# Patient Record
Sex: FEMALE | Race: ASIAN | Employment: FULL TIME | URBAN - METROPOLITAN AREA
[De-identification: names, ages, dates, MRNs, and addresses within clinical notes are randomized per-mention and may not be internally consistent; named-entity substitution may affect disease eponyms.]

---

## 2017-01-05 ENCOUNTER — OFFICE VISIT (OUTPATIENT)
Dept: FAMILY MEDICINE | Facility: CLINIC | Age: 42
End: 2017-01-05
Payer: OTHER MISCELLANEOUS

## 2017-01-05 ENCOUNTER — TELEPHONE (OUTPATIENT)
Dept: FAMILY MEDICINE | Facility: CLINIC | Age: 42
End: 2017-01-05

## 2017-01-05 VITALS
BODY MASS INDEX: 23.99 KG/M2 | HEART RATE: 61 BPM | HEIGHT: 59 IN | SYSTOLIC BLOOD PRESSURE: 92 MMHG | DIASTOLIC BLOOD PRESSURE: 60 MMHG | TEMPERATURE: 96.5 F | RESPIRATION RATE: 14 BRPM | OXYGEN SATURATION: 99 % | WEIGHT: 119 LBS

## 2017-01-05 DIAGNOSIS — E78.00 HYPERCHOLESTEROLEMIA: ICD-10-CM

## 2017-01-05 DIAGNOSIS — R10.31 ABDOMINAL PAIN, RIGHT LOWER QUADRANT: Primary | ICD-10-CM

## 2017-01-05 DIAGNOSIS — E55.9 VITAMIN D DEFICIENCY: ICD-10-CM

## 2017-01-05 DIAGNOSIS — M77.8 DELTOID TENDINITIS OF LEFT SHOULDER: ICD-10-CM

## 2017-01-05 DIAGNOSIS — R20.2 PARESTHESIAS: ICD-10-CM

## 2017-01-05 LAB
ALBUMIN UR-MCNC: NEGATIVE MG/DL
APPEARANCE UR: CLEAR
BASOPHILS # BLD AUTO: 0 10E9/L (ref 0–0.2)
BASOPHILS NFR BLD AUTO: 0.3 %
BILIRUB UR QL STRIP: NEGATIVE
COLOR UR AUTO: YELLOW
DIFFERENTIAL METHOD BLD: NORMAL
EOSINOPHIL # BLD AUTO: 0.2 10E9/L (ref 0–0.7)
EOSINOPHIL NFR BLD AUTO: 3.1 %
ERYTHROCYTE [DISTWIDTH] IN BLOOD BY AUTOMATED COUNT: 12.7 % (ref 10–15)
GLUCOSE UR STRIP-MCNC: NEGATIVE MG/DL
HCT VFR BLD AUTO: 37.4 % (ref 35–47)
HGB BLD-MCNC: 12 G/DL (ref 11.7–15.7)
HGB UR QL STRIP: ABNORMAL
KETONES UR STRIP-MCNC: NEGATIVE MG/DL
LEUKOCYTE ESTERASE UR QL STRIP: NEGATIVE
LYMPHOCYTES # BLD AUTO: 1.6 10E9/L (ref 0.8–5.3)
LYMPHOCYTES NFR BLD AUTO: 23.1 %
MCH RBC QN AUTO: 28.6 PG (ref 26.5–33)
MCHC RBC AUTO-ENTMCNC: 32.1 G/DL (ref 31.5–36.5)
MCV RBC AUTO: 89 FL (ref 78–100)
MONOCYTES # BLD AUTO: 0.6 10E9/L (ref 0–1.3)
MONOCYTES NFR BLD AUTO: 8.8 %
NEUTROPHILS # BLD AUTO: 4.4 10E9/L (ref 1.6–8.3)
NEUTROPHILS NFR BLD AUTO: 64.7 %
NITRATE UR QL: NEGATIVE
NON-SQ EPI CELLS #/AREA URNS LPF: ABNORMAL /LPF
PH UR STRIP: 7 PH (ref 5–7)
PLATELET # BLD AUTO: 269 10E9/L (ref 150–450)
RBC # BLD AUTO: 4.19 10E12/L (ref 3.8–5.2)
RBC #/AREA URNS AUTO: ABNORMAL /HPF (ref 0–2)
SP GR UR STRIP: 1.01 (ref 1–1.03)
URN SPEC COLLECT METH UR: ABNORMAL
UROBILINOGEN UR STRIP-ACNC: 0.2 EU/DL (ref 0.2–1)
WBC # BLD AUTO: 6.8 10E9/L (ref 4–11)
WBC #/AREA URNS AUTO: ABNORMAL /HPF (ref 0–2)

## 2017-01-05 PROCEDURE — 81001 URINALYSIS AUTO W/SCOPE: CPT | Performed by: FAMILY MEDICINE

## 2017-01-05 PROCEDURE — 36415 COLL VENOUS BLD VENIPUNCTURE: CPT | Performed by: FAMILY MEDICINE

## 2017-01-05 PROCEDURE — 99214 OFFICE O/P EST MOD 30 MIN: CPT | Performed by: FAMILY MEDICINE

## 2017-01-05 PROCEDURE — 85025 COMPLETE CBC W/AUTO DIFF WBC: CPT | Performed by: FAMILY MEDICINE

## 2017-01-05 PROCEDURE — 99000 SPECIMEN HANDLING OFFICE-LAB: CPT | Performed by: FAMILY MEDICINE

## 2017-01-05 PROCEDURE — 82306 VITAMIN D 25 HYDROXY: CPT | Mod: 90 | Performed by: FAMILY MEDICINE

## 2017-01-05 NOTE — PATIENT INSTRUCTIONS
1. ICE  decreases inflammation & kills the pain coming from the nerves     WARM SOAKS/PACKS  Relax & loosen up tight muscles to reduce the pain of the        muscle tightness & spasm    2. PLEASE  Return if not better in 1-4 days     3. Consider asking re workman comp

## 2017-01-05 NOTE — MR AVS SNAPSHOT
After Visit Summary   1/5/2017    Zo Qureshi    MRN: [id number]           Patient Information     Date Of Birth          1975        Visit Information        Provider Department      1/5/2017 1:00 PM Fatoumata Herman MD [facility]        Today's Diagnoses     Abdominal pain, right lower quadrant- m strain onset mid to  end Dec 2016     -  1     Deltoid tendinitis of left shoulder onset after increased work mid Dec , 2016          Paresthesias 1st 3 fingers after increased work mid Dec          Hypercholesterolemia         Vitamin D deficiency           Care Instructions    1. ICE  decreases inflammation & kills the pain coming from the nerves     WARM SOAKS/PACKS  Relax & loosen up tight muscles to reduce the pain of the        muscle tightness & spasm    2. PLEASE  Return if not better in 1-4 days     3. Consider asking re workman comp         Follow-ups after your visit        Additional Services     [facility] PT, HAND, AND CHIROPRACTIC REFERRAL       **This order will print in the [facility] Scheduling Office**    Physical Therapy, Hand Therapy and Chiropractic Care are available through:    *[facility]  *[facility]  *[facility]    Call one number to schedule at any of the above locations: [phone].    Your provider has referred you to: As Indicated:     Indication/Reason for Referral:   Onset of Illness:   Therapy Orders: Evaluate and Treat  Special Programs:   Special Request:     Joe Diaz      Additional Comments for the Therapist or Chiropractor:     Please be aware that coverage of these services is subject to the terms and limitations of your health insurance plan.  Call member services at your health plan with any benefit or coverage questions.      Please bring the following to your appointment:    *Your personal calendar for scheduling future appointments  *Comfortable clothing             "      Follow-up notes from your care team     Return in about 2 weeks (around 1/19/2017) for f/u injury .      Your next 10 appointments already scheduled     Jan 09, 2017  5:00 PM   [facility] Baron MD   [facility]    [address] [place] [address] [id number] [phone]              Who to contact     If you have questions or need follow up information about today's clinic visit or your schedule please contact [facility] directly at [phone].  Normal or non-critical lab and imaging results will be communicated to you by MyChart, letter or phone within 4 business days after the clinic has received the results. If you do not hear from us within 7 days, please contact the clinic through MyChart or phone. If you have a critical or abnormal lab result, we will notify you by phone as soon as possible.  Submit refill requests through [facility] or call your pharmacy and they will forward the refill request to us. Please allow 3 business days for your refill to be completed.          Additional Information About Your Visit        Care EveryWhere ID     This is your Care EveryWhere ID. This could be used by other organizations to access your [place] medical records  [id number]        Your Vitals Were     Pulse Temperature Respirations    61 96.5  F (35.8  C) (Tympanic) 14    Height BMI (Body Mass Index) Pulse Oximetry    4' 11\" (1.499 m) 24.02 kg/m2 99%    Last Period Breastfeeding?       01/04/2017 (Exact Date) No        Blood Pressure from Last 3 Encounters:   01/05/17 92/60   09/27/16 106/80   06/20/16 104/58    Weight from Last 3 Encounters:   01/05/17 119 lb (53.978 kg)   09/27/16 121 lb (54.885 kg)   06/20/16 121 lb (54.885 kg)              We Performed the Following     CBC with platelets differential     [facility] PT, HAND, AND CHIROPRACTIC REFERRAL     UA reflex to Microscopic and Culture     Urine Microscopic     " Vitamin D Deficiency        Primary Care Provider Office Phone # Fax #    Terri Baron [phone]       [facility] [address] [facility] [address]        Thank you!     Thank you for choosing [facility]  for your care. Our goal is always to provide you with excellent care. Hearing back from our patients is one way we can continue to improve our services. Please take a few minutes to complete the written survey that you may receive in the mail after your visit with us. Thank you!             Your Updated Medication List - Protect others around you: Learn how to safely use, store and throw away your medicines at www.disposemymeds.org.          This list is accurate as of: 1/5/17  2:10 PM.  Always use your most recent med list.                   Brand Name Dispense Instructions for use    benzonatate 200 MG capsule    TESSALON    21 capsule    Take 1 capsule (200 mg) by mouth 3 times daily as needed for cough       clindamycin-benzoyl peroxide gel    BENZACLIN    50 g    Apply to  Affected area initially once daily for 2 weeks and then increase to 2 times daily if tolerated       loratadine 10 MG tablet    CLARITIN    30 tablet    Take 1 tablet (10 mg) by mouth daily       PATADAY 0.2 % Soln   Generic drug:  olopatadine HCl          phenazopyridine 200 MG tablet    PYRIDIUM    6 tablet    Take 1 tablet (200 mg) by mouth 3 times daily as needed for irritation       traMADol 50 MG tablet    ULTRAM    40 tablet    Take 1-2 tablets ( mg) by mouth every 6 hours as needed for moderate pain       vitamin D 2000 UNITS tablet     100 tablet    Take 2,000 Units by mouth daily

## 2017-01-05 NOTE — PROGRESS NOTES
SUBJECTIVE:                                                    Zo Qureshi is a 41 year old female who presents to clinic today for the following health issues:    Abdominal Pain      Duration: 01/04/17 ONSET awoke her at   12 MN and and better by 3-4am     Ate breakfast at 9am -rice crackers and nuts     Started up again 10;30am     Constant, not crampy     LMP= 1-31-17    No PMH     Supper rice and fish at 7 pm    Description (location/character/radiation): Pain on  right side of abdomen-more in UQ        Associated flank pain: None    Intensity:  moderate    Accompanying signs and symptoms:        Fever/Chills: Yes        Gas/Bloating: YES       Nausea/vomitting: no        Diarrhea: No        Dysuria or Hematuria: no     History (previous similar pain/trauma/previous testing): NA    Precipitating or alleviating factors:    Di start after longer hrs and harder work in mid 12-16     Does stress this RUQ  By pulling a cleaning machine across the table toward her from the Lt side        Pain worse with eating/BM/urination: No       Pain relieved by BM: no     Therapies tried and outcome: Tylenol    LMP:  01/04/17    PMH UTIs with last in 9-16          Medication Followup of Vitamin D     Taking Medication as prescribed: yes 2000IU a d     Side Effects:  None    Medication Helping Symptoms:  Yes    Vit D = 21 in 5-14     Hyperlipidemia: cholesterol Follow-Up      Rate your low fat/cholesterol diet?: not monitoring fat    Taking statin?  No    Other lipid medications/supplements?:  none     Musculoskeletal problem/pain: Lt Nondominant Deltoid      Duration: since heavier work longer hrs in early 12-16    Description  Location: above     Intensity:  moderate, 7/10    Accompanying signs and symptoms: none    History  Previous similar problem: no   Previous evaluation:  none    Precipitating or alleviating factors:  Trauma or overuse: YES  Aggravating factors include: lifting, exercise and overuse    Therapies tried  "and outcome: rest/inactivity        PARAESTHESIA of  RT Dominant Fingers #1-3       Duration: sice mid 12-16     Description  Location: above     Intensity:  moderate    Accompanying signs and symptoms: numbness and tingling    History  Previous similar problem: no   Previous evaluation:  none    Precipitating or alleviating factors:  Trauma or overuse: YES  Aggravating factors include: exercise, overuse and cold or damp weather    Therapies tried and outcome: rest/inactivity      Problem list and histories reviewed & adjusted, as indicated.  Additional history: as documented    Labs reviewed in EPIC  Problem list, Medication list, Allergies, and Medical/Social/Surgical histories reviewed in [facility] and updated as appropriate.    ROS:  C: NEGATIVE for fever, chills, change in weight  I: NEGATIVE for worrisome rashes, moles or lesions  E: NEGATIVE for vision changes or irritation  E/M: NEGATIVE for ear, mouth and throat problems  R: NEGATIVE for significant cough or SOB  B: NEGATIVE for masses, tenderness or discharge  CV: NEGATIVE for chest pain, palpitations or peripheral edema  GI: NEGATIVE for nausea,  heartburn, or change in bowel habits,NEGA TIVE for constipation, diarrhea, dyspepsia, nausea and vomiting and abdominal pain RUQ and a little  RLQ  : NEGATIVE for frequency, dysuria, or hematuria  M: NEGATIVE for significant arthralgias or myalgia  N: NEGATIVE for weakness, dizziness or paresthesias  E: NEGATIVE for temperature intolerance, skin/hair changes  H: NEGATIVE for bleeding problems  P: NEGATIVE for changes in mood or affect    OBJECTIVE:                                                    BP 92/60 mmHg  Pulse 61  Temp(Src) 96.5  F (35.8  C) (Tympanic)  Resp 14  Ht 4' 11\" (1.499 m)  Wt 119 lb (53.978 kg)  BMI 24.02 kg/m2  SpO2 99%  LMP 01/04/2017 (Exact Date)  Breastfeeding? No  Body mass index is 24.02 kg/(m^2).  GENERAL: healthy, alert and no distress; a little uncomfortable with standing up and " walking   EYES: Eyes grossly normal to inspection, PERRL and conjunctivae and sclerae normal  RESP: lungs clear to auscultation - no rales, rhonchi or wheezes  CV: regular rate and rhythm, normal S1 S2, no S3 or S4, no murmur, click or rub, no peripheral edema and peripheral pulses strong  ABDOMEN: tenderness RUQ > sup  LUQ and bowel sounds normal kumar rebound, referral of pain   MS: no gross musculoskeletal defects noted, no edema  SKIN: no suspicious lesions or rashes  NEURO: Normal strength and tone, mentation intact and speech normal  PSYCH: mentation appears normal, affect normal/bright    Diagnostic Test Results:  Results for orders placed or performed in visit on 01/05/17 (from the past 24 hour(s))   UA reflex to Microscopic and Culture   Result Value Ref Range    Color Urine Yellow     Appearance Urine Clear     Glucose Urine Negative NEG mg/dL    Bilirubin Urine Negative NEG    Ketones Urine Negative NEG mg/dL    Specific Gravity Urine 1.015 1.003 - 1.035    Blood Urine Large (A) NEG    pH Urine 7.0 5.0 - 7.0 pH    Protein Albumin Urine Negative NEG mg/dL    Urobilinogen Urine 0.2 0.2 - 1.0 EU/dL    Nitrite Urine Negative NEG    Leukocyte Esterase Urine Negative NEG    Source Midstream Urine    Urine Microscopic   Result Value Ref Range    WBC Urine O - 2 0 - 2 /HPF    RBC Urine 10-25 (A) 0 - 2 /HPF    Squamous Epithelial /LPF Urine Few FEW /LPF   CBC with platelets differential   Result Value Ref Range    WBC 6.8 4.0 - 11.0 10e9/L    RBC Count 4.19 3.8 - 5.2 10e12/L    Hemoglobin 12.0 11.7 - 15.7 g/dL    Hematocrit 37.4 35.0 - 47.0 %    MCV 89 78 - 100 fl    MCH 28.6 26.5 - 33.0 pg    MCHC 32.1 31.5 - 36.5 g/dL    RDW 12.7 10.0 - 15.0 %    Platelet Count 269 150 - 450 10e9/L    Diff Method Automated Method     % Neutrophils 64.7 %    % Lymphocytes 23.1 %    % Monocytes 8.8 %    % Eosinophils 3.1 %    % Basophils 0.3 %    Absolute Neutrophil 4.4 1.6 - 8.3 10e9/L    Absolute Lymphocytes 1.6 0.8 - 5.3 10e9/L     Absolute Monocytes 0.6 0.0 - 1.3 10e9/L    Absolute Eosinophils 0.2 0.0 - 0.7 10e9/L    Absolute Basophils 0.0 0.0 - 0.2 10e9/L        ASSESSMENT/PLAN:                                                              ICD-10-CM    1. Abdominal pain, right lower quadrant- m strain onset mid to  end Dec 2016  R10.31 CBC with platelets differential     UA reflex to Microscopic and Culture     Urine Microscopic     [facility] PT[facility] HAND, AND CHIR[facility]RACTIC REFERRAL   2. Deltoid tendinitis of left shoulder onset after increased work mid Dec , 2016  M75.82 [facility], [facility] REFERRAL   3. Paresthesias 1st 3 fingers after increased work mid Dec  R20.2 [facility] HAND, [facility]CT[facility] REFERRAL   4. Hypercholesterolemia E78.00    5. Vitamin D deficiency E55.9 Vitamin D Deficiency       Patient Instructions   1. ICE  decreases inflammation & kills the pain coming from the nerves     WARM SOAKS/PACKS  Relax & loosen up tight muscles to reduce the pain of the        muscle tightness & spasm    2. PLEASE  Return if not better in 1-4 days     3. Consider asking re workman comp       As this was caused by the work   Pt now admits to working extra hrs and harder and more intense work at work the weeks prior to Christmas, which is when the mm strain above began in arms and abdom     She holds a drill tightly with her Lt arm and the drill with her Rt to the point of numbness.  She must reach across to the far end of her desk to get the cleaning machine and demos this and shows how she hurt her abdom mm on the Rt ant flank      Fatoumata Herman MD  [facility]

## 2017-01-05 NOTE — NURSING NOTE
"Chief Complaint   Patient presents with     Abdominal Pain     BP 92/60 mmHg  Pulse 61  Temp(Src) 96.5  F (35.8  C) (Tympanic)  Resp 14  Ht 4' 11\" (1.499 m)  Wt 119 lb (53.978 kg)  BMI 24.02 kg/m2  SpO2 99%  LMP 01/04/2017 (Exact Date)  Breastfeeding? No Estimated body mass index is 24.02 kg/(m^2) as calculated from the following:    Height as of this encounter: 4' 11\" (1.499 m).    Weight as of this encounter: 119 lb (53.978 kg).  BP completed using cuff size: luz maria Mcclellan CMA    There are no preventive care reminders to display for this patient.  Health Maintenance reviewed at today's visit patient asked to schedule/complete:   None, Health Maintenance up to date.      "

## 2017-01-05 NOTE — Clinical Note
[facility]  [address]  [place] [id number] [phone]                                                                                                           Zo Qureshi  [address] [address] [address]    January 6, 2017      Dear Zo,    The results of your recent tests were reviewed and are enclosed.     They are all normal     THE FOLLOWING ARE EXPLANATIONS OF SOME OF OUR LAB TESTS     YOU DID NOT NECESSARILY HAVE ALL OF THESE DONE   Hgb is the blood iron level  WBC means White Blood Cells  Platelets are small blood cells that help with forming the blood clots along with other blood factors.  Electrolytes are Sodium, Potassium, Calcium, Magnesium, Phosphorus.  Liver tests are: AST, ALT, Bilirubin, Alkaline Phosphatase.  Kidney tests are Creatinine, GFR.  HDL Cholesterol - is the good cholesterol and it is good to have it high.  LDL cholesterol is the bad cholesterol and it is good to have it low.  It is recommended to have LDL less than 130 for people with hypertension and to have it less than 100 for people with heart disease, diabetes and chronic kidney disease.  Triglycerides are another type of lipid that can cause heart disease, like the cholesterol and should be kept low   Thyroid tests are TSH, T4, T3  Glucose is sugar.  A1c is a test that gives us an idea about how well was controlled the diabetes for the last 3 months.   PSA stands for Prostate Specific Antigen and it can be elevated with prostate cancer or prostate inflammation.    Your vitamin D is normal.  Please take 1,000 units in the summer and 2,000 units in the winter to maintain it     Please continue on the same medications  Results for orders placed or performed in visit on 01/05/17   CBC with platelets differential   Result Value Ref Range    WBC 6.8 4.0 - 11.0 10e9/L    RBC Count 4.19 3.8 - 5.2 10e12/L    Hemoglobin 12.0 11.7 - 15.7 g/dL    Hematocrit  37.4 35.0 - 47.0 %    MCV 89 78 - 100 fl    MCH 28.6 26.5 - 33.0 pg    MCHC 32.1 31.5 - 36.5 g/dL    RDW 12.7 10.0 - 15.0 %    Platelet Count 269 150 - 450 10e9/L    Diff Method Automated Method     % Neutrophils 64.7 %    % Lymphocytes 23.1 %    % Monocytes 8.8 %    % Eosinophils 3.1 %    % Basophils 0.3 %    Absolute Neutrophil 4.4 1.6 - 8.3 10e9/L    Absolute Lymphocytes 1.6 0.8 - 5.3 10e9/L    Absolute Monocytes 0.6 0.0 - 1.3 10e9/L    Absolute Eosinophils 0.2 0.0 - 0.7 10e9/L    Absolute Basophils 0.0 0.0 - 0.2 10e9/L   UA reflex to Microscopic and Culture   Result Value Ref Range    Color Urine Yellow     Appearance Urine Clear     Glucose Urine Negative NEG mg/dL    Bilirubin Urine Negative NEG    Ketones Urine Negative NEG mg/dL    Specific Gravity Urine 1.015 1.003 - 1.035    Blood Urine Large (A) NEG    pH Urine 7.0 5.0 - 7.0 pH    Protein Albumin Urine Negative NEG mg/dL    Urobilinogen Urine 0.2 0.2 - 1.0 EU/dL    Nitrite Urine Negative NEG    Leukocyte Esterase Urine Negative NEG    Source Midstream Urine    Vitamin D Deficiency   Result Value Ref Range    Vitamin D Deficiency screening 38 20 - 75 ug/L   Urine Microscopic   Result Value Ref Range    WBC Urine O - 2 0 - 2 /HPF    RBC Urine 10-25 (A) 0 - 2 /HPF    Squamous Epithelial /LPF Urine Few FEW /LPF     Thank you for choosing [facility].  We appreciate the opportunity to serve you and look forward to supporting your healthcare needs in the future.    If you have any questions or concerns, please call me or my staff at [phone].      Sincerely,    Fatoumata Herman MD

## 2017-01-05 NOTE — TELEPHONE ENCOUNTER
Patient calling and states having RLQ since last night.  Rates pain a 6 out of 10.  States pain is constant.  Has period.  Denies chance of pregnancy.  States having chills and sweating.  Took Tylenol but just now so unsure if helped at all yet.  No openings in AV today.  Offered tomorrow with option of UC or ER if worsened before appt.  Declined that.  Declined LV.  Transferred to appointments to check other locations.  Delma Vaz RN

## 2017-01-05 NOTE — Clinical Note
[facility]  [address]  [place] [address]  [phone]                                                                                                           Zo Qureshi  [address] [address] [address]    January 5, 2017          Dear Zo Qureshi,    Seen by me today.  Off work Jan 5-6 with RTW on Monday Jan 9, 2017                   Sincerely,    Fatoumata Herman MD

## 2017-01-06 ENCOUNTER — THERAPY VISIT (OUTPATIENT)
Dept: PHYSICAL THERAPY | Facility: CLINIC | Age: 42
End: 2017-01-06
Payer: OTHER MISCELLANEOUS

## 2017-01-06 DIAGNOSIS — M25.512 BILATERAL SHOULDER PAIN: Primary | ICD-10-CM

## 2017-01-06 DIAGNOSIS — M25.511 BILATERAL SHOULDER PAIN: Primary | ICD-10-CM

## 2017-01-06 DIAGNOSIS — R29.898 WEAKNESS OF SHOULDER: ICD-10-CM

## 2017-01-06 LAB — DEPRECATED CALCIDIOL+CALCIFEROL SERPL-MC: 38 UG/L (ref 20–75)

## 2017-01-06 PROCEDURE — 97162 PT EVAL MOD COMPLEX 30 MIN: CPT | Mod: GP | Performed by: PHYSICAL THERAPIST

## 2017-01-06 NOTE — PROGRESS NOTES
Quick Note:    .  Please see attached lab results  They are all normal     THE FOLLOWING ARE EXPLANATIONS OF SOME OF OUR LAB TESTS   YOU DID NOT NECESSARILY HAVE ALL OF THESE DONE     Hgb is the blood iron level  WBC means White Blood Cells  Platelets are small blood cells that help with forming the blood clots along with other blood factors.  Electrolytes are Sodium, Potassium, Calcium, Magnesium, Phosphorus.  Liver tests are: AST, ALT, Bilirubin, Alkaline Phosphatase.  Kidney tests are Creatinine, GFR.  HDL Cholesterol - is the good cholesterol and it is good to have it high.  LDL cholesterol is the bad cholesterol and it is good to have it low.  It is recommended to have LDL less than 130 for people with hypertension and to have it less than 100 for people with heart disease, diabetes and chronic kidney disease.  Triglycerides are another type of lipid that can cause heart disease, like the cholesterol and should be kept low   Thyroid tests are TSH, T4, T3  Glucose is sugar.  A1c is a test that gives us an idea about how well was controlled the diabetes for the last 3 months.   PSA stands for Prostate Specific Antigen and it can be elevated with prostate cancer or prostate inflammation.    Your vitamin D is normal. Please take 1,000 units in the summer and 2,000 units in the winter to maintain it     Please continue on the same medications    ______

## 2017-01-09 ENCOUNTER — OFFICE VISIT (OUTPATIENT)
Dept: FAMILY MEDICINE | Facility: CLINIC | Age: 42
End: 2017-01-09
Payer: OTHER MISCELLANEOUS

## 2017-01-09 VITALS
HEART RATE: 81 BPM | BODY MASS INDEX: 24.39 KG/M2 | WEIGHT: 121 LBS | HEIGHT: 59 IN | OXYGEN SATURATION: 99 % | SYSTOLIC BLOOD PRESSURE: 96 MMHG | DIASTOLIC BLOOD PRESSURE: 70 MMHG | RESPIRATION RATE: 16 BRPM | TEMPERATURE: 98 F

## 2017-01-09 DIAGNOSIS — M25.512 ACUTE PAIN OF BOTH SHOULDERS: ICD-10-CM

## 2017-01-09 DIAGNOSIS — M25.511 ACUTE PAIN OF BOTH SHOULDERS: ICD-10-CM

## 2017-01-09 DIAGNOSIS — R10.11 ABDOMINAL PAIN, RIGHT UPPER QUADRANT: Primary | ICD-10-CM

## 2017-01-09 PROCEDURE — 99213 OFFICE O/P EST LOW 20 MIN: CPT | Performed by: FAMILY MEDICINE

## 2017-01-09 RX ORDER — TRAMADOL HYDROCHLORIDE 50 MG/1
50-100 TABLET ORAL EVERY 6 HOURS PRN
Qty: 40 TABLET | Refills: 0 | Status: SHIPPED | OUTPATIENT
Start: 2017-01-09 | End: 2017-02-06

## 2017-01-09 NOTE — MR AVS SNAPSHOT
After Visit Summary   1/9/2017    Zo Qureshi    MRN: [id number]           Patient Information     Date Of Birth          1975        Visit Information        Provider Department      1/9/2017 5:00 PM Terri Baron MD [facility]        Today's Diagnoses     Abdominal pain, right upper quadrant    -  1     Acute pain of both shoulders            Follow-ups after your visit        Your next 10 appointments already scheduled     Khoi 10, 2017 11:00 AM   SHORT with Fatoumata Herman MD   [facility] ([facility]    [address]  [facility] [address]   [phone]            Jan 13, 2017  3:30 PM   [facility] with Estefani Finley, PT   [facility] ([facility]  )    [address] [place] [address]   [phone]            Jan 20, 2017  3:30 PM   [facility] with Estefani Finley, PT   [facility] ([facility]  )    [address] [place] [address]   [phone]              Future tests that were ordered for you today     Open Future Orders        Priority Expected Expires Ordered    US Abdomen Limited Routine  1/9/2018 1/9/2017            Who to contact     If you have questions or need follow up information about today's clinic visit or your schedule please contact [facility] directly at [phone].  Normal or non-critical lab and imaging results will be communicated to you by MyChart, letter or phone within 4 business days after the clinic has received the results. If you do not hear from us within 7 days, please contact the clinic through MyChart or phone. If you have a critical or abnormal lab result, we will notify you by phone as soon as possible.  Submit refill requests through [employer] or call your pharmacy and they will forward the refill request to us. Please allow 3 business days for your refill  "to be completed.          Additional Information About Your Visit        Care EveryWhere ID     This is your Care EveryWhere ID. This could be used by other organizations to access your [place] medical records  [id number]        Your Vitals Were     Pulse Temperature Respirations    81 98  F (36.7  C) (Oral) 16    Height BMI (Body Mass Index) Pulse Oximetry    4' 11\" (1.499 m) 24.43 kg/m2 99%    Last Period          01/04/2017 (Exact Date)         Blood Pressure from Last 3 Encounters:   01/09/17 96/70   01/05/17 92/60   09/27/16 106/80    Weight from Last 3 Encounters:   01/09/17 121 lb (54.885 kg)   01/05/17 119 lb (53.978 kg)   09/27/16 121 lb (54.885 kg)                 Where to get your medicines      Some of these will need a paper prescription and others can be bought over the counter.  Ask your nurse if you have questions.     Bring a paper prescription for each of these medications    - traMADol 50 MG tablet       Primary Care Provider Office Phone # Fax #    Terri Baron [phone]       [facility] [address] [facility] [address]        Thank you!     Thank you for choosing [facility]  for your care. Our goal is always to provide you with excellent care. Hearing back from our patients is one way we can continue to improve our services. Please take a few minutes to complete the written survey that you may receive in the mail after your visit with us. Thank you!             Your Updated Medication List - Protect others around you: Learn how to safely use, store and throw away your medicines at www.disposemymeds.org.          This list is accurate as of: 1/9/17  5:49 PM.  Always use your most recent med list.                   Brand Name Dispense Instructions for use    clindamycin-benzoyl peroxide gel    BENZACLIN    50 g    Apply to  Affected area initially once daily for 2 weeks and then increase to 2 times daily if tolerated       loratadine " 10 MG tablet    CLARITIN    30 tablet    Take 1 tablet (10 mg) by mouth daily       PATADAY 0.2 % Soln   Generic drug:  olopatadine HCl          traMADol 50 MG tablet    ULTRAM    40 tablet    Take 1-2 tablets ( mg) by mouth every 6 hours as needed for moderate pain

## 2017-01-09 NOTE — NURSING NOTE
"Chief Complaint   Patient presents with     Neck Pain     both sides of neck     Musculoskeletal Problem     both shoulders and arms- left arm pain is more shoulder/bicep area and right is whole arm and fingers, pt reports right hand is weaker     Initial BP 96/70 mmHg  Pulse 81  Temp(Src) 98  F (36.7  C) (Oral)  Resp 16  Ht 4' 11\" (1.499 m)  Wt 121 lb (54.885 kg)  BMI 24.43 kg/m2  SpO2 99%  LMP 01/04/2017 (Exact Date) Estimated body mass index is 24.43 kg/(m^2) as calculated from the following:    Height as of this encounter: 4' 11\" (1.499 m).    Weight as of this encounter: 121 lb (54.885 kg).  BP completed using cuff size regular Right Arm    Health Maintenance reviewed - Yes: (pt is up to date)  Tobacco Verified: Yes  Family History Updated: Yes  MyChart Offered: Yes  Immunizations Up to Date:  Yes    William Mixon CMA      "

## 2017-01-09 NOTE — Clinical Note
[facility]  [address] [place], [address]  [phone]        January 9, 2017    RE: Zo Qureshi                                                                                                                                                       [address] [facility] [address]            To Whom It May Concern,   Zo Qureshi is a patient of mine.   She is suffering from bilateral shoulder tendonitis.   She will need work restrictions for the next 4-6 weeks.   She will be attending physical therapy during this time.   She will need to refrain from any repetitive work with the upper body/shoulders during this time.             Sincerely,    Terri Robles M.D.

## 2017-01-09 NOTE — PROGRESS NOTES
"  SUBJECTIVE:                                                    Zo Qureshi is a 41 year old female who presents to clinic today for the following health issues:    Her shoulder and arms have been hurting for many months but worse for the last 2-3 weeks.   Now her hands and fingers ar hurting too.   They are stiff.   Last Thursday at work she had right upper quadrant pain.   She laid down for 30 minutes and it still bothered her and she was told to come in.   She was seen and the MD felt it was muscle strain and also felt she had shoulder tendonitis due to repetitive work.   She has worked for over 15 years doing the same work.   It is very repetitive with the hands and arms.         Medication Followup of Tramadol    Taking Medication as prescribed: yes    Side Effects:  None    Medication Helping Symptoms:  yes       Neck Pain/Shoulder Pain     Onset: on and off couple weeks    Description:   Location: both sides of neck, both shoulders and arms  Radiation: into the right shoulder, into the right forearm, into the right hand and nto the left shoulder    Intensity: severe    Progression of Symptoms:  worsening    Accompanying Signs & Symptoms:  Burning, prickly sensation (paresthesias) in arm(s): YES- right arm  Numbness in arm(s): YES- right hand  Weakness in arm(s):  YES- right hand  Fever: no   Headache: YES  Nausea and/or vomiting: no    History:   Trauma: no   Previous neck pain: no   Previous surgery or injections: no   Previous Imaging (MRI,X ray): no     Precipitating factors:   Does movement increase the pain:  YES    Alleviating factors: 3 fingers on right hand are \"tight\" feeling     Therapies Tried and outcome:  Tramadol      Past Medical History   Diagnosis Date     Unspecified closed fracture of pelvis 2000     left fracture of pelvis after delivery     INFLAM SPONDYLOPATHY NOS   1/7/2008     HSIL on Pap smear 09/21/11     MARTA 2 and 3     Leukocytopenia 3/10/2014     Thrombocytopenia (H) 3/10/2014 " "      Past Surgical History   Procedure Laterality Date     C nonspecific procedure  10/00     after delivery 2 units of prbcs secondary to placenta     Tubal ligation  5/2009     laparoscopic tubal ligation     Leep tx, cervical  12/19/11     MARTA II       MEDICATIONS:  Current Outpatient Prescriptions   Medication     loratadine (CLARITIN) 10 MG tablet     traMADol (ULTRAM) 50 MG tablet     PATADAY 0.2 % SOLN     clindamycin-benzoyl peroxide (BENZACLIN) gel     No current facility-administered medications for this visit.       SOCIAL HISTORY:  Social History   Substance Use Topics     Smoking status: Never Smoker      Smokeless tobacco: Never Used     Alcohol Use: 0.0 oz/week     0 Standard drinks or equivalent per week      Comment: occasionally       Family History   Problem Relation Age of Onset     Hypertension Father      Lipids Father      Hypertension Mother      Lipids Mother      DIABETES No family hx of      Coronary Artery Disease No family hx of      Hyperlipidemia No family hx of      CEREBROVASCULAR DISEASE No family hx of      Breast Cancer No family hx of      Colon Cancer No family hx of      Prostate Cancer No family hx of      Other Cancer No family hx of      Depression No family hx of      Anxiety Disorder No family hx of      MENTAL ILLNESS No family hx of      Substance Abuse No family hx of      Anesthesia Reaction No family hx of      Asthma No family hx of      OSTEOPOROSIS No family hx of      Genetic Disorder No family hx of      Thyroid Disease No family hx of      Obesity No family hx of      Unknown/Adopted No family hx of        Objective:  Blood pressure 96/70, pulse 81, temperature 98  F (36.7  C), temperature source Oral, resp. rate 16, height 4' 11\" (1.499 m), weight 121 lb (54.885 kg), last menstrual period 01/04/2017, SpO2 99 %, not currently breastfeeding.  Chest: Clear to auscultation bilaterally.  No wheezes, rales or retractions.  CV: Regular rate and rhythm without " murmurs, rubs or gallops.  ABDOMEN:  Positive bowel sounds, soft, nondistended and mild tender in right upper quadrant with no rebound.  No masses are palpated and there is no organomegaly or inguinal lymphadenopathy.  Shoulder exam with negative impingement on both,  There is no swelling, redness or gross deformity of the shoulder joints.  There is limited range of motion with active range of motion in the mid arch and even some on the left with passive range of motion.  The empty can sign is + on both sides but the pain is more in the bottom of the neck.    The rotater cuff strength is 5/5 with some pain with internal rotation and some with external rotation.    Assessment:  1. Shoulder pain - both sides - feel most likely tendonitis  2. Neck pain - very stiff and tight  3. Right upper quadrant pain - is better now - could have been muscles strain but will rule out gallstones    Plan:  1. Letter for work restrictions for 4-6 weeks  2. Continue PT  3. Refills per epicare  4. RUQ u/s  5. If not better in 4 weeks will consider MRI of left shoulder first

## 2017-01-13 ENCOUNTER — THERAPY VISIT (OUTPATIENT)
Dept: PHYSICAL THERAPY | Facility: CLINIC | Age: 42
End: 2017-01-13
Payer: OTHER MISCELLANEOUS

## 2017-01-13 DIAGNOSIS — M54.12 CERVICAL RADICULOPATHY: ICD-10-CM

## 2017-01-13 DIAGNOSIS — M25.511 BILATERAL SHOULDER PAIN: Primary | ICD-10-CM

## 2017-01-13 DIAGNOSIS — M25.512 BILATERAL SHOULDER PAIN: Primary | ICD-10-CM

## 2017-01-13 PROCEDURE — 97110 THERAPEUTIC EXERCISES: CPT | Mod: GP | Performed by: PHYSICAL THERAPIST

## 2017-01-13 PROCEDURE — 97112 NEUROMUSCULAR REEDUCATION: CPT | Mod: GP | Performed by: PHYSICAL THERAPIST

## 2017-01-13 PROCEDURE — 97014 ELECTRIC STIMULATION THERAPY: CPT | Mod: GP | Performed by: PHYSICAL THERAPIST

## 2017-01-13 NOTE — PROGRESS NOTES
HPI  System  Physical Exam  General   ROS    SUBJECTIVE  Subjective changes as noted by pt:  Feels worse. Just came from 8 hour day at work and having a lot of pain. Increased B Upper trap pain, left shoulder pain, right finger pain. Had HA and dizziness yeaterday. Obtained work note from MD for absence. Patient denies any implanted devices.  Current pain level:Current Pain level: 8/10   Changes in function:  Less functional ability at the current moment. Tolerates 1 hour of sitting at the start of the day before needing to move   Adverse reaction to treatment or activity:  None    OBJECTIVE  Changes in objective findings:   Manual traction did not provide UE sx relief. Hypersensitivity to palpation of midline of thoracic spine, rhomboids, upper trap. Did not tolerate much treatment today due to pain and sensitivity.     Neurological:  Motor Deficit:  Myotomes L R   C4 (shoulder elevation) 5/5 5/5   C5 (shoulder abduction) 5/5 5/5   C6 (elbow flexion) 5/5 5/5   C7 (elbow extension) 5/5 5/5   C8 (thumb extension) 5/5 4/5   T1 (finger add/abd) 5/5 3+/5     Sensory Deficit: Symmetrical and normal  DTR's:  +Loc on R, +AC joint reflex - shoulder abd  C5 (Biceps):  Left:  3+  Right:  3+   C6 (Brachioradialis):  Left:  3+  Right:  3+      C7 (Triceps):  Left:  +  Right:  3+    AROM: (Major, Moderate, Minimal or Nil loss)  Movement Loss Cameron Mod Min Nil Quality & Pain   Protrusion    X    Flexion  X   B neck pain   Retraction  X   B neck pain   Extension  X   B neck pain   Left Rotation    X    Right Rotation    X    Left Side Bending   X     Right Side bending   X       SPECIAL TESTS   R L   Spurlings/Quadrant + +   ULTT 1 (median) + +   Distraction - -       ASSESSMENT  Zo's symptom presentation today gives consideration to mechanical neck pain with radicular symptoms as a contribution to her pain. She presents with decreased cervical motion, positive Spurling's test, decreased sensation of median nerve  distribution on R hand, reflexes are hyper and potential + cord signs with acromioclavicular reflex and Pantoja bilaterally. 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 With bilateral presentation, there are some concerns for myelopathy and will keep vigilant for changing symptoms. Patient was also instructed to seek additional medical care if she experiences worsening bilateral symptoms over the weekend and to follow up with me on Monday.    Diagnosis added to POC: Neck pain with radicular symptoms (Cervical radiculopathy)  Pain: hot/cold therapy, E-stim, manual therapy, mechanical traction, patient education, home program  Decreased ROM: therapeutic exercise, neuromuscular re-education, manual therapy  Decreased function: therapeutic activity  Decreased strength: therapeutic exercise  Decreased motor control: neuromuscular re-education    Patient continues to require intervention to meet STG and LTG's: PT  Patient has experienced an exacerbation of symptoms due to just coming off of 8 hour work day.  Response to therapy has shown lack of progress in  pain level and ROM   Progress made towards STG/LTG?  None    PLAN    Continue current treatment plan until patient demonstrates readiness to progress to higher level exercises.  The following modalities have been added:  electrical stimulation, mechanical traction and hot/cold therapy    Please refer to the daily flowsheet for treatment today, total treatment time and time spent performing 1:1 timed codes.

## 2017-01-16 ENCOUNTER — TELEPHONE (OUTPATIENT)
Dept: FAMILY MEDICINE | Facility: CLINIC | Age: 42
End: 2017-01-16

## 2017-01-16 NOTE — TELEPHONE ENCOUNTER
Patient calling to let you know she went to PT on Friday and she should be off work completely.  States did not say how long she should be off work.  Would need letter to be off work.  I did letter but you may want to edit or add next evaluation date.  Can pick letter up.  Call when ready [phone] .

## 2017-01-16 NOTE — Clinical Note
[facility]  [address] [place], [place], [address]  [phone]        January 16, 2017    Zo Qureshi                                                                                                                                        [address] [facility] [address]            To whom it may concern,      Zo Qureshi is a patient of mine.   She is suffering from bilateral shoulder tendonitis.   She will need to attend physical therapy for 4-6 weeks.   Since being evaluated by physical therapy for her issues they are advises she be completely out of work til next evaluation.          Sincerely,          Terri Robles M.D.

## 2017-01-17 ENCOUNTER — HOSPITAL ENCOUNTER (OUTPATIENT)
Dept: ULTRASOUND IMAGING | Facility: CLINIC | Age: 42
Discharge: HOME | End: 2017-01-17
Attending: FAMILY MEDICINE | Admitting: FAMILY MEDICINE
Payer: OTHER MISCELLANEOUS

## 2017-01-17 DIAGNOSIS — R10.11 ABDOMINAL PAIN, RIGHT UPPER QUADRANT: ICD-10-CM

## 2017-01-17 PROCEDURE — 76705 ECHO EXAM OF ABDOMEN: CPT

## 2017-01-19 ENCOUNTER — THERAPY VISIT (OUTPATIENT)
Dept: PHYSICAL THERAPY | Facility: CLINIC | Age: 42
End: 2017-01-19
Payer: OTHER MISCELLANEOUS

## 2017-01-19 DIAGNOSIS — R29.898 WEAKNESS OF SHOULDER: ICD-10-CM

## 2017-01-19 DIAGNOSIS — M25.511 BILATERAL SHOULDER PAIN: ICD-10-CM

## 2017-01-19 DIAGNOSIS — M25.512 BILATERAL SHOULDER PAIN: ICD-10-CM

## 2017-01-19 DIAGNOSIS — M54.12 CERVICAL RADICULOPATHY: Primary | ICD-10-CM

## 2017-01-19 PROCEDURE — 97140 MANUAL THERAPY 1/> REGIONS: CPT | Mod: GP | Performed by: PHYSICAL THERAPIST

## 2017-01-19 PROCEDURE — 97110 THERAPEUTIC EXERCISES: CPT | Mod: GP | Performed by: PHYSICAL THERAPIST

## 2017-01-23 ENCOUNTER — THERAPY VISIT (OUTPATIENT)
Dept: PHYSICAL THERAPY | Facility: CLINIC | Age: 42
End: 2017-01-23
Payer: OTHER MISCELLANEOUS

## 2017-01-23 DIAGNOSIS — R29.898 WEAKNESS OF SHOULDER: ICD-10-CM

## 2017-01-23 DIAGNOSIS — M25.511 BILATERAL SHOULDER PAIN: ICD-10-CM

## 2017-01-23 DIAGNOSIS — M54.12 CERVICAL RADICULOPATHY: Primary | ICD-10-CM

## 2017-01-23 DIAGNOSIS — M25.512 BILATERAL SHOULDER PAIN: ICD-10-CM

## 2017-01-23 PROCEDURE — 97112 NEUROMUSCULAR REEDUCATION: CPT | Mod: GP | Performed by: PHYSICAL THERAPIST

## 2017-01-23 PROCEDURE — 97140 MANUAL THERAPY 1/> REGIONS: CPT | Mod: GP | Performed by: PHYSICAL THERAPIST

## 2017-01-23 PROCEDURE — 97110 THERAPEUTIC EXERCISES: CPT | Mod: GP | Performed by: PHYSICAL THERAPIST

## 2017-01-26 ENCOUNTER — THERAPY VISIT (OUTPATIENT)
Dept: PHYSICAL THERAPY | Facility: CLINIC | Age: 42
End: 2017-01-26
Payer: OTHER MISCELLANEOUS

## 2017-01-26 DIAGNOSIS — M25.511 BILATERAL SHOULDER PAIN: ICD-10-CM

## 2017-01-26 DIAGNOSIS — M25.512 BILATERAL SHOULDER PAIN: ICD-10-CM

## 2017-01-26 DIAGNOSIS — R29.898 WEAKNESS OF SHOULDER: ICD-10-CM

## 2017-01-26 DIAGNOSIS — M54.12 CERVICAL RADICULOPATHY: Primary | ICD-10-CM

## 2017-01-26 PROCEDURE — 97140 MANUAL THERAPY 1/> REGIONS: CPT | Mod: GP | Performed by: PHYSICAL THERAPIST

## 2017-01-26 NOTE — PROGRESS NOTES
Patient arrived late to therapy today due to cramping in her right leg. She states that at times it is also numb. The symptoms of cramping and numbness have been on the left side as well, though not presently, and started around the same time as the rest of her symptoms (neck/shoulder pain). She denies any bowel and bladder dysfunction.     Objective:    Neurological:    Motor Deficit:  Myotomes L R   L1-2 (hip flexion) 4+ 4-   L3 (knee extension) 5 4-   L4 (ankle DF) 5 4   L5 (g. toe ext) 5 4-   S1 (ankle PF or knee flex) 4+ 3+     Reflexes:    L R   Patellar 2+ 2+   Achilles 2+ 3+   Babinski (-) (-)     Sensation: Decreased on the right compared to the left at the anterolateral thigh, lateral and medial calf, lateral and medial foot, dorsum of the foot, and at the heel.     Dural Signs:  - Slump and SLR testing was positive to neural tension of the lower extremity. On the right, the symptoms were reported in the calf where the patient had reported the cramping.     Spring Testing: Symptoms in the leg were reproduced with unilateral PAs at C6 on the right.     Palpation: tenderness to the midline of the neck, and right worse than left sided pain with palpation of the cervical soft tissue.     Other Tests:   - Upper limb tension tests were positive for left sided symptoms of the UE with the median nerve tension test on the left, and positive for right sided symptoms of the UE with radial nerve tension tests on the right.   - Manual traction of the cervical spine reduced UE pain, but generated a pulling sensation in the right LE.       Ms. Qureshi was instructed to report to the Emergency Department if lower extremity symptoms worsened, including loss of bowel/bladder.     HPI  System  Physical Exam  General   ROS

## 2017-01-31 ENCOUNTER — TELEPHONE (OUTPATIENT)
Dept: PHYSICAL THERAPY | Facility: CLINIC | Age: 42
End: 2017-01-31

## 2017-01-31 DIAGNOSIS — M25.512 BILATERAL SHOULDER PAIN: ICD-10-CM

## 2017-01-31 DIAGNOSIS — M25.511 BILATERAL SHOULDER PAIN: ICD-10-CM

## 2017-01-31 DIAGNOSIS — M54.12 CERVICAL RADICULOPATHY: Primary | ICD-10-CM

## 2017-01-31 DIAGNOSIS — R29.898 WEAKNESS OF SHOULDER: ICD-10-CM

## 2017-01-31 NOTE — TELEPHONE ENCOUNTER
Spoke with patient on the phone about her cancelling today's appointment due to pain. She reported that her neck was very painful and that the pain was going down to her hand, and that it hurt to make a fist. I instructed her to call her doctor and schedule a follow-up as soon as she could get in.

## 2017-02-01 NOTE — TELEPHONE ENCOUNTER
Left message for patient to call clinic back. Dr. Robles highly recommends patient come in to clinic tomorrow and be seen- she recommends patient see Dr. Cunningham and see if cervical spine MRI is needed due to patients current sx's.  William Mixon CMA

## 2017-02-02 ENCOUNTER — THERAPY VISIT (OUTPATIENT)
Dept: PHYSICAL THERAPY | Facility: CLINIC | Age: 42
End: 2017-02-02
Payer: OTHER MISCELLANEOUS

## 2017-02-02 ENCOUNTER — HOSPITAL ENCOUNTER (OUTPATIENT)
Dept: ULTRASOUND IMAGING | Facility: CLINIC | Age: 42
Discharge: HOME | End: 2017-02-02
Attending: FAMILY MEDICINE | Admitting: FAMILY MEDICINE
Payer: OTHER MISCELLANEOUS

## 2017-02-02 ENCOUNTER — TELEPHONE (OUTPATIENT)
Dept: FAMILY MEDICINE | Facility: CLINIC | Age: 42
End: 2017-02-02

## 2017-02-02 ENCOUNTER — OFFICE VISIT (OUTPATIENT)
Dept: FAMILY MEDICINE | Facility: CLINIC | Age: 42
End: 2017-02-02
Payer: OTHER MISCELLANEOUS

## 2017-02-02 VITALS
TEMPERATURE: 98.4 F | BODY MASS INDEX: 24.63 KG/M2 | RESPIRATION RATE: 18 BRPM | HEART RATE: 84 BPM | DIASTOLIC BLOOD PRESSURE: 94 MMHG | WEIGHT: 122 LBS | SYSTOLIC BLOOD PRESSURE: 128 MMHG

## 2017-02-02 DIAGNOSIS — M79.605 BILATERAL LEG PAIN: Primary | ICD-10-CM

## 2017-02-02 DIAGNOSIS — M25.511 BILATERAL SHOULDER PAIN: ICD-10-CM

## 2017-02-02 DIAGNOSIS — M79.604 BILATERAL LEG PAIN: Primary | ICD-10-CM

## 2017-02-02 DIAGNOSIS — M54.12 CERVICAL RADICULOPATHY: Primary | ICD-10-CM

## 2017-02-02 DIAGNOSIS — R29.898 WEAKNESS OF SHOULDER: ICD-10-CM

## 2017-02-02 DIAGNOSIS — M54.12 CERVICAL RADICULOPATHY: ICD-10-CM

## 2017-02-02 DIAGNOSIS — M79.605 BILATERAL LEG PAIN: ICD-10-CM

## 2017-02-02 DIAGNOSIS — M54.50 BILATERAL LOW BACK PAIN WITHOUT SCIATICA, UNSPECIFIED CHRONICITY: ICD-10-CM

## 2017-02-02 DIAGNOSIS — M25.512 BILATERAL SHOULDER PAIN: ICD-10-CM

## 2017-02-02 DIAGNOSIS — M79.604 BILATERAL LEG PAIN: ICD-10-CM

## 2017-02-02 PROCEDURE — 99214 OFFICE O/P EST MOD 30 MIN: CPT | Performed by: FAMILY MEDICINE

## 2017-02-02 PROCEDURE — 97140 MANUAL THERAPY 1/> REGIONS: CPT | Mod: GP | Performed by: PHYSICAL THERAPIST

## 2017-02-02 PROCEDURE — 93970 EXTREMITY STUDY: CPT

## 2017-02-02 PROCEDURE — 97110 THERAPEUTIC EXERCISES: CPT | Mod: GP | Performed by: PHYSICAL THERAPIST

## 2017-02-02 PROCEDURE — 97112 NEUROMUSCULAR REEDUCATION: CPT | Mod: GP | Performed by: PHYSICAL THERAPIST

## 2017-02-02 NOTE — TELEPHONE ENCOUNTER
[facility] radiology calls, read and call to GREGORIO, NA at [facility], informs bilateral leg u/s negative, pt has left, routed FYI to GREGORIO Fowler RN, BSN  Message handled by Nurse Triage.

## 2017-02-02 NOTE — PROGRESS NOTES
HPI  System  Physical Exam  General   ROS    SUBJECTIVE  Patient reports the left leg pain is decreased, just feels pulling in the back of the left leg behind the knee. Reports the pain was greater on Tuesday and worsens when she sits for prolonged periods of time. Also complains of continued right calf cramping when walking. Having more bilateral back pain rated 7/10 currently. Reports she has not been able to have a bowel movement over the past 2 days. Continues to have B upper trapezius pain and right arm pain rated 3/10. C/o right sided headaches and occasional dizziness when she has higher levels of pain.      Objective:    Neurological:    Motor Deficit: (no changes from 1/25/17 visit)  Myotomes                                             R                                        L  C4 (shoulder elevation) 5 5   C5 (shoulder abduction) 5 5   C6 (elbow flexion) 5 5   C7 (elbow extension) 5 5   C8 (thumb extension) 5 5   T1 (finger add/abd) 5 5     Myotomes  R  L   L1-2 (hip flexion)  4+  4-    L3 (knee extension)  5  4-    L4 (ankle DF)  5  4    L5 (g. toe ext)  5  4+   S1 (ankle PF or knee flex)  4+  3+      Reflexes:     L R   Biceps 3+ 3+   Brachioradialis 3+ 3+   Patellar  3+  3+    Achilles  3+  3+    Pantoja (+) (+)   Babinski  (-)  (-)      Sensation: Continued Decreased on the left compared to the right at the anterolateral thigh, lateral and medial calf, lateral and medial foot, dorsum of the foot, and at the heel.     Posture: Poor: posterior pelvic tilt, slouched, forward head and rounded shoulders     Palpation: tenderness in suboccipital region R>L, Tightness and pain in right hand intrinsics    Other Tests:  .    - Manual traction of the cervical spine reduced UE pain, no report of pulling sensation in the LEs today   - Prone lying and prone press ups x 10 reduced R calf pain and L hamstring pain                 ASSESSMENT  Zo continues to require intervention to meet STG and LTG's: PT  No  change of symptoms has been noted. Patient to follow up with MD to discuss new LE symptoms and further evaluation of cervical spine due to recent changing symptoms. Continue to follow up with physical therapy as appropriate.  Response to therapy has shown an improvement in  pain level and radicular symptoms  Progress made towards STG/LTG?  Yes (See Goal flowsheet attached for updates on achievement of STG and LTG)    PLAN  Continue current treatment plan until patient demonstrates readiness to progress to higher level exercises.    Please refer to the daily flowsheet for treatment today, total treatment time and time spent performing 1:1 timed codes.

## 2017-02-02 NOTE — TELEPHONE ENCOUNTER
'm really booked today, can she come tomorrow, or urgent care please?  Kayla Cunningham MD  [facility]  [phone]

## 2017-02-02 NOTE — NURSING NOTE
"Chief Complaint   Patient presents with     Neck Pain     Shoulder Pain     Musculoskeletal Problem     left leg pain     Initial /94 mmHg  Pulse 84  Temp(Src) 98.4  F (36.9  C) (Oral)  Resp 18  Wt 122 lb (55.339 kg)  LMP 01/04/2017 (Exact Date) Estimated body mass index is 24.63 kg/(m^2) as calculated from the following:    Height as of 1/9/17: 4' 11\" (1.499 m).    Weight as of this encounter: 122 lb (55.339 kg)..  BP completed using cuff size regular-RA      "

## 2017-02-02 NOTE — PROGRESS NOTES
"  SUBJECTIVE:                                                    Zo Qureshi is a 41 year old female who presents to clinic today for the following health issues:    Patient called clinic today with bilateral leg pain Left > Right. Was triaged by nurse line and told to come in for evaluation.   Per patient, she was doing fine until she developed left leg pain - she has been unable to walk straight since then.   She also noted a\"charley horse feel in right calf on Monday which has not gone away'.   The pain is mostly in the back of her legs and is debilitating especially when she walks. Denies any swelling.   Pain is worse with prolonged periods of sitting. Of note she sits about 8-10 hrs a day for work.     Has history of chronic back pain denies pain radiating into legs. She requests a referral in order to PT to work on her back as well.   She is scheduled to see PCP for re-evaluation of neck and shoulder problems.  Does not endorse worsening neck pain in visit today.         Problem list and histories reviewed & adjusted, as indicated.  Additional history: as documented    Problem list, Medication list, Allergies, and Medical/Social/Surgical histories reviewed in EPIC and updated as appropriate.    ROS:  Constitutional, MSK, neuro  systems are negative, except as otherwise noted.      OBJECTIVE:                                                    /94 mmHg  Pulse 84  Temp(Src) 98.4  F (36.9  C) (Oral)  Resp 18  Wt 122 lb (55.339 kg)  LMP 01/04/2017 (Exact Date)  Body mass index is 24.63 kg/(m^2).  GENERAL: healthy, alert and no distress  MS: TTP bilateral calf. Antalgic gait. Sensation intact. Positive philly. No swelling/asymmetry noted.     Diagnostic Test Results:  none      ASSESSMENT/PLAN:                                                      1. Bilateral leg pain  - pain is very focal to back of both legs/calves- due to long periods of sitting will order US to rule out DVT. Supportive care also " discussed.   - US Lower Extremity Venous Duplex Bilateral; Future    2. Bilateral low back pain without sciatica, unspecified chronicity  - referral as per patient request.   - [facility]    3. Cervical radiculopathy  - will order MRI for further evaluation. Patient to keep appt on Monday   - MR Cervical Spine w/o & w Contrast; Future    See Patient Instructions    Liz Gaviria MD  [facility]

## 2017-02-02 NOTE — TELEPHONE ENCOUNTER
Called patient to discuss, patient states that her pain is the same, she feels like she has cramps in her right leg. Patient states she is having a hard time walking, she has to hold the rails to walk up the stairs. Will have patient speak with Triage to see if appropriate to wait until tomorrow to see AA or if patient should be seen in ER/UC today.  William Mixon, CMA

## 2017-02-02 NOTE — TELEPHONE ENCOUNTER
Patient informed-AA's schedule full.   are you ok to double book patient? Looks like  recommends patient to f/u with you.  Informed patient we would call patient back after 11 once we talked with AA.

## 2017-02-06 ENCOUNTER — THERAPY VISIT (OUTPATIENT)
Dept: PHYSICAL THERAPY | Facility: CLINIC | Age: 42
End: 2017-02-06
Payer: OTHER MISCELLANEOUS

## 2017-02-06 ENCOUNTER — OFFICE VISIT (OUTPATIENT)
Dept: FAMILY MEDICINE | Facility: CLINIC | Age: 42
End: 2017-02-06
Payer: OTHER MISCELLANEOUS

## 2017-02-06 VITALS
DIASTOLIC BLOOD PRESSURE: 60 MMHG | HEART RATE: 74 BPM | OXYGEN SATURATION: 99 % | TEMPERATURE: 98.4 F | SYSTOLIC BLOOD PRESSURE: 104 MMHG | HEIGHT: 59 IN | BODY MASS INDEX: 24.13 KG/M2 | RESPIRATION RATE: 16 BRPM | WEIGHT: 119.7 LBS

## 2017-02-06 DIAGNOSIS — R29.898 WEAKNESS OF SHOULDER: ICD-10-CM

## 2017-02-06 DIAGNOSIS — M54.12 CERVICAL RADICULOPATHY: Primary | ICD-10-CM

## 2017-02-06 DIAGNOSIS — M54.50 BILATERAL LOW BACK PAIN WITHOUT SCIATICA: Primary | ICD-10-CM

## 2017-02-06 DIAGNOSIS — M46.98 INFLAMMATORY SPONDYLOPATHY OF SACRAL REGION (H): ICD-10-CM

## 2017-02-06 DIAGNOSIS — M25.511 ACUTE PAIN OF BOTH SHOULDERS: ICD-10-CM

## 2017-02-06 DIAGNOSIS — M25.512 ACUTE PAIN OF BOTH SHOULDERS: ICD-10-CM

## 2017-02-06 PROCEDURE — 97161 PT EVAL LOW COMPLEX 20 MIN: CPT | Mod: GP | Performed by: PHYSICAL THERAPIST

## 2017-02-06 PROCEDURE — 97110 THERAPEUTIC EXERCISES: CPT | Mod: GP | Performed by: PHYSICAL THERAPIST

## 2017-02-06 PROCEDURE — 99213 OFFICE O/P EST LOW 20 MIN: CPT | Performed by: FAMILY MEDICINE

## 2017-02-06 RX ORDER — TRAMADOL HYDROCHLORIDE 50 MG/1
50-100 TABLET ORAL EVERY 6 HOURS PRN
Qty: 40 TABLET | Refills: 0 | Status: SHIPPED | OUTPATIENT
Start: 2017-02-06 | End: 2017-05-08

## 2017-02-06 NOTE — PROGRESS NOTES
[facility] Initial Evaluation  2/6/2017     Subjective Exam:  Zo Qureshi is a 41 year old female patient presenting to Physical Therapy with the following Primary Symptoms: Bilateral broad mid lower back.  She reports intermittent bilateral pulling type pain in both calves/back of legs. Her back pain symptoms are described as constant.  Pain is rated 8/10 at rest, and 10/10 at worst.Red Flag Screen:  She denies having painful cough/sneeze, saddle anaesthesia, severe night pain, peripheral motor deficit, recent bowel/bladder change, recent vision change, dizziness, ringing in the ears or pain with swallowing. History of symptoms: Patient has history of chronic low back pain but had recent calf cramping/pain January 1, 2016 as the result of increased work activities.  Previous treatment has included chiropractic.  Since onset, these symptoms are improving.  Current Symptoms are worsened by: sitting >30 minutes increases leg pain, standing >30-40.Current Symptoms are relieved by lying flat       Recent surgical procedure/Imaging: none General health as reported by patient is:  Good.  Pertinent medical history: Currently seeing PT for neck, shoulder and OT for hand.  She denies any significant current illness or recent hospital admissions. She denies any regonal implanted devices.     Current occupational status: Off work. Previous functional status:  fully functional prior to pain onset/injury.       HPI                  LUMBAR Examination:    Standing posture: normal Lateral shift present: none   AROM: (Major, Moderate, Minimal or Nil loss)  Movement Loss Cameron Mod Min Nil Pain   Flexion    X Fingertips to 4 inchs above floor Pain with rising   Extension   X  Mid back   Side Gliding/Bend L    X    Side Gliding/Bend R    X      Repeated movement testing:   No peripheralization with RFIS. ROM slightly improved with COLTON and pain decreased in   REIL caused increased low back soreness.    Directional  preference: Extension    Neurological:  Motor Deficit:  Myotomes L R   L1-2 (hip flexion) 5 5   L3 (knee extension) 5 5   L4 (ankle DF) 5 5   L5 (g. toe ext) 5 5   S1 (ankle PF or knee flex) 5 5     Sensory Exam: Symmetrical and normal light touch sensation (2)  Reflexes: Achilles: R 2+, L 2+        Patellar R 2+, L 2+  Cord Signs: Negative Babinski B  Dural Signs:   L R   Slump Leg pulling Leg pulling   SLR + LBP + LBP     Hip Screen: WNL and Negative    System  Physical Exam  General   ROS    Assessment/Plan:      Patient is a 41 year old female with lumbar complaints.    Patient has the following significant findings with corresponding treatment plan.                Diagnosis 1:  Low back pain with mobility impairments  Pain -  electric stimulation, manual therapy, self management, education and directional preference exercise  Decreased ROM/flexibility - manual therapy and therapeutic exercise  Impaired muscle performance - neuro re-education  Decreased function - therapeutic activities  Impaired posture - neuro re-education    Therapy Evaluation Codes:   1) History comprised of:   Personal factors that impact the plan of care:      Work status.    Comorbidity factors that impact the plan of care are:      Pain at night/rest.     Medications impacting care: Anti-inflammatory.  2) Examination of Body Systems comprised of:   Body structures and functions that impact the plan of care:      Lumbar spine.   Activity limitations that impact the plan of care are:      Sitting, Standing and Working.  3) Clinical presentation characteristics are:   Stable/Uncomplicated.  4) Decision-Making    Low complexity using standardized patient assessment instrument and/or measureable assessment of functional outcome.  Cumulative Therapy Evaluation is: Low complexity.    Previous and current functional limitations:  (See Goal Flow Sheet for this information)    Short term and Long term goals: (See Goal Flow Sheet for this  information)     Communication ability:  Patient appears to be able to clearly communicate and understand verbal and written communication and follow directions correctly.  Treatment Explanation - The following has been discussed with the patient:   RX ordered/plan of care  Anticipated outcomes  Possible risks and side effects  This patient would benefit from PT intervention to resume normal activities.   Rehab potential is good.    Frequency:  1 X week, once daily  Duration:  for 6 weeks  Discharge Plan:  Achieve all LTG.  Independent in home treatment program.  Reach maximal therapeutic benefit.    Please refer to the daily flowsheet for treatment today, total treatment time and time spent performing 1:1 timed codes.

## 2017-02-06 NOTE — PROGRESS NOTES
SUBJECTIVE:                                                    Zo Qureshi is a 41 year old female who presents to clinic today for the following health issues:      Recheck on bilateral sides mid back pain, both sides of neck, with right side of neck worse and right shoulder pain.  The pain radiates down right arm and when patient tries to close hand there is more pain.   She also has ongoing right leg pain and cramps in the right legs and numbness in the right leg after sitting for only short periods of time.   PT has been concerned about cervical radiculopathy.   An MRI of the cervical spine has been ordered for Thursday.       Past Medical History   Diagnosis Date     Unspecified closed fracture of pelvis 2000     left fracture of pelvis after delivery     INFLAM SPONDYLOPATHY NOS   1/7/2008     HSIL on Pap smear 09/21/11     MARTA 2 and 3     Leukocytopenia 3/10/2014     Thrombocytopenia (H) 3/10/2014       Past Surgical History   Procedure Laterality Date     C nonspecific procedure  10/00     after delivery 2 units of prbcs secondary to placenta     Tubal ligation  5/2009     laparoscopic tubal ligation     Leep tx, cervical  12/19/11     MARTA II       MEDICATIONS:  Current Outpatient Prescriptions   Medication     traMADol (ULTRAM) 50 MG tablet     loratadine (CLARITIN) 10 MG tablet     PATADAY 0.2 % SOLN     clindamycin-benzoyl peroxide (BENZACLIN) gel     No current facility-administered medications for this visit.       SOCIAL HISTORY:  Social History   Substance Use Topics     Smoking status: Never Smoker      Smokeless tobacco: Never Used     Alcohol Use: 0.0 oz/week     0 Standard drinks or equivalent per week      Comment: occasionally       Family History   Problem Relation Age of Onset     Hypertension Father      Lipids Father      Hypertension Mother      Lipids Mother      DIABETES No family hx of      Coronary Artery Disease No family hx of      Hyperlipidemia No family hx of       "CEREBROVASCULAR DISEASE No family hx of      Breast Cancer No family hx of      Colon Cancer No family hx of      Prostate Cancer No family hx of      Other Cancer No family hx of      Depression No family hx of      Anxiety Disorder No family hx of      MENTAL ILLNESS No family hx of      Substance Abuse No family hx of      Anesthesia Reaction No family hx of      Asthma No family hx of      OSTEOPOROSIS No family hx of      Genetic Disorder No family hx of      Thyroid Disease No family hx of      Obesity No family hx of      Unknown/Adopted No family hx of        Objective:  Blood pressure 104/60, pulse 74, temperature 98.4  F (36.9  C), temperature source Oral, resp. rate 16, height 4' 11\" (1.499 m), weight 119 lb 11.2 oz (54.296 kg), SpO2 99 %, not currently breastfeeding.  Neck:  There is no lymphadenopathy or thyroid tenderness or enlargement  Chest: Clear to auscultation bilaterally.  No wheezes, rales or retractions.  CV: Regular rate and rhythm without murmurs, rubs or gallops.  Paraspinal muscles are very tight all the way down the spine  Patellar reflexes are equal and 1+  Strength of dorsiflexion 5/5 on left and 4+/5 or right  Quad extension 5/5 both sides    Assessment:  1. Concerns about cervical radiculopathy with PT and patient with hx of spondy and does not want to tx due to side effects - also father has severe cervical and lumbar multilevel disease  2. Shoulder tendonitis - this is improving with PT  3. RUQ pain - seen for last time - this is gone now and u/s was negative    Plan:  1. Will get MRI of c spine  2. Continue PT  3. Refills per epicare  4. Continue work restrictions for at least 2 more weeks and until results of MRI      "

## 2017-02-06 NOTE — NURSING NOTE
"Chief Complaint   Patient presents with     Work Comp     Follow-up on Shoulder     Initial /60 mmHg  Pulse 74  Temp(Src) 98.4  F (36.9  C) (Oral)  Resp 16  Ht 4' 11\" (1.499 m)  Wt 119 lb 11.2 oz (54.296 kg)  BMI 24.16 kg/m2  SpO2 99% Estimated body mass index is 24.16 kg/(m^2) as calculated from the following:    Height as of this encounter: 4' 11\" (1.499 m).    Weight as of this encounter: 119 lb 11.2 oz (54.296 kg).  BP completed using cuff size regular Right Arm    Health Maintenance reviewed - Yes: (pt is up to date)  Tobacco Verified: Yes  Family History Updated: Yes  MyChart Offered: Yes  Immunizations Up to Date:  Yes    William Mixon CMA      "

## 2017-02-06 NOTE — MR AVS SNAPSHOT
After Visit Summary   2/6/2017    Zo Qureshi    MRN: [id number]           Patient Information     Date Of Birth          1975        Visit Information        Provider Department      2/6/2017 10:00 AM Terri Baron MD [facility]        Today's Diagnoses     Cervical radiculopathy    -  1     Weakness of shoulder         Acute pain of both shoulders            Follow-ups after your visit        Your next 10 appointments already scheduled     Feb 06, 2017  1:50 PM   [facility]reyen, PT   [facility]    [address]  [address]  [place] [facility] [address]   [phone]            Feb 07, 2017 12:30 PM   [facility] La [facility]   [facility]    [address]  [place] [address]   [phone]            Feb 09, 2017  9:00 AM   MR CERVICAL SPINE W/O & W CONTRAST with [id number]   [facility] ([facility])    [address]  [place] [address]   [phone]           Take your medicines as usual, unless your doctor tells you not to. Bring a list of your current medicines to your exam (including vitamins, minerals and over-the-counter drugs).  You will be given intravenous contrast for this exam. To prepare:   The day before your exam, drink extra fluids at least six 8-ounce glasses (unless your doctor tells you to restrict your fluids).   Have a blood test (creatinine test) within 30 days of your exam. Go to your clinic or Diagnostic Imaging Department for this test.  The MRI machine uses a strong magnet. Please wear clothes without metal (snaps, zippers). A sweatsuit works well, or we may give you a hospital gown.  Please remove any body piercings and hair extensions before you arrive. You will also remove watches, jewelry, hairpins, wallets, dentures, partial dental plates and hearing aids. You may wear contact lenses, and  you may be able to wear your rings. We have a safe place to keep your personal items, but it is safer to leave them at home.   **IMPORTANT** THE INSTRUCTIONS BELOW ARE ONLY FOR THOSE PATIENTS WHO HAVE BEEN TOLD THEY WILL RECEIVE SEDATION OR GENERAL ANESTHESIA DURING THEIR MRI PROCEDURE:  IF YOU WILL RECEIVE SEDATION (take medicine to help you relax during your exam):   You must get the medicine from your doctor before you arrive. Bring the medicine to the exam. Do not take it at home.   Arrive one hour early. Bring someone who can take you home after the test. Your medicine will make you sleepy. After the exam, you may not drive, take a bus or take a taxi by yourself.   No eating 8 hours before your exam. You may have clear liquids up until 4 hours before your exam. (Clear liquids include water, clear tea, black coffee and fruit juice without pulp.)  IF YOU WILL RECEIVE ANESTHESIA (be asleep for your exam):   Arrive 1 1/2 hours early. Bring someone who can take you home after the test. You may not drive, take a bus or take a taxi by yourself.   No eating 8 hours before your exam. You may have clear liquids up until 4 hours before your exam. (Clear liquids include water, clear tea, black coffee and fruit juice without pulp.)  Please call the Imaging Department at your exam site with any questions.              Who to contact     If you have questions or need follow up information about today's clinic visit or your schedule please contact [facility] directly at [phone].  Normal or non-critical lab and imaging results will be communicated to you by MyChart, letter or phone within 4 business days after the clinic has received the results. If you do not hear from us within 7 days, please contact the clinic through [employer] or phone. If you have a critical or abnormal lab result, we will notify you by phone as soon as possible.  Submit refill requests through [employer] or call your pharmacy and they  "will forward the refill request to us. Please allow 3 business days for your refill to be completed.          Additional Information About Your Visit        Care EveryWhere ID     This is your Care EveryWhere ID. This could be used by other organizations to access your [facility] medical records  [id number]        Your Vitals Were     Pulse Temperature Respirations Height BMI (Body Mass Index) Pulse Oximetry    74 98.4  F (36.9  C) (Oral) 16 4' 11\" (1.499 m) 24.16 kg/m2 99%       Blood Pressure from Last 3 Encounters:   02/06/17 104/60   02/02/17 128/94   01/09/17 96/70    Weight from Last 3 Encounters:   02/06/17 119 lb 11.2 oz (54.296 kg)   02/02/17 122 lb (55.339 kg)   01/09/17 121 lb (54.885 kg)              Today, you had the following     No orders found for display         Where to get your medicines      Some of these will need a paper prescription and others can be bought over the counter.  Ask your nurse if you have questions.     Bring a paper prescription for each of these medications    - traMADol 50 MG tablet       Primary Care Provider Office Phone # Fax #    Terri Baron [phone]       [facility] [id number] [place] [address]        Thank you!     Thank you for choosing [facility]  for your care. Our goal is always to provide you with excellent care. Hearing back from our patients is one way we can continue to improve our services. Please take a few minutes to complete the written survey that you may receive in the mail after your visit with us. Thank you!             Your Updated Medication List - Protect others around you: Learn how to safely use, store and throw away your medicines at www.disposemymeds.org.          This list is accurate as of: 2/6/17 10:25 AM.  Always use your most recent med list.                   Brand Name Dispense Instructions for use    clindamycin-benzoyl peroxide gel    BENZACLIN    50 g    Apply to  Affected " area initially once daily for 2 weeks and then increase to 2 times daily if tolerated       loratadine 10 MG tablet    CLARITIN    30 tablet    Take 1 tablet (10 mg) by mouth daily       PATADAY 0.2 % Soln   Generic drug:  olopatadine HCl          traMADol 50 MG tablet    ULTRAM    40 tablet    Take 1-2 tablets ( mg) by mouth every 6 hours as needed for moderate pain

## 2017-02-07 ENCOUNTER — THERAPY VISIT (OUTPATIENT)
Dept: OCCUPATIONAL THERAPY | Facility: CLINIC | Age: 42
End: 2017-02-07
Payer: OTHER MISCELLANEOUS

## 2017-02-07 DIAGNOSIS — M79.644 FINGER PAIN, RIGHT: Primary | ICD-10-CM

## 2017-02-07 DIAGNOSIS — R20.2 PARESTHESIA OF BOTH HANDS: ICD-10-CM

## 2017-02-07 PROCEDURE — 97165 OT EVAL LOW COMPLEX 30 MIN: CPT | Mod: GO | Performed by: OCCUPATIONAL THERAPIST

## 2017-02-07 PROCEDURE — 29125 APPL SHORT ARM SPLINT STATIC: CPT | Mod: GO | Performed by: OCCUPATIONAL THERAPIST

## 2017-02-07 NOTE — PROGRESS NOTES
Hand Therapy Initial Evaluation  Current Date:  2/7/2017    Subjective:  Zo Qureshi is a 41 year old R hand dominant female.    Diagnosis: Paresthesias of 3 fingers  DOI:  January 2017    Patient reports symptoms of pain, stiffness/loss of motion, weakness/loss of strength, numbness and tingling of B hands, but more on R, which occurred due to Unsure. Since onset symptoms are Gradually getting worse. Special tests:  None.  Previous treatment: None, Tramadol. General health as reported by patient is good.  Pertinent medical history includes: numbness/tingling.  Medical allergies: Advil, Percoset, CT dye.  Surgical history: none.  Medication history: pain, muscle relaxants.   Current occupation is: Does a job that involves repetitive motions and paying attention to tedious detail.   Currently not working due to present treatment problem  Job Tasks: prolonged sitting, repetitive tasks, computer work  Barriers include:none  Prior functional level:  no limitations  Red flags:  none    Additional Occupational Profile Information (patterns of daily living, interests, values and needs): Pt is currently unable to work due to her symptoms. She has a hard time doing tasks around the home like washing laundry, opening jars, and opening juice bottles.     Upper Extremity Functional Index Score:  SCORE:   Column Totals: /80: 46   (A lower score indicates greater disability.)    Objective:  Sensation:  Intermittant numbness and tingling bilaterally. Most prominent in R index, middle, and ring finger, and also along R anterior forearm.    Pain Level Report: On scale 0-10/10  Date 2/7/2017 2/7/2017   side R L   Overall 7 5   At Rest 3 2   With Activity 6-7 5     Primary Report: location and description  Date 2/7/2017 2/7/2017   Side R L   Location Forearm, index, middle, and ring fingers Index, middle, and ring finger   Radiation Shooting up arm Shooting up arm   Pain Quality Pinching Pinching   Frequency Constant Constant    Duration Sleeping is ok, but its worst with activity Sleeping is ok, but its worst with activity   Exacerbated by  Lifting, gripping, twisting, pinching, pulling  Lifting, gripping, twisting, pinching, pulling   Relieved by Rest, heat, ice Rest, heat, ice   Progression since onset Gradually getting worse Gradually worse     Range of Motion Wrist AROM (PROM):  Date 2/7/2017 2/7/2017   Side R L   Ext 65 70   Flex 34 59   UD 25 25   RD 20 18     Special Tests:  Date 2/7/2017 2/7/2017   Side R L   Phalens + at 20 sec -   Tinels at CT - -   Tinels at Pronator - -   Median Nerve ULTT ~15% ~15%   Paresthesias + +     STRENGTH: (Measured in pounds, pain scale 0-10/10)    Date 2/7/2017        Trials Left Right Left Right Left Right Left Right Left Right Left Right   1 25 6             2               3               Avg               Pain 0 7               3 Point Pinch  Date 2/7/2017        Trials Left Right Left Right Left Right Left Right Left Right Left Right   1 9 4             2               3               Avg               Pain 0 6               Lateral Pinch  Date 2/7/2017        Trials Left Right Left Right Left Right Left Right Left Right Left Right   1 10 6             2               3               Avg               Pain 0 6               Assessment:  Patient presents with symptoms consistent with diagnosis bilateral Parasthesias of first three fingers, with conservative intervention.     Patient's limitations or Problem List includes:  Pain, Parathesias of the median nerve distribution, Decreased ROM/motion, Weakness, Decreased  and Tightness in musculature of the forearm, bilateral index finger, long finger and ring finger which interferes with the patient's ability to perform Self Care Tasks (dressing, bathing), Recreational Activities and Household Chores as compared to previous level of function.    Rehab Potential:  Excellent - Return to full activity, no limitations    Patient will benefit  from skilled Occupational Therapy to increase ROM, flexibility,  strength and forearm strength and decrease pain and parathesias to return to previous activity level and resume normal daily tasks and to reach their rehab potential.    Barriers to Learning:  No barrier    Communication Issues:  Patient appears to be able to clearly communicate and understand verbal and written communication and follow directions correctly.    Assessment of Occupational Performance:  1-3 Performance Deficits  Identified Performance Deficits: bathing/showering, home establishment and management, meal preparation and cleanup, work and leisure activities      Clinical Decision Making (Complexity): Low complexity    Treatment Explanation:  The following has been discussed with the patient:    RX ordered/plan of care  Anticipated outcomes  Possible risks and side effects    P: Frequency:  1 X week, once daily  Duration:  for 6 weeks    Treatment Plan:    Modalities:  None   Therapeutic Exercise: ,  Tendon Gliding ex, blocking  Neuromuscular Techniques: Median nerve glides both active and passive (in the clinic)  Manual Techniques:, Myofascial release of the forearm extensors and flexors, wrist mobilization techniques  Orthosis:  Wrist in netural orthosis for night wear.    Home Program:   Orthosis: Static orthosis and Forearm based orthosis Wrist in neutral orthosis at night.    Activity: Avoid activities that exacerbate symptoms in the median nerve.  Avoid prolonged flexion or extension of the wrist.    Discharge Plan:    Achieve all LTG.  Independent in home treatment program.  Reach maximal therapeutic benefit.    Next Visit:  MFR  Ergonomics education  Splint L arm if appropriate  Adjust R arm splint if necessary  Introduce nerve glides and exercises

## 2017-02-09 ENCOUNTER — HOSPITAL ENCOUNTER (OUTPATIENT)
Dept: MRI IMAGING | Facility: CLINIC | Age: 42
Discharge: HOME | End: 2017-02-09
Attending: FAMILY MEDICINE | Admitting: FAMILY MEDICINE
Payer: OTHER MISCELLANEOUS

## 2017-02-09 DIAGNOSIS — M54.12 CERVICAL RADICULOPATHY: ICD-10-CM

## 2017-02-09 PROCEDURE — 72141 MRI NECK SPINE W/O DYE: CPT

## 2017-02-13 ENCOUNTER — OFFICE VISIT (OUTPATIENT)
Dept: FAMILY MEDICINE | Facility: CLINIC | Age: 42
End: 2017-02-13
Payer: OTHER MISCELLANEOUS

## 2017-02-13 ENCOUNTER — THERAPY VISIT (OUTPATIENT)
Dept: PHYSICAL THERAPY | Facility: CLINIC | Age: 42
End: 2017-02-13
Payer: OTHER MISCELLANEOUS

## 2017-02-13 VITALS
SYSTOLIC BLOOD PRESSURE: 104 MMHG | DIASTOLIC BLOOD PRESSURE: 62 MMHG | WEIGHT: 120.8 LBS | RESPIRATION RATE: 12 BRPM | BODY MASS INDEX: 24.4 KG/M2 | HEART RATE: 80 BPM | TEMPERATURE: 97.8 F | OXYGEN SATURATION: 99 %

## 2017-02-13 DIAGNOSIS — M25.511 BILATERAL SHOULDER PAIN: ICD-10-CM

## 2017-02-13 DIAGNOSIS — M79.644 FINGER PAIN, RIGHT: ICD-10-CM

## 2017-02-13 DIAGNOSIS — G44.219 EPISODIC TENSION-TYPE HEADACHE, NOT INTRACTABLE: Primary | ICD-10-CM

## 2017-02-13 DIAGNOSIS — M54.50 BILATERAL LOW BACK PAIN WITHOUT SCIATICA: ICD-10-CM

## 2017-02-13 DIAGNOSIS — M54.12 CERVICAL RADICULOPATHY: ICD-10-CM

## 2017-02-13 DIAGNOSIS — R29.898 WEAKNESS OF SHOULDER: ICD-10-CM

## 2017-02-13 DIAGNOSIS — R20.2 PARESTHESIA OF BOTH HANDS: ICD-10-CM

## 2017-02-13 DIAGNOSIS — M25.512 BILATERAL SHOULDER PAIN: ICD-10-CM

## 2017-02-13 PROCEDURE — 99213 OFFICE O/P EST LOW 20 MIN: CPT | Performed by: FAMILY MEDICINE

## 2017-02-13 PROCEDURE — 97140 MANUAL THERAPY 1/> REGIONS: CPT | Mod: GP | Performed by: PHYSICAL THERAPIST

## 2017-02-13 PROCEDURE — 97110 THERAPEUTIC EXERCISES: CPT | Mod: GP | Performed by: PHYSICAL THERAPIST

## 2017-02-13 RX ORDER — TOPIRAMATE 25 MG/1
TABLET, FILM COATED ORAL
Qty: 60 TABLET | Refills: 1 | Status: SHIPPED | OUTPATIENT
Start: 2017-02-13 | End: 2017-10-18

## 2017-02-13 NOTE — PROGRESS NOTES
SUBJECTIVE:                                                    Zo Qureshi is a 42 year old female who presents to clinic today for the following health issues:      Work Comp follow-up and discuss results of MRI on cervical spine that was completed on 02/09/2017.   Her symptoms continue off and on with shoulder pain and radiating down her right arm with tingling and numbness off and on.   Her symptoms have been since last summer but really got worse in last month with her work load increased.         Patient Active Problem List   Diagnosis     Inflammatory spondylopathy (H)     Disorder of SI (sacroiliac) joint     Hypercholesterolemia     HSIL on Pap smear     S/P LEEP of cervix     Acne     Influenza B     Vitamin D deficiency     Bilateral shoulder pain     Weakness of shoulder     Cervical radiculopathy     Bilateral low back pain without sciatica     Paresthesia of both hands     Finger pain, right         Past Medical History   Diagnosis Date     HSIL on Pap smear 09/21/11     MARTA 2 and 3     INFLAM SPONDYLOPATHY NOS   1/7/2008     Leukocytopenia 3/10/2014     Thrombocytopenia (H) 3/10/2014     Unspecified closed fracture of pelvis 2000     left fracture of pelvis after delivery       Past Surgical History   Procedure Laterality Date     C nonspecific procedure  10/00     after delivery 2 units of prbcs secondary to placenta     Tubal ligation  5/2009     laparoscopic tubal ligation     Leep tx, cervical  12/19/11     MARTA II       MEDICATIONS:  Current Outpatient Prescriptions   Medication     traMADol (ULTRAM) 50 MG tablet     loratadine (CLARITIN) 10 MG tablet     PATADAY 0.2 % SOLN     clindamycin-benzoyl peroxide (BENZACLIN) gel     No current facility-administered medications for this visit.        SOCIAL HISTORY:  Social History   Substance Use Topics     Smoking status: Never Smoker     Smokeless tobacco: Never Used     Alcohol use 0.0 oz/week     0 Standard drinks or equivalent per week       Comment: occasionally       Family History   Problem Relation Age of Onset     Hypertension Father      Lipids Father      Hypertension Mother      Lipids Mother      DIABETES No family hx of      Coronary Artery Disease No family hx of      Hyperlipidemia No family hx of      CEREBROVASCULAR DISEASE No family hx of      Breast Cancer No family hx of      Colon Cancer No family hx of      Prostate Cancer No family hx of      Other Cancer No family hx of      Depression No family hx of      Anxiety Disorder No family hx of      MENTAL ILLNESS No family hx of      Substance Abuse No family hx of      Anesthesia Reaction No family hx of      Asthma No family hx of      OSTEOPOROSIS No family hx of      Genetic Disorder No family hx of      Thyroid Disease No family hx of      Obesity No family hx of      Unknown/Adopted No family hx of        Objective:  Blood pressure 104/62, pulse 80, temperature 97.8  F (36.6  C), temperature source Oral, resp. rate 12, weight 120 lb 12.8 oz (54.8 kg), SpO2 99 %, not currently breastfeeding.   Neck:  There is no lymphadenopathy or thyroid tenderness or enlargement  Chest: Clear to auscultation bilaterally.  No wheezes, rales or retractions.  CV: Regular rate and rhythm without murmurs, rubs or gallops.    Cervical MRI:   C5-C6: There is facet arthropathy bilaterally, uncinate hypertrophy  bilaterally and a posterior broad-based disc-osteophyte complex with a  superimposed small posterior central/right paracentral disc herniation  (protrusion). The herniated disc material mildly indents the right  anterior surface of the cervical spinal cord and results in moderate  spinal canal stenosis. There is no foraminal narrowing on either side.     C6-C7: There is facet arthropathy bilaterally, uncinate hypertrophy  bilaterally and a posterior broad-based disc-osteophyte complex with a  superimposed tiny posterior broad-based disc herniation (contusion).  These findings result in minimal  spinal canal narrowing but no  foraminal stenosis on either side.    Assessment:  1. Tendonitis of wrists - right worse than left  2. Right cervical radiculopathy    Plan:  1. 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 Will send to [facility] - consider medical intervention - wonder about injection  4. Recheck in one month

## 2017-02-13 NOTE — NURSING NOTE
"Chief Complaint   Patient presents with     Work Comp     Follow-up     Results     from MRI that was completed 02/09/2017     Initial /62 (BP Location: Left arm, Patient Position: Chair, Cuff Size: Adult Regular)  Pulse 80  Temp 97.8  F (36.6  C) (Oral)  Resp 12  Wt 120 lb 12.8 oz (54.8 kg)  SpO2 99%  BMI 24.4 kg/m2 Estimated body mass index is 24.4 kg/(m^2) as calculated from the following:    Height as of 2/6/17: 4' 11\" (1.499 m).    Weight as of this encounter: 120 lb 12.8 oz (54.8 kg).  BP completed using cuff size regular Right Arm    Health Maintenance reviewed - Yes: (pt is up todate)  Tobacco Verified: Yes  Family History Updated: Yes  MyChart Offered: Yes  Immunizations Up to Date:  Yes    William Mixon CMA    "

## 2017-02-13 NOTE — MR AVS SNAPSHOT
After Visit Summary   2/13/2017    Zo Qureshi    MRN: [id number]           Patient Information     Date Of Birth          1975        Visit Information        Provider Department      2/13/2017 2:00 PM Terri Baron MD [facility]        Today's Diagnoses     Episodic tension-type headache, not intractable    -  1    Paresthesia of both hands        Finger pain, right        Cervical radiculopathy        Weakness of shoulder           Follow-ups after your visit        Additional Services     ORTHO  REFERRAL       [facility] is referring you to the Orthopedic  Services at [facility].       The  Representative will assist you in the coordination of your Orthopedic and Musculoskeletal Care as prescribed by your physician.    The  Representative will call you within 1 business day to help schedule your appointment, or you may contact the  Representative at:    All areas ~ [phone]     Type of Referral : Spine: Cervical / Thoracic: Medical Spine Specialist  - wondering about pain intervention - patient with moderate disc herniation and nerve impingement      Timeframe requested: Routine    Coverage of these services is subject to the terms and limitations of your health insurance plan.  Please call member services at your health plan with any benefit or coverage questions.      If X-rays, CT or MRI's have been performed, please contact the facility where they were done to arrange for , prior to your scheduled appointment.  Please bring this referral request to your appointment and present it to your specialist.                  Follow-up notes from your care team     Return in about 4 weeks (around 3/13/2017).      Your next 10 appointments already scheduled     Feb 14, 2017 11:30 AM JABIER VILLAVICENCIO ([facility]  )    [address]  [facility]  [address]  [facility] [address]   [phone]            Feb 16, 2017  2:20 PM CST   [facility] with Lang M Ho   [facility] ([facility] [place]  )    [address] [address] [address]   [phone]            Feb 21, 2017 11:00 AM CST   [facility] with Shanonvaughn Jensener   [facility] ([facility] [place]  )    [address] [address] [address]   [phone]            Feb 28, 2017  3:30 PM CST   [facility] with Shanonvaughn Chaudhrynder   [facility] ([facility] [place]  )    [address] [facility] [address]  [facility] [address]   [phone]            Mar 07, 2017  3:30 PM CST   [facility] with Shanon Chaudhrynder   [facility] ([facility] [place]  )    [address] [address] [address]   [phone]              Who to contact     If you have questions or need follow up information about today's clinic visit or your schedule please contact [facility] directly at [phone].  Normal or non-critical lab and imaging results will be communicated to you by MyChart, letter or phone within 4 business days after the clinic has received the results. If you do not hear from us within 7 days, please contact the clinic through MyChart or phone. If you have a critical or abnormal lab result, we will notify you by phone as soon as possible.  Submit refill requests through [email or web address] or call your pharmacy and they will forward the refill request to us. Please allow 3 business days for your refill to be completed.          Additional Information About Your Visit        Care EveryWhere ID     This is your Care EveryWhere ID. This could be used by other organizations to access your [place] medical records  [id number]        Your Vitals Were     Pulse Temperature Respirations Pulse Oximetry BMI (Body Mass Index)       80 97.8  F (36.6  C) (Oral) 12 99% 24.4 kg/m2        Blood Pressure from Last 3 Encounters:   02/13/17 104/62   02/06/17 104/60    02/02/17 (!) 128/94    Weight from Last 3 Encounters:   02/13/17 120 lb 12.8 oz (54.8 kg)   02/06/17 119 lb 11.2 oz (54.3 kg)   02/02/17 122 lb (55.3 kg)              We Performed the Following     ORTHO  REFERRAL          Today's Medication Changes          These changes are accurate as of: 2/13/17  2:44 PM.  If you have any questions, ask your nurse or doctor.               Start taking these medicines.        Dose/Directions    topiramate 25 MG tablet   Commonly known as:  TOPAMAX   Used for:  Episodic tension-type headache, not intractable, Weakness of shoulder, Cervical radiculopathy   Started by:  Terri Baron MD        Take 1 tablet (25 mg) at bedtime for 1 week, then 1 tablet twice daily   Quantity:  60 tablet   Refills:  1            Where to get your medicines      These medications were sent to [address] [address]     Phone:  [phone]     topiramate 25 MG tablet                Primary Care Provider Office Phone # Fax #    Terri Baron -[phone]       [facility] [address] [place] [address]        Thank you!     Thank you for choosing [facility]  for your care. Our goal is always to provide you with excellent care. Hearing back from our patients is one way we can continue to improve our services. Please take a few minutes to complete the written survey that you may receive in the mail after your visit with us. Thank you!             Your Updated Medication List - Protect others around you: Learn how to safely use, store and throw away your medicines at www.disposemymeds.org.          This list is accurate as of: 2/13/17  2:44 PM.  Always use your most recent med list.                   Brand Name Dispense Instructions for use    clindamycin-benzoyl peroxide gel    BENZACLIN    50 g    Apply to  Affected area initially once daily for 2 weeks  and then increase to 2 times daily if tolerated       loratadine 10 MG tablet    CLARITIN    30 tablet    Take 1 tablet (10 mg) by mouth daily       PATADAY 0.2 % Soln   Generic drug:  olopatadine HCl          topiramate 25 MG tablet    TOPAMAX    60 tablet    Take 1 tablet (25 mg) at bedtime for 1 week, then 1 tablet twice daily       traMADol 50 MG tablet    ULTRAM    40 tablet    Take 1-2 tablets ( mg) by mouth every 6 hours as needed for moderate pain

## 2017-02-13 NOTE — LETTER
[facility]  [address] [place] [address]  Phone: [phone]    February 13, 2017        Zo Qureshi  [address] [facility] [address]          To Whom It May Concern,       RE: Zo Qureshi    Patient was seen and treated today at our clinic.  She has been diagnosed with tendonitis and has been attending physical therapy 3 days per week.   She has also been diagnosed with right sided cervical radiculopathy and has had MRI of cervical spine showing disc herniation.   She has been referred to medical spine clinic for evaluation and treatment.   She will need at least another 4 weeks off of work for recovery until her next visit with me.     Please contact me for questions or concerns.      Sincerely,        Terri Robles MD

## 2017-02-14 ENCOUNTER — THERAPY VISIT (OUTPATIENT)
Dept: OCCUPATIONAL THERAPY | Facility: CLINIC | Age: 42
End: 2017-02-14
Payer: OTHER MISCELLANEOUS

## 2017-02-14 DIAGNOSIS — R20.2 PARESTHESIA OF BOTH HANDS: ICD-10-CM

## 2017-02-14 DIAGNOSIS — M79.644 FINGER PAIN, RIGHT: ICD-10-CM

## 2017-02-14 PROCEDURE — 97140 MANUAL THERAPY 1/> REGIONS: CPT | Mod: GO | Performed by: OCCUPATIONAL THERAPIST

## 2017-02-14 PROCEDURE — 97112 NEUROMUSCULAR REEDUCATION: CPT | Mod: GO | Performed by: OCCUPATIONAL THERAPIST

## 2017-02-17 ENCOUNTER — TELEPHONE (OUTPATIENT)
Dept: FAMILY MEDICINE | Facility: CLINIC | Age: 42
End: 2017-02-17

## 2017-02-17 NOTE — TELEPHONE ENCOUNTER
2 page fax received from [employer] Zhaopin for Dr Baron to complete.  Form is in Dr Baron's in-basket for review.  Please fax form back to [phone].    Norma Corrales/

## 2017-02-20 ENCOUNTER — THERAPY VISIT (OUTPATIENT)
Dept: PHYSICAL THERAPY | Facility: CLINIC | Age: 42
End: 2017-02-20
Payer: OTHER MISCELLANEOUS

## 2017-02-20 DIAGNOSIS — R29.898 WEAKNESS OF SHOULDER: ICD-10-CM

## 2017-02-20 DIAGNOSIS — M54.12 CERVICAL RADICULOPATHY: ICD-10-CM

## 2017-02-20 DIAGNOSIS — M25.511 BILATERAL SHOULDER PAIN: ICD-10-CM

## 2017-02-20 DIAGNOSIS — M25.512 BILATERAL SHOULDER PAIN: ICD-10-CM

## 2017-02-20 PROCEDURE — 97530 THERAPEUTIC ACTIVITIES: CPT | Mod: GP | Performed by: PHYSICAL THERAPIST

## 2017-02-20 PROCEDURE — 97110 THERAPEUTIC EXERCISES: CPT | Mod: GP | Performed by: PHYSICAL THERAPIST

## 2017-02-21 ENCOUNTER — THERAPY VISIT (OUTPATIENT)
Dept: OCCUPATIONAL THERAPY | Facility: CLINIC | Age: 42
End: 2017-02-21
Payer: OTHER MISCELLANEOUS

## 2017-02-21 DIAGNOSIS — R20.2 PARESTHESIA OF BOTH HANDS: ICD-10-CM

## 2017-02-21 DIAGNOSIS — M79.644 FINGER PAIN, RIGHT: ICD-10-CM

## 2017-02-21 PROCEDURE — 97140 MANUAL THERAPY 1/> REGIONS: CPT | Mod: GO | Performed by: OCCUPATIONAL THERAPIST

## 2017-02-21 PROCEDURE — 97112 NEUROMUSCULAR REEDUCATION: CPT | Mod: GO | Performed by: OCCUPATIONAL THERAPIST

## 2017-02-21 PROCEDURE — 29125 APPL SHORT ARM SPLINT STATIC: CPT | Mod: GO | Performed by: OCCUPATIONAL THERAPIST

## 2017-02-23 ENCOUNTER — THERAPY VISIT (OUTPATIENT)
Dept: PHYSICAL THERAPY | Facility: CLINIC | Age: 42
End: 2017-02-23
Payer: OTHER MISCELLANEOUS

## 2017-02-23 DIAGNOSIS — M54.50 BILATERAL LOW BACK PAIN WITHOUT SCIATICA: ICD-10-CM

## 2017-02-23 DIAGNOSIS — R29.898 WEAKNESS OF SHOULDER: ICD-10-CM

## 2017-02-23 DIAGNOSIS — M25.511 BILATERAL SHOULDER PAIN: ICD-10-CM

## 2017-02-23 DIAGNOSIS — M54.12 CERVICAL RADICULOPATHY: ICD-10-CM

## 2017-02-23 DIAGNOSIS — M25.512 BILATERAL SHOULDER PAIN: ICD-10-CM

## 2017-02-23 PROCEDURE — 97530 THERAPEUTIC ACTIVITIES: CPT | Mod: GP | Performed by: PHYSICAL THERAPIST

## 2017-02-23 PROCEDURE — 97110 THERAPEUTIC EXERCISES: CPT | Mod: GP | Performed by: PHYSICAL THERAPIST

## 2017-02-28 ENCOUNTER — THERAPY VISIT (OUTPATIENT)
Dept: OCCUPATIONAL THERAPY | Facility: CLINIC | Age: 42
End: 2017-02-28
Payer: OTHER MISCELLANEOUS

## 2017-02-28 DIAGNOSIS — M79.644 FINGER PAIN, RIGHT: ICD-10-CM

## 2017-02-28 DIAGNOSIS — R20.2 PARESTHESIA OF BOTH HANDS: ICD-10-CM

## 2017-02-28 DIAGNOSIS — R29.898 MECHANICAL PROBLEMS WITH LIMBS: Primary | ICD-10-CM

## 2017-02-28 PROCEDURE — 97112 NEUROMUSCULAR REEDUCATION: CPT | Mod: GO | Performed by: OCCUPATIONAL THERAPIST

## 2017-02-28 PROCEDURE — 97760 ORTHOTIC MGMT&TRAING 1ST ENC: CPT | Mod: GO | Performed by: OCCUPATIONAL THERAPIST

## 2017-03-01 ENCOUNTER — THERAPY VISIT (OUTPATIENT)
Dept: PHYSICAL THERAPY | Facility: CLINIC | Age: 42
End: 2017-03-01
Payer: OTHER MISCELLANEOUS

## 2017-03-01 DIAGNOSIS — M25.512 BILATERAL SHOULDER PAIN: ICD-10-CM

## 2017-03-01 DIAGNOSIS — M54.12 CERVICAL RADICULOPATHY: ICD-10-CM

## 2017-03-01 DIAGNOSIS — M25.511 BILATERAL SHOULDER PAIN: ICD-10-CM

## 2017-03-01 DIAGNOSIS — R29.898 WEAKNESS OF SHOULDER: ICD-10-CM

## 2017-03-01 PROCEDURE — 97140 MANUAL THERAPY 1/> REGIONS: CPT | Mod: GP | Performed by: PHYSICAL THERAPIST

## 2017-03-01 PROCEDURE — 97112 NEUROMUSCULAR REEDUCATION: CPT | Mod: GP | Performed by: PHYSICAL THERAPIST

## 2017-03-01 PROCEDURE — 97110 THERAPEUTIC EXERCISES: CPT | Mod: GP | Performed by: PHYSICAL THERAPIST

## 2017-03-07 ENCOUNTER — THERAPY VISIT (OUTPATIENT)
Dept: OCCUPATIONAL THERAPY | Facility: CLINIC | Age: 42
End: 2017-03-07
Payer: OTHER MISCELLANEOUS

## 2017-03-07 DIAGNOSIS — R20.2 PARESTHESIA OF BOTH HANDS: ICD-10-CM

## 2017-03-07 DIAGNOSIS — R29.898 MECHANICAL PROBLEMS WITH LIMBS: ICD-10-CM

## 2017-03-07 DIAGNOSIS — M79.644 FINGER PAIN, RIGHT: ICD-10-CM

## 2017-03-07 PROCEDURE — 97140 MANUAL THERAPY 1/> REGIONS: CPT | Mod: GO | Performed by: OCCUPATIONAL THERAPIST

## 2017-03-07 PROCEDURE — 97112 NEUROMUSCULAR REEDUCATION: CPT | Mod: GO | Performed by: OCCUPATIONAL THERAPIST

## 2017-03-09 ENCOUNTER — THERAPY VISIT (OUTPATIENT)
Dept: PHYSICAL THERAPY | Facility: CLINIC | Age: 42
End: 2017-03-09
Payer: OTHER MISCELLANEOUS

## 2017-03-09 ENCOUNTER — TELEPHONE (OUTPATIENT)
Dept: FAMILY MEDICINE | Facility: CLINIC | Age: 42
End: 2017-03-09

## 2017-03-09 DIAGNOSIS — M25.512 CHRONIC PAIN OF BOTH SHOULDERS: ICD-10-CM

## 2017-03-09 DIAGNOSIS — M46.98 INFLAMMATORY SPONDYLOPATHY OF SACRAL REGION (H): Primary | ICD-10-CM

## 2017-03-09 DIAGNOSIS — M54.12 CERVICAL RADICULOPATHY: ICD-10-CM

## 2017-03-09 DIAGNOSIS — M25.511 BILATERAL SHOULDER PAIN: ICD-10-CM

## 2017-03-09 DIAGNOSIS — R29.898 WEAKNESS OF SHOULDER: ICD-10-CM

## 2017-03-09 DIAGNOSIS — M25.512 BILATERAL SHOULDER PAIN: ICD-10-CM

## 2017-03-09 DIAGNOSIS — M25.511 CHRONIC PAIN OF BOTH SHOULDERS: ICD-10-CM

## 2017-03-09 DIAGNOSIS — G89.29 CHRONIC PAIN OF BOTH SHOULDERS: ICD-10-CM

## 2017-03-09 PROCEDURE — 97140 MANUAL THERAPY 1/> REGIONS: CPT | Mod: GP | Performed by: PHYSICAL THERAPIST

## 2017-03-09 PROCEDURE — 97110 THERAPEUTIC EXERCISES: CPT | Mod: GP | Performed by: PHYSICAL THERAPIST

## 2017-03-09 NOTE — TELEPHONE ENCOUNTER
Yani adams from [facility] CB# [phone] available on Monday,    Working on shoulders, neck, low back for 11 visits  Concern about central sensitivity, very guarded, high level of pain even to light touch  Very sensitive to touch in any of the areas being worked on  Radicular symptoms going down arms, feels like it's coming more from muscles than her neck  Very minimal progress    Would she benefit from physical therapist eval at pain clinic  eval at neuro surgeon office on Wed, but PT wondering if pain mngt might be better    Route to PCP to review and advise  Alejandra Champagne RN, BS  Clinical Nurse Triage.

## 2017-03-13 ENCOUNTER — TELEPHONE (OUTPATIENT)
Dept: FAMILY MEDICINE | Facility: CLINIC | Age: 42
End: 2017-03-13

## 2017-03-13 ENCOUNTER — OFFICE VISIT (OUTPATIENT)
Dept: FAMILY MEDICINE | Facility: CLINIC | Age: 42
End: 2017-03-13
Payer: OTHER MISCELLANEOUS

## 2017-03-13 VITALS
WEIGHT: 120.6 LBS | RESPIRATION RATE: 16 BRPM | OXYGEN SATURATION: 99 % | DIASTOLIC BLOOD PRESSURE: 68 MMHG | BODY MASS INDEX: 24.36 KG/M2 | TEMPERATURE: 97.9 F | HEART RATE: 83 BPM | SYSTOLIC BLOOD PRESSURE: 126 MMHG

## 2017-03-13 DIAGNOSIS — K64.4 EXTERNAL HEMORRHOIDS: ICD-10-CM

## 2017-03-13 DIAGNOSIS — M54.2 NECK PAIN: ICD-10-CM

## 2017-03-13 DIAGNOSIS — R20.0 NUMBNESS OF RIGHT HAND: Primary | ICD-10-CM

## 2017-03-13 PROCEDURE — 99213 OFFICE O/P EST LOW 20 MIN: CPT | Performed by: FAMILY MEDICINE

## 2017-03-13 RX ORDER — ASPIRIN 81 MG
100 TABLET, DELAYED RELEASE (ENTERIC COATED) ORAL DAILY
Qty: 60 TABLET | Refills: 1 | Status: SHIPPED | OUTPATIENT
Start: 2017-03-13 | End: 2019-01-21

## 2017-03-13 NOTE — LETTER
[facility]  [address] [place], [address]  [phone]        March 13, 2017    RE: Zo Qureshi                                                                                                                                                       [address] [facility] [address]            To Whom It May Concern,   Zo Qureshi was seen here today.   She continues to have ongoing problems with her tendonitis and we are wondering now about about right sided carpal tunnel syndrome.   She had neck MRI which did not showed disc herniation.   Since PT did not improve her symptoms adequately she is having further evaluation with a pain clinic and with an EMG to see if this is indeed carpal tunnel syndrome.   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   She could work on light duty but that would require that she not do any repetitive work.             Sincerely,    Terri Robles M.D.

## 2017-03-13 NOTE — MR AVS SNAPSHOT
After Visit Summary   3/13/2017    Zo Qureshi    MRN: [id number]           Patient Information     Date Of Birth          1975        Visit Information        Provider Department      3/13/2017 1:30 PM Terri Baron MD [facility]        Today's Diagnoses     Numbness of right hand    -  1    Neck pain        External hemorrhoids           Follow-ups after your visit        Additional Services     NEUROLOGY ADULT REFERRAL       Your provider has referred you to: N: [facility] [place] of Neurology [facility] [phone]   [email or web address]    Reason for Referral: EMG    Please be aware that coverage of these services is subject to the terms and limitations of your health insurance plan.  Call member services at your health plan with any benefit or coverage questions.      Please bring the following with you to your appointment:    (1) Any X-Rays, CTs or MRIs which have been performed.  Contact the facility where they were done to arrange for  prior to your scheduled appointment.    (2) List of current medications  (3) This referral request   (4) Any documents/labs given to you for this referral                  Your next 10 appointments already scheduled     Mar 15, 2017  2:00 PM CDT   New Visit with MARILEE Hsu CNP   [facility] ([facility])    [address]  [facility] [address] [phone]            Mar 17, 2017 10:00 AM CDT   [facility] with Shanon POLO [facility] ([facility]  )    [address]  [address]  [facility] [address]   [phone]              Who to contact     If you have questions or need follow up information about today's clinic visit or your schedule please contact [facility] directly at [phone].  Normal or non-critical lab and imaging results will be communicated to you by Taj  letter or phone within 4 business days after the clinic has received the results. If you do not hear from us within 7 days, please contact the clinic through Product Huntt or phone. If you have a critical or abnormal lab result, we will notify you by phone as soon as possible.  Submit refill requests through DLS or call your pharmacy and they will forward the refill request to us. Please allow 3 business days for your refill to be completed.          Additional Information About Your Visit        Care EveryWhere ID     This is your Care EveryWhere ID. This could be used by other organizations to access your [place] medical records  [id number]        Your Vitals Were     Pulse Temperature Respirations Pulse Oximetry BMI (Body Mass Index)       83 97.9  F (36.6  C) (Oral) 16 99% 24.36 kg/m2        Blood Pressure from Last 3 Encounters:   03/13/17 126/68   02/13/17 104/62   02/06/17 104/60    Weight from Last 3 Encounters:   03/13/17 120 lb 9.6 oz (54.7 kg)   02/13/17 120 lb 12.8 oz (54.8 kg)   02/06/17 119 lb 11.2 oz (54.3 kg)              We Performed the Following     CRP inflammation     ESR: Erythrocyte sedimentation rate     NEUROLOGY ADULT REFERRAL          Today's Medication Changes          These changes are accurate as of: 3/13/17  2:09 PM.  If you have any questions, ask your nurse or doctor.               Start taking these medicines.        Dose/Directions    docusate sodium 100 MG tablet   Commonly known as:  COLACE   Used for:  External hemorrhoids   Started by:  Terri Baron MD        Dose:  100 mg   Take 100 mg by mouth daily   Quantity:  60 tablet   Refills:  1            Where to get your medicines      [facility] [address] [place] [address] [address] AT [place] [address]  [address] [place] [address]    Hours:  24-hours Phone:  [phone]     docusate sodium 100 MG tablet                Primary Care Provider Office Phone #  Fax #    Terri Baron [phone]       [facility] [address] [facility] [address]        Thank you!     Thank you for choosing [facility]  for your care. Our goal is always to provide you with excellent care. Hearing back from our patients is one way we can continue to improve our services. Please take a few minutes to complete the written survey that you may receive in the mail after your visit with us. Thank you!             Your Updated Medication List - Protect others around you: Learn how to safely use, store and throw away your medicines at www.disposemymeds.org.          This list is accurate as of: 3/13/17  2:09 PM.  Always use your most recent med list.                   Brand Name Dispense Instructions for use    clindamycin-benzoyl peroxide gel    BENZACLIN    50 g    Apply to  Affected area initially once daily for 2 weeks and then increase to 2 times daily if tolerated       docusate sodium 100 MG tablet    COLACE    60 tablet    Take 100 mg by mouth daily       loratadine 10 MG tablet    CLARITIN    30 tablet    Take 1 tablet (10 mg) by mouth daily       PATADAY 0.2 % Soln   Generic drug:  olopatadine HCl          topiramate 25 MG tablet    TOPAMAX    60 tablet    Take 1 tablet (25 mg) at bedtime for 1 week, then 1 tablet twice daily       traMADol 50 MG tablet    ULTRAM    40 tablet    Take 1-2 tablets ( mg) by mouth every 6 hours as needed for moderate pain

## 2017-03-13 NOTE — LETTER
[facility]  [address] [place], [address]  [phone]        March 13, 2017    RE: Zo Qureshi                                                                                                                                                       [address] [facility] [address]            Dear Zo,  To Whom It May Concern,   Zo Qureshi was seen here today.   She continues to have ongoing problems with her tendonitis and we are wondering now about about right sided carpal tunnel syndrome.   She had neck MRI which did not showed disc herniation.   Since PT did not improve her symptoms adequately she is having further evaluation with a pain clinic and with an EMG to see if this is indeed carpal tunnel syndrome.   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   She could work on light duty but that would require that she not do any repetitive work.             Sincerely,    Terri Robles M.D.

## 2017-03-13 NOTE — TELEPHONE ENCOUNTER
Pt here today and there were a few typos on letter we gave her for work.  Corrected them and re did letter, faxed per pts request.

## 2017-03-13 NOTE — NURSING NOTE
"Chief Complaint   Patient presents with     Md Request F/U     from OV on 02/13 for neck pain, headache       Initial /68 (BP Location: Right arm, Patient Position: Chair, Cuff Size: Adult Regular)  Pulse 83  Temp 97.9  F (36.6  C) (Oral)  Resp 16  Wt 120 lb 9.6 oz (54.7 kg)  SpO2 99%  BMI 24.36 kg/m2 Estimated body mass index is 24.36 kg/(m^2) as calculated from the following:    Height as of 2/6/17: 4' 11\" (1.499 m).    Weight as of this encounter: 120 lb 9.6 oz (54.7 kg).  Medication Reconciliation: complete     William Mixon CMA      "

## 2017-03-13 NOTE — PROGRESS NOTES
SUBJECTIVE:                                                    Zo Qureshi is a 42 year old female who presents to clinic today for the following health issues:      MD request follow-up for recheck on neck pain and headaches. Patient states she has been getting headaches more frequently. She has been having episodes of vertigo with the headaches. Patient states that taking medicine- Tylenol does help with the headache.  She has appointment with ortho for her neck pain in a few days and she is working to set up appt with the pain clinic.   Her right middle three fingers continue with pain and tingling.         Past Medical History   Diagnosis Date     HSIL on Pap smear 09/21/11     MARTA 2 and 3     INFLAM SPONDYLOPATHY NOS   1/7/2008     Leukocytopenia 3/10/2014     Thrombocytopenia (H) 3/10/2014     Unspecified closed fracture of pelvis 2000     left fracture of pelvis after delivery       Past Surgical History   Procedure Laterality Date     C nonspecific procedure  10/00     after delivery 2 units of prbcs secondary to placenta     Tubal ligation  5/2009     laparoscopic tubal ligation     Leep tx, cervical  12/19/11     MARTA II       MEDICATIONS:  Current Outpatient Prescriptions   Medication     topiramate (TOPAMAX) 25 MG tablet     traMADol (ULTRAM) 50 MG tablet     loratadine (CLARITIN) 10 MG tablet     PATADAY 0.2 % SOLN     clindamycin-benzoyl peroxide (BENZACLIN) gel     No current facility-administered medications for this visit.        SOCIAL HISTORY:  Social History   Substance Use Topics     Smoking status: Never Smoker     Smokeless tobacco: Never Used     Alcohol use 0.0 oz/week     0 Standard drinks or equivalent per week      Comment: occasionally       Family History   Problem Relation Age of Onset     Hypertension Father      Lipids Father      Hypertension Mother      Lipids Mother      DIABETES No family hx of      Coronary Artery Disease No family hx of      Hyperlipidemia No family hx of       CEREBROVASCULAR DISEASE No family hx of      Breast Cancer No family hx of      Colon Cancer No family hx of      Prostate Cancer No family hx of      Other Cancer No family hx of      Depression No family hx of      Anxiety Disorder No family hx of      MENTAL ILLNESS No family hx of      Substance Abuse No family hx of      Anesthesia Reaction No family hx of      Asthma No family hx of      OSTEOPOROSIS No family hx of      Genetic Disorder No family hx of      Thyroid Disease No family hx of      Obesity No family hx of      Unknown/Adopted No family hx of        Objective:  Blood pressure 126/68, pulse 83, temperature 97.9  F (36.6  C), temperature source Oral, resp. rate 16, weight 120 lb 9.6 oz (54.7 kg), SpO2 99 %, not currently breastfeeding.  Neck:  There is no lymphadenopathy or thyroid tenderness or enlargement  Chest: Clear to auscultation bilaterally.  No wheezes, rales or retractions.  CV: Regular rate and rhythm without murmurs, rubs or gallops.  Reviewed MRI of neck - no major herniation or stenosis  PT is regular and not helping much    Assessment:  1. Wonder about CTS rather than radiculopathy   2. Also wonder about contribution of her spondyloarthropathy  3. Headaches  4. Complains of hemorrhoids and hard BM - using OTC cream     Plan:  1. Continue topamax - may increase after another month  2. Will use stool softener for hemorrhoids - already using OTC cream  3. Work off another 4 weeks  4. Will get EMG  5. eval by pain clinic and ortho  6. See [facility] orders for labs  7. Recheck in 4 weeks

## 2017-03-15 ENCOUNTER — TELEPHONE (OUTPATIENT)
Dept: FAMILY MEDICINE | Facility: CLINIC | Age: 42
End: 2017-03-15

## 2017-03-15 ENCOUNTER — OFFICE VISIT (OUTPATIENT)
Dept: NEUROSURGERY | Facility: CLINIC | Age: 42
End: 2017-03-15
Attending: NURSE PRACTITIONER
Payer: OTHER MISCELLANEOUS

## 2017-03-15 VITALS
SYSTOLIC BLOOD PRESSURE: 126 MMHG | DIASTOLIC BLOOD PRESSURE: 85 MMHG | TEMPERATURE: 98.2 F | OXYGEN SATURATION: 97 % | HEART RATE: 111 BPM | WEIGHT: 120 LBS | HEIGHT: 59 IN | BODY MASS INDEX: 24.19 KG/M2

## 2017-03-15 DIAGNOSIS — M54.12 CERVICAL RADICULAR PAIN: Primary | ICD-10-CM

## 2017-03-15 PROCEDURE — 99211 OFF/OP EST MAY X REQ PHY/QHP: CPT | Performed by: NURSE PRACTITIONER

## 2017-03-15 PROCEDURE — 99203 OFFICE O/P NEW LOW 30 MIN: CPT | Performed by: NURSE PRACTITIONER

## 2017-03-15 ASSESSMENT — PAIN SCALES - GENERAL: PAINLEVEL: SEVERE PAIN (7)

## 2017-03-15 NOTE — PATIENT INSTRUCTIONS
1.  Please schedule your physical therapy.      2.  Please schedule your injection. Someone will contact you from the pain clinic within 24 hours to schedule.        3.  Please contact the clinic if pain persists at [phone].

## 2017-03-15 NOTE — NURSING NOTE
"Zo Qureshi is a 42 year old female who presents for:  Chief Complaint   Patient presents with     Neurologic Problem     Neck pain, patient noticed it around Jan 5th 2017, she has been having headaches, dizziness, numbness and tingling on the right side.         Initial Vitals:  /85 (BP Location: Right arm)  Pulse 111  Temp 98.2  F (36.8  C) (Oral)  Ht 4' 11\" (1.499 m)  Wt 120 lb (54.4 kg)  SpO2 97%  BMI 24.24 kg/m2 Estimated body mass index is 24.24 kg/(m^2) as calculated from the following:    Height as of this encounter: 4' 11\" (1.499 m).    Weight as of this encounter: 120 lb (54.4 kg).. Body surface area is 1.5 meters squared. BP completed using cuff size: regular  Severe Pain (7)    Do you feel safe in your environment?  Yes  Do you need any refills today? No    Nursing Comments: Neck pain, patient noticed it around Jan 5th 2017, she has been having headaches, dizziness, numbness and tingling on the right side.  Patient rates her pain today as 7      5 min. nursing intake time  Maryann Doll MA       Discharge plan: 1.  Please schedule your physical therapy.      2.  Please schedule your injection. Someone will contact you from the pain clinic within 24 hours to schedule.        3.  Please contact the clinic if pain persists at [phone].   2 min. nursing discharge time  Maryann Doll MA        "

## 2017-03-15 NOTE — PROGRESS NOTES
Dr. Darnell Rodriguez  [facility]  Neurosurgery Clinic Visit        CC: neck and arm pain    Primary care Provider: Terri Baron      Reason For Visit:   I was asked by Dr. Baron to consult on the patient for cervical radicular pain.      HPI: Zo Qureshi is a 42 year old female with cervical radicular pain.  The pt reports that she has had pain for years but this was controlled with heat and pain medications. She noted that in January the pain became worse and hard to control. She is right handed and has pain in her neck with radicular pain into both arms right greater than left.  She works at a job and does repetitive movements. She reports that this is a work comp claim. She notes numbness in right arm and right hand with severe tightness and numbness to her three middle fingers.  She denies any falls or coordination changes.  She is in PT but has not had injection therapy.      Pain at its worst 10  Pain right now:  7    Past Medical History   Diagnosis Date     HSIL on Pap smear 09/21/11     MARTA 2 and 3     INFLAM SPONDYLOPATHY NOS   1/7/2008     Leukocytopenia 3/10/2014     Thrombocytopenia (H) 3/10/2014     Unspecified closed fracture of pelvis 2000     left fracture of pelvis after delivery       Past Medical History reviewed with patient during visit.    Past Surgical History   Procedure Laterality Date     C nonspecific procedure  10/00     after delivery 2 units of prbcs secondary to placenta     Tubal ligation  5/2009     laparoscopic tubal ligation     Leep tx, cervical  12/19/11     MARTA II     Past Surgical History reviewed with patient during visit.    Current Outpatient Prescriptions   Medication     docusate sodium (COLACE) 100 MG tablet     topiramate (TOPAMAX) 25 MG tablet     traMADol (ULTRAM) 50 MG tablet     loratadine (CLARITIN) 10 MG tablet     clindamycin-benzoyl peroxide (BENZACLIN) gel     PATADAY 0.2 % SOLN     No current facility-administered medications for this  visit.        Allergies   Allergen Reactions     Advil [Ibuprofen Micronized]      Rash      Contrast Dye      Percocet [Codeine] Nausea and Vomiting and Itching       Social History     Social History     Marital status: Single     Spouse name: N/A     Number of children: 4     Years of education: N/A     Occupational History      [employer]     Social History Main Topics     Smoking status: Never Smoker     Smokeless tobacco: Never Used     Alcohol use 0.0 oz/week     0 Standard drinks or equivalent per week      Comment: occasionally     Drug use: No     Sexual activity: Yes     Partners: Male     Birth control/ protection: Surgical      Comment: tubal     Other Topics Concern     Parent/Sibling W/ Cabg, Mi Or Angioplasty Before 65f 55m? No     Social History Narrative       Family History   Problem Relation Age of Onset     Hypertension Father      Lipids Father      Hypertension Mother      Lipids Mother      DIABETES No family hx of      Coronary Artery Disease No family hx of      Hyperlipidemia No family hx of      CEREBROVASCULAR DISEASE No family hx of      Breast Cancer No family hx of      Colon Cancer No family hx of      Prostate Cancer No family hx of      Other Cancer No family hx of      Depression No family hx of      Anxiety Disorder No family hx of      MENTAL ILLNESS No family hx of      Substance Abuse No family hx of      Anesthesia Reaction No family hx of      Asthma No family hx of      OSTEOPOROSIS No family hx of      Genetic Disorder No family hx of      Thyroid Disease No family hx of      Obesity No family hx of      Unknown/Adopted No family hx of          Review Of Systems  Skin: negative  Eyes: negative  Ears/Nose/Throat: negative  Respiratory: No shortness of breath, dyspnea on exertion, cough, or hemoptysis  Cardiovascular: negative  Gastrointestinal: negative  Genitourinary: negative  Musculoskeletal: neck pain  Neurologic: negative and numbness or  "tingling of hands  Psychiatric: negative  Hematologic/Lymphatic/Immunologic: negative  Endocrine: negative     ROS: 10 point ROS neg other than the symptoms noted above in the HPI.      Vital Signs: /85 (BP Location: Right arm)  Pulse 111  Temp 98.2  F (36.8  C) (Oral)  Ht 4' 11\" (1.499 m)  Wt 120 lb (54.4 kg)  SpO2 97%  BMI 24.24 kg/m2    Examination:  Constitutional:  Alert, well nourished, NAD.  HEENT: Normocephalic, atraumatic.   Pulm:  Without shortness of breath   CV:  No pitting edema of BLE.    Neurological:  Awake  Alert  Oriented x 3  Speech clear  Cranial nerves II - XII intact  PERRL  EOMI  Face symmetric  Tongue midline  Motor exam   Shoulder Abduction:  Right:  5/5   Left:  5/5  Biceps:                      Right:  5/5   Left:  5/5  Triceps:                     Right:  5/5   Left:  5/5  Wrist Extensors:       Right:  5/5   Left:  5/5  Wrist Flexors:           Right:  5/5   Left:  5/5  Intrinsics:                   Right:  5/5   Left:  5/5   Hip Flexor:                Right: 5/5  Left:  5/5  Hip Adductor:             Right:  5/5  Left:  5/5  Hip Abductor:             Right:  5/5  Left:  5/5  Gastroc Soleus:        Right:  5/5  Left:  5/5  Tib/Ant:                      Right:  5/5  Left:  5/5  EHL:                          Right:  5/5  Left:  5/5   Sensation normal to bilateral upper and lower extremities    Gait: Able to stand from a seated position. Normal non-antalgic, non-myelopathic gait.  Able to heel/toe walk without loss of balance  Cervical examination reveals painful and restricted range of motion.  Tenderness to palpation of the cervical spine and paraspinous muscles bilaterally.      Imaging: IMPRESSION: Degenerative changes of the cervical spine as described  above.    Assessment/Plan:   Zo Qureshi is a 42 year old female with cervical radicular pain.  The pt reports that she has had pain for years but this was controlled with heat and pain medications. She noted that in January the " pain became worse and hard to control. She is right handed and has pain in her neck with radicular pain into both arms right greater than left.  She works at a job and does repetitive movements. She reports that this is a work comp claim. She notes numbness in right arm and right hand with severe tightness and numbness to her three middle fingers.  She denies any falls or coordination changes.  She is in PT but has not had injection therapy.      The pts MRI was reviewed in detail. She does have a herniation at C5-6 on the right. At this time it was explained that she may benefit from an injection. She also has significant myofacial pain and tenderness. We will have her continue PT for this.  She agreed with the POC.     Patient Instructions   1.  Please schedule your physical therapy.      2.  Please schedule your injection. Someone will contact you from the pain clinic within 24 hours to schedule.        3.  Please contact the clinic if pain persists at [phone].              Marline Sparks [facility]  [facility]  [address] [address]    Tel [phone]  Pager [phone]

## 2017-03-15 NOTE — MR AVS SNAPSHOT
After Visit Summary   3/15/2017    Zo Qureshi    MRN: [id number]           Patient Information     Date Of Birth          1975        Visit Information        Provider Department      3/15/2017 2:00 PM Marline Sparks APRN CNP [facility]        Today's Diagnoses     Cervical radicular pain    -  1      Care Instructions    1.  Please schedule your physical therapy.      2.  Please schedule your injection. Someone will contact you from the pain clinic within 24 hours to schedule.        3.  Please contact the clinic if pain persists at [phone].         Follow-ups after your visit        Additional Services     [facility], [facility]CT[facility] REFERRAL       **This order will print in the [facility] Scheduling Office**    Physical Therapy, Hand Therapy and Chiropractic Care are available through:    *[facility]  *[facility]  *[facility]    Call one number to schedule at any of the above locations: [phone].    Your provider has referred you to: Physical Therapy at [facility] or [facility]    Indication/Reason for Referral: cervical radicular pain   Onset of Illness: chronic  Therapy Orders: Evaluate and Treat  Special Programs: None  Special Request: None    Joe Diaz      Additional Comments for the Therapist or Chiropractor:         Please be aware that coverage of these services is subject to the terms and limitations of your health insurance plan.  Call member services at your health plan with any benefit or coverage questions.      Please bring the following to your appointment:    *Your personal calendar for scheduling future appointments  *Comfortable clothing            PAIN MANAGEMENT CENTER ([place]) REFERRAL       Your provider has referred you to the [facility]. Dr. Wilson or Dr. Justin    Reason for Referral: Procedure or injection only - patient will be contacted within 24 hrs to schedule:  Epidural Steroid (interlaminar approach): Cervical C5-6     Please complete the following questions:    What is your diagnosis for the patient's pain? chronic    Do you have any specific questions for the pain specialist? No    Are there any red flags that may impact the assessment or management of the patient? None    **ANY DIAGNOSTIC TESTS THAT ARE NOT IN EPIC SHOULD BE SENT TO THE PAIN CENTER**    Please note the Pre-Op Pain Consult must be scheduled 2-3 weeks prior to the patient's surgery.  Patient's trying to schedule within 2 weeks of surgery may not be accommodated.     Pre-Op Pain Consults are only good for 30 days.    REGARDING OPIOID MEDICATIONS:  We will always address appropriateness of opioid pain medications, but we generally will not automatically take on a prescribing role. When we do take on prescribing of opioids for chronic pain, it is in collaboration with the referring physician for an intermediate period of time (months), with an expectation that the primary physician or provider will assume the prescribing role if medications are effective at stable doses with demonstrated compliance.  Therefore, please do not assume that your prescribing responsibilities end on the day of pain clinic consultation.  Is this agreeable to you? YES    For any questions, contact the [facility] at [phone].    Please be aware that coverage of these services is subject to the terms and limitations of your health insurance plan.  Call member services at your health plan with any benefit or coverage questions.      Please bring the following with you to your appointment:    (1) Any X-Rays, CTs or MRIs which have been performed.  Contact the facility where they were done to arrange for  prior to your scheduled appointment.    (2) List of current medications   (3) This referral request   (4) Any documents/labs given to you for this referral                  Your next 10 appointments  "already scheduled     Mar 17, 2017 10:00 AM CDT   [facility] with Shanon De La O   [facility]    [address]  [address]  [facility] [address]   [phone]            Apr 10, 2017 11:30 AM CDT   [facility] with Terri Baron MD   [facility]    [address] [facility] [address] [phone]              Who to contact     If you have questions or need follow up information about today's clinic visit or your schedule please contact [facility] directly at [phone].  Normal or non-critical lab and imaging results will be communicated to you by MyChart, letter or phone within 4 business days after the clinic has received the results. If you do not hear from us within 7 days, please contact the clinic through MyChart or phone. If you have a critical or abnormal lab result, we will notify you by phone as soon as possible.  Submit refill requests through [employer] or call your pharmacy and they will forward the refill request to us. Please allow 3 business days for your refill to be completed.          Additional Information About Your Visit        Care EveryWhere ID     This is your Care EveryWhere ID. This could be used by other organizations to access your [facility] medical records  [id number]        Your Vitals Were     Pulse Temperature Height Pulse Oximetry BMI (Body Mass Index)       111 98.2  F (36.8  C) (Oral) 4' 11\" (1.499 m) 97% 24.24 kg/m2        Blood Pressure from Last 3 Encounters:   03/15/17 126/85   03/13/17 126/68   02/13/17 104/62    Weight from Last 3 Encounters:   03/15/17 120 lb (54.4 kg)   03/13/17 120 lb 9.6 oz (54.7 kg)   02/13/17 120 lb 12.8 oz (54.8 kg)              We Performed the Following     [facility] REFERRAL     PA[facility] MANAGEMENT CENTER ([place]) REFERRAL        Primary Care Provider Office Phone # Fax #    Terri Baron [phone] " [phone]       [facility] [place] [address] [facility] [address]        Thank you!     Thank you for choosing [facility]  for your care. Our goal is always to provide you with excellent care. Hearing back from our patients is one way we can continue to improve our services. Please take a few minutes to complete the written survey that you may receive in the mail after your visit with us. Thank you!             Your Updated Medication List - Protect others around you: Learn how to safely use, store and throw away your medicines at www.disposemymeds.org.          This list is accurate as of: 3/15/17  2:21 PM.  Always use your most recent med list.                   Brand Name Dispense Instructions for use    clindamycin-benzoyl peroxide gel    BENZACLIN    50 g    Apply to  Affected area initially once daily for 2 weeks and then increase to 2 times daily if tolerated       docusate sodium 100 MG tablet    COLACE    60 tablet    Take 100 mg by mouth daily       loratadine 10 MG tablet    CLARITIN    30 tablet    Take 1 tablet (10 mg) by mouth daily       PATADAY 0.2 % Soln   Generic drug:  olopatadine HCl          topiramate 25 MG tablet    TOPAMAX    60 tablet    Take 1 tablet (25 mg) at bedtime for 1 week, then 1 tablet twice daily       traMADol 50 MG tablet    ULTRAM    40 tablet    Take 1-2 tablets ( mg) by mouth every 6 hours as needed for moderate pain

## 2017-03-15 NOTE — TELEPHONE ENCOUNTER
Pt saw ortho surgeon today and they suggested she do PT and have epidural injection.  She would like your opinion on that.    She also has still been going to chiropractor.  Do you think she should continue this or stop?

## 2017-03-17 ENCOUNTER — THERAPY VISIT (OUTPATIENT)
Dept: OCCUPATIONAL THERAPY | Facility: CLINIC | Age: 42
End: 2017-03-17
Payer: OTHER MISCELLANEOUS

## 2017-03-17 DIAGNOSIS — M79.644 FINGER PAIN, RIGHT: ICD-10-CM

## 2017-03-17 DIAGNOSIS — R29.898 MECHANICAL PROBLEMS WITH LIMBS: ICD-10-CM

## 2017-03-17 DIAGNOSIS — R20.2 PARESTHESIA OF BOTH HANDS: ICD-10-CM

## 2017-03-17 PROCEDURE — 97140 MANUAL THERAPY 1/> REGIONS: CPT | Mod: GO | Performed by: OCCUPATIONAL THERAPIST

## 2017-03-17 PROCEDURE — 97112 NEUROMUSCULAR REEDUCATION: CPT | Mod: GO | Performed by: OCCUPATIONAL THERAPIST

## 2017-03-17 PROCEDURE — 97110 THERAPEUTIC EXERCISES: CPT | Mod: GO | Performed by: OCCUPATIONAL THERAPIST

## 2017-03-17 NOTE — PROGRESS NOTES
Hand Therapy Progress Note  Current Date:  3/17/2017  Reporting Period: 2/7/17 to 3/17/2017    S:  Subjective changes as noted by patient: It's been painful this week, I didn't have much physical therapy.      Functional changes noted by patient: Its difficult to do anything I used to do. It's hard to do much standing or anything around the house for long.     Response to previous treatment:  Good  Patient has noted adverse reaction to:   None      Objective:  Sensation:  Intermittant numbness and tingling bilaterally. Most prominent in R index, middle, and ring finger, and also along R anterior forearm. 3/17/17: Intermittant numbness and tingling bilaterally. Most prominent in R index, middle, and ring finger, and also along R anterior forearm.     Pain Level Report: On scale 0-10/10  Date 2/7/2017 2/7/2017 3/17/17 3/17/17   side R L R L   Overall 7 5 7 (no meds) 3-4   At Rest 3 2 4-5 3-4   With Activity 6-7 5 7 6     Primary Report: location and description  Date 2/7/2017 2/7/2017 3/17/17 3/17/17   Side R L R L   Location Forearm, index, middle, and ring fingers Index, middle, and ring finger Dorsal wrist, and Ant. Forearm, and first three fingers Ant. Forearm and first 3 fingers   Radiation Shooting up arm Shooting up arm R shoulder shooting down to hand (more than left) shoulder shooting down to hand   Pain Quality Pinching Pinching Pinching  Pinching    Frequency Constant Constant Constant Constant   Duration Sleeping is ok, but its worst with activity Sleeping is ok, but its worst with activity Worse with activity Worse with activity   Exacerbated by  Lifting, gripping, twisting, pinching, pulling  Lifting, gripping, twisting, pinching, pulling Wrist flexion, finger flexion (more stiff and painful than L) Wrist flexion, finger flexion   Relieved by Rest, heat, ice Rest, heat, ice Rest, heat Rest, heat   Progression since onset Gradually getting worse Gradually worse Gradually getting better Gradually getting  better     Range of Motion Wrist AROM (PROM):  Date 2/7/2017 2/7/2017 3/17/17 3/17/17   Side R L R L   Ext 65 70 65 67   Flex 34 59 36 45   UD 25 25 25 25   RD 20 18 19 15     Special Tests:  Date 2/7/2017 2/7/2017 3/17/17 3/17/17   Side R L R L   Phalens + at 20 sec - + at ~10 sec -   Tinels at CT - -     Tinels at Pronator - -     Median Nerve ULTT ~15% ~15% ~15% ~15%   Paresthesias + + + +     STRENGTH: (Measured in pounds, pain scale 0-10/10)    Date 2/7/2017 3/17/17       Trials Left Right Left Right Left Right Left Right Left Right Left Right   1 25 6 10 8           2               3               Avg               Pain 0 7 6 8             3 Point Pinch  Date 2/7/2017 3/17/17       Trials Left Right Left Right Left Right Left Right Left Right Left Right   1 9 4 4 4           2               3               Avg               Pain 0 6 6 6-7             Lateral Pinch  Date 2/7/2017 3/17/17       Trials Left Right Left Right Left Right Left Right Left Right Left Right   1 10 6 6 6           2               3               Avg               Pain 0 6 0 7             Assessment:  Response to therapy has been lack of progress in:  Pain:  Pt reports minimal improvement in pain overall  Paresthesias:  Median nerve - Minimal change  Radial nerve - Continued paresthesias and ache throughout radial nerve distribution    Overall Assessment:  Patient would benefit from continued therapy to achieve rehab potential  STG/LTG:  STGoals have been reviewed;  see goal sheet for details and updates.  LTGoals have been reviewed;  see goal sheet for details and updates.    I have re-evaluated this patient and find that the nature, scope, duration and intensity of the therapy is appropriate for the medical condition of the patient.    P: Frequency:  1 X week, once daily  Duration:  for 6 weeks    Treatment Plan:    Modalities:  None   Therapeutic Exercise: Tendon Gliding ex, blocking  Neuromuscular Techniques: Median nerve glides  both active and passive (in the clinic)  Manual Techniques:, Myofascial release of the forearm extensors and flexors, wrist mobilization techniques  Orthosis:  Wrist in netural orthosis for night wear.    Home Program:   Orthosis: Static orthosis and Forearm based orthosis Wrist in neutral orthosis at night.    Activity: Avoid activities that exacerbate symptoms in the median nerve.  Avoid prolonged flexion or extension of the wrist.    Discharge Plan:    Achieve all LTG.  Independent in home treatment program.  Reach maximal therapeutic benefit.    Next Visit:  MFR  Ergonomics education  Nerve gliding

## 2017-03-17 NOTE — TELEPHONE ENCOUNTER
Pt. Calls back and informed of note below. She will try the injections.     Kasia Roberts RN, BSN, PHN

## 2017-03-24 ENCOUNTER — THERAPY VISIT (OUTPATIENT)
Dept: PHYSICAL THERAPY | Facility: CLINIC | Age: 42
End: 2017-03-24
Payer: OTHER MISCELLANEOUS

## 2017-03-24 DIAGNOSIS — G89.29 CHRONIC PAIN OF BOTH SHOULDERS: ICD-10-CM

## 2017-03-24 DIAGNOSIS — M25.511 CHRONIC PAIN OF BOTH SHOULDERS: ICD-10-CM

## 2017-03-24 DIAGNOSIS — R29.898 WEAKNESS OF SHOULDER: ICD-10-CM

## 2017-03-24 DIAGNOSIS — M25.512 CHRONIC PAIN OF BOTH SHOULDERS: ICD-10-CM

## 2017-03-24 DIAGNOSIS — M54.12 CERVICAL RADICULOPATHY: ICD-10-CM

## 2017-03-24 PROCEDURE — 97140 MANUAL THERAPY 1/> REGIONS: CPT | Mod: GP | Performed by: PHYSICAL THERAPIST

## 2017-03-24 PROCEDURE — 97110 THERAPEUTIC EXERCISES: CPT | Mod: GP | Performed by: PHYSICAL THERAPIST

## 2017-03-27 ENCOUNTER — THERAPY VISIT (OUTPATIENT)
Dept: PHYSICAL THERAPY | Facility: CLINIC | Age: 42
End: 2017-03-27
Payer: OTHER MISCELLANEOUS

## 2017-03-27 DIAGNOSIS — G89.29 CHRONIC PAIN OF BOTH SHOULDERS: ICD-10-CM

## 2017-03-27 DIAGNOSIS — M25.511 CHRONIC PAIN OF BOTH SHOULDERS: ICD-10-CM

## 2017-03-27 DIAGNOSIS — M54.12 CERVICAL RADICULOPATHY: ICD-10-CM

## 2017-03-27 DIAGNOSIS — M25.512 CHRONIC PAIN OF BOTH SHOULDERS: ICD-10-CM

## 2017-03-27 DIAGNOSIS — R29.898 WEAKNESS OF SHOULDER: ICD-10-CM

## 2017-03-27 PROCEDURE — 97140 MANUAL THERAPY 1/> REGIONS: CPT | Mod: GP | Performed by: PHYSICAL THERAPIST

## 2017-04-03 ENCOUNTER — THERAPY VISIT (OUTPATIENT)
Dept: PHYSICAL THERAPY | Facility: CLINIC | Age: 42
End: 2017-04-03
Payer: OTHER MISCELLANEOUS

## 2017-04-03 DIAGNOSIS — M25.512 CHRONIC PAIN OF BOTH SHOULDERS: ICD-10-CM

## 2017-04-03 DIAGNOSIS — M54.12 CERVICAL RADICULOPATHY: ICD-10-CM

## 2017-04-03 DIAGNOSIS — M25.511 CHRONIC PAIN OF BOTH SHOULDERS: ICD-10-CM

## 2017-04-03 DIAGNOSIS — R29.898 WEAKNESS OF SHOULDER: ICD-10-CM

## 2017-04-03 DIAGNOSIS — G89.29 CHRONIC PAIN OF BOTH SHOULDERS: ICD-10-CM

## 2017-04-03 PROCEDURE — 97110 THERAPEUTIC EXERCISES: CPT | Mod: GP | Performed by: PHYSICAL THERAPIST

## 2017-04-03 PROCEDURE — 97140 MANUAL THERAPY 1/> REGIONS: CPT | Mod: GP | Performed by: PHYSICAL THERAPIST

## 2017-04-03 NOTE — PROGRESS NOTES
Subjective:    HPI  Oswestry Score: 55.56 %                 Objective:    System    Physical Exam    General     ROS    Assessment/Plan:      PROGRESS  REPORT    Progress reporting period is from 3/9/2017 to 4/3/2017.       SUBJECTIVE  Subjective changes noted by patient:  Overall the patient reports some relief with therapy but the relief only lasts for a short while. She is still waiting for approval for the injection. She is not sure if some of the exercises were causing extra pain.    Subjective: Pt woke up fine this morning, but then got stiff after doing some exercise. The stiffness is in the lower neck and the back.     Current Pain level: 5/10.     Previous pain level was  8/10  .   Changes in function:  Yes (See Goal flowsheet attached for changes in current functional level)  Adverse reaction to treatment or activity: None    OBJECTIVE  Changes noted in objective findings:  Yes, Recent exacerbation with decreased ROM.       Objective:    Posture: Moderate sitting posture    Thoracic Spine Screen: Stiff and tender to spring testing    Cervical AROM: (Major, Moderate, Minimal or Nil loss)  Movement Loss Cameron Mod Min Nil Pain   Flexion x       Extension x       Left Rotation x       Right Rotation  x      Retraction  x        Deep Neck Flexor Activation: Able activate deep neck flexors, felt a pulling sensation in the neck.     Other: Tenderness of the thoracic and cervical spines, and upper traps.       ASSESSMENT/PLAN  Updated problem list and treatment plan: Diagnosis 1:  Neck Pain  Pain -  hot/cold therapy, US, manual therapy, splint/taping/bracing/orthotics, self management, education, directional preference exercise and home program  Decreased ROM/flexibility - manual therapy, therapeutic exercise, therapeutic activity and home program  Decreased joint mobility - manual therapy, therapeutic exercise, therapeutic activity and home program  Decreased strength - therapeutic exercise, therapeutic activities  and home program  Decreased function - therapeutic activities and home program  Impaired posture - neuro re-education, therapeutic activities and home program  STG/LTGs have been met or progress has been made towards goals:  Yes (See Goal flow sheet completed today.)  Assessment of Progress: The patient has had set backs in their progress.  Self Management Plans:  Patient has been instructed in a home treatment program.  Patient  has been instructed in self management of symptoms.  I have re-evaluated this patient and find that the nature, scope, duration and intensity of the therapy is appropriate for the medical condition of the patient.  Zo continues to require the following intervention to meet STG and LTG's:  PT    Recommendations:  This patient would benefit from continued therapy.     Frequency:  1 X week, once daily  Duration:  for 4 weeks    Patient would benefit from continued therapy until injection is received, at which point we will re-evaluate status and proceed. Therapy has provided relief, though long-term gains have been less than anticipated.       Please refer to the daily flowsheet for treatment today, total treatment time and time spent performing 1:1 timed codes.

## 2017-04-10 ENCOUNTER — THERAPY VISIT (OUTPATIENT)
Dept: OCCUPATIONAL THERAPY | Facility: CLINIC | Age: 42
End: 2017-04-10
Payer: OTHER MISCELLANEOUS

## 2017-04-10 ENCOUNTER — OFFICE VISIT (OUTPATIENT)
Dept: FAMILY MEDICINE | Facility: CLINIC | Age: 42
End: 2017-04-10
Payer: OTHER MISCELLANEOUS

## 2017-04-10 VITALS
WEIGHT: 118.9 LBS | OXYGEN SATURATION: 99 % | HEIGHT: 59 IN | TEMPERATURE: 97.4 F | BODY MASS INDEX: 23.97 KG/M2 | DIASTOLIC BLOOD PRESSURE: 76 MMHG | RESPIRATION RATE: 16 BRPM | SYSTOLIC BLOOD PRESSURE: 124 MMHG | HEART RATE: 77 BPM

## 2017-04-10 DIAGNOSIS — M79.644 FINGER PAIN, RIGHT: Primary | ICD-10-CM

## 2017-04-10 DIAGNOSIS — R29.898 WEAKNESS OF SHOULDER: ICD-10-CM

## 2017-04-10 DIAGNOSIS — R20.2 PARESTHESIA OF BOTH HANDS: ICD-10-CM

## 2017-04-10 DIAGNOSIS — M79.644 FINGER PAIN, RIGHT: ICD-10-CM

## 2017-04-10 DIAGNOSIS — R29.898 MECHANICAL PROBLEMS WITH LIMBS: ICD-10-CM

## 2017-04-10 DIAGNOSIS — M54.12 CERVICAL RADICULOPATHY: ICD-10-CM

## 2017-04-10 DIAGNOSIS — M25.511 RIGHT SHOULDER PAIN, UNSPECIFIED CHRONICITY: Primary | ICD-10-CM

## 2017-04-10 PROCEDURE — 99213 OFFICE O/P EST LOW 20 MIN: CPT | Performed by: FAMILY MEDICINE

## 2017-04-10 PROCEDURE — 97140 MANUAL THERAPY 1/> REGIONS: CPT | Mod: GO | Performed by: OCCUPATIONAL THERAPIST

## 2017-04-10 PROCEDURE — 97112 NEUROMUSCULAR REEDUCATION: CPT | Mod: GO | Performed by: OCCUPATIONAL THERAPIST

## 2017-04-10 NOTE — NURSING NOTE
"Chief Complaint   Patient presents with     Work Comp     follow-up for back, shoulder, neck and hand pain       Initial /76 (BP Location: Right arm, Patient Position: Chair, Cuff Size: Adult Regular)  Pulse 77  Temp 97.4  F (36.3  C) (Oral)  Resp 16  Ht 4' 11\" (1.499 m)  Wt 118 lb 14.4 oz (53.9 kg)  SpO2 99%  BMI 24.01 kg/m2 Estimated body mass index is 24.01 kg/(m^2) as calculated from the following:    Height as of this encounter: 4' 11\" (1.499 m).    Weight as of this encounter: 118 lb 14.4 oz (53.9 kg).  Medication Reconciliation: complete     William Mixon CMA      "

## 2017-04-10 NOTE — MR AVS SNAPSHOT
After Visit Summary   4/10/2017    Zo Qureshi    MRN: [id number]           Patient Information     Date Of Birth          1975        Visit Information        Provider Department      4/10/2017 11:30 AM Terri Baron MD [facility]        Today's Diagnoses     Right shoulder pain, unspecified chronicity    -  1    Mechanical problems with limbs        Paresthesia of both hands        Finger pain, right        Cervical radiculopathy        Weakness of shoulder           Follow-ups after your visit        Your next 10 appointments already scheduled     Apr 11, 2017  9:00 AM CDT   [facility]   [facility] ([facility]  )    [address]  [address]  [facility] [address]   [phone]            Apr 17, 2017 11:20 AM CDT   [facility]    [address]  [address]  [facility] [address]   [phone]            Apr 18, 2017  9:30 AM CDT   [facility] Shanon De La O   [facility]    [id number] [place] [address]  [address]  [facility] [address]   [phone]              Future tests that were ordered for you today     Open Future Orders        Priority Expected Expires Ordered    MR Shoulder Right w/o Contrast Routine  4/10/2018 4/10/2017            Who to contact     If you have questions or need follow up information about today's clinic visit or your schedule please contact [facility] directly at [phone].  Normal or non-critical lab and imaging results will be communicated to you by MyChart, letter or phone within 4 business days after the clinic has received the results. If you do not hear from us within 7 days, please contact the clinic through [employer] or phone. If you have a critical or abnormal lab result, we will notify you by phone as soon as possible.  Submit refill requests through [employer] or call your pharmacy and they will forward  "the refill request to us. Please allow 3 business days for your refill to be completed.          Additional Information About Your Visit        MyChart Information     [employer] lets you send messages to your doctor, view your test results, renew your prescriptions, schedule appointments and more. To sign up, go to [email or web address] . Click on \"Log in\" on the left side of the screen, which will take you to the Welcome page. Then click on \"Sign up Now\" on the right side of the page.     You will be asked to enter the access code listed below, as well as some personal information. Please follow the directions to create your username and password.     Your access code is: [email or web address]  Expires: 2017 11:52 AM     Your access code will  in 90 days. If you need help or a new code, please call your [facility] clinic or [phone].        Care EveryWhere ID     This is your Care EveryWhere ID. This could be used by other organizations to access your [facility] medical records  [id number]        Your Vitals Were     Pulse Temperature Respirations Height Pulse Oximetry BMI (Body Mass Index)    77 97.4  F (36.3  C) (Oral) 16 4' 11\" (1.499 m) 99% 24.01 kg/m2       Blood Pressure from Last 3 Encounters:   04/10/17 124/76   03/15/17 126/85   17 126/68    Weight from Last 3 Encounters:   04/10/17 118 lb 14.4 oz (53.9 kg)   03/15/17 120 lb (54.4 kg)   17 120 lb 9.6 oz (54.7 kg)               Primary Care Provider Office Phone # Fax #    Terri Baron [phone]       [facility] [address] [facility] [address]        Thank you!     Thank you for choosing [facility]  for your care. Our goal is always to provide you with excellent care. Hearing back from our patients is one way we can continue to improve our services. Please take a few minutes to complete the written survey that you may receive in the mail after your visit with us. Thank you!      "        Your Updated Medication List - Protect others around you: Learn how to safely use, store and throw away your medicines at www.disposemymeds.org.          This list is accurate as of: 4/10/17 11:56 AM.  Always use your most recent med list.                   Brand Name Dispense Instructions for use    clindamycin-benzoyl peroxide gel    BENZACLIN    50 g    Apply to  Affected area initially once daily for 2 weeks and then increase to 2 times daily if tolerated       docusate sodium 100 MG tablet    COLACE    60 tablet    Take 100 mg by mouth daily       loratadine 10 MG tablet    CLARITIN    30 tablet    Take 1 tablet (10 mg) by mouth daily       PATADAY 0.2 % Soln   Generic drug:  olopatadine HCl          topiramate 25 MG tablet    TOPAMAX    60 tablet    Take 1 tablet (25 mg) at bedtime for 1 week, then 1 tablet twice daily       traMADol 50 MG tablet    ULTRAM    40 tablet    Take 1-2 tablets ( mg) by mouth every 6 hours as needed for moderate pain

## 2017-04-10 NOTE — LETTER
[facility]  [address] [place], [address]  [phone]        April 10, 2017    RE: Zo Qureshi                                                                                                                                                       [id number] [facility] [address]            To Whom It May Concern,   Zo Qureshi is a patient of mine.   She continues to suffer with neck and right arm pain.   She is going to PT and having treatment.   She will need to have another 4 weeks off work.           Sincerely,    Terri Robles M.D.

## 2017-04-10 NOTE — PROGRESS NOTES
SUBJECTIVE:                                                    Zo Qureshi is a 42 year old female who presents to clinic today for the following health issues:      Work Comp follow-up for back, neck, shoulder and hand pain. Pt states pain, last week was bead. She thinks she may have over-exerted during exercise. Patient reported that she had a sharp pain in shoulder that ran down her right arm. Patient used a hot pack on her arm, and that helped. She reports that she had a hard time moving with the pain. And had limited ROM.  She saw Marline Gustafson at ortho clinic who ordered EMG for right arm and cervical injections for neck.   Has moderate disc bulge and stenosis and C5-C6.   She continues to have right arm pain and numbness.   She also has shoulder pain too.       Patient also reports that last Tuesday and Wednesday she had some episodes of sharp pain on the left side of her head.     Past Medical History:   Diagnosis Date     HSIL on Pap smear 09/21/11    MARTA 2 and 3     INFLAM SPONDYLOPATHY NOS   1/7/2008     Leukocytopenia 3/10/2014     Thrombocytopenia (H) 3/10/2014     Unspecified closed fracture of pelvis 2000    left fracture of pelvis after delivery       Past Surgical History:   Procedure Laterality Date     C NONSPECIFIC PROCEDURE  10/00    after delivery 2 units of prbcs secondary to placenta     LEEP TX, CERVICAL  12/19/11    MARTA II     TUBAL LIGATION  5/2009    laparoscopic tubal ligation       MEDICATIONS:  Current Outpatient Prescriptions   Medication     docusate sodium (COLACE) 100 MG tablet     topiramate (TOPAMAX) 25 MG tablet     traMADol (ULTRAM) 50 MG tablet     loratadine (CLARITIN) 10 MG tablet     PATADAY 0.2 % SOLN     clindamycin-benzoyl peroxide (BENZACLIN) gel     No current facility-administered medications for this visit.        SOCIAL HISTORY:  Social History   Substance Use Topics     Smoking status: Never Smoker     Smokeless tobacco: Never Used     Alcohol use 0.0 oz/week      "0 Standard drinks or equivalent per week      Comment: occasionally       Family History   Problem Relation Age of Onset     Hypertension Father      Lipids Father      Hypertension Mother      Lipids Mother      DIABETES No family hx of      Coronary Artery Disease No family hx of      Hyperlipidemia No family hx of      CEREBROVASCULAR DISEASE No family hx of      Breast Cancer No family hx of      Colon Cancer No family hx of      Prostate Cancer No family hx of      Other Cancer No family hx of      Depression No family hx of      Anxiety Disorder No family hx of      MENTAL ILLNESS No family hx of      Substance Abuse No family hx of      Anesthesia Reaction No family hx of      Asthma No family hx of      OSTEOPOROSIS No family hx of      Genetic Disorder No family hx of      Thyroid Disease No family hx of      Obesity No family hx of      Unknown/Adopted No family hx of        Objective:  Blood pressure 124/76, pulse 77, temperature 97.4  F (36.3  C), temperature source Oral, resp. rate 16, height 4' 11\" (1.499 m), weight 118 lb 14.4 oz (53.9 kg), SpO2 99 %, not currently breastfeeding.  HEENT:  TM's are clear bilaterally.  Oropharynx is moderately red without tonsillar hypertrophy or exudate.  Conjuctiva are clear.  Neck:  There is no lymphadenopathy or thyroid tenderness or enlargement  Right Shoulder exam:  There is no swelling, redness or gross deformity of the shoulder joints.  There is full range of motion with some pain in mid arch.  The empty can sign is ? POsitive but not too bad.  The impingement sign is ++.  The rotater cuff strength is 5/5 with some pain with internal rotation and some with external rotation but some of the pain is behind the shoulder more at level of neck.    Assessment:  1. Cervical pain and stenosis at C5-C6 - will be getting injection  2. Right hand pain and numbness - will be getting EMG  3. Wonder about shoulder or rotator cuff involvement  4. URI - likely " viral    Plan:  1. Note for work  2. Continue therapy  3. MRI of right shoulder  4. EMG of right arm  5. Cervical injections  6. Recheck in one month

## 2017-04-11 ENCOUNTER — THERAPY VISIT (OUTPATIENT)
Dept: PHYSICAL THERAPY | Facility: CLINIC | Age: 42
End: 2017-04-11
Payer: OTHER MISCELLANEOUS

## 2017-04-11 DIAGNOSIS — M25.512 BILATERAL SHOULDER PAIN: ICD-10-CM

## 2017-04-11 DIAGNOSIS — M54.12 CERVICAL RADICULOPATHY: ICD-10-CM

## 2017-04-11 DIAGNOSIS — R29.898 WEAKNESS OF SHOULDER: ICD-10-CM

## 2017-04-11 DIAGNOSIS — M25.511 BILATERAL SHOULDER PAIN: ICD-10-CM

## 2017-04-11 PROCEDURE — 97140 MANUAL THERAPY 1/> REGIONS: CPT | Mod: GP | Performed by: PHYSICAL THERAPIST

## 2017-04-11 PROCEDURE — 97110 THERAPEUTIC EXERCISES: CPT | Mod: GP | Performed by: PHYSICAL THERAPIST

## 2017-04-12 ENCOUNTER — TRANSFERRED RECORDS (OUTPATIENT)
Dept: HEALTH INFORMATION MANAGEMENT | Facility: CLINIC | Age: 42
End: 2017-04-12

## 2017-04-13 ENCOUNTER — OFFICE VISIT (OUTPATIENT)
Dept: FAMILY MEDICINE | Facility: CLINIC | Age: 42
End: 2017-04-13
Payer: COMMERCIAL

## 2017-04-13 ENCOUNTER — TELEPHONE (OUTPATIENT)
Dept: OCCUPATIONAL THERAPY | Facility: CLINIC | Age: 42
End: 2017-04-13

## 2017-04-13 VITALS
DIASTOLIC BLOOD PRESSURE: 78 MMHG | BODY MASS INDEX: 23.63 KG/M2 | SYSTOLIC BLOOD PRESSURE: 128 MMHG | HEART RATE: 80 BPM | RESPIRATION RATE: 16 BRPM | TEMPERATURE: 98.6 F | WEIGHT: 117 LBS

## 2017-04-13 DIAGNOSIS — J04.0 LARYNGITIS: ICD-10-CM

## 2017-04-13 DIAGNOSIS — M79.644 FINGER PAIN, RIGHT: ICD-10-CM

## 2017-04-13 DIAGNOSIS — R20.2 PARESTHESIA OF BOTH HANDS: ICD-10-CM

## 2017-04-13 DIAGNOSIS — R29.898 MECHANICAL PROBLEMS WITH LIMBS: ICD-10-CM

## 2017-04-13 DIAGNOSIS — R07.0 THROAT PAIN: Primary | ICD-10-CM

## 2017-04-13 LAB
DEPRECATED S PYO AG THROAT QL EIA: NORMAL
MICRO REPORT STATUS: NORMAL
SPECIMEN SOURCE: NORMAL

## 2017-04-13 PROCEDURE — 99213 OFFICE O/P EST LOW 20 MIN: CPT | Performed by: PHYSICIAN ASSISTANT

## 2017-04-13 PROCEDURE — 87880 STREP A ASSAY W/OPTIC: CPT | Performed by: PHYSICIAN ASSISTANT

## 2017-04-13 PROCEDURE — 87081 CULTURE SCREEN ONLY: CPT | Performed by: PHYSICIAN ASSISTANT

## 2017-04-13 RX ORDER — FLUTICASONE PROPIONATE 50 MCG
1-2 SPRAY, SUSPENSION (ML) NASAL DAILY
Qty: 16 G | Refills: 3 | Status: SHIPPED | OUTPATIENT
Start: 2017-04-13 | End: 2019-12-05

## 2017-04-13 RX ORDER — BENZONATATE 100 MG/1
100 CAPSULE ORAL 3 TIMES DAILY PRN
Qty: 42 CAPSULE | Refills: 0 | Status: SHIPPED | OUTPATIENT
Start: 2017-04-13 | End: 2017-05-08

## 2017-04-13 NOTE — PROGRESS NOTES
SUBJECTIVE:                                                    Zo Qureshi is a 42 year old female who presents to clinic today for the following health issues:      Acute Illness   Acute illness concerns: sore throat  Onset: Monday    Fever: no     Chills/Sweats: no     Headache (location?): YES with dizziness    Sinus Pressure:no    Conjunctivitis:  no    Ear Pain: no    Rhinorrhea: YES    Congestion: YES    Sore Throat: YES-worse with lying down.      Cough: YES-productive of clear sputum    Wheeze: no     Decreased Appetite: no     Nausea: no     Vomiting: no     Diarrhea:  no    Dysuria/Freq.: no    Fatigue/Achiness: YES    Sick/Strep Exposure: YES-brother, niece, and father.      Therapies Tried and outcome: tylenol    No known history of seasonal allergies.     Problem list and histories reviewed & adjusted, as indicated.  Additional history: as documented    Patient Active Problem List   Diagnosis     Inflammatory spondylopathy (H)     Disorder of SI (sacroiliac) joint     Hypercholesterolemia     HSIL on Pap smear     S/P LEEP of cervix     Acne     Influenza B     Vitamin D deficiency     Bilateral shoulder pain     Weakness of shoulder     Cervical radiculopathy     Bilateral low back pain without sciatica     Paresthesia of both hands     Finger pain, right     Mechanical problems with limbs     Past Surgical History:   Procedure Laterality Date     C NONSPECIFIC PROCEDURE  10/00    after delivery 2 units of prbcs secondary to placenta     LEEP TX, CERVICAL  12/19/11    MARTA II     TUBAL LIGATION  5/2009    laparoscopic tubal ligation       Social History   Substance Use Topics     Smoking status: Never Smoker     Smokeless tobacco: Never Used     Alcohol use 0.0 oz/week     0 Standard drinks or equivalent per week      Comment: occasionally     Family History   Problem Relation Age of Onset     Hypertension Father      Lipids Father      Hypertension Mother      Lipids Mother      DIABETES No family  hx of      Coronary Artery Disease No family hx of      Hyperlipidemia No family hx of      CEREBROVASCULAR DISEASE No family hx of      Breast Cancer No family hx of      Colon Cancer No family hx of      Prostate Cancer No family hx of      Other Cancer No family hx of      Depression No family hx of      Anxiety Disorder No family hx of      MENTAL ILLNESS No family hx of      Substance Abuse No family hx of      Anesthesia Reaction No family hx of      Asthma No family hx of      OSTEOPOROSIS No family hx of      Genetic Disorder No family hx of      Thyroid Disease No family hx of      Obesity No family hx of      Unknown/Adopted No family hx of          Current Outpatient Prescriptions   Medication Sig Dispense Refill     fluticasone (FLONASE) 50 MCG/ACT spray Spray 1-2 sprays into both nostrils daily 16 g 3     benzonatate (TESSALON) 100 MG capsule Take 1 capsule (100 mg) by mouth 3 times daily as needed 42 capsule 0     docusate sodium (COLACE) 100 MG tablet Take 100 mg by mouth daily 60 tablet 1     topiramate (TOPAMAX) 25 MG tablet Take 1 tablet (25 mg) at bedtime for 1 week, then 1 tablet twice daily 60 tablet 1     traMADol (ULTRAM) 50 MG tablet Take 1-2 tablets ( mg) by mouth every 6 hours as needed for moderate pain 40 tablet 0     loratadine (CLARITIN) 10 MG tablet Take 1 tablet (10 mg) by mouth daily 30 tablet 1     PATADAY 0.2 % SOLN   6     clindamycin-benzoyl peroxide (BENZACLIN) gel Apply to  Affected area initially once daily for 2 weeks and then increase to 2 times daily if tolerated 50 g 11     Allergies   Allergen Reactions     Advil [Ibuprofen Micronized]      Rash      Contrast Dye      Percocet [Codeine] Nausea and Vomiting and Itching     BP Readings from Last 3 Encounters:   04/13/17 128/78   04/10/17 124/76   03/15/17 126/85    Wt Readings from Last 3 Encounters:   04/13/17 117 lb (53.1 kg)   04/10/17 118 lb 14.4 oz (53.9 kg)   03/15/17 120 lb (54.4 kg)                    Reviewed  and updated as needed this visit by clinical staff  Tobacco  Allergies  Meds  Problems  Med Hx  Surg Hx  Fam Hx  Soc Hx        Reviewed and updated as needed this visit by Provider  Allergies  Meds  Problems         ROS:  Constitutional, HEENT, cardiovascular, pulmonary, gi and gu systems are negative, except as otherwise noted.    OBJECTIVE:                                                    /78 (BP Location: Right arm, Patient Position: Chair, Cuff Size: Adult Regular)  Pulse 80  Temp 98.6  F (37  C) (Oral)  Resp 16  Wt 117 lb (53.1 kg)  BMI 23.63 kg/m2  Body mass index is 23.63 kg/(m^2).  GENERAL: alert, ,mild distress and fatigued  EYES: Eyes grossly normal to inspection, PERRL and conjunctivae and sclerae normal  HENT: normal cephalic/atraumatic, both ears: clear effusion, nasal mucosa edematous , oropharynx with marked erythema, oral mucous membranes moist and sinuses: not tender  NECK: no adenopathy, no asymmetry, masses, or scars and thyroid normal to palpation  RESP: hoarse voice. Otherwise, lungs clear to auscultation - no rales, rhonchi or wheezes  CV: regular rates and rhythm, normal S1 S2, no S3 or S4 and no murmur, click or rub  SKIN: no suspicious lesions or rashes  PSYCH: mentation appears normal, affect normal/bright    Diagnostic Test Results:  Results for orders placed or performed in visit on 04/13/17 (from the past 24 hour(s))   Rapid strep screen   Result Value Ref Range    Specimen Description Throat     Rapid Strep A Screen       NEGATIVE: No Group A streptococcal antigen detected by immunoassay, await   culture report.      Micro Report Status FINAL 04/13/2017         ASSESSMENT/PLAN:                                                      (R07.0) Throat pain  (primary encounter diagnosis)  Comment: rapid strep negative. Likely viral and due to post nasal drip. Recommending flonase as well as prn tessalon for cough. Remainder of the exam looks benign. If symptoms fail to  improve in 1 week, patient should RTC. Sooner if worsening.   Plan: Rapid strep screen, Beta strep group A culture,        fluticasone (FLONASE) 50 MCG/ACT spray,         benzonatate (TESSALON) 100 MG capsule        -Medication use and side effects discussed with the patient. Patient is in complete understanding and agreement with plan.       (J04.0) Laryngitis  Comment: evident on exam. Likely viral. Discussed etiology with patient. Recommending fluids, tea with honey, and throat lozenges.   Plan:     Follow up: as above     Marc Grady PA-C  [facility]

## 2017-04-13 NOTE — NURSING NOTE
"Chief Complaint   Patient presents with     Pharyngitis       Initial /78 (BP Location: Right arm, Patient Position: Chair, Cuff Size: Adult Regular)  Pulse 80  Temp 98.6  F (37  C) (Oral)  Resp 16  Wt 117 lb (53.1 kg)  BMI 23.63 kg/m2 Estimated body mass index is 23.63 kg/(m^2) as calculated from the following:    Height as of 4/10/17: 4' 11\" (1.499 m).    Weight as of this encounter: 117 lb (53.1 kg).  Medication Reconciliation: complete    "

## 2017-04-13 NOTE — PATIENT INSTRUCTIONS
Viral Upper Respiratory Illness (Adult)  You have a viral upper respiratory illness (URI), which is another term for the common cold. This illness is contagious during the first few days. It is spread through the air by coughing and sneezing. It may also be spread by direct contact (touching the sick person and then touching your own eyes, nose, or mouth). Frequent handwashing will decrease risk of spread. Most viral illnesses go away within 7 to 10 days with rest and simple home remedies. Sometimes the illness may last for several weeks. Antibiotics will not kill a virus, and they are generally not prescribed for this condition.    Home care    If symptoms are severe, rest at home for the first 2 to 3 days. When you resume activity, don't let yourself get too tired.    Avoid being exposed to cigarette smoke (yours or others ).    You may use acetaminophen or ibuprofen to control pain and fever, unless another medicine was prescribed. (Note: If you have chronic liver or kidney disease, have ever had a stomach ulcer or gastrointestinal bleeding, or are taking blood-thinning medicines, talk with your healthcare provider before using these medicines.) Aspirin should never be given to anyone under 18 years of age who is ill with a viral infection or fever. It may cause severe liver or brain damage.    Your appetite may be poor, so a light diet is fine. Avoid dehydration by drinking 6 to 8 glasses of fluids per day (water, soft drinks, juices, tea, or soup). Extra fluids will help loosen secretions in the nose and lungs.    Over-the-counter cold medicines will not shorten the length of time you re sick, but they may be helpful for the following symptoms: cough, sore throat, and nasal and sinus congestion. (Note: Do not use decongestants if you have high blood pressure.)  Follow-up care  Follow up with your healthcare provider, or as advised.  When to seek medical advice  Call your healthcare provider right away if any  of these occur:    Cough with lots of colored sputum (mucus)    Severe headache; face, neck, or ear pain    Difficulty swallowing due to throat pain    Fever of 100.4 F (38 C)  Call 911, or get immediate medical care  Call emergency services right away if any of these occur:    Chest pain, shortness of breath, wheezing, or difficulty breathing    Coughing up blood    Inability to swallow due to throat pain    2988-7009 [employer]. [address] [place] [address]. All rights reserved. This information is not intended as a substitute for professional medical care. Always follow your healthcare professional's instructions.        Laryngitis    Laryngitis is a swelling of the tissues around the vocal cords. Symptoms include a hoarse (scratchy) voice. The voice may be lost completely. It may be caused by a viral illness, such as a head or chest cold. It may also be due to overuse and strain of the voice. Smoking, drinking alcohol, acid reflux, allergies, or inhaling harsh chemicals may also lead to symptoms. This condition will usually resolve in 1-2 weeks.  Home care    Rest your voice until it recovers. Talk as little as possible. If your symptoms are severe, rest at home for a day or so.    Breathing cool steam from a humidifier/vaporizer or in a steamy shower may be helpful.    Drink plenty of fluids to stay well hydrated.    Do not smoke  Follow-up care  Follow up with your healthcare provider or this facility if you have not improved after one week.  When to seek medical advice  Contact your healthcare provider for any of the following:    Severe pain with swallowing    Trouble opening mouth    Neck swelling, neck pain, or trouble moving neck    Noisy breathing or trouble breathing    Fever of 100.4 F (38. C) or higher, or as directed by your healthcare provider    Drooling    Symptoms do not resolve in 2 weeks    1458-5940 [employer]. [address] [place] [address]  [address]. All rights reserved. This information is not intended as a substitute for professional medical care. Always follow your healthcare professional's instructions.

## 2017-04-13 NOTE — MR AVS SNAPSHOT
After Visit Summary   4/13/2017    Zo Qureshi    MRN: [id number]           Patient Information     Date Of Birth          1975        Visit Information        Provider Department      4/13/2017 11:00 AM Marc Grady PA-C [facility]        Today's Diagnoses     Throat pain    -  1      Care Instructions      Viral Upper Respiratory Illness (Adult)  You have a viral upper respiratory illness (URI), which is another term for the common cold. This illness is contagious during the first few days. It is spread through the air by coughing and sneezing. It may also be spread by direct contact (touching the sick person and then touching your own eyes, nose, or mouth). Frequent handwashing will decrease risk of spread. Most viral illnesses go away within 7 to 10 days with rest and simple home remedies. Sometimes the illness may last for several weeks. Antibiotics will not kill a virus, and they are generally not prescribed for this condition.    Home care    If symptoms are severe, rest at home for the first 2 to 3 days. When you resume activity, don't let yourself get too tired.    Avoid being exposed to cigarette smoke (yours or others ).    You may use acetaminophen or ibuprofen to control pain and fever, unless another medicine was prescribed. (Note: If you have chronic liver or kidney disease, have ever had a stomach ulcer or gastrointestinal bleeding, or are taking blood-thinning medicines, talk with your healthcare provider before using these medicines.) Aspirin should never be given to anyone under 18 years of age who is ill with a viral infection or fever. It may cause severe liver or brain damage.    Your appetite may be poor, so a light diet is fine. Avoid dehydration by drinking 6 to 8 glasses of fluids per day (water, soft drinks, juices, tea, or soup). Extra fluids will help loosen secretions in the nose and lungs.    Over-the-counter cold medicines will not  shorten the length of time you re sick, but they may be helpful for the following symptoms: cough, sore throat, and nasal and sinus congestion. (Note: Do not use decongestants if you have high blood pressure.)  Follow-up care  Follow up with your healthcare provider, or as advised.  When to seek medical advice  Call your healthcare provider right away if any of these occur:    Cough with lots of colored sputum (mucus)    Severe headache; face, neck, or ear pain    Difficulty swallowing due to throat pain    Fever of 100.4 F (38 C)  Call 911, or get immediate medical care  Call emergency services right away if any of these occur:    Chest pain, shortness of breath, wheezing, or difficulty breathing    Coughing up blood    Inability to swallow due to throat pain    7546-3127 [employer]. [address] [place] [address], [place] [id number]. All rights reserved. This information is not intended as a substitute for professional medical care. Always follow your healthcare professional's instructions.        Laryngitis    Laryngitis is a swelling of the tissues around the vocal cords. Symptoms include a hoarse (scratchy) voice. The voice may be lost completely. It may be caused by a viral illness, such as a head or chest cold. It may also be due to overuse and strain of the voice. Smoking, drinking alcohol, acid reflux, allergies, or inhaling harsh chemicals may also lead to symptoms. This condition will usually resolve in 1-2 weeks.  Home care    Rest your voice until it recovers. Talk as little as possible. If your symptoms are severe, rest at home for a day or so.    Breathing cool steam from a humidifier/vaporizer or in a steamy shower may be helpful.    Drink plenty of fluids to stay well hydrated.    Do not smoke  Follow-up care  Follow up with your healthcare provider or this facility if you have not improved after one week.  When to seek medical advice  Contact your healthcare provider for any of the  following:    Severe pain with swallowing    Trouble opening mouth    Neck swelling, neck pain, or trouble moving neck    Noisy breathing or trouble breathing    Fever of 100.4 F (38. C) or higher, or as directed by your healthcare provider    Drooling    Symptoms do not resolve in 2 weeks    7027-9134 [employer]. [address] [place] [address]. All rights reserved. This information is not intended as a substitute for professional medical care. Always follow your healthcare professional's instructions.              Follow-ups after your visit        Your next 10 appointments already scheduled     Apr 18, 2017  9:30 AM CDT   [facility] with Shanon De La O   [facility] ([facility]  )    [address]  [address]  [facility] [address]   [phone]            Apr 24, 2017 10:30 AM CDT   MR SHOULDER RIGHT W/O CONTRAST with [id number]   [facility] ([facility])    [address]  [facility] [id number] [phone]           Take your medicines as usual, unless your doctor tells you not to. Bring a list of your current medicines to your exam (including vitamins, minerals and over-the-counter drugs). Also bring the results of similar scans you may have had.  Please remove any body piercings and hair extensions before you arrive.  Follow your doctor s orders. If you do not, we may have to postpone your exam.  You will not have contrast for this exam. You do not need to do anything special to prepare.  The MRI machine uses a strong magnet. Please wear clothes without metal (snaps, zippers). A sweatsuit works well, or we may give you a hospital gown.   **IMPORTANT** THE INSTRUCTIONS BELOW ARE ONLY FOR THOSE PATIENTS WHO HAVE BEEN TOLD THEY WILL RECEIVE SEDATION OR GENERAL ANESTHESIA DURING THEIR MRI PROCEDURE:  IF YOU WILL RECEIVE SEDATION (take medicine to help you relax during your exam):   You must get the medicine  from your doctor before you arrive. Bring the medicine to the exam. Do not take it at home.   Arrive one hour early. Bring someone who can take you home after the test. Your medicine will make you sleepy. After the exam, you may not drive, take a bus or take a taxi by yourself.   No eating 8 hours before your exam. You may have clear liquids up until 4 hours before your exam. (Clear liquids include water, clear tea, black coffee and fruit juice without pulp.)  IF YOU WILL RECEIVE ANESTHESIA (be asleep for your exam):   Arrive 1 1/2 hours early. Bring someone who can take you home after the test. You may not drive, take a bus or take a taxi by yourself.   No eating 8 hours before your exam. You may have clear liquids up until 4 hours before your exam. (Clear liquids include water, clear tea, black coffee and fruit juice without pulp.)   You will spend four to five hours in the recovery room.  Please call the Imaging Department at your exam site with any questions.            Apr 24, 2017 11:20 AM CDT   [facility] with Caleb POLO [facility] [place] [facility] ([facility]  )    [address]  [address]  [facility] [address]   [phone]            May 08, 2017 11:00 AM CDT   [facility] with Terri Baron MD   [facility]    [address] [facility] [address] [address] [phone]              Who to contact     If you have questions or need follow up information about today's clinic visit or your schedule please contact [facility] directly at [phone].  Normal or non-critical lab and imaging results will be communicated to you by MyChart, letter or phone within 4 business days after the clinic has received the results. If you do not hear from us within 7 days, please contact the clinic through MyChart or phone. If you have a critical or abnormal lab result, we will notify you by phone as soon as possible.  Submit refill requests through  "MyChart or call your pharmacy and they will forward the refill request to us. Please allow 3 business days for your refill to be completed.          Additional Information About Your Visit        MyChart Information     [employer] lets you send messages to your doctor, view your test results, renew your prescriptions, schedule appointments and more. To sign up, go to [email or web address] . Click on \"Log in\" on the left side of the screen, which will take you to the Welcome page. Then click on \"Sign up Now\" on the right side of the page.     You will be asked to enter the access code listed below, as well as some personal information. Please follow the directions to create your username and password.     Your access code is: [email or web address]  Expires: 2017 11:52 AM     Your access code will  in 90 days. If you need help or a new code, please call your [facility] clinic or [phone].        Care EveryWhere ID     This is your Care EveryWhere ID. This could be used by other organizations to access your [facility] medical records  [id number]        Your Vitals Were     Pulse Temperature Respirations BMI (Body Mass Index)          80 98.6  F (37  C) (Oral) 16 23.63 kg/m2         Blood Pressure from Last 3 Encounters:   17 128/78   04/10/17 124/76   03/15/17 126/85    Weight from Last 3 Encounters:   17 117 lb (53.1 kg)   04/10/17 118 lb 14.4 oz (53.9 kg)   03/15/17 120 lb (54.4 kg)              We Performed the Following     Beta strep group A culture     Rapid strep screen          Today's Medication Changes          These changes are accurate as of: 17 11:33 AM.  If you have any questions, ask your nurse or doctor.               Start taking these medicines.        Dose/Directions    benzonatate 100 MG capsule   Commonly known as:  TESSALON   Used for:  Throat pain   Started by:  Marc Grady PA-C        Dose:  100 mg   Take 1 capsule (100 mg) by mouth 3 times daily as needed "   Quantity:  42 capsule   Refills:  0       fluticasone 50 MCG/ACT spray   Commonly known as:  FLONASE   Used for:  Throat pain   Started by:  Marc Grady PA-C        Dose:  1-2 spray   Spray 1-2 sprays into both nostrils daily   Quantity:  16 g   Refills:  3            Where to get your medicines      These medications were sent to [address] [address]     Phone:  [phone]     benzonatate 100 MG capsule    fluticasone 50 MCG/ACT spray                Primary Care Provider Office Phone # Fax #    Terri Baron [phone]       [facility] [id number] [place] [address]        Thank you!     Thank you for choosing [facility]  for your care. Our goal is always to provide you with excellent care. Hearing back from our patients is one way we can continue to improve our services. Please take a few minutes to complete the written survey that you may receive in the mail after your visit with us. Thank you!             Your Updated Medication List - Protect others around you: Learn how to safely use, store and throw away your medicines at www.disposemymeds.org.          This list is accurate as of: 4/13/17 11:33 AM.  Always use your most recent med list.                   Brand Name Dispense Instructions for use    benzonatate 100 MG capsule    TESSALON    42 capsule    Take 1 capsule (100 mg) by mouth 3 times daily as needed       clindamycin-benzoyl peroxide gel    BENZACLIN    50 g    Apply to  Affected area initially once daily for 2 weeks and then increase to 2 times daily if tolerated       docusate sodium 100 MG tablet    COLACE    60 tablet    Take 100 mg by mouth daily       fluticasone 50 MCG/ACT spray    FLONASE    16 g    Spray 1-2 sprays into both nostrils daily       loratadine 10 MG tablet    CLARITIN    30 tablet    Take 1 tablet (10 mg) by mouth daily        PATADAY 0.2 % Soln   Generic drug:  olopatadine HCl          topiramate 25 MG tablet    TOPAMAX    60 tablet    Take 1 tablet (25 mg) at bedtime for 1 week, then 1 tablet twice daily       traMADol 50 MG tablet    ULTRAM    40 tablet    Take 1-2 tablets ( mg) by mouth every 6 hours as needed for moderate pain

## 2017-04-15 LAB
BACTERIA SPEC CULT: NORMAL
MICRO REPORT STATUS: NORMAL
SPECIMEN SOURCE: NORMAL

## 2017-04-17 ENCOUNTER — TELEPHONE (OUTPATIENT)
Dept: FAMILY MEDICINE | Facility: CLINIC | Age: 42
End: 2017-04-17

## 2017-04-17 NOTE — TELEPHONE ENCOUNTER
Zo Qureshi is a 42 year old female who calls with worsening cough since 4/13/17 OV with Garrett . Patient is following plan of care.  Visit note reviewed :(R07.0) Throat pain (primary encounter diagnosis)  Comment: rapid strep negative. Likely viral and due to post nasal drip. Recommending flonase as well as prn tessalon for cough. Remainder of the exam looks benign. If symptoms fail to improve in 1 week, patient should RTC. Sooner if worsening.   Plan: Rapid strep screen, Beta strep group A culture,  fluticasone (FLONASE) 50 MCG/ACT spray,   benzonatate (TESSALON) 100 MG capsule  -Medication use and side effects discussed with the patient. Patient is in complete understanding and agreement with plan.     Call forwarded to scheduling for appointment.   Nura Pink RN

## 2017-04-17 NOTE — TELEPHONE ENCOUNTER
Very full schedule today - maybe could double at 6 pm and if something comes up with cancellation earlier could let her know

## 2017-04-17 NOTE — TELEPHONE ENCOUNTER
Pt calls, wants appointment today with JAIMIE for cough/cold, onset last week, denies SOB or wheezing, does not want to see another provider, routed to JAIMIE/gold staff, please check and inform pt on cell of plan    Telephone Information:   Mobile [phone]     Ellen Fowler RN, BSN  Message handled by Nurse Triage.

## 2017-04-18 ENCOUNTER — THERAPY VISIT (OUTPATIENT)
Dept: OCCUPATIONAL THERAPY | Facility: CLINIC | Age: 42
End: 2017-04-18

## 2017-04-18 DIAGNOSIS — R29.898 MECHANICAL PROBLEMS WITH LIMBS: ICD-10-CM

## 2017-04-18 DIAGNOSIS — R20.2 PARESTHESIA OF BOTH HANDS: ICD-10-CM

## 2017-04-18 DIAGNOSIS — M79.644 FINGER PAIN, RIGHT: ICD-10-CM

## 2017-04-18 PROCEDURE — 97140 MANUAL THERAPY 1/> REGIONS: CPT | Mod: GO | Performed by: OCCUPATIONAL THERAPIST

## 2017-04-18 PROCEDURE — 97112 NEUROMUSCULAR REEDUCATION: CPT | Mod: GO | Performed by: OCCUPATIONAL THERAPIST

## 2017-04-18 PROCEDURE — 97110 THERAPEUTIC EXERCISES: CPT | Mod: GO | Performed by: OCCUPATIONAL THERAPIST

## 2017-04-18 NOTE — MR AVS SNAPSHOT
After Visit Summary   4/18/2017    Zo Qureshi    MRN: [id number]           Patient Information     Date Of Birth          1975        Visit Information        Provider Department      4/18/2017 9:30 AM Shanon De La O [facility] HAND        Today's Diagnoses     Mechanical problems with limbs        Paresthesia of both hands        Finger pain, right           Follow-ups after your visit        Your next 10 appointments already scheduled     Apr 19, 2017 11:30 AM CDT   SHORT with Terri Baron MD   [facility] ([facility])    [address] [facility] [id number] [phone]            Apr 24, 2017 10:30 AM CDT   MR SHOULDER RIGHT W/O CONTRAST with [id number]   [facility] ([facility])    [address] [address]  [facility] [id number] [phone]           Take your medicines as usual, unless your doctor tells you not to. Bring a list of your current medicines to your exam (including vitamins, minerals and over-the-counter drugs). Also bring the results of similar scans you may have had.  Please remove any body piercings and hair extensions before you arrive.  Follow your doctor s orders. If you do not, we may have to postpone your exam.  You will not have contrast for this exam. You do not need to do anything special to prepare.  The MRI machine uses a strong magnet. Please wear clothes without metal (snaps, zippers). A sweatsuit works well, or we may give you a hospital gown.   **IMPORTANT** THE INSTRUCTIONS BELOW ARE ONLY FOR THOSE PATIENTS WHO HAVE BEEN TOLD THEY WILL RECEIVE SEDATION OR GENERAL ANESTHESIA DURING THEIR MRI PROCEDURE:  IF YOU WILL RECEIVE SEDATION (take medicine to help you relax during your exam):   You must get the medicine from your doctor before you arrive. Bring the medicine to the exam. Do not take it at home.   Arrive one hour early. Bring someone who can  take you home after the test. Your medicine will make you sleepy. After the exam, you may not drive, take a bus or take a taxi by yourself.   No eating 8 hours before your exam. You may have clear liquids up until 4 hours before your exam. (Clear liquids include water, clear tea, black coffee and fruit juice without pulp.)  IF YOU WILL RECEIVE ANESTHESIA (be asleep for your exam):   Arrive 1 1/2 hours early. Bring someone who can take you home after the test. You may not drive, take a bus or take a taxi by yourself.   No eating 8 hours before your exam. You may have clear liquids up until 4 hours before your exam. (Clear liquids include water, clear tea, black coffee and fruit juice without pulp.)   You will spend four to five hours in the recovery room.  Please call the Imaging Department at your exam site with any questions.            Apr 24, 2017 11:20 AM CDT   [facility] with Caleb Escalona   [facility] ([facility]  )    [id number] [place] [address]   [phone]            Apr 27, 2017  8:30 AM CDT   [facility] with Shanon De La O   [facility] ([facility]  )    [address] [place] [address]   [phone]            May 04, 2017 11:00 AM CDT   [facility] with Shanon De La O   [facility] ([facility]  )    [id number] [place] [address]   [phone]            May 08, 2017 11:00 AM CDT   SHORT with Terri Baron MD   [facility] ([facility])    [address] [facility] [address] [phone]              Who to contact     If you have questions or need follow up information about today's clinic visit or your schedule please contact [facility] directly at [phone].  Normal or non-critical lab and imaging results will be communicated to you by MyChart, letter or phone within 4 business days after the clinic has received the results. If  "you do not hear from us within 7 days, please contact the clinic through [employer] or phone. If you have a critical or abnormal lab result, we will notify you by phone as soon as possible.  Submit refill requests through [employer] or call your pharmacy and they will forward the refill request to us. Please allow 3 business days for your refill to be completed.          Additional Information About Your Visit        [employer] Information     [employer] lets you send messages to your doctor, view your test results, renew your prescriptions, schedule appointments and more. To sign up, go to [email or web address] . Click on \"Log in\" on the left side of the screen, which will take you to the Welcome page. Then click on \"Sign up Now\" on the right side of the page.     You will be asked to enter the access code listed below, as well as some personal information. Please follow the directions to create your username and password.     Your access code is: [email or web address]  Expires: 2017 11:52 AM     Your access code will  in 90 days. If you need help or a new code, please call your [facility] clinic or [phone].        Care EveryWhere ID     This is your Care EveryWhere ID. This could be used by other organizations to access your [facility] medical records  [id number]         Blood Pressure from Last 3 Encounters:   17 128/78   04/10/17 124/76   03/15/17 126/85    Weight from Last 3 Encounters:   17 53.1 kg (117 lb)   04/10/17 53.9 kg (118 lb 14.4 oz)   03/15/17 54.4 kg (120 lb)              We Performed the Following     [facility] PROGRESS NOTES REPORT     MANUAL THER TECH,1+REGIONS,EA 15 MIN     NEUROMUSCULAR RE-EDUCATION     THERAPEUTIC EXERCISES        Primary Care Provider Office Phone # Fax #    Terri Baron [phone]       [facility] [address] [facility] [address]        Thank you!     Thank you for [facility]  [facility] your [facility]. Our goal is always to " provide you with excellent care. Hearing back from our patients is one way we can continue to improve our services. Please take a few minutes to complete the written survey that you may receive in the mail after your visit with us. Thank you!             Your Updated Medication List - Protect others around you: Learn how to safely use, store and throw away your medicines at www.disposemymeds.org.          This list is accurate as of: 4/18/17  3:17 PM.  Always use your most recent med list.                   Brand Name Dispense Instructions for use    benzonatate 100 MG capsule    TESSALON    42 capsule    Take 1 capsule (100 mg) by mouth 3 times daily as needed       clindamycin-benzoyl peroxide gel    BENZACLIN    50 g    Apply to  Affected area initially once daily for 2 weeks and then increase to 2 times daily if tolerated       docusate sodium 100 MG tablet    COLACE    60 tablet    Take 100 mg by mouth daily       fluticasone 50 MCG/ACT spray    FLONASE    16 g    Spray 1-2 sprays into both nostrils daily       loratadine 10 MG tablet    CLARITIN    30 tablet    Take 1 tablet (10 mg) by mouth daily       PATADAY 0.2 % Soln   Generic drug:  olopatadine HCl          topiramate 25 MG tablet    TOPAMAX    60 tablet    Take 1 tablet (25 mg) at bedtime for 1 week, then 1 tablet twice daily       traMADol 50 MG tablet    ULTRAM    40 tablet    Take 1-2 tablets ( mg) by mouth every 6 hours as needed for moderate pain

## 2017-04-18 NOTE — PROGRESS NOTES
Hand Therapy Progress Note  Current Date:  4/18/17  Reporting Period: 3/17/2017 to 4/18/17    S:  Subjective changes as noted by patient: Today is a good day.  My fingers still hurt when I bend them.  Functional changes noted by patient: I do housework and I stop when it starts to hurt.    Response to previous treatment: It felt better.     Patient has noted adverse reaction to:   None     Upper Extremity Functional Index Score:  SCORE:   Column Totals: /80: 56   (A lower score indicates greater disability.)     Objective:  Sensation:  Intermittant numbness and tingling bilaterally. Most prominent in R index, middle, and ring finger, and also along R anterior forearm. 3/17/17: Intermittant numbness and tingling bilaterally. Most prominent in R index, middle, and ring finger, and also along R anterior forearm.  4/18/17: Numbness and tingling in index, long, and ring fingers bilaterally.    Pain Level Report: On scale 0-10/10  Date 2/7/2017 2/7/2017 3/17/17 3/17/17 4/18/17 4/18/17   side R L R L R L   Overall 7 5 7 (no meds) 3-4 5 4   At Rest 3 2 4-5 3-4 4-5 4-5   With Activity 6-7 5 7 6 7 6     Primary Report: location and description  Date 2/7/2017 2/7/2017 3/17/17 3/17/17 4/18/17 4/18/17   Side R L R L R L   Location Forearm, index, middle, and ring fingers Index, middle, and ring finger Dorsal wrist, and Ant. Forearm, and first three fingers Ant. Forearm and first 3 fingers First three fingers, flexor wad, thumb web space First three fingers, thumb web space   Radiation Shooting up arm Shooting up arm R shoulder shooting down to hand (more than left) shoulder shooting down to hand Shoulder shooting down to hand Shoulder shooting down to hand   Pain Quality Pinching Pinching Pinching  Pinching  Pinching, tightness Pinching, tightness   Frequency Constant Constant Constant Constant Constant Constant   Duration Sleeping is ok, but its worst with activity Sleeping is ok, but its worst with activity Worse with activity  Worse with activity Worse with activity Worse with activity   Exacerbated by  Lifting, gripping, twisting, pinching, pulling  Lifting, gripping, twisting, pinching, pulling Wrist flexion, finger flexion (more stiff and painful than L) Wrist flexion, finger flexion General use  General use   Relieved by Rest, heat, ice Rest, heat, ice Rest, heat Rest, heat Pain meds, rest Pain meds, rest   Progression since onset Gradually getting worse Gradually worse Gradually getting better Gradually getting better Gradually getting better Gradually getting better     Range of Motion Wrist AROM (PROM):  Date 2/7/2017 2/7/2017 3/17/17 3/17/17 4/18/17 4/18/17   Side R L R L R L   Ext 65 70 65 67 65 65   Flex 34 59 36 45 34 31   UD 25 25 25 25 29 19   RD 20 18 19 15 15 13     Special Tests:  Date 2/7/2017 2/7/2017 3/17/17 3/17/17 4/18/17 4/18/17   Side R L R L R L   Phalens + at 20 sec - + at ~10 sec - + at 10 sec - at 30 sec but fatigue present   Tinels at CT - -   + +   Tinels at Pronator - -   NT NT   Median Nerve ULTT ~15% ~15% ~15% ~15% ~20% ~25%   Paresthesias + + + + + +       STRENGTH: (Measured in pounds, pain scale 0-10/10)    Date 2/7/2017 3/17/17 4/18/17      Trials Left Right Left Right Left Right Left Right Left Right Left Right   1 25 6 10 8 20 16         2               3               Avg               Pain 0 7 6 8 6 6           3 Point Pinch  Date 2/7/2017 3/17/17 4/18/17      Trials Left Right Left Right Left Right Left Right Left Right Left Right   1 9 4 4 4 4 3         2               3               Avg               Pain 0 6 6 6-7 2 2           Lateral Pinch  Date 2/7/2017 3/17/17 4/18/17      Trials Left Right Left Right Left Right Left Right Left Right Left Right   1 10 6 6 6 4 3         2               3               Avg               Pain 0 6 0 7 1 2           Assessment:  Response to therapy has been improvement to:  Strength:   and pinch  Pain:  intensity of pain is decreased    Overall  Assessment:  Patient's symptoms are resolving.  Patient is becoming more independent in home exercise program  Patient would benefit from continued therapy to achieve rehab potential  STG/LTG:  STGoals have been reviewed;  see goal sheet for details and updates.  LTGoals have been reviewed;  see goal sheet for details and updates.    I have re-evaluated this patient and find that the nature, scope, duration and intensity of the therapy is appropriate for the medical condition of the patient.    P: Frequency:  1 X week, once daily  Duration:  for 6 weeks    Treatment Plan:    Modalities:  None   Therapeutic Exercise: Tendon Gliding ex, blocking  Neuromuscular Techniques: Median nerve glides both active and passive (in the clinic)  Manual Techniques:, Myofascial release of the forearm extensors and flexors, wrist mobilization techniques  Orthosis:  Wrist in netural orthosis for night wear.    Home Program:   Orthosis: Static orthosis and Forearm based orthosis Wrist in neutral orthosis at night.    Activity: Avoid activities that exacerbate symptoms in the median nerve.  Avoid prolonged flexion or extension of the wrist.    Discharge Plan:    Achieve all LTG.  Independent in home treatment program.  Reach maximal therapeutic benefit.    Next Visit:  MFR  Ergonomics education  Nerve gliding

## 2017-04-19 ENCOUNTER — OFFICE VISIT (OUTPATIENT)
Dept: FAMILY MEDICINE | Facility: CLINIC | Age: 42
End: 2017-04-19
Payer: COMMERCIAL

## 2017-04-19 VITALS
HEART RATE: 80 BPM | OXYGEN SATURATION: 99 % | BODY MASS INDEX: 24.03 KG/M2 | SYSTOLIC BLOOD PRESSURE: 116 MMHG | WEIGHT: 119.2 LBS | RESPIRATION RATE: 16 BRPM | DIASTOLIC BLOOD PRESSURE: 62 MMHG | HEIGHT: 59 IN | TEMPERATURE: 98.3 F

## 2017-04-19 DIAGNOSIS — R05.9 COUGH: Primary | ICD-10-CM

## 2017-04-19 DIAGNOSIS — J40 BRONCHITIS: ICD-10-CM

## 2017-04-19 PROCEDURE — 99213 OFFICE O/P EST LOW 20 MIN: CPT | Performed by: FAMILY MEDICINE

## 2017-04-19 RX ORDER — PREDNISONE 20 MG/1
40 TABLET ORAL DAILY
Qty: 10 TABLET | Refills: 0 | Status: SHIPPED | OUTPATIENT
Start: 2017-04-19 | End: 2017-04-24

## 2017-04-19 NOTE — PROGRESS NOTES
SUBJECTIVE:                                                    Zo Qureshi is a 42 year old female who presents to clinic today for the following health issues:    Sick for about 2 weeks - was seen last week and got some cough medicine.   She also got some nasal spray.   It has not helped.   She feels she now has chest congestion.   She has had fever to 101.   She had lost her voice.  It is coming back now.         Acute Illness   Acute illness concerns: Cough  Onset: 10 days    Fever: YES    Chills/Sweats: no     Headache (location?): YES    Sinus Pressure:no    Conjunctivitis:  YES- itch    Ear Pain: no    Rhinorrhea: YES    Congestion: YES    Sore Throat: YES     Cough: YES    Wheeze: YES    Decreased Appetite: no     Nausea: no     Vomiting: no     Diarrhea:  no     Dysuria/Freq.: no     Fatigue/Achiness: YES    Sick/Strep Exposure: YES     Therapies Tried and outcome: Tessalon, Tylenol      Past Medical History:   Diagnosis Date     HSIL on Pap smear 09/21/11    MARTA 2 and 3     INFLAM SPONDYLOPATHY NOS   1/7/2008     Leukocytopenia 3/10/2014     Thrombocytopenia (H) 3/10/2014     Unspecified closed fracture of pelvis 2000    left fracture of pelvis after delivery       Past Surgical History:   Procedure Laterality Date     C NONSPECIFIC PROCEDURE  10/00    after delivery 2 units of prbcs secondary to placenta     LEEP TX, CERVICAL  12/19/11    MARTA II     TUBAL LIGATION  5/2009    laparoscopic tubal ligation       MEDICATIONS:  Current Outpatient Prescriptions   Medication     fluticasone (FLONASE) 50 MCG/ACT spray     benzonatate (TESSALON) 100 MG capsule     docusate sodium (COLACE) 100 MG tablet     topiramate (TOPAMAX) 25 MG tablet     traMADol (ULTRAM) 50 MG tablet     loratadine (CLARITIN) 10 MG tablet     PATADAY 0.2 % SOLN     clindamycin-benzoyl peroxide (BENZACLIN) gel     No current facility-administered medications for this visit.        SOCIAL HISTORY:  Social History   Substance Use Topics      "Smoking status: Never Smoker     Smokeless tobacco: Never Used     Alcohol use 0.0 oz/week     0 Standard drinks or equivalent per week      Comment: occasionally       Family History   Problem Relation Age of Onset     Hypertension Father      Lipids Father      Hypertension Mother      Lipids Mother      DIABETES No family hx of      Coronary Artery Disease No family hx of      Hyperlipidemia No family hx of      CEREBROVASCULAR DISEASE No family hx of      Breast Cancer No family hx of      Colon Cancer No family hx of      Prostate Cancer No family hx of      Other Cancer No family hx of      Depression No family hx of      Anxiety Disorder No family hx of      MENTAL ILLNESS No family hx of      Substance Abuse No family hx of      Anesthesia Reaction No family hx of      Asthma No family hx of      OSTEOPOROSIS No family hx of      Genetic Disorder No family hx of      Thyroid Disease No family hx of      Obesity No family hx of      Unknown/Adopted No family hx of        Objective:  Blood pressure 116/62, pulse 80, temperature 98.3  F (36.8  C), temperature source Oral, resp. rate 16, height 4' 11\" (1.499 m), weight 119 lb 3.2 oz (54.1 kg), SpO2 99 %, not currently breastfeeding.  HEENT:  TM's are clear bilaterally.  Oropharynx is clear without tonsillar hypertrophy or exudate.  Conjuctiva are clear.  Nose: moderate edema  Neck:  There is no lymphadenopathy or thyroid tenderness or enlargement  Chest: Clear to auscultation bilaterally.  No wheezes, rales or retractions.  CV: Regular rate and rhythm without murmurs, rubs or gallops.  ABDOMEN:  Positive bowel sounds, soft, nondistended and nontender.  No masses are palpated and there is no organomegaly or inguinal lymphadenopathy.  Skin: free of rash or abnormal looking lesion(s)    Assessment:  1. Acute bronchitis - likely viral    Plan:  1. Prednisone  2. May continue tessalon  3. The potential side effects of the prescription medication were discussed with " the patient.  4. The patient is to follow up as needed for nonresolution of symptoms

## 2017-04-19 NOTE — MR AVS SNAPSHOT
After Visit Summary   4/19/2017    Zo Qureshi    MRN: [id number]           Patient Information     Date Of Birth          1975        Visit Information        Provider Department      4/19/2017 9:30 AM Terri Baron MD [facility]        Today's Diagnoses     Cough    -  1    Bronchitis           Follow-ups after your visit        Your next 10 appointments already scheduled     Apr 24, 2017 10:30 AM CDT   MR SHOULDER RIGHT W/O CONTRAST with [id number]   [facility] ([facility])    [address]  [facility] [id number] [phone]           Take your medicines as usual, unless your doctor tells you not to. Bring a list of your current medicines to your exam (including vitamins, minerals and over-the-counter drugs). Also bring the results of similar scans you may have had.  Please remove any body piercings and hair extensions before you arrive.  Follow your doctor s orders. If you do not, we may have to postpone your exam.  You will not have contrast for this exam. You do not need to do anything special to prepare.  The MRI machine uses a strong magnet. Please wear clothes without metal (snaps, zippers). A sweatsuit works well, or we may give you a hospital gown.   **IMPORTANT** THE INSTRUCTIONS BELOW ARE ONLY FOR THOSE PATIENTS WHO HAVE BEEN TOLD THEY WILL RECEIVE SEDATION OR GENERAL ANESTHESIA DURING THEIR MRI PROCEDURE:  IF YOU WILL RECEIVE SEDATION (take medicine to help you relax during your exam):   You must get the medicine from your doctor before you arrive. Bring the medicine to the exam. Do not take it at home.   Arrive one hour early. Bring someone who can take you home after the test. Your medicine will make you sleepy. After the exam, you may not drive, take a bus or take a taxi by yourself.   No eating 8 hours before your exam. You may have clear liquids up until 4 hours before your exam. (Clear  liquids include water, clear tea, black coffee and fruit juice without pulp.)  IF YOU WILL RECEIVE ANESTHESIA (be asleep for your exam):   Arrive 1 1/2 hours early. Bring someone who can take you home after the test. You may not drive, take a bus or take a taxi by yourself.   No eating 8 hours before your exam. You may have clear liquids up until 4 hours before your exam. (Clear liquids include water, clear tea, black coffee and fruit juice without pulp.)   You will spend four to five hours in the recovery room.  Please call the Imaging Department at your exam site with any questions.            Apr 24, 2017 11:20 AM CDT   [facility] with Caleb Escalona   [facility]  )    [address] [place] [address]   [phone]            Apr 27, 2017  8:30 AM CDT   [facility] with Shanon De La O   [facility]  )    [address] [place] [address]   [phone]            May 04, 2017 11:00 AM CDT   [facility] with Shanon De La O   [facility] ([facility]  )    [address] [place], [place] [address]  [address] [place] [address]   [phone]            May 08, 2017 11:00 AM CDT   SHORT with Terri Baron MD   [facility] ([facility]    [address] [place] [id number] [phone]              Who to contact     If you have questions or need follow up information about today's clinic visit or your schedule please contact [facility] directly at [phone].  Normal or non-critical lab and imaging results will be communicated to you by MyChart, letter or phone within 4 business days after the clinic has received the results. If you do not hear from us within 7 days, please contact the clinic through MyChart or phone. If you have a critical or abnormal lab result, we will notify you by phone as soon as possible.  Submit refill requests through [employer] or call your  "pharmacy and they will forward the refill request to us. Please allow 3 business days for your refill to be completed.          Additional Information About Your Visit        MyChart Information     [employer] lets you send messages to your doctor, view your test results, renew your prescriptions, schedule appointments and more. To sign up, go to [email or web address] . Click on \"Log in\" on the left side of the screen, which will take you to the Welcome page. Then click on \"Sign up Now\" on the right side of the page.     You will be asked to enter the access code listed below, as well as some personal information. Please follow the directions to create your username and password.     Your access code is: [email or web address]  Expires: 2017 11:52 AM     Your access code will  in 90 days. If you need help or a new code, please call your [facility] clinic or [phone].        Care EveryWhere ID     This is your Care EveryWhere ID. This could be used by other organizations to access your [facility] medical records  [id number]        Your Vitals Were     Pulse Temperature Respirations Height Pulse Oximetry BMI (Body Mass Index)    80 98.3  F (36.8  C) (Oral) 16 4' 11\" (1.499 m) 99% 24.08 kg/m2       Blood Pressure from Last 3 Encounters:   17 116/62   17 128/78   04/10/17 124/76    Weight from Last 3 Encounters:   17 119 lb 3.2 oz (54.1 kg)   17 117 lb (53.1 kg)   04/10/17 118 lb 14.4 oz (53.9 kg)              Today, you had the following     No orders found for display         Today's Medication Changes          These changes are accurate as of: 17  9:51 AM.  If you have any questions, ask your nurse or doctor.               Start taking these medicines.        Dose/Directions    predniSONE 20 MG tablet   Commonly known as:  DELTASONE   Used for:  Cough, Bronchitis   Started by:  Terri Baron MD        Dose:  40 mg   Take 2 tablets (40 mg) by mouth daily for 5 days   Quantity:  10 tablet "   Refills:  0            Where to get your medicines      These medications were sent to [facility] [address]  [address] [facility] [address]     Phone:  [phone]     predniSONE 20 MG tablet                Primary Care Provider Office Phone # Fax #    Terri Baron [phone]       [facility] [address] [facility] [address]        Thank you!     Thank you for choosing [facility]  for your care. Our goal is always to provide you with excellent care. Hearing back from our patients is one way we can continue to improve our services. Please take a few minutes to complete the written survey that you may receive in the mail after your visit with us. Thank you!             Your Updated Medication List - Protect others around you: Learn how to safely use, store and throw away your medicines at www.disposemymeds.org.          This list is accurate as of: 4/19/17  9:51 AM.  Always use your most recent med list.                   Brand Name Dispense Instructions for use    benzonatate 100 MG capsule    TESSALON    42 capsule    Take 1 capsule (100 mg) by mouth 3 times daily as needed       clindamycin-benzoyl peroxide gel    BENZACLIN    50 g    Apply to  Affected area initially once daily for 2 weeks and then increase to 2 times daily if tolerated       docusate sodium 100 MG tablet    COLACE    60 tablet    Take 100 mg by mouth daily       fluticasone 50 MCG/ACT spray    FLONASE    16 g    Spray 1-2 sprays into both nostrils daily       loratadine 10 MG tablet    CLARITIN    30 tablet    Take 1 tablet (10 mg) by mouth daily       PATADAY 0.2 % Soln   Generic drug:  olopatadine HCl          predniSONE 20 MG tablet    DELTASONE    10 tablet    Take 2 tablets (40 mg) by mouth daily for 5 days       topiramate 25 MG tablet    TOPAMAX    60 tablet    Take 1 tablet (25 mg) at bedtime for 1 week, then 1 tablet twice  daily       traMADol 50 MG tablet    ULTRAM    40 tablet    Take 1-2 tablets ( mg) by mouth every 6 hours as needed for moderate pain

## 2017-04-19 NOTE — TELEPHONE ENCOUNTER
Received fax on 4/18/17 from [facility] stating that they are unable to authorize any further treatment at this time.

## 2017-04-19 NOTE — NURSING NOTE
"Chief Complaint   Patient presents with     RECHECK     Pt was in clinic and saw ZB on 04/13 for cough, was given Tessalon pills. Patient states she is not feeling any better       Initial /62 (BP Location: Right arm, Patient Position: Chair, Cuff Size: Adult Regular)  Pulse 80  Temp 98.3  F (36.8  C) (Oral)  Resp 16  Ht 4' 11\" (1.499 m)  Wt 119 lb 3.2 oz (54.1 kg)  SpO2 99%  BMI 24.08 kg/m2 Estimated body mass index is 24.08 kg/(m^2) as calculated from the following:    Height as of this encounter: 4' 11\" (1.499 m).    Weight as of this encounter: 119 lb 3.2 oz (54.1 kg).  Medication Reconciliation: complete     William Mixon CMA      "

## 2017-04-21 ENCOUNTER — MEDICAL CORRESPONDENCE (OUTPATIENT)
Dept: HEALTH INFORMATION MANAGEMENT | Facility: CLINIC | Age: 42
End: 2017-04-21

## 2017-04-24 ENCOUNTER — TELEPHONE (OUTPATIENT)
Dept: FAMILY MEDICINE | Facility: CLINIC | Age: 42
End: 2017-04-24

## 2017-04-24 ENCOUNTER — HOSPITAL ENCOUNTER (OUTPATIENT)
Dept: MRI IMAGING | Facility: CLINIC | Age: 42
Discharge: HOME | End: 2017-04-24
Attending: FAMILY MEDICINE | Admitting: FAMILY MEDICINE
Payer: OTHER MISCELLANEOUS

## 2017-04-24 ENCOUNTER — THERAPY VISIT (OUTPATIENT)
Dept: PHYSICAL THERAPY | Facility: CLINIC | Age: 42
End: 2017-04-24

## 2017-04-24 DIAGNOSIS — R29.898 WEAKNESS OF SHOULDER: ICD-10-CM

## 2017-04-24 DIAGNOSIS — M54.12 CERVICAL RADICULOPATHY: ICD-10-CM

## 2017-04-24 DIAGNOSIS — M25.512 BILATERAL SHOULDER PAIN: ICD-10-CM

## 2017-04-24 DIAGNOSIS — M25.511 RIGHT SHOULDER PAIN, UNSPECIFIED CHRONICITY: ICD-10-CM

## 2017-04-24 DIAGNOSIS — M25.511 BILATERAL SHOULDER PAIN: ICD-10-CM

## 2017-04-24 PROCEDURE — 97140 MANUAL THERAPY 1/> REGIONS: CPT | Mod: GP | Performed by: PHYSICAL THERAPIST

## 2017-04-24 PROCEDURE — 97110 THERAPEUTIC EXERCISES: CPT | Mod: GP | Performed by: PHYSICAL THERAPIST

## 2017-04-24 PROCEDURE — 73221 MRI JOINT UPR EXTREM W/O DYE: CPT | Mod: RT

## 2017-04-24 NOTE — TELEPHONE ENCOUNTER
Terri Baron MD  P Cr Referral                     Severe tendonitis is seen.   No tear seen.   Would like to have her seen by ortho but does not need to see surgeon              LM for this pt to call Fernanda back.    Dr. Baron can you put the referral into [facility]?    Thank you,  Fernanda-Referrals  [phone]

## 2017-04-24 NOTE — TELEPHONE ENCOUNTER
Informed pt to call [facility] [phone].    Fernanda-Referrals

## 2017-04-25 DIAGNOSIS — R20.0 NUMBNESS OF RIGHT HAND: Primary | ICD-10-CM

## 2017-04-26 ENCOUNTER — TELEPHONE (OUTPATIENT)
Dept: FAMILY MEDICINE | Facility: CLINIC | Age: 42
End: 2017-04-26

## 2017-04-26 DIAGNOSIS — M25.511 SHOULDER PAIN, RIGHT: Primary | ICD-10-CM

## 2017-04-26 PROBLEM — M67.88 RIGHT PERONEAL TENDINOSIS: Status: ACTIVE | Noted: 2017-04-26

## 2017-04-27 ENCOUNTER — THERAPY VISIT (OUTPATIENT)
Dept: OCCUPATIONAL THERAPY | Facility: CLINIC | Age: 42
End: 2017-04-27
Payer: OTHER MISCELLANEOUS

## 2017-04-27 DIAGNOSIS — M79.644 FINGER PAIN, RIGHT: ICD-10-CM

## 2017-04-27 DIAGNOSIS — R20.2 PARESTHESIA OF BOTH HANDS: ICD-10-CM

## 2017-04-27 DIAGNOSIS — R29.898 MECHANICAL PROBLEMS WITH LIMBS: ICD-10-CM

## 2017-04-27 PROCEDURE — 97140 MANUAL THERAPY 1/> REGIONS: CPT | Mod: GO | Performed by: OCCUPATIONAL THERAPIST

## 2017-04-27 PROCEDURE — 97112 NEUROMUSCULAR REEDUCATION: CPT | Mod: GO | Performed by: OCCUPATIONAL THERAPIST

## 2017-05-01 ENCOUNTER — OFFICE VISIT (OUTPATIENT)
Dept: ORTHOPEDICS | Facility: CLINIC | Age: 42
End: 2017-05-01
Payer: OTHER MISCELLANEOUS

## 2017-05-01 VITALS
BODY MASS INDEX: 24.77 KG/M2 | DIASTOLIC BLOOD PRESSURE: 82 MMHG | SYSTOLIC BLOOD PRESSURE: 122 MMHG | HEIGHT: 58 IN | WEIGHT: 118 LBS

## 2017-05-01 DIAGNOSIS — M75.101 ROTATOR CUFF SYNDROME OF RIGHT SHOULDER: ICD-10-CM

## 2017-05-01 DIAGNOSIS — M54.2 CERVICALGIA: Primary | ICD-10-CM

## 2017-05-01 DIAGNOSIS — M50.20 CERVICAL HERNIATED DISC: ICD-10-CM

## 2017-05-01 DIAGNOSIS — M54.12 RIGHT CERVICAL RADICULOPATHY: ICD-10-CM

## 2017-05-01 DIAGNOSIS — M79.18 MYOFASCIAL PAIN SYNDROME, CERVICAL: ICD-10-CM

## 2017-05-01 PROCEDURE — 99243 OFF/OP CNSLTJ NEW/EST LOW 30: CPT | Performed by: FAMILY MEDICINE

## 2017-05-01 NOTE — PROGRESS NOTES
ASSESSMENT & PLAN    ICD-10-CM    1. Cervicalgia M54.2 [facility]   2. Rotator cuff syndrome of right shoulder M75.101    3. Cervical herniated disc M50.20 [facility]   4. Myofascial pain syndrome, cervical M79.1 [facility] REFERR[facility]   5. Right cervical radiculopathy M54.12 [facility] REFERRAL   Discussed exam and in my opinion, injections/interventions should be focused around your neck rather than your shoulder  Reviewed your MRI which shows a chronic tendinosis  Would recommend moving forward with already ordered cervical epidural Steroid injection that was recommend by Neurosurgery  Referral is made to Pain Management for comprehensive evaluation and management of your chronic neck and myofascial pain    Follow up as needed. Call direct clinic number [[phone]] at any time with questions or concerns. Instructed to call the office if the condition evolves or worsens.    -----    SUBJECTIVE  Zo Qureshi is a/an 42 year old right hand dominant female who is seen in consultation at the request of Dr. Baron for evaluation of right posterior and lateral shoulder pain. She will get intermittent radiating pain to her hand to her 3-5th fingers. The patient is seen with a QRC (Qualified Rehabilitation Consultant) from .    Onset: Summer 2016 with increased pain on 1/5/17. Reports insidious onset without acute precipitating event.She reports an increase in her work hours in November. No specific incident reported. Job activities/description has not changed.  Was working 8 hours (M-Sat) and since November increased to 9-10/shift.  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  Worsened by: moving a lot, prolonged use of the right arm  Better with: rest  Quality: sharp when the shoulder  "is acting up, achy while taking pain medication  Pain Scale (maximum/current)/10: 8/10 / 6/10  Treatments tried: heat, Tylenol, other medications: Tramadol (Ultram), home exercises, physical therapy (15 visits) and previous imaging (MRI see below), chiro for neck/back/shoulders  Orthopedic history: NO  Relevant surgical history: NO  Patient Social History: works as assembly line - hearing aids    Patient's past medical, surgical, social, and family histories were reviewed today and no changes are noted.    REVIEW OF SYSTEMS:  10 point ROS is negative other than symptoms noted above in HPI, Past Medical History or as stated below  Constitutional: NEGATIVE for fever, chills, change in weight  Skin: NEGATIVE for worrisome rashes, moles or lesions  GI/: NEGATIVE for bowel or bladder changes  Neuro: NEGATIVE for weakness, dizziness or paresthesias    OBJECTIVE:  Ht 4' 11\" (1.499 m)  Wt 120 lb (54.4 kg)  BMI 24.24 kg/m2   General: healthy, alert and in no distress  HEENT: no scleral icterus or conjunctival erythema  Skin: no suspicious lesions or rash. No jaundice.  CV: regular rhythm by palpation  Resp: normal respiratory effort without conversational dyspnea   Psych: normal mood and affect  Gait: normal steady gait with appropriate coordination and balance  Neuro: feels decreased sensation over C5, 6, 7, 8 dermatomes of right upper extremity.  Motor strength as noted below.  MSK:  RIGHT SHOULDER  Inspection:    no atrophy  Palpation:    Tender about the medial border of scapula, posterior shoulder. Tender along clavicle. Nontender on AC joint, supraspinatus insertion and proximal bicep. Remainder of bony and tendinous landmarks are nontender.  Active Range of Motion:     Abduction 1650, FF 1650,       Scapular dyskinesis present  Strength:    Scapular plane abduction 5-/5,  ER 5-/5, IR 5-/5, biceps 5-/5  Special Tests:    Positive: equivocal supraspinatus (empty can) for mild discomfort, no weakness    " Negative: drop arm/painful arc, castro    CERVICAL SPINE  Inspection:    rounded shoulders, forward head posture  Palpation:    Tender about the cervical spinous processes, paracervical musculature (bilateral) and medial border of scapula. Otherwise remainder of the landmarks and nontender.  Range of Motion:     Flexion full    Extension extremely limited ~ < 10 degrees    Right side bend limited by pain    Right rotation limited by pain ( < 10 degrees)    Left rotation limited by pain (< 10 degrees)  Special Tests:    Positive: Spurling's (right)    Independent visualization of the below image:   MR SHOULDER RIGHT WITHOUT CONTRAST 4/24/2017 10:58 AM     HISTORY: Right shoulder pain for 6 weeks. Muscle weakness  (generalized). Pain in right shoulder.     TECHNIQUE: Coronal oblique T1, T2, and fat suppressed T2, sagittal  oblique T2, and transverse proton density and T2 weighted images.     FINDINGS:   Osseous acromial outlet: There is a mildly type II subacromial  configuration. Lateral downsloping of the acromion is also noted. No  apparent subacromial spur. The acromioclavicular joint appears  essentially within normal limits with no indentation upon the  supraspinatus outlet.     Rotator cuff: There is marked thickening as well as intrasubstance  signal intensity within the posterior aspect of the supraspinatus  tendon/infraspinatus tendon. In addition, there appears to be a small  myotendinous junction cyst or intramuscular ganglion measuring at  least 1.3 cm from medial to lateral and approximately 0.2 cm and  sagittal diameter. No red tendon transverse tear or tendon rupture  is visible. No rotator cuff muscle atrophy.     Labral Structures: The glenoid labrum appears within normal limits. No  apparent SLAP lesion. No paralabral cysts.     Biceps Tendon: No long head biceps tendon tear, subluxation, or  tendinosis.     Osseous structures: Mild resorptive cysts are noted along the  anatomical neck of the  humerus beneath the infraspinatus tendon  attachment. Marrow signal about the shoulder is otherwise  unremarkable. No apparent chondromalacia.     Joint space: No glenohumeral joint effusion.     Additional findings: No abnormal bursal signal. No deltoid muscle  edema.          IMPRESSION: Infraspinatus/posterior supraspinatus severe tendinosis.  There is an infraspinatus myotendinous junction small cyst or  intramuscular ganglion. However, there is no visible transverse tear  or tendon rupture. No rotator cuff muscle atrophy.     PAUL OSCAR MD    MRI OF THE CERVICAL SPINE WITHOUT CONTRAST 2/9/2017 9:36 AM     COMPARISON: None.     HISTORY: Radiculopathy, cervical region.     TECHNIQUE: Multiplanar, multisequence MRI images of the cervical spine  were acquired without intravenous contrast.     FINDINGS: There is normal alignment of the cervical vertebrae;  however, there is reversal of normal cervical lordosis centered at the  C4 level. Vertebral body heights of the cervical spine are normal.  Marrow signal throughout the cervical vertebrae is within normal  limits. There is no evidence for fracture or pathologic bony lesion of  the cervical spine.      There is loss of disc height, disc desiccation and posterior disc  bulging/herniation at the C5-C6 and C6-C7 levels. The cervical  intervertebral discs are otherwise within normal limits in height,  contour and signal intensity.     The cervical spinal cord is mildly deformed by degenerative changes at  the C5-C6 level. The cervical spinal cord is otherwise normal in  contour. There is no abnormal signal in the cervical spinal cord.  There is no evidence for intrathecal abnormality.     Level by level:     C2-C3: There is no spinal canal or neural foraminal stenosis at this  level.     C3-C4: There is no spinal canal or neural foraminal stenosis at this  level.     C4-C5: There is no spinal canal or neural foraminal stenosis at this  level.     C5-C6: There is  facet arthropathy bilaterally, uncinate hypertrophy  bilaterally and a posterior broad-based disc-osteophyte complex with a  superimposed small posterior central/right paracentral disc herniation  (protrusion). The herniated disc material mildly indents the right  anterior surface of the cervical spinal cord and results in moderate  spinal canal stenosis. There is no foraminal narrowing on either side.     C6-C7: There is facet arthropathy bilaterally, uncinate hypertrophy  bilaterally and a posterior broad-based disc-osteophyte complex with a  superimposed tiny posterior broad-based disc herniation (contusion).  These findings result in minimal spinal canal narrowing but no  foraminal stenosis on either side.     C7-T1: There is no spinal canal or neural foraminal stenosis at this  level.         IMPRESSION: Degenerative changes of the cervical spine as described  above.     JACKIE MORIN MD    Patient's conditions were thoroughly discussed during today's visit with greater than 50% of the visit spent counseling the patient with total time spent face-to-face with the patient being 25 minutes.    Rupert Pantoja DO [facility]

## 2017-05-01 NOTE — NURSING NOTE
"Chief Complaint   Patient presents with     Musculoskeletal Problem       Initial /82  Ht 4' 10\" (1.473 m)  Wt 118 lb (53.5 kg)  BMI 24.66 kg/m2 Estimated body mass index is 24.66 kg/(m^2) as calculated from the following:    Height as of this encounter: 4' 10\" (1.473 m).    Weight as of this encounter: 118 lb (53.5 kg).  Medication Reconciliation: complete     Marvin Zepeda ATC    "

## 2017-05-01 NOTE — PATIENT INSTRUCTIONS
Thank you for allowing us to participate in your care today.  Please find below your visit diagnosis and the plan going forward.    1. Cervicalgia    2. Rotator cuff syndrome of right shoulder    3. Cervical herniated disc    4. Myofascial pain syndrome, cervical    5. Right cervical radiculopathy      Discussed your exam and in my opinion, injections/interventions should be focused around your neck rather than your shoulder  Reviewed your MRI which shows a chronic tendinosis  Would recommend moving forward with already ordered cervical epidural Steroid injection that was recommend by Neurosurgery  Referral is made to Pain Management for comprehensive evaluation and management of your chronic neck and myofascial pain    Follow up as needed. Call direct clinic number [[phone]] at any time with questions or concerns.    Rupert Pantoja DO CAQSM  [facility]  Website: [email or web address]  Twitter: [email or web address]

## 2017-05-01 NOTE — MR AVS SNAPSHOT
After Visit Summary   5/1/2017    Zo Qureshi    MRN: [id number]           Patient Information     Date Of Birth          1975        Visit Information        Provider Department      5/1/2017 11:00 AM Rupert Pantoja DO [facility]        Today's Diagnoses     Cervicalgia    -  1    Rotator cuff syndrome of right shoulder        Cervical herniated disc        Myofascial pain syndrome, cervical        Right cervical radiculopathy          Care Instructions    Thank you for allowing us to participate in your care today.  Please find below your visit diagnosis and the plan going forward.    1. Cervicalgia    2. Rotator cuff syndrome of right shoulder    3. Cervical herniated disc    4. Myofascial pain syndrome, cervical    5. Right cervical radiculopathy      Discussed your exam and in my opinion, injections/interventions should be focused around your neck rather than your shoulder  Reviewed your MRI which shows a chronic tendinosis  Would recommend moving forward with already ordered cervical epidural Steroid injection that was recommend by Neurosurgery  Referral is made to Pain Management for comprehensive evaluation and management of your chronic neck and myofascial pain    Follow up as needed. Call direct clinic number [[phone]] at any time with questions or concerns.    Rupert Pantoja DO CAQSM  [facility]  Website: [email or web address]  Twitter: [email or web address]          Follow-ups after your visit        Additional Services     PAIN MANAGEMENT CENTER ([place]) REFERRAL       Your provider has referred you to the [facility].    Reason for Referral: Comprehensive Evaluation and Management    Please complete the following questions:    What is your diagnosis for the patient's pain? Cervical herniation with radic, myofascial pain syndrome    Do you have any specific questions for the pain specialist? No    Are  there any red flags that may impact the assessment or management of the patient? Trying to get this covered by work comp    **ANY DIAGNOSTIC TESTS THAT ARE NOT IN EPIC SHOULD BE SENT TO THE PAIN CENTER**    Please note the Pre-Op Pain Consult must be scheduled 2-3 weeks prior to the patient's surgery.  Patient's trying to schedule within 2 weeks of surgery may not be accommodated.     Pre-Op Pain Consults are only good for 30 days.    REGARDING OPIOID MEDICATIONS:  We will always address appropriateness of opioid pain medications, but we generally will not automatically take on a prescribing role. When we do take on prescribing of opioids for chronic pain, it is in collaboration with the referring physician for an intermediate period of time (months), with an expectation that the primary physician or provider will assume the prescribing role if medications are effective at stable doses with demonstrated compliance.  Therefore, please do not assume that your prescribing responsibilities end on the day of pain clinic consultation.  Is this agreeable to you? YES    For any questions, contact the [facility] at [phone].    Please be aware that coverage of these services is subject to the terms and limitations of your health insurance plan.  Call member services at your health plan with any benefit or coverage questions.      Please bring the following with you to your appointment:    (1) Any X-Rays, CTs or MRIs which have been performed.  Contact the facility where they were done to arrange for  prior to your scheduled appointment.    (2) List of current medications   (3) This referral request   (4) Any documents/labs given to you for this referral                  Your next 10 appointments already scheduled     May 04, 2017 11:00 AM TITI Villavicencio with Shanon VILLAVICENCIO ([facility]  )    [address]  [address] [place] [address]   [phone]         "    May 08, 2017 11:00 AM CDT   [facility] with Terri Baron MD   [facility]    [address] [facility] [address] [phone]            May 10, 2017  9:00 AM CDT   [facility] with Shanon De La O   [facility] ([facility]  )    [address]  [address]  [facility] [address] [phone]            May 15, 2017 11:20 AM CDT   [facility] Spine with Caleb BENEDICT Pola   [facility] ([facility]  )    [address]  [address]  [facility] [address]   [phone]              Who to contact     If you have questions or need follow up information about today's clinic visit or your schedule please contact [facility] directly at [phone].  Normal or non-critical lab and imaging results will be communicated to you by MyChart, letter or phone within 4 business days after the clinic has received the results. If you do not hear from us within 7 days, please contact the clinic through [employer] or phone. If you have a critical or abnormal lab result, we will notify you by phone as soon as possible.  Submit refill requests through [employer] or call your pharmacy and they will forward the refill request to us. Please allow 3 business days for your refill to be completed.          Additional Information About Your Visit        [employer] Information     [employer] lets you send messages to your doctor, view your test results, renew your prescriptions, schedule appointments and more. To sign up, go to [email or web address] . Click on \"Log in\" on the left side of the screen, which will take you to the Welcome page. Then click on \"Sign up Now\" on the right side of the page.     You will be asked to enter the access code listed below, as well as some personal information. Please follow the directions to create your username and password.     Your access code is: [email or web address]  Expires: 7/9/2017 11:52 AM     Your access code will " " in 90 days. If you need help or a new code, please call your [facility] clinic or [phone].        Care EveryWhere ID     This is your Care EveryWhere ID. This could be used by other organizations to access your [place] medical records  [id number]        Your Vitals Were     Height BMI (Body Mass Index)                4' 10\" (1.473 m) 24.66 kg/m2           Blood Pressure from Last 3 Encounters:   17 122/82   17 116/62   17 128/78    Weight from Last 3 Encounters:   17 118 lb (53.5 kg)   17 119 lb 3.2 oz (54.1 kg)   17 117 lb (53.1 kg)              We Performed the Following     PAIN MANAGEMENT CENTER ([place]) REFERRAL        Primary Care Provider Office Phone # Fax #    Terri Baron [phone]       [facility] [id number] [place] [address]        Thank you!     Thank you for choosing [facility]  for your care. Our goal is always to provide you with excellent care. Hearing back from our patients is one way we can continue to improve our services. Please take a few minutes to complete the written survey that you may receive in the mail after your visit with us. Thank you!             Your Updated Medication List - Protect others around you: Learn how to safely use, store and throw away your medicines at www.disposemymeds.org.          This list is accurate as of: 17 11:59 AM.  Always use your most recent med list.                   Brand Name Dispense Instructions for use    benzonatate 100 MG capsule    TESSALON    42 capsule    Take 1 capsule (100 mg) by mouth 3 times daily as needed       clindamycin-benzoyl peroxide gel    BENZACLIN    50 g    Apply to  Affected area initially once daily for 2 weeks and then increase to 2 times daily if tolerated       docusate sodium 100 MG tablet    COLACE    60 tablet    Take 100 mg by mouth daily       fluticasone 50 MCG/ACT spray    FLONASE    16 g    Spray " 1-2 sprays into both nostrils daily       loratadine 10 MG tablet    CLARITIN    30 tablet    Take 1 tablet (10 mg) by mouth daily       PATADAY 0.2 % Soln   Generic drug:  olopatadine HCl      Reported on 5/1/2017       topiramate 25 MG tablet    TOPAMAX    60 tablet    Take 1 tablet (25 mg) at bedtime for 1 week, then 1 tablet twice daily       traMADol 50 MG tablet    ULTRAM    40 tablet    Take 1-2 tablets ( mg) by mouth every 6 hours as needed for moderate pain

## 2017-05-04 ENCOUNTER — THERAPY VISIT (OUTPATIENT)
Dept: OCCUPATIONAL THERAPY | Facility: CLINIC | Age: 42
End: 2017-05-04
Payer: OTHER MISCELLANEOUS

## 2017-05-04 DIAGNOSIS — R29.898 MECHANICAL PROBLEMS WITH LIMBS: ICD-10-CM

## 2017-05-04 DIAGNOSIS — R20.2 PARESTHESIA OF BOTH HANDS: ICD-10-CM

## 2017-05-04 DIAGNOSIS — M79.644 FINGER PAIN, RIGHT: ICD-10-CM

## 2017-05-04 PROCEDURE — 97140 MANUAL THERAPY 1/> REGIONS: CPT | Mod: GO | Performed by: OCCUPATIONAL THERAPIST

## 2017-05-04 PROCEDURE — 97112 NEUROMUSCULAR REEDUCATION: CPT | Mod: GO | Performed by: OCCUPATIONAL THERAPIST

## 2017-05-08 ENCOUNTER — OFFICE VISIT (OUTPATIENT)
Dept: FAMILY MEDICINE | Facility: CLINIC | Age: 42
End: 2017-05-08
Payer: OTHER MISCELLANEOUS

## 2017-05-08 VITALS
SYSTOLIC BLOOD PRESSURE: 112 MMHG | BODY MASS INDEX: 24.35 KG/M2 | HEART RATE: 67 BPM | WEIGHT: 116 LBS | RESPIRATION RATE: 16 BRPM | OXYGEN SATURATION: 99 % | HEIGHT: 58 IN | TEMPERATURE: 97.5 F | DIASTOLIC BLOOD PRESSURE: 64 MMHG

## 2017-05-08 DIAGNOSIS — M25.511 ACUTE PAIN OF BOTH SHOULDERS: ICD-10-CM

## 2017-05-08 DIAGNOSIS — M25.512 ACUTE PAIN OF BOTH SHOULDERS: ICD-10-CM

## 2017-05-08 PROCEDURE — 99213 OFFICE O/P EST LOW 20 MIN: CPT | Performed by: FAMILY MEDICINE

## 2017-05-08 RX ORDER — TRAMADOL HYDROCHLORIDE 50 MG/1
50-100 TABLET ORAL EVERY 6 HOURS PRN
Qty: 40 TABLET | Refills: 0 | Status: SHIPPED | OUTPATIENT
Start: 2017-05-08 | End: 2017-07-05

## 2017-05-08 NOTE — NURSING NOTE
"Chief Complaint   Patient presents with     Work Comp     Follow-up, pt states neck pain is alittle better- she has been resting and doing PT. pt states last weeks she had an episode of sharp pain at the back of head, left side. Patient states right shoulder pain is still bad and left leg and hip is still having episodes of numbness.        Initial /64 (BP Location: Right arm, Patient Position: Chair, Cuff Size: Adult Regular)  Pulse 67  Temp 97.5  F (36.4  C) (Oral)  Resp 16  Ht 4' 10\" (1.473 m)  Wt 116 lb (52.6 kg)  SpO2 99%  BMI 24.24 kg/m2 Estimated body mass index is 24.24 kg/(m^2) as calculated from the following:    Height as of this encounter: 4' 10\" (1.473 m).    Weight as of this encounter: 116 lb (52.6 kg).  Medication Reconciliation: complete     William Mixon CMA      "

## 2017-05-08 NOTE — MR AVS SNAPSHOT
After Visit Summary   5/8/2017    Zo Qureshi    MRN: [id number]           Patient Information     Date Of Birth          1975        Visit Information        Provider Department      5/8/2017 11:00 AM Terri Baron MD [facility]        Today's Diagnoses     Acute pain of both shoulders           Follow-ups after your visit        Your next 10 appointments already scheduled     May 10, 2017  9:00 AM CDT   [facility] Hand with Shanon De La O   [facility] ([facility]  )    [address]  [address]  [facility] [address]   [phone]            May 15, 2017 11:20 AM CDT   [facility] Spine with Caleb BENEDICT Pola   [facility] ([facility]  )    [address]  [address]  [facility] [address]   [phone]              Who to contact     If you have questions or need follow up information about today's clinic visit or your schedule please contact [facility] directly at [phone].  Normal or non-critical lab and imaging results will be communicated to you by [employer], letter or phone within 4 business days after the clinic has received the results. If you do not hear from us within 7 days, please contact the clinic through [employer] or phone. If you have a critical or abnormal lab result, we will notify you by phone as soon as possible.  Submit refill requests through [employer] or call your pharmacy and they will forward the refill request to us. Please allow 3 business days for your refill to be completed.          Additional Information About Your Visit        [employer] Information     [employer] gives you secure access to your electronic health record. If you see a primary care provider, you can also send messages to your care team and make appointments. If you have questions, please call your primary care clinic.  If you do not have a primary care provider, please call [phone] and they will assist you.        Care EveryWhere ID     This is your  "Care EveryWhere ID. This could be used by other organizations to access your [place] medical records  [id number]        Your Vitals Were     Pulse Temperature Respirations Height Pulse Oximetry BMI (Body Mass Index)    67 97.5  F (36.4  C) (Oral) 16 4' 10\" (1.473 m) 99% 24.24 kg/m2       Blood Pressure from Last 3 Encounters:   05/08/17 112/64   05/01/17 122/82   04/19/17 116/62    Weight from Last 3 Encounters:   05/08/17 116 lb (52.6 kg)   05/01/17 118 lb (53.5 kg)   04/19/17 119 lb 3.2 oz (54.1 kg)              Today, you had the following     No orders found for display         Where to get your medicines      Some of these will need a paper prescription and others can be bought over the counter.  Ask your nurse if you have questions.     Bring a paper prescription for each of these medications     traMADol 50 MG tablet          Primary Care Provider Office Phone # Fax #    Terri Baron [phone]       [facility] [address] [facility] [address]        Thank you!     Thank you for choosing [facility]  for your care. Our goal is always to provide you with excellent care. Hearing back from our patients is one way we can continue to improve our services. Please take a few minutes to complete the written survey that you may receive in the mail after your visit with us. Thank you!             Your Updated Medication List - Protect others around you: Learn how to safely use, store and throw away your medicines at www.disposemymeds.org.          This list is accurate as of: 5/8/17 11:39 AM.  Always use your most recent med list.                   Brand Name Dispense Instructions for use    clindamycin-benzoyl peroxide gel    BENZACLIN    50 g    Apply to  Affected area initially once daily for 2 weeks and then increase to 2 times daily if tolerated       docusate sodium 100 MG tablet    COLACE    60 tablet    Take 100 mg by mouth daily       fluticasone 50 " MCG/ACT spray    FLONASE    16 g    Spray 1-2 sprays into both nostrils daily       loratadine 10 MG tablet    CLARITIN    30 tablet    Take 1 tablet (10 mg) by mouth daily       PATADAY 0.2 % Soln   Generic drug:  olopatadine HCl      Reported on 5/1/2017       topiramate 25 MG tablet    TOPAMAX    60 tablet    Take 1 tablet (25 mg) at bedtime for 1 week, then 1 tablet twice daily       traMADol 50 MG tablet    ULTRAM    40 tablet    Take 1-2 tablets ( mg) by mouth every 6 hours as needed for moderate pain

## 2017-05-08 NOTE — LETTER
[facility]  [address] [place], [address]  [phone]        May 8, 2017    RE: Zo Qureshi                                                                                                                                                       [address] [facility] [address]            To Whom It May Concern,   Zo Qureshi is a patient of mine.   She has now seen another specialist who feels her injury is best treated with a course of injections to the neck.   She will receive these over the time of the next 2 months.   She will continue her therapy for her wrist as well.   She will need the next 2 months off in order to see if this will help her function return.   If not, she may move into more long term disability.             Sincerely,    Terri Robles M.D.

## 2017-05-08 NOTE — PROGRESS NOTES
SUBJECTIVE:  Zo Qureshi is a 42 year old female who presents for recheck on her right shoulder and wrist.   She continues PT on her hand and wrist once per week.   PT is helping some and the rest from work is helping.    She continues to have symptoms though is she tried to use her arm for any hard work even at home.   Her shoulder was dx with severe tendonitis by MRI and she saw orthopedics last week.    Dr. Pantoja thought the best route was to start with injections of the neck though due to a disc herniation found on MRI 2/2017.   She felt that the symptoms on the right side may all be worse to to this.   She has set up her first injection for next week.   She continues PT.       Past Medical History:   Diagnosis Date     HSIL on Pap smear 09/21/11    MARTA 2 and 3     INFLAM SPONDYLOPATHY NOS   1/7/2008     Leukocytopenia 3/10/2014     Tendinosis      Thrombocytopenia (H) 3/10/2014     Unspecified closed fracture of pelvis 2000    left fracture of pelvis after delivery       Past Surgical History:   Procedure Laterality Date     C NONSPECIFIC PROCEDURE  10/00    after delivery 2 units of prbcs secondary to placenta     LEEP TX, CERVICAL  12/19/11    MARTA II     TUBAL LIGATION  5/2009    laparoscopic tubal ligation       MEDICATIONS:  Current Outpatient Prescriptions   Medication     fluticasone (FLONASE) 50 MCG/ACT spray     docusate sodium (COLACE) 100 MG tablet     topiramate (TOPAMAX) 25 MG tablet     traMADol (ULTRAM) 50 MG tablet     loratadine (CLARITIN) 10 MG tablet     clindamycin-benzoyl peroxide (BENZACLIN) gel     PATADAY 0.2 % SOLN     No current facility-administered medications for this visit.        SOCIAL HISTORY:  Social History   Substance Use Topics     Smoking status: Never Smoker     Smokeless tobacco: Never Used     Alcohol use 0.0 oz/week     0 Standard drinks or equivalent per week      Comment: occasionally       Family History   Problem Relation Age of Onset     Hypertension Father       "Lipids Father      Hypertension Mother      Lipids Mother      DIABETES No family hx of      Coronary Artery Disease No family hx of      Hyperlipidemia No family hx of      CEREBROVASCULAR DISEASE No family hx of      Breast Cancer No family hx of      Colon Cancer No family hx of      Prostate Cancer No family hx of      Other Cancer No family hx of      Depression No family hx of      Anxiety Disorder No family hx of      MENTAL ILLNESS No family hx of      Substance Abuse No family hx of      Anesthesia Reaction No family hx of      Asthma No family hx of      OSTEOPOROSIS No family hx of      Genetic Disorder No family hx of      Thyroid Disease No family hx of      Obesity No family hx of      Unknown/Adopted No family hx of        Objective:  Blood pressure 112/64, pulse 67, temperature 97.5  F (36.4  C), temperature source Oral, resp. rate 16, height 4' 10\" (1.473 m), weight 116 lb (52.6 kg), SpO2 99 %, not currently breastfeeding.  Neck:  There is no lymphadenopathy or thyroid tenderness or enlargement  Chest: Clear to auscultation bilaterally.  No wheezes, rales or retractions.  CV: Regular rate and rhythm without murmurs, rubs or gallops.      Assessment:  1. Right wrist tendonitis  2. Right shoulder tendonitis  3. Right sided cervical disc herniation at C5-6      Plan:  1. Has neck injections with [facility] starting next week  2. Continue PT for wrist  3. Come back in 2 months  4. She may not be able to return to the kind of work she was doing - the repetitive work - especially if she is not better in 2 months      "

## 2017-05-10 ENCOUNTER — THERAPY VISIT (OUTPATIENT)
Dept: OCCUPATIONAL THERAPY | Facility: CLINIC | Age: 42
End: 2017-05-10
Payer: OTHER MISCELLANEOUS

## 2017-05-10 DIAGNOSIS — R20.2 PARESTHESIA OF BOTH HANDS: ICD-10-CM

## 2017-05-10 DIAGNOSIS — R29.898 MECHANICAL PROBLEMS WITH LIMBS: ICD-10-CM

## 2017-05-10 DIAGNOSIS — M79.644 FINGER PAIN, RIGHT: ICD-10-CM

## 2017-05-10 PROCEDURE — 97112 NEUROMUSCULAR REEDUCATION: CPT | Mod: GO | Performed by: OCCUPATIONAL THERAPIST

## 2017-05-10 PROCEDURE — 97140 MANUAL THERAPY 1/> REGIONS: CPT | Mod: GO | Performed by: OCCUPATIONAL THERAPIST

## 2017-05-15 ENCOUNTER — THERAPY VISIT (OUTPATIENT)
Dept: PHYSICAL THERAPY | Facility: CLINIC | Age: 42
End: 2017-05-15
Payer: OTHER MISCELLANEOUS

## 2017-05-15 DIAGNOSIS — M54.50 BILATERAL LOW BACK PAIN WITHOUT SCIATICA: ICD-10-CM

## 2017-05-15 PROCEDURE — 97110 THERAPEUTIC EXERCISES: CPT | Mod: GP | Performed by: PHYSICAL THERAPIST

## 2017-05-15 PROCEDURE — 97140 MANUAL THERAPY 1/> REGIONS: CPT | Mod: GP | Performed by: PHYSICAL THERAPIST

## 2017-05-15 NOTE — PROGRESS NOTES
Subjective:    HPI                    Objective:    System    Physical Exam    General     ROS    Assessment/Plan:      PROGRESS  REPORT    Progress reporting period is from 2017 to 5/15/2017.     NOTE: Sessions since the initial evaluation have focused on neck and upper extremity symptoms which are the main complaint of the patient. Progress there has slowed and the patient is due for an injection in the neck. This is the first session dedicated to the low back since the evaluation.     SUBJECTIVE  Subjective changes noted by patient:  The back is still bothersome.     Current Pain level: 7/10.     Changes in function:  None  Adverse reaction to treatment or activity: None    OBJECTIVE  Changes noted in objective findings:  Yes, see below    AROM: (Major, Moderate, Minimal or Nil loss)  Movement Loss Cameron Mod Min Nil Pain   Flexion x       Extension x       Side Bend L  x      Side Bend R  x        Dural Signs:   R L   Slump (-) (-)   SLR (-) (+)     Repeated movement testing:   (During: produces, abolishes, increases, decreases, no effect, centralizing, peripheralizing; After: better, worse, no better, no worse, no effect, centralized, peripheralized)    Pre-test Symptoms Lyin/10    Symptoms During Symptoms After ROM increased ROM decreased No Effect   SHAYY        Rep SHAYY        Prone Lying same same      LUPE better Better, but can't stay      EIL better Worse (in shoulders and hands)      Rep EIL          Palpation: tenderness of the anterior thigh bilaterally    Other Tests:   - Martinez test positive bilaterally  - Hip Ext: 3+/5 bilaterally        ASSESSMENT/PLAN  Updated problem list and treatment plan:   Diagnosis 1: Low back pain with mobility impairments Pain - electric stimulation, manual therapy, self management, education and directional preference exercise  Decreased ROM/flexibility - manual therapy and therapeutic exercise  Impaired muscle performance - neuro re-education  Decreased function -  therapeutic activities  Impaired posture - neuro re-education  STG/LTGs have been met or progress has been made towards goals:  None  Assessment of Progress: The patient's condition has potential to improve.  Self Management Plans:  Patient has been instructed in a home treatment program.  I have re-evaluated this patient and find that the nature, scope, duration and intensity of the therapy is appropriate for the medical condition of the patient.  Zo continues to require the following intervention to meet STG and LTG's:  PT    Recommendations:  This patient would benefit from continued therapy.     Frequency:  1 X week, once daily  Duration:  for 3 weeks tapering to 2 X a month over 4 weeks        Please refer to the daily flowsheet for treatment today, total treatment time and time spent performing 1:1 timed codes.

## 2017-05-16 ENCOUNTER — TRANSFERRED RECORDS (OUTPATIENT)
Dept: HEALTH INFORMATION MANAGEMENT | Facility: CLINIC | Age: 42
End: 2017-05-16

## 2017-05-24 ENCOUNTER — THERAPY VISIT (OUTPATIENT)
Dept: OCCUPATIONAL THERAPY | Facility: CLINIC | Age: 42
End: 2017-05-24
Payer: OTHER MISCELLANEOUS

## 2017-05-24 DIAGNOSIS — R29.898 MECHANICAL PROBLEMS WITH LIMBS: ICD-10-CM

## 2017-05-24 DIAGNOSIS — M79.644 FINGER PAIN, RIGHT: ICD-10-CM

## 2017-05-24 DIAGNOSIS — R20.2 PARESTHESIA OF BOTH HANDS: ICD-10-CM

## 2017-05-24 PROCEDURE — 97110 THERAPEUTIC EXERCISES: CPT | Mod: GO | Performed by: OCCUPATIONAL THERAPIST

## 2017-05-24 PROCEDURE — 97112 NEUROMUSCULAR REEDUCATION: CPT | Mod: GO | Performed by: OCCUPATIONAL THERAPIST

## 2017-05-24 PROCEDURE — 97140 MANUAL THERAPY 1/> REGIONS: CPT | Mod: GO | Performed by: OCCUPATIONAL THERAPIST

## 2017-05-24 NOTE — PROGRESS NOTES
Hand Therapy Progress Note  Current Date:  5/24/17  Reporting Period: 4/18/17 to 5/24/17    S:  Subjective changes as noted by patient: My shoulder really hurts and it goes down into my hand.  The numbness and tingling is still there but it's on and off.  Functional changes noted by patient: When I am in pain I don't do my daily tasks.  I can do light chores around the house.    Response to previous treatment: It felt better.     Patient has noted adverse reaction to:   None     Upper Extremity Functional Index Score:  SCORE:   Column Totals: /80: 50   (A lower score indicates greater disability.)       Objective:  Sensation:  Intermittant numbness and tingling bilaterally. Most prominent in R index, middle, and ring finger, and also along R anterior forearm. 3/17/17: Intermittant numbness and tingling bilaterally. Most prominent in R index, middle, and ring finger, and also along R anterior forearm.  4/18/17: Numbness and tingling in index, long, and ring fingers bilaterally. 5/24/17:  N/T in IF, LF, and RF intermittently.     Pain Level Report: On scale 0-10/10  Date 3/17/17 3/17/17 4/18/17 4/18/17 5/24/17 5/24/17   side R L R L R L   Overall 7 (no meds) 3-4 5 4 7 6   At Rest 4-5 3-4 4-5 4-5 6 6   With Activity 7 6 7 6 8 7     Primary Report: location and description  Date 3/17/17 3/17/17 4/18/17 4/18/17 5/24/17   Side R L R L B   Location Dorsal wrist, and Ant. Forearm, and first three fingers Ant. Forearm and first 3 fingers First three fingers, flexor wad, thumb  web space First three fingers, thumb web space MEP, ulnar volar forearm, IF, LF, RF   Radiation R shoulder shooting down to hand (more than left) shoulder shooting down to hand Shoulder shooting down to hand Shoulder shooting down to hand Shoulder shooting down to hand   Pain Quality Pinching  Pinching  Pinching, tightness Pinching, tightness Pinching, twitching   Frequency Constant Constant Constant Constant Constant   Duration Worse with activity  Worse with activity Worse with activity Worse with activity Worse with activity   Exacerbated by Wrist flexion, finger flexion (more stiff and painful than L) Wrist flexion, finger flexion General use  General use General use   Relieved by Rest, heat Rest, heat Pain meds, rest Pain meds, rest Pain meds, rest, heat pad, massage   Progression since onset Gradually getting better Gradually getting better Gradually getting better Gradually getting better Gradually getting better     Range of Motion Wrist AROM (PROM):  Date 3/17/17 3/17/17 4/18/17 4/18/17 5/24/17 5/24/17   Side R L R L R L   Ext 65 67 65 65 55 64   Flex 36 45 34 31 29 29   UD 25 25 29 19 20 25   RD 19 15 15 13 5 7     Special Tests:  Date 3/17/17 3/17/17 4/18/17 4/18/17 5/24/17 5/24/17   Side R L R L R L   Phalens + at ~10 sec - + at 10 sec - at 30 sec but fatigue present +@20 sec -   Tinels at CT   + + + +   Tinels at Pronator   NT NT NT NT   Median Nerve ULTT ~15% ~15% ~20% ~25% ~25% ~25%   Paresthesias + + + + + +       STRENGTH: (Measured in pounds, pain scale 0-10/10)    Date 2/7/2017 3/17/17 4/18/17 5/24/17     Trials Left Right Left Right Left Right Left Right Left Right Left Right   1 25 6 10 8 20 16 15 10       2               3               Avg               Pain 0 7 6 8 6 6 5 6         3 Point Pinch  Date 2/7/2017 3/17/17 4/18/17 5/24/17     Trials Left Right Left Right Left Right Left Right Left Right Left Right   1 9 4 4 4 4 3 2 2       2               3               Avg               Pain 0 6 6 6-7 2 2 3 5         Lateral Pinch  Date 2/7/2017 3/17/17 4/18/17 5/24/17     Trials Left Right Left Right Left Right Left Right Left Right Left Right   1 10 6 6 6 4 3 4 4       2               3               Avg               Pain 0 6 0 7 1 2 0 0         Assessment:  Response to therapy has been improvement to:  Paresthesias:  Median nerve - less intense numbness and tingling  Response to therapy has been lack of progress in:  ROM of Wrist:   All Planes  Strength:   and pinch  Pain:  No change     Overall Assessment:  Patient is not progressing as expected.  Patient would benefit from continued therapy to achieve rehab potential  STG/LTG:  STGoals have been reviewed;  see goal sheet for details and updates.  LTGoals have been reviewed;  see goal sheet for details and updates.    I have re-evaluated this patient and find that the nature, scope, duration and intensity of the therapy is appropriate for the medical condition of the patient.      P: Frequency:  1 X week, once daily  Duration:  for 4 weeks    Treatment Plan:    Modalities:  None   Therapeutic Exercise: Tendon Gliding ex, blocking  Neuromuscular Techniques: Median nerve glides both active and passive (in the clinic)  Manual Techniques:, Myofascial release of the forearm extensors and flexors, wrist mobilization techniques  Orthosis:  Wrist in netural orthosis for night wear.    Home Program:   Orthosis: Static orthosis and Forearm based orthosis Wrist in neutral orthosis at night.    Activity: Avoid activities that exacerbate symptoms in the median nerve.  Avoid prolonged flexion or extension of the wrist.    Discharge Plan:    Achieve all LTG.  Independent in home treatment program.  Reach maximal therapeutic benefit.    Next Visit:  MFR  Ergonomics education  Nerve gliding

## 2017-05-26 ENCOUNTER — THERAPY VISIT (OUTPATIENT)
Dept: PHYSICAL THERAPY | Facility: CLINIC | Age: 42
End: 2017-05-26
Payer: OTHER MISCELLANEOUS

## 2017-05-26 DIAGNOSIS — M54.50 BILATERAL LOW BACK PAIN WITHOUT SCIATICA: ICD-10-CM

## 2017-05-26 DIAGNOSIS — R29.898 WEAKNESS OF SHOULDER: ICD-10-CM

## 2017-05-26 DIAGNOSIS — M54.12 CERVICAL RADICULOPATHY: ICD-10-CM

## 2017-05-26 DIAGNOSIS — M25.511 BILATERAL SHOULDER PAIN: ICD-10-CM

## 2017-05-26 DIAGNOSIS — M25.512 BILATERAL SHOULDER PAIN: ICD-10-CM

## 2017-05-26 PROCEDURE — 97140 MANUAL THERAPY 1/> REGIONS: CPT | Mod: GP | Performed by: PHYSICAL THERAPIST

## 2017-05-26 PROCEDURE — 97110 THERAPEUTIC EXERCISES: CPT | Mod: GP | Performed by: PHYSICAL THERAPIST

## 2017-05-31 ENCOUNTER — THERAPY VISIT (OUTPATIENT)
Dept: OCCUPATIONAL THERAPY | Facility: CLINIC | Age: 42
End: 2017-05-31
Payer: OTHER MISCELLANEOUS

## 2017-05-31 DIAGNOSIS — M79.644 FINGER PAIN, RIGHT: Primary | ICD-10-CM

## 2017-05-31 DIAGNOSIS — R29.898 MECHANICAL PROBLEMS WITH LIMBS: ICD-10-CM

## 2017-05-31 DIAGNOSIS — R20.2 PARESTHESIA OF BOTH HANDS: ICD-10-CM

## 2017-05-31 PROCEDURE — 97140 MANUAL THERAPY 1/> REGIONS: CPT | Mod: GO | Performed by: OCCUPATIONAL THERAPIST

## 2017-05-31 PROCEDURE — 97112 NEUROMUSCULAR REEDUCATION: CPT | Mod: GO | Performed by: OCCUPATIONAL THERAPIST

## 2017-06-06 ENCOUNTER — THERAPY VISIT (OUTPATIENT)
Dept: PHYSICAL THERAPY | Facility: CLINIC | Age: 42
End: 2017-06-06
Payer: OTHER MISCELLANEOUS

## 2017-06-06 DIAGNOSIS — M54.50 BILATERAL LOW BACK PAIN WITHOUT SCIATICA: ICD-10-CM

## 2017-06-06 PROCEDURE — 97140 MANUAL THERAPY 1/> REGIONS: CPT | Mod: GP | Performed by: PHYSICAL THERAPIST

## 2017-06-06 PROCEDURE — 97110 THERAPEUTIC EXERCISES: CPT | Mod: GP | Performed by: PHYSICAL THERAPIST

## 2017-06-09 ENCOUNTER — RADIOLOGY INJECTION OFFICE VISIT (OUTPATIENT)
Dept: PALLIATIVE MEDICINE | Facility: CLINIC | Age: 42
End: 2017-06-09
Payer: OTHER MISCELLANEOUS

## 2017-06-09 ENCOUNTER — RADIANT APPOINTMENT (OUTPATIENT)
Dept: GENERAL RADIOLOGY | Facility: CLINIC | Age: 42
End: 2017-06-09
Attending: PHYSICAL MEDICINE & REHABILITATION

## 2017-06-09 VITALS — SYSTOLIC BLOOD PRESSURE: 128 MMHG | HEART RATE: 88 BPM | DIASTOLIC BLOOD PRESSURE: 79 MMHG | OXYGEN SATURATION: 97 %

## 2017-06-09 DIAGNOSIS — M54.12 CERVICAL RADICULOPATHY: Primary | ICD-10-CM

## 2017-06-09 DIAGNOSIS — M54.2 CERVICALGIA: ICD-10-CM

## 2017-06-09 DIAGNOSIS — M47.812 CERVICAL SPONDYLOSIS WITHOUT MYELOPATHY: ICD-10-CM

## 2017-06-09 PROCEDURE — A9585 GADOBUTROL INJECTION: HCPCS | Mod: JW | Performed by: PHYSICAL MEDICINE & REHABILITATION

## 2017-06-09 PROCEDURE — 62321 NJX INTERLAMINAR CRV/THRC: CPT | Performed by: PHYSICAL MEDICINE & REHABILITATION

## 2017-06-09 ASSESSMENT — PAIN SCALES - GENERAL: PAINLEVEL: SEVERE PAIN (6)

## 2017-06-09 NOTE — PROGRESS NOTES
[facility] - Procedure Note    Date of Visit: 6/9/2017    Procedure performed: C7-T1 interlaminar epidural steroid injection with fluoroscopic guidance  Diagnosis: Cervical spondylosis; Cervical radiculitis/radiculopathy  : Ngozi Wilson DO  Anesthesia: none    Indications: Zo Qureshi is a 42 year old female who is seen at the request of Dr. Pantoja for cervical epidural steroid injection. The patient describes neck pain that radiates down both arms. The patient has been exhibiting symptoms consistent with cervical intraspinal inflammation and radiculopathy. Symptoms have been persistent, disabling, and intermittently severe. The patient reports minimal improvement with conservative treatment, including physical therapy and pain medications.    Cervical MRI was done on 2/9/17 which showed   FINDINGS: There is normal alignment of the cervical vertebrae;  however, there is reversal of normal cervical lordosis centered at the  C4 level. Vertebral body heights of the cervical spine are normal.  Marrow signal throughout the cervical vertebrae is within normal  limits. There is no evidence for fracture or pathologic bony lesion of  the cervical spine.      There is loss of disc height, disc desiccation and posterior disc  bulging/herniation at the C5-C6 and C6-C7 levels. The cervical  intervertebral discs are otherwise within normal limits in height,  contour and signal intensity.     The cervical spinal cord is mildly deformed by degenerative changes at  the C5-C6 level. The cervical spinal cord is otherwise normal in  contour. There is no abnormal signal in the cervical spinal cord.  There is no evidence for intrathecal abnormality.     Level by level:     C2-C3: There is no spinal canal or neural foraminal stenosis at this  level.     C3-C4: There is no spinal canal or neural foraminal stenosis at this  level.     C4-C5: There is no spinal canal or neural foraminal stenosis at  this  level.     C5-C6: There is facet arthropathy bilaterally, uncinate hypertrophy  bilaterally and a posterior broad-based disc-osteophyte complex with a  superimposed small posterior central/right paracentral disc herniation  (protrusion). The herniated disc material mildly indents the right  anterior surface of the cervical spinal cord and results in moderate  spinal canal stenosis. There is no foraminal narrowing on either side.     C6-C7: There is facet arthropathy bilaterally, uncinate hypertrophy  bilaterally and a posterior broad-based disc-osteophyte complex with a  superimposed tiny posterior broad-based disc herniation (contusion).  These findings result in minimal spinal canal narrowing but no  foraminal stenosis on either side.     C7-T1: There is no spinal canal or neural foraminal stenosis at this  level.         IMPRESSION: Degenerative changes of the cervical spine as described  above.    Allergies:      Allergies   Allergen Reactions     Advil [Ibuprofen Micronized]      Rash      Contrast Dye      Percocet [Codeine] Nausea and Vomiting and Itching        Vitals:  /78  Pulse 81  SpO2 97%  Breastfeeding? No    Review of Systems: The patient denies recent fever, chills, illness, use of antibiotics or anticoagulants. All other 10-point review of systems negative.     Procedure: The procedure and risks were explained, and informed written consent was obtained from the patient. Risks include but are not limited to: infection, bleeding, increased pain, and damage to soft tissue, nerve, muscle, and vasculature structures. After getting informed consent, patient was brought into the procedure suite and was placed in a prone position on the procedure table. A Pause for the Cause was performed. Patient was prepped and draped in sterile fashion.     The C7-T1 interspace was identified with use of fluoroscopy in AP view. A 25-gauge, 1.5 inch needle was used to anesthetize the skin and subcutaneous  tissue entry site with a total of 2 ml of 1% lidocaine. Under fluoroscopic visualization, a 22-gauge, 3.5 inch Tuohy epidural needle was slowly advanced towards the epidural space a few millimeters left of midline. The latter part of the needle advancement was guided with fluoroscopy in the lateral view. The epidural space was identified using loss of resistance technique. After negative aspiration for heme and cerebrospinal fluid, a total of 1 mL of gadolinium was injected to confirm needle placement. 9 mL of gadolinium was wasted. Epidurogram confirmed spread within the posterior epidural space. 2 ml of 40mg/ml of triamcinolone and 1 ml of preservative free saline was injected. The needle was removed.  Images were saved to PACS.    The patient tolerated the procedure well, and there was no evidence of procedural complications. No new sensory or motor deficits were noted following the procedure. The patient was stable and able to ambulate on discharge home. Post-procedure instructions were provided.     Pre-procedure pain score: 6/10 in the neck, 5/10 in the arm  Post-procedure pain score: 5/10 in the neck, 4/10 in the arm    Assessment/Plan: Zo Qureshi is a 42 year old female s/p cervical interlaminar epidural steroid injection today for cervical spondylosis and radiculitis/radiculopathy.     1. Following today's procedure, the patient was advised to contact the [facility] for any of the following:   Fever, chills, or night sweats   New onset of pain, numbness, or weakness   Any questions/concerns regarding the procedure  If unable to contact the Pain Center, the patient was instructed to go to a local Emergency Room for any complications.   2. The patient will receive a follow-up call in 1 week.   3. Follow-up with Dr. Pantoja in 2 weeks for post-procedure evaluation.    Ngozi Wilson DO  [facility]  [facility]

## 2017-06-09 NOTE — PATIENT INSTRUCTIONS
[facility] Procedure Discharge Instructions    Today you saw:    Dr. Ngozi Wilson      Your procedure:  Epidural steroid injection     Medications used:  Lidocaine (anesthetic)Kenalog (steroid) Gadolinium (contrast)         Be cautious when walking as numbness and/or weakness in the legs may occur up to 6-8 hours after the procedure due to effect of the local anesthetic    Do not drive for 6 hours. The effect of the local anesthetic could slow your reflexes.     Avoid strenuous activity for the first 24 hours. You may resume your regular activities after that.     You may shower, however avoid swimming, tub baths or hot tubs for 24 hours following your procedure    If you were fasting, you can resume your regular diet and medications    You may have a mild to moderate increase in pain for several days following the injection.      You may use ice packs for 10-15 minutes, 3 to 4 times a day at the injection site for comfort    Do not use heat to painful areas for 6 to 8 hours. This will give the local anesthetic time to wear off and prevent you from accidentally burning your skin.    You may use anti-inflammatory medications (such as Ibuprofen/Advil or Aleve) or Tylenol for pain control if necessary    If you have diabetes, check your blood sugar more frequently than usual as your blood sugar may be higher than normal for 10-14 days following a steroid injection. Contact your doctor who manages your diabetes if your blood sugar is higher than usual    It may take up to 14 days for the steroid medication to start working although you may feel the effect as early as a few days after the procedure.       If you experience any of the following, call the pain center nursing line during work hours at [phone] or on-call physician after hours at [phone]:  -Fever over 100 degree F  -Swelling, bleeding, redness, drainage, warmth at the injection site  -Progressive weakness or numbness in your legs or  arms  -Loss of bowel or bladder function  -Unusual headache that is not relieved by Tylenol  -Unusual new onset of pain that is not improving    Phone #s:  Appointment scheduling line: [phone]

## 2017-06-09 NOTE — NURSING NOTE
Injection intake:    If this procedure is requiring IV sedation has patient been NPO for 6  Hours? n/a    Is patient on coumadin, plavix or other prescribed blood thinner?   No    If patient is on coumadin was it held for 5 days?   NA    If patient is on plavix was it held for 7 days?    NA     Does patient take aspirin?  No    If this is for a cervical procedure and patient is on aspirin has it been held for 6 days?   NA    Any allergies to contrast dye, iodine, steroid and/or numbing medications?  YES: contrast dye    Is patient currently taking antibiotics or have an active infection?  NO    Does patient have a ? Yes       Is patient pregnant or breastfeeding?  NO    Are the vital signs normal?  Yes

## 2017-06-09 NOTE — MR AVS SNAPSHOT
After Visit Summary   6/9/2017    Zo Qureshi    MRN: [id number]           Patient Information     Date Of Birth          1975        Visit Information        Provider Department      6/9/2017 2:15 PM Ngozi Wilson DO [facility]        Today's Diagnoses     Cervical radiculopathy    -  1    Cervical spondylosis without myelopathy          Care Instructions    [facility] Procedure Discharge Instructions    Today you saw:    Dr. Ngozi Wilson      Your procedure:  Epidural steroid injection     Medications used:  Lidocaine (anesthetic)Kenalog (steroid) Gadolinium (contrast)         Be cautious when walking as numbness and/or weakness in the legs may occur up to 6-8 hours after the procedure due to effect of the local anesthetic    Do not drive for 6 hours. The effect of the local anesthetic could slow your reflexes.     Avoid strenuous activity for the first 24 hours. You may resume your regular activities after that.     You may shower, however avoid swimming, tub baths or hot tubs for 24 hours following your procedure    If you were fasting, you can resume your regular diet and medications    You may have a mild to moderate increase in pain for several days following the injection.      You may use ice packs for 10-15 minutes, 3 to 4 times a day at the injection site for comfort    Do not use heat to painful areas for 6 to 8 hours. This will give the local anesthetic time to wear off and prevent you from accidentally burning your skin.    You may use anti-inflammatory medications (such as Ibuprofen/Advil or Aleve) or Tylenol for pain control if necessary    If you have diabetes, check your blood sugar more frequently than usual as your blood sugar may be higher than normal for 10-14 days following a steroid injection. Contact your doctor who manages your diabetes if your blood sugar is higher than usual    It may take up to 14 days for the steroid medication to start  working although you may feel the effect as early as a few days after the procedure.       If you experience any of the following, call the pain center nursing line during work hours at [phone] or on-call physician after hours at [phone]:  -Fever over 100 degree F  -Swelling, bleeding, redness, drainage, warmth at the injection site  -Progressive weakness or numbness in your legs or arms  -Loss of bowel or bladder function  -Unusual headache that is not relieved by Tylenol  -Unusual new onset of pain that is not improving    Phone #s:  Appointment scheduling line: [phone]            Follow-ups after your visit        Your next 10 appointments already scheduled     Jun 16, 2017  9:00 AM CDT   [facility] ([facility]  )    [id number] [place] [address]  [address] [place] [address]   [phone]            Jun 19, 2017 11:20 AM CDT   [facility] with [facility] ([facility]  )    [address] [place] [address]  [address] [place] [address]   [phone]            Jul 10, 2017 11:00 AM CDT   [facility] with Terri Baron MD   [facility] ([facility])    [address] [place] [id number] [phone]              Who to contact     If you have questions or need follow up information about today's clinic visit or your schedule please contact [facility] directly at [phone].  Normal or non-critical lab and imaging results will be communicated to you by MyChart, letter or phone within 4 business days after the clinic has received the results. If you do not hear from us within 7 days, please contact the clinic through MyChart or phone. If you have a critical or abnormal lab result, we will notify you by phone as soon as possible.  Submit refill requests through [facility] or call your pharmacy and they will forward the refill request to us. Please allow 3 business days for your refill  to be completed.          Additional Information About Your Visit        [facility] Information     [facility] gives you secure access to your electronic health record. If you see a primary care provider, you can also send messages to your care team and make appointments. If you have questions, please call your primary care clinic.  If you do not have a primary care provider, please call [phone] and they will assist you.        Care EveryWhere ID     This is your Care EveryWhere ID. This could be used by other organizations to access your [facility] medical records  [id number]        Your Vitals Were     Pulse Pulse Oximetry Breastfeeding?             81 97% No          Blood Pressure from Last 3 Encounters:   06/09/17 125/78   05/08/17 112/64   05/01/17 122/82    Weight from Last 3 Encounters:   05/08/17 52.6 kg (116 lb)   05/01/17 53.5 kg (118 lb)   04/19/17 54.1 kg (119 lb 3.2 oz)              Today, you had the following     No orders found for display       Primary Care Provider Office Phone # Fax #    Terri Baron [phone]       [facility] [address] [place] [address]        Thank you!     Thank you for choosing [facility]  for your care. Our goal is always to provide you with excellent care. Hearing back from our patients is one way we can continue to improve our services. Please take a few minutes to complete the written survey that you may receive in the mail after your visit with us. Thank you!             Your Updated Medication List - Protect others around you: Learn how to safely use, store and throw away your medicines at www.disposemymeds.org.          This list is accurate as of: 6/9/17  2:54 PM.  Always use your most recent med list.                   Brand Name Dispense Instructions for use    clindamycin-benzoyl peroxide gel    BENZACLIN    50 g    Apply to  Affected area initially once daily for 2 weeks and then increase to 2 times  daily if tolerated       docusate sodium 100 MG tablet    COLACE    60 tablet    Take 100 mg by mouth daily       fluticasone 50 MCG/ACT spray    FLONASE    16 g    Spray 1-2 sprays into both nostrils daily       loratadine 10 MG tablet    CLARITIN    30 tablet    Take 1 tablet (10 mg) by mouth daily       PATADAY 0.2 % Soln   Generic drug:  olopatadine HCl      Reported on 5/1/2017       topiramate 25 MG tablet    TOPAMAX    60 tablet    Take 1 tablet (25 mg) at bedtime for 1 week, then 1 tablet twice daily       traMADol 50 MG tablet    ULTRAM    40 tablet    Take 1-2 tablets ( mg) by mouth every 6 hours as needed for moderate pain

## 2017-06-09 NOTE — NURSING NOTE
Discharge Information    IV Discontiued Time:  NA    Amount of Fluid Infused:  NA    Discharge Criteria = When patient returns to baseline or as per MD order    Consciousness:  Pt is fully awake    Circulation:  BP +/- 20% of pre-procedure level    Respiration:  Patient is able to breathe deeply    O2 Sat:  Patient is able to maintain O2 Sat >92% on room air    Activity:  Moves 4 extremities on command    Ambulation:  Patient is able to stand and walk or stand and pivot into wheelchair    Dressing:  Clean/dry or No Dressing    Notes:   Discharge instructions and AVS given to patient    Patient meets criteria for discharge?  YES    Admitted to PCU?  No    Responsible adult present to accompany patient home?  Yes    Signature/Title:    Kristy Kendrick  RN Care Coordinator  [facility] [place]

## 2017-06-16 ENCOUNTER — THERAPY VISIT (OUTPATIENT)
Dept: OCCUPATIONAL THERAPY | Facility: CLINIC | Age: 42
End: 2017-06-16
Payer: OTHER MISCELLANEOUS

## 2017-06-16 DIAGNOSIS — R20.2 PARESTHESIA OF BOTH HANDS: ICD-10-CM

## 2017-06-16 DIAGNOSIS — M79.644 FINGER PAIN, RIGHT: ICD-10-CM

## 2017-06-16 DIAGNOSIS — R29.898 MECHANICAL PROBLEMS WITH LIMBS: ICD-10-CM

## 2017-06-16 PROCEDURE — 97112 NEUROMUSCULAR REEDUCATION: CPT | Mod: GO | Performed by: OCCUPATIONAL THERAPIST

## 2017-06-16 PROCEDURE — 97140 MANUAL THERAPY 1/> REGIONS: CPT | Mod: GO | Performed by: OCCUPATIONAL THERAPIST

## 2017-06-19 ENCOUNTER — THERAPY VISIT (OUTPATIENT)
Dept: PHYSICAL THERAPY | Facility: CLINIC | Age: 42
End: 2017-06-19
Payer: OTHER MISCELLANEOUS

## 2017-06-19 DIAGNOSIS — M54.50 BILATERAL LOW BACK PAIN WITHOUT SCIATICA: ICD-10-CM

## 2017-06-19 PROCEDURE — 97110 THERAPEUTIC EXERCISES: CPT | Mod: GP | Performed by: PHYSICAL THERAPIST

## 2017-06-19 PROCEDURE — 97140 MANUAL THERAPY 1/> REGIONS: CPT | Mod: GP | Performed by: PHYSICAL THERAPIST

## 2017-06-19 PROCEDURE — 97112 NEUROMUSCULAR REEDUCATION: CPT | Mod: GP | Performed by: PHYSICAL THERAPIST

## 2017-06-29 ENCOUNTER — THERAPY VISIT (OUTPATIENT)
Dept: OCCUPATIONAL THERAPY | Facility: CLINIC | Age: 42
End: 2017-06-29
Payer: OTHER MISCELLANEOUS

## 2017-06-29 DIAGNOSIS — R20.2 PARESTHESIA OF BOTH HANDS: ICD-10-CM

## 2017-06-29 DIAGNOSIS — M79.644 FINGER PAIN, RIGHT: ICD-10-CM

## 2017-06-29 PROCEDURE — 97140 MANUAL THERAPY 1/> REGIONS: CPT | Mod: GO | Performed by: OCCUPATIONAL THERAPIST

## 2017-06-29 PROCEDURE — 97112 NEUROMUSCULAR REEDUCATION: CPT | Mod: GO | Performed by: OCCUPATIONAL THERAPIST

## 2017-06-29 PROCEDURE — 97110 THERAPEUTIC EXERCISES: CPT | Mod: GO | Performed by: OCCUPATIONAL THERAPIST

## 2017-06-29 NOTE — MR AVS SNAPSHOT
After Visit Summary   6/29/2017    Zo Qureshi    MRN: [id number]           Patient Information     Date Of Birth          1975        Visit Information        Provider Department      6/29/2017 9:00 AM Dina Gaines OT [facility]        Today's Diagnoses     Mechanical problems with limbs        Paresthesia of both hands        Finger pain, right           Follow-ups after your visit        Your next 10 appointments already scheduled     Jul 07, 2017  8:30 AM CDT   [facility] with Shanon De La O   [facility] ([facility] [place]  )    [address]  [address]  [facility] [address]   [phone]            Jul 07, 2017  9:30 AM CDT   [facility] with Caelb Escalona   [facility] ([facility] [place]  )    [address]  [address] [place] [address]   [phone]            Jul 10, 2017 11:00 AM CDT   [facility]    [address] [place] [address] [phone]              Who to contact     If you have questions or need follow up information about today's clinic visit or your schedule please contact [facility] directly at [phone].  Normal or non-critical lab and imaging results will be communicated to you by MyChart, letter or phone within 4 business days after the clinic has received the results. If you do not hear from us within 7 days, please contact the clinic through MyChart or phone. If you have a critical or abnormal lab result, we will notify you by phone as soon as possible.  Submit refill requests through [employer] or call your pharmacy and they will forward the refill request to us. Please allow 3 business days for your refill to be completed.          Additional Information About Your Visit        [employer] Information     [employer] gives you secure access to your electronic health record. If you see a primary care provider, you can also  send messages to your care team and make appointments. If you have questions, please call your primary care clinic.  If you do not have a primary care provider, please call [phone] and they will assist you.        Care EveryWhere ID     This is your Care EveryWhere ID. This could be used by other organizations to access your [facility] medical records  [id number]         Blood Pressure from Last 3 Encounters:   06/09/17 128/79   05/08/17 112/64   05/01/17 122/82    Weight from Last 3 Encounters:   05/08/17 52.6 kg (116 lb)   05/01/17 53.5 kg (118 lb)   04/19/17 54.1 kg (119 lb 3.2 oz)              We Performed the Following     MANUAL THER TECH,1+REGIONS,EA 15 MIN     NEUROMUSCULAR RE-EDUCATION     THERAPEUTIC EXERCISES        Primary Care Provider Office Phone # Fax #    Terri DENNIS Baron [phone]       [address] [address]        Equal Access to Services     HUMERA HOLCOMB AH: Hadii aad ku hadasho Soomaali, waaxda luqadaha, qaybta kaalmada adeegyada, waxay idiin hayaan kirby kharavivien simpson . So [facility] [phone].    ATENCIÓN: Si habla español, tiene a vogel disposición servicios gratuitos de asistencia lingüística. Llame al [phone].    We comply with applicable federal civil rights laws and [place] laws. We do not discriminate on the basis of race, color, national origin, age, disability sex, sexual orientation or gender identity.            Thank you!     Thank you for choosing [facility]  for your care. Our goal is always to provide you with excellent care. Hearing back from our patients is one way we can continue to improve our services. Please take a few minutes to complete the written survey that you may receive in the mail after your visit with us. Thank you!             Your Updated Medication List - Protect others around you: Learn how to safely use, store and throw away your medicines at www.disposemymeds.org.          This list  is accurate as of: 6/29/17 11:23 AM.  Always use your most recent med list.                   Brand Name Dispense Instructions for use Diagnosis    clindamycin-benzoyl peroxide gel    BENZACLIN    50 g    Apply to  Affected area initially once daily for 2 weeks and then increase to 2 times daily if tolerated    Acne       docusate sodium 100 MG tablet    COLACE    60 tablet    Take 100 mg by mouth daily    External hemorrhoids       fluticasone 50 MCG/ACT spray    FLONASE    16 g    Spray 1-2 sprays into both nostrils daily    Throat pain       loratadine 10 MG tablet    CLARITIN    30 tablet    Take 1 tablet (10 mg) by mouth daily    Hives       PATADAY 0.2 % Soln   Generic drug:  olopatadine HCl      Reported on 5/1/2017        topiramate 25 MG tablet    TOPAMAX    60 tablet    Take 1 tablet (25 mg) at bedtime for 1 week, then 1 tablet twice daily    Episodic tension-type headache, not intractable, Weakness of shoulder, Cervical radiculopathy       traMADol 50 MG tablet    ULTRAM    40 tablet    Take 1-2 tablets ( mg) by mouth every 6 hours as needed for moderate pain    Acute pain of both shoulders

## 2017-06-29 NOTE — PROGRESS NOTES
Hand Therapy Progress Note  Current Date:  6/29/17  Reporting Period: 5/24/17 to 6/29/17    S:  Subjective changes as noted by patient: Yesterday right started to bother me again. I was doing more of my exercises and using it around the house. I may have over used my shoulder as well. Using pain medicine.   Functional changes noted by patient: Light housework. Cleaning, washing dishes.  Response to previous treatment: It felt better.     Patient has noted adverse reaction to:   None     Upper Extremity Functional Index Score:  SCORE:   Column Totals: /80: 50   (A lower score indicates greater disability.)       Objective:  Sensation:  Intermittant numbness and tingling bilaterally. Most prominent in R index, middle, and ring finger, and also along R anterior forearm. 3/17/17: Intermittant numbness and tingling bilaterally. Most prominent in R index, middle, and ring finger, and also along R anterior forearm.  4/18/17: Numbness and tingling in index, long, and ring fingers bilaterally. 5/24/17:  N/T in IF, LF, and RF intermittently.   6/29/17: Predominately in right index, middle, and ring finger.  Reports left is not as bad as right.    Pain Level Report: On scale 0-10/10  Date 3/17/17 3/17/17 4/18/17 4/18/17 5/24/17 5/24/17 6/29/17 6/29/17   side R L R L R L R L   Overall 7 (no meds) 3-4 5 4 7 6 7 5   At Rest 4-5 3-4 4-5 4-5 6 6 5-6 with meds 4   With Activity 7 6 7 6 8 7 8 6     Primary Report: location and description  Date 3/17/17 3/17/17 4/18/17 4/18/17 5/24/17 6/29/17 6/29/17   Side R L R L B R L   Location Dorsal wrist, and Ant. Forearm, and first three fingers Ant. Forearm and first 3 fingers First three fingers, flexor wad, thumb  web space First three fingers, thumb web space MEP, ulnar volar forearm, IF, LF, RF Medial volar FA, dorsal wrist Medial volar FA   Radiation R shoulder shooting down to hand (more than left) shoulder shooting down to hand Shoulder shooting down to hand Shoulder shooting down to  hand Shoulder shooting down to hand Shoulder shooting down to hand Shoulder shooting down to hand   Pain Quality Pinching  Pinching  Pinching, tightness Pinching, tightness Pinching, twitching tightness tightness   Frequency Constant Constant Constant Constant Constant Constant Constant   Duration Worse with activity Worse with activity Worse with activity Worse with activity Worse with activity Worse with activity Worse with activity   Exacerbated by Wrist flexion, finger flexion (more stiff and painful than L) Wrist flexion, finger flexion General use  General use General use General use General use   Relieved by Rest, heat Rest, heat Pain meds, rest Pain meds, rest Pain meds, rest, heat pad, massage Rest, heat pad, pain meds, orthosis Rest, heat pad, pain meds, orthosis   Progression since onset Gradually getting better Gradually getting better Gradually getting better Gradually getting better Gradually getting better Gradually getting better Gradually getting better     Range of Motion Wrist AROM (PROM):  Date 3/17/17 3/17/17 4/18/17 4/18/17 5/24/17 5/24/17 6/29/17 6/29/17   Side R L R L R L R L   Ext 65 67 65 65 55 64 50 60   Flex 36 45 34 31 29 29 45 47   UD 25 25 29 19 20 25 20 16   RD 19 15 15 13 5 7 10 15     Special Tests:  Date 3/17/17 3/17/17 4/18/17 4/18/17 5/24/17 5/24/17 6/29/17 6/29/17   Side R L R L R L R L   Phalens + at ~10 sec - + at 10 sec - at 30 sec but fatigue present +@20 sec - + @ 30 sec -   Tinels at CT   + + + + + -   Tinels at Pronator   NT NT NT NT NT NT   Median Nerve ULTT ~15% ~15% ~20% ~25% ~25% ~25% ~25% ~15%   Paresthesias + + + + + + + +       STRENGTH: (Measured in pounds, pain scale 0-10/10)    Date 2/7/2017 3/17/17 4/18/17 5/24/17 6/29/17    Trials Left Right Left Right Left Right Left Right Left Right Left Right   1 25 6 10 8 20 16 15 10 16 10     2               3               Avg               Pain 0 7 6 8 6 6 5 6 7 5       3 Point Pinch  Date 2/7/2017 3/17/17 4/18/17  5/24/17 6/29/17    Trials Left Right Left Right Left Right Left Right Left Right Left Right   1 9 4 4 4 4 3 2 2 4 2     2               3               Avg               Pain 0 6 6 6-7 2 2 3 5 0 0       Lateral Pinch  Date 2/7/2017 3/17/17 4/18/17 5/24/17 6/29/17    Trials Left Right Left Right Left Right Left Right Left Right Left Right   1 10 6 6 6 4 3 4 4 4 2     2               3               Avg               Pain 0 6 0 7 1 2 0 0 0 0       Assessment:  Response to therapy has been improvement to:  Paresthesias:  Median nerve - less intense numbness and tingling mainly on left  Response to therapy has been lack of progress in:  ROM of Wrist:  All Planes, paresthesias in right median nerve distribution   Strength:   and pinch  Pain:  No change     Overall Assessment:  Patient is not progressing as expected.  Patient would benefit from continued therapy to achieve rehab potential  STG/LTG:  STGoals have been reviewed;  see goal sheet for details and updates.  LTGoals have been reviewed;  see goal sheet for details and updates.    I have re-evaluated this patient and find that the nature, scope, duration and intensity of the therapy is appropriate for the medical condition of the patient.      P: Frequency:  1 X week, once daily  Duration:  for 4 weeks    Treatment Plan:    Modalities:  None   Therapeutic Exercise: Tendon Gliding ex, blocking  Neuromuscular Techniques: Median nerve glides both active and passive (in the clinic)  Manual Techniques:, Myofascial release of the forearm extensors and flexors, wrist mobilization techniques  Orthosis:  Wrist in netural orthosis for night wear.    Home Program:   Orthosis: Static orthosis and Forearm based orthosis Wrist in neutral orthosis at night.    Activity: Avoid activities that exacerbate symptoms in the median nerve.  Avoid prolonged flexion or extension of the wrist.    Discharge Plan:    Achieve all LTG.  Independent in home treatment program.  Reach maximal  therapeutic benefit.    Next Visit:  MFR  Ergonomics education  Nerve gliding

## 2017-07-05 ENCOUNTER — OFFICE VISIT (OUTPATIENT)
Dept: FAMILY MEDICINE | Facility: CLINIC | Age: 42
End: 2017-07-05
Payer: OTHER MISCELLANEOUS

## 2017-07-05 ENCOUNTER — RADIANT APPOINTMENT (OUTPATIENT)
Dept: GENERAL RADIOLOGY | Facility: CLINIC | Age: 42
End: 2017-07-05
Attending: FAMILY MEDICINE
Payer: OTHER MISCELLANEOUS

## 2017-07-05 VITALS
TEMPERATURE: 98.3 F | WEIGHT: 115 LBS | BODY MASS INDEX: 24.14 KG/M2 | HEART RATE: 81 BPM | HEIGHT: 58 IN | DIASTOLIC BLOOD PRESSURE: 76 MMHG | SYSTOLIC BLOOD PRESSURE: 122 MMHG | RESPIRATION RATE: 16 BRPM | OXYGEN SATURATION: 99 %

## 2017-07-05 DIAGNOSIS — M54.2 NECK PAIN: ICD-10-CM

## 2017-07-05 DIAGNOSIS — R21 RASH: ICD-10-CM

## 2017-07-05 DIAGNOSIS — R20.0 NUMBNESS OF RIGHT HAND: ICD-10-CM

## 2017-07-05 DIAGNOSIS — R07.89 ATYPICAL CHEST PAIN: Primary | ICD-10-CM

## 2017-07-05 DIAGNOSIS — M94.0 COSTOCHONDRITIS: ICD-10-CM

## 2017-07-05 DIAGNOSIS — M25.511 ACUTE PAIN OF BOTH SHOULDERS: ICD-10-CM

## 2017-07-05 DIAGNOSIS — R07.89 ATYPICAL CHEST PAIN: ICD-10-CM

## 2017-07-05 DIAGNOSIS — M25.512 ACUTE PAIN OF BOTH SHOULDERS: ICD-10-CM

## 2017-07-05 PROCEDURE — 71020 XR CHEST 2 VW: CPT

## 2017-07-05 PROCEDURE — 93000 ELECTROCARDIOGRAM COMPLETE: CPT | Performed by: FAMILY MEDICINE

## 2017-07-05 PROCEDURE — 99214 OFFICE O/P EST MOD 30 MIN: CPT | Performed by: FAMILY MEDICINE

## 2017-07-05 RX ORDER — HYDROCORTISONE VALERATE CREAM 2 MG/G
CREAM TOPICAL
Qty: 45 G | Refills: 0 | Status: SHIPPED | OUTPATIENT
Start: 2017-07-05 | End: 2019-09-11

## 2017-07-05 RX ORDER — TRAMADOL HYDROCHLORIDE 50 MG/1
50-100 TABLET ORAL EVERY 6 HOURS PRN
Qty: 40 TABLET | Refills: 0 | Status: SHIPPED | OUTPATIENT
Start: 2017-07-05 | End: 2018-03-07

## 2017-07-05 NOTE — MR AVS SNAPSHOT
After Visit Summary   7/5/2017    Zo Qureshi    MRN: [id number]           Patient Information     Date Of Birth          1975        Visit Information        Provider Department      7/5/2017 1:45 PM Terri Baron MD [facility]        Today's Diagnoses     Atypical chest pain    -  1    Neck pain        Rash        Costochondritis        Acute pain of both shoulders        Numbness of right hand           Follow-ups after your visit        Additional Services     MASSAGE THERAPY REFERRAL       Please be aware that coverage of these services is subject to the terms and limitations of your health insurance plan.  Call member services at your health plan with any benefit or coverage questions.      Please bring the following to your appointment:    >>   Any x-rays, CTs or MRIs which have been performed.  Contact the facility where they were done to arrange for  prior to your scheduled appointment.    >>   List of current medications   >>   This referral request   >>   Any documents/labs given to you for this referral            NEUROLOGY ADULT REFERRAL       Your provider has referred you to: [facility]: [facility] [phone]   [email or web address]    Reason for Referral: Consult    Please be aware that coverage of these services is subject to the terms and limitations of your health insurance plan.  Call member services at your health plan with any benefit or coverage questions.      Please bring the following with you to your appointment:    (1) Any X-Rays, CTs or MRIs which have been performed.  Contact the facility where they were done to arrange for  prior to your scheduled appointment.    (2) List of current medications  (3) This referral request   (4) Any documents/labs given to you for this referral                  Your next 10 appointments already scheduled     Jul 07, 2017  8:30 AM CDT   [facility]  with [facility]on [facility] ([facility] [place]  )    [address] [place] [address]  [address]  [facility] [address]   [phone]            Jul 07, 2017  9:30 AM CDT   [facility] with Caleb BENEDICT Pola [facility] ([facility] [place]  )    [address]  [address]  [facility] [address]   [phone]            Jul 10, 2017 11:00 AM CDT   [facility]    [address] [place] [address] [phone]              Who to contact     If you have questions or need follow up information about today's clinic visit or your schedule please contact [facility] directly at [phone].  Normal or non-critical lab and imaging results will be communicated to you by MyChart, letter or phone within 4 business days after the clinic has received the results. If you do not hear from us within 7 days, please contact the clinic through [employer] or phone. If you have a critical or abnormal lab result, we will notify you by phone as soon as possible.  Submit refill requests through [employer] or call your pharmacy and they will forward the refill request to us. Please allow 3 business days for your refill to be completed.          Additional Information About Your Visit        [employer] Information     [employer] gives you secure access to your electronic health record. If you see a primary care provider, you can also send messages to your care team and make appointments. If you have questions, please call your primary care clinic.  If you do not have a primary care provider, please call [phone] and they will assist you.        Care EveryWhere ID     This is your Care EveryWhere ID. This could be used by other organizations to access your [facility] medical records  [id number]        Your Vitals Were     Pulse Temperature Respirations Height Pulse Oximetry BMI (Body Mass Index)    81 98.3  F (36.8  C) (Oral) 16 4'  "10\" (1.473 m) 99% 24.04 kg/m2       Blood Pressure from Last 3 Encounters:   07/05/17 122/76   06/09/17 128/79   05/08/17 112/64    Weight from Last 3 Encounters:   07/05/17 115 lb (52.2 kg)   05/08/17 116 lb (52.6 kg)   05/01/17 118 lb (53.5 kg)              We Performed the Following     CRP inflammation     EKG 12-lead complete w/read - Clinics     ESR: Erythrocyte sedimentation rate     MASSAGE THERAPY REFERRAL     NEUROLOGY ADULT REFERRAL          Today's Medication Changes          These changes are accurate as of: 7/5/17  2:59 PM.  If you have any questions, ask your nurse or doctor.               Start taking these medicines.        Dose/Directions    diclofenac 1 % Gel topical gel   Commonly known as:  VOLTAREN   Used for:  Costochondritis, Atypical chest pain   Started by:  Terri Baron MD        Use small amount and rub into area of chest that is hurting 1-2 times daily as needed   Quantity:  100 g   Refills:  1       hydrocortisone 0.2 % cream   Commonly known as:  WESTCORT   Used for:  Rash   Started by:  Terri Baron MD        Apply sparingly to affected area 2 times daily as needed.   Quantity:  45 g   Refills:  0            Where to get your medicines      These medications were sent to [facility] [place] [id number]     Phone:  [phone]     diclofenac 1 % Gel topical gel    hydrocortisone 0.2 % cream         Some of these will need a paper prescription and others can be bought over the counter.  Ask your nurse if you have questions.     Bring a paper prescription for each of these medications     traMADol 50 MG tablet                Primary Care Provider Office Phone # Fax #    Terri Baron -[phone]       [facility] [address] [place] [address]        Equal Access to Services     HUMERA HOLCOMB AH: Bhanu De La Cruz, jarad mccullough, mert nam, " isabella alanizglen campuzano'aan ah. So [facility] [phone].    ATENCIÓN: Si shannonla modesto, tiene a vogel disposición servicios gratuitos de asistencia lingüística. Issac astorga [phone].    We comply with applicable federal civil rights laws and [place] laws. We do not discriminate on the basis of race, color, national origin, age, disability sex, sexual orientation or gender identity.            Thank you!     Thank you for choosing [facility]  for your care. Our goal is always to provide you with excellent care. Hearing back from our patients is one way we can continue to improve our services. Please take a few minutes to complete the written survey that you may receive in the mail after your visit with us. Thank you!             Your Updated Medication List - Protect others around you: Learn how to safely use, store and throw away your medicines at www.disposemymeds.org.          This list is accurate as of: 7/5/17  2:59 PM.  Always use your most recent med list.                   Brand Name Dispense Instructions for use Diagnosis    clindamycin-benzoyl peroxide gel    BENZACLIN    50 g    Apply to  Affected area initially once daily for 2 weeks and then increase to 2 times daily if tolerated    Acne       diclofenac 1 % Gel topical gel    VOLTAREN    100 g    Use small amount and rub into area of chest that is hurting 1-2 times daily as needed    Costochondritis, Atypical chest pain       docusate sodium 100 MG tablet    COLACE    60 tablet    Take 100 mg by mouth daily    External hemorrhoids       fluticasone 50 MCG/ACT spray    FLONASE    16 g    Spray 1-2 sprays into both nostrils daily    Throat pain       hydrocortisone 0.2 % cream    WESTCORT    45 g    Apply sparingly to affected area 2 times daily as needed.    Rash       loratadine 10 MG tablet    CLARITIN    30 tablet    Take 1 tablet (10 mg) by mouth daily    Hives       PATADAY 0.2 % Soln   Generic drug:  olopatadine HCl       Reported on 5/1/2017        topiramate 25 MG tablet    TOPAMAX    60 tablet    Take 1 tablet (25 mg) at bedtime for 1 week, then 1 tablet twice daily    Episodic tension-type headache, not intractable, Weakness of shoulder, Cervical radiculopathy       traMADol 50 MG tablet    ULTRAM    40 tablet    Take 1-2 tablets ( mg) by mouth every 6 hours as needed for moderate pain    Acute pain of both shoulders

## 2017-07-05 NOTE — NURSING NOTE
"Chief Complaint   Patient presents with     Derm Problem     itchy red rash off and on since having steriod injection at the beginning of June.      Chest Pain     episodes last week. Patient reports she was feeling SOB during the chest pain episodes.        Initial /76 (BP Location: Right arm, Patient Position: Chair, Cuff Size: Adult Regular)  Pulse 81  Temp 98.3  F (36.8  C) (Oral)  Resp 16  Ht 4' 10\" (1.473 m)  Wt 115 lb (52.2 kg)  SpO2 99%  BMI 24.04 kg/m2 Estimated body mass index is 24.04 kg/(m^2) as calculated from the following:    Height as of this encounter: 4' 10\" (1.473 m).    Weight as of this encounter: 115 lb (52.2 kg).  Medication Reconciliation: complete     William Mixon CMA      "

## 2017-07-05 NOTE — PROGRESS NOTES
SUBJECTIVE:                                                    Zo Qureshi is a 42 year old female who presents to clinic today for the following health issues:      Has red spot right where she got her injection earlier last month.   She got chest pain and shortness of breath when she got injection.   This went away shortly after.   She also got itching which lasted 4-5 days.   Then it went away.   Now she has chest pain again for last few days which is similar to back then.   She also has itching again.    Now with rash on upper thighs.   The chest pain she has now is worse in am and worse with twisting and pulling and is worse with pressure.   It is to the left of her sternum.      Rash  Onset: a month    Description:   Location: arms and legs  Character: red spots  Itching (Pruritis): YES    Progression of Symptoms:  waxing and waning    Accompanying Signs & Symptoms:  Fever: no   Body aches or joint pain: no   Sore throat symptoms: no   Recent cold symptoms: no       History:   Previous similar rash: no     Precipitating factors:   Exposure to similar rash: no   New exposures: Patient had steroid injection in her neck   Recent travel: no     Alleviating factors:  none    Therapies Tried and outcome: Warm bath     CHEST PAIN     Onset: last week    Description:   Location:  left side  Character: sharp  Radiation: between shoulder blades  Duration: 1 minutes     Intensity: moderate    Progression of Symptoms:  waxing and waning    Accompanying Signs & Symptoms:  Shortness of breath: YES  Sweating: no   Nausea/vomiting: no   Lightheadedness: YES  Palpitations: no  Fever/Chills: no   Cough: no   Heartburn: no     History:   Family history of heart disease no   Tobacco use: no     Precipitating factors:   Worse with exertion: YES  Worse with deep breaths :  no   Related to food: no     Alleviating factors:  Pt had the same feeling when she was getting the steroid injection in her neck       Therapies Tried and  outcome:       .  Past Medical History:   Diagnosis Date     HSIL on Pap smear 09/21/11    MARTA 2 and 3     INFLAM SPONDYLOPATHY NOS   1/7/2008     Leukocytopenia 3/10/2014     Leukocytopenia      Tendinosis      Thrombocytopenia (H) 3/10/2014     Thrombocytopenia (H)      Unspecified closed fracture of pelvis 2000    left fracture of pelvis after delivery       Past Surgical History:   Procedure Laterality Date     C NONSPECIFIC PROCEDURE  10/00    after delivery 2 units of prbcs secondary to placenta     LEEP TX, CERVICAL  12/19/11    MARTA II     TUBAL LIGATION  5/2009    laparoscopic tubal ligation       MEDICATIONS:  Current Outpatient Prescriptions   Medication     traMADol (ULTRAM) 50 MG tablet     fluticasone (FLONASE) 50 MCG/ACT spray     docusate sodium (COLACE) 100 MG tablet     topiramate (TOPAMAX) 25 MG tablet     loratadine (CLARITIN) 10 MG tablet     PATADAY 0.2 % SOLN     clindamycin-benzoyl peroxide (BENZACLIN) gel     No current facility-administered medications for this visit.        SOCIAL HISTORY:  Social History   Substance Use Topics     Smoking status: Never Smoker     Smokeless tobacco: Never Used     Alcohol use 0.0 oz/week     0 Standard drinks or equivalent per week      Comment: occasionally       Family History   Problem Relation Age of Onset     Hypertension Father      Lipids Father      Hypertension Mother      Lipids Mother      DIABETES No family hx of      Coronary Artery Disease No family hx of      Hyperlipidemia No family hx of      CEREBROVASCULAR DISEASE No family hx of      Breast Cancer No family hx of      Colon Cancer No family hx of      Prostate Cancer No family hx of      Other Cancer No family hx of      Depression No family hx of      Anxiety Disorder No family hx of      MENTAL ILLNESS No family hx of      Substance Abuse No family hx of      Anesthesia Reaction No family hx of      Asthma No family hx of      OSTEOPOROSIS No family hx of      Genetic Disorder No  "family hx of      Thyroid Disease No family hx of      Obesity No family hx of      Unknown/Adopted No family hx of        Objective:  Blood pressure 122/76, pulse 81, temperature 98.3  F (36.8  C), temperature source Oral, resp. rate 16, height 4' 10\" (1.473 m), weight 115 lb (52.2 kg), SpO2 99 %, not currently breastfeeding.  HEENT:  TM's are clear bilaterally.  Oropharynx is clear without tonsillar hypertrophy or exudate.  Conjuctiva are clear.  Neck:  There is no lymphadenopathy or thyroid tenderness or enlargement  Chest: Clear to auscultation bilaterally.  No wheezes, rales or retractions.  Pain to the left of sternum with firm but not really hard palpation  CV: Regular rate and rhythm without murmurs, rubs or gallops.  ABDOMEN:  Positive bowel sounds, soft, nondistended and nontender.  No masses are palpated and there is no organomegaly or inguinal lymphadenopathy.    Cxr: clear  EKG: NSR and no ischemic changes  Right arm EMG: reported as normal with no CTS or radiculopathy    Assessment:  1. Chest pain - likely costochondritis  2. Rash - not sure - seems allergic but not sure to what  3. Numbness in right hand off and on with pain in forearm - EMG was NEG for CTS and radiculopathy or entrapment - will send to neurology  4. Cervicalgia with right sided shoulder pain - better since injection but had side effects while having injection    Plan:  1. Will try topical ketoprofen  2. Warm pack prn  3. Handout on the above diagnosis was given to the patient and discussed  4. westcort cream for rash  5. Patient continued PT and will send to neurology for any further eval for numbness  6. Injection for neck did help with symptoms but caused patient some side effects       "

## 2017-07-05 NOTE — LETTER
[facility]  [address] [place], [address]  [phone]        July 5, 2017    RE: Zo Qureshi                                                                                                                                                       [address] [facility] [address]            To Whom It May Concern,   Zo Qureshi was seen today.   She had an injection of her neck and her right shoulder pain is better.   She is still having trouble with right forearm and hand pain and numbness and not making progress in PT like we had hoped.   She had an EMG which did not show nerve entrapment.   We are sending her to neurology for help on further work up.   She will be trying massage therapy in the meantime.   She will need another 4 weeks off.           Sincerely,    Terri Robles M.D.

## 2017-07-07 ENCOUNTER — THERAPY VISIT (OUTPATIENT)
Dept: OCCUPATIONAL THERAPY | Facility: CLINIC | Age: 42
End: 2017-07-07
Payer: OTHER MISCELLANEOUS

## 2017-07-07 ENCOUNTER — THERAPY VISIT (OUTPATIENT)
Dept: PHYSICAL THERAPY | Facility: CLINIC | Age: 42
End: 2017-07-07
Payer: OTHER MISCELLANEOUS

## 2017-07-07 DIAGNOSIS — M79.644 FINGER PAIN, RIGHT: ICD-10-CM

## 2017-07-07 DIAGNOSIS — R20.2 PARESTHESIA OF BOTH HANDS: ICD-10-CM

## 2017-07-07 DIAGNOSIS — M54.50 BILATERAL LOW BACK PAIN WITHOUT SCIATICA: ICD-10-CM

## 2017-07-07 PROCEDURE — 97140 MANUAL THERAPY 1/> REGIONS: CPT | Mod: GO | Performed by: OCCUPATIONAL THERAPIST

## 2017-07-07 PROCEDURE — 97140 MANUAL THERAPY 1/> REGIONS: CPT | Mod: GP | Performed by: PHYSICAL THERAPIST

## 2017-07-07 PROCEDURE — 97110 THERAPEUTIC EXERCISES: CPT | Mod: GP | Performed by: PHYSICAL THERAPIST

## 2017-07-07 PROCEDURE — 97112 NEUROMUSCULAR REEDUCATION: CPT | Mod: GO | Performed by: OCCUPATIONAL THERAPIST

## 2017-07-07 NOTE — PROGRESS NOTES
Subjective:    HPI  Oswestry Score: 42.5 %                 Objective:    System    Physical Exam    General     ROS    Assessment/Plan:      DISCHARGE  REPORT    Progress reporting period is from 5/15/2017 to 7/7/2017.       SUBJECTIVE  Subjective changes noted by patient: Patient is okay when she does not over-do it. She had the injection and was feeling better, but then overdid it and things got worse. Reports that she still gets tingling in the legs with supine abdominal exercises. Leg gets sleepy when she sits for a long time (more than an hour) and the leg also feels cold. She reports that things are better than when she first started PT.     Changes in function:  Yes (See Goal flowsheet attached for changes in current functional level)  Adverse reaction to treatment or activity: None    OBJECTIVE  Changes noted in objective findings:  Yes, Patient demonstrates decrease symptoms with ROM    AROM: (Major, Moderate, Minimal or Nil loss)  Movement Loss Cameron Mod Min Nil Pain   Flexion   x     Extension    x    Side Gliding L    x Poking pain   Side Gliding R    x Poking pain       Dural Signs:   R L   Slump (-) (-)   SLR (-) (+)     Palpation: Tension with tenderness of the lumbar paraspinals    Other Tests:   - Patient reports symptoms in the legs with supine abdominal exercises at the anterior hip/groin.   - Hip flexion 5/5 bilaterally      ASSESSMENT/PLAN  Updated problem list and treatment plan: Diagnosis 1:  Low back pain with mobility impairments   Pain - self management, education and directional preference exercise  Decreased ROM/flexibility - therapeutic exercise and home program  Impaired muscle performance - neuro re-education and home program  Decreased function - therapeutic activities and home program  Impaired posture - neuro re-education and home program  STG/LTGs have been met or progress has been made towards goals:  Yes (See Goal flow sheet completed today.)  Assessment of Progress: The patient's  progress has plateaued.  Self Management Plans:  Patient has been instructed in a home treatment program.  Patient  has been instructed in self management of symptoms.  Zo continues to require the following intervention to meet STG and LTG's:  PT on hold    Recommendations:  This patient would benefit from further evaluation.    Patient has not reported functional gains for some time, though she does report that symptoms feel less. She is limited in her participation with her home program due to the exacerbation of symptoms. The patient has been referred to a neurologist and to therapeutic massage. Some soft tissue work has been one of the few things helpful to the patient within therapy, we will hold further additional therapy until after her visit with the neurologist regarding ongoing numbness and tingling in the her legs and arms which is the primary limitation with sitting, and does not appear to have been affected with physical therapy thus far when addressing ROM, strength, tissue quality/tension, or in establishing a directional preference.     Please refer to the daily flowsheet for treatment today, total treatment time and time spent performing 1:1 timed codes.

## 2017-07-07 NOTE — Clinical Note
Dr. Baron,  I saw Zo again today. We do not seem to be making functional gains with physical therapy. I saw that she has the referral in to the neurologist and that you submitted an order for therapeutic massage. I think it would be best to put physical therapy on hold at this time until we can establish the various effects of the massage and the injection, and until we get an opinion from the neurologist on the cause of the numbness and tingling in her extremities, especially since the numbness and tingling is what she reports to be the limiting factor in her ability to sit/stand for prolonged periods of time. I hope you are in agreement with this. If you have other thoughts on continuing physical therapy, please contact me so that I am on the same page with regard to the plan of care.   --Caleb

## 2017-07-25 ENCOUNTER — THERAPY VISIT (OUTPATIENT)
Dept: PHYSICAL THERAPY | Facility: CLINIC | Age: 42
End: 2017-07-25
Payer: OTHER MISCELLANEOUS

## 2017-07-25 DIAGNOSIS — R29.898 WEAKNESS OF SHOULDER: ICD-10-CM

## 2017-07-25 DIAGNOSIS — M25.511 BILATERAL SHOULDER PAIN: ICD-10-CM

## 2017-07-25 DIAGNOSIS — M54.12 CERVICAL RADICULOPATHY: ICD-10-CM

## 2017-07-25 DIAGNOSIS — M25.512 BILATERAL SHOULDER PAIN: ICD-10-CM

## 2017-07-25 PROCEDURE — 97140 MANUAL THERAPY 1/> REGIONS: CPT | Mod: GP | Performed by: PHYSICAL THERAPIST

## 2017-07-25 PROCEDURE — 97110 THERAPEUTIC EXERCISES: CPT | Mod: GP | Performed by: PHYSICAL THERAPIST

## 2017-07-25 NOTE — Clinical Note
Dr. Baron,  I am discharging Zo from PT for the time being. If new findings with the neurologist indicatea return to PT, then I would recommend it, but we have not made much progress due to the limiting nature of her symptoms. Her reports each visit do not indicate much progress (she reports the same issues), but when asked directly she states that she feels better. I think we have hit a plateau for the time being. The findings she does have appear to be largely soft tissue in nature and she may benefit from massage therapy on a more regular basis to try and address these issues. She stated that this was something you had discussed previously.  Please let me know if you have any questions.  --Caleb

## 2017-07-25 NOTE — PROGRESS NOTES
Subjective:    HPI                    Objective:    System    Physical Exam    General     ROS    Assessment/Plan:      PROGRESS/DISCHARGE REPORT    Progress reporting period is from 4/3/2017 to 7/25/23017.       SUBJECTIVE  Subjective changes noted by patient: Patient notes that things have trended towards better overall, but that she still gets increases in pain with increased activity. This limits her tolerance for exercise. She is suppose to see a neurologist, but has not yet been cleared to make the appointment.       Current Pain level: 7/10 (for shoulders, back is up to 8 or 9).     Changes in function:  Yes (See Goal flowsheet attached for changes in current functional level)  Adverse reaction to treatment or activity: None    OBJECTIVE  Changes noted in objective findings:  See below    AROM: (Major, Moderate, Minimal or Nil loss)  Movement Loss Cameron Mod Min Nil Pain   Flexion x x   Pinching/pulling in the neck   Extension x x      Left Rotation  x      Right Rotation  x x     Left Side Bending   x     Right Side bending  x   Pinching/pulling in the neck   Retraction   x       Cervical PIVMs: (R- right, L - left; U - upglide, D - downglide)  Level Hypo Pain   C1/2     C2/3     C3/4 L L   C4/5     C5/6     C6/7     C7/T1       Other:   - Tenderness with tension of the upper cervical paraspinals, pectorals bilaterally, and the upper trap on the right.   - STM to the pectorals triggered symptoms down the arm.         ASSESSMENT/PLAN  Updated problem list and treatment plan: Diagnosis 1:  Neck Pain  Pain -  hot/cold therapy, US, manual therapy, splint/taping/bracing/orthotics, self management, education, directional preference exercise and home program  Decreased ROM/flexibility - manual therapy, therapeutic exercise, therapeutic activity and home program  Decreased joint mobility - manual therapy, therapeutic exercise, therapeutic activity and home program  Decreased strength - therapeutic exercise, therapeutic  activities and home program  Decreased function - therapeutic activities and home program  Impaired posture - neuro re-education, therapeutic activities and home program  STG/LTGs have been met or progress has been made towards goals:  Yes (See Goal flow sheet completed today.)  Assessment of Progress: The patient's progress has plateaued.  Self Management Plans:  Patient has been instructed in a home treatment program.  Patient  has been instructed in self management of symptoms.    Recommendations:  This patient would benefit from further evaluation.    Zo has continued to experience function-limiting symptoms of the neck and shoulder. These symptoms have limited her ability to do her exercises, and have therefore limited her progress in PT. She still continues to have pain that wakes her in the night, particularly with laying on the shoulder.     Many of the findings appear related to soft tissue. She has been instructed to consult with her PCP and her upcoming neurologist to discuss findings and POC and return to PT if new findings indicate additional treatment.     For now, Zo has been instructed in a home program to continue making what progress she can within symptom limitations.     Please refer to the daily flowsheet for treatment today, total treatment time and time spent performing 1:1 timed codes.

## 2017-07-26 ENCOUNTER — THERAPY VISIT (OUTPATIENT)
Dept: OCCUPATIONAL THERAPY | Facility: CLINIC | Age: 42
End: 2017-07-26
Payer: OTHER MISCELLANEOUS

## 2017-07-26 DIAGNOSIS — R29.898 MECHANICAL PROBLEMS WITH LIMBS: ICD-10-CM

## 2017-07-26 DIAGNOSIS — R20.2 PARESTHESIA OF BOTH HANDS: ICD-10-CM

## 2017-07-26 DIAGNOSIS — M79.644 FINGER PAIN, RIGHT: ICD-10-CM

## 2017-07-26 PROCEDURE — 97112 NEUROMUSCULAR REEDUCATION: CPT | Mod: GO | Performed by: OCCUPATIONAL THERAPIST

## 2017-07-26 PROCEDURE — 97140 MANUAL THERAPY 1/> REGIONS: CPT | Mod: GO | Performed by: OCCUPATIONAL THERAPIST

## 2017-08-02 ENCOUNTER — OFFICE VISIT (OUTPATIENT)
Dept: FAMILY MEDICINE | Facility: CLINIC | Age: 42
End: 2017-08-02
Payer: OTHER MISCELLANEOUS

## 2017-08-02 VITALS
SYSTOLIC BLOOD PRESSURE: 136 MMHG | BODY MASS INDEX: 24.45 KG/M2 | DIASTOLIC BLOOD PRESSURE: 80 MMHG | HEART RATE: 86 BPM | RESPIRATION RATE: 12 BRPM | TEMPERATURE: 98.3 F | WEIGHT: 117 LBS

## 2017-08-02 DIAGNOSIS — M54.9 BILATERAL BACK PAIN, UNSPECIFIED BACK LOCATION, UNSPECIFIED CHRONICITY: Primary | ICD-10-CM

## 2017-08-02 DIAGNOSIS — M46.98 INFLAMMATORY SPONDYLOPATHY OF SACRAL REGION (H): ICD-10-CM

## 2017-08-02 LAB
CRP SERPL-MCNC: <2.9 MG/L (ref 0–8)
ERYTHROCYTE [SEDIMENTATION RATE] IN BLOOD BY WESTERGREN METHOD: 37 MM/H (ref 0–20)

## 2017-08-02 PROCEDURE — 85652 RBC SED RATE AUTOMATED: CPT | Performed by: FAMILY MEDICINE

## 2017-08-02 PROCEDURE — 99214 OFFICE O/P EST MOD 30 MIN: CPT | Performed by: FAMILY MEDICINE

## 2017-08-02 PROCEDURE — 36415 COLL VENOUS BLD VENIPUNCTURE: CPT | Performed by: FAMILY MEDICINE

## 2017-08-02 PROCEDURE — 86140 C-REACTIVE PROTEIN: CPT | Performed by: FAMILY MEDICINE

## 2017-08-02 NOTE — NURSING NOTE
"Chief Complaint   Patient presents with     RECHECK     rash and chest pain       Initial /80  Pulse 86  Temp 98.3  F (36.8  C) (Oral)  Resp 12  Wt 117 lb (53.1 kg)  BMI 24.45 kg/m2 Estimated body mass index is 24.45 kg/(m^2) as calculated from the following:    Height as of 7/5/17: 4' 10\" (1.473 m).    Weight as of this encounter: 117 lb (53.1 kg).  Medication Reconciliation: complete   Demetrio Sales      "

## 2017-08-02 NOTE — LETTER
[facility]  [address] [place], [address]  [phone]        August 2, 2017    Zo Qureshi                                                                                                                                                       [address] [facility] [address]            To Whom It May Concern,   Zo Qureshi is a patient of mine.   She continues to suffer from joint pain of upper extremities and back.   She continues to undergo treatment for this.   I would like to see if she could return to work part time no more than 4 hours per day.   She can not do any repetitive work with her upper extremities and no lifting more than 10 lbs.             Sincerely,    Terri Robles M.D.

## 2017-08-02 NOTE — PROGRESS NOTES
SUBJECTIVE:                                                    Zo Qureshi is a 42 year old female who presents to clinic today for the following health issues:      Follow up on Rash, Neck and R arm pain and Chest pain.  Rash is improving.  Chest pain continues intermittently, topical cream does help.  She now complains of bilateral pain in lower back.   She is using over the counter menthol patches to help with this pain.   The PT advised stopping for now since insurance would not pay for both PT and massage therapy and PT was not making any gains over the last 2 months.   She has not set up appt yet with massage therapist.   She has appt with neurology.   She has had NEG EMG for her right arm and CTS.   She continues to have right arm and hand and wrist and shoulder pain.        Past Medical History:   Diagnosis Date     HSIL on Pap smear 09/21/11    MARTA 2 and 3     INFLAM SPONDYLOPATHY NOS   1/7/2008     Leukocytopenia 3/10/2014     Leukocytopenia      Tendinosis      Thrombocytopenia (H) 3/10/2014     Thrombocytopenia (H)      Unspecified closed fracture of pelvis 2000    left fracture of pelvis after delivery       Past Surgical History:   Procedure Laterality Date     C NONSPECIFIC PROCEDURE  10/00    after delivery 2 units of prbcs secondary to placenta     LEEP TX, CERVICAL  12/19/11    MARTA II     TUBAL LIGATION  5/2009    laparoscopic tubal ligation       MEDICATIONS:  Current Outpatient Prescriptions   Medication     hydrocortisone (WESTCORT) 0.2 % cream     diclofenac (VOLTAREN) 1 % GEL topical gel     traMADol (ULTRAM) 50 MG tablet     fluticasone (FLONASE) 50 MCG/ACT spray     docusate sodium (COLACE) 100 MG tablet     topiramate (TOPAMAX) 25 MG tablet     loratadine (CLARITIN) 10 MG tablet     PATADAY 0.2 % SOLN     clindamycin-benzoyl peroxide (BENZACLIN) gel     No current facility-administered medications for this visit.        SOCIAL HISTORY:  Social History   Substance Use Topics     Smoking  status: Never Smoker     Smokeless tobacco: Never Used     Alcohol use 0.0 oz/week     0 Standard drinks or equivalent per week      Comment: occasionally       Family History   Problem Relation Age of Onset     Hypertension Father      Lipids Father      Hypertension Mother      Lipids Mother      DIABETES No family hx of      Coronary Artery Disease No family hx of      Hyperlipidemia No family hx of      CEREBROVASCULAR DISEASE No family hx of      Breast Cancer No family hx of      Colon Cancer No family hx of      Prostate Cancer No family hx of      Other Cancer No family hx of      Depression No family hx of      Anxiety Disorder No family hx of      MENTAL ILLNESS No family hx of      Substance Abuse No family hx of      Anesthesia Reaction No family hx of      Asthma No family hx of      OSTEOPOROSIS No family hx of      Genetic Disorder No family hx of      Thyroid Disease No family hx of      Obesity No family hx of      Unknown/Adopted No family hx of        Objective:  Blood pressure 136/80, pulse 86, temperature 98.3  F (36.8  C), temperature source Oral, resp. rate 12, weight 117 lb (53.1 kg), not currently breastfeeding.  HEENT:  TM's are clear bilaterally.  Oropharynx is clear without tonsillar hypertrophy or exudate.  Conjuctiva are clear.  Neck:  There is no lymphadenopathy or thyroid tenderness or enlargement  Chest: Clear to auscultation bilaterally.  No wheezes, rales or retractions.  CV: Regular rate and rhythm without murmurs, rubs or gallops.  Patient with tender spots around shoulder girdle both sides and neck.   She is also tender along her ventral forearm    Assessment:  1. Over the course of the last few months the patient has had rotating areas of pain.  I wonder if her spondyloarthropathy is playing a role in this at all    Plan:  1. See [facility] orders for labs  2. Will be seeing neurology  3. If neurology does not find neurologist issue, will have her see her rheumatologist  4.   Will  have her go back to work part time and restricted work  5. Follow up in 1 month and as needed

## 2017-08-02 NOTE — MR AVS SNAPSHOT
After Visit Summary   8/2/2017    Zo Qureshi    MRN: [id number]           Patient Information     Date Of Birth          1975        Visit Information        Provider Department      8/2/2017 10:30 AM Terri Baron MD [facility]        Today's Diagnoses     Bilateral back pain, unspecified back location, unspecified chronicity    -  1    Inflammatory spondylopathy of sacral region (H)           Follow-ups after your visit        Additional Services     RHEUMATOLOGY REFERRAL       Your provider has referred you to: [facility]:  [facility] [place] Janel Mart[facility]nez   [phone] [email or web address]    Please be aware that coverage of these services is subject to the terms and limitations of your health insurance plan.  Call member services at your health plan with any benefit or coverage questions.      Please bring the following with you to your appointment:    (1) Any X-Rays, CTs or MRIs which have been performed.  Contact the facility where they were done to arrange for  prior to your scheduled appointment.    (2) List of current medications   (3) This referral request   (4) Any documents/labs given to you for this referral                  Your next 10 appointments already scheduled     Aug 03, 2017 11:00 AM CDT   [facility]   [facility] ([facility] [place]  )    [address]  [address]  [place] [address]   [phone]            Aug 10, 2017  9:00 AM CDT   [facility]   [facility] ([facility] [place]  )    [address] [place] [address]   [phone]              Who to contact     If you have questions or need follow up information about today's clinic visit or your schedule please contact [facility] directly at [phone].  Normal or non-critical lab and imaging results will be communicated to you by MyChart, letter or phone within 4 business days after the  clinic has received the results. If you do not hear from us within 7 days, please contact the clinic through [employer] or phone. If you have a critical or abnormal lab result, we will notify you by phone as soon as possible.  Submit refill requests through [employer] or call your pharmacy and they will forward the refill request to us. Please allow 3 business days for your refill to be completed.          Additional Information About Your Visit        [employer] Information     [employer] gives you secure access to your electronic health record. If you see a primary care provider, you can also send messages to your care team and make appointments. If you have questions, please call your primary care clinic.  If you do not have a primary care provider, please call [phone] and they will assist you.        Care EveryWhere ID     This is your Care EveryWhere ID. This could be used by other organizations to access your [place] medical records  [id number]        Your Vitals Were     Pulse Temperature Respirations BMI (Body Mass Index)          86 98.3  F (36.8  C) (Oral) 12 24.45 kg/m2         Blood Pressure from Last 3 Encounters:   08/02/17 136/80   07/05/17 122/76   06/09/17 128/79    Weight from Last 3 Encounters:   08/02/17 117 lb (53.1 kg)   07/05/17 115 lb (52.2 kg)   05/08/17 116 lb (52.6 kg)              We Performed the Following     CRP inflammation     ESR: Erythrocyte sedimentation rate     RHEUMATOLOGY REFERRAL        Primary Care Provider Office Phone # Fax #    Terri COLLINS MD [place] [phone]       [address] [address]        Equal Access to Services     [facility]: Hadii aad ku hadasho Soomaali, waaxda luqadaha, qaybta kaalmada adeegyada, isabella gibson. So [facility] [phone].    ATENCIÓN: Si habla español, tiene a vogel disposición servicios gratuitos de asistencia lingüística. Llame al [phone].    We comply with  applicable federal civil rights laws and [place] laws. We do not discriminate on the basis of race, color, national origin, age, disability sex, sexual orientation or gender identity.            Thank you!     Thank you for choosing [facility]  for your care. Our goal is always to provide you with excellent care. Hearing back from our patients is one way we can continue to improve our services. Please take a few minutes to complete the written survey that you may receive in the mail after your visit with us. Thank you!             Your Updated Medication List - Protect others around you: Learn how to safely use, store and throw away your medicines at www.disposemymeds.org.          This list is accurate as of: 8/2/17 11:08 AM.  Always use your most recent med list.                   Brand Name Dispense Instructions for use Diagnosis    clindamycin-benzoyl peroxide gel    BENZACLIN    50 g    Apply to  Affected area initially once daily for 2 weeks and then increase to 2 times daily if tolerated    Acne       diclofenac 1 % Gel topical gel    VOLTAREN    100 g    Use small amount and rub into area of chest that is hurting 1-2 times daily as needed    Costochondritis, Atypical chest pain       docusate sodium 100 MG tablet    COLACE    60 tablet    Take 100 mg by mouth daily    External hemorrhoids       fluticasone 50 MCG/ACT spray    FLONASE    16 g    Spray 1-2 sprays into both nostrils daily    Throat pain       hydrocortisone 0.2 % cream    WESTCORT    45 g    Apply sparingly to affected area 2 times daily as needed.    Rash       loratadine 10 MG tablet    CLARITIN    30 tablet    Take 1 tablet (10 mg) by mouth daily    Hives       PATADAY 0.2 % Soln   Generic drug:  olopatadine HCl      Reported on 5/1/2017        topiramate 25 MG tablet    TOPAMAX    60 tablet    Take 1 tablet (25 mg) at bedtime for 1 week, then 1 tablet twice daily    Episodic tension-type headache, not intractable,  Weakness of shoulder, Cervical radiculopathy       traMADol 50 MG tablet    ULTRAM    40 tablet    Take 1-2 tablets ( mg) by mouth every 6 hours as needed for moderate pain    Acute pain of both shoulders

## 2017-08-08 NOTE — PROGRESS NOTES
[place] - Rheumatology Clinic Visit     Zo Qureshi MRN# [id number]   YOB: 1975    Primary care provider: Terri Baron  Aug 28, 2017          Assessment and Plan:   # Sacroiliitis (MRI evidence 2010) with negative HLA B27; Polyarthralgia in MCPs and also wrists  # Elevated sed rate  # Pleuritic chest pain    Patient was evaluated by Dr. Cuba at [facility] rheumatology in 2010. Humira and enbrel were tried. But patient did not tolerate. So it was not continued for adequate duration to assess the response.   Since there is MCP involvement, we will check CCP antibody as well.   We will get a repeat MRI of SI joints to follow up on the sacroiliitis.   After the MRI, the patient would start a 2 week course of low dose prednisone 7.5mg PO daily for symptomatic benefit and also to see the response. Higher dose is avoided because of patient's concern for med induced side effects. Side effects discussed. Prednisone is usually not the first choice for spondyloarthropathy however since patient has MCP involvement, I would like to see the response to prednisone first.     The labs, imaging from patient records are reviewed.     I will be back in touch with the patient through mychart/letter when results are available.     Return in about 3 weeks (around 9/18/2017).    Orders Placed This Encounter   Procedures     MRI Pelvis w & w/o contrast     Cyclic Citrullinated Peptide Antibody IgG     AST     ALT       There are no discontinued medications.  Current Outpatient Prescriptions   Medication Sig Dispense Refill     predniSONE (DELTASONE) 5 MG tablet 7.5mg PO daily X 2 weeks and then stop 21 tablet 0     hydrocortisone (WESTCORT) 0.2 % cream Apply sparingly to affected area 2 times daily as needed. 45 g 0     diclofenac (VOLTAREN) 1 % GEL topical gel Use small amount and rub into area of chest that is hurting 1-2 times daily as needed 100 g 1     traMADol (ULTRAM) 50 MG tablet Take 1-2 tablets ( mg) by mouth  every 6 hours as needed for moderate pain 40 tablet 0     fluticasone (FLONASE) 50 MCG/ACT spray Spray 1-2 sprays into both nostrils daily 16 g 3     docusate sodium (COLACE) 100 MG tablet Take 100 mg by mouth daily 60 tablet 1     topiramate (TOPAMAX) 25 MG tablet Take 1 tablet (25 mg) at bedtime for 1 week, then 1 tablet twice daily 60 tablet 1     loratadine (CLARITIN) 10 MG tablet Take 1 tablet (10 mg) by mouth daily 30 tablet 1     PATADAY 0.2 % SOLN Reported on 5/1/2017  6     clindamycin-benzoyl peroxide (BENZACLIN) gel Apply to  Affected area initially once daily for 2 weeks and then increase to 2 times daily if tolerated 50 g 11       Julius Garay MD  [facility]          Active Problem List:     Patient Active Problem List    Diagnosis Date Noted     Right peroneal tendinosis 04/26/2017     Priority: Medium     Mechanical problems with limbs 03/01/2017     Priority: Medium     Paresthesia of both hands 02/07/2017     Priority: Medium     Finger pain, right 02/07/2017     Priority: Medium     Vitamin D deficiency 01/05/2017     Priority: Medium     Influenza B 03/03/2014     Priority: Medium     Acne 09/10/2012     Priority: Medium     S/P LEEP of cervix 12/19/2011     Priority: Medium     HSIL on Pap smear 09/21/2011     Priority: Medium     HGSIL confirmed with biopsy=repeat pap by May 2012  12/19/11 LEEP MARTA II  4/9/12 NIL pap.  Per OV notes, pt to repeat pap in 4 months  8/6/12: NIL pap. Per MD plan pap in 4 months.  12/10/12 NIL pap. Plan per MD, repeat in one year.   02/05/14 Pap reminder letter sent through [employer].  05/21/14 Pap= Normal, Neg HPV. Repeat co-test in 1 yr.  09/21/15 Pap= NIL, Neg HPV. Co-test in 3 yrs per ASCCP guidelines         Hypercholesterolemia 10/31/2010     Priority: Medium     Disorder of SI (sacroiliac) joint 09/15/2008     Priority: Medium     Inflammatory spondylopathy (H) 01/07/2008     Priority: Medium     Problem list name updated by automated  process. Provider to review              History of Present Illness:     Chief Complaint   Patient presents with     Establish Care       August 8, 2017  Have you ever seen a rheumatologist Yes Who Dr. Cuba  When 4/2010  Joint pain history  Onset: has pain that rotates around her body, primary care wanted her to see rheumatology.   Involved joints: back pain arms, shoulders, neck pain.knees Had epidural injection in June 2017  Pain scale:  7.5/10     Wakes the patient from sleep : Yes  Morning stiffness:Yes for 15 minutes  Meds used:tramadol, voltaren gel; tried humira and enbrel around 2010; had flu-like symptoms after trying couple of shots of humira and enbrel.      Interim history  Since last visit:  1. Infections - Yes/ had a cold a couple weeks ago  2. New symptoms/medical problem - No  3. Any side effects from Rheum medications -NA  3. ER visits/Hospitalizations/surgeries - No  4. Last PCP visit: 8/2/17     Fatigue during flare ups of polyarthralgia    Wt Readings from Last 4 Encounters:   08/28/17 53.8 kg (118 lb 9.6 oz)   08/02/17 53.1 kg (117 lb)   07/05/17 52.2 kg (115 lb)   05/08/17 52.6 kg (116 lb)     H/o pleuritic chest pain in middle with taking deep breaths.     No h/o ,unintentional weight loss, loss of appetite, fevers, persistent rash, swollen glands  No family or personal history of psoriasis, ulcerative colitis or chron's disease. No h/o iritis.   Patient denies any raynauds  No h/o arterial/venous thrombosis in the past  No h/o persistent shortness of breath, cough  No h/o persistent nausea, vomiting, constipation, diarrhea, abdominal pain  No h/o hematochezia (except with constipation), hematuria, hemoptysis, hematemesis  No h/o seizures or strokes  No h/o cancer    BP Readings from Last 3 Encounters:   08/28/17 96/62   08/02/17 136/80   07/05/17 122/76              Review of Systems:   Complete ROS negative except for symptoms mentioned in the HPI          Past Medical History:     Past  Medical History:   Diagnosis Date     HSIL on Pap smear 09/21/11    MARTA 2 and 3     INFLAM SPONDYLOPATHY NOS   1/7/2008     Leukocytopenia 3/10/2014     Leukocytopenia      Tendinosis      Thrombocytopenia (H) 3/10/2014     Thrombocytopenia (H)      Unspecified closed fracture of pelvis 2000    left fracture of pelvis after delivery     Past Surgical History:   Procedure Laterality Date     C NONSPECIFIC PROCEDURE  10/00    after delivery 2 units of prbcs secondary to placenta     LEEP TX, CERVICAL  12/19/11    MARTA II     TUBAL LIGATION  5/2009    laparoscopic tubal ligation            Social History:     Social History     Occupational History      [employer]     Social History Main Topics     Smoking status: Never Smoker     Smokeless tobacco: Never Used     Alcohol use 0.0 oz/week     0 Standard drinks or equivalent per week      Comment: rarely     Drug use: No     Sexual activity: Not Currently     Partners: Male     Birth control/ protection: None      Comment: tubal            Family History:     Family History   Problem Relation Age of Onset     Hypertension Father      Lipids Father      Hypertension Mother      Lipids Mother      DIABETES No family hx of      Coronary Artery Disease No family hx of      Hyperlipidemia No family hx of      CEREBROVASCULAR DISEASE No family hx of      Breast Cancer No family hx of      Colon Cancer No family hx of      Prostate Cancer No family hx of      Other Cancer No family hx of      Depression No family hx of      Anxiety Disorder No family hx of      MENTAL ILLNESS No family hx of      Substance Abuse No family hx of      Anesthesia Reaction No family hx of      Asthma No family hx of      OSTEOPOROSIS No family hx of      Genetic Disorder No family hx of      Thyroid Disease No family hx of      Obesity No family hx of      Unknown/Adopted No family hx of             Allergies:     Allergies   Allergen Reactions     Advil [Ibuprofen  "Micronized]      Rash      Contrast Dye      Percocet [Codeine] Nausea and Vomiting and Itching            Medications:     Current Outpatient Prescriptions   Medication Sig Dispense Refill     predniSONE (DELTASONE) 5 MG tablet 7.5mg PO daily X 2 weeks and then stop 21 tablet 0     hydrocortisone (WESTCORT) 0.2 % cream Apply sparingly to affected area 2 times daily as needed. 45 g 0     diclofenac (VOLTAREN) 1 % GEL topical gel Use small amount and rub into area of chest that is hurting 1-2 times daily as needed 100 g 1     traMADol (ULTRAM) 50 MG tablet Take 1-2 tablets ( mg) by mouth every 6 hours as needed for moderate pain 40 tablet 0     fluticasone (FLONASE) 50 MCG/ACT spray Spray 1-2 sprays into both nostrils daily 16 g 3     docusate sodium (COLACE) 100 MG tablet Take 100 mg by mouth daily 60 tablet 1     topiramate (TOPAMAX) 25 MG tablet Take 1 tablet (25 mg) at bedtime for 1 week, then 1 tablet twice daily 60 tablet 1     loratadine (CLARITIN) 10 MG tablet Take 1 tablet (10 mg) by mouth daily 30 tablet 1     PATADAY 0.2 % SOLN Reported on 5/1/2017  6     clindamycin-benzoyl peroxide (BENZACLIN) gel Apply to  Affected area initially once daily for 2 weeks and then increase to 2 times daily if tolerated 50 g 11            Physical Exam:   Blood pressure 96/62, pulse 86, temperature 98.9  F (37.2  C), temperature source Oral, height 1.486 m (4' 10.5\"), weight 53.8 kg (118 lb 9.6 oz), SpO2 99 %, not currently breastfeeding.  Wt Readings from Last 4 Encounters:   08/28/17 53.8 kg (118 lb 9.6 oz)   08/02/17 53.1 kg (117 lb)   07/05/17 52.2 kg (115 lb)   05/08/17 52.6 kg (116 lb)       Constitutional: well-developed, appearing stated age; cooperative  Eyes: PERRLA, normal conjunctiva, sclera  ENT: nl external ears, nose, lips.No mucous membrane lesions, normal saliva pool  Neck: no cervical lymphadenopathy  Resp: lungs clear to auscultation,   CV: RRR, no added sounds, no edema  GI: Abdomen soft and no " tenderness  : not tested  Lymph: no cervical, supraclavicular or epitrochlear nodes  MS: Tenderness in right wrist, MCPs (R> L). ROM in right wrist is significantly restricted passively.   Tenderness in midline lumbar area, paralumbar area and also in SI area.   All shoulder, elbow, wrist, MCP/PIP/DIP, hip, knee, ankle, and foot MTP/IP joints were examined and  found normal. No active synovitis or deformity. Full ROM.  Fist 100%.  No dactylitis,  tenosynovitis, enthesopathy.  Skin: no nail pitting; no rash in exposed areas  Psych: nl judgement, orientation, memory, affect.         Data:   Reviewed following labs:  CBC RESULTS:   Recent Labs   Lab Test  01/05/17   1322   WBC  6.8   RBC  4.19   HGB  12.0   HCT  37.4   MCV  89   MCH  28.6   MCHC  32.1   RDW  12.7   PLT  269       Liver Function Studies -   Recent Labs   Lab Test  09/26/15   1153   PROTTOTAL  8.4   ALBUMIN  4.2   BILITOTAL  0.3   ALKPHOS  60   AST  19   ALT  20       Creatinine   Date Value Ref Range Status   09/26/2015 0.64 0.52 - 1.04 mg/dL Final   ]    No results found for: URIC]    ESR/CRP  Recent Labs   Lab Test  08/02/17   1117  09/26/15   1153  05/21/14   1710   04/16/10   1140   SED  37*  27*  33*   < >  21*   CRP  <2.9   --    --    --   7.1    < > = values in this interval not displayed.       HLA-B27  No results for input(s): C35VMHHTSJ, B1 in the last 71684 hours.    RF/CCP  Recent Labs   Lab Test  05/21/14   1710   RHF  <20       RAMÍREZ/RNP/Sm/SSA/SSB  Recent Labs   Lab Test  05/21/14   1710   BOO  <1.0  Interpretation:  Negative           Julius Garay MD    [facility]

## 2017-08-10 ENCOUNTER — THERAPY VISIT (OUTPATIENT)
Dept: OCCUPATIONAL THERAPY | Facility: CLINIC | Age: 42
End: 2017-08-10
Payer: OTHER MISCELLANEOUS

## 2017-08-10 DIAGNOSIS — R20.2 PARESTHESIA OF BOTH HANDS: ICD-10-CM

## 2017-08-10 DIAGNOSIS — R29.898 MECHANICAL PROBLEMS WITH LIMBS: ICD-10-CM

## 2017-08-10 DIAGNOSIS — M79.644 FINGER PAIN, RIGHT: Primary | ICD-10-CM

## 2017-08-10 PROCEDURE — 97112 NEUROMUSCULAR REEDUCATION: CPT | Mod: GO | Performed by: OCCUPATIONAL THERAPIST

## 2017-08-10 PROCEDURE — 97140 MANUAL THERAPY 1/> REGIONS: CPT | Mod: GO | Performed by: OCCUPATIONAL THERAPIST

## 2017-08-14 ENCOUNTER — THERAPY VISIT (OUTPATIENT)
Dept: OCCUPATIONAL THERAPY | Facility: CLINIC | Age: 42
End: 2017-08-14
Payer: OTHER MISCELLANEOUS

## 2017-08-14 DIAGNOSIS — R20.2 PARESTHESIA OF BOTH HANDS: ICD-10-CM

## 2017-08-14 DIAGNOSIS — R29.898 MECHANICAL PROBLEMS WITH LIMBS: ICD-10-CM

## 2017-08-14 DIAGNOSIS — M79.644 FINGER PAIN, RIGHT: ICD-10-CM

## 2017-08-14 PROCEDURE — 97112 NEUROMUSCULAR REEDUCATION: CPT | Mod: GO | Performed by: OCCUPATIONAL THERAPIST

## 2017-08-14 PROCEDURE — 97140 MANUAL THERAPY 1/> REGIONS: CPT | Mod: GO | Performed by: OCCUPATIONAL THERAPIST

## 2017-08-14 NOTE — PROGRESS NOTES
Hand Therapy Progress Note  Current Date:  8/14/17  Reporting Period: 6/29/17 to 8/14/17    S:  Subjective changes as noted by patient: Every time my shoulder hurts, my hand acts up.  It is more on the R than the left.  Better with rest and with medicine.    Functional changes noted by patient: with increased use, shoulder hurts causing hand to hurt.  Response to previous treatment: It felt better.     Patient has noted adverse reaction to:   None     Objective:  Sensation:  Intermittant numbness and tingling bilaterally. Most prominent in R index, middle, and ring finger, and also along R anterior forearm. 3/17/17: Intermittant numbness and tingling bilaterally. Most prominent in R index, middle, and ring finger, and also along R anterior forearm.  4/18/17: Numbness and tingling in index, long, and ring fingers bilaterally. 5/24/17:  N/T in IF, LF, and RF intermittently.   6/29/17: Predominately in right index, middle, and ring finger.  Reports left is not as bad as right.    8/14/17: Intermittent numbness and tingling present in the index, long, and ring fingers R>L    Pain Level Report: On scale 0-10/10  Date 3/17/17 3/17/17 5/24/17 5/24/17 6/29/17 6/29/17 8/14/17 8/14/17   side R L R L R L R L   Overall 7 (no meds) 3-4 7 6 7 5 8 5   At Rest 4-5 3-4 6 6 5-6 with meds 4 0 0   With Activity 7 6 8 7 8 6 8-9 6     Primary Report: location and description  Date 3/17/17 3/17/17 6/29/17 6/29/17 8/14/17 8/14/17   Side R L R L R L   Location Dorsal wrist, and Ant. Forearm, and first three fingers Ant. Forearm and first 3 fingers Medial volar FA, dorsal wrist Medial volar FA Medial volar FA, dorsal wrist Medial volar FA   Radiation R shoulder shooting down to hand (more than left) shoulder shooting down to hand Shoulder shooting down to hand Shoulder shooting down to hand Shoulder shooting down to hand Shoulder shooting down to hand   Pain Quality Pinching  Pinching  tightness tightness tightness Tightness   Frequency  Constant Constant Constant Constant Intermittent Intermittent   Duration Worse with activity Worse with activity Worse with activity Worse with activity Dependent on activity Dependent on activity   Exacerbated by Wrist flexion, finger flexion (more stiff and painful than L) Wrist flexion, finger flexion General use General use General use General use   Relieved by Rest, heat Rest, heat Rest, heat pad, pain meds, orthosis Rest, heat pad, pain meds, orthosis Rest, pain meds Rest, pain meds   Progression since onset Gradually getting better Gradually getting better Gradually getting better Gradually getting better Improving Improving     Range of Motion Wrist AROM (PROM):  Date 3/17/17 3/17/17 5/24/17 5/24/17 6/29/17 6/29/17 8/14/17 8/14/17   Side R L R L R L R L   Ext 65 67 55 64 50 60 57 65   Flex 36 45 29 29 45 47 28 33   UD 25 25 20 25 20 16 21 21   RD 19 15 5 7 10 15 12 12     Special Tests:  Date 3/17/17 3/17/17 5/24/17 5/24/17 6/29/17 6/29/17 8/14/17 8/14/17   Side R L R L R L R L   Phalens + at ~10 sec - +@20 sec - + @ 30 sec - + at 10 sec NT   Tinels at CT   + + + - + NT   Tinels at Pronator   NT NT NT NT NT NT   Median Nerve ULTT ~15% ~15% ~25% ~25% ~25% ~15% ~40% ~25%   Paresthesias + + + + + + + +     STRENGTH: (Measured in pounds, pain scale 0-10/10)    Date 2/7/2017 3/17/17 4/18/17 5/24/17 6/29/17 8/14/17   Trials Left Right Left Right Left Right Left Right Left Right Left Right   1 25 6 10 8 20 16 15 10 16 10 24 6   2               3               Avg               Pain 0 7 6 8 6 6 5 6 7 5 4 5     3 Point Pinch  Date 2/7/2017 3/17/17 4/18/17 5/24/17 6/29/17 8/14/17   Trials Left Right Left Right Left Right Left Right Left Right Left Right   1 9 4 4 4 4 3 2 2 4 2 5 2   2               3               Avg               Pain 0 6 6 6-7 2 2 3 5 0 0 0 0     Lateral Pinch  Date 2/7/2017 3/17/17 4/18/17 5/24/17 6/29/17 8/14/17   Trials Left Right Left Right Left Right Left Right Left Right Left Right   1 10  6 6 6 4 3 4 4 4 2 4 2   2               3               Avg               Pain 0 6 0 7 1 2 0 0 0 0 0 0     Assessment:  Overall, pain is improved with it now being more intermittent.  Wrist AROM has improved.  Paresthesias persist.  Minimal change in strength overall (slight decrease on the right).  Symptoms in the right hand increase when the patient's shoulder is more painful indicating possible correlation between shoulder and hand pain.    Overall Assessment:  Patient is not progressing as expected.  Patient would benefit from continued therapy to achieve rehab potential  STG/LTG:  STGoals have been reviewed;  see goal sheet for details and updates.  LTGoals have been reviewed;  see goal sheet for details and updates.    I have re-evaluated this patient and find that the nature, scope, duration and intensity of the therapy is appropriate for the medical condition of the patient.    P: Frequency:  Pt to see MD (rheumatologist and neurologist).  Will con't per MD order if pt returns.  If no return by 11/1/17, assume all goals are me to pt satisfaction and D/C [facility].      Treatment Plan:    Modalities:  None   Therapeutic Exercise: Tendon Gliding ex, blocking  Neuromuscular Techniques: Median nerve glides both active and passive (in the clinic)  Manual Techniques:, Myofascial release of the forearm extensors and flexors, wrist mobilization techniques  Orthosis:  Wrist in netural orthosis for night wear.    Home Program:   Orthosis: Static orthosis and Forearm based orthosis Wrist in neutral orthosis at night.    Activity: Avoid activities that exacerbate symptoms in the median nerve.  Avoid prolonged flexion or extension of the wrist.    Discharge Plan:    Achieve all LTG.  Independent in home treatment program.  Reach maximal therapeutic benefit.    Next Visit:  MFR  Ergonomics education  Nerve gliding

## 2017-08-24 PROBLEM — M25.511 BILATERAL SHOULDER PAIN: Status: RESOLVED | Noted: 2017-01-06 | Resolved: 2017-08-24

## 2017-08-24 PROBLEM — R29.898 WEAKNESS OF SHOULDER: Status: RESOLVED | Noted: 2017-01-06 | Resolved: 2017-08-24

## 2017-08-24 PROBLEM — M54.50 BILATERAL LOW BACK PAIN WITHOUT SCIATICA: Status: RESOLVED | Noted: 2017-02-06 | Resolved: 2017-08-24

## 2017-08-24 PROBLEM — M25.512 BILATERAL SHOULDER PAIN: Status: RESOLVED | Noted: 2017-01-06 | Resolved: 2017-08-24

## 2017-08-24 PROBLEM — M54.12 CERVICAL RADICULOPATHY: Status: RESOLVED | Noted: 2017-01-13 | Resolved: 2017-08-24

## 2017-08-28 ENCOUNTER — OFFICE VISIT (OUTPATIENT)
Dept: RHEUMATOLOGY | Facility: CLINIC | Age: 42
End: 2017-08-28
Attending: FAMILY MEDICINE
Payer: COMMERCIAL

## 2017-08-28 VITALS
DIASTOLIC BLOOD PRESSURE: 62 MMHG | HEIGHT: 59 IN | SYSTOLIC BLOOD PRESSURE: 96 MMHG | HEART RATE: 86 BPM | OXYGEN SATURATION: 99 % | TEMPERATURE: 98.9 F | BODY MASS INDEX: 23.91 KG/M2 | WEIGHT: 118.6 LBS

## 2017-08-28 DIAGNOSIS — M25.50 PAIN IN JOINT, MULTIPLE SITES: Primary | ICD-10-CM

## 2017-08-28 LAB
ALT SERPL W P-5'-P-CCNC: 35 U/L (ref 0–50)
AST SERPL W P-5'-P-CCNC: 33 U/L (ref 0–45)

## 2017-08-28 PROCEDURE — 84460 ALANINE AMINO (ALT) (SGPT): CPT | Performed by: INTERNAL MEDICINE

## 2017-08-28 PROCEDURE — 84450 TRANSFERASE (AST) (SGOT): CPT | Performed by: INTERNAL MEDICINE

## 2017-08-28 PROCEDURE — 36415 COLL VENOUS BLD VENIPUNCTURE: CPT | Performed by: INTERNAL MEDICINE

## 2017-08-28 PROCEDURE — 99204 OFFICE O/P NEW MOD 45 MIN: CPT | Performed by: INTERNAL MEDICINE

## 2017-08-28 PROCEDURE — 86200 CCP ANTIBODY: CPT | Performed by: INTERNAL MEDICINE

## 2017-08-28 RX ORDER — PREDNISONE 5 MG/1
TABLET ORAL
Qty: 21 TABLET | Refills: 0 | Status: SHIPPED | OUTPATIENT
Start: 2017-08-28 | End: 2017-11-13

## 2017-08-28 ASSESSMENT — ROUTINE ASSESSMENT OF PATIENT INDEX DATA (RAPID3)
RAPID3 INTERPRETATION: HIGH > 12.0
TOTAL RAPID3 SCORE: 17.8

## 2017-08-28 NOTE — NURSING NOTE
"Chief Complaint   Patient presents with     [facility] Care       Initial BP 96/62 (BP Location: Right arm, Patient Position: Chair, Cuff Size: Adult Regular)  Pulse 86  Temp 98.9  F (37.2  C) (Oral)  Ht 1.486 m (4' 10.5\")  Wt 53.8 kg (118 lb 9.6 oz)  SpO2 99%  BMI 24.37 kg/m2 Estimated body mass index is 24.37 kg/(m^2) as calculated from the following:    Height as of this encounter: 1.486 m (4' 10.5\").    Weight as of this encounter: 53.8 kg (118 lb 9.6 oz).  Medication Reconciliation: complete    Have you ever seen a rheumatologist Yes Who Dr. Cuba  When 4/2010  Joint pain history  Onset: has pain that rotates around her body, primary care wanted her to see rheumatology.   Involved joints: back pain arms, shoulders, neck pain.knees Had epidural injection in June 2017  Pain scale:  7.5/10     Wakes the patient from sleep : Yes  Morning stiffness:Yes for 15 minutes  Meds used:tramadol, voltaren gel    Interim history  Since last visit:  1. Infections - Yes/ had a cold a couple weeks ago  2. New symptoms/medical problem - No  3. Any side effects from Rheum medications -NA  3. ER visits/Hospitalizations/surgeries - No  4. Last PCP visit: 8/2/17  Wt Readings from Last 4 Encounters:   08/28/17 53.8 kg (118 lb 9.6 oz)   08/02/17 53.1 kg (117 lb)   07/05/17 52.2 kg (115 lb)   05/08/17 52.6 kg (116 lb)     BP Readings from Last 3 Encounters:   08/28/17 96/62   08/02/17 136/80   07/05/17 122/76       "

## 2017-08-28 NOTE — MR AVS SNAPSHOT
After Visit Summary   8/28/2017    Zo Qureshi    MRN: [id number]           Patient Information     Date Of Birth          1975        Visit Information        Provider Department      8/28/2017 1:30 PM Julius Garay MD [facility]        Today's Diagnoses     Pain in joint, multiple sites    -  1       Follow-ups after your visit        Follow-up notes from your care team     Return in about 3 weeks (around 9/18/2017).      Your next 10 appointments already scheduled     Sep 20, 2017 10:30 AM CDT   [facility]    [address] [address] [phone]              Future tests that were ordered for you today     Open Future Orders        Priority Expected Expires Ordered    MRI Pelvis w & w/o contrast Routine  8/23/2018 8/28/2017            Who to contact     If you have questions or need follow up information about today's clinic visit or your schedule please contact [facility] directly at [phone].  Normal or non-critical lab and imaging results will be communicated to you by [employer], letter or phone within 4 business days after the clinic has received the results. If you do not hear from us within 7 days, please contact the clinic through [employer] or phone. If you have a critical or abnormal lab result, we will notify you by phone as soon as possible.  Submit refill requests through [employer] or call your pharmacy and they will forward the refill request to us. Please allow 3 business days for your refill to be completed.          Additional Information About Your Visit        [employer] Information     [employer] gives you secure access to your electronic health record. If you see a primary care provider, you can also send messages to your care team and make appointments. If you have questions, please call your primary care clinic.  If you do not have a  "primary care provider, please call [phone] and they will assist you.        Care EveryWhere ID     This is your Care EveryWhere ID. This could be used by other organizations to access your [facility] medical records  [id number]        Your Vitals Were     Pulse Temperature Height Pulse Oximetry BMI (Body Mass Index)       86 98.9  F (37.2  C) (Oral) 1.486 m (4' 10.5\") 99% 24.37 kg/m2        Blood Pressure from Last 3 Encounters:   08/28/17 96/62   08/02/17 136/80   07/05/17 122/76    Weight from Last 3 Encounters:   08/28/17 53.8 kg (118 lb 9.6 oz)   08/02/17 53.1 kg (117 lb)   07/05/17 52.2 kg (115 lb)              We Performed the Following     ALT     AST     Cyclic Citrullinated Peptide Antibody IgG          Today's Medication Changes          These changes are accurate as of: 8/28/17  2:37 PM.  If you have any questions, ask your nurse or doctor.               Start taking these medicines.        Dose/Directions    predniSONE 5 MG tablet   Commonly known as:  DELTASONE   Used for:  Pain in joint, multiple sites   Started by:  Julius Garay MD        7.5mg PO daily X 2 weeks and then stop   Quantity:  21 tablet   Refills:  0            Where to get your medicines      These medications were sent to [facility] - [address]   [address]  [address], [place] [address]     Phone:  [phone]     predniSONE 5 MG tablet                Primary Care Provider Office Phone # Fax #    Terri Baron [phone] [address] [address]        Equal Access to Services     [facility] : Hadii darlyn De La Cruz, jarad mccullough, qaisabella echavarria. So [facility] [phone].    ATENCIÓN: Si habla español, tiene a vogel disposición servicios gratuitos de asistencia lingüística. Llame al [phone].    We comply with applicable federal civil rights laws and [place] " laws. We do not discriminate on the basis of race, color, national origin, age, disability sex, sexual orientation or gender identity.            Thank you!     Thank you for choosing [facility]  for your care. Our goal is always to provide you with excellent care. Hearing back from our patients is one way we can continue to improve our services. Please take a few minutes to complete the written survey that you may receive in the mail after your visit with us. Thank you!             Your Updated Medication List - Protect others around you: Learn how to safely use, store and throw away your medicines at www.disposemymeds.org.          This list is accurate as of: 8/28/17  2:37 PM.  Always use your most recent med list.                   Brand Name Dispense Instructions for use Diagnosis    clindamycin-benzoyl peroxide gel    BENZACLIN    50 g    Apply to  Affected area initially once daily for 2 weeks and then increase to 2 times daily if tolerated    Acne       diclofenac 1 % Gel topical gel    VOLTAREN    100 g    Use small amount and rub into area of chest that is hurting 1-2 times daily as needed    Costochondritis, Atypical chest pain       docusate sodium 100 MG tablet    COLACE    60 tablet    Take 100 mg by mouth daily    External hemorrhoids       fluticasone 50 MCG/ACT spray    FLONASE    16 g    Spray 1-2 sprays into both nostrils daily    Throat pain       hydrocortisone 0.2 % cream    WESTCORT    45 g    Apply sparingly to affected area 2 times daily as needed.    Rash       loratadine 10 MG tablet    CLARITIN    30 tablet    Take 1 tablet (10 mg) by mouth daily    Hives       PATADAY 0.2 % Soln   Generic drug:  olopatadine HCl      Reported on 5/1/2017        predniSONE 5 MG tablet    DELTASONE    21 tablet    7.5mg PO daily X 2 weeks and then stop    Pain in joint, multiple sites       topiramate 25 MG tablet    TOPAMAX    60 tablet    Take 1 tablet (25 mg) at bedtime for 1 week, then 1  tablet twice daily    Episodic tension-type headache, not intractable, Weakness of shoulder, Cervical radiculopathy       traMADol 50 MG tablet    ULTRAM    40 tablet    Take 1-2 tablets ( mg) by mouth every 6 hours as needed for moderate pain    Acute pain of both shoulders

## 2017-08-29 LAB — CCP AB SER IA-ACNC: 1 U/ML

## 2017-08-29 NOTE — PROGRESS NOTES
Results released to [facility]:  Rheumatoid antibody CCP antibody and liver enzymes are normal    Sincerely    Julius Garay MD  [facility]

## 2017-09-07 ENCOUNTER — TRANSFERRED RECORDS (OUTPATIENT)
Dept: HEALTH INFORMATION MANAGEMENT | Facility: CLINIC | Age: 42
End: 2017-09-07

## 2017-09-12 ENCOUNTER — TRANSFERRED RECORDS (OUTPATIENT)
Dept: HEALTH INFORMATION MANAGEMENT | Facility: CLINIC | Age: 42
End: 2017-09-12

## 2017-09-19 ENCOUNTER — TELEPHONE (OUTPATIENT)
Dept: FAMILY MEDICINE | Facility: CLINIC | Age: 42
End: 2017-09-19

## 2017-09-19 NOTE — TELEPHONE ENCOUNTER
Pt calls, has appointment in am with JAIMIE pt c/o tummy pains on and off x 3 days, worse on 9/17, discussed at length, denies fever, n-v-d, does not interfere with adl's today, discussed UC vs ER vs wait for tomorrow, pt agrees  Ellen Fowler RN, BSN  Message handled by Nurse Triage.

## 2017-09-20 ENCOUNTER — OFFICE VISIT (OUTPATIENT)
Dept: FAMILY MEDICINE | Facility: CLINIC | Age: 42
End: 2017-09-20
Payer: OTHER MISCELLANEOUS

## 2017-09-20 VITALS
BODY MASS INDEX: 23.73 KG/M2 | SYSTOLIC BLOOD PRESSURE: 132 MMHG | OXYGEN SATURATION: 98 % | WEIGHT: 117.7 LBS | TEMPERATURE: 98.2 F | HEART RATE: 80 BPM | DIASTOLIC BLOOD PRESSURE: 80 MMHG | RESPIRATION RATE: 14 BRPM | HEIGHT: 59 IN

## 2017-09-20 DIAGNOSIS — M67.88 RIGHT PERONEAL TENDINOSIS: Primary | ICD-10-CM

## 2017-09-20 DIAGNOSIS — G44.219 EPISODIC TENSION-TYPE HEADACHE, NOT INTRACTABLE: ICD-10-CM

## 2017-09-20 DIAGNOSIS — M46.98 INFLAMMATORY SPONDYLOPATHY OF SACRAL REGION (H): ICD-10-CM

## 2017-09-20 PROCEDURE — 99213 OFFICE O/P EST LOW 20 MIN: CPT | Performed by: FAMILY MEDICINE

## 2017-09-20 NOTE — MR AVS SNAPSHOT
After Visit Summary   9/20/2017    Zo Qureshi    MRN: [id number]           Patient Information     Date Of Birth          1975        Visit Information        Provider Department      9/20/2017 10:30 AM Terri Baron MD [facility]        Today's Diagnoses     Right peroneal tendinosis    -  1    Inflammatory spondylopathy of sacral region (H)        Episodic tension-type headache, not intractable          Care Instructions    Would like to again recommend massage therapy for trigger point and muscle pain          Follow-ups after your visit        Additional Services     RHEUMATOLOGY REFERRAL       Your provider has referred you to: [facility]:  [facility] [phone]  [email or web address]    Please be aware that coverage of these services is subject to the terms and limitations of your health insurance plan.  Call member services at your health plan with any benefit or coverage questions.      Please bring the following with you to your appointment:    (1) Any X-Rays, CTs or MRIs which have been performed.  Contact the facility where they were done to arrange for  prior to your scheduled appointment.    (2) List of current medications   (3) This referral request   (4) Any documents/labs given to you for this referral                  Your next 10 appointments already scheduled     Sep 22, 2017  9:45 AM CDT   Return Visit with Julius Garay MD   [facility] ([facility]    [address] [address] [phone]              Who to contact     If you have questions or need follow up information about today's clinic visit or your schedule please contact [facility] directly at [phone].  Normal or non-critical lab and imaging results will be communicated to you by MyChart, letter or phone within 4  "business days after the clinic has received the results. If you do not hear from us within 7 days, please contact the clinic through [employer] or phone. If you have a critical or abnormal lab result, we will notify you by phone as soon as possible.  Submit refill requests through [employer] or call your pharmacy and they will forward the refill request to us. Please allow 3 business days for your refill to be completed.          Additional Information About Your Visit        [employer] Information     [employer] gives you secure access to your electronic health record. If you see a primary care provider, you can also send messages to your care team and make appointments. If you have questions, please call your primary care clinic.  If you do not have a primary care provider, please call [phone] and they will assist you.        Care EveryWhere ID     This is your Care EveryWhere ID. This could be used by other organizations to access your [place] medical records  [id number]        Your Vitals Were     Pulse Temperature Respirations Height Pulse Oximetry BMI (Body Mass Index)    80 98.2  F (36.8  C) (Oral) 14 4' 10.5\" (1.486 m) 98% 24.18 kg/m2       Blood Pressure from Last 3 Encounters:   09/20/17 132/80   08/28/17 96/62   08/02/17 136/80    Weight from Last 3 Encounters:   09/20/17 117 lb 11.2 oz (53.4 kg)   08/28/17 118 lb 9.6 oz (53.8 kg)   08/02/17 117 lb (53.1 kg)              We Performed the Following     RHEUMATOLOGY REFERRAL        Primary Care Provider Office Phone # Fax #    Terrileroy Baron [phone] [address] [address]        Equal Access to Services     : Hadii darlyn De La Cruz, jarad mccullough, qaybta isabella avila . So [facility] [phone].    ATENCIÓN: Si habla español, tiene a vogel disposición servicios gratuitos de asistencia lingüística. Llame al [phone].    We comply with applicable federal " civil rights laws and [place] laws. We do not discriminate on the basis of race, color, national origin, age, disability sex, sexual orientation or gender identity.            Thank you!     Thank you for choosing [facility]  for your care. Our goal is always to provide you with excellent care. Hearing back from our patients is one way we can continue to improve our services. Please take a few minutes to complete the written survey that you may receive in the mail after your visit with us. Thank you!             Your Updated Medication List - Protect others around you: Learn how to safely use, store and throw away your medicines at www.disposemymeds.org.          This list is accurate as of: 9/20/17 11:13 AM.  Always use your most recent med list.                   Brand Name Dispense Instructions for use Diagnosis    clindamycin-benzoyl peroxide gel    BENZACLIN    50 g    Apply to  Affected area initially once daily for 2 weeks and then increase to 2 times daily if tolerated    Acne       diclofenac 1 % Gel topical gel    VOLTAREN    100 g    Use small amount and rub into area of chest that is hurting 1-2 times daily as needed    Costochondritis, Atypical chest pain       docusate sodium 100 MG tablet    COLACE    60 tablet    Take 100 mg by mouth daily    External hemorrhoids       fluticasone 50 MCG/ACT spray    FLONASE    16 g    Spray 1-2 sprays into both nostrils daily    Throat pain       hydrocortisone 0.2 % cream    WESTCORT    45 g    Apply sparingly to affected area 2 times daily as needed.    Rash       loratadine 10 MG tablet    CLARITIN    30 tablet    Take 1 tablet (10 mg) by mouth daily    Hives       PATADAY 0.2 % Soln   Generic drug:  olopatadine HCl      Reported on 5/1/2017        predniSONE 5 MG tablet    DELTASONE    21 tablet    7.5mg PO daily X 2 weeks and then stop    Pain in joint, multiple sites       topiramate 25 MG tablet    TOPAMAX    60 tablet    Take 1 tablet  (25 mg) at bedtime for 1 week, then 1 tablet twice daily    Episodic tension-type headache, not intractable, Weakness of shoulder, Cervical radiculopathy       traMADol 50 MG tablet    ULTRAM    40 tablet    Take 1-2 tablets ( mg) by mouth every 6 hours as needed for moderate pain    Acute pain of both shoulders

## 2017-09-20 NOTE — NURSING NOTE
"Chief Complaint   Patient presents with     RECHECK     from the last visit, stomach pain since sunday        Initial /80 (BP Location: Left arm, Patient Position: Chair, Cuff Size: Adult Regular)  Pulse 80  Temp 98.2  F (36.8  C) (Oral)  Resp 14  Ht 4' 10.5\" (1.486 m)  Wt 117 lb 11.2 oz (53.4 kg)  SpO2 98%  BMI 24.18 kg/m2 Estimated body mass index is 24.18 kg/(m^2) as calculated from the following:    Height as of this encounter: 4' 10.5\" (1.486 m).    Weight as of this encounter: 117 lb 11.2 oz (53.4 kg).  Medication Reconciliation: complete   "

## 2017-09-20 NOTE — LETTER
[facility]  [address] [place], [address]  [phone]        September 20, 2017    Zo Qureshi                                                                                                                                                       [address] [address]                 To Whom It May Concern,   Zo Qureshi is a patient of mine.   She continues to suffer from joint pain of upper extremities and back.   She continues to undergo treatment for this.   I would like to see if she could return to work part time no more than 4 hours per day.   She can not do any repetitive work with her upper extremities and no lifting more than 10 lbs.                 Sincerely,     Terri Robles M.D.

## 2017-09-20 NOTE — PROGRESS NOTES
SUBJECTIVE:   Zo Qureshi is a 42 year old female who presents to clinic today for the following health issues:      The pain is still there but better with the medication.  She saw the neurologist in the last month.   For her headaches she stopped the topamax and was to start gabapentin and work her way up and burst of prednisone.   She is also to use tizanidine and naproxen and imitrex for headaches.   She has not started yet as she wanted to make sure this was ok.   She also did not get massage tx approved.   She had stopped PT because they were not making progress.   Neurology thought she should go due to trigger point issues neck and headaches.      Past Medical History:   Diagnosis Date     HSIL on Pap smear 09/21/11    MARTA 2 and 3     INFLAM SPONDYLOPATHY NOS   1/7/2008     Leukocytopenia 3/10/2014     Leukocytopenia      Tendinosis      Thrombocytopenia (H) 3/10/2014     Thrombocytopenia (H)      Unspecified closed fracture of pelvis 2000    left fracture of pelvis after delivery       Past Surgical History:   Procedure Laterality Date     C NONSPECIFIC PROCEDURE  10/00    after delivery 2 units of prbcs secondary to placenta     LEEP TX, CERVICAL  12/19/11    MARTA II     TUBAL LIGATION  5/2009    laparoscopic tubal ligation       MEDICATIONS:  Current Outpatient Prescriptions   Medication     predniSONE (DELTASONE) 5 MG tablet     hydrocortisone (WESTCORT) 0.2 % cream     diclofenac (VOLTAREN) 1 % GEL topical gel     traMADol (ULTRAM) 50 MG tablet     fluticasone (FLONASE) 50 MCG/ACT spray     docusate sodium (COLACE) 100 MG tablet     topiramate (TOPAMAX) 25 MG tablet     loratadine (CLARITIN) 10 MG tablet     PATADAY 0.2 % SOLN     clindamycin-benzoyl peroxide (BENZACLIN) gel     No current facility-administered medications for this visit.        SOCIAL HISTORY:  Social History   Substance Use Topics     Smoking status: Never Smoker     Smokeless tobacco: Never Used     Alcohol use 0.0 oz/week     0  "Standard drinks or equivalent per week      Comment: rarely       Family History   Problem Relation Age of Onset     Hypertension Father      Lipids Father      Hypertension Mother      Lipids Mother      DIABETES No family hx of      Coronary Artery Disease No family hx of      Hyperlipidemia No family hx of      CEREBROVASCULAR DISEASE No family hx of      Breast Cancer No family hx of      Colon Cancer No family hx of      Prostate Cancer No family hx of      Other Cancer No family hx of      Depression No family hx of      Anxiety Disorder No family hx of      MENTAL ILLNESS No family hx of      Substance Abuse No family hx of      Anesthesia Reaction No family hx of      Asthma No family hx of      OSTEOPOROSIS No family hx of      Genetic Disorder No family hx of      Thyroid Disease No family hx of      Obesity No family hx of      Unknown/Adopted No family hx of        Objective:  Blood pressure 132/80, pulse 80, temperature 98.2  F (36.8  C), temperature source Oral, resp. rate 14, height 4' 10.5\" (1.486 m), weight 117 lb 11.2 oz (53.4 kg), SpO2 98 %, not currently breastfeeding.  Chest: Clear to auscultation bilaterally.  No wheezes, rales or retractions.  CV: Regular rate and rhythm without murmurs, rubs or gallops.  ABDOMEN:  Positive bowel sounds, soft, nondistended and nontender.  No masses are palpated and there is no organomegaly or inguinal lymphadenopathy.  Right arm still with tenderness along extensor tendons of wrist    Assessment:  1. Known spondyloarthropathy  2. Headaches and neck pain  3. Tendonitis - onggoing    Plan:  1. Massage tx for trigger points  2. Proceed with neurology's plan with tizandine at night and gabapentin and naproxen and prednisone burst  3. The potential side effects of the prescription medication were discussed with the patient.  4. Refer to rheum  5. See me in one month  6.  Note for work with restrictions        "

## 2017-09-27 ENCOUNTER — HOSPITAL ENCOUNTER (OUTPATIENT)
Dept: MRI IMAGING | Facility: CLINIC | Age: 42
Discharge: HOME | End: 2017-09-27
Attending: INTERNAL MEDICINE | Admitting: INTERNAL MEDICINE
Payer: OTHER MISCELLANEOUS

## 2017-09-27 DIAGNOSIS — M25.50 PAIN IN JOINT, MULTIPLE SITES: ICD-10-CM

## 2017-09-27 PROCEDURE — A9585 GADOBUTROL INJECTION: HCPCS | Performed by: RADIOLOGY

## 2017-09-27 PROCEDURE — 72197 MRI PELVIS W/O & W/DYE: CPT

## 2017-09-27 PROCEDURE — 25000128 H RX IP 250 OP 636: Performed by: RADIOLOGY

## 2017-09-27 RX ORDER — GADOBUTROL 604.72 MG/ML
7.5 INJECTION INTRAVENOUS ONCE
Status: COMPLETED | OUTPATIENT
Start: 2017-09-27 | End: 2017-09-27

## 2017-09-27 RX ADMIN — GADOBUTROL 5 ML: 604.72 INJECTION INTRAVENOUS at 13:50

## 2017-10-02 NOTE — PROGRESS NOTES
Results released to [facility]:  MRI does NOT show active sacroiliitis at present but shows evidence of past sacroiliitis. We will discuss this when we meet.     Sincerely    Julius Garay MD  [facility]

## 2017-10-12 ENCOUNTER — TRANSFERRED RECORDS (OUTPATIENT)
Dept: HEALTH INFORMATION MANAGEMENT | Facility: CLINIC | Age: 42
End: 2017-10-12

## 2017-10-16 PROBLEM — M79.644 FINGER PAIN, RIGHT: Status: RESOLVED | Noted: 2017-02-07 | Resolved: 2017-10-16

## 2017-10-16 PROBLEM — R20.2 PARESTHESIA OF BOTH HANDS: Status: RESOLVED | Noted: 2017-02-07 | Resolved: 2017-10-16

## 2017-10-16 PROBLEM — R29.898 MECHANICAL PROBLEMS WITH LIMBS: Status: RESOLVED | Noted: 2017-03-01 | Resolved: 2017-10-16

## 2017-10-16 NOTE — PROGRESS NOTES
Pt has not returned for therapy since 8/14/17.  Assume all goals are met to pt satisfaction.  D/C [facility].

## 2017-10-18 ENCOUNTER — OFFICE VISIT (OUTPATIENT)
Dept: FAMILY MEDICINE | Facility: CLINIC | Age: 42
End: 2017-10-18
Payer: OTHER MISCELLANEOUS

## 2017-10-18 VITALS
WEIGHT: 117 LBS | BODY MASS INDEX: 24.04 KG/M2 | RESPIRATION RATE: 14 BRPM | TEMPERATURE: 98.3 F | HEART RATE: 90 BPM | DIASTOLIC BLOOD PRESSURE: 60 MMHG | OXYGEN SATURATION: 98 % | SYSTOLIC BLOOD PRESSURE: 112 MMHG

## 2017-10-18 DIAGNOSIS — M67.88 RIGHT PERONEAL TENDINOSIS: ICD-10-CM

## 2017-10-18 DIAGNOSIS — G47.00 INSOMNIA, UNSPECIFIED TYPE: Primary | ICD-10-CM

## 2017-10-18 DIAGNOSIS — G44.219 EPISODIC TENSION-TYPE HEADACHE, NOT INTRACTABLE: ICD-10-CM

## 2017-10-18 DIAGNOSIS — M54.2 CERVICALGIA: ICD-10-CM

## 2017-10-18 DIAGNOSIS — M46.98 INFLAMMATORY SPONDYLOPATHY OF SACRAL REGION (H): ICD-10-CM

## 2017-10-18 DIAGNOSIS — Z23 NEED FOR PROPHYLACTIC VACCINATION AND INOCULATION AGAINST INFLUENZA: ICD-10-CM

## 2017-10-18 PROCEDURE — 90471 IMMUNIZATION ADMIN: CPT | Performed by: FAMILY MEDICINE

## 2017-10-18 PROCEDURE — 99214 OFFICE O/P EST MOD 30 MIN: CPT | Performed by: FAMILY MEDICINE

## 2017-10-18 PROCEDURE — 90686 IIV4 VACC NO PRSV 0.5 ML IM: CPT | Performed by: FAMILY MEDICINE

## 2017-10-18 RX ORDER — TRAZODONE HYDROCHLORIDE 50 MG/1
50 TABLET, FILM COATED ORAL
Qty: 60 TABLET | Refills: 1 | Status: SHIPPED | OUTPATIENT
Start: 2017-10-18 | End: 2018-02-21

## 2017-10-18 NOTE — PROGRESS NOTES
Injectable Influenza Immunization Documentation    1.  Is the person to be vaccinated sick today?   No    2. Does the person to be vaccinated have an allergy to a component   of the vaccine?   No    3. Has the person to be vaccinated ever had a serious reaction   to influenza vaccine in the past?   No    4. Has the person to be vaccinated ever had Guillain-Barré syndrome?   No    Form completed by Demetrio Sales MA

## 2017-10-18 NOTE — MR AVS SNAPSHOT
After Visit Summary   10/18/2017    Zo Qureshi    MRN: [id number]           Patient Information     Date Of Birth          1975        Visit Information        Provider Department      10/18/2017 11:45 AM Terri Baron MD [facility]        Today's Diagnoses     Insomnia, unspecified type    -  1       Follow-ups after your visit        Who to contact     If you have questions or need follow up information about today's clinic visit or your schedule please contact [facility] directly at [phone].  Normal or non-critical lab and imaging results will be communicated to you by [employer], letter or phone within 4 business days after the clinic has received the results. If you do not hear from us within 7 days, please contact the clinic through [employer] or phone. If you have a critical or abnormal lab result, we will notify you by phone as soon as possible.  Submit refill requests through [employer] or call your pharmacy and they will forward the refill request to us. Please allow 3 business days for your refill to be completed.          Additional Information About Your Visit        MyChart Information     [employer] gives you secure access to your electronic health record. If you see a primary care provider, you can also send messages to your care team and make appointments. If you have questions, please call your primary care clinic.  If you do not have a primary care provider, please call [phone] and they will assist you.        Care EveryWhere ID     This is your Care EveryWhere ID. This could be used by other organizations to access your [facility] medical records  [id number]        Your Vitals Were     Pulse Temperature Respirations Pulse Oximetry BMI (Body Mass Index)       90 98.3  F (36.8  C) (Oral) 14 98% 24.04 kg/m2        Blood Pressure from Last 3 Encounters:   10/18/17 112/60   09/20/17 132/80   08/28/17 96/62    Weight from Last 3 Encounters:    10/18/17 117 lb (53.1 kg)   09/20/17 117 lb 11.2 oz (53.4 kg)   08/28/17 118 lb 9.6 oz (53.8 kg)              Today, you had the following     No orders found for display         Today's Medication Changes          These changes are accurate as of: 10/18/17 12:18 PM.  If you have any questions, ask your nurse or doctor.               Start taking these medicines.        Dose/Directions    traZODone 50 MG tablet   Commonly known as:  DESYREL   Used for:  Insomnia, unspecified type   Started by:  Terri Baron MD        Dose:  50 mg   Take 1 tablet (50 mg) by mouth nightly as needed for sleep   Quantity:  60 tablet   Refills:  1            Where to get your medicines      These medications were sent to [facility] [address]  [id number] [place] [address]     Phone:  [phone]     traZODone 50 MG tablet                Primary Care Provider Office Phone # Fax #    Terri Baron [phone] [address] [facility] [address]        Equal Access to Services     [facility]: Hadii darlyn vicente hadasho Sojignaali, waaxda luqadaha, qaybta kaalmada adevalenciayada, isabella simpson . So [facility] [phone].    ATENCIÓN: Si habla español, tiene a vogel disposición servicios gratuitos de asistencia lingüística. LlVan [facility] [phone].    We comply with applicable federal civil rights laws and [place] laws. We do not discriminate on the basis of race, color, national origin, age, disability, sex, sexual orientation, or gender identity.            Thank you!     Thank you for choosing [facility]  for your care. Our goal is always to provide you with excellent care. Hearing back from our patients is one way we can continue to improve our services. Please take a few minutes to complete the written survey that you may receive in the mail after your visit with us. Thank you!             Your Updated Medication List -  Protect others around you: Learn how to safely use, store and throw away your medicines at www.disposemymeds.org.          This list is accurate as of: 10/18/17 12:18 PM.  Always use your most recent med list.                   Brand Name Dispense Instructions for use Diagnosis    clindamycin-benzoyl peroxide gel    BENZACLIN    50 g    Apply to  Affected area initially once daily for 2 weeks and then increase to 2 times daily if tolerated    Acne       diclofenac 1 % Gel topical gel    VOLTAREN    100 g    Use small amount and rub into area of chest that is hurting 1-2 times daily as needed    Costochondritis, Atypical chest pain       docusate sodium 100 MG tablet    COLACE    60 tablet    Take 100 mg by mouth daily    External hemorrhoids       fluticasone 50 MCG/ACT spray    FLONASE    16 g    Spray 1-2 sprays into both nostrils daily    Throat pain       hydrocortisone 0.2 % cream    WESTCORT    45 g    Apply sparingly to affected area 2 times daily as needed.    Rash       loratadine 10 MG tablet    CLARITIN    30 tablet    Take 1 tablet (10 mg) by mouth daily    Hives       PATADAY 0.2 % Soln   Generic drug:  olopatadine HCl      Reported on 5/1/2017        predniSONE 5 MG tablet    DELTASONE    21 tablet    7.5mg PO daily X 2 weeks and then stop    Pain in joint, multiple sites       traMADol 50 MG tablet    ULTRAM    40 tablet    Take 1-2 tablets ( mg) by mouth every 6 hours as needed for moderate pain    Acute pain of both shoulders       traZODone 50 MG tablet    DESYREL    60 tablet    Take 1 tablet (50 mg) by mouth nightly as needed for sleep    Insomnia, unspecified type

## 2017-10-18 NOTE — PROGRESS NOTES
SUBJECTIVE:   Zo Qureshi is a 42 year old female who presents to clinic today for the following health issues:      Workman's comp follow up visit for Neck, Bilateral Shoulder, and Right hand pain.    She is back and doing better with headaches.   She saw neurology and is taking naproxen and gabapentin.  The prednisone burst is done.   She did not tolerate the tizanidine due to constipation and chest tightness.   She is doing trigger point therapy with PT and it is helping.   She is going 2 times per week for 6 weeks.   She was able to go back to work part time and just not do repetitive work.   Her work could not accommodate that.       Past Medical History:   Diagnosis Date     HSIL on Pap smear 09/21/11    MARTA 2 and 3     INFLAM SPONDYLOPATHY NOS   1/7/2008     Leukocytopenia 3/10/2014     Leukocytopenia      Tendinosis      Thrombocytopenia (H) 3/10/2014     Thrombocytopenia (H)      Unspecified closed fracture of pelvis 2000    left fracture of pelvis after delivery       Past Surgical History:   Procedure Laterality Date     C NONSPECIFIC PROCEDURE  10/00    after delivery 2 units of prbcs secondary to placenta     LEEP TX, CERVICAL  12/19/11    MARTA II     TUBAL LIGATION  5/2009    laparoscopic tubal ligation       MEDICATIONS:  Current Outpatient Prescriptions   Medication     predniSONE (DELTASONE) 5 MG tablet     hydrocortisone (WESTCORT) 0.2 % cream     diclofenac (VOLTAREN) 1 % GEL topical gel     traMADol (ULTRAM) 50 MG tablet     fluticasone (FLONASE) 50 MCG/ACT spray     docusate sodium (COLACE) 100 MG tablet     topiramate (TOPAMAX) 25 MG tablet     loratadine (CLARITIN) 10 MG tablet     PATADAY 0.2 % SOLN     clindamycin-benzoyl peroxide (BENZACLIN) gel     No current facility-administered medications for this visit.        SOCIAL HISTORY:  Social History   Substance Use Topics     Smoking status: Never Smoker     Smokeless tobacco: Never Used     Alcohol use 0.0 oz/week     0 Standard drinks or  equivalent per week      Comment: rarely       Family History   Problem Relation Age of Onset     Hypertension Father      Lipids Father      Hypertension Mother      Lipids Mother      DIABETES No family hx of      Coronary Artery Disease No family hx of      Hyperlipidemia No family hx of      CEREBROVASCULAR DISEASE No family hx of      Breast Cancer No family hx of      Colon Cancer No family hx of      Prostate Cancer No family hx of      Other Cancer No family hx of      Depression No family hx of      Anxiety Disorder No family hx of      MENTAL ILLNESS No family hx of      Substance Abuse No family hx of      Anesthesia Reaction No family hx of      Asthma No family hx of      OSTEOPOROSIS No family hx of      Genetic Disorder No family hx of      Thyroid Disease No family hx of      Obesity No family hx of      Unknown/Adopted No family hx of        Objective:  Blood pressure 112/60, pulse 90, temperature 98.3  F (36.8  C), temperature source Oral, resp. rate 14, weight 117 lb (53.1 kg), SpO2 98 %, not currently breastfeeding.  Neck:  There is no lymphadenopathy or thyroid tenderness or enlargement  Chest: Clear to auscultation bilaterally.  No wheezes, rales or retractions.  CV: Regular rate and rhythm without murmurs, rubs or gallops.  Shoulders are tender anteriorly    Assessment:  1. Rheumatic disease - has not taken prednisone for 2 weeks or rechecked with rheum.   MRI did NOT show active sacroiliitis   2. Neuro - they have her doing PT and trigger point masage 2 times per week for 6 weeks and take gabapentin and naproxen  3. Headaches - some better  4. Tendonitis - could be due to #1  5. insomnia    Plan:  1. Will keep on restrictions for now - too early to say if permanent  2. Follow up with neuro later this week  3. Take prednisone and then recheck with rheum  4. Letter  5. Check back with me in 4-6 weeks  6. Will add trazodone for sleep  7. The potential side effects of the prescription medication  were discussed with the patient.  8. Flu shot

## 2017-10-18 NOTE — LETTER
[facility] [place]  [address] [place], [place], [address]  [phone]        October 18, 2017    Zo Qureshi                                                                                                                                                       [id number] [facility] [address]            To Whom It May Concern,   Zo Qureshi is a patient of mine.   She continues to suffer from joint pain of upper extremities and back.   She continues to undergo treatment for this.   She may continue work part time no more than 4 hours per day.   She can not do any repetitive work with her upper extremities and no lifting more than 10 lbs.                 Sincerely,    Terri Robles M.D.

## 2017-10-18 NOTE — NURSING NOTE
"Chief Complaint   Patient presents with     Work Comp     Neck, shoulder and hand pain       Initial /60 (BP Location: Right arm, Patient Position: Chair, Cuff Size: Adult Regular)  Pulse 90  Temp 98.3  F (36.8  C) (Oral)  Resp 14  Wt 117 lb (53.1 kg)  SpO2 98%  BMI 24.04 kg/m2 Estimated body mass index is 24.04 kg/(m^2) as calculated from the following:    Height as of 9/20/17: 4' 10.5\" (1.486 m).    Weight as of this encounter: 117 lb (53.1 kg).  Medication Reconciliation: complete   Demetrio Sales MA      "

## 2017-11-10 NOTE — PROGRESS NOTES
[facility] - Rheumatology Clinic Visit     Zo Qureshi MRN# [id number]   YOB: 1975    Primary care provider: Terri Baron  Nov 13, 2017          Assessment and Plan:   # Sacroiliitis (MRI evidence 2010) with negative HLA B27; Polyarthralgia in MCPs and also wrists  # Elevated sed rate  # Pleuritic chest pain    Patient was evaluated by Dr. Cuba at [facility] rheumatology in 2010. Humira and enbrel were tried. But patient did not tolerate. So it was not continued for adequate duration to assess the response.   Since there is MCP involvement, we checked RF and CCP antibodies were normal.   We will get a repeat MRI of SI joints to follow up on the sacroiliitis. MRI SI joints 9/2017 did not show active inflammation in SI joints.   Her symptoms are steroid and NSAID responsive. Increase naproxen to 500mg PO BID with food. Follow up in 2 months. Side effects discussed.     The labs, imaging from patient records are reviewed.     I will be back in touch with the patient through mychart/letter when results are available.     Return in about 2 months (around 1/13/2018).    Orders Placed This Encounter   Procedures     ESR     CBC with platelets differential     Creatinine       Medications Discontinued During This Encounter   Medication Reason     predniSONE (DELTASONE) 5 MG tablet      Current Outpatient Prescriptions   Medication Sig Dispense Refill     naproxen (NAPROSYN) 500 MG tablet Take 1 tablet (500 mg) by mouth daily as needed for moderate pain 180 tablet 0     traZODone (DESYREL) 50 MG tablet Take 1 tablet (50 mg) by mouth nightly as needed for sleep 60 tablet 1     hydrocortisone (WESTCORT) 0.2 % cream Apply sparingly to affected area 2 times daily as needed. 45 g 0     diclofenac (VOLTAREN) 1 % GEL topical gel Use small amount and rub into area of chest that is hurting 1-2 times daily as needed 100 g 1     traMADol (ULTRAM) 50 MG tablet Take 1-2 tablets ( mg) by mouth every 6 hours as needed for  moderate pain 40 tablet 0     fluticasone (FLONASE) 50 MCG/ACT spray Spray 1-2 sprays into both nostrils daily 16 g 3     docusate sodium (COLACE) 100 MG tablet Take 100 mg by mouth daily 60 tablet 1     loratadine (CLARITIN) 10 MG tablet Take 1 tablet (10 mg) by mouth daily 30 tablet 1     PATADAY 0.2 % SOLN Reported on 5/1/2017  6     clindamycin-benzoyl peroxide (BENZACLIN) gel Apply to  Affected area initially once daily for 2 weeks and then increase to 2 times daily if tolerated 50 g 11       Julius Garay MD  [facility]          Active Problem List:     Patient Active Problem List    Diagnosis Date Noted     Episodic tension-type headache, not intractable 10/18/2017     Priority: Medium     Cervicalgia 10/18/2017     Priority: Medium     Right peroneal tendinosis 04/26/2017     Priority: Medium     Vitamin D deficiency 01/05/2017     Priority: Medium     Influenza B 03/03/2014     Priority: Medium     Acne 09/10/2012     Priority: Medium     S/P LEEP of cervix 12/19/2011     Priority: Medium     HSIL on Pap smear 09/21/2011     Priority: Medium     HGSIL confirmed with biopsy=repeat pap by May 2012  12/19/11 LEEP MARTA II  4/9/12 NIL pap.  Per OV notes, pt to repeat pap in 4 months  8/6/12: NIL pap. Per MD plan pap in 4 months.  12/10/12 NIL pap. Plan per MD, repeat in one year.   02/05/14 Pap reminder letter sent through [employer].  05/21/14 Pap= Normal, Neg HPV. Repeat co-test in 1 yr.  09/21/15 Pap= NIL, Neg HPV. Co-test in 3 yrs per ASCCP guidelines         Hypercholesterolemia 10/31/2010     Priority: Medium     Disorder of SI (sacroiliac) joint 09/15/2008     Priority: Medium     Inflammatory spondylopathy (H) 01/07/2008     Priority: Medium     Problem list name updated by automated process. Provider to review              History of Present Illness:     Chief Complaint   Patient presents with     RECHECK       August 8, 2017  Have you ever seen a rheumatologist Yes Who Dr. Cuba   When 4/2010  Joint pain history  Onset: has pain that rotates around her body, primary care wanted her to see rheumatology.   Involved joints: back pain arms, shoulders, neck pain.knees Had epidural injection in June 2017  Pain scale:  7.5/10     Wakes the patient from sleep : Yes  Morning stiffness:Yes for 15 minutes  Meds used:tramadol, voltaren gel; tried humira and enbrel around 2010; had flu-like symptoms after trying couple of shots of humira and enbrel.      Interim history  Since last visit:  1. Infections - Yes/ had a cold a couple weeks ago  2. New symptoms/medical problem - No  3. Any side effects from Rheum medications -NA  3. ER visits/Hospitalizations/surgeries - No  4. Last PCP visit: 8/2/17     Fatigue during flare ups of polyarthralgia    Wt Readings from Last 4 Encounters:   11/13/17 54.7 kg (120 lb 9.6 oz)   10/18/17 53.1 kg (117 lb)   09/20/17 53.4 kg (117 lb 11.2 oz)   08/28/17 53.8 kg (118 lb 9.6 oz)     H/o pleuritic chest pain in middle with taking deep breaths.     No h/o ,unintentional weight loss, loss of appetite, fevers, persistent rash, swollen glands  No family or personal history of psoriasis, ulcerative colitis or chron's disease. No h/o iritis.   Patient denies any raynauds  No h/o arterial/venous thrombosis in the past  No h/o persistent shortness of breath, cough  No h/o persistent nausea, vomiting, constipation, diarrhea, abdominal pain  No h/o hematochezia (except with constipation), hematuria, hemoptysis, hematemesis  No h/o seizures or strokes  No h/o cancer    November 13, 2017  Have you ever seen a rheumatologist Yes Who You When 8/28/17  Joint pain history  Onset: pt states that she is having pain in her neck, and shoulders and lower back  Involved joints: see above  Pain scale:  7.5/10     Wakes the patient from sleep : Yes  Morning stiffness:Yes for 5 minutes  Meds used: naproxen which helps     Interim history  Since last visit:  1. Infections - No  2. New  symptoms/medical problem - Yes, has developed bilateral knee pain and going up stairs  3. Any side effects from Rheum medications -none  3. ER visits/Hospitalizations/surgeries - No  4. Last PCP visit: 10/18/17    BP Readings from Last 3 Encounters:   11/13/17 104/66   10/18/17 112/60   09/20/17 132/80              Review of Systems:   Complete ROS negative except for symptoms mentioned in the HPI          Past Medical History:     Past Medical History:   Diagnosis Date     HSIL on Pap smear 09/21/11    MARTA 2 and 3     INFLAM SPONDYLOPATHY NOS   1/7/2008     Leukocytopenia 3/10/2014     Leukocytopenia      Tendinosis      Thrombocytopenia (H) 3/10/2014     Thrombocytopenia (H)      Unspecified closed fracture of pelvis 2000    left fracture of pelvis after delivery     Past Surgical History:   Procedure Laterality Date     C NONSPECIFIC PROCEDURE  10/00    after delivery 2 units of prbcs secondary to placenta     LEEP TX, CERVICAL  12/19/11    MARTA II     TUBAL LIGATION  5/2009    laparoscopic tubal ligation            Social History:     Social History     Occupational History      [employer]     Social History Main Topics     Smoking status: Never Smoker     Smokeless tobacco: Never Used     Alcohol use 0.0 oz/week     0 Standard drinks or equivalent per week      Comment: rarely     Drug use: No     Sexual activity: Not Currently     Partners: Male     Birth control/ protection: None      Comment: tubal            Family History:     Family History   Problem Relation Age of Onset     Hypertension Father      Lipids Father      Hypertension Mother      Lipids Mother      DIABETES No family hx of      Coronary Artery Disease No family hx of      Hyperlipidemia No family hx of      CEREBROVASCULAR DISEASE No family hx of      Breast Cancer No family hx of      Colon Cancer No family hx of      Prostate Cancer No family hx of      Other Cancer No family hx of      Depression No family hx of   "    Anxiety Disorder No family hx of      MENTAL ILLNESS No family hx of      Substance Abuse No family hx of      Anesthesia Reaction No family hx of      Asthma No family hx of      OSTEOPOROSIS No family hx of      Genetic Disorder No family hx of      Thyroid Disease No family hx of      Obesity No family hx of      Unknown/Adopted No family hx of             Allergies:     Allergies   Allergen Reactions     Advil [Ibuprofen Micronized]      Rash      Contrast Dye      Percocet [Codeine] Nausea and Vomiting and Itching            Medications:     Current Outpatient Prescriptions   Medication Sig Dispense Refill     naproxen (NAPROSYN) 500 MG tablet Take 1 tablet (500 mg) by mouth daily as needed for moderate pain 180 tablet 0     traZODone (DESYREL) 50 MG tablet Take 1 tablet (50 mg) by mouth nightly as needed for sleep 60 tablet 1     hydrocortisone (WESTCORT) 0.2 % cream Apply sparingly to affected area 2 times daily as needed. 45 g 0     diclofenac (VOLTAREN) 1 % GEL topical gel Use small amount and rub into area of chest that is hurting 1-2 times daily as needed 100 g 1     traMADol (ULTRAM) 50 MG tablet Take 1-2 tablets ( mg) by mouth every 6 hours as needed for moderate pain 40 tablet 0     fluticasone (FLONASE) 50 MCG/ACT spray Spray 1-2 sprays into both nostrils daily 16 g 3     docusate sodium (COLACE) 100 MG tablet Take 100 mg by mouth daily 60 tablet 1     loratadine (CLARITIN) 10 MG tablet Take 1 tablet (10 mg) by mouth daily 30 tablet 1     PATADAY 0.2 % SOLN Reported on 5/1/2017  6     clindamycin-benzoyl peroxide (BENZACLIN) gel Apply to  Affected area initially once daily for 2 weeks and then increase to 2 times daily if tolerated 50 g 11            Physical Exam:   Blood pressure 104/66, pulse 83, temperature 98.9  F (37.2  C), temperature source Oral, height 1.486 m (4' 10.5\"), weight 54.7 kg (120 lb 9.6 oz), SpO2 100 %, not currently breastfeeding.  Wt Readings from Last 4 Encounters: "   11/13/17 54.7 kg (120 lb 9.6 oz)   10/18/17 53.1 kg (117 lb)   09/20/17 53.4 kg (117 lb 11.2 oz)   08/28/17 53.8 kg (118 lb 9.6 oz)       Constitutional: well-developed, appearing stated age; cooperative  Eyes: PERRLA, normal conjunctiva, sclera  ENT: nl external ears, nose, lips.No mucous membrane lesions, normal saliva pool  Neck: no cervical lymphadenopathy  Resp: lungs clear to auscultation,   CV: RRR, no added sounds, no edema  GI: Abdomen soft and no tenderness  : not tested  Lymph: no cervical, supraclavicular or epitrochlear nodes  MS: Tenderness in right wrist, MCPs (R> L). ROM in right wrist is significantly restricted passively.   Tenderness in midline lumbar area, paralumbar area and also in SI area.   All shoulder, elbow, wrist, MCP/PIP/DIP, hip, knee, ankle, and foot MTP/IP joints were examined and  found normal. No active synovitis or deformity. Full ROM.  Fist 100%.  No dactylitis,  tenosynovitis, enthesopathy.  Skin: no nail pitting; no rash in exposed areas  Psych: nl judgement, orientation, memory, affect.         Data:   Reviewed following labs:  CBC RESULTS:   Recent Labs   Lab Test  01/05/17   1322   WBC  6.8   RBC  4.19   HGB  12.0   HCT  37.4   MCV  89   MCH  28.6   MCHC  32.1   RDW  12.7   PLT  269       Liver Function Studies -   Recent Labs   Lab Test  09/26/15   1153   PROTTOTAL  8.4   ALBUMIN  4.2   BILITOTAL  0.3   ALKPHOS  60   AST  19   ALT  20       Creatinine   Date Value Ref Range Status   09/26/2015 0.64 0.52 - 1.04 mg/dL Final   ]    No results found for: URIC]    ESR/CRP  Recent Labs   Lab Test  08/02/17   1117  09/26/15   1153  05/21/14   1710   04/16/10   1140   SED  37*  27*  33*   < >  21*   CRP  <2.9   --    --    --   7.1    < > = values in this interval not displayed.       HLA-B27  No results for input(s): [id number], B1 in the last 98444 hours.    RF/CCP  Recent Labs   Lab Test  05/21/14   1710   RHF  <20       RAMÍREZ/RNP/Sm/SSA/SSB  Recent Labs   Lab Test  05/21/14    1710   BOO  <1.0  Interpretation:  Negative           Julius Garay MD    [facility]

## 2017-11-13 ENCOUNTER — OFFICE VISIT (OUTPATIENT)
Dept: RHEUMATOLOGY | Facility: CLINIC | Age: 42
End: 2017-11-13
Payer: COMMERCIAL

## 2017-11-13 VITALS
OXYGEN SATURATION: 100 % | DIASTOLIC BLOOD PRESSURE: 66 MMHG | BODY MASS INDEX: 24.31 KG/M2 | WEIGHT: 120.6 LBS | TEMPERATURE: 98.9 F | HEART RATE: 83 BPM | HEIGHT: 59 IN | SYSTOLIC BLOOD PRESSURE: 104 MMHG

## 2017-11-13 DIAGNOSIS — M46.1 SACROILIITIS (H): Primary | ICD-10-CM

## 2017-11-13 LAB
BASOPHILS # BLD AUTO: 0 10E9/L (ref 0–0.2)
BASOPHILS NFR BLD AUTO: 0.5 %
DIFFERENTIAL METHOD BLD: ABNORMAL
EOSINOPHIL # BLD AUTO: 0.1 10E9/L (ref 0–0.7)
EOSINOPHIL NFR BLD AUTO: 2.5 %
ERYTHROCYTE [DISTWIDTH] IN BLOOD BY AUTOMATED COUNT: 12.7 % (ref 10–15)
ERYTHROCYTE [SEDIMENTATION RATE] IN BLOOD BY WESTERGREN METHOD: 41 MM/H (ref 0–20)
HCT VFR BLD AUTO: 33.9 % (ref 35–47)
HGB BLD-MCNC: 10.9 G/DL (ref 11.7–15.7)
LYMPHOCYTES # BLD AUTO: 1.8 10E9/L (ref 0.8–5.3)
LYMPHOCYTES NFR BLD AUTO: 32 %
MCH RBC QN AUTO: 28.8 PG (ref 26.5–33)
MCHC RBC AUTO-ENTMCNC: 32.2 G/DL (ref 31.5–36.5)
MCV RBC AUTO: 90 FL (ref 78–100)
MONOCYTES # BLD AUTO: 0.6 10E9/L (ref 0–1.3)
MONOCYTES NFR BLD AUTO: 11 %
NEUTROPHILS # BLD AUTO: 3 10E9/L (ref 1.6–8.3)
NEUTROPHILS NFR BLD AUTO: 54 %
PLATELET # BLD AUTO: 245 10E9/L (ref 150–450)
RBC # BLD AUTO: 3.78 10E12/L (ref 3.8–5.2)
WBC # BLD AUTO: 5.6 10E9/L (ref 4–11)

## 2017-11-13 PROCEDURE — 99214 OFFICE O/P EST MOD 30 MIN: CPT | Performed by: INTERNAL MEDICINE

## 2017-11-13 PROCEDURE — 85025 COMPLETE CBC W/AUTO DIFF WBC: CPT | Performed by: INTERNAL MEDICINE

## 2017-11-13 PROCEDURE — 36415 COLL VENOUS BLD VENIPUNCTURE: CPT | Performed by: INTERNAL MEDICINE

## 2017-11-13 PROCEDURE — 85652 RBC SED RATE AUTOMATED: CPT | Performed by: INTERNAL MEDICINE

## 2017-11-13 PROCEDURE — 82565 ASSAY OF CREATININE: CPT | Performed by: INTERNAL MEDICINE

## 2017-11-13 RX ORDER — NAPROXEN 500 MG/1
500 TABLET ORAL DAILY PRN
Qty: 180 TABLET | Refills: 0 | Status: SHIPPED | OUTPATIENT
Start: 2017-11-13 | End: 2017-11-13

## 2017-11-13 RX ORDER — NAPROXEN 500 MG/1
500 TABLET ORAL 2 TIMES DAILY WITH MEALS
Qty: 180 TABLET | Refills: 0 | Status: SHIPPED | OUTPATIENT
Start: 2017-11-13 | End: 2018-03-07

## 2017-11-13 ASSESSMENT — ROUTINE ASSESSMENT OF PATIENT INDEX DATA (RAPID3)
TOTAL RAPID3 SCORE: 17.2
RAPID3 INTERPRETATION: HIGH > 12.0

## 2017-11-13 NOTE — MR AVS SNAPSHOT
After Visit Summary   11/13/2017    Zo Qureshi    MRN: [id number]           Patient Information     Date Of Birth          1975        Visit Information        Provider Department      11/13/2017 4:00 PM Julius Garay MD [facility]        Today's Diagnoses     Sacroiliitis (H)    -  1       Follow-ups after your visit        Follow-up notes from your care team     Return in about 2 months (around 1/13/2018).      Your next 10 appointments already scheduled     Nov 29, 2017 10:30 AM CST   [facility]    [address] [facility] [address] [phone]              Who to contact     If you have questions or need follow up information about today's clinic visit or your schedule please contact [facility] ([facility]] directly at [phone].  Normal or non-critical lab and imaging results will be communicated to you by MyChart, letter or phone within 4 business days after the clinic has received the results. If you do not hear from us within 7 days, please contact the clinic through Lvmamahart or phone. If you have a critical or abnormal lab result, we will notify you by phone as soon as possible.  Submit refill requests through Package Concierge or call your pharmacy and they will forward the refill request to us. Please allow 3 business days for your refill to be completed.          Additional Information About Your Visit        MyChart Information     Package Concierge gives you secure access to your electronic health record. If you see a primary care provider, you can also send messages to your care team and make appointments. If you have questions, please call your primary care clinic.  If you do not have a primary care provider, please call [phone] and they will assist you.        Care EveryWhere ID     This is your Care EveryWhere ID. This could be used by other organizations to access  "your [place] medical records  [id number]        Your Vitals Were     Pulse Temperature Height Pulse Oximetry BMI (Body Mass Index)       83 98.9  F (37.2  C) (Oral) 1.486 m (4' 10.5\") 100% 24.78 kg/m2        Blood Pressure from Last 3 Encounters:   11/13/17 104/66   10/18/17 112/60   09/20/17 132/80    Weight from Last 3 Encounters:   11/13/17 54.7 kg (120 lb 9.6 oz)   10/18/17 53.1 kg (117 lb)   09/20/17 53.4 kg (117 lb 11.2 oz)              We Performed the Following     CBC with platelets differential     Creatinine     ESR          Today's Medication Changes          These changes are accurate as of: 11/13/17  4:32 PM.  If you have any questions, ask your nurse or doctor.               Start taking these medicines.        Dose/Directions    naproxen 500 MG tablet   Commonly known as:  NAPROSYN   Used for:  Sacroiliitis (H)   Started by:  Julius Garay MD        Dose:  500 mg   Take 1 tablet (500 mg) by mouth 2 times daily (with meals)   Quantity:  180 tablet   Refills:  0         Stop taking these medicines if you haven't already. Please contact your care team if you have questions.     predniSONE 5 MG tablet   Commonly known as:  DELTASONE   Stopped by:  Julius Garay MD                Where to get your medicines      These medications were sent to [facility] [address] [place] [address] AT [place] [address]  [id number] [place] [address]    Hours:  24-hours Phone:  [phone]     naproxen 500 MG tablet                Primary Care Provider Office Phone # Fax #    Terri Baron [phone] [address] [facility] [address]        Equal Access to Services     HUMERA HOLCOMB AH: Bhanu De La Cruz, waperlita luqguido, qaybta kaalmapetros nam, isabella gibson. [facility] [phone].    ATENCIÓN: Si habla español, tiene a vogel disposición servicios gratuitos de asistencia " lingüísticaSarika Davenport al [phone].    We comply with applicable federal civil rights laws and [place] laws. We do not discriminate on the basis of race, color, national origin, age, disability, sex, sexual orientation, or gender identity.            Thank you!     Thank you for choosing [facility]  for your care. Our goal is always to provide you with excellent care. Hearing back from our patients is one way we can continue to improve our services. Please take a few minutes to complete the written survey that you may receive in the mail after your visit with us. Thank you!             Your Updated Medication List - Protect others around you: Learn how to safely use, store and throw away your medicines at www.disposemymeds.org.          This list is accurate as of: 11/13/17  4:32 PM.  Always use your most recent med list.                   Brand Name Dispense Instructions for use Diagnosis    clindamycin-benzoyl peroxide gel    BENZACLIN    50 g    Apply to  Affected area initially once daily for 2 weeks and then increase to 2 times daily if tolerated    Acne       diclofenac 1 % Gel topical gel    VOLTAREN    100 g    Use small amount and rub into area of chest that is hurting 1-2 times daily as needed    Costochondritis, Atypical chest pain       docusate sodium 100 MG tablet    COLACE    60 tablet    Take 100 mg by mouth daily    External hemorrhoids       fluticasone 50 MCG/ACT spray    FLONASE    16 g    Spray 1-2 sprays into both nostrils daily    Throat pain       hydrocortisone 0.2 % cream    WESTCORT    45 g    Apply sparingly to affected area 2 times daily as needed.    Rash       loratadine 10 MG tablet    CLARITIN    30 tablet    Take 1 tablet (10 mg) by mouth daily    Hives       naproxen 500 MG tablet    NAPROSYN    180 tablet    Take 1 tablet (500 mg) by mouth 2 times daily (with meals)    Sacroiliitis (H)       PATADAY 0.2 % Soln   Generic drug:  olopatadine HCl      Reported on  5/1/2017        traMADol 50 MG tablet    ULTRAM    40 tablet    Take 1-2 tablets ( mg) by mouth every 6 hours as needed for moderate pain    Acute pain of both shoulders       traZODone 50 MG tablet    DESYREL    60 tablet    Take 1 tablet (50 mg) by mouth nightly as needed for sleep    Insomnia, unspecified type

## 2017-11-13 NOTE — NURSING NOTE
"Chief Complaint   Patient presents with     RECHECK       Initial /66 (BP Location: Right arm, Patient Position: Chair, Cuff Size: Adult Regular)  Pulse 83  Temp 98.9  F (37.2  C) (Oral)  Ht 1.486 m (4' 10.5\")  Wt 54.7 kg (120 lb 9.6 oz)  SpO2 100%  BMI 24.78 kg/m2 Estimated body mass index is 24.78 kg/(m^2) as calculated from the following:    Height as of this encounter: 1.486 m (4' 10.5\").    Weight as of this encounter: 54.7 kg (120 lb 9.6 oz).  Medication Reconciliation: complete    Have you ever seen a rheumatologist Yes Who You When 8/28/17  Joint pain history  Onset: pt states that she is having pain in her neck, and shoulders and lower back  Involved joints: see above  Pain scale:  7.5/10     Wakes the patient from sleep : Yes  Morning stiffness:Yes for 5 minutes  Meds used:prednisone    Interim history  Since last visit:  1. Infections - No  2. New symptoms/medical problem - Yes, has developed bilateral knee pain and going up stairs  3. Any side effects from Rheum medications -none  3. ER visits/Hospitalizations/surgeries - No  4. Last PCP visit: 10/18/17  Wt Readings from Last 4 Encounters:   11/13/17 54.7 kg (120 lb 9.6 oz)   10/18/17 53.1 kg (117 lb)   09/20/17 53.4 kg (117 lb 11.2 oz)   08/28/17 53.8 kg (118 lb 9.6 oz)     BP Readings from Last 3 Encounters:   11/13/17 104/66   10/18/17 112/60   09/20/17 132/80     "

## 2017-11-14 ENCOUNTER — TRANSFERRED RECORDS (OUTPATIENT)
Dept: HEALTH INFORMATION MANAGEMENT | Facility: CLINIC | Age: 42
End: 2017-11-14

## 2017-11-14 LAB
CREAT SERPL-MCNC: 0.62 MG/DL (ref 0.52–1.04)
GFR SERPL CREATININE-BSD FRML MDRD: >90 ML/MIN/1.7M2

## 2017-11-15 NOTE — PROGRESS NOTES
Results released to [facility]:    Anemia noted. We will discuss this during next visit. However, please seek medical attention if you have any signs of bleeding in stomach such as : blood in stool or black stool or blood in vomit. Please try over the counter iron supplementation.     Sed rate is still elevated. We will watch this.   Kidney function is normal.     Sincerely    Julius Garay MD  [facility]

## 2017-11-29 ENCOUNTER — OFFICE VISIT (OUTPATIENT)
Dept: FAMILY MEDICINE | Facility: CLINIC | Age: 42
End: 2017-11-29
Payer: OTHER MISCELLANEOUS

## 2017-11-29 VITALS
TEMPERATURE: 98.2 F | RESPIRATION RATE: 12 BRPM | HEART RATE: 80 BPM | BODY MASS INDEX: 24.24 KG/M2 | DIASTOLIC BLOOD PRESSURE: 80 MMHG | WEIGHT: 118 LBS | OXYGEN SATURATION: 99 % | SYSTOLIC BLOOD PRESSURE: 132 MMHG

## 2017-11-29 DIAGNOSIS — M54.2 CERVICALGIA: ICD-10-CM

## 2017-11-29 DIAGNOSIS — M67.88 RIGHT PERONEAL TENDINOSIS: ICD-10-CM

## 2017-11-29 DIAGNOSIS — G44.219 EPISODIC TENSION-TYPE HEADACHE, NOT INTRACTABLE: Primary | ICD-10-CM

## 2017-11-29 PROCEDURE — 99214 OFFICE O/P EST MOD 30 MIN: CPT | Performed by: FAMILY MEDICINE

## 2017-11-29 NOTE — LETTER
[facility]  [address] [place], [address]  [phone]        November 29, 2017    RE: Zo Qureshi                                                                                                                                                       [address] [facility] [address]            To Whom It May Concern,   Zo Qureshi has been seen today and is doing worse due to inability to obtain any of our recommended treatment.   She is seeing neurology and myself but our prescribed medications are not being covered therefore she is getting worse.   She will need to be off work indefinitely until she starts to improve.   She is continuing to go to physical therapy and her appointments.   I will see her again in 2 months and hopefully she will be able to obtain her needed treatment.             Sincerely,    Terri Robles M.D.

## 2017-11-29 NOTE — MR AVS SNAPSHOT
After Visit Summary   11/29/2017    Zo Qureshi    MRN: [id number]           Patient Information     Date Of Birth          1975        Visit Information        Provider Department      11/29/2017 10:30 AM Terri Baron MD [facility]         Follow-ups after your visit        Your next 10 appointments already scheduled     Khoi 15, 2018 11:30 AM CST   Return Visit with Julius Garay MD   [facility]    [address] [place] [address] [phone]              Who to contact     If you have questions or need follow up information about today's clinic visit or your schedule please contact [facility] directly at [phone].  Normal or non-critical lab and imaging results will be communicated to you by MyChart, letter or phone within 4 business days after the clinic has received the results. If you do not hear from us within 7 days, please contact the clinic through MyChart or phone. If you have a critical or abnormal lab result, we will notify you by phone as soon as possible.  Submit refill requests through surespot or call your pharmacy and they will forward the refill request to us. Please allow 3 business days for your refill to be completed.          Additional Information About Your Visit        MyChart Information     surespot gives you secure access to your electronic health record. If you see a primary care provider, you can also send messages to your care team and make appointments. If you have questions, please call your primary care clinic.  If you do not have a primary care provider, please call [phone] and they will assist you.        Care EveryWhere ID     This is your Care EveryWhere ID. This could be used by other organizations to access your [place] medical records  [id number]        Your Vitals Were     Pulse Temperature Respirations Pulse Oximetry BMI (Body Mass  Index)       80 98.2  F (36.8  C) (Oral) 12 99% 24.24 kg/m2        Blood Pressure from Last 3 Encounters:   11/29/17 132/80   11/13/17 104/66   10/18/17 112/60    Weight from Last 3 Encounters:   11/29/17 118 lb (53.5 kg)   11/13/17 120 lb 9.6 oz (54.7 kg)   10/18/17 117 lb (53.1 kg)              Today, you had the following     No orders found for display       Primary Care Provider Office Phone # Fax #    Terrileroy Baron [phone] [address] [facility] [address]        Equal Access to Services     HUMERA HOLCOMB : Bhanu De La Cruz, waperlita mccullough, qaybta kaalmada cyril, isabella gibson. [facility] [phone].    ATENCIÓN: Si habla español, tiene a vogel disposición servicios gratuitos de asistencia lingüística. Llame al [phone].    We comply with applicable federal civil rights laws and [place] laws. We do not discriminate on the basis of race, color, national origin, age, disability, sex, sexual orientation, or gender identity.            Thank you!     Thank you for choosing [facility]  for your care. Our goal is always to provide you with excellent care. Hearing back from our patients is one way we can continue to improve our services. Please take a few minutes to complete the written survey that you may receive in the mail after your visit with us. Thank you!             Your Updated Medication List - Protect others around you: Learn how to safely use, store and throw away your medicines at www.disposemymeds.org.          This list is accurate as of: 11/29/17 11:08 AM.  Always use your most recent med list.                   Brand Name Dispense Instructions for use Diagnosis    clindamycin-benzoyl peroxide gel    BENZACLIN    50 g    Apply to  Affected area initially once daily for 2 weeks and then increase to 2 times daily if tolerated    Acne       diclofenac 1 % Gel topical gel    VOLTAREN    100 g    Use small  amount and rub into area of chest that is hurting 1-2 times daily as needed    Costochondritis, Atypical chest pain       docusate sodium 100 MG tablet    COLACE    60 tablet    Take 100 mg by mouth daily    External hemorrhoids       fluticasone 50 MCG/ACT spray    FLONASE    16 g    Spray 1-2 sprays into both nostrils daily    Throat pain       GABAPENTIN PO      Take 300 mg by mouth        hydrocortisone 0.2 % cream    WESTCORT    45 g    Apply sparingly to affected area 2 times daily as needed.    Rash       loratadine 10 MG tablet    CLARITIN    30 tablet    Take 1 tablet (10 mg) by mouth daily    Hives       naproxen 500 MG tablet    NAPROSYN    180 tablet    Take 1 tablet (500 mg) by mouth 2 times daily (with meals)    Sacroiliitis (H)       PATADAY 0.2 % Soln   Generic drug:  olopatadine HCl      Reported on 5/1/2017        SUMATRIPTAN SUCCINATE PO      Take 100 mg by mouth every 8 hours as needed for migraine        traMADol 50 MG tablet    ULTRAM    40 tablet    Take 1-2 tablets ( mg) by mouth every 6 hours as needed for moderate pain    Acute pain of both shoulders       traZODone 50 MG tablet    DESYREL    60 tablet    Take 1 tablet (50 mg) by mouth nightly as needed for sleep    Insomnia, unspecified type

## 2017-11-29 NOTE — PROGRESS NOTES
SUBJECTIVE:   Zo Qureshi is a 42 year old female who presents to clinic today for the following health issues:      Patient here for routine workman's comp exam.   She is very frustrated as she has been unable to get her medications.   She is supposed to be on trazodone for sleep rx by me and gabapentin, imitrex and naproxen by neurology and she has been unable to get them and take them as her insurance says the case in closed but when the  calls they say it is not so there is a lot of confusion and in the meantime she is not taking these meds.       She continues to experience neck and right shoulder and forearm pain and inflammation.       Past Medical History:   Diagnosis Date     HSIL on Pap smear 09/21/11    MARTA 2 and 3     INFLAM SPONDYLOPATHY NOS   1/7/2008     Leukocytopenia 3/10/2014     Leukocytopenia      Tendinosis      Thrombocytopenia (H) 3/10/2014     Thrombocytopenia (H)      Unspecified closed fracture of pelvis 2000    left fracture of pelvis after delivery       Past Surgical History:   Procedure Laterality Date     C NONSPECIFIC PROCEDURE  10/00    after delivery 2 units of prbcs secondary to placenta     LEEP TX, CERVICAL  12/19/11    MARTA II     TUBAL LIGATION  5/2009    laparoscopic tubal ligation       MEDICATIONS:  Current Outpatient Prescriptions   Medication     SUMATRIPTAN SUCCINATE PO     GABAPENTIN PO     naproxen (NAPROSYN) 500 MG tablet     hydrocortisone (WESTCORT) 0.2 % cream     diclofenac (VOLTAREN) 1 % GEL topical gel     traMADol (ULTRAM) 50 MG tablet     fluticasone (FLONASE) 50 MCG/ACT spray     docusate sodium (COLACE) 100 MG tablet     loratadine (CLARITIN) 10 MG tablet     clindamycin-benzoyl peroxide (BENZACLIN) gel     traZODone (DESYREL) 50 MG tablet     PATADAY 0.2 % SOLN     No current facility-administered medications for this visit.        SOCIAL HISTORY:  Social History   Substance Use Topics     Smoking status: Never Smoker     Smokeless tobacco:  Never Used     Alcohol use 0.0 oz/week     0 Standard drinks or equivalent per week      Comment: rarely       Family History   Problem Relation Age of Onset     Hypertension Father      Lipids Father      Hypertension Mother      Lipids Mother      DIABETES No family hx of      Coronary Artery Disease No family hx of      Hyperlipidemia No family hx of      CEREBROVASCULAR DISEASE No family hx of      Breast Cancer No family hx of      Colon Cancer No family hx of      Prostate Cancer No family hx of      Other Cancer No family hx of      Depression No family hx of      Anxiety Disorder No family hx of      MENTAL ILLNESS No family hx of      Substance Abuse No family hx of      Anesthesia Reaction No family hx of      Asthma No family hx of      OSTEOPOROSIS No family hx of      Genetic Disorder No family hx of      Thyroid Disease No family hx of      Obesity No family hx of      Unknown/Adopted No family hx of        Objective:  Blood pressure 132/80, pulse 80, temperature 98.2  F (36.8  C), temperature source Oral, resp. rate 12, weight 118 lb (53.5 kg), SpO2 99 %, not currently breastfeeding.  Chest: Clear to auscultation bilaterally.  No wheezes, rales or retractions.  CV: Regular rate and rhythm without murmurs, rubs or gallops.  Neck: very tender posteriorly especially on her right  Right shoulder with tender point anteriorly and tender points at right elbow    Assessment:  1. Work comp with tirgger points and tendonitis - worse not getting medication  2. Also rheumatic disease  3. Headaches which were doing better but now worse as medication not being covered  4. Insomnia - untreated    Plan:  1. Will need to be off work now  2. Will try to get her meds covered so we can pursue adequate tx  3. Follow up with neuro as planned  4. Recheck in 2 months

## 2017-11-29 NOTE — NURSING NOTE
"Chief Complaint   Patient presents with     Work Comp       Initial /80 (BP Location: Right arm, Patient Position: Chair, Cuff Size: Adult Regular)  Pulse 80  Temp 98.2  F (36.8  C) (Oral)  Resp 12  Wt 118 lb (53.5 kg)  SpO2 99%  BMI 24.24 kg/m2 Estimated body mass index is 24.24 kg/(m^2) as calculated from the following:    Height as of 11/13/17: 4' 10.5\" (1.486 m).    Weight as of this encounter: 118 lb (53.5 kg).  Medication Reconciliation: complete   Demetrio Sales MA      "

## 2017-12-20 ENCOUNTER — TELEPHONE (OUTPATIENT)
Dept: FAMILY MEDICINE | Facility: CLINIC | Age: 42
End: 2017-12-20

## 2017-12-20 NOTE — TELEPHONE ENCOUNTER
Reason for Call:  Form, our goal is to have forms completed with 72 hours, however, some forms may require a visit or additional information.    Type of letter, form or note:  employer    Who is the form from?: Insurance comp    Where did the form come from: form was faxed in    What clinic location was the form placed at?: [facility]     Where the form was placed: 's Box    What number is listed as a contact on the form?: NA       Additional comments: Fax completed form to [employer]    Call taken on 12/20/2017 at 12:15 PM by Kelly Pelyao

## 2018-01-15 ENCOUNTER — TRANSFERRED RECORDS (OUTPATIENT)
Dept: HEALTH INFORMATION MANAGEMENT | Facility: CLINIC | Age: 43
End: 2018-01-15

## 2018-01-29 ENCOUNTER — OFFICE VISIT (OUTPATIENT)
Dept: FAMILY MEDICINE | Facility: CLINIC | Age: 43
End: 2018-01-29
Payer: OTHER MISCELLANEOUS

## 2018-01-29 VITALS
HEART RATE: 71 BPM | BODY MASS INDEX: 24.19 KG/M2 | DIASTOLIC BLOOD PRESSURE: 77 MMHG | HEIGHT: 59 IN | WEIGHT: 120 LBS | TEMPERATURE: 98.1 F | SYSTOLIC BLOOD PRESSURE: 128 MMHG | RESPIRATION RATE: 16 BRPM

## 2018-01-29 DIAGNOSIS — M46.98 INFLAMMATORY SPONDYLOPATHY OF SACRAL REGION (H): ICD-10-CM

## 2018-01-29 DIAGNOSIS — G44.219 EPISODIC TENSION-TYPE HEADACHE, NOT INTRACTABLE: ICD-10-CM

## 2018-01-29 DIAGNOSIS — G47.01 INSOMNIA DUE TO MEDICAL CONDITION: ICD-10-CM

## 2018-01-29 DIAGNOSIS — J01.01 ACUTE RECURRENT MAXILLARY SINUSITIS: Primary | ICD-10-CM

## 2018-01-29 PROCEDURE — 99214 OFFICE O/P EST MOD 30 MIN: CPT | Performed by: FAMILY MEDICINE

## 2018-01-29 NOTE — LETTER
[facility]  [address] [place], [address]  [phone]        January 29, 2018    Zo Qureshi                                                                                                                                                       [address] [facility] [address]            To Whom It May Concern,   Zo Qureshi is a patient of mine.   She has made some progress with her pain.   She may return to work with restrictions.   She  May not work more than 4 hours per day and no more than 2 shifts in a row with a day off in between.   Also no lifting greater than 15 lbs.   These will be for the next month and then she will be re- evaluated.            Sincerely,    Terri Robles M.D.

## 2018-01-29 NOTE — NURSING NOTE
"Chief Complaint   Patient presents with     Work Comp     8 week FU       Initial /77 (BP Location: Right arm, Patient Position: Chair, Cuff Size: Adult Regular)  Pulse 71  Temp 98.1  F (36.7  C) (Oral)  Resp 16  Ht 4' 10.5\" (1.486 m)  Wt 120 lb (54.4 kg)  Breastfeeding? No  BMI 24.65 kg/m2 Estimated body mass index is 24.65 kg/(m^2) as calculated from the following:    Height as of this encounter: 4' 10.5\" (1.486 m).    Weight as of this encounter: 120 lb (54.4 kg).  Medication Reconciliation: complete rt arm Lacey Quiros MA      "

## 2018-01-29 NOTE — MR AVS SNAPSHOT
"              After Visit Summary   1/29/2018    Zo Qureshi    MRN: [id number]           Patient Information     Date Of Birth          1975        Visit Information        Provider Department      1/29/2018 10:00 AM Terri Baron MD [facility]        Today's Diagnoses     Acute recurrent maxillary sinusitis    -  1       Follow-ups after your visit        Who to contact     If you have questions or need follow up information about today's clinic visit or your schedule please contact [facility] directly at [phone].  Normal or non-critical lab and imaging results will be communicated to you by Giving Assistanthart, letter or phone within 4 business days after the clinic has received the results. If you do not hear from us within 7 days, please contact the clinic through TrackBillt or phone. If you have a critical or abnormal lab result, we will notify you by phone as soon as possible.  Submit refill requests through Q Interactive or call your pharmacy and they will forward the refill request to us. Please allow 3 business days for your refill to be completed.          Additional Information About Your Visit        MyChart Information     [employer] gives you secure access to your electronic health record. If you see a primary care provider, you can also send messages to your care team and make appointments. If you have questions, please call your primary care clinic.  If you do not have a primary care provider, please call [phone] and they will assist you.        Care EveryWhere ID     This is your Care EveryWhere ID. This could be used by other organizations to access your [place] medical records  [id number]        Your Vitals Were     Pulse Temperature Respirations Height Breastfeeding? BMI (Body Mass Index)    71 98.1  F (36.7  C) (Oral) 16 4' 10.5\" (1.486 m) No 24.65 kg/m2       Blood Pressure from Last 3 Encounters:   01/29/18 128/77   11/29/17 132/80   11/13/17 104/66    Weight " from Last 3 Encounters:   01/29/18 120 lb (54.4 kg)   11/29/17 118 lb (53.5 kg)   11/13/17 120 lb 9.6 oz (54.7 kg)              Today, you had the following     No orders found for display         Today's Medication Changes          These changes are accurate as of 1/29/18 10:58 AM.  If you have any questions, ask your nurse or doctor.               Start taking these medicines.        Dose/Directions    loratadine-pseudoePHEDrine  MG per 24 hr tablet   Commonly known as:  CLARITIN-D 24-hour   Used for:  Acute recurrent maxillary sinusitis   Started by:  Terri Baron MD        Dose:  1 tablet   Take 1 tablet by mouth daily   Quantity:  14 tablet   Refills:  0            Where to get your medicines      Some of these will need a paper prescription and others can be bought over the counter.  Ask your nurse if you have questions.     Bring a paper prescription for each of these medications     loratadine-pseudoePHEDrine  MG per 24 hr tablet                Primary Care Provider Office Phone # Fax #    Terri Baron [phone] [address] [facility] [address]        Equal Access to Services     [facility] AH: Hadii darlyn vicente hadasho Somaddi, waaxda luqadaha, qaybta kaalmada adeegyada, isabella simpson . So [facility] [phone].    ATENCIÓN: Si habla español, tiene a vogel disposición servicios gratuitos de asistencia lingüística. Llame al [phone].    We comply with applicable federal civil rights laws and [place] laws. We do not discriminate on the basis of race, color, national origin, age, disability, sex, sexual orientation, or gender identity.            Thank you!     Thank you for choosing [facility]  for your care. Our goal is always to provide you with excellent care. Hearing back from our patients is one way we can continue to improve our services. Please take a few minutes to complete the written survey that you may receive in  the mail after your visit with us. Thank you!             Your Updated Medication List - Protect others around you: Learn how to safely use, store and throw away your medicines at www.disposemymeds.org.          This list is accurate as of 1/29/18 10:58 AM.  Always use your most recent med list.                   Brand Name Dispense Instructions for use Diagnosis    clindamycin-benzoyl peroxide gel    BENZACLIN    50 g    Apply to  Affected area initially once daily for 2 weeks and then increase to 2 times daily if tolerated    Acne       diclofenac 1 % Gel topical gel    VOLTAREN    100 g    Use small amount and rub into area of chest that is hurting 1-2 times daily as needed    Costochondritis, Atypical chest pain       docusate sodium 100 MG tablet    COLACE    60 tablet    Take 100 mg by mouth daily    External hemorrhoids       fluticasone 50 MCG/ACT spray    FLONASE    16 g    Spray 1-2 sprays into both nostrils daily    Throat pain       GABAPENTIN PO      Take 300 mg by mouth        hydrocortisone 0.2 % cream    WESTCORT    45 g    Apply sparingly to affected area 2 times daily as needed.    Rash       loratadine 10 MG tablet    CLARITIN    30 tablet    Take 1 tablet (10 mg) by mouth daily    Hives       loratadine-pseudoePHEDrine  MG per 24 hr tablet    CLARITIN-D 24-hour    14 tablet    Take 1 tablet by mouth daily    Acute recurrent maxillary sinusitis       naproxen 500 MG tablet    NAPROSYN    180 tablet    Take 1 tablet (500 mg) by mouth 2 times daily (with meals)    Sacroiliitis (H)       PATADAY 0.2 % Soln   Generic drug:  olopatadine HCl      Reported on 5/1/2017        SUMATRIPTAN SUCCINATE PO      Take 100 mg by mouth every 8 hours as needed for migraine        traMADol 50 MG tablet    ULTRAM    40 tablet    Take 1-2 tablets ( mg) by mouth every 6 hours as needed for moderate pain    Acute pain of both shoulders       traZODone 50 MG tablet    DESYREL    60 tablet    Take 1 tablet (50  mg) by mouth nightly as needed for sleep    Insomnia, unspecified type

## 2018-01-29 NOTE — LETTER
[facility]  [address] [place], [address]  [phone]        January 29, 2018    Zo Qureshi                                                                                                                                                       [address] [facility] [facility] [address]              To Whom It May Concern,   Zo Qureshi is a patient of mine.   She has made some progress with her pain.   She may return to work with restrictions.   She  May not work more than 4 hours per day and no more than 2 shifts in a row with a day off in between.   I would like her to have a 10 minute break after 2 hours.  Also no lifting greater than 15 lbs.   These will be for the next month and then she will be re- evaluated.            Sincerely,    Terri Robles M.D.

## 2018-01-29 NOTE — PROGRESS NOTES
SUBJECTIVE:  Zo Qureshi is a 42 year old female who presents for recheck on workers comp.   She was able to get her meds covered and start them.   She was doing well.   She saw neurology who felt she has fibromyalgia as well and started her on cymbalta.   She had taken 5 day of it when she began to have diarrhea and eye pressure and she stopped it.   She also began to feel sick that day which was last Friday.   She got sore throat and runny nose and fever and facial pressure.   She then stopped all her meds.   Her facial pain is especially bad on the right.   Her joints start to feel gradually worse on after stopping her meds.   Her headache are better overall and her sleep is better.   She is still not back to work.   She does not have follow up appointment with rheumatology yet.            Past Medical History:   Diagnosis Date     HSIL on Pap smear 09/21/11    MARTA 2 and 3     INFLAM SPONDYLOPATHY NOS   1/7/2008     Leukocytopenia 3/10/2014     Leukocytopenia      Tendinosis      Thrombocytopenia (H) 3/10/2014     Thrombocytopenia (H)      Unspecified closed fracture of pelvis 2000    left fracture of pelvis after delivery       Past Surgical History:   Procedure Laterality Date     C NONSPECIFIC PROCEDURE  10/00    after delivery 2 units of prbcs secondary to placenta     LEEP TX, CERVICAL  12/19/11    MARTA II     TUBAL LIGATION  5/2009    laparoscopic tubal ligation       MEDICATIONS:  Current Outpatient Prescriptions   Medication     SUMATRIPTAN SUCCINATE PO     GABAPENTIN PO     naproxen (NAPROSYN) 500 MG tablet     traZODone (DESYREL) 50 MG tablet     hydrocortisone (WESTCORT) 0.2 % cream     diclofenac (VOLTAREN) 1 % GEL topical gel     traMADol (ULTRAM) 50 MG tablet     fluticasone (FLONASE) 50 MCG/ACT spray     docusate sodium (COLACE) 100 MG tablet     loratadine (CLARITIN) 10 MG tablet     PATADAY 0.2 % SOLN     clindamycin-benzoyl peroxide (BENZACLIN) gel     No current facility-administered  "medications for this visit.        SOCIAL HISTORY:  Social History   Substance Use Topics     Smoking status: Never Smoker     Smokeless tobacco: Never Used     Alcohol use 0.0 oz/week     0 Standard drinks or equivalent per week      Comment: rarely       Family History   Problem Relation Age of Onset     Hypertension Father      Lipids Father      Hypertension Mother      Lipids Mother      DIABETES No family hx of      Coronary Artery Disease No family hx of      Hyperlipidemia No family hx of      CEREBROVASCULAR DISEASE No family hx of      Breast Cancer No family hx of      Colon Cancer No family hx of      Prostate Cancer No family hx of      Other Cancer No family hx of      Depression No family hx of      Anxiety Disorder No family hx of      MENTAL ILLNESS No family hx of      Substance Abuse No family hx of      Anesthesia Reaction No family hx of      Asthma No family hx of      OSTEOPOROSIS No family hx of      Genetic Disorder No family hx of      Thyroid Disease No family hx of      Obesity No family hx of      Unknown/Adopted No family hx of        Objective:  Blood pressure 128/77, pulse 71, temperature 98.1  F (36.7  C), temperature source Oral, resp. rate 16, height 4' 10.5\" (1.486 m), weight 120 lb (54.4 kg), not currently breastfeeding.  HEENT:  TM's are clear bilaterally.  Oropharynx is clear without tonsillar hypertrophy or exudate.  Conjuctiva are clear.  Neck:  There is no lymphadenopathy or thyroid tenderness or enlargement  Chest: Clear to auscultation bilaterally.  No wheezes, rales or retractions.  CV: Regular rate and rhythm without murmurs, rubs or gallops.  Extremities: There is no clubbing, cyanosis or edema.  Peripheral pulses are present bilaterally in the radial and dorsalis pedis arteries.      Assessment:  1. Joint pain - likely fibromyalgia and inflammatory joint disease  2. Right shoulder pain - has been tendonitis from overuse - this is getting better  3. URI and right " sinusitis - likely viral  4. Insomnia - has been better  5. Headaches - have been better    Plan:  1. claritin D  2. The potential side effects of the prescription medication were discussed with the patient.  3. After a week start back on meds  4. Start back on gabapentin now  5. Trazodone prn  6. May return to work very gradually - see letter  7. See rheum again in next few weeks  8. Recheck with me in 1 month

## 2018-02-19 DIAGNOSIS — J01.01 ACUTE RECURRENT MAXILLARY SINUSITIS: ICD-10-CM

## 2018-02-21 ENCOUNTER — OFFICE VISIT (OUTPATIENT)
Dept: FAMILY MEDICINE | Facility: CLINIC | Age: 43
End: 2018-02-21
Payer: COMMERCIAL

## 2018-02-21 VITALS
RESPIRATION RATE: 14 BRPM | WEIGHT: 118.1 LBS | TEMPERATURE: 98.2 F | DIASTOLIC BLOOD PRESSURE: 70 MMHG | HEART RATE: 81 BPM | HEIGHT: 59 IN | OXYGEN SATURATION: 100 % | SYSTOLIC BLOOD PRESSURE: 124 MMHG | BODY MASS INDEX: 23.81 KG/M2

## 2018-02-21 DIAGNOSIS — M46.98 INFLAMMATORY SPONDYLOPATHY OF SACRAL REGION (H): ICD-10-CM

## 2018-02-21 DIAGNOSIS — Z98.890 S/P LEEP OF CERVIX: ICD-10-CM

## 2018-02-21 DIAGNOSIS — G47.00 INSOMNIA, UNSPECIFIED TYPE: ICD-10-CM

## 2018-02-21 DIAGNOSIS — E78.00 HYPERCHOLESTEROLEMIA: ICD-10-CM

## 2018-02-21 DIAGNOSIS — Z00.00 ROUTINE HISTORY AND PHYSICAL EXAMINATION OF ADULT: Primary | ICD-10-CM

## 2018-02-21 DIAGNOSIS — I88.9 LYMPHADENITIS: ICD-10-CM

## 2018-02-21 PROCEDURE — 99396 PREV VISIT EST AGE 40-64: CPT | Performed by: FAMILY MEDICINE

## 2018-02-21 PROCEDURE — G0145 SCR C/V CYTO,THINLAYER,RESCR: HCPCS | Performed by: FAMILY MEDICINE

## 2018-02-21 PROCEDURE — 36415 COLL VENOUS BLD VENIPUNCTURE: CPT | Performed by: FAMILY MEDICINE

## 2018-02-21 PROCEDURE — 87624 HPV HI-RISK TYP POOLED RSLT: CPT | Performed by: FAMILY MEDICINE

## 2018-02-21 PROCEDURE — 80061 LIPID PANEL: CPT | Performed by: FAMILY MEDICINE

## 2018-02-21 RX ORDER — TRAZODONE HYDROCHLORIDE 50 MG/1
50 TABLET, FILM COATED ORAL
Qty: 60 TABLET | Refills: 6 | Status: SHIPPED | OUTPATIENT
Start: 2018-02-21 | End: 2018-04-23

## 2018-02-21 RX ORDER — CEPHALEXIN 500 MG/1
500 CAPSULE ORAL 2 TIMES DAILY
Qty: 20 CAPSULE | Refills: 0 | Status: SHIPPED | OUTPATIENT
Start: 2018-02-21 | End: 2018-03-07

## 2018-02-21 ASSESSMENT — PATIENT HEALTH QUESTIONNAIRE - PHQ9
SUM OF ALL RESPONSES TO PHQ QUESTIONS 1-9: 13
SUM OF ALL RESPONSES TO PHQ QUESTIONS 1-9: 13
10. IF YOU CHECKED OFF ANY PROBLEMS, HOW DIFFICULT HAVE THESE PROBLEMS MADE IT FOR YOU TO DO YOUR WORK, TAKE CARE OF THINGS AT HOME, OR GET ALONG WITH OTHER PEOPLE: VERY DIFFICULT

## 2018-02-21 NOTE — PROGRESS NOTES
SUBJECTIVE:   CC: Zo Qureshi is an 43 year old woman who presents for preventive health visit.     Physical   Annual:     Getting at least 3 servings of Calcium per day::  Yes    Bi-annual eye exam::  Yes    Dental care twice a year::  NO    Sleep apnea or symptoms of sleep apnea::  Daytime drowsiness    Diet::  Regular (no restrictions)    Frequency of exercise::  2-3 days/week    Duration of exercise::  Less than 15 minutes    Taking medications regularly::  Yes    Medication side effects::  Other    Additional concerns today::  No              Today's PHQ-2 Score:   PHQ-2 ( 1999 [employer]) 2/21/2018   Q1: Little interest or pleasure in doing things 3   Q2: Feeling down, depressed or hopeless 1   PHQ-2 Score 4   Q1: Little interest or pleasure in doing things Nearly every day   Q2: Feeling down, depressed or hopeless Several days   PHQ-2 Score 4       Abuse: Current or Past(Physical, Sexual or Emotional)- No  Do you feel safe in your environment - Yes    Social History   Substance Use Topics     Smoking status: Never Smoker     Smokeless tobacco: Never Used     Alcohol use 0.0 oz/week     0 Standard drinks or equivalent per week      Comment: rarely     Alcohol Use 2/21/2018   If you drink alcohol, do you typically have greater than 3 drinks per day OR greater than 7 drinks per week?   Not applicable   No flowsheet data found.    Reviewed orders with patient.  Reviewed health maintenance and updated orders accordingly - Yes      Subjective:  Zo Qureshi is a 43 year old female  who presents today for a Physical Exam.  She has had an abnormal PAP smear.  She has had her cholesterol checked in the past and it was high in 2015.  Her last mammogram was never. She has had sore lumps in right groin for about 2 weeks and a sore on her bottom for about 3-4 weeks.   Would like those checked out.  Also she has not gotten her gabapentin approved.       Current Outpatient Prescriptions   Medication Sig Dispense Refill      loratadine-pseudoePHEDrine (CLARITIN-D 24-HOUR)  MG per 24 hr tablet Take 1 tablet by mouth daily 14 tablet 0     SUMATRIPTAN SUCCINATE PO Take 100 mg by mouth every 8 hours as needed for migraine       GABAPENTIN PO Take 300 mg by mouth       naproxen (NAPROSYN) 500 MG tablet Take 1 tablet (500 mg) by mouth 2 times daily (with meals) 180 tablet 0     traZODone (DESYREL) 50 MG tablet Take 1 tablet (50 mg) by mouth nightly as needed for sleep 60 tablet 1     hydrocortisone (WESTCORT) 0.2 % cream Apply sparingly to affected area 2 times daily as needed. 45 g 0     diclofenac (VOLTAREN) 1 % GEL topical gel Use small amount and rub into area of chest that is hurting 1-2 times daily as needed 100 g 1     traMADol (ULTRAM) 50 MG tablet Take 1-2 tablets ( mg) by mouth every 6 hours as needed for moderate pain 40 tablet 0     fluticasone (FLONASE) 50 MCG/ACT spray Spray 1-2 sprays into both nostrils daily 16 g 3     loratadine (CLARITIN) 10 MG tablet Take 1 tablet (10 mg) by mouth daily 30 tablet 1     PATADAY 0.2 % SOLN Reported on 5/1/2017  6     clindamycin-benzoyl peroxide (BENZACLIN) gel Apply to  Affected area initially once daily for 2 weeks and then increase to 2 times daily if tolerated 50 g 11     docusate sodium (COLACE) 100 MG tablet Take 100 mg by mouth daily (Patient not taking: Reported on 2/21/2018) 60 tablet 1       Past Medical History:   Diagnosis Date     HSIL on Pap smear 09/21/11    MARTA 2 and 3     INFLAM SPONDYLOPATHY NOS   1/7/2008     Leukocytopenia 3/10/2014     Leukocytopenia      Tendinosis      Thrombocytopenia (H) 3/10/2014     Thrombocytopenia (H)      Unspecified closed fracture of pelvis 2000    left fracture of pelvis after delivery       Past Surgical History:   Procedure Laterality Date     C NONSPECIFIC PROCEDURE  10/00    after delivery 2 units of prbcs secondary to placenta     LEEP TX, CERVICAL  12/19/11    MARTA II     TUBAL LIGATION  5/2009    laparoscopic tubal ligation  "      Family History   Problem Relation Age of Onset     Hypertension Father      Lipids Father      Hypertension Mother      Lipids Mother      DIABETES No family hx of      Coronary Artery Disease No family hx of      Hyperlipidemia No family hx of      CEREBROVASCULAR DISEASE No family hx of      Breast Cancer No family hx of      Colon Cancer No family hx of      Prostate Cancer No family hx of      Other Cancer No family hx of      Depression No family hx of      Anxiety Disorder No family hx of      MENTAL ILLNESS No family hx of      Substance Abuse No family hx of      Anesthesia Reaction No family hx of      Asthma No family hx of      OSTEOPOROSIS No family hx of      Genetic Disorder No family hx of      Thyroid Disease No family hx of      Obesity No family hx of      Unknown/Adopted No family hx of        Social History   Substance Use Topics     Smoking status: Never Smoker     Smokeless tobacco: Never Used     Alcohol use 0.0 oz/week     0 Standard drinks or equivalent per week      Comment: rarely       Objective:  Blood pressure 124/70, pulse 81, temperature 98.2  F (36.8  C), temperature source Oral, resp. rate 14, height 4' 10.5\" (1.486 m), weight 118 lb 1.6 oz (53.6 kg), last menstrual period 01/26/2018, SpO2 100 %, not currently breastfeeding.  General appearance: Healthy.  Mental Status: cooperative, normal affect, no gross thought process defects.  HEENT:   Ears- Normal external canals and TMs  Eyes- PERRL, EOM's intact, fundi benign, sharp disc margins.  Throat- Normal   Neck- no carotid bruits noted  Nodes- Negative  Thyroid: Normal to palpation, no enlargement or nodules noted.  Breasts: Symmetric without mass tenderness or discharge.  Axillary nodes negative.  Lungs: Clear to auscultation bilaterally  Heart.: Normal rate and rhythm.  No murmurs, clicks or gallops.  Pulses:    R-4/4, PT-4/4, DP-4/4  Abdomen: BS active. Soft, non-tender, no masses or organomegaly.  Aorta normal. No " "bruits.  Genitalia: Normal female external genitalia without lesions.  Speculum:  Cervix normal,  Pap taken, Bimanual exam - normal size uterus, no adenexal mass or tenderness.  Extremities: Normal without edema  Reflexes:  Symmetric bilaterally at the patella  Skin: right gluteal area with 2-3 mm superficial ulceration with no purulent drainage - slightly tender and red  Right inguinal area with string of lymph nodes which are shoddy and slightly enlarged and tender - none like that on the left    Assessment:  1. Zo Qureshi is a healthy 43 year old female here for health care maintainence issues.  2. Hx of abnormal pap but last pap was ok  3. Never had mammo  4. Insomnia - needs refll of trazodone which works well  5. Folliculitis which she scratched on right buttock and now small ulceration/infection  6. Right inguinal lymphadenitis  7. Hx of elevated lipids  8. Spondyloarthropathy - cannot get gabapentin approved from her insurance    Plan:  1. A Pap smear was obtained  2. Labs ordered per Epicare orders  3. Screening Mammogram was ordered  4. Refills per epicare  5. Keflex for ulceration and lymphadenitis  6. The potential side effects of the prescription medication were discussed with the patient.  7.  Patient is to follow-up in 1 year and as needed.        Review of Systems  Physical Exam       reports that she has never smoked. She has never used smokeless tobacco.    Estimated body mass index is 24.65 kg/(m^2) as calculated from the following:    Height as of 1/29/18: 4' 10.5\" (1.486 m).    Weight as of 1/29/18: 120 lb (54.4 kg).       Counseling Resources:  ATP IV Guidelines  Pooled Cohorts Equation Calculator  Breast Cancer Risk Calculator  FRAX Risk Assessment  ICSI Preventive Guidelines  Dietary Guidelines for Americans, 2010  [facility] EHR's MyPlate  ASA Prophylaxis  Lung CA Screening    Terri Baron MD  [facility]  Answers for HPI/ROS submitted by the patient on 2/21/2018   PHQ-2 Score: " 4  If you checked off any problems, how difficult have these problems made it for you to do your work, take care of things at home, or get along with other people?: Very difficult  PHQ9 TOTAL SCORE: 13

## 2018-02-21 NOTE — PATIENT INSTRUCTIONS
Preventive Health Recommendations  Female Ages 40 to 49    Yearly exam:     See your health care provider every year in order to  1. Review health changes.   2. Discuss preventive care.    3. Review your medicines if your doctor prescribed any.      Get a Pap test every three years (unless you have an abnormal result and your provider advises testing more often).      If you get Pap tests with HPV test, you only need to test every 5 years, unless you have an abnormal result. You do not need a Pap test if your uterus was removed (hysterectomy) and you have not had cancer.      You should be tested each year for STDs (sexually transmitted diseases), if you're at risk.       Ask your doctor if you should have a mammogram.      Have a colonoscopy (test for colon cancer) if someone in your family has had colon cancer or polyps before age 50.       Have a cholesterol test every 5 years.       Have a diabetes test (fasting glucose) after age 45. If you are at risk for diabetes, you should have this test every 3 years.    Shots: Get a flu shot each year. Get a tetanus shot every 10 years.     Nutrition:     Eat at least 5 servings of fruits and vegetables each day.    Eat whole-grain bread, whole-wheat pasta and brown rice instead of white grains and rice.    Talk to your provider about Calcium and Vitamin D.     Lifestyle    Exercise at least 150 minutes a week (an average of 30 minutes a day, 5 days a week). This will help you control your weight and prevent disease.    Limit alcohol to one drink per day.    No smoking.     Wear sunscreen to prevent skin cancer.    See your dentist every six months for an exam and cleaning.      You may schedule your mammogram at [facility] by calling [phone] and asking to schedule your mammogram or you may schedule with [facility] in [place] by calling [phone].

## 2018-02-21 NOTE — MR AVS SNAPSHOT
After Visit Summary   2/21/2018    Zo Qureshi    MRN: [id number]           Patient Information     Date Of Birth          1975        Visit Information        Provider Department      2/21/2018 10:15 AM Terri Baron MD [facility]        Today's Diagnoses     Routine history and physical examination of adult    -  1    S/P LEEP of cervix        Inflammatory spondylopathy of sacral region (H)        Insomnia, unspecified type        Hypercholesterolemia          Care Instructions      Preventive Health Recommendations  Female Ages 40 to 49    Yearly exam:     See your health care provider every year in order to  1. Review health changes.   2. Discuss preventive care.    3. Review your medicines if your doctor prescribed any.      Get a Pap test every three years (unless you have an abnormal result and your provider advises testing more often).      If you get Pap tests with HPV test, you only need to test every 5 years, unless you have an abnormal result. You do not need a Pap test if your uterus was removed (hysterectomy) and you have not had cancer.      You should be tested each year for STDs (sexually transmitted diseases), if you're at risk.       Ask your doctor if you should have a mammogram.      Have a colonoscopy (test for colon cancer) if someone in your family has had colon cancer or polyps before age 50.       Have a cholesterol test every 5 years.       Have a diabetes test (fasting glucose) after age 45. If you are at risk for diabetes, you should have this test every 3 years.    Shots: Get a flu shot each year. Get a tetanus shot every 10 years.     Nutrition:     Eat at least 5 servings of fruits and vegetables each day.    Eat whole-grain bread, whole-wheat pasta and brown rice instead of white grains and rice.    Talk to your provider about Calcium and Vitamin D.     Lifestyle    Exercise at least 150 minutes a week (an average of 30 minutes a day, 5 days a  week). This will help you control your weight and prevent disease.    Limit alcohol to one drink per day.    No smoking.     Wear sunscreen to prevent skin cancer.    See your dentist every six months for an exam and cleaning.      You may schedule your mammogram at [facility] by calling [phone] and asking to schedule your mammogram or you may schedule with [facility] in [place] by calling [phone].            Follow-ups after your visit        Your next 10 appointments already scheduled     Feb 26, 2018 10:30 AM CST   [facility]    [address] [place] [address] [phone]            Mar 07, 2018  1:45 PM CST   New Visit with [facility] MD   [facility]    [address] [address] [phone]              Future tests that were ordered for you today     Open Future Orders        Priority Expected Expires Ordered    *MA Screening Digital Bilateral Routine  2/21/2019 2/21/2018            Who to contact     If you have questions or need follow up information about today's clinic visit or your schedule please contact [facility] directly at [phone].  Normal or non-critical lab and imaging results will be communicated to you by MyChart, letter or phone within 4 business days after the clinic has received the results. If you do not hear from us within 7 days, please contact the clinic through MyChart or phone. If you have a critical or abnormal lab result, we will notify you by phone as soon as possible.  Submit refill requests through Caterna or call your pharmacy and they will forward the refill request to us. Please allow 3 business days for your refill to be completed.          Additional Information About Your Visit        Nutricatehart  "Information     Taj gives you secure access to your electronic health record. If you see a primary care provider, you can also send messages to your care team and make appointments. If you have questions, please call your primary care clinic.  If you do not have a primary care provider, please call [phone] and they will assist you.        Care EveryWhere ID     This is your Care EveryWhere ID. This could be used by other organizations to access your [place] medical records  [id number]        Your Vitals Were     Pulse Temperature Respirations Height Last Period Pulse Oximetry    81 98.2  F (36.8  C) (Oral) 14 4' 10.5\" (1.486 m) 01/26/2018 100%    BMI (Body Mass Index)                   24.26 kg/m2            Blood Pressure from Last 3 Encounters:   02/21/18 124/70   01/29/18 128/77   11/29/17 132/80    Weight from Last 3 Encounters:   02/21/18 118 lb 1.6 oz (53.6 kg)   01/29/18 120 lb (54.4 kg)   11/29/17 118 lb (53.5 kg)              We Performed the Following     HPV High Risk Types DNA Cervical     Lipid panel reflex to direct LDL Fasting     Pap imaged thin layer screen with HPV - recommended age 30 - 65 years (select HPV order below)     TDAP VACCINE (ADACEL)          Where to get your medicines      These medications were sent to [facility] [address]  [address] [facility] [address]     Phone:  [phone]     traZODone 50 MG tablet          Primary Care Provider Office Phone # Fax #    Terri Baron [phone] [address] [facility] [address]        Equal Access to Services     [facility] : Hadii darlyn De La Cruz, wabonda luanastasiaadaha, qaybta kaalisabella burks . So [facility] [phone].    ATENCIÓN: Si habla español, tiene a vogel disposición servicios gratuitos de asistencia lingüística. Llame al [phone].    We comply with applicable federal civil rights laws and " [place] laws. We do not discriminate on the basis of race, color, national origin, age, disability, sex, sexual orientation, or gender identity.            Thank you!     Thank you for choosing [facility]  for your care. Our goal is always to provide you with excellent care. Hearing back from our patients is one way we can continue to improve our services. Please take a few minutes to complete the written survey that you may receive in the mail after your visit with us. Thank you!             Your Updated Medication List - Protect others around you: Learn how to safely use, store and throw away your medicines at www.disposemymeds.org.          This list is accurate as of 2/21/18 10:57 AM.  Always use your most recent med list.                   Brand Name Dispense Instructions for use Diagnosis    clindamycin-benzoyl peroxide gel    BENZACLIN    50 g    Apply to  Affected area initially once daily for 2 weeks and then increase to 2 times daily if tolerated    Acne       diclofenac 1 % Gel topical gel    VOLTAREN    100 g    Use small amount and rub into area of chest that is hurting 1-2 times daily as needed    Costochondritis, Atypical chest pain       docusate sodium 100 MG tablet    COLACE    60 tablet    Take 100 mg by mouth daily    External hemorrhoids       fluticasone 50 MCG/ACT spray    FLONASE    16 g    Spray 1-2 sprays into both nostrils daily    Throat pain       GABAPENTIN PO      Take 300 mg by mouth        hydrocortisone 0.2 % cream    WESTCORT    45 g    Apply sparingly to affected area 2 times daily as needed.    Rash       loratadine 10 MG tablet    CLARITIN    30 tablet    Take 1 tablet (10 mg) by mouth daily    Hives       loratadine-pseudoePHEDrine  MG per 24 hr tablet    CLARITIN-D 24-hour    14 tablet    Take 1 tablet by mouth daily    Acute recurrent maxillary sinusitis       naproxen 500 MG tablet    NAPROSYN    180 tablet    Take 1 tablet (500 mg) by mouth 2  times daily (with meals)    Sacroiliitis (H)       PATADAY 0.2 % Soln   Generic drug:  olopatadine HCl      Reported on 5/1/2017        SUMATRIPTAN SUCCINATE PO      Take 100 mg by mouth every 8 hours as needed for migraine        traMADol 50 MG tablet    ULTRAM    40 tablet    Take 1-2 tablets ( mg) by mouth every 6 hours as needed for moderate pain    Acute pain of both shoulders       traZODone 50 MG tablet    DESYREL    60 tablet    Take 1 tablet (50 mg) by mouth nightly as needed for sleep    Insomnia, unspecified type

## 2018-02-21 NOTE — NURSING NOTE
"Chief Complaint   Patient presents with     Physical     itchy bump on the back      Dizziness       Initial /70 (BP Location: Right arm, Patient Position: Chair, Cuff Size: Adult Regular)  Pulse 81  Temp 98.2  F (36.8  C) (Oral)  Resp 14  Ht 4' 10.5\" (1.486 m)  Wt 118 lb 1.6 oz (53.6 kg)  LMP 01/26/2018  SpO2 100%  BMI 24.26 kg/m2 Estimated body mass index is 24.26 kg/(m^2) as calculated from the following:    Height as of this encounter: 4' 10.5\" (1.486 m).    Weight as of this encounter: 118 lb 1.6 oz (53.6 kg).  Medication Reconciliation: complete   "

## 2018-02-22 LAB
CHOLEST SERPL-MCNC: 217 MG/DL
HDLC SERPL-MCNC: 56 MG/DL
LDLC SERPL CALC-MCNC: 133 MG/DL
NONHDLC SERPL-MCNC: 161 MG/DL
TRIGL SERPL-MCNC: 140 MG/DL

## 2018-02-22 ASSESSMENT — PATIENT HEALTH QUESTIONNAIRE - PHQ9: SUM OF ALL RESPONSES TO PHQ QUESTIONS 1-9: 13

## 2018-02-26 ENCOUNTER — TRANSFERRED RECORDS (OUTPATIENT)
Dept: HEALTH INFORMATION MANAGEMENT | Facility: CLINIC | Age: 43
End: 2018-02-26

## 2018-02-26 ENCOUNTER — OFFICE VISIT (OUTPATIENT)
Dept: FAMILY MEDICINE | Facility: CLINIC | Age: 43
End: 2018-02-26
Payer: OTHER MISCELLANEOUS

## 2018-02-26 VITALS
HEART RATE: 79 BPM | DIASTOLIC BLOOD PRESSURE: 79 MMHG | TEMPERATURE: 98.1 F | WEIGHT: 118 LBS | RESPIRATION RATE: 16 BRPM | HEIGHT: 59 IN | BODY MASS INDEX: 23.79 KG/M2 | OXYGEN SATURATION: 99 % | SYSTOLIC BLOOD PRESSURE: 124 MMHG

## 2018-02-26 DIAGNOSIS — M77.8 RIGHT SHOULDER TENDONITIS: Primary | ICD-10-CM

## 2018-02-26 DIAGNOSIS — G44.219 EPISODIC TENSION-TYPE HEADACHE, NOT INTRACTABLE: ICD-10-CM

## 2018-02-26 LAB
COPATH REPORT: NORMAL
PAP: NORMAL

## 2018-02-26 PROCEDURE — 99213 OFFICE O/P EST LOW 20 MIN: CPT | Performed by: FAMILY MEDICINE

## 2018-02-26 NOTE — LETTER
[facility]  [address] [place], [address]  [phone]        February 26, 2018    RE: Zo Qureshi                                                                                                                                                       [address] [facility] [address]            To Whom It May Concern,   Zo Qureshi will need to have off work again for the next 2 months due to flaring of her previous injury.   She will continue to have treatment and evaluation.   She will be seen again in 2 months here.            Sincerely,    Terri Robles M.D.

## 2018-02-26 NOTE — NURSING NOTE
"Chief Complaint   Patient presents with     RECHECK       Initial /79 (BP Location: Right arm, Patient Position: Chair, Cuff Size: Adult Regular)  Pulse 79  Temp 98.1  F (36.7  C) (Oral)  Resp 16  Ht 4' 10.5\" (1.486 m)  Wt 118 lb (53.5 kg)  SpO2 99%  BMI 24.24 kg/m2 Estimated body mass index is 24.24 kg/(m^2) as calculated from the following:    Height as of this encounter: 4' 10.5\" (1.486 m).    Weight as of this encounter: 118 lb (53.5 kg).  Medication Reconciliation: complete   "

## 2018-02-26 NOTE — PROGRESS NOTES
SUBJECTIVE:   Zo Qureshi is a 43 year old female who presents to clinic today for the following health issues:      Follow up on work comp.   She went back to work 4 weeks ago.   She went back 4 hours per day and Monday Wednesday and Friday.   The first week she was back the ache started back up but not nearly as bad as it was.   The second and 3rd week were a little worse.   Then the 4th week, her meds were not covered and she had to work and not have any of her med and it got really bad.   The pain was back along with the stiffness and limited range of motion on the right shoulder and arm.   Her left hurts a little too.   She has a hard time sleeping the last week due to the pain.   She is tearful.   She saw neurology this am and they decreased her cymbalta to 30 mg per day and added flexeril at night.   She will see rheum next week.         Past Medical History:   Diagnosis Date     HSIL on Pap smear 09/21/11    MARTA 2 and 3     INFLAM SPONDYLOPATHY NOS   1/7/2008     Leukocytopenia 3/10/2014     Leukocytopenia      Tendinosis      Thrombocytopenia (H) 3/10/2014     Thrombocytopenia (H)      Unspecified closed fracture of pelvis 2000    left fracture of pelvis after delivery       Past Surgical History:   Procedure Laterality Date     C NONSPECIFIC PROCEDURE  10/00    after delivery 2 units of prbcs secondary to placenta     LEEP TX, CERVICAL  12/19/11    MARTA II     TUBAL LIGATION  5/2009    laparoscopic tubal ligation       MEDICATIONS:  Current Outpatient Prescriptions   Medication     loratadine-pseudoePHEDrine (CLARITIN-D 24-HOUR)  MG per 24 hr tablet     GABAPENTIN PO     naproxen (NAPROSYN) 500 MG tablet     hydrocortisone (WESTCORT) 0.2 % cream     diclofenac (VOLTAREN) 1 % GEL topical gel     fluticasone (FLONASE) 50 MCG/ACT spray     docusate sodium (COLACE) 100 MG tablet     loratadine (CLARITIN) 10 MG tablet     clindamycin-benzoyl peroxide (BENZACLIN) gel     traZODone (DESYREL) 50 MG tablet  "    cephALEXin (KEFLEX) 500 MG capsule     SUMATRIPTAN SUCCINATE PO     traMADol (ULTRAM) 50 MG tablet     PATADAY 0.2 % SOLN     No current facility-administered medications for this visit.        SOCIAL HISTORY:  Social History   Substance Use Topics     Smoking status: Never Smoker     Smokeless tobacco: Never Used     Alcohol use 0.0 oz/week     0 Standard drinks or equivalent per week      Comment: rarely       Family History   Problem Relation Age of Onset     Hypertension Father      Lipids Father      Hypertension Mother      Lipids Mother      DIABETES No family hx of      Coronary Artery Disease No family hx of      Hyperlipidemia No family hx of      CEREBROVASCULAR DISEASE No family hx of      Breast Cancer No family hx of      Colon Cancer No family hx of      Prostate Cancer No family hx of      Other Cancer No family hx of      Depression No family hx of      Anxiety Disorder No family hx of      MENTAL ILLNESS No family hx of      Substance Abuse No family hx of      Anesthesia Reaction No family hx of      Asthma No family hx of      OSTEOPOROSIS No family hx of      Genetic Disorder No family hx of      Thyroid Disease No family hx of      Obesity No family hx of      Unknown/Adopted No family hx of        Objective:  Blood pressure 124/79, pulse 79, temperature 98.1  F (36.7  C), temperature source Oral, resp. rate 16, height 4' 10.5\" (1.486 m), weight 118 lb (53.5 kg), SpO2 99 %, not currently breastfeeding.  Neck:  There is no lymphadenopathy or thyroid tenderness or enlargement  Chest: Clear to auscultation bilaterally.  No wheezes, rales or retractions.  CV: Regular rate and rhythm without murmurs, rubs or gallops.  Right Shoulder exam:  There is no swelling, redness or gross deformity of the shoulder joints.  There is limited range of motion with pain in mid and upper arch.  The empty can sign is POS.  The impingement sign is POS.  The rotater cuff strength is 5/5 with  pain with internal " rotation and not as much with external rotation.  She has tenderness of the anterior shoulder    Assessment:  1. Right shoulder tendonitis - has returned  2. Headaches and neck pain      Plan:  1. Patient will need to be off work  2. Will need to get eval for functional capacity - may not be able to go back to this job  3. Continue meds - hope to get these covered - needs to get back on naproxen and gabapentin   4. Ice qid  5. Letter for work  6. Recheck in 2 months

## 2018-02-26 NOTE — MR AVS SNAPSHOT
"              After Visit Summary   2/26/2018    Zo Qureshi    MRN: [id number]           Patient Information     Date Of Birth          1975        Visit Information        Provider Department      2/26/2018 10:30 AM Terri Baron MD [facility]         Follow-ups after your visit        Your next 10 appointments already scheduled     Mar 05, 2018  3:30 PM CST   (Arrive by 3:15 PM)   MA SCREENING DIGITAL BILATERAL with CRMA1   [facility] ([facility]    [address] [place] [address] [phone]           Do not use any powder, lotion or deodorant under your arms or on your breast. If you do, we will ask you to remove it before your exam.  Wear comfortable, two-piece clothing.  If you have any allergies, tell your care team.  Bring any previous mammograms from other facilities or have them mailed to the breast center. Three-dimensional (3D) mammograms are available at [place] locations in [facility], [facility], [facility], and [place]. Health locations include [place] and [facility] in [place]. Benefits of 3D mammograms include: - Improved rate of cancer detection - Decreases your chance of having to go back for more tests, which means fewer: - \"False-positive\" results (This means that there is an abnormal area but it isn't cancer.) - Invasive testing procedures, such as a biopsy or surgery - Can provide clearer images of the breast if you have dense breast tissue. 3D mammography is an optional exam that anyone can have with a 2D mammogram. It doesn't replace or take the place of a 2D mammogram. 2D mammograms remain an effective screening test for all women.  Not all insurance companies cover the cost of a 3D mammogram. Check with your insurance.            Mar 07, 2018  1:45 PM CST   New Visit with Julius Garay MD   [facility] " "([facility])    [address] [id number] [phone]              Who to contact     If you have questions or need follow up information about today's clinic visit or your schedule please contact [facility] directly at [phone].  Normal or non-critical lab and imaging results will be communicated to you by MyChart, letter or phone within 4 business days after the clinic has received the results. If you do not hear from us within 7 days, please contact the clinic through MyChart or phone. If you have a critical or abnormal lab result, we will notify you by phone as soon as possible.  Submit refill requests through Spotzer or call your pharmacy and they will forward the refill request to us. Please allow 3 business days for your refill to be completed.          Additional Information About Your Visit        MyChart Information     Spotzer gives you secure access to your electronic health record. If you see a primary care provider, you can also send messages to your care team and make appointments. If you have questions, please call your primary care clinic.  If you do not have a primary care provider, please call [phone] and they will assist you.        Care EveryWhere ID     This is your Care EveryWhere ID. This could be used by other organizations to access your [place] medical records  [id number]        Your Vitals Were     Pulse Temperature Respirations Height Pulse Oximetry BMI (Body Mass Index)    79 98.1  F (36.7  C) (Oral) 16 4' 10.5\" (1.486 m) 99% 24.24 kg/m2       Blood Pressure from Last 3 Encounters:   02/26/18 124/79   02/21/18 124/70   01/29/18 128/77    Weight from Last 3 Encounters:   02/26/18 118 lb (53.5 kg)   02/21/18 118 lb 1.6 oz (53.6 kg)   01/29/18 120 lb (54.4 kg)              Today, you had the following     No orders found for display       Primary Care Provider Office Phone # Fax #    Terri Baron MD " [phone]       [address]  [facility] [address]        Equal Access to Services     HUMERA HOLCOMB : Hadii aad ku hadsavannah De La Cruz, wabonda luerwin, qaottota kaalmada cyril, isabella rolon lazeusglen arthur. So [facility] [phone].    ATENCIÓN: Si habla español, tiene a vogel disposición servicios gratuitos de asistencia lingüística. Llame al [phone].    We comply with applicable federal civil rights laws and [place] laws. We do not discriminate on the basis of race, color, national origin, age, disability, sex, sexual orientation, or gender identity.            Thank you!     Thank you for choosing [facility]  for your care. Our goal is always to provide you with excellent care. Hearing back from our patients is one way we can continue to improve our services. Please take a few minutes to complete the written survey that you may receive in the mail after your visit with us. Thank you!             Your Updated Medication List - Protect others around you: Learn how to safely use, store and throw away your medicines at www.disposemymeds.org.          This list is accurate as of 2/26/18 11:01 AM.  Always use your most recent med list.                   Brand Name Dispense Instructions for use Diagnosis    cephALEXin 500 MG capsule    KEFLEX    20 capsule    Take 1 capsule (500 mg) by mouth 2 times daily    Lymphadenitis       clindamycin-benzoyl peroxide gel    BENZACLIN    50 g    Apply to  Affected area initially once daily for 2 weeks and then increase to 2 times daily if tolerated    Acne       diclofenac 1 % Gel topical gel    VOLTAREN    100 g    Use small amount and rub into area of chest that is hurting 1-2 times daily as needed    Costochondritis, Atypical chest pain       docusate sodium 100 MG tablet    COLACE    60 tablet    Take 100 mg by mouth daily    External hemorrhoids       fluticasone 50 MCG/ACT spray    FLONASE    16 g    Spray 1-2 sprays into  both nostrils daily    Throat pain       GABAPENTIN PO      Take 300 mg by mouth        hydrocortisone 0.2 % cream    WESTCORT    45 g    Apply sparingly to affected area 2 times daily as needed.    Rash       loratadine 10 MG tablet    CLARITIN    30 tablet    Take 1 tablet (10 mg) by mouth daily    Hives       loratadine-pseudoePHEDrine  MG per 24 hr tablet    CLARITIN-D 24-hour    14 tablet    Take 1 tablet by mouth daily    Acute recurrent maxillary sinusitis       naproxen 500 MG tablet    NAPROSYN    180 tablet    Take 1 tablet (500 mg) by mouth 2 times daily (with meals)    Sacroiliitis (H)       PATADAY 0.2 % Soln   Generic drug:  olopatadine HCl      Reported on 5/1/2017        SUMATRIPTAN SUCCINATE PO      Take 100 mg by mouth every 8 hours as needed for migraine        traMADol 50 MG tablet    ULTRAM    40 tablet    Take 1-2 tablets ( mg) by mouth every 6 hours as needed for moderate pain    Acute pain of both shoulders       traZODone 50 MG tablet    DESYREL    60 tablet    Take 1 tablet (50 mg) by mouth nightly as needed for sleep    Insomnia, unspecified type

## 2018-02-28 LAB
FINAL DIAGNOSIS: NORMAL
HPV HR 12 DNA CVX QL NAA+PROBE: NEGATIVE
HPV16 DNA SPEC QL NAA+PROBE: NEGATIVE
HPV18 DNA SPEC QL NAA+PROBE: NEGATIVE
SPECIMEN DESCRIPTION: NORMAL
SPECIMEN SOURCE CVX/VAG CYTO: NORMAL

## 2018-03-02 ENCOUNTER — TELEPHONE (OUTPATIENT)
Dept: FAMILY MEDICINE | Facility: CLINIC | Age: 43
End: 2018-03-02

## 2018-03-02 NOTE — TELEPHONE ENCOUNTER
Received 5 page fax for Short Term Disability Claim Form for Dr Baron to complete. Form in the in-basket at ROBBIE's desk.

## 2018-03-05 ENCOUNTER — RADIANT APPOINTMENT (OUTPATIENT)
Dept: MAMMOGRAPHY | Facility: CLINIC | Age: 43
End: 2018-03-05
Payer: COMMERCIAL

## 2018-03-05 ENCOUNTER — TELEPHONE (OUTPATIENT)
Dept: FAMILY MEDICINE | Facility: CLINIC | Age: 43
End: 2018-03-05

## 2018-03-05 DIAGNOSIS — G44.219 EPISODIC TENSION-TYPE HEADACHE, NOT INTRACTABLE: Primary | ICD-10-CM

## 2018-03-05 DIAGNOSIS — Z00.00 ROUTINE HISTORY AND PHYSICAL EXAMINATION OF ADULT: ICD-10-CM

## 2018-03-05 DIAGNOSIS — M54.2 CERVICALGIA: ICD-10-CM

## 2018-03-05 PROCEDURE — 77067 SCR MAMMO BI INCL CAD: CPT | Mod: TC

## 2018-03-05 NOTE — TELEPHONE ENCOUNTER
Spoke with patient.  Patient states that there must be some miscommunication.  States she has already been to the [facility] and would now like a referral for a second opinion to [facility].  Please enter the correct referral and then notify patient of the number.  Brittani Tavera M.A.

## 2018-03-05 NOTE — TELEPHONE ENCOUNTER
Patient came into clinic today and wanted to speak with her PCP's nurse. She let me know that she wants to talk to her about getting a new referral for a different provider for a nuerologist, other than seeing someone at [facility]. She wants to get a second opinion about her neck. If you could please call the patient back to talk about this.

## 2018-03-06 NOTE — TELEPHONE ENCOUNTER
Faxed referral to [facility] at fax# [phone].  (ph# [phone])  Brittani Tavera M.A.

## 2018-03-06 NOTE — PROGRESS NOTES
[place] - Rheumatology Clinic Visit     Zo Qureshi MRN# [id number]   YOB: 1975    Primary care provider: Terri Baron  Mar 7, 2018          Assessment and Plan:   # Sacroiliitis (MRI evidence 2010) with negative HLA B27; Polyarthralgia in MCPs and also wrists  # Elevated sed rate  # Pleuritic chest pain    Patient was evaluated by Dr. Cuba at [facility] rheumatology in 2010. Humira and enbrel were tried. But patient did not tolerate. So it was not continued for adequate duration to assess the response.   Since there is MCP involvement, we checked RF and CCP antibodies were normal.   We will get a repeat MRI of SI joints to follow up on the sacroiliitis. MRI SI joints 9/2017 did not show active inflammation in SI joints.   Her symptoms are steroid and NSAID responsive. Patient stopped naproxen on her own in 2/18. Pain increased after stopping naproxen. We will try prednisone course and meet again to discuss. She likely has seronegative rheumatoid arthritis vs spondyloarthropathy.  Resume naproxen next visit if appropriate.     Side effects of prednisone discussed.     The labs, imaging from patient records are reviewed.     I will be back in touch with the patient through mychart/letter when results are available.     Return in about 2 weeks (around 3/21/2018).    Orders Placed This Encounter   Procedures     CBC with platelets differential     AST     ALT     Creatinine     ESR     CRP inflammation       Medications Discontinued During This Encounter   Medication Reason     cephALEXin (KEFLEX) 500 MG capsule      SUMATRIPTAN SUCCINATE PO      GABAPENTIN PO      naproxen (NAPROSYN) 500 MG tablet      traMADol (ULTRAM) 50 MG tablet      Current Outpatient Prescriptions   Medication Sig Dispense Refill     predniSONE (DELTASONE) 5 MG tablet Take 15 mg PO daily X 2 weeks 45 tablet 0     traZODone (DESYREL) 50 MG tablet Take 1 tablet (50 mg) by mouth nightly as needed for sleep 60 tablet 6      loratadine-pseudoePHEDrine (CLARITIN-D 24-HOUR)  MG per 24 hr tablet Take 1 tablet by mouth daily 14 tablet 0     hydrocortisone (WESTCORT) 0.2 % cream Apply sparingly to affected area 2 times daily as needed. 45 g 0     diclofenac (VOLTAREN) 1 % GEL topical gel Use small amount and rub into area of chest that is hurting 1-2 times daily as needed 100 g 1     fluticasone (FLONASE) 50 MCG/ACT spray Spray 1-2 sprays into both nostrils daily 16 g 3     docusate sodium (COLACE) 100 MG tablet Take 100 mg by mouth daily 60 tablet 1     loratadine (CLARITIN) 10 MG tablet Take 1 tablet (10 mg) by mouth daily 30 tablet 1     PATADAY 0.2 % SOLN Reported on 5/1/2017  6     clindamycin-benzoyl peroxide (BENZACLIN) gel Apply to  Affected area initially once daily for 2 weeks and then increase to 2 times daily if tolerated 50 g 11       Julius Garay MD  [facility]          Active Problem List:     Patient Active Problem List    Diagnosis Date Noted     Insomnia due to medical condition 01/29/2018     Priority: Medium     Episodic tension-type headache, not intractable 10/18/2017     Priority: Medium     Cervicalgia 10/18/2017     Priority: Medium     Right peroneal tendinosis 04/26/2017     Priority: Medium     Vitamin D deficiency 01/05/2017     Priority: Medium     Influenza B 03/03/2014     Priority: Medium     Acne 09/10/2012     Priority: Medium     S/P LEEP of cervix 12/19/2011     Priority: Medium     HSIL on Pap smear 09/21/2011     Priority: Medium     HGSIL confirmed with biopsy=repeat pap by May 2012  12/19/11 LEEP MARTA II  4/9/12 NIL pap.  Per [facility] notes, pt to repeat pap in 4 months  8/6/12: NIL pap. Per MD plan pap in 4 months.  12/10/12 NIL pap. Plan per MD, repeat in one year.   02/05/14 Pap reminder letter sent through [facility].  05/21/14 Pap= Normal, Neg HPV. Repeat co-test in 1 yr.  09/21/15 Pap= NIL, Neg HPV. Co-test in 3 yrs per ASCCP guidelines  2/21/18 NIL, Neg HPV. Plan 3 yr pap  for 20 yrs post LEEP, 2011         Hypercholesterolemia 10/31/2010     Priority: Medium     Disorder of SI (sacroiliac) joint 09/15/2008     Priority: Medium     Inflammatory spondylopathy (H) 01/07/2008     Priority: Medium     Problem list name updated by automated process. Provider to review              History of Present Illness:     Chief Complaint   Patient presents with     RECHECK       August 8, 2017  Have you ever seen a rheumatologist Yes Who Dr. Cuba  When 4/2010  Joint pain history  Onset: has pain that rotates around her body, primary care wanted her to see rheumatology.   Involved joints: back pain arms, shoulders, neck pain.knees Had epidural injection in June 2017  Pain scale:  7.5/10     Wakes the patient from sleep : Yes  Morning stiffness:Yes for 15 minutes  Meds used:tramadol, voltaren gel; tried humira and enbrel around 2010; had flu-like symptoms after trying couple of shots of humira and enbrel.      Interim history  Since last visit:  1. Infections - Yes/ had a cold a couple weeks ago  2. New symptoms/medical problem - No  3. Any side effects from Rheum medications -NA  3. ER visits/Hospitalizations/surgeries - No  4. Last PCP visit: 8/2/17     Fatigue during flare ups of polyarthralgia    Wt Readings from Last 4 Encounters:   03/07/18 52.8 kg (116 lb 4.8 oz)   02/26/18 53.5 kg (118 lb)   02/21/18 53.6 kg (118 lb 1.6 oz)   01/29/18 54.4 kg (120 lb)     H/o pleuritic chest pain in middle with taking deep breaths.     No h/o ,unintentional weight loss, loss of appetite, fevers, persistent rash, swollen glands  No family or personal history of psoriasis, ulcerative colitis or chron's disease. No h/o iritis.   Patient denies any raynauds  No h/o arterial/venous thrombosis in the past  No h/o persistent shortness of breath, cough  No h/o persistent nausea, vomiting, constipation, diarrhea, abdominal pain  No h/o hematochezia (except with constipation), hematuria, hemoptysis, hematemesis  No  h/o seizures or strokes  No h/o cancer    November 13, 2017  Have you ever seen a rheumatologist Yes Who You When 8/28/17  Joint pain history  Onset: pt states that she is having pain in her neck, and shoulders and lower back  Involved joints: see above  Pain scale:  7.5/10     Wakes the patient from sleep : Yes  Morning stiffness:Yes for 5 minutes  Meds used: naproxen which helps     Interim history  Since last visit:  1. Infections - No  2. New symptoms/medical problem - Yes, has developed bilateral knee pain and going up stairs  3. Any side effects from Rheum medications -none  3. ER visits/Hospitalizations/surgeries - No  4. Last PCP visit: 10/18/17    March 7, 2018  Have you ever seen a rheumatologist Yes Who You When 11/13/17  Joint pain history  Onset: pt states that she continues with pain in her neck and shoulder and hands, worse on the right side. Pt was put on prednisone back when she saw you, but is not sure if it helped due to she was on other medications at the time  Involved joints: see above  Pain scale:  7/10     Wakes the patient from sleep : Yes  Morning stiffness:Yes for 15 minutes  Meds used:nothing     Interim history  Since last visit:  1. Infections - No  2. New symptoms/medical problem - No  3. Any side effects from Rheum medications -NA  3. ER visits/Hospitalizations/surgeries - No  4. Last PCP visit: 2/26/18    BP Readings from Last 3 Encounters:   03/07/18 118/72   02/26/18 124/79   02/21/18 124/70              Review of Systems:   Complete ROS negative except for symptoms mentioned in the HPI          Past Medical History:     Past Medical History:   Diagnosis Date     HSIL on Pap smear 09/21/11    MARTA 2 and 3     INFLAM SPONDYLOPATHY NOS   1/7/2008     Leukocytopenia 3/10/2014     Leukocytopenia      Tendinosis      Thrombocytopenia (H) 3/10/2014     Thrombocytopenia (H)      Unspecified closed fracture of pelvis 2000    left fracture of pelvis after delivery     Past Surgical History:    Procedure Laterality Date     C NONSPECIFIC PROCEDURE  10/00    after delivery 2 units of prbcs secondary to placenta     LEEP TX, CERVICAL  12/19/11    MARTA II     TUBAL LIGATION  5/2009    laparoscopic tubal ligation            Social History:     Social History     Occupational History      [employer]     Social History Main Topics     Smoking status: Never Smoker     Smokeless tobacco: Never Used     Alcohol use 0.0 oz/week     0 Standard drinks or equivalent per week      Comment: rarely     Drug use: No     Sexual activity: Not Currently     Partners: Male     Birth control/ protection: None      Comment: tubal            Family History:     Family History   Problem Relation Age of Onset     Hypertension Father      Lipids Father      Hypertension Mother      Lipids Mother      DIABETES No family hx of      Coronary Artery Disease No family hx of      Hyperlipidemia No family hx of      CEREBROVASCULAR DISEASE No family hx of      Breast Cancer No family hx of      Colon Cancer No family hx of      Prostate Cancer No family hx of      Other Cancer No family hx of      Depression No family hx of      Anxiety Disorder No family hx of      MENTAL ILLNESS No family hx of      Substance Abuse No family hx of      Anesthesia Reaction No family hx of      Asthma No family hx of      OSTEOPOROSIS No family hx of      Genetic Disorder No family hx of      Thyroid Disease No family hx of      Obesity No family hx of      Unknown/Adopted No family hx of             Allergies:     Allergies   Allergen Reactions     Advil [Ibuprofen Micronized]      Rash      Contrast Dye      Percocet [Codeine] Nausea and Vomiting and Itching            Medications:     Current Outpatient Prescriptions   Medication Sig Dispense Refill     predniSONE (DELTASONE) 5 MG tablet Take 15 mg PO daily X 2 weeks 45 tablet 0     traZODone (DESYREL) 50 MG tablet Take 1 tablet (50 mg) by mouth nightly as needed for sleep 60  "tablet 6     loratadine-pseudoePHEDrine (CLARITIN-D 24-HOUR)  MG per 24 hr tablet Take 1 tablet by mouth daily 14 tablet 0     hydrocortisone (WESTCORT) 0.2 % cream Apply sparingly to affected area 2 times daily as needed. 45 g 0     diclofenac (VOLTAREN) 1 % GEL topical gel Use small amount and rub into area of chest that is hurting 1-2 times daily as needed 100 g 1     fluticasone (FLONASE) 50 MCG/ACT spray Spray 1-2 sprays into both nostrils daily 16 g 3     docusate sodium (COLACE) 100 MG tablet Take 100 mg by mouth daily 60 tablet 1     loratadine (CLARITIN) 10 MG tablet Take 1 tablet (10 mg) by mouth daily 30 tablet 1     PATADAY 0.2 % SOLN Reported on 5/1/2017  6     clindamycin-benzoyl peroxide (BENZACLIN) gel Apply to  Affected area initially once daily for 2 weeks and then increase to 2 times daily if tolerated 50 g 11            Physical Exam:   Blood pressure 118/72, pulse 92, temperature 98.2  F (36.8  C), temperature source Oral, height 1.48 m (4' 10.25\"), weight 52.8 kg (116 lb 4.8 oz), SpO2 98 %, not currently breastfeeding.  Wt Readings from Last 4 Encounters:   03/07/18 52.8 kg (116 lb 4.8 oz)   02/26/18 53.5 kg (118 lb)   02/21/18 53.6 kg (118 lb 1.6 oz)   01/29/18 54.4 kg (120 lb)       Constitutional: well-developed, appearing stated age; cooperative  Eyes: PERRLA, normal conjunctiva, sclera  ENT: nl external ears, nose, lips.No mucous membrane lesions, normal saliva pool  Neck: no cervical lymphadenopathy  Resp: lungs clear to auscultation,   CV: RRR, no added sounds, no edema  GI: Abdomen soft and no tenderness  : not tested  Lymph: no cervical, supraclavicular or epitrochlear nodes  MS: Tenderness in right wrist - chronic, MCPs (R> L) pain resolved. ROM in right wrist is significantly restricted passively.   Tenderness in midline cervical and upper back paraspinal tenderness;   midline lumbar area, paralumbar area and also in SI area tenderness - resolved.   All shoulder, elbow, " wrist, MCP/PIP/DIP, hip, knee, ankle, and foot MTP/IP joints were examined and  found normal. No active synovitis or deformity. Full ROM.  Fist 100%.  No dactylitis,  tenosynovitis, enthesopathy.  Skin: no nail pitting; no rash in exposed areas  Psych: nl judgement, orientation, memory, affect.         Data:   Reviewed following labs:  CBC RESULTS:   Recent Labs   Lab Test  01/05/17   1322   WBC  6.8   RBC  4.19   HGB  12.0   HCT  37.4   MCV  89   MCH  28.6   MCHC  32.1   RDW  12.7   PLT  269       Liver Function Studies -   Recent Labs   Lab Test  09/26/15   1153   PROTTOTAL  8.4   ALBUMIN  4.2   BILITOTAL  0.3   ALKPHOS  60   AST  19   ALT  20       Creatinine   Date Value Ref Range Status   11/13/2017 0.62 0.52 - 1.04 mg/dL Final   ]    No results found for: URIC]    ESR/CRP  Recent Labs   Lab Test  08/02/17   1117  09/26/15   1153  05/21/14   1710   04/16/10   1140   SED  37*  27*  33*   < >  21*   CRP  <2.9   --    --    --   7.1    < > = values in this interval not displayed.       HLA-B27  No results for input(s): [id number], B1 in the last 29667 hours.    RF/CCP  Recent Labs   Lab Test  05/21/14   1710   RHF  <20       RAMÍREZ/RNP/Sm/SSA/SSB  Recent Labs   Lab Test  05/21/14   1710   BOO  <1.0  Interpretation:  Negative           Julius Garay MD    [facility]

## 2018-03-06 NOTE — TELEPHONE ENCOUNTER
Patient given [facility] referral information.   Will fax referral over in the morning as they are currently closed.  Brittani Tavera M.A.

## 2018-03-07 ENCOUNTER — OFFICE VISIT (OUTPATIENT)
Dept: RHEUMATOLOGY | Facility: CLINIC | Age: 43
End: 2018-03-07
Attending: FAMILY MEDICINE
Payer: COMMERCIAL

## 2018-03-07 VITALS
HEIGHT: 58 IN | WEIGHT: 116.3 LBS | BODY MASS INDEX: 24.41 KG/M2 | HEART RATE: 92 BPM | SYSTOLIC BLOOD PRESSURE: 118 MMHG | TEMPERATURE: 98.2 F | OXYGEN SATURATION: 98 % | DIASTOLIC BLOOD PRESSURE: 72 MMHG

## 2018-03-07 DIAGNOSIS — M46.1 SACROILIITIS (H): Primary | ICD-10-CM

## 2018-03-07 LAB
ALT SERPL W P-5'-P-CCNC: 14 U/L (ref 0–50)
AST SERPL W P-5'-P-CCNC: 21 U/L (ref 0–45)
BASOPHILS # BLD AUTO: 0 10E9/L (ref 0–0.2)
BASOPHILS NFR BLD AUTO: 0.4 %
CREAT SERPL-MCNC: 0.73 MG/DL (ref 0.52–1.04)
CRP SERPL-MCNC: <2.9 MG/L (ref 0–8)
DIFFERENTIAL METHOD BLD: ABNORMAL
EOSINOPHIL # BLD AUTO: 0.2 10E9/L (ref 0–0.7)
EOSINOPHIL NFR BLD AUTO: 2.6 %
ERYTHROCYTE [DISTWIDTH] IN BLOOD BY AUTOMATED COUNT: 14 % (ref 10–15)
ERYTHROCYTE [SEDIMENTATION RATE] IN BLOOD BY WESTERGREN METHOD: 36 MM/H (ref 0–20)
GFR SERPL CREATININE-BSD FRML MDRD: 88 ML/MIN/1.7M2
HCT VFR BLD AUTO: 33.9 % (ref 35–47)
HGB BLD-MCNC: 10.2 G/DL (ref 11.7–15.7)
LYMPHOCYTES # BLD AUTO: 2 10E9/L (ref 0.8–5.3)
LYMPHOCYTES NFR BLD AUTO: 28.4 %
MCH RBC QN AUTO: 27.2 PG (ref 26.5–33)
MCHC RBC AUTO-ENTMCNC: 30.1 G/DL (ref 31.5–36.5)
MCV RBC AUTO: 90 FL (ref 78–100)
MONOCYTES # BLD AUTO: 0.8 10E9/L (ref 0–1.3)
MONOCYTES NFR BLD AUTO: 11.8 %
NEUTROPHILS # BLD AUTO: 3.9 10E9/L (ref 1.6–8.3)
NEUTROPHILS NFR BLD AUTO: 56.8 %
PLATELET # BLD AUTO: 265 10E9/L (ref 150–450)
RBC # BLD AUTO: 3.75 10E12/L (ref 3.8–5.2)
WBC # BLD AUTO: 6.9 10E9/L (ref 4–11)

## 2018-03-07 PROCEDURE — 84460 ALANINE AMINO (ALT) (SGPT): CPT | Performed by: INTERNAL MEDICINE

## 2018-03-07 PROCEDURE — 99214 OFFICE O/P EST MOD 30 MIN: CPT | Performed by: INTERNAL MEDICINE

## 2018-03-07 PROCEDURE — 36415 COLL VENOUS BLD VENIPUNCTURE: CPT | Performed by: INTERNAL MEDICINE

## 2018-03-07 PROCEDURE — 82565 ASSAY OF CREATININE: CPT | Performed by: INTERNAL MEDICINE

## 2018-03-07 PROCEDURE — 85025 COMPLETE CBC W/AUTO DIFF WBC: CPT | Performed by: INTERNAL MEDICINE

## 2018-03-07 PROCEDURE — 84450 TRANSFERASE (AST) (SGOT): CPT | Performed by: INTERNAL MEDICINE

## 2018-03-07 PROCEDURE — 86140 C-REACTIVE PROTEIN: CPT | Performed by: INTERNAL MEDICINE

## 2018-03-07 PROCEDURE — 85652 RBC SED RATE AUTOMATED: CPT | Performed by: INTERNAL MEDICINE

## 2018-03-07 RX ORDER — PREDNISONE 5 MG/1
TABLET ORAL
Qty: 45 TABLET | Refills: 0 | Status: SHIPPED | OUTPATIENT
Start: 2018-03-07 | End: 2018-03-28

## 2018-03-07 ASSESSMENT — ROUTINE ASSESSMENT OF PATIENT INDEX DATA (RAPID3)
RAPID3 INTERPRETATION: HIGH > 12.0
TOTAL RAPID3 SCORE: 16

## 2018-03-07 NOTE — TELEPHONE ENCOUNTER
Received request from [facility] to fax over the demographic page on this patient.  Demographic page faxed to [facility] at [phone].  Brittani Tavera M.A.

## 2018-03-07 NOTE — NURSING NOTE
"Chief Complaint   Patient presents with     RECHECK       Initial /72 (BP Location: Left arm, Patient Position: Chair, Cuff Size: Adult Regular)  Pulse 92  Temp 98.2  F (36.8  C) (Oral)  Ht 1.48 m (4' 10.25\")  Wt 52.8 kg (116 lb 4.8 oz)  SpO2 98%  BMI 24.1 kg/m2 Estimated body mass index is 24.1 kg/(m^2) as calculated from the following:    Height as of this encounter: 1.48 m (4' 10.25\").    Weight as of this encounter: 52.8 kg (116 lb 4.8 oz).  Medication Reconciliation: complete    Have you ever seen a rheumatologist Yes Who You When 11/13/17  Joint pain history  Onset: pt states that she continues with pain in her neck and shoulder and hands, worse on the right side. Pt was put on prednisone back when she saw you, but is not sure if it helped due to she was on other medications at the time  Involved joints: see above  Pain scale:  7/10     Wakes the patient from sleep : Yes  Morning stiffness:Yes for 15 minutes  Meds used:nothing    Interim history  Since last visit:  1. Infections - No  2. New symptoms/medical problem - No  3. Any side effects from Rheum medications -NA  3. ER visits/Hospitalizations/surgeries - No  4. Last PCP visit: 2/26/18  Wt Readings from Last 4 Encounters:   03/07/18 52.8 kg (116 lb 4.8 oz)   02/26/18 53.5 kg (118 lb)   02/21/18 53.6 kg (118 lb 1.6 oz)   01/29/18 54.4 kg (120 lb)     BP Readings from Last 3 Encounters:   03/07/18 118/72   02/26/18 124/79   02/21/18 124/70       "

## 2018-03-07 NOTE — MR AVS SNAPSHOT
After Visit Summary   3/7/2018    Zo Qureshi    MRN: [id number]           Patient Information     Date Of Birth          1975        Visit Information        Provider Department      3/7/2018 1:45 PM Julius Garay MD [facility]        Today's Diagnoses     Sacroiliitis (H)    -  1       Follow-ups after your visit        Follow-up notes from your care team     Return in about 2 weeks (around 3/21/2018).      Your next 10 appointments already scheduled     Apr 25, 2018  9:30 AM CDT   SHORT with Terri Baron MD   [facility]    [address] [address] [phone]              Who to contact     If you have questions or need follow up information about today's clinic visit or your schedule please contact [facility] directly at [phone].  Normal or non-critical lab and imaging results will be communicated to you by MyChart, letter or phone within 4 business days after the clinic has received the results. If you do not hear from us within 7 days, please contact the clinic through [facility] or phone. If you have a critical or abnormal lab result, we will notify you by phone as soon as possible.  Submit refill requests through [employer] or call your pharmacy and they will forward the refill request to us. Please allow 3 business days for your refill to be completed.          Additional Information About Your Visit        MyChart Information     [employer] gives you secure access to your electronic health record. If you see a primary care provider, you can also send messages to your care team and make appointments. If you have questions, please call your primary care clinic.  If you do not have a primary care provider, please call [phone] and they will assist you.        Care EveryWhere ID     This is your Care EveryWhere ID. This could be used by other  "organizations to access your [place] medical records  [id number]        Your Vitals Were     Pulse Temperature Height Pulse Oximetry BMI (Body Mass Index)       92 98.2  F (36.8  C) (Oral) 1.48 m (4' 10.25\") 98% 24.1 kg/m2        Blood Pressure from Last 3 Encounters:   03/07/18 118/72   02/26/18 124/79   02/21/18 124/70    Weight from Last 3 Encounters:   03/07/18 52.8 kg (116 lb 4.8 oz)   02/26/18 53.5 kg (118 lb)   02/21/18 53.6 kg (118 lb 1.6 oz)              We Performed the Following     ALT     AST     CBC with platelets differential     Creatinine     CRP inflammation     ESR          Today's Medication Changes          These changes are accurate as of 3/7/18  2:22 PM.  If you have any questions, ask your nurse or doctor.               Start taking these medicines.        Dose/Directions    predniSONE 5 MG tablet   Commonly known as:  DELTASONE   Used for:  Sacroiliitis (H)   Started by:  Julius Garay MD        Take 15 mg PO daily X 2 weeks   Quantity:  45 tablet   Refills:  0         Stop taking these medicines if you haven't already. Please contact your care team if you have questions.     cephALEXin 500 MG capsule   Commonly known as:  KEFLEX   Stopped by:  Julius Garay MD           GABAPENTIN PO   Stopped by:  Julius Garay MD           naproxen 500 MG tablet   Commonly known as:  NAPROSYN   Stopped by:  Julius Garay MD           SUMATRIPTAN SUCCINATE PO   Stopped by:  Julius Garay MD           traMADol 50 MG tablet   Commonly known as:  ULTRAM   Stopped by:  Julius Garay MD                Where to get your medicines      These medications were sent to [facility] [address]  [address] [facility] [address]     Phone:  [phone]     predniSONE 5 MG tablet                Primary Care Provider Office Phone # Fax #    Terri Baron [phone]       " [address] [facility] [address]        Equal Access to Services     MARY CARMENBRYNN ZHANG : Hadii aad ku hadpageanila Shayneali, wabonda kayjarochoha, qanoel marilyallysonpetros orradrianapetros, waxclaudine newton kattyglen ramostaneshavivien gibson. So [facility] [phone].    ATENCIÓN: Si habla español, tiene a vogel disposición servicios gratuitos de asistencia lingüística. [facility] [phone].    We comply with applicable federal civil rights laws and [place] laws. We do not discriminate on the basis of race, color, national origin, age, disability, sex, sexual orientation, or gender identity.            Thank you!     Thank you for choosing [facility]  for your care. Our goal is always to provide you with excellent care. Hearing back from our patients is one way we can continue to improve our services. Please take a few minutes to complete the written survey that you may receive in the mail after your visit with us. Thank you!             Your Updated Medication List - Protect others around you: Learn how to safely use, store and throw away your medicines at www.disposemymeds.org.          This list is accurate as of 3/7/18  2:22 PM.  Always use your most recent med list.                   Brand Name Dispense Instructions for use Diagnosis    clindamycin-benzoyl peroxide gel    BENZACLIN    50 g    Apply to  Affected area initially once daily for 2 weeks and then increase to 2 times daily if tolerated    Acne       diclofenac 1 % Gel topical gel    VOLTAREN    100 g    Use small amount and rub into area of chest that is hurting 1-2 times daily as needed    Costochondritis, Atypical chest pain       docusate sodium 100 MG tablet    COLACE    60 tablet    Take 100 mg by mouth daily    External hemorrhoids       fluticasone 50 MCG/ACT spray    FLONASE    16 g    Spray 1-2 sprays into both nostrils daily    Throat pain       hydrocortisone 0.2 % cream    WESTCORT    45 g    Apply sparingly to affected area 2 times daily as needed.     Rash       loratadine 10 MG tablet    CLARITIN    30 tablet    Take 1 tablet (10 mg) by mouth daily    Hives       loratadine-pseudoePHEDrine  MG per 24 hr tablet    CLARITIN-D 24-hour    14 tablet    Take 1 tablet by mouth daily    Acute recurrent maxillary sinusitis       PATADAY 0.2 % Soln   Generic drug:  olopatadine HCl      Reported on 5/1/2017        predniSONE 5 MG tablet    DELTASONE    45 tablet    Take 15 mg PO daily X 2 weeks    Sacroiliitis (H)       traZODone 50 MG tablet    DESYREL    60 tablet    Take 1 tablet (50 mg) by mouth nightly as needed for sleep    Insomnia, unspecified type

## 2018-03-19 NOTE — PROGRESS NOTES
Results released to [facility]:  Dear Ms. Qureshi,  Basic blood cell counts, liver and kidney function labs within normal limits except anemia noted. We will watch this.   Inflammatory marker CRP is normal but sed rate is improving when compared to 4 months ago.     Sincerely    Julius Garay MD  [facility]

## 2018-03-23 ENCOUNTER — TELEPHONE (OUTPATIENT)
Dept: FAMILY MEDICINE | Facility: CLINIC | Age: 43
End: 2018-03-23

## 2018-03-23 DIAGNOSIS — G44.219 EPISODIC TENSION-TYPE HEADACHE, NOT INTRACTABLE: ICD-10-CM

## 2018-03-23 DIAGNOSIS — M53.3 DISORDER OF SI (SACROILIAC) JOINT: Primary | ICD-10-CM

## 2018-03-23 NOTE — TELEPHONE ENCOUNTER
Fax from [facility] in [place], [place] that patient contacted them requesting Tramadol RX.  Med is not on current med list.  You last gave Tramadol RX 7/5/17 for acute pain in both shoulders.  Form put in Dr. Dennis's in basket.  Delma Vaz, RN      2/26/18  Assessment:  1. Right shoulder tendonitis - has returned  2. Headaches and neck pain        Plan:  1. Patient will need to be off work  2. Will need to get eval for functional capacity - may not be able to go back to this job  3. Continue meds - hope to get these covered - needs to get back on naproxen and gabapentin   4. Ice qid  5. Letter for work  6. Recheck in 2 months

## 2018-03-26 RX ORDER — TRAMADOL HYDROCHLORIDE 50 MG/1
50 TABLET ORAL EVERY 6 HOURS PRN
Qty: 40 TABLET | Refills: 0 | Status: SHIPPED | OUTPATIENT
Start: 2018-03-26 | End: 2018-04-23

## 2018-03-28 ENCOUNTER — OFFICE VISIT (OUTPATIENT)
Dept: RHEUMATOLOGY | Facility: CLINIC | Age: 43
End: 2018-03-28
Payer: COMMERCIAL

## 2018-03-28 VITALS
RESPIRATION RATE: 12 BRPM | WEIGHT: 117.5 LBS | HEIGHT: 58 IN | BODY MASS INDEX: 24.67 KG/M2 | DIASTOLIC BLOOD PRESSURE: 76 MMHG | HEART RATE: 88 BPM | TEMPERATURE: 98.3 F | OXYGEN SATURATION: 100 % | SYSTOLIC BLOOD PRESSURE: 112 MMHG

## 2018-03-28 DIAGNOSIS — M46.1 SACROILIITIS (H): Primary | ICD-10-CM

## 2018-03-28 LAB
FERRITIN SERPL-MCNC: 4 NG/ML (ref 12–150)
IRON SATN MFR SERPL: 6 % (ref 15–46)
IRON SERPL-MCNC: 25 UG/DL (ref 35–180)
TIBC SERPL-MCNC: 389 UG/DL (ref 240–430)

## 2018-03-28 PROCEDURE — 83550 IRON BINDING TEST: CPT | Performed by: INTERNAL MEDICINE

## 2018-03-28 PROCEDURE — 82728 ASSAY OF FERRITIN: CPT | Performed by: INTERNAL MEDICINE

## 2018-03-28 PROCEDURE — 36415 COLL VENOUS BLD VENIPUNCTURE: CPT | Performed by: INTERNAL MEDICINE

## 2018-03-28 PROCEDURE — 83540 ASSAY OF IRON: CPT | Performed by: INTERNAL MEDICINE

## 2018-03-28 PROCEDURE — 99213 OFFICE O/P EST LOW 20 MIN: CPT | Performed by: INTERNAL MEDICINE

## 2018-03-28 RX ORDER — NAPROXEN 500 MG/1
500 TABLET ORAL 2 TIMES DAILY WITH MEALS
Qty: 60 TABLET | COMMUNITY
Start: 2018-03-28 | End: 2018-04-23

## 2018-03-28 ASSESSMENT — ROUTINE ASSESSMENT OF PATIENT INDEX DATA (RAPID3)
RAPID3 INTERPRETATION: HIGH > 12.0
TOTAL RAPID3 SCORE: 19.7

## 2018-03-28 NOTE — PROGRESS NOTES
[facility] - Rheumatology Clinic Visit     Zo Qureshi MRN# [id number]   YOB: 1975    Primary care provider: Terri Baron  Mar 28, 2018          Assessment and Plan:   # Sacroiliitis (MRI evidence 2010) with negative HLA B27; Polyarthralgia in MCPs and also wrists  # Elevated sed rate; anemia  # Pleuritic chest pain  # Chronic NSAID use    Basic blood cell counts, liver and kidney function labs within normal limits except anemia noted. Patient to discuss with PCP regarding anemia. Patient declined doing anemia work up today. Later agreed for work up today.     Inflammatory marker CRP is normal but sed rate is improving when compared to 4 months ago.     Patient was evaluated by Dr. Cuba at [facility] in 2010. Humira and enbrel were tried. But patient did not tolerate. So it was not continued for adequate duration to assess the response.   Since there is MCP involvement, we checked RF and CCP antibodies were normal.   MRI SI joints 9/2017 did not show active inflammation in SI joints.   Her symptoms are steroid and NSAID responsive. Patient stopped naproxen on her own in 2/18. Pain increased after stopping naproxen. She is back on naproxen 500mg twice daily    She likely has seronegative rheumatoid arthritis vs spondyloarthropathy. Uncontrolled polyarthralgia. I gave the option of starting sulfasalazine. Side effects discussed. Patient wants to wait and watch and discuss with PCP next month and then get back to me. Patient to discuss with PCP regarding anemia also. Patient declined doing anemia work up today. Later agreed for work up today.     The labs, imaging from patient records are reviewed.     I will be back in touch with the patient through mychart/letter when results are available.     Follow up after discussing with PCP.     Data Unavailable    Orders Placed This Encounter   Procedures     IRON     IRON AND IRON BINDING CAPACITY     FERRITIN       Medications Discontinued During  This Encounter   Medication Reason     predniSONE (DELTASONE) 5 MG tablet      Current Outpatient Prescriptions   Medication Sig Dispense Refill     naproxen (NAPROSYN) 500 MG tablet Take 1 tablet (500 mg) by mouth 2 times daily (with meals) 60 tablet      traMADol (ULTRAM) 50 MG tablet Take 1 tablet (50 mg) by mouth every 6 hours as needed for severe pain 40 tablet 0     traZODone (DESYREL) 50 MG tablet Take 1 tablet (50 mg) by mouth nightly as needed for sleep 60 tablet 6     loratadine-pseudoePHEDrine (CLARITIN-D 24-HOUR)  MG per 24 hr tablet Take 1 tablet by mouth daily 14 tablet 0     hydrocortisone (WESTCORT) 0.2 % cream Apply sparingly to affected area 2 times daily as needed. 45 g 0     diclofenac (VOLTAREN) 1 % GEL topical gel Use small amount and rub into area of chest that is hurting 1-2 times daily as needed 100 g 1     fluticasone (FLONASE) 50 MCG/ACT spray Spray 1-2 sprays into both nostrils daily 16 g 3     docusate sodium (COLACE) 100 MG tablet Take 100 mg by mouth daily 60 tablet 1     PATADAY 0.2 % SOLN Reported on 5/1/2017  6     clindamycin-benzoyl peroxide (BENZACLIN) gel Apply to  Affected area initially once daily for 2 weeks and then increase to 2 times daily if tolerated 50 g 11     loratadine (CLARITIN) 10 MG tablet Take 1 tablet (10 mg) by mouth daily (Patient not taking: Reported on 3/28/2018) 30 tablet 1       Julius Garay MD  [facility]          Active Problem List:     Patient Active Problem List    Diagnosis Date Noted     Insomnia due to medical condition 01/29/2018     Priority: Medium     Episodic tension-type headache, not intractable 10/18/2017     Priority: Medium     Cervicalgia 10/18/2017     Priority: Medium     Right peroneal tendinosis 04/26/2017     Priority: Medium     Vitamin D deficiency 01/05/2017     Priority: Medium     Influenza B 03/03/2014     Priority: Medium     Acne 09/10/2012     Priority: Medium     S/P LEEP of cervix 12/19/2011      Priority: Medium     HSIL on Pap smear 09/21/2011     Priority: Medium     HGSIL confirmed with biopsy=repeat pap by May 2012  12/19/11 LEEP MARTA II  4/9/12 NIL pap.  Per OV notes, pt to repeat pap in 4 months  8/6/12: NIL pap. Per MD plan pap in 4 months.  12/10/12 NIL pap. Plan per MD, repeat in one year.   02/05/14 Pap reminder letter sent through [employer].  05/21/14 Pap= Normal, Neg HPV. Repeat co-test in 1 yr.  09/21/15 Pap= NIL, Neg HPV. Co-test in 3 yrs per ASCCP guidelines  2/21/18 NIL, Neg HPV. Plan 3 yr pap for 20 yrs post LEEP, 2011         Hypercholesterolemia 10/31/2010     Priority: Medium     Disorder of SI (sacroiliac) joint 09/15/2008     Priority: Medium     Inflammatory spondylopathy (H) 01/07/2008     Priority: Medium     Problem list name updated by automated process. Provider to review              History of Present Illness:     Chief Complaint   Patient presents with     RECHECK     f/u       August 8, 2017  Have you ever seen a rheumatologist Yes Who Dr. Cuba  When 4/2010  Joint pain history  Onset: has pain that rotates around her body, primary care wanted her to see rheumatology.   Involved joints: back pain arms, shoulders, neck pain.knees Had epidural injection in June 2017  Pain scale:  7.5/10     Wakes the patient from sleep : Yes  Morning stiffness:Yes for 15 minutes  Meds used:tramadol, voltaren gel; tried humira and enbrel around 2010; had flu-like symptoms after trying couple of shots of humira and enbrel.      Interim history  Since last visit:  1. Infections - Yes/ had a cold a couple weeks ago  2. New symptoms/medical problem - No  3. Any side effects from Rheum medications -NA  3. ER visits/Hospitalizations/surgeries - No  4. Last PCP visit: 8/2/17     Fatigue during flare ups of polyarthralgia    Wt Readings from Last 4 Encounters:   03/28/18 53.3 kg (117 lb 8 oz)   03/07/18 52.8 kg (116 lb 4.8 oz)   02/26/18 53.5 kg (118 lb)   02/21/18 53.6 kg (118 lb 1.6 oz)     H/o  pleuritic chest pain in middle with taking deep breaths.     No h/o ,unintentional weight loss, loss of appetite, fevers, persistent rash, swollen glands  No family or personal history of psoriasis, ulcerative colitis or chron's disease. No h/o iritis.   Patient denies any raynauds  No h/o arterial/venous thrombosis in the past  No h/o persistent shortness of breath, cough  No h/o persistent nausea, vomiting, constipation, diarrhea, abdominal pain  No h/o hematochezia (except with constipation), hematuria, hemoptysis, hematemesis  No h/o seizures or strokes  No h/o cancer    November 13, 2017  Have you ever seen a rheumatologist Yes Who You When 8/28/17  Joint pain history  Onset: pt states that she is having pain in her neck, and shoulders and lower back  Involved joints: see above  Pain scale:  7.5/10     Wakes the patient from sleep : Yes  Morning stiffness:Yes for 5 minutes  Meds used: naproxen which helps     Interim history  Since last visit:  1. Infections - No  2. New symptoms/medical problem - Yes, has developed bilateral knee pain and going up stairs  3. Any side effects from Rheum medications -none  3. ER visits/Hospitalizations/surgeries - No  4. Last PCP visit: 10/18/17    March 7, 2018  Have you ever seen a rheumatologist Yes Who You When 11/13/17  Joint pain history  Onset: pt states that she continues with pain in her neck and shoulder and hands, worse on the right side. Pt was put on prednisone back when she saw you, but is not sure if it helped due to she was on other medications at the time  Involved joints: see above  Pain scale:  7/10     Wakes the patient from sleep : Yes  Morning stiffness:Yes for 15 minutes  Meds used:nothing     Interim history  Since last visit:  1. Infections - No  2. New symptoms/medical problem - No  3. Any side effects from Rheum medications -NA  3. ER visits/Hospitalizations/surgeries - No  4. Last PCP visit: 2/26/18 March 28, 2018  Have you ever seen a  rheumatologist yes Who  When 3/7/18  Joint pain history  Onset: 10-15 yrs ago  Involved joints: neck, shoulder, R wrist and arms  Pain scale:  8/10     Wakes the patient from sleep : Yes  Morning stiffness:15  Meds used: finished prednisone. Prednisone helped about 25-30% of her pain. After stopping prednisone, her pain is worse. Started taking gabapentin 300mg, QID, started 1 week ago  Interim history  Since last visit:  1. Infections - No  2. New symptoms/medical problem - Yes- L shoulder and neck are so stiff that she cannot turn her neck. Has to turn whole body to look  3. Any side effects from Rheum medications - some fatigue and dizziness from prednisone  3. ER visits/Hospitalizations/surgeries - No  4. Last PCP visit: 02/2018    BP Readings from Last 3 Encounters:   03/28/18 112/76   03/07/18 118/72   02/26/18 124/79              Review of Systems:   Complete ROS negative except for symptoms mentioned in the HPI          Past Medical History:     Past Medical History:   Diagnosis Date     HSIL on Pap smear 09/21/11    MARTA 2 and 3     INFLAM SPONDYLOPATHY NOS   1/7/2008     Leukocytopenia 3/10/2014     Leukocytopenia      Tendinosis      Thrombocytopenia (H) 3/10/2014     Thrombocytopenia (H)      Unspecified closed fracture of pelvis 2000    left fracture of pelvis after delivery     Past Surgical History:   Procedure Laterality Date     C NONSPECIFIC PROCEDURE  10/00    after delivery 2 units of prbcs secondary to placenta     LEEP TX, CERVICAL  12/19/11    MARTA II     TUBAL LIGATION  5/2009    laparoscopic tubal ligation            Social History:     Social History     Occupational History      [employer]     Social History Main Topics     Smoking status: Never Smoker     Smokeless tobacco: Never Used     Alcohol use 0.0 oz/week     0 Standard drinks or equivalent per week      Comment: rarely     Drug use: No     Sexual activity: Not Currently     Partners: Male     Birth  control/ protection: None      Comment: tubal            Family History:     Family History   Problem Relation Age of Onset     Hypertension Father      Lipids Father      Hypertension Mother      Lipids Mother      DIABETES No family hx of      Coronary Artery Disease No family hx of      Hyperlipidemia No family hx of      CEREBROVASCULAR DISEASE No family hx of      Breast Cancer No family hx of      Colon Cancer No family hx of      Prostate Cancer No family hx of      Other Cancer No family hx of      Depression No family hx of      Anxiety Disorder No family hx of      MENTAL ILLNESS No family hx of      Substance Abuse No family hx of      Anesthesia Reaction No family hx of      Asthma No family hx of      OSTEOPOROSIS No family hx of      Genetic Disorder No family hx of      Thyroid Disease No family hx of      Obesity No family hx of      Unknown/Adopted No family hx of             Allergies:     Allergies   Allergen Reactions     Advil [Ibuprofen Micronized]      Rash      Contrast Dye      Percocet [Codeine] Nausea and Vomiting and Itching            Medications:     Current Outpatient Prescriptions   Medication Sig Dispense Refill     naproxen (NAPROSYN) 500 MG tablet Take 1 tablet (500 mg) by mouth 2 times daily (with meals) 60 tablet      traMADol (ULTRAM) 50 MG tablet Take 1 tablet (50 mg) by mouth every 6 hours as needed for severe pain 40 tablet 0     traZODone (DESYREL) 50 MG tablet Take 1 tablet (50 mg) by mouth nightly as needed for sleep 60 tablet 6     loratadine-pseudoePHEDrine (CLARITIN-D 24-HOUR)  MG per 24 hr tablet Take 1 tablet by mouth daily 14 tablet 0     hydrocortisone (WESTCORT) 0.2 % cream Apply sparingly to affected area 2 times daily as needed. 45 g 0     diclofenac (VOLTAREN) 1 % GEL topical gel Use small amount and rub into area of chest that is hurting 1-2 times daily as needed 100 g 1     fluticasone (FLONASE) 50 MCG/ACT spray Spray 1-2 sprays into both nostrils daily  "16 g 3     docusate sodium (COLACE) 100 MG tablet Take 100 mg by mouth daily 60 tablet 1     PATADAY 0.2 % SOLN Reported on 5/1/2017  6     clindamycin-benzoyl peroxide (BENZACLIN) gel Apply to  Affected area initially once daily for 2 weeks and then increase to 2 times daily if tolerated 50 g 11     loratadine (CLARITIN) 10 MG tablet Take 1 tablet (10 mg) by mouth daily (Patient not taking: Reported on 3/28/2018) 30 tablet 1            Physical Exam:   Blood pressure 112/76, pulse 88, temperature 98.3  F (36.8  C), temperature source Oral, resp. rate 12, height 1.48 m (4' 10.25\"), weight 53.3 kg (117 lb 8 oz), SpO2 100 %, not currently breastfeeding.  Wt Readings from Last 4 Encounters:   03/28/18 53.3 kg (117 lb 8 oz)   03/07/18 52.8 kg (116 lb 4.8 oz)   02/26/18 53.5 kg (118 lb)   02/21/18 53.6 kg (118 lb 1.6 oz)       Constitutional: well-developed, appearing stated age; cooperative  Psych: nl judgement, orientation, memory, affect.         Data:   Reviewed following labs:  CBC RESULTS:   Recent Labs   Lab Test  01/05/17   1322   WBC  6.8   RBC  4.19   HGB  12.0   HCT  37.4   MCV  89   MCH  28.6   MCHC  32.1   RDW  12.7   PLT  269       Liver Function Studies -   Recent Labs   Lab Test  09/26/15   1153   PROTTOTAL  8.4   ALBUMIN  4.2   BILITOTAL  0.3   ALKPHOS  60   AST  19   ALT  20       Creatinine   Date Value Ref Range Status   03/07/2018 0.73 0.52 - 1.04 mg/dL Final   ]    No results found for: URIC]    ESR/CRP  Recent Labs   Lab Test  08/02/17   1117  09/26/15   1153  05/21/14   1710   04/16/10   1140   SED  37*  27*  33*   < >  21*   CRP  <2.9   --    --    --   7.1    < > = values in this interval not displayed.       HLA-B27  No results for input(s): [id number], B1 in the last 71172 hours.    RF/CCP  Recent Labs   Lab Test  05/21/14   1710   RHF  <20       RAMÍREZ/RNP/Sm/SSA/SSB  Recent Labs   Lab Test  05/21/14 1710   BOO  <1.0  Interpretation:  Negative           Julius Garay, " MD [facility]

## 2018-03-28 NOTE — MR AVS SNAPSHOT
After Visit Summary   3/28/2018    Zo Qureshi    MRN: [id number]           Patient Information     Date Of Birth          1975        Visit Information        Provider Department      3/28/2018 10:30 AM Julius Garay MD [facility]        Care Instructions      Good sources of iron include beef, liver, spinach and other dark green leafy vegetables, whole grains, beans, and nuts.   Discuss with PCP regarding anemia and also starting sulfasalazine for your joint pains.           Follow-ups after your visit        Your next 10 appointments already scheduled     Apr 25, 2018  9:30 AM CDT   SHORT with Terri Baron MD   [facility]    [address] [place] [id number] [phone]              Who to contact     If you have questions or need follow up information about today's clinic visit or your schedule please contact [facility] directly at [phone].  Normal or non-critical lab and imaging results will be communicated to you by [employer], letter or phone within 4 business days after the clinic has received the results. If you do not hear from us within 7 days, please contact the clinic through [employer] or phone. If you have a critical or abnormal lab result, we will notify you by phone as soon as possible.  Submit refill requests through [employer] or call your pharmacy and they will forward the refill request to us. Please allow 3 business days for your refill to be completed.          Additional Information About Your Visit        [employer] Information     [employer] gives you secure access to your electronic health record. If you see a primary care provider, you can also send messages to your care team and make appointments. If you have questions, please call your primary care clinic.  If you do not have a primary care provider, please call [phone] and they will  "assist you.        Care EveryWhere ID     This is your Care EveryWhere ID. This could be used by other organizations to access your [facility] medical records  [id number]        Your Vitals Were     Pulse Temperature Respirations Height Pulse Oximetry BMI (Body Mass Index)    88 98.3  F (36.8  C) (Oral) 12 1.48 m (4' 10.25\") 100% 24.35 kg/m2       Blood Pressure from Last 3 Encounters:   03/28/18 112/76   03/07/18 118/72   02/26/18 124/79    Weight from Last 3 Encounters:   03/28/18 53.3 kg (117 lb 8 oz)   03/07/18 52.8 kg (116 lb 4.8 oz)   02/26/18 53.5 kg (118 lb)              Today, you had the following     No orders found for display         Today's Medication Changes          These changes are accurate as of 3/28/18 11:34 AM.  If you have any questions, ask your nurse or doctor.               Stop taking these medicines if you haven't already. Please contact your care team if you have questions.     predniSONE 5 MG tablet   Commonly known as:  DELTASONE   Stopped by:  Juilus Garay MD                    Primary Care Provider Office Phone # Fax #    Terri Baron [phone] [address] [facility] [address]        Equal Access to Services     [facility] AH: Hadcyrus finno Somaddi, waaxda luqadaha, qaybta kaalmapetros nam, isabella gibson. So [facility] [phone].    ATENCIÓN: Si habla español, tiene a vogel disposición servicios gratuitos de asistencia lingüística. Llame al [phone].    We comply with applicable federal civil rights laws and [place] laws. We do not discriminate on the basis of race, color, national origin, age, disability, sex, sexual orientation, or gender identity.            Thank you!     Thank you for choosing [facility]  for your care. Our goal is always to provide you with excellent care. Hearing back from our patients is one way we can continue to improve our services. Please take a few " minutes to complete the written survey that you may receive in the mail after your visit with us. Thank you!             Your Updated Medication List - Protect others around you: Learn how to safely use, store and throw away your medicines at www.disposemymeds.org.          This list is accurate as of 3/28/18 11:34 AM.  Always use your most recent med list.                   Brand Name Dispense Instructions for use Diagnosis    clindamycin-benzoyl peroxide gel    BENZACLIN    50 g    Apply to  Affected area initially once daily for 2 weeks and then increase to 2 times daily if tolerated    Acne       diclofenac 1 % Gel topical gel    VOLTAREN    100 g    Use small amount and rub into area of chest that is hurting 1-2 times daily as needed    Costochondritis, Atypical chest pain       docusate sodium 100 MG tablet    COLACE    60 tablet    Take 100 mg by mouth daily    External hemorrhoids       fluticasone 50 MCG/ACT spray    FLONASE    16 g    Spray 1-2 sprays into both nostrils daily    Throat pain       hydrocortisone 0.2 % cream    WESTCORT    45 g    Apply sparingly to affected area 2 times daily as needed.    Rash       loratadine 10 MG tablet    CLARITIN    30 tablet    Take 1 tablet (10 mg) by mouth daily    Hives       loratadine-pseudoePHEDrine  MG per 24 hr tablet    CLARITIN-D 24-hour    14 tablet    Take 1 tablet by mouth daily    Acute recurrent maxillary sinusitis       NAPROSYN 500 MG tablet   Generic drug:  naproxen     60 tablet    Take 1 tablet (500 mg) by mouth 2 times daily (with meals)        PATADAY 0.2 % Soln   Generic drug:  olopatadine HCl      Reported on 5/1/2017        traMADol 50 MG tablet    ULTRAM    40 tablet    Take 1 tablet (50 mg) by mouth every 6 hours as needed for severe pain    Disorder of SI (sacroiliac) joint, Episodic tension-type headache, not intractable       traZODone 50 MG tablet    DESYREL    60 tablet    Take 1 tablet (50 mg) by mouth nightly as needed for  sleep    Insomnia, unspecified type

## 2018-03-28 NOTE — PATIENT INSTRUCTIONS
Good sources of iron include beef, liver, spinach and other dark green leafy vegetables, whole grains, beans, and nuts.   Discuss with PCP regarding anemia and also starting sulfasalazine for your joint pains.

## 2018-03-28 NOTE — NURSING NOTE
"Chief Complaint   Patient presents with     RECHECK     f/u       Initial /76  Pulse 88  Temp 98.3  F (36.8  C) (Oral)  Resp 12  Ht 1.48 m (4' 10.25\")  Wt 53.3 kg (117 lb 8 oz)  SpO2 100%  BMI 24.35 kg/m2 Estimated body mass index is 24.35 kg/(m^2) as calculated from the following:    Height as of this encounter: 1.48 m (4' 10.25\").    Weight as of this encounter: 53.3 kg (117 lb 8 oz).  Medication Reconciliation: complete    Have you ever seen a rheumatologist yes Who  When 3/7/18  Joint pain history  Onset: 10-15 yrs ago  Involved joints: neck, shoulder, R wrist and arms  Pain scale:  8/10     Wakes the patient from sleep : Yes  Morning stiffness:15  Meds used: finished prednisone. Started taking gabapentin 300mg, QID, started 1 week ago  Interim history  Since last visit:  1. Infections - No  2. New symptoms/medical problem - Yes- L shoulder and neck are so stiff that she cannot turn her neck. Has to turn whole body to look  3. Any side effects from Rheum medications - some fatigue and dizziness from prednisone  3. ER visits/Hospitalizations/surgeries - No  4. Last PCP visit: 02/2018  Wt Readings from Last 4 Encounters:   03/28/18 53.3 kg (117 lb 8 oz)   03/07/18 52.8 kg (116 lb 4.8 oz)   02/26/18 53.5 kg (118 lb)   02/21/18 53.6 kg (118 lb 1.6 oz)     BP Readings from Last 3 Encounters:   03/28/18 112/76   03/07/18 118/72   02/26/18 124/79     Lacey Ramirez CMA    "

## 2018-03-30 NOTE — PROGRESS NOTES
Results released to [facility]:  Ms. Qureshi,  Your labs are suggestive of iron deficiency anemia. Please take 325mg ferrous sulphate twice daily. This is available over the counter.   Please follow up with your primary care doctor in 4 weeks regarding this anemia.     Sincerely    Julius Garay MD  [facility]

## 2018-04-17 ENCOUNTER — ALLIED HEALTH/NURSE VISIT (OUTPATIENT)
Dept: NURSING | Facility: CLINIC | Age: 43
End: 2018-04-17

## 2018-04-17 DIAGNOSIS — Z11.1 SCREENING EXAMINATION FOR PULMONARY TUBERCULOSIS: Primary | ICD-10-CM

## 2018-04-17 PROCEDURE — 99207 ZZC NO CHARGE NURSE ONLY: CPT

## 2018-04-17 PROCEDURE — 86580 TB INTRADERMAL TEST: CPT

## 2018-04-17 NOTE — NURSING NOTE
The patient is asked the following questions today and these are her answers:    -Have you had a mantoux administered in the past 30 days?    No  -Have you had a previous positive Mantoux.  No  -Have you received BCG in the past.  No  -Have you had a live vaccine  (MMR, Varicella, OPV, Yellow Fever) in the last 6 weeks.  No  -Have you had and active  viral or bacterial infection in the past 6 weeks.  No  -Have you received corticosteroids or immunosuppressive agents in the past 6 weeks.  No  -Have you been diagnosed with HIV?  No  -Do you have a maglinancy?  No     Brittani Tavera M.A.

## 2018-04-17 NOTE — NURSING NOTE
Patient advised to return in 48-72 hours to have Mantoux read.  Patient already has a future appointment scheduled.  Brittani Tavera M.A.

## 2018-04-17 NOTE — MR AVS SNAPSHOT
After Visit Summary   4/17/2018    Zo Qureshi    MRN: [id number]           Patient Information     Date Of Birth          1975        Visit Information        Provider Department      4/17/2018 10:00 AM EVELYNE ABDUL/LPN [facility]        Today's Diagnoses     Screening examination for pulmonary tuberculosis    -  1       Follow-ups after your visit        Your next 10 appointments already scheduled     Apr 19, 2018 11:00 AM CDT   Nurse Only with CR MA/LPN   [facility] ([facility])    [address] [facility] [address] [phone]            Apr 23, 2018  1:00 PM CDT   SHORT with Terri Baron MD   [facility][note 3])    [address] [facility] [address] [phone]              Who to contact     If you have questions or need follow up information about today's clinic visit or your schedule please contact [facility] directly at [phone].  Normal or non-critical lab and imaging results will be communicated to you by [employer], letter or phone within 4 business days after the clinic has received the results. If you do not hear from us within 7 days, please contact the clinic through [employer] or phone. If you have a critical or abnormal lab result, we will notify you by phone as soon as possible.  Submit refill requests through [employer] or call your pharmacy and they will forward the refill request to us. Please allow 3 business days for your refill to be completed.          Additional Information About Your Visit        ATG Accesshart Information     [employer] gives you secure access to your electronic health record. If you see a primary care provider, you can also send messages to your care team and make appointments. If you have questions, please call your primary care clinic.  If you do not have a primary care provider, please call [phone] and  they will assist you.        Care EveryWhere ID     This is your Care EveryWhere ID. This could be used by other organizations to access your [facility] medical records  [id number]         Blood Pressure from Last 3 Encounters:   03/28/18 112/76   03/07/18 118/72   02/26/18 124/79    Weight from Last 3 Encounters:   03/28/18 53.3 kg (117 lb 8 oz)   03/07/18 52.8 kg (116 lb 4.8 oz)   02/26/18 53.5 kg (118 lb)              We Performed the Following     TB INTRADERMAL TEST        Primary Care Provider Office Phone # Fax #    Terri Baron [phone] [address] [facility] [address]        Equal Access to Services     HUMERA HOLCOMB : Hadii aad ku hadasho Soomaali, waaxda luqadaha, qaybta kaalmada adeegyada, isabella simpson . So [facility] [phone].    ATENCIÓN: Si habla español, tiene a vogel disposición servicios gratuitos de asistencia lingüística. Ll[facility] [phone].    We comply with applicable federal civil rights laws and [place] laws. We do not discriminate on the basis of race, color, national origin, age, disability, sex, sexual orientation, or gender identity.            Thank you!     Thank you for choosing [facility]  for your care. Our goal is always to provide you with excellent care. Hearing back from our patients is one way we can continue to improve our services. Please take a few minutes to complete the written survey that you may receive in the mail after your visit with us. Thank you!             Your Updated Medication List - Protect others around you: Learn how to safely use, store and throw away your medicines at www.disposemymeds.org.          This list is accurate as of 4/17/18 10:17 AM.  Always use your most recent med list.                   Brand Name Dispense Instructions for use Diagnosis    clindamycin-benzoyl peroxide gel    BENZACLIN    50 g    Apply to  Affected area initially once daily for 2 weeks and then  increase to 2 times daily if tolerated    Acne       diclofenac 1 % Gel topical gel    VOLTAREN    100 g    Use small amount and rub into area of chest that is hurting 1-2 times daily as needed    Costochondritis, Atypical chest pain       docusate sodium 100 MG tablet    COLACE    60 tablet    Take 100 mg by mouth daily    External hemorrhoids       fluticasone 50 MCG/ACT spray    FLONASE    16 g    Spray 1-2 sprays into both nostrils daily    Throat pain       hydrocortisone 0.2 % cream    WESTCORT    45 g    Apply sparingly to affected area 2 times daily as needed.    Rash       loratadine 10 MG tablet    CLARITIN    30 tablet    Take 1 tablet (10 mg) by mouth daily    Hives       loratadine-pseudoePHEDrine  MG per 24 hr tablet    CLARITIN-D 24-hour    14 tablet    Take 1 tablet by mouth daily    Acute recurrent maxillary sinusitis       NAPROSYN 500 MG tablet   Generic drug:  naproxen     60 tablet    Take 1 tablet (500 mg) by mouth 2 times daily (with meals)        PATADAY 0.2 % Soln   Generic drug:  olopatadine HCl      Reported on 5/1/2017        traMADol 50 MG tablet    ULTRAM    40 tablet    Take 1 tablet (50 mg) by mouth every 6 hours as needed for severe pain    Disorder of SI (sacroiliac) joint, Episodic tension-type headache, not intractable       traZODone 50 MG tablet    DESYREL    60 tablet    Take 1 tablet (50 mg) by mouth nightly as needed for sleep    Insomnia, unspecified type

## 2018-04-19 ENCOUNTER — RADIANT APPOINTMENT (OUTPATIENT)
Dept: GENERAL RADIOLOGY | Facility: CLINIC | Age: 43
End: 2018-04-19
Attending: FAMILY MEDICINE
Payer: COMMERCIAL

## 2018-04-19 ENCOUNTER — OFFICE VISIT (OUTPATIENT)
Dept: FAMILY MEDICINE | Facility: CLINIC | Age: 43
End: 2018-04-19
Payer: COMMERCIAL

## 2018-04-19 ENCOUNTER — ALLIED HEALTH/NURSE VISIT (OUTPATIENT)
Dept: NURSING | Facility: CLINIC | Age: 43
End: 2018-04-19
Payer: COMMERCIAL

## 2018-04-19 VITALS
HEART RATE: 80 BPM | RESPIRATION RATE: 16 BRPM | HEIGHT: 59 IN | DIASTOLIC BLOOD PRESSURE: 60 MMHG | TEMPERATURE: 98.5 F | BODY MASS INDEX: 23.79 KG/M2 | SYSTOLIC BLOOD PRESSURE: 110 MMHG | WEIGHT: 118 LBS

## 2018-04-19 DIAGNOSIS — R76.11 MANTOUX: POSITIVE: Primary | ICD-10-CM

## 2018-04-19 DIAGNOSIS — R76.11 MANTOUX: POSITIVE: ICD-10-CM

## 2018-04-19 LAB
PPDINDURATION: NORMAL MM (ref 0–5)
PPDREDNESS: NORMAL MM

## 2018-04-19 PROCEDURE — 99213 OFFICE O/P EST LOW 20 MIN: CPT | Performed by: FAMILY MEDICINE

## 2018-04-19 PROCEDURE — 86480 TB TEST CELL IMMUN MEASURE: CPT | Performed by: FAMILY MEDICINE

## 2018-04-19 PROCEDURE — 71046 X-RAY EXAM CHEST 2 VIEWS: CPT

## 2018-04-19 PROCEDURE — 36415 COLL VENOUS BLD VENIPUNCTURE: CPT | Performed by: FAMILY MEDICINE

## 2018-04-19 PROCEDURE — 99207 ZZC NO CHARGE NURSE ONLY: CPT

## 2018-04-19 NOTE — NURSING NOTE
Mantoux results: 15 mm.  Dr. PERKINS will see her.  2 view CXR ordered and brought to x-ray.  Aware to go to room 28 after x-ray.  Delma Vaz RN

## 2018-04-19 NOTE — PROGRESS NOTES
SUBJECTIVE:   Zo Qureshi is a 43 year old female who presents to clinic today for the following health issues:    Patient here today to see RN for PPD read.   Per RN, reading was positive---15mm x 20mm   Patient is of [place] origin and came to the [place] at age 13.   No recent travel. No weight loss, chills, cough, night sweats        Problem list and histories reviewed & adjusted, as indicated.  Additional history: as documented    Patient Active Problem List   Diagnosis     Inflammatory spondylopathy (H)     Disorder of SI (sacroiliac) joint     Hypercholesterolemia     HSIL on Pap smear     S/P LEEP of cervix     Acne     Influenza B     Vitamin D deficiency     Right peroneal tendinosis     Episodic tension-type headache, not intractable     Cervicalgia     Insomnia due to medical condition     Past Surgical History:   Procedure Laterality Date     C NONSPECIFIC PROCEDURE  10/00    after delivery 2 units of prbcs secondary to placenta     LEEP TX, CERVICAL  12/19/11    MARTA II     TUBAL LIGATION  5/2009    laparoscopic tubal ligation       Social History   Substance Use Topics     Smoking status: Never Smoker     Smokeless tobacco: Never Used     Alcohol use 0.0 oz/week     0 Standard drinks or equivalent per week      Comment: rarely     Family History   Problem Relation Age of Onset     Hypertension Father      Lipids Father      Hypertension Mother      Lipids Mother      DIABETES No family hx of      Coronary Artery Disease No family hx of      Hyperlipidemia No family hx of      CEREBROVASCULAR DISEASE No family hx of      Breast Cancer No family hx of      Colon Cancer No family hx of      Prostate Cancer No family hx of      Other Cancer No family hx of      Depression No family hx of      Anxiety Disorder No family hx of      MENTAL ILLNESS No family hx of      Substance Abuse No family hx of      Anesthesia Reaction No family hx of      Asthma No family hx of      OSTEOPOROSIS No family hx of       "Genetic Disorder No family hx of      Thyroid Disease No family hx of      Obesity No family hx of      Unknown/Adopted No family hx of            Reviewed and updated as needed this visit by clinical staff  Tobacco       Reviewed and updated as needed this visit by Provider         ROS:  Constitutional, HEENT, cardiovascular, pulmonary, GI, , musculoskeletal, neuro, skin, endocrine and psych systems are negative, except as otherwise noted.    OBJECTIVE:     /60 (BP Location: Left arm, Patient Position: Chair, Cuff Size: Adult Regular)  Pulse 80  Temp 98.5  F (36.9  C) (Oral)  Resp 16  Ht 4' 11\" (1.499 m)  Wt 118 lb (53.5 kg)  Breastfeeding? No  BMI 23.83 kg/m2  Body mass index is 23.83 kg/(m^2).  GENERAL: healthy, alert and no distress  NECK: no adenopathy  RESP: lungs clear to auscultation - no rales, rhonchi or wheezes  CV: regular rate and rhythm, normal S1 S2  MS: no gross musculoskeletal defects noted, no edema  SKIN: no suspicious lesions or rashes    Diagnostic Test Results:  CXR: I independently visualized the xray: no signs of TB or infiltrates       ASSESSMENT/PLAN:         1. Mantoux: positive  - CXR clear.   - XR Chest 2 Views; Future  - M Tuberculosis by Quantiferon    See Patient Instructions    Liz Gaviria MD  [facility]    "

## 2018-04-19 NOTE — MR AVS SNAPSHOT
After Visit Summary   4/19/2018    Zo Qureshi    MRN: [id number]           Patient Information     Date Of Birth          1975        Visit Information        Provider Department      4/19/2018 11:30 AM Liz Gaviria MD [facility]        Today's Diagnoses     Mantoux: positive    -  1      Care Instructions    Follow up as needed          Follow-ups after your visit        Your next 10 appointments already scheduled     Apr 23, 2018  1:00 PM CDT   SHORT with Terri Baron MD   [facility] ([facility])    [address] [place] [id number] [phone]              Who to contact     If you have questions or need follow up information about today's clinic visit or your schedule please contact [facility] directly at [phone].  Normal or non-critical lab and imaging results will be communicated to you by MyChart, letter or phone within 4 business days after the clinic has received the results. If you do not hear from us within 7 days, please contact the clinic through MyChart or phone. If you have a critical or abnormal lab result, we will notify you by phone as soon as possible.  Submit refill requests through [employer] or call your pharmacy and they will forward the refill request to us. Please allow 3 business days for your refill to be completed.          Additional Information About Your Visit        MyChart Information     [employer] gives you secure access to your electronic health record. If you see a primary care provider, you can also send messages to your care team and make appointments. If you have questions, please call your primary care clinic.  If you do not have a primary care provider, please call [phone] and they will assist you.        Care EveryWhere ID     This is your Care EveryWhere ID. This could be used by other organizations to access your [facility]  "records  [id number]        Your Vitals Were     Pulse Temperature Respirations Height Breastfeeding? BMI (Body Mass Index)    80 98.5  F (36.9  C) (Oral) 16 4' 11\" (1.499 m) No 23.83 kg/m2       Blood Pressure from Last 3 Encounters:   04/19/18 110/60   03/28/18 112/76   03/07/18 118/72    Weight from Last 3 Encounters:   04/19/18 118 lb (53.5 kg)   03/28/18 117 lb 8 oz (53.3 kg)   03/07/18 116 lb 4.8 oz (52.8 kg)              We Performed the Following     M Tuberculosis by Quantiferon        Primary Care Provider Office Phone # Fax #    Terri Baron [phone] [address] [facility] [address]        Equal Access to Services     [facility]: Hadii aad ku hadasho Soomaali, waaxda luqadaha, qaybta kaalmada adeegyada, isabella simpson . So [facility] [phone].    ATENCIÓN: Si habla español, tiene a vogel disposición servicios gratuitos de asistencia lingüística. Llame al [phone].    We comply with applicable federal civil rights laws and [place] laws. We do not discriminate on the basis of race, color, national origin, age, disability, sex, sexual orientation, or gender identity.            Thank you!     Thank you for choosing [facility]  for your care. Our goal is always to provide you with excellent care. Hearing back from our patients is one way we can continue to improve our services. Please take a few minutes to complete the written survey that you may receive in the mail after your visit with us. Thank you!             Your Updated Medication List - Protect others around you: Learn how to safely use, store and throw away your medicines at www.disposemymeds.org.          This list is accurate as of 4/19/18 11:54 AM.  Always use your most recent med list.                   Brand Name Dispense Instructions for use Diagnosis    clindamycin-benzoyl peroxide gel    BENZACLIN    50 g    Apply to  Affected area initially once daily for 2 " weeks and then increase to 2 times daily if tolerated    Acne       diclofenac 1 % Gel topical gel    VOLTAREN    100 g    Use small amount and rub into area of chest that is hurting 1-2 times daily as needed    Costochondritis, Atypical chest pain       docusate sodium 100 MG tablet    COLACE    60 tablet    Take 100 mg by mouth daily    External hemorrhoids       fluticasone 50 MCG/ACT spray    FLONASE    16 g    Spray 1-2 sprays into both nostrils daily    Throat pain       hydrocortisone 0.2 % cream    WESTCORT    45 g    Apply sparingly to affected area 2 times daily as needed.    Rash       loratadine 10 MG tablet    CLARITIN    30 tablet    Take 1 tablet (10 mg) by mouth daily    Hives       loratadine-pseudoePHEDrine  MG per 24 hr tablet    CLARITIN-D 24-hour    14 tablet    Take 1 tablet by mouth daily    Acute recurrent maxillary sinusitis       NAPROSYN 500 MG tablet   Generic drug:  naproxen     60 tablet    Take 1 tablet (500 mg) by mouth 2 times daily (with meals)        PATADAY 0.2 % Soln   Generic drug:  olopatadine HCl      Reported on 5/1/2017        traMADol 50 MG tablet    ULTRAM    40 tablet    Take 1 tablet (50 mg) by mouth every 6 hours as needed for severe pain    Disorder of SI (sacroiliac) joint, Episodic tension-type headache, not intractable       traZODone 50 MG tablet    DESYREL    60 tablet    Take 1 tablet (50 mg) by mouth nightly as needed for sleep    Insomnia, unspecified type

## 2018-04-19 NOTE — MR AVS SNAPSHOT
After Visit Summary   4/19/2018    Zo Qureshi    MRN: [id number]           Patient Information     Date Of Birth          1975        Visit Information        Provider Department      4/19/2018 11:00 AM CR RN [facility]        Today's Diagnoses     Mantoux: positive    -  1       Follow-ups after your visit        Your next 10 appointments already scheduled     Apr 19, 2018 11:30 AM CDT   SHORT with Liz Gaviria MD   [facility])    [address] [facility] [address] [phone]            Apr 23, 2018  1:00 PM CDT   SHORT with Terri Baron MD   [facility] ([facility]    [address] [facility] [address] [phone]              Who to contact     If you have questions or need follow up information about today's clinic visit or your schedule please contact [facility] directly at [phone].  Normal or non-critical lab and imaging results will be communicated to you by [employer], letter or phone within 4 business days after the clinic has received the results. If you do not hear from us within 7 days, please contact the clinic through [employer] or phone. If you have a critical or abnormal lab result, we will notify you by phone as soon as possible.  Submit refill requests through [employer] or call your pharmacy and they will forward the refill request to us. Please allow 3 business days for your refill to be completed.          Additional Information About Your Visit        [employer] Information     [employer] gives you secure access to your electronic health record. If you see a primary care provider, you can also send messages to your care team and make appointments. If you have questions, please call your primary care clinic.  If you do not have a primary care provider, please call [phone] and they will assist  you.        Care EveryWhere ID     This is your Care EveryWhere ID. This could be used by other organizations to access your [facility] medical records  [id number]         Blood Pressure from Last 3 Encounters:   03/28/18 112/76   03/07/18 118/72   02/26/18 124/79    Weight from Last 3 Encounters:   03/28/18 117 lb 8 oz (53.3 kg)   03/07/18 116 lb 4.8 oz (52.8 kg)   02/26/18 118 lb (53.5 kg)              Today, you had the following     No orders found for display       Primary Care Provider Office Phone # Fax #    Terri Baron [phone] [address] [facility] [address]        Equal Access to Services     HUMERA HOLCOMB : Hadii darlyn finno Sojignaali, waaxda luqadaha, qaybta kaalmada adeegyada, isabella simpson . So [facility] [phone].    ATENCIÓN: Si habla español, tiene a vogel disposición servicios gratuitos de asistencia lingüística. Ll[facility]ica [facility] [phone].    We comply with applicable federal civil rights laws and [place] laws. We do not discriminate on the basis of race, color, national origin, age, disability, sex, sexual orientation, or gender identity.            Thank you!     Thank you for choosing [facility]  for your care. Our goal is always to provide you with excellent care. Hearing back from our patients is one way we can continue to improve our services. Please take a few minutes to complete the written survey that you may receive in the mail after your visit with us. Thank you!             Your Updated Medication List - Protect others around you: Learn how to safely use, store and throw away your medicines at www.disposemymeds.org.          This list is accurate as of 4/19/18 11:21 AM.  Always use your most recent med list.                   Brand Name Dispense Instructions for use Diagnosis    clindamycin-benzoyl peroxide gel    BENZACLIN    50 g    Apply to  Affected area initially once daily for 2 weeks and then increase to 2  times daily if tolerated    Acne       diclofenac 1 % Gel topical gel    VOLTAREN    100 g    Use small amount and rub into area of chest that is hurting 1-2 times daily as needed    Costochondritis, Atypical chest pain       docusate sodium 100 MG tablet    COLACE    60 tablet    Take 100 mg by mouth daily    External hemorrhoids       fluticasone 50 MCG/ACT spray    FLONASE    16 g    Spray 1-2 sprays into both nostrils daily    Throat pain       hydrocortisone 0.2 % cream    WESTCORT    45 g    Apply sparingly to affected area 2 times daily as needed.    Rash       loratadine 10 MG tablet    CLARITIN    30 tablet    Take 1 tablet (10 mg) by mouth daily    Hives       loratadine-pseudoePHEDrine  MG per 24 hr tablet    CLARITIN-D 24-hour    14 tablet    Take 1 tablet by mouth daily    Acute recurrent maxillary sinusitis       NAPROSYN 500 MG tablet   Generic drug:  naproxen     60 tablet    Take 1 tablet (500 mg) by mouth 2 times daily (with meals)        PATADAY 0.2 % Soln   Generic drug:  olopatadine HCl      Reported on 5/1/2017        traMADol 50 MG tablet    ULTRAM    40 tablet    Take 1 tablet (50 mg) by mouth every 6 hours as needed for severe pain    Disorder of SI (sacroiliac) joint, Episodic tension-type headache, not intractable       traZODone 50 MG tablet    DESYREL    60 tablet    Take 1 tablet (50 mg) by mouth nightly as needed for sleep    Insomnia, unspecified type

## 2018-04-23 ENCOUNTER — OFFICE VISIT (OUTPATIENT)
Dept: FAMILY MEDICINE | Facility: CLINIC | Age: 43
End: 2018-04-23
Payer: OTHER MISCELLANEOUS

## 2018-04-23 ENCOUNTER — TRANSFERRED RECORDS (OUTPATIENT)
Dept: HEALTH INFORMATION MANAGEMENT | Facility: CLINIC | Age: 43
End: 2018-04-23

## 2018-04-23 VITALS
HEART RATE: 80 BPM | BODY MASS INDEX: 24.05 KG/M2 | RESPIRATION RATE: 14 BRPM | SYSTOLIC BLOOD PRESSURE: 108 MMHG | WEIGHT: 119.3 LBS | DIASTOLIC BLOOD PRESSURE: 74 MMHG | HEIGHT: 59 IN | TEMPERATURE: 98.3 F

## 2018-04-23 DIAGNOSIS — G47.00 INSOMNIA, UNSPECIFIED TYPE: ICD-10-CM

## 2018-04-23 DIAGNOSIS — M77.8 RIGHT SHOULDER TENDONITIS: ICD-10-CM

## 2018-04-23 DIAGNOSIS — G89.4 CHRONIC PAIN SYNDROME: ICD-10-CM

## 2018-04-23 DIAGNOSIS — G44.219 EPISODIC TENSION-TYPE HEADACHE, NOT INTRACTABLE: ICD-10-CM

## 2018-04-23 DIAGNOSIS — D50.9 IRON DEFICIENCY ANEMIA, UNSPECIFIED IRON DEFICIENCY ANEMIA TYPE: Primary | ICD-10-CM

## 2018-04-23 DIAGNOSIS — M54.2 CERVICALGIA: ICD-10-CM

## 2018-04-23 DIAGNOSIS — M67.88 RIGHT PERONEAL TENDINOSIS: ICD-10-CM

## 2018-04-23 DIAGNOSIS — M53.3 DISORDER OF SI (SACROILIAC) JOINT: ICD-10-CM

## 2018-04-23 LAB
M TB TUBERC IFN-G BLD QL: NEGATIVE
M TB TUBERC IFN-G/MITOGEN IGNF BLD: 0.02 IU/ML

## 2018-04-23 PROCEDURE — 99000 SPECIMEN HANDLING OFFICE-LAB: CPT | Performed by: FAMILY MEDICINE

## 2018-04-23 PROCEDURE — 80307 DRUG TEST PRSMV CHEM ANLYZR: CPT | Mod: 90 | Performed by: FAMILY MEDICINE

## 2018-04-23 PROCEDURE — 99213 OFFICE O/P EST LOW 20 MIN: CPT | Performed by: FAMILY MEDICINE

## 2018-04-23 RX ORDER — TRAMADOL HYDROCHLORIDE 50 MG/1
50 TABLET ORAL EVERY 6 HOURS PRN
Qty: 60 TABLET | Refills: 0 | Status: SHIPPED | OUTPATIENT
Start: 2018-04-23 | End: 2019-01-21

## 2018-04-23 RX ORDER — TRAZODONE HYDROCHLORIDE 50 MG/1
50 TABLET, FILM COATED ORAL
Qty: 60 TABLET | Refills: 6 | Status: SHIPPED | OUTPATIENT
Start: 2018-04-23 | End: 2019-01-21

## 2018-04-23 RX ORDER — GABAPENTIN 300 MG/1
CAPSULE ORAL
Qty: 120 CAPSULE | Refills: 1 | COMMUNITY
Start: 2018-04-23 | End: 2018-08-29

## 2018-04-23 RX ORDER — NAPROXEN 500 MG/1
500 TABLET ORAL 2 TIMES DAILY WITH MEALS
Qty: 60 TABLET | Refills: 3 | Status: SHIPPED | OUTPATIENT
Start: 2018-04-23 | End: 2019-01-21

## 2018-04-23 RX ORDER — DULOXETIN HYDROCHLORIDE 30 MG/1
60 CAPSULE, DELAYED RELEASE ORAL DAILY
Qty: 180 CAPSULE | Refills: 3 | COMMUNITY
Start: 2018-04-23 | End: 2019-01-21

## 2018-04-23 RX ORDER — TRAZODONE HYDROCHLORIDE 50 MG/1
50 TABLET, FILM COATED ORAL
Qty: 60 TABLET | Refills: 6 | Status: SHIPPED | OUTPATIENT
Start: 2018-04-23 | End: 2018-04-23

## 2018-04-23 RX ORDER — NAPROXEN 500 MG/1
500 TABLET ORAL 2 TIMES DAILY WITH MEALS
Qty: 60 TABLET | Refills: 3 | Status: SHIPPED | OUTPATIENT
Start: 2018-04-23 | End: 2018-04-23

## 2018-04-23 RX ORDER — FERROUS SULFATE 325(65) MG
325 TABLET ORAL 2 TIMES DAILY
Qty: 60 TABLET | Refills: 2 | Status: SHIPPED | OUTPATIENT
Start: 2018-04-23 | End: 2019-07-08

## 2018-04-23 NOTE — PROGRESS NOTES
SUBJECTIVE:   Zo Qureshi is a 43 year old female who presents to clinic today for the following health issues:      Pt is here for a work comp injury follow up to the neck shoulder and hand on mostly on the right side.   When she was last seen she had gotten worse due to going back to work part time and not having her meds covered.   She is not back on her meds and has been off work for the last 2 months.   Her right shoulder is a lot better and the wrist is much better.   Her headaches are improved.   She has been seeing rheumatology and saw neurology as well.  She was found to be iron deficient which is unrelated to her WC injury.       Past Medical History:   Diagnosis Date     HSIL on Pap smear 09/21/11    MARTA 2 and 3     INFLAM SPONDYLOPATHY NOS   1/7/2008     Leukocytopenia 3/10/2014     Leukocytopenia      Tendinosis      Thrombocytopenia (H) 3/10/2014     Thrombocytopenia (H)      Unspecified closed fracture of pelvis 2000    left fracture of pelvis after delivery       Past Surgical History:   Procedure Laterality Date     C NONSPECIFIC PROCEDURE  10/00    after delivery 2 units of prbcs secondary to placenta     LEEP TX, CERVICAL  12/19/11    MARTA II     TUBAL LIGATION  5/2009    laparoscopic tubal ligation       MEDICATIONS:  Current Outpatient Prescriptions   Medication     clindamycin-benzoyl peroxide (BENZACLIN) gel     diclofenac (VOLTAREN) 1 % GEL topical gel     docusate sodium (COLACE) 100 MG tablet     fluticasone (FLONASE) 50 MCG/ACT spray     hydrocortisone (WESTCORT) 0.2 % cream     loratadine (CLARITIN) 10 MG tablet     loratadine-pseudoePHEDrine (CLARITIN-D 24-HOUR)  MG per 24 hr tablet     naproxen (NAPROSYN) 500 MG tablet     PATADAY 0.2 % SOLN     traMADol (ULTRAM) 50 MG tablet     traZODone (DESYREL) 50 MG tablet     No current facility-administered medications for this visit.        SOCIAL HISTORY:  Social History   Substance Use Topics     Smoking status: Never Smoker      "Smokeless tobacco: Never Used     Alcohol use 0.0 oz/week     0 Standard drinks or equivalent per week      Comment: rarely       Family History   Problem Relation Age of Onset     Hypertension Father      Lipids Father      Hypertension Mother      Lipids Mother      DIABETES No family hx of      Coronary Artery Disease No family hx of      Hyperlipidemia No family hx of      CEREBROVASCULAR DISEASE No family hx of      Breast Cancer No family hx of      Colon Cancer No family hx of      Prostate Cancer No family hx of      Other Cancer No family hx of      Depression No family hx of      Anxiety Disorder No family hx of      MENTAL ILLNESS No family hx of      Substance Abuse No family hx of      Anesthesia Reaction No family hx of      Asthma No family hx of      OSTEOPOROSIS No family hx of      Genetic Disorder No family hx of      Thyroid Disease No family hx of      Obesity No family hx of      Unknown/Adopted No family hx of        Objective:  Blood pressure 108/74, pulse 80, temperature 98.3  F (36.8  C), temperature source Oral, resp. rate 14, height 4' 11\" (1.499 m), weight 119 lb 4.8 oz (54.1 kg), last menstrual period 04/22/2018, not currently breastfeeding.  Neck:  There is no lymphadenopathy or thyroid tenderness or enlargement  Chest: Clear to auscultation bilaterally.  No wheezes, rales or retractions.  CV: Regular rate and rhythm without murmurs, rubs or gallops.  Right shoulder - slightly tender to touch and palpation anterior and the right wrist has good strength of     Assessment:  1. Right shoulder tendonitis  2. Right wrist tendonitis  3. Iron deficiency - not WC related    Plan:  1. Continue current meds  2. Refills per [facility]  3. Will refer for functional capacity analysis  4. Recheck in 1-2 months  5. Continue off work  6. Continue on meds at current doses        "

## 2018-04-23 NOTE — MR AVS SNAPSHOT
After Visit Summary   4/23/2018    Zo Qureshi    MRN: [id number]           Patient Information     Date Of Birth          1975        Visit Information        Provider Department      4/23/2018 1:00 PM Terri Baron MD [facility]        Today's Diagnoses     Iron deficiency anemia, unspecified iron deficiency anemia type    -  1    Right peroneal tendinosis        Right shoulder tendonitis        Cervicalgia        Disorder of SI (sacroiliac) joint        Episodic tension-type headache, not intractable        Insomnia, unspecified type        Chronic pain syndrome           Follow-ups after your visit        Additional Services     OCCUPATIONAL MEDICINE REFERRAL       Your provider has referred you to: [facility] [phone]   [email or web address]    Patient needs functional capacity evaluation - has had workers comp injury and have tried to get her back to work but the injury has come back - I think she may not be able to continue with her current line of work    Please be aware that coverage of these services is subject to the terms and limitations of your health insurance plan.  Call member services at your health plan with any benefit or coverage questions.      Please bring the following to your appointment:  >>   Any x-rays, CTs or MRIs which have been performed.  Contact the facility where they were done to arrange for  prior to your scheduled appointment.    >>   List of current medications   >>   This referral request   >>   Any documents/labs given to you for this referral                  Who to contact     If you have questions or need follow up information about today's clinic visit or your schedule please contact [facility] directly at [phone].  Normal or non-critical lab and imaging results will be communicated to you by MyChart, letter or phone within 4 business days after the clinic has  "received the results. If you do not hear from us within 7 days, please contact the clinic through [employer] or phone. If you have a critical or abnormal lab result, we will notify you by phone as soon as possible.  Submit refill requests through [employer] or call your pharmacy and they will forward the refill request to us. Please allow 3 business days for your refill to be completed.          Additional Information About Your Visit        [employer] Information     [employer] gives you secure access to your electronic health record. If you see a primary care provider, you can also send messages to your care team and make appointments. If you have questions, please call your primary care clinic.  If you do not have a primary care provider, please call [phone] and they will assist you.        Care EveryWhere ID     This is your Care EveryWhere ID. This could be used by other organizations to access your [place] medical records  [id number]        Your Vitals Were     Pulse Temperature Respirations Height Last Period Breastfeeding?    80 98.3  F (36.8  C) (Oral) 14 4' 11\" (1.499 m) 04/22/2018 No    BMI (Body Mass Index)                   24.1 kg/m2            Blood Pressure from Last 3 Encounters:   04/23/18 108/74   04/19/18 110/60   03/28/18 112/76    Weight from Last 3 Encounters:   04/23/18 119 lb 4.8 oz (54.1 kg)   04/19/18 118 lb (53.5 kg)   03/28/18 117 lb 8 oz (53.3 kg)              We Performed the Following     Drug  Screen Comprehensive, Urine w/o Reported Meds (Pain Care Package)     OCCUPATIONAL MEDICINE REFERRAL          Today's Medication Changes          These changes are accurate as of 4/23/18  1:42 PM.  If you have any questions, ask your nurse or doctor.               Start taking these medicines.        Dose/Directions    ferrous sulfate 325 (65 Fe) MG tablet   Commonly known as:  IRON   Used for:  Iron deficiency anemia, unspecified iron deficiency anemia type   Started by:  Terri Baron MD        " Dose:  325 mg   Take 1 tablet (325 mg) by mouth 2 times daily   Quantity:  60 tablet   Refills:  2            Where to get your medicines      These medications were sent to [facility] [place] [address]  [address] [facility] [address]     Phone:  [phone]     naproxen 500 MG tablet    traZODone 50 MG tablet         Some of these will need a paper prescription and others can be bought over the counter.  Ask your nurse if you have questions.     Bring a paper prescription for each of these medications     ferrous sulfate 325 (65 Fe) MG tablet    traMADol 50 MG tablet               Information about OPIOIDS     PRESCRIPTION OPIOIDS: WHAT YOU NEED TO KNOW   You have a prescription for an opioid (narcotic) pain medicine. Opioids can cause addiction. If you have a history of chemical dependency of any type, you are at a higher risk of becoming addicted to opioids. Only take this medicine after all other options have been tried. Take it for as short a time and as few doses as possible.     Do not:    Drive. If you drive while taking these medicines, you could be arrested for driving under the influence (DUI).    Operate heavy machinery    Do any other dangerous activities while taking these medicines.     Drink any alcohol while taking these medicines.      Take with any other medicines that contain acetaminophen. Read all labels carefully. Look for the word  acetaminophen  or  Tylenol.  Ask your pharmacist if you have questions or are unsure.    Store your pills in a secure place, locked if possible. We will not replace any lost or stolen medicine. If you don t finish your medicine, please throw away (dispose) as directed by your pharmacist. The [place] Pollution Control Agency has more information about safe disposal: https://www.pca.[facility].[place].us/living-green/managing-unwanted-medications    All opioids tend to cause constipation. Drink plenty of water and eat foods that  have a lot of fiber, such as fruits, vegetables, prune juice, apple juice and high-fiber cereal. Take a laxative (Miralax, milk of magnesia, Colace, Senna) if you don t move your bowels at least every other day.          Primary Care Provider Office Phone # Fax #    Terri Baron [phone] [address] [facility] [address]        Equal Access to Services     HUMERA HOLCOMB : Hadii aad ku hadasho Soomaali, waaxda luqadaha, qaybta kaalmada adeegyada, waxay idiin hayaan adevalencia ramostaneshavivien simpson . So [facility] [phone].    ATENCIÓN: Si dasia salvador, tiene a vogel disposición servicios gratuitos de asistencia lingüística. [facility] [phone].    We comply with applicable federal civil rights laws and [place] laws. We do not discriminate on the basis of race, color, national origin, age, disability, sex, sexual orientation, or gender identity.            Thank you!     Thank you for choosing [facility]  for your care. Our goal is always to provide you with excellent care. Hearing back from our patients is one way we can continue to improve our services. Please take a few minutes to complete the written survey that you may receive in the mail after your visit with us. Thank you!             Your Updated Medication List - Protect others around you: Learn how to safely use, store and throw away your medicines at www.disposemymeds.org.          This list is accurate as of 4/23/18  1:42 PM.  Always use your most recent med list.                   Brand Name Dispense Instructions for use Diagnosis    clindamycin-benzoyl peroxide gel    BENZACLIN    50 g    Apply to  Affected area initially once daily for 2 weeks and then increase to 2 times daily if tolerated    Acne       diclofenac 1 % Gel topical gel    VOLTAREN    100 g    Use small amount and rub into area of chest that is hurting 1-2 times daily as needed    Costochondritis, Atypical chest pain       docusate sodium 100 MG  tablet    COLACE    60 tablet    Take 100 mg by mouth daily    External hemorrhoids       DULoxetine 30 MG EC capsule    CYMBALTA    180 capsule    Take 2 capsules (60 mg) by mouth daily    Cervicalgia, Right shoulder tendonitis, Right peroneal tendinosis       ferrous sulfate 325 (65 Fe) MG tablet    IRON    60 tablet    Take 1 tablet (325 mg) by mouth 2 times daily    Iron deficiency anemia, unspecified iron deficiency anemia type       fluticasone 50 MCG/ACT spray    FLONASE    16 g    Spray 1-2 sprays into both nostrils daily    Throat pain       gabapentin 300 MG capsule    NEURONTIN    120 capsule    1 tab in am and 1 in evening and 2 at bedtime for (4 per day)    Right peroneal tendinosis, Right shoulder tendonitis, Cervicalgia       hydrocortisone 0.2 % cream    WESTCORT    45 g    Apply sparingly to affected area 2 times daily as needed.    Rash       loratadine 10 MG tablet    CLARITIN    30 tablet    Take 1 tablet (10 mg) by mouth daily    Hives       loratadine-pseudoePHEDrine  MG per 24 hr tablet    CLARITIN-D 24-hour    14 tablet    Take 1 tablet by mouth daily    Acute recurrent maxillary sinusitis       naproxen 500 MG tablet    NAPROSYN    60 tablet    Take 1 tablet (500 mg) by mouth 2 times daily (with meals)    Right peroneal tendinosis, Right shoulder tendonitis, Cervicalgia       PATADAY 0.2 % Soln   Generic drug:  olopatadine HCl      Reported on 5/1/2017        traMADol 50 MG tablet    ULTRAM    60 tablet    Take 1 tablet (50 mg) by mouth every 6 hours as needed for severe pain    Disorder of SI (sacroiliac) joint, Episodic tension-type headache, not intractable       traZODone 50 MG tablet    DESYREL    60 tablet    Take 1 tablet (50 mg) by mouth nightly as needed for sleep    Insomnia, unspecified type

## 2018-04-23 NOTE — LETTER
[facility]  [address] [place], [address]  [phone]        April 23, 2018    RE: Zo Qureshi                                                                                                                                                       [address] [address] [facility] [address]          To Whom It May Concern,   Zo Qureshi is a patient of mine.   She was seen today.   She is doing much better now with treatment and being off work.   She will need to continue off work and will have a functional capacity evaluation done.   I will see her back in 1-2 months after the evaluation is complete.             Sincerely,    Terri Robles M.D.

## 2018-04-26 LAB — COMPREHEN DRUG ANALYSIS UR: NORMAL

## 2018-05-08 ENCOUNTER — TELEPHONE (OUTPATIENT)
Dept: FAMILY MEDICINE | Facility: CLINIC | Age: 43
End: 2018-05-08

## 2018-05-08 NOTE — TELEPHONE ENCOUNTER
Pt calls, has tramadol rx from 4/23/18, clinic needs to fax to work comp pharmacy, she has hard copy, will bring signed rx to clinic for faxing and will bring fax number, FYI to     Ellen Fowler RN, BSN  Message handled by Nurse Triage.

## 2018-05-29 ENCOUNTER — TELEPHONE (OUTPATIENT)
Dept: FAMILY MEDICINE | Facility: CLINIC | Age: 43
End: 2018-05-29

## 2018-05-29 DIAGNOSIS — M25.512 BILATERAL SHOULDER PAIN, UNSPECIFIED CHRONICITY: Primary | ICD-10-CM

## 2018-05-29 DIAGNOSIS — M25.511 BILATERAL SHOULDER PAIN, UNSPECIFIED CHRONICITY: Primary | ICD-10-CM

## 2018-05-29 NOTE — TELEPHONE ENCOUNTER
Patient calling and states was referred to [facility] Mattson.  [facility] saw them and they advised she see orthopedic surgeon.  Wanting you to put referral in for bilateral shoulders.  See 5/24/18 scanned note from Juan.  Can not copy and paste since scanned record.  Please advise.  Delma Vaz RN

## 2018-06-08 ENCOUNTER — OFFICE VISIT (OUTPATIENT)
Dept: ORTHOPEDICS | Facility: CLINIC | Age: 43
End: 2018-06-08
Payer: OTHER MISCELLANEOUS

## 2018-06-08 VITALS — WEIGHT: 119 LBS | SYSTOLIC BLOOD PRESSURE: 120 MMHG | DIASTOLIC BLOOD PRESSURE: 74 MMHG | BODY MASS INDEX: 24.04 KG/M2

## 2018-06-08 DIAGNOSIS — M75.40 SUBACROMIAL IMPINGEMENT, UNSPECIFIED LATERALITY: Primary | ICD-10-CM

## 2018-06-08 PROCEDURE — 99243 OFF/OP CNSLTJ NEW/EST LOW 30: CPT | Mod: 25 | Performed by: ORTHOPAEDIC SURGERY

## 2018-06-08 PROCEDURE — 20610 DRAIN/INJ JOINT/BURSA W/O US: CPT | Mod: 50 | Performed by: ORTHOPAEDIC SURGERY

## 2018-06-08 NOTE — PROGRESS NOTES
HISTORY OF PRESENT ILLNESS:    Zo Qureshi is a 43 year old female who is seen in consultation at the request of Dr. Baron for bilateral chronic shoulder pain. 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 Due to chronic neck and shoulder pain she is unable to perform her work tasks.  Patient's last day of work 2/26/18.  Pain was off and on over 8 years ago, and significantly worsening over the last 2-3 years.   Present symptoms: posterior shoulder pain that radiates across the superior aspect of the shoulder, down the lateral upper arm and down the anterior forearm with pain and numbness into 2-4th fingers. Weakness noted in both arms, and increased pain in all motions with movements above chest height.   Treatments tried to this point:  Rest, pain medication, physical therapy, Ten's unit, cervical epidural  Orthopedic PMH: cervicalgia     Past Medical History:   Diagnosis Date     HSIL on Pap smear 09/21/11    MARTA 2 and 3     INFLAM SPONDYLOPATHY NOS   1/7/2008     Leukocytopenia 3/10/2014     Leukocytopenia      Tendinosis      Thrombocytopenia (H) 3/10/2014     Thrombocytopenia (H)      Unspecified closed fracture of pelvis 2000    left fracture of pelvis after delivery       Past Surgical History:   Procedure Laterality Date     C NONSPECIFIC PROCEDURE  10/00    after delivery 2 units of prbcs secondary to placenta     LEEP TX, CERVICAL  12/19/11    MARTA II     TUBAL LIGATION  5/2009    laparoscopic tubal ligation       Family History   Problem Relation Age of Onset     Hypertension Father      Lipids Father      Hypertension Mother      Lipids Mother      DIABETES No family hx of      Coronary Artery Disease No family hx of      Hyperlipidemia No family hx of      CEREBROVASCULAR DISEASE No family hx of      Breast Cancer No family hx of      Colon Cancer No family hx of      Prostate Cancer No family hx of      Other Cancer No family hx of       Depression No family hx of      Anxiety Disorder No family hx of      MENTAL ILLNESS No family hx of      Substance Abuse No family hx of      Anesthesia Reaction No family hx of      Asthma No family hx of      OSTEOPOROSIS No family hx of      Genetic Disorder No family hx of      Thyroid Disease No family hx of      Obesity No family hx of      Unknown/Adopted No family hx of        Social History     Social History     Marital status: Single     Spouse name: N/A     Number of children: 4     Years of education: N/A     Occupational History      [employer], [employer]     Social History Main Topics     Smoking status: Never Smoker     Smokeless tobacco: Never Used     Alcohol use 0.0 oz/week     0 Standard drinks or equivalent per week      Comment: rarely     Drug use: No     Sexual activity: Not Currently     Partners: Male     Birth control/ protection: None      Comment: tubal     Other Topics Concern     Parent/Sibling W/ Cabg, Mi Or Angioplasty Before 65f 55m? No     Social History Narrative       Current Outpatient Prescriptions   Medication Sig Dispense Refill     clindamycin-benzoyl peroxide (BENZACLIN) gel Apply to  Affected area initially once daily for 2 weeks and then increase to 2 times daily if tolerated 50 g 11     diclofenac (VOLTAREN) 1 % GEL topical gel Use small amount and rub into area of chest that is hurting 1-2 times daily as needed 100 g 1     docusate sodium (COLACE) 100 MG tablet Take 100 mg by mouth daily 60 tablet 1     DULoxetine (CYMBALTA) 30 MG EC capsule Take 2 capsules (60 mg) by mouth daily 180 capsule 3     ferrous sulfate (IRON) 325 (65 Fe) MG tablet Take 1 tablet (325 mg) by mouth 2 times daily 60 tablet 2     fluticasone (FLONASE) 50 MCG/ACT spray Spray 1-2 sprays into both nostrils daily 16 g 3     gabapentin (NEURONTIN) 300 MG capsule 1 tab in am and 1 in evening and 2 at bedtime for (4 per day) 120 capsule 1     hydrocortisone (WESTCORT) 0.2 % cream Apply  sparingly to affected area 2 times daily as needed. 45 g 0     loratadine (CLARITIN) 10 MG tablet Take 1 tablet (10 mg) by mouth daily 30 tablet 1     loratadine-pseudoePHEDrine (CLARITIN-D 24-HOUR)  MG per 24 hr tablet Take 1 tablet by mouth daily 14 tablet 0     naproxen (NAPROSYN) 500 MG tablet Take 1 tablet (500 mg) by mouth 2 times daily (with meals) 60 tablet 3     PATADAY 0.2 % SOLN Reported on 5/1/2017  6     traMADol (ULTRAM) 50 MG tablet Take 1 tablet (50 mg) by mouth every 6 hours as needed for severe pain 60 tablet 0     traZODone (DESYREL) 50 MG tablet Take 1 tablet (50 mg) by mouth nightly as needed for sleep 60 tablet 6       Allergies   Allergen Reactions     Advil [Ibuprofen Micronized]      Rash      Contrast Dye      Percocet [Oxycodone-Acetaminophen] Nausea and Vomiting and Itching       REVIEW OF SYSTEMS:  CONSTITUTIONAL:  NEGATIVE for fever, chills, change in weight  INTEGUMENTARY/SKIN:  NEGATIVE for worrisome rashes, moles or lesions  EYES:  NEGATIVE for vision changes or irritation  ENT/MOUTH:  NEGATIVE for ear, mouth and throat problems  RESP:  NEGATIVE for significant cough or SOB  BREAST:  NEGATIVE for masses, tenderness or discharge  CV:  NEGATIVE for chest pain, palpitations or peripheral edema  GI:  NEGATIVE for nausea, abdominal pain, heartburn, or change in bowel habits  :  Negative   MUSCULOSKELETAL:  See HPI above  NEURO:  NEGATIVE for weakness, dizziness or paresthesias  ENDOCRINE:  NEGATIVE for temperature intolerance, skin/hair changes  HEME/ALLERGY/IMMUNE:  NEGATIVE for bleeding problems  PSYCHIATRIC:  NEGATIVE for changes in mood or affect      PHYSICAL EXAM:  /74 (BP Location: Right arm, Patient Position: Chair, Cuff Size: Adult Regular)  Wt 119 lb (54 kg)  BMI 24.04 kg/m2  Body mass index is 24.04 kg/(m^2).   GENERAL APPEARANCE: healthy, alert and no distress   SKIN: no suspicious lesions or rashes  NEURO: Normal strength and tone, mentation intact and speech  normal  VASCULAR: Good pulses, and capillary refill   LYMPH: no lymphadenopathy   PSYCH:  mentation appears normal and affect normal/bright    MSK:  Examination of the neck and shoulder girdle musculature reveals no asymmetry, or atrophy to the muscle masses.  There is no erythema, ecchymosis or edema.  There is a normal lordosis to the cervical and lumbar spine regions.  There is a normal kyphosis to the thoracic spine.  There is no clinical evidence of scoliosis. There is no tenderness to palpation or percussion.  There is no paraspinal muscle spasm present.  There is a Normal range of motion to the cervical spine. Forward flexion to 45, extension to 55, R and L lateral bending to 45, and rotation to 70, both R and L.    Strength : Bicep 5/5   C5-6       Sensation :     Deltoid 5/5   C5    Lateral Arm    Wrist extensors 5/5  C6    Thumb    Tricep  5/5   C7    Middle Finger    Finger flexors  5/5  C8    Little Finger    Interossei  5/5   T1    Medial Arm    Reflexes :  Bicep   Symmetric  C5-6    BR Symmetric  C6    Tricep Symmetric  C7    Shoulder:  Examination of the bilateral shoulder reveals a partial active ROM and full passive ROM.    Forward Flexion : 180 degrees Pain  YES --   Abduction  :  180 degrees  YES --   Internal Rotation : thoracic 10   YES --  Subscap Lift-off test negative  External Rotation :    0 Deg-90  90 Deg- 90  Contralateral side symmetric    There is no tenderness to palpation about the AC joint, anterior capsule or Bicep tendon.  There is moderate tenderness to palpation in the subacromial space.  There is give-way rotator cuff weakness.  Speed's an Yergason's Tests for Bicep pathology are negative. Arm adduction does not cause pain in the AC joint.  Apprehension sign is negative. Scapular winging is not present. ROM of the elbow and wrist is normal and CMS is intact to fingertips.                 ASSESSMENT / PLAN: Probable subacromial impingement of bilateral shoulders.  For both its  diagnostic as well as therapeutic value, I offered her some corticosteroid injections.  She accepted.      Procedure Note:  After risks and benefits were explained and questions answered, pt agreed to undergo a bilateral subacromial shoulder injection. Using aseptic technique and a posterior approach, the subacromial space was infiltrated with 4 mg of Dexamethasone, 40 mg of Depo Medrol, and 3 cc of 1.0% Lidocaine.  There were no complications and the patient tolerated the procedure well.    Imaging Interpretation:         Dmitry Dacosta MD  Department of Orthopedic Surgery

## 2018-06-08 NOTE — MR AVS SNAPSHOT
After Visit Summary   6/8/2018    Zo Qureshi    MRN: [id number]           Patient Information     Date Of Birth          1975        Visit Information        Provider Department      6/8/2018 1:00 PM Leonard Dacosta MD [facility] ORTHOPEDIC SURGERY        Today's Diagnoses     Subacromial impingement, unspecified laterality    -  1       Follow-ups after your visit        Who to contact     If you have questions or need follow up information about today's clinic visit or your schedule please contact [facility] ORTHOPEDIC SURGERY directly at [phone].  Normal or non-critical lab and imaging results will be communicated to you by FabAlleyhart, letter or phone within 4 business days after the clinic has received the results. If you do not hear from us within 7 days, please contact the clinic through Picateerst or phone. If you have a critical or abnormal lab result, we will notify you by phone as soon as possible.  Submit refill requests through [employer] or call your pharmacy and they will forward the refill request to us. Please allow 3 business days for your refill to be completed.          Additional Information About Your Visit        MyChart Information     [employer] gives you secure access to your electronic health record. If you see a primary care provider, you can also send messages to your care team and make appointments. If you have questions, please call your primary care clinic.  If you do not have a primary care provider, please call [phone] and they will assist you.        Care EveryWhere ID     This is your Care EveryWhere ID. This could be used by other organizations to access your [facility] medical records  [id number]        Your Vitals Were     BMI (Body Mass Index)                   24.04 kg/m2            Blood Pressure from Last 3 Encounters:   06/08/18 120/74   04/23/18 108/74   04/19/18 110/60    Weight from Last 3 Encounters:   06/08/18 119 lb (54 kg)    04/23/18 119 lb 4.8 oz (54.1 kg)   04/19/18 118 lb (53.5 kg)              We Performed the Following     DEXAMETHASONE SODIUM PHOS PER 1 MG     DRAIN/INJECT LARGE JOINT/BURSA     METHYLPREDNISOLONE 40 MG INJ          Today's Medication Changes          These changes are accurate as of 6/8/18 11:59 PM.  If you have any questions, ask your nurse or doctor.               Start taking these medicines.        Dose/Directions    dexamethasone 4 MG/ML injection   Commonly known as:  DECADRON   Used for:  Subacromial impingement, unspecified laterality   Started by:  Leonard Dacosta MD        Dose:  4 mg   Inject 1 mL (4 mg) as directed once for 1 dose   Quantity:  1 mL   Refills:  0       lidocaine 1 % injection   Used for:  Subacromial impingement, unspecified laterality   Started by:  Leonard Dacosta MD        Dose:  6 mL   6 mLs by INTRA-ARTICULAR route once for 1 dose   Quantity:  6 mL   Refills:  0       methylPREDNISolone acetate 40 MG/ML injection   Commonly known as:  DEPO-MEDROL   Used for:  Subacromial impingement, unspecified laterality   Started by:  Leonard Dacosta MD        Dose:  40 mg   1 mL (40 mg) by INTRA-ARTICULAR route once for 1 dose   Quantity:  1 mL   Refills:  0            Where to get your medicines      Some of these will need a paper prescription and others can be bought over the counter.  Ask your nurse if you have questions.     You don't need a prescription for these medications     dexamethasone 4 MG/ML injection    lidocaine 1 % injection    methylPREDNISolone acetate 40 MG/ML injection                Primary Care Provider Office Phone # Fax #    Terri Baron [phone] [address] [facility] [address]        Equal Access to Services     [facility]: Hadcyrus De La Cruz, waaxda luqguido, qaybta isabella avila. So [facility] [phone].    ATENCIÓN: zac Cortes  disposición servicios gratuitos de asistencia lingüística. Issac astorga [phone].    We comply with applicable federal civil rights laws and [place] laws. We do not discriminate on the basis of race, color, national origin, age, disability, sex, sexual orientation, or gender identity.            Thank you!     Thank you for choosing [facility] ORTHOPEDIC SURGERY  for your care. Our goal is always to provide you with excellent care. Hearing back from our patients is one way we can continue to improve our services. Please take a few minutes to complete the written survey that you may receive in the mail after your visit with us. Thank you!             Your Updated Medication List - Protect others around you: Learn how to safely use, store and throw away your medicines at www.disposemymeds.org.          This list is accurate as of 6/8/18 11:59 PM.  Always use your most recent med list.                   Brand Name Dispense Instructions for use Diagnosis    clindamycin-benzoyl peroxide gel    BENZACLIN    50 g    Apply to  Affected area initially once daily for 2 weeks and then increase to 2 times daily if tolerated    Acne       dexamethasone 4 MG/ML injection    DECADRON    1 mL    Inject 1 mL (4 mg) as directed once for 1 dose    Subacromial impingement, unspecified laterality       diclofenac 1 % Gel topical gel    VOLTAREN    100 g    Use small amount and rub into area of chest that is hurting 1-2 times daily as needed    Costochondritis, Atypical chest pain       docusate sodium 100 MG tablet    COLACE    60 tablet    Take 100 mg by mouth daily    External hemorrhoids       DULoxetine 30 MG EC capsule    CYMBALTA    180 capsule    Take 2 capsules (60 mg) by mouth daily    Cervicalgia, Right shoulder tendonitis, Right peroneal tendinosis       ferrous sulfate 325 (65 Fe) MG tablet    IRON    60 tablet    Take 1 tablet (325 mg) by mouth 2 times daily    Iron deficiency anemia, unspecified iron deficiency  anemia type       fluticasone 50 MCG/ACT spray    FLONASE    16 g    Spray 1-2 sprays into both nostrils daily    Throat pain       gabapentin 300 MG capsule    NEURONTIN    120 capsule    1 tab in am and 1 in evening and 2 at bedtime for (4 per day)    Right peroneal tendinosis, Right shoulder tendonitis, Cervicalgia       hydrocortisone 0.2 % cream    WESTCORT    45 g    Apply sparingly to affected area 2 times daily as needed.    Rash       lidocaine 1 % injection     6 mL    6 mLs by INTRA-ARTICULAR route once for 1 dose    Subacromial impingement, unspecified laterality       loratadine 10 MG tablet    CLARITIN    30 tablet    Take 1 tablet (10 mg) by mouth daily    Hives       loratadine-pseudoePHEDrine  MG per 24 hr tablet    CLARITIN-D 24-hour    14 tablet    Take 1 tablet by mouth daily    Acute recurrent maxillary sinusitis       methylPREDNISolone acetate 40 MG/ML injection    DEPO-MEDROL    1 mL    1 mL (40 mg) by INTRA-ARTICULAR route once for 1 dose    Subacromial impingement, unspecified laterality       naproxen 500 MG tablet    NAPROSYN    60 tablet    Take 1 tablet (500 mg) by mouth 2 times daily (with meals)    Right peroneal tendinosis, Right shoulder tendonitis, Cervicalgia       PATADAY 0.2 % Soln   Generic drug:  olopatadine HCl      Reported on 5/1/2017        traMADol 50 MG tablet    ULTRAM    60 tablet    Take 1 tablet (50 mg) by mouth every 6 hours as needed for severe pain    Disorder of SI (sacroiliac) joint, Episodic tension-type headache, not intractable       traZODone 50 MG tablet    DESYREL    60 tablet    Take 1 tablet (50 mg) by mouth nightly as needed for sleep    Insomnia, unspecified type

## 2018-06-29 RX ORDER — METHYLPREDNISOLONE ACETATE 40 MG/ML
40 INJECTION, SUSPENSION INTRA-ARTICULAR; INTRALESIONAL; INTRAMUSCULAR; SOFT TISSUE ONCE
Qty: 1 ML | Refills: 0 | OUTPATIENT
Start: 2018-06-29 | End: 2018-06-29

## 2018-06-29 RX ORDER — LIDOCAINE HYDROCHLORIDE 10 MG/ML
6 INJECTION, SOLUTION INFILTRATION; PERINEURAL ONCE
Qty: 6 ML | Refills: 0 | OUTPATIENT
Start: 2018-06-29 | End: 2018-06-29

## 2018-06-29 RX ORDER — DEXAMETHASONE SODIUM PHOSPHATE 4 MG/ML
4 INJECTION, SOLUTION INTRA-ARTICULAR; INTRALESIONAL; INTRAMUSCULAR; INTRAVENOUS; SOFT TISSUE ONCE
Qty: 1 ML | Refills: 0 | OUTPATIENT
Start: 2018-06-29 | End: 2019-02-25

## 2018-07-31 ENCOUNTER — TRANSFERRED RECORDS (OUTPATIENT)
Dept: HEALTH INFORMATION MANAGEMENT | Facility: CLINIC | Age: 43
End: 2018-07-31

## 2018-08-29 ENCOUNTER — OFFICE VISIT (OUTPATIENT)
Dept: FAMILY MEDICINE | Facility: CLINIC | Age: 43
End: 2018-08-29
Payer: OTHER MISCELLANEOUS

## 2018-08-29 VITALS
SYSTOLIC BLOOD PRESSURE: 120 MMHG | HEART RATE: 79 BPM | TEMPERATURE: 98.3 F | DIASTOLIC BLOOD PRESSURE: 72 MMHG | RESPIRATION RATE: 14 BRPM | BODY MASS INDEX: 23.83 KG/M2 | WEIGHT: 118.2 LBS | HEIGHT: 59 IN | OXYGEN SATURATION: 99 %

## 2018-08-29 DIAGNOSIS — M54.2 CERVICALGIA: ICD-10-CM

## 2018-08-29 DIAGNOSIS — M53.3 DISORDER OF SI (SACROILIAC) JOINT: ICD-10-CM

## 2018-08-29 DIAGNOSIS — G44.219 EPISODIC TENSION-TYPE HEADACHE, NOT INTRACTABLE: ICD-10-CM

## 2018-08-29 DIAGNOSIS — M77.8 RIGHT SHOULDER TENDONITIS: ICD-10-CM

## 2018-08-29 DIAGNOSIS — M67.88 RIGHT PERONEAL TENDINOSIS: ICD-10-CM

## 2018-08-29 PROCEDURE — 99214 OFFICE O/P EST MOD 30 MIN: CPT | Performed by: FAMILY MEDICINE

## 2018-08-29 RX ORDER — GABAPENTIN 300 MG/1
CAPSULE ORAL
Qty: 120 CAPSULE | Refills: 1 | COMMUNITY
Start: 2018-08-29 | End: 2018-10-29

## 2018-08-29 NOTE — MR AVS SNAPSHOT
"              After Visit Summary   8/29/2018    Zo Qureshi    MRN: [id number]           Patient Information     Date Of Birth          1975        Visit Information        Provider Department      8/29/2018 10:00 AM Terri Baron MD [facility]        Today's Diagnoses     Right peroneal tendinosis        Right shoulder tendonitis        Cervicalgia           Follow-ups after your visit        Who to contact     If you have questions or need follow up information about today's clinic visit or your schedule please contact [facility] directly at [phone].  Normal or non-critical lab and imaging results will be communicated to you by RegisterPatienthart, letter or phone within 4 business days after the clinic has received the results. If you do not hear from us within 7 days, please contact the clinic through RegisterPatienthart or phone. If you have a critical or abnormal lab result, we will notify you by phone as soon as possible.  Submit refill requests through [employer] or call your pharmacy and they will forward the refill request to us. Please allow 3 business days for your refill to be completed.          Additional Information About Your Visit        MyChart Information     [employer] gives you secure access to your electronic health record. If you see a primary care provider, you can also send messages to your care team and make appointments. If you have questions, please call your primary care clinic.  If you do not have a primary care provider, please call [phone] and they will assist you.        Care EveryWhere ID     This is your Care EveryWhere ID. This could be used by other organizations to access your [facility] medical records  [id number]        Your Vitals Were     Pulse Temperature Respirations Height Last Period Pulse Oximetry    79 98.3  F (36.8  C) (Oral) 14 4' 11\" (1.499 m) 08/10/2018 (Approximate) 99%    Breastfeeding? BMI (Body Mass Index)                No 23.87 " kg/m2           Blood Pressure from Last 3 Encounters:   08/29/18 120/72   06/08/18 120/74   04/23/18 108/74    Weight from Last 3 Encounters:   08/29/18 118 lb 3.2 oz (53.6 kg)   06/08/18 119 lb (54 kg)   04/23/18 119 lb 4.8 oz (54.1 kg)              Today, you had the following     No orders found for display         Today's Medication Changes          These changes are accurate as of 8/29/18 10:47 AM.  If you have any questions, ask your nurse or doctor.               These medicines have changed or have updated prescriptions.        Dose/Directions    gabapentin 300 MG capsule   Commonly known as:  NEURONTIN   This may have changed:  additional instructions   Used for:  Right peroneal tendinosis, Right shoulder tendonitis, Cervicalgia   Changed by:  Terri Baron MD        1 tab in am  and 2 at bedtime for (3 per day)   Quantity:  120 capsule   Refills:  1                Primary Care Provider Office Phone # Fax #    Terri Baron [phone] [address] [facility] [address]        Equal Access to Services     [facility]: Hadii darlyn vicente hadasho Somaddi, waaxda luqadaha, qaybta kaalmapetros nam, isabella simpson . So [facility] [phone].    ATENCIÓN: Si habla español, tiene a vogel disposición servicios gratuitos de asistencia lingüística. [facility] [phone].    We comply with applicable federal civil rights laws and [place] laws. We do not discriminate on the basis of race, color, national origin, age, disability, sex, sexual orientation, or gender identity.            Thank you!     Thank you for choosing [facility]  for your care. Our goal is always to provide you with excellent care. Hearing back from our patients is one way we can continue to improve our services. Please take a few minutes to complete the written survey that you may receive in the mail after your visit with us. Thank you!             Your Updated Medication List -  Protect others around you: Learn how to safely use, store and throw away your medicines at www.disposemymeds.org.          This list is accurate as of 8/29/18 10:47 AM.  Always use your most recent med list.                   Brand Name Dispense Instructions for use Diagnosis    clindamycin-benzoyl peroxide gel    BENZACLIN    50 g    Apply to  Affected area initially once daily for 2 weeks and then increase to 2 times daily if tolerated    Acne       dexamethasone 4 MG/ML injection    DECADRON    1 mL    Inject 1 mL (4 mg) as directed once for 1 dose    Subacromial impingement, unspecified laterality       diclofenac 1 % Gel topical gel    VOLTAREN    100 g    Use small amount and rub into area of chest that is hurting 1-2 times daily as needed    Costochondritis, Atypical chest pain       docusate sodium 100 MG tablet    COLACE    60 tablet    Take 100 mg by mouth daily    External hemorrhoids       DULoxetine 30 MG EC capsule    CYMBALTA    180 capsule    Take 2 capsules (60 mg) by mouth daily    Cervicalgia, Right shoulder tendonitis, Right peroneal tendinosis       ferrous sulfate 325 (65 Fe) MG tablet    IRON    60 tablet    Take 1 tablet (325 mg) by mouth 2 times daily    Iron deficiency anemia, unspecified iron deficiency anemia type       fluticasone 50 MCG/ACT spray    FLONASE    16 g    Spray 1-2 sprays into both nostrils daily    Throat pain       gabapentin 300 MG capsule    NEURONTIN    120 capsule    1 tab in am  and 2 at bedtime for (3 per day)    Right peroneal tendinosis, Right shoulder tendonitis, Cervicalgia       hydrocortisone 0.2 % cream    WESTCORT    45 g    Apply sparingly to affected area 2 times daily as needed.    Rash       loratadine 10 MG tablet    CLARITIN    30 tablet    Take 1 tablet (10 mg) by mouth daily    Hives       loratadine-pseudoePHEDrine  MG per 24 hr tablet    CLARITIN-D 24-hour    14 tablet    Take 1 tablet by mouth daily    Acute recurrent maxillary sinusitis        naproxen 500 MG tablet    NAPROSYN    60 tablet    Take 1 tablet (500 mg) by mouth 2 times daily (with meals)    Right peroneal tendinosis, Right shoulder tendonitis, Cervicalgia       PATADAY 0.2 % Soln   Generic drug:  olopatadine HCl      Reported on 5/1/2017        traMADol 50 MG tablet    ULTRAM    60 tablet    Take 1 tablet (50 mg) by mouth every 6 hours as needed for severe pain    Disorder of SI (sacroiliac) joint, Episodic tension-type headache, not intractable       traZODone 50 MG tablet    DESYREL    60 tablet    Take 1 tablet (50 mg) by mouth nightly as needed for sleep    Insomnia, unspecified type

## 2018-08-29 NOTE — PROGRESS NOTES
SUBJECTIVE:   Zo Qureshi is a 43 year old female who presents to clinic today for the following health issues:      Pt [facility]   She has had functional capacity eval and it shows that she cannot do her things her job requires.       Past Medical History:   Diagnosis Date     HSIL on Pap smear 09/21/11    MARTA 2 and 3     INFLAM SPONDYLOPATHY NOS   1/7/2008     Leukocytopenia 3/10/2014     Leukocytopenia      Tendinosis      Thrombocytopenia (H) 3/10/2014     Thrombocytopenia (H)      Unspecified closed fracture of pelvis 2000    left fracture of pelvis after delivery       Past Surgical History:   Procedure Laterality Date     C NONSPECIFIC PROCEDURE  10/00    after delivery 2 units of prbcs secondary to placenta     LEEP TX, CERVICAL  12/19/11    MARTA II     TUBAL LIGATION  5/2009    laparoscopic tubal ligation       MEDICATIONS:  Current Outpatient Prescriptions   Medication     clindamycin-benzoyl peroxide (BENZACLIN) gel     diclofenac (VOLTAREN) 1 % GEL topical gel     docusate sodium (COLACE) 100 MG tablet     DULoxetine (CYMBALTA) 30 MG EC capsule     ferrous sulfate (IRON) 325 (65 Fe) MG tablet     fluticasone (FLONASE) 50 MCG/ACT spray     gabapentin (NEURONTIN) 300 MG capsule     hydrocortisone (WESTCORT) 0.2 % cream     loratadine (CLARITIN) 10 MG tablet     loratadine-pseudoePHEDrine (CLARITIN-D 24-HOUR)  MG per 24 hr tablet     naproxen (NAPROSYN) 500 MG tablet     PATADAY 0.2 % SOLN     traMADol (ULTRAM) 50 MG tablet     traZODone (DESYREL) 50 MG tablet     dexamethasone (DECADRON) 4 MG/ML injection     No current facility-administered medications for this visit.        SOCIAL HISTORY:  Social History   Substance Use Topics     Smoking status: Never Smoker     Smokeless tobacco: Never Used     Alcohol use 0.0 oz/week     0 Standard drinks or equivalent per week      Comment: rarely       Family History   Problem Relation Age of Onset  "    Hypertension Father      Lipids Father      Hypertension Mother      Lipids Mother      Diabetes No family hx of      Coronary Artery Disease No family hx of      Hyperlipidemia No family hx of      Cerebrovascular Disease No family hx of      Breast Cancer No family hx of      Colon Cancer No family hx of      Prostate Cancer No family hx of      Other Cancer No family hx of      Depression No family hx of      Anxiety Disorder No family hx of      Mental Illness No family hx of      Substance Abuse No family hx of      Anesthesia Reaction No family hx of      Asthma No family hx of      Osteoperosis No family hx of      Genetic Disorder No family hx of      Thyroid Disease No family hx of      Obesity No family hx of      Unknown/Adopted No family hx of        Objective:  Blood pressure 120/72, pulse 79, temperature 98.3  F (36.8  C), temperature source Oral, resp. rate 14, height 4' 11\" (1.499 m), weight 118 lb 3.2 oz (53.6 kg), last menstrual period 08/10/2018, SpO2 99 %, not currently breastfeeding.  Neck:  There is no lymphadenopathy or thyroid tenderness or enlargement  Chest: Clear to auscultation bilaterally.  No wheezes, rales or retractions.  CV: Regular rate and rhythm without murmurs, rubs or gallops.  Both shoulder with tenderness anteriorly and decreased strength  Functional eval reviewed  - see abstract    Assessment:  1. Shoulder tendonitis - went of functional eval  2. Neck pain using tramadol  3. Headaches - using gabapentin    Plan:  1. Signed letter for permanent disability  2. Went over functional capacity eval  3. Will change gabapentin dosing as she is having side effects of light headedness and tiredness  4. Recheck in 2 months  5. Consider changing tramadol too      "

## 2018-08-30 ENCOUNTER — TELEPHONE (OUTPATIENT)
Dept: RHEUMATOLOGY | Facility: CLINIC | Age: 43
End: 2018-08-30

## 2018-08-30 NOTE — TELEPHONE ENCOUNTER
Received form that needs to be filled out. Last visit 3/2018.  Form on Dr. Tanisha ibrahim. Ashley Oates LPN

## 2018-08-30 NOTE — TELEPHONE ENCOUNTER
Per Dr. Garay, pt has not done her follow-up with us, has not done short term disability.  Our goal is to treat and get pt back to work.  My-chart message sent to pt. Ashley Oates LPN

## 2018-08-30 NOTE — TELEPHONE ENCOUNTER
# I strongly discourage long term disability  # The goal is to treat the disease and help her continue with her work.  # Patient had lost follow up after 3/18

## 2018-09-06 ENCOUNTER — TELEPHONE (OUTPATIENT)
Dept: FAMILY MEDICINE | Facility: CLINIC | Age: 43
End: 2018-09-06

## 2018-09-25 ENCOUNTER — TELEPHONE (OUTPATIENT)
Dept: FAMILY MEDICINE | Facility: CLINIC | Age: 43
End: 2018-09-25

## 2018-09-25 ENCOUNTER — NURSE TRIAGE (OUTPATIENT)
Dept: NURSING | Facility: CLINIC | Age: 43
End: 2018-09-25

## 2018-09-25 ENCOUNTER — RADIANT APPOINTMENT (OUTPATIENT)
Dept: GENERAL RADIOLOGY | Facility: CLINIC | Age: 43
End: 2018-09-25
Attending: FAMILY MEDICINE
Payer: COMMERCIAL

## 2018-09-25 ENCOUNTER — OFFICE VISIT (OUTPATIENT)
Dept: URGENT CARE | Facility: URGENT CARE | Age: 43
End: 2018-09-25
Payer: COMMERCIAL

## 2018-09-25 VITALS
HEART RATE: 78 BPM | WEIGHT: 118 LBS | OXYGEN SATURATION: 99 % | BODY MASS INDEX: 23.83 KG/M2 | DIASTOLIC BLOOD PRESSURE: 70 MMHG | SYSTOLIC BLOOD PRESSURE: 128 MMHG | TEMPERATURE: 98.4 F

## 2018-09-25 DIAGNOSIS — R30.0 DYSURIA: ICD-10-CM

## 2018-09-25 DIAGNOSIS — R10.9 LEFT FLANK PAIN: ICD-10-CM

## 2018-09-25 DIAGNOSIS — R10.9 LEFT FLANK PAIN: Primary | ICD-10-CM

## 2018-09-25 LAB
ALBUMIN UR-MCNC: NEGATIVE MG/DL
ANION GAP SERPL CALCULATED.3IONS-SCNC: 5 MMOL/L (ref 3–14)
APPEARANCE UR: CLEAR
BASOPHILS # BLD AUTO: 0 10E9/L (ref 0–0.2)
BASOPHILS NFR BLD AUTO: 0.2 %
BILIRUB UR QL STRIP: NEGATIVE
BUN SERPL-MCNC: 15 MG/DL (ref 7–30)
CALCIUM SERPL-MCNC: 9.5 MG/DL (ref 8.5–10.1)
CHLORIDE SERPL-SCNC: 106 MMOL/L (ref 94–109)
CO2 SERPL-SCNC: 31 MMOL/L (ref 20–32)
COLOR UR AUTO: YELLOW
CREAT SERPL-MCNC: 0.5 MG/DL (ref 0.52–1.04)
DIFFERENTIAL METHOD BLD: NORMAL
EOSINOPHIL # BLD AUTO: 0.1 10E9/L (ref 0–0.7)
EOSINOPHIL NFR BLD AUTO: 0.8 %
GFR SERPL CREATININE-BSD FRML MDRD: >90 ML/MIN/1.7M2
GLUCOSE SERPL-MCNC: 98 MG/DL (ref 70–99)
GLUCOSE UR STRIP-MCNC: NEGATIVE MG/DL
HGB UR QL STRIP: ABNORMAL
KETONES UR STRIP-MCNC: NEGATIVE MG/DL
LEUKOCYTE ESTERASE UR QL STRIP: NEGATIVE
LYMPHOCYTES # BLD AUTO: 1.2 10E9/L (ref 0.8–5.3)
LYMPHOCYTES NFR BLD AUTO: 13.4 %
MONOCYTES # BLD AUTO: 1.1 10E9/L (ref 0–1.3)
MONOCYTES NFR BLD AUTO: 11.5 %
NEUTROPHILS # BLD AUTO: 6.8 10E9/L (ref 1.6–8.3)
NEUTROPHILS NFR BLD AUTO: 74.1 %
NITRATE UR QL: NEGATIVE
PH UR STRIP: 6 PH (ref 5–7)
POTASSIUM SERPL-SCNC: 4.2 MMOL/L (ref 3.4–5.3)
RBC #/AREA URNS AUTO: NORMAL /HPF
SODIUM SERPL-SCNC: 142 MMOL/L (ref 133–144)
SOURCE: ABNORMAL
SP GR UR STRIP: 1.01 (ref 1–1.03)
UROBILINOGEN UR STRIP-ACNC: 0.2 EU/DL (ref 0.2–1)
WBC # BLD AUTO: 9.2 10E9/L (ref 4–11)
WBC #/AREA URNS AUTO: NORMAL /HPF

## 2018-09-25 PROCEDURE — 81001 URINALYSIS AUTO W/SCOPE: CPT | Performed by: FAMILY MEDICINE

## 2018-09-25 PROCEDURE — 85048 AUTOMATED LEUKOCYTE COUNT: CPT | Performed by: FAMILY MEDICINE

## 2018-09-25 PROCEDURE — 99214 OFFICE O/P EST MOD 30 MIN: CPT | Performed by: FAMILY MEDICINE

## 2018-09-25 PROCEDURE — 80048 BASIC METABOLIC PNL TOTAL CA: CPT | Performed by: FAMILY MEDICINE

## 2018-09-25 PROCEDURE — 36415 COLL VENOUS BLD VENIPUNCTURE: CPT | Performed by: FAMILY MEDICINE

## 2018-09-25 PROCEDURE — 85004 AUTOMATED DIFF WBC COUNT: CPT | Performed by: FAMILY MEDICINE

## 2018-09-25 PROCEDURE — 74019 RADEX ABDOMEN 2 VIEWS: CPT

## 2018-09-25 RX ORDER — PREDNISONE 20 MG/1
40 TABLET ORAL DAILY
Qty: 6 TABLET | Refills: 0 | Status: SHIPPED | OUTPATIENT
Start: 2018-09-25 | End: 2018-09-28

## 2018-09-25 ASSESSMENT — ENCOUNTER SYMPTOMS
HEMATURIA: 0
DYSURIA: 0
FEVER: 0
VOMITING: 0
CHILLS: 1
RECTAL PAIN: 0
ABDOMINAL PAIN: 0
FREQUENCY: 1
BLOOD IN STOOL: 0
APPETITE CHANGE: 1
CONSTIPATION: 0
DIARRHEA: 0
MYALGIAS: 1
NAUSEA: 0
ANAL BLEEDING: 0

## 2018-09-25 NOTE — PATIENT INSTRUCTIONS
Take prednisone 40 mg daily for 3 days.  This medication reduces inflammation and will hopefully help relieve the irritation around the nerve that is causing your pain.    If you are not feeling better by Friday, follow up with your primary care provider or one of their partners.    If you are having any significantly worsening symptoms, follow up sooner.  Go to the ER as soon as possible if you have any leakage of urine or stool or if you have any sudden onset weakness or numbness in your leg.

## 2018-09-25 NOTE — TELEPHONE ENCOUNTER
Pt calls for earlier appointment,has 1:20 today, discussed at length, ER if severe, [facility] open now, may proceed to either, pt will consider, recommend call to cancel if does go elsewhere, pt able to verbalize without difficulty, cannot ascertain if severe but informed it pt feels severe recommend ER vs UC    Next 5 appointments (look out 90 days)     Sep 25, 2018  1:20 PM CDT   SHORT with Yusuf Vargas PA-C   [facility]    [address] [place] [id number]   [phone]            Oct 08, 2018 11:15 AM CDT   SHORT with Terri Baron MD   [facility]    [id number] [place] [id number]   [phone]            Oct 29, 2018 10:15 AM CDT   SHORT with Terri Baron MD   [facility]    [address] [place] [id number]   [phone]                Ellen Fowler, RN, BSN  Message handled by Nurse Triage.

## 2018-09-25 NOTE — PROGRESS NOTES
SUBJECTIVE:   Zo Qureshi is a 43 year old female presenting with a chief complaint of   Chief Complaint   Patient presents with     Urgent Care     Urinary Problem     Pt states having lower left pain in abdomen on and off x 2 weeks.        She is an established patient of [place].    The patient reports onset of intermittent left-sided pain beginning about three weeks ago. She notes that this pain has occurred on three occasions since onset, with each episode lasting about one hour. She states that each time she has taken Tylenol, laid down, and applied heat to the area with good relief of her symptoms. However, she states that today she woke up with left-sided pain that has not responded to her normal treatment. She reports associated frequency, chills, and decreased appetite. She also notes dark green stool recently. She denies any fevers, nausea, vomiting, diarrhea, constipation, black or bloody stools, rectal pain, dysuria, hematuria, urgency, or vaginal symptoms.     Review of Systems   Constitutional: Positive for appetite change and chills. Negative for fever.   Gastrointestinal: Negative for abdominal pain, anal bleeding, blood in stool, constipation, diarrhea, nausea, rectal pain and vomiting.   Genitourinary: Positive for frequency. Negative for dysuria, hematuria, pelvic pain, urgency, vaginal bleeding, vaginal discharge and vaginal pain.   Musculoskeletal: Positive for myalgias.       Past Medical History:   Diagnosis Date     HSIL on Pap smear 09/21/11    MARTA 2 and 3     INFLAM SPONDYLOPATHY NOS   1/7/2008     Leukocytopenia 3/10/2014     Leukocytopenia      Tendinosis      Thrombocytopenia (H) 3/10/2014     Thrombocytopenia (H)      Unspecified closed fracture of pelvis 2000    left fracture of pelvis after delivery     Family History   Problem Relation Age of Onset     Hypertension Father      Lipids Father      Hypertension Mother      Lipids Mother      Diabetes No family hx of      Coronary  Artery Disease No family hx of      Hyperlipidemia No family hx of      Cerebrovascular Disease No family hx of      Breast Cancer No family hx of      Colon Cancer No family hx of      Prostate Cancer No family hx of      Other Cancer No family hx of      Depression No family hx of      Anxiety Disorder No family hx of      Mental Illness No family hx of      Substance Abuse No family hx of      Anesthesia Reaction No family hx of      Asthma No family hx of      Osteoporosis No family hx of      Genetic Disorder No family hx of      Thyroid Disease No family hx of      Obesity No family hx of      Unknown/Adopted No family hx of      Current Outpatient Prescriptions   Medication Sig Dispense Refill     docusate sodium (COLACE) 100 MG tablet Take 100 mg by mouth daily 60 tablet 1     DULoxetine (CYMBALTA) 30 MG EC capsule Take 2 capsules (60 mg) by mouth daily 180 capsule 3     ferrous sulfate (IRON) 325 (65 Fe) MG tablet Take 1 tablet (325 mg) by mouth 2 times daily 60 tablet 2     fluticasone (FLONASE) 50 MCG/ACT spray Spray 1-2 sprays into both nostrils daily 16 g 3     gabapentin (NEURONTIN) 300 MG capsule 1 tab in am  and 2 at bedtime for (3 per day) 120 capsule 1     clindamycin-benzoyl peroxide (BENZACLIN) gel Apply to  Affected area initially once daily for 2 weeks and then increase to 2 times daily if tolerated (Patient not taking: Reported on 9/25/2018) 50 g 11     dexamethasone (DECADRON) 4 MG/ML injection Inject 1 mL (4 mg) as directed once for 1 dose 1 mL 0     diclofenac (VOLTAREN) 1 % GEL topical gel Use small amount and rub into area of chest that is hurting 1-2 times daily as needed 100 g 1     hydrocortisone (WESTCORT) 0.2 % cream Apply sparingly to affected area 2 times daily as needed. 45 g 0     loratadine (CLARITIN) 10 MG tablet Take 1 tablet (10 mg) by mouth daily 30 tablet 1     loratadine-pseudoePHEDrine (CLARITIN-D 24-HOUR)  MG per 24 hr tablet Take 1 tablet by mouth daily 14 tablet  0     naproxen (NAPROSYN) 500 MG tablet Take 1 tablet (500 mg) by mouth 2 times daily (with meals) 60 tablet 3     PATADAY 0.2 % SOLN Reported on 5/1/2017  6     traMADol (ULTRAM) 50 MG tablet Take 1 tablet (50 mg) by mouth every 6 hours as needed for severe pain (Patient not taking: Reported on 9/25/2018) 60 tablet 0     traZODone (DESYREL) 50 MG tablet Take 1 tablet (50 mg) by mouth nightly as needed for sleep 60 tablet 6     Social History   Substance Use Topics     Smoking status: Never Smoker     Smokeless tobacco: Never Used     Alcohol use 0.0 oz/week     0 Standard drinks or equivalent per week      Comment: rarely       OBJECTIVE  /70 (BP Location: Right arm, Patient Position: Chair, Cuff Size: Adult Regular)  Pulse 78  Temp 98.4  F (36.9  C) (Tympanic)  Wt 118 lb (53.5 kg)  LMP 09/04/2018  SpO2 99%  BMI 23.83 kg/m2    Physical Exam   Constitutional: She appears well-developed and well-nourished. She is cooperative. She appears distressed.   HENT:   Head: Normocephalic.   Right Ear: Tympanic membrane and external ear normal.   Left Ear: Tympanic membrane and external ear normal.   Nose: Nose normal.   Mouth/Throat: Oropharynx is clear and moist and mucous membranes are normal.   Eyes: Conjunctivae and EOM are normal. Pupils are equal, round, and reactive to light.   Neck: Normal range of motion.   Cardiovascular: Normal rate, regular rhythm, S1 normal and S2 normal.    Pulmonary/Chest: Effort normal and breath sounds normal. She has no wheezes. She has no rhonchi. She has no rales.   Abdominal: Soft. Bowel sounds are normal. She exhibits no mass. There is no hepatosplenomegaly. There is tenderness (Left side/flank). There is no rigidity, no rebound, no guarding, no CVA tenderness and no tenderness at McBurney's point.   Musculoskeletal: She exhibits tenderness.   Tenderness along posterior and lateral iliac crest in the muscles   Neurological: She is alert. She has normal strength. No sensory  deficit.   Reflex Scores:       Patellar reflexes are 2+ on the right side and 2+ on the left side.  Skin: No rash noted.       Labs:  Results for orders placed or performed in visit on 09/25/18 (from the past 24 hour(s))   UA reflex to Microscopic and Culture   Result Value Ref Range    Color Urine Yellow     Appearance Urine Clear     Glucose Urine Negative NEG^Negative mg/dL    Bilirubin Urine Negative NEG^Negative    Ketones Urine Negative NEG^Negative mg/dL    Specific Gravity Urine 1.010 1.003 - 1.035    Blood Urine Small (A) NEG^Negative    pH Urine 6.0 5.0 - 7.0 pH    Protein Albumin Urine Negative NEG^Negative mg/dL    Urobilinogen Urine 0.2 0.2 - 1.0 EU/dL    Nitrite Urine Negative NEG^Negative    Leukocyte Esterase Urine Negative NEG^Negative    Source Midstream Urine    Urine Microscopic   Result Value Ref Range    WBC Urine 0 - 5 OTO5^0 - 5 /HPF    RBC Urine O - 2 OTO2^O - 2 /HPF   WBC and differential   Result Value Ref Range    WBC 9.2 4.0 - 11.0 10e9/L    % Neutrophils 74.1 %    % Lymphocytes 13.4 %    % Monocytes 11.5 %    % Eosinophils 0.8 %    % Basophils 0.2 %    Absolute Neutrophil 6.8 1.6 - 8.3 10e9/L    Absolute Lymphocytes 1.2 0.8 - 5.3 10e9/L    Absolute Monocytes 1.1 0.0 - 1.3 10e9/L    Absolute Eosinophils 0.1 0.0 - 0.7 10e9/L    Absolute Basophils 0.0 0.0 - 0.2 10e9/L    Diff Method Automated Method    Basic metabolic panel  (Ca, Cl, CO2, Creat, Gluc, K, Na, BUN)   Result Value Ref Range    Sodium 142 133 - 144 mmol/L    Potassium 4.2 3.4 - 5.3 mmol/L    Chloride 106 94 - 109 mmol/L    Carbon Dioxide 31 20 - 32 mmol/L    Anion Gap 5 3 - 14 mmol/L    Glucose 98 70 - 99 mg/dL    Urea Nitrogen 15 7 - 30 mg/dL    Creatinine 0.50 (L) 0.52 - 1.04 mg/dL    GFR Estimate >90 >60 mL/min/1.7m2    GFR Estimate If Black >90 >60 mL/min/1.7m2    Calcium 9.5 8.5 - 10.1 mg/dL       X-Ray was done.  KUB shows a small amount of spurring on the superior edge of the L ilium.  There is some stool in the right  colon.    ASSESSMENT:      ICD-10-CM    1. Left flank pain R10.9 WBC and differential     Basic metabolic panel  (Ca, Cl, CO2, Creat, Gluc, K, Na, BUN)     XR Abdomen 2 Views     predniSONE (DELTASONE) 20 MG tablet   2. Dysuria R30.0 UA reflex to Microscopic and Culture     Urine Microscopic      Left flank pain:  Tenderness within the soft tissues and along the upper iliac crest suggests a MSK etiology rather than an intra-abdominal process.  Pain does not seem consistent with that of passing kidney stones, especially again given tenderness in the soft tissues.    Lab work all WNL.  No evidence of significant bacterial etiology.    Given distribution of pain, I suspect there is some irritation of the left side L1 nerve root.    PLAN:  Pt allergic to ibuprofen (rash), so will use prednisone instead to reduce inflammation around this nerve root.    Patient Instructions   Take prednisone 40 mg daily for 3 days.  This medication reduces inflammation and will hopefully help relieve the irritation around the nerve that is causing your pain.    If you are not feeling better by Friday, follow up with your primary care provider or one of their partners.    If you are having any significantly worsening symptoms, follow up sooner.  Go to the ER as soon as possible if you have any leakage of urine or stool or if you have any sudden onset weakness or numbness in your leg.

## 2018-09-25 NOTE — MR AVS SNAPSHOT
After Visit Summary   9/25/2018    oZ Qureshi    MRN: [id number]           Patient Information     Date Of Birth          1975        Visit Information        Provider Department      9/25/2018 11:10 AM Alesha Wiggins MD [facility]        Today's Diagnoses     Left flank pain    -  1    Dysuria          Care Instructions    Take prednisone 40 mg daily for 3 days.  This medication reduces inflammation and will hopefully help relieve the irritation around the nerve that is causing your pain.    If you are not feeling better by Friday, follow up with your primary care provider or one of their partners.    If you are having any significantly worsening symptoms, follow up sooner.  Go to the ER as soon as possible if you have any leakage of urine or stool or if you have any sudden onset weakness or numbness in your leg.          Follow-ups after your visit        Your next 10 appointments already scheduled     Oct 08, 2018 11:15 AM CDT   [facility] with Terri Baron MD   [facility]    [address] [place] [id number] [phone]            Oct 29, 2018 10:15 AM CDT   SHORT with Terri Baron MD   [facility]    [address] [facility] [address] [phone]              Future tests that were ordered for you today     Open Future Orders        Priority Expected Expires Ordered    UA reflex to Microscopic and Culture Routine  9/25/2019 9/25/2018            Who to contact     If you have questions or need follow up information about today's clinic visit or your schedule please contact [facility] directly at [phone].  Normal or non-critical lab and imaging results will be communicated to you by MyChart, letter or phone within 4 business days after the clinic has received the results. If you do not hear from us within 7 days,  please contact the clinic through [employer] or phone. If you have a critical or abnormal lab result, we will notify you by phone as soon as possible.  Submit refill requests through [employer] or call your pharmacy and they will forward the refill request to us. Please allow 3 business days for your refill to be completed.          Additional Information About Your Visit        Careg[employer] Information     [employer] gives you secure access to your electronic health record. If you see a primary care provider, you can also send messages to your care team and make appointments. If you have questions, please call your primary care clinic.  If you do not have a primary care provider, please call [phone] and they will assist you.        Care EveryWhere ID     This is your Care EveryWhere ID. This could be used by other organizations to access your [facility] medical records  [id number]        Your Vitals Were     Pulse Temperature Last Period Pulse Oximetry BMI (Body Mass Index)       78 98.4  F (36.9  C) (Tympanic) 09/04/2018 99% 23.83 kg/m2        Blood Pressure from Last 3 Encounters:   09/25/18 128/70   08/29/18 120/72   06/08/18 120/74    Weight from Last 3 Encounters:   09/25/18 118 lb (53.5 kg)   08/29/18 118 lb 3.2 oz (53.6 kg)   06/08/18 119 lb (54 kg)              We Performed the Following     Basic metabolic panel  (Ca, Cl, CO2, Creat, Gluc, K, Na, BUN)     UA reflex to Microscopic and Culture     Urine Microscopic     WBC and differential          Today's Medication Changes          These changes are accurate as of 9/25/18  1:32 PM.  If you have any questions, ask your nurse or doctor.               Start taking these medicines.        Dose/Directions    predniSONE 20 MG tablet   Commonly known as:  DELTASONE   Used for:  Left flank pain   Started by:  Alesha Wiggins MD        Dose:  40 mg   Take 2 tablets (40 mg) by mouth daily for 3 days   Quantity:  6 tablet   Refills:  0            Where to get your medicines       These medications were sent to [facility] [place] - [place] - [address]   [address]  [address], [place] [address]     Phone:  [phone]     predniSONE 20 MG tablet                Primary Care Provider Office Phone # Fax #    Terri Baron [phone] [address]  [facility] [address]        Equal Access to Services     [facility]: Hadii aad ku hadasho Soomaali, waaxda luqadaha, qaybta kaalmada adeegyada, waxay idiin hayaan adeeg kharash la'aan ah. So [facility] [phone].    ATENCIÓN: Si dasia salvador, tiene a vogel disposición servicios gratuitos de asistencia lingüística. Llame al [phone].    We comply with applicable federal civil rights laws and [place] laws. We do not discriminate on the basis of race, color, national origin, age, disability, sex, sexual orientation, or gender identity.            Thank you!     Thank you for choosing [facility]  for your care. Our goal is always to provide you with excellent care. Hearing back from our patients is one way we can continue to improve our services. Please take a few minutes to complete the written survey that you may receive in the mail after your visit with us. Thank you!             Your Updated Medication List - Protect others around you: Learn how to safely use, store and throw away your medicines at www.disposemymeds.org.          This list is accurate as of 9/25/18  1:32 PM.  Always use your most recent med list.                   Brand Name Dispense Instructions for use Diagnosis    clindamycin-benzoyl peroxide gel    BENZACLIN    50 g    Apply to  Affected area initially once daily for 2 weeks and then increase to 2 times daily if tolerated    Acne       dexamethasone 4 MG/ML injection    DECADRON    1 mL    Inject 1 mL (4 mg) as directed once for 1 dose    Subacromial impingement, unspecified laterality       diclofenac 1 % Gel topical gel    VOLTAREN    100 g     Use small amount and rub into area of chest that is hurting 1-2 times daily as needed    Costochondritis, Atypical chest pain       docusate sodium 100 MG tablet    COLACE    60 tablet    Take 100 mg by mouth daily    External hemorrhoids       DULoxetine 30 MG EC capsule    CYMBALTA    180 capsule    Take 2 capsules (60 mg) by mouth daily    Cervicalgia, Right shoulder tendonitis, Right peroneal tendinosis       ferrous sulfate 325 (65 Fe) MG tablet    IRON    60 tablet    Take 1 tablet (325 mg) by mouth 2 times daily    Iron deficiency anemia, unspecified iron deficiency anemia type       fluticasone 50 MCG/ACT spray    FLONASE    16 g    Spray 1-2 sprays into both nostrils daily    Throat pain       gabapentin 300 MG capsule    NEURONTIN    120 capsule    1 tab in am  and 2 at bedtime for (3 per day)    Right peroneal tendinosis, Right shoulder tendonitis, Cervicalgia       hydrocortisone 0.2 % cream    WESTCORT    45 g    Apply sparingly to affected area 2 times daily as needed.    Rash       loratadine 10 MG tablet    CLARITIN    30 tablet    Take 1 tablet (10 mg) by mouth daily    Hives       loratadine-pseudoePHEDrine  MG per 24 hr tablet    CLARITIN-D 24-hour    14 tablet    Take 1 tablet by mouth daily    Acute recurrent maxillary sinusitis       naproxen 500 MG tablet    NAPROSYN    60 tablet    Take 1 tablet (500 mg) by mouth 2 times daily (with meals)    Right peroneal tendinosis, Right shoulder tendonitis, Cervicalgia       PATADAY 0.2 % Soln   Generic drug:  olopatadine HCl      Reported on 5/1/2017        predniSONE 20 MG tablet    DELTASONE    6 tablet    Take 2 tablets (40 mg) by mouth daily for 3 days    Left flank pain       traMADol 50 MG tablet    ULTRAM    60 tablet    Take 1 tablet (50 mg) by mouth every 6 hours as needed for severe pain    Disorder of SI (sacroiliac) joint, Episodic tension-type headache, not intractable       traZODone 50 MG tablet    DESYREL    60 tablet    Take 1  tablet (50 mg) by mouth nightly as needed for sleep    Insomnia, unspecified type

## 2018-09-25 NOTE — TELEPHONE ENCOUNTER
"  Reason for Disposition    Pain or burning with urination     \"I was seen today in UC(see epic) I thought I had a UTI. The tests were negative for an infection. Since I have been home, I am having more frequency, burning when I urinate. When I wipe it's a pinkish color. I am still having left flank pain.\" denies fever or other sx. Triaged and advised ER. Patient prefers to make an appt with PCP. Transferred to scheduling. Call back if needed.    Additional Information    Negative: Passed out (i.e., lost consciousness, collapsed and was not responding)    Negative: Shock suspected (e.g., cold/pale/clammy skin, too weak to stand, low BP, rapid pulse)    Negative: Difficult to awaken or acting confused  (e.g., disoriented, slurred speech)    Negative: Sounds like a life-threatening emergency to the triager    Negative: Followed a major injury to the back (e.g., MVA, fall > 10 feet or 3 meters, penetrating injury, etc.)    Negative: Back pain or flank pain during pregnancy    Negative: Upper, mid or lower back pain that occurs mainly in the midline    Negative: [1] SEVERE pain (e.g., excruciating, scale 8-10) AND [2] present > 1 hour    Negative: [1] SEVERE pain (e.g., excruciating, scale 8-10) AND [2] not improved after pain medicine    Negative: [1] Sudden onset of severe flank pain AND [2] age > 60    Negative: [1] Abdominal pain AND [2] age > 60    Negative: [1] Unable to urinate (or only a few drops) > 4 hours AND     [2] bladder feels very full (e.g., palpable bladder or strong urge to urinate)    Negative: Vomiting    Negative: Weakness of a leg or foot (e.g., unable to bear weight, dragging foot)    Negative: Patient sounds very sick or weak to the triager    Negative: Fever > 100.5 F (38.1 C)    Protocols used: FLANK PAIN-ADULT-    "

## 2018-09-26 ENCOUNTER — TELEPHONE (OUTPATIENT)
Dept: FAMILY MEDICINE | Facility: CLINIC | Age: 43
End: 2018-09-26

## 2018-09-26 NOTE — TELEPHONE ENCOUNTER
Patient calling and states she is wondering if Dr. Robles can work her her today.   has been having flank pain for a month and was to urgent care yesterday.  Reviewed urgent care visit.  Was given Prednisone and advised to follow-up Friday if not better and sooner if worse.  Discussed patient giving Prednisone time to work.  Offered appointment for Friday in case she is not better.  Declined and states is scheduled with Dr. Robles on 10/8/18.  Advised to give Prednisone time to work and follow up as advised.  Delma Vaz RN    9/25/18  ASSESSMENT:         ICD-10-CM     1. Left flank pain R10.9 WBC and differential       Basic metabolic panel  (Ca, Cl, CO2, Creat, Gluc, K, Na, BUN)       XR Abdomen 2 Views       predniSONE (DELTASONE) 20 MG tablet   2. Dysuria R30.0 UA reflex to Microscopic and Culture       Urine Microscopic      Left flank pain:  Tenderness within the soft tissues and along the upper iliac crest suggests a MSK etiology rather than an intra-abdominal process.  Pain does not seem consistent with that of passing kidney stones, especially again given tenderness in the soft tissues.     Lab work all WNL.  No evidence of significant bacterial etiology.     Given distribution of pain, I suspect there is some irritation of the left side L1 nerve root.     PLAN:  Pt allergic to ibuprofen (rash), so will use prednisone instead to reduce inflammation around this nerve root.     Patient Instructions   Take prednisone 40 mg daily for 3 days.  This medication reduces inflammation and will hopefully help relieve the irritation around the nerve that is causing your pain.     If you are not feeling better by Friday, follow up with your primary care provider or one of their partners.     If you are having any significantly worsening symptoms, follow up sooner.  Go to the ER as soon as possible if you have any leakage of urine or stool or if you have any sudden onset weakness or numbness in your leg.

## 2018-10-08 ENCOUNTER — OFFICE VISIT (OUTPATIENT)
Dept: FAMILY MEDICINE | Facility: CLINIC | Age: 43
End: 2018-10-08
Payer: COMMERCIAL

## 2018-10-08 VITALS
SYSTOLIC BLOOD PRESSURE: 120 MMHG | WEIGHT: 115.4 LBS | DIASTOLIC BLOOD PRESSURE: 76 MMHG | TEMPERATURE: 98.2 F | HEART RATE: 79 BPM | BODY MASS INDEX: 23.26 KG/M2 | HEIGHT: 59 IN | RESPIRATION RATE: 14 BRPM | OXYGEN SATURATION: 98 %

## 2018-10-08 DIAGNOSIS — R19.7 INTERMITTENT DIARRHEA: ICD-10-CM

## 2018-10-08 DIAGNOSIS — M46.98 INFLAMMATORY SPONDYLOPATHY OF SACRAL REGION (H): ICD-10-CM

## 2018-10-08 DIAGNOSIS — R10.32 LLQ ABDOMINAL PAIN: Primary | ICD-10-CM

## 2018-10-08 DIAGNOSIS — Z23 NEED FOR PROPHYLACTIC VACCINATION AND INOCULATION AGAINST INFLUENZA: ICD-10-CM

## 2018-10-08 LAB
CANCER AG125 SERPL-ACNC: 21 U/ML (ref 0–30)
CRP SERPL-MCNC: <2.9 MG/L (ref 0–8)
ERYTHROCYTE [DISTWIDTH] IN BLOOD BY AUTOMATED COUNT: 12.2 % (ref 10–15)
ERYTHROCYTE [SEDIMENTATION RATE] IN BLOOD BY WESTERGREN METHOD: 30 MM/H (ref 0–20)
HCT VFR BLD AUTO: 36.6 % (ref 35–47)
HGB BLD-MCNC: 11.7 G/DL (ref 11.7–15.7)
MCH RBC QN AUTO: 30.2 PG (ref 26.5–33)
MCHC RBC AUTO-ENTMCNC: 32 G/DL (ref 31.5–36.5)
MCV RBC AUTO: 94 FL (ref 78–100)
PLATELET # BLD AUTO: 251 10E9/L (ref 150–450)
RBC # BLD AUTO: 3.88 10E12/L (ref 3.8–5.2)
WBC # BLD AUTO: 7.2 10E9/L (ref 4–11)

## 2018-10-08 PROCEDURE — 90471 IMMUNIZATION ADMIN: CPT | Performed by: FAMILY MEDICINE

## 2018-10-08 PROCEDURE — 36415 COLL VENOUS BLD VENIPUNCTURE: CPT | Performed by: FAMILY MEDICINE

## 2018-10-08 PROCEDURE — 90686 IIV4 VACC NO PRSV 0.5 ML IM: CPT | Performed by: FAMILY MEDICINE

## 2018-10-08 PROCEDURE — 99214 OFFICE O/P EST MOD 30 MIN: CPT | Performed by: FAMILY MEDICINE

## 2018-10-08 PROCEDURE — 85652 RBC SED RATE AUTOMATED: CPT | Performed by: FAMILY MEDICINE

## 2018-10-08 PROCEDURE — 86304 IMMUNOASSAY TUMOR CA 125: CPT | Performed by: FAMILY MEDICINE

## 2018-10-08 PROCEDURE — 86140 C-REACTIVE PROTEIN: CPT | Performed by: FAMILY MEDICINE

## 2018-10-08 PROCEDURE — 80053 COMPREHEN METABOLIC PANEL: CPT | Performed by: FAMILY MEDICINE

## 2018-10-08 PROCEDURE — 85027 COMPLETE CBC AUTOMATED: CPT | Performed by: FAMILY MEDICINE

## 2018-10-08 ASSESSMENT — PATIENT HEALTH QUESTIONNAIRE - PHQ9: 5. POOR APPETITE OR OVEREATING: NOT AT ALL

## 2018-10-08 ASSESSMENT — ANXIETY QUESTIONNAIRES
GAD7 TOTAL SCORE: 1
3. WORRYING TOO MUCH ABOUT DIFFERENT THINGS: SEVERAL DAYS
5. BEING SO RESTLESS THAT IT IS HARD TO SIT STILL: NOT AT ALL
2. NOT BEING ABLE TO STOP OR CONTROL WORRYING: NOT AT ALL
7. FEELING AFRAID AS IF SOMETHING AWFUL MIGHT HAPPEN: NOT AT ALL
6. BECOMING EASILY ANNOYED OR IRRITABLE: NOT AT ALL
1. FEELING NERVOUS, ANXIOUS, OR ON EDGE: NOT AT ALL
IF YOU CHECKED OFF ANY PROBLEMS ON THIS QUESTIONNAIRE, HOW DIFFICULT HAVE THESE PROBLEMS MADE IT FOR YOU TO DO YOUR WORK, TAKE CARE OF THINGS AT HOME, OR GET ALONG WITH OTHER PEOPLE: NOT DIFFICULT AT ALL

## 2018-10-08 NOTE — PROGRESS NOTES
Injectable Influenza Immunization Documentation    1.  Is the person to be vaccinated sick today?   No    2. Does the person to be vaccinated have an allergy to a component   of the vaccine?   No  Egg Allergy Algorithm Link    3. Has the person to be vaccinated ever had a serious reaction   to influenza vaccine in the past?   No    4. Has the person to be vaccinated ever had Guillain-Barré syndrome?   No    Form completed by Unique Simeon CMA on 10/8/2018 at 12:17 PM

## 2018-10-08 NOTE — PROGRESS NOTES
SUBJECTIVE:   Zo Qureshi is a 43 year old female who presents to clinic today for the following health issues:    Zo Qureshi presents with one month of left lower quadrant pain off and on.   She has had this 4 times.   She had it 3 times where it would last about 2 hours.   Heat and water and massage and drinking water would help and then it would go away.   2 weeks ago it came at 8 am and by 11 am still was not better.   She went to  and they did UA and blood work and felt it was nerve or muscle and gave her prednisone but she had barely taken it and the pain was gone at about 2 pm so she is not sure the med is what did it.   It has not been back like that but something does not feel right in the left lower quadrant stil.   Each time this happened it was proceeded by diarrhea which got to the point of being watery.   Now things are normal.        FLANK PAIN     Onset: Month    Description:   Character: Sharp, Stabbing and Gnawing  Location: left lower quadrant pelvic region around to back  Radiation: Back and Pelvic region    Intensity: moderate, severe    Progression of Symptoms:  intermittent    Accompanying Signs & Symptoms:  Fever/Chills?: YES- chills  Gas/Bloating: YES- hard stomach  Nausea: no   Vomitting: no   Diarrhea?: YES- before it happens  Constipation:no   Dysuria or Hematuria: YES- some pink on toilet paper   History:   Trauma: no   Previous similar pain: no    Previous tests done: none    Precipitating factors:   Does the pain change with:     Food: no      BM: no     Urination: no     Alleviating factors:  ibuprofen    Therapies Tried and outcome: heat    LMP:  Patient's last menstrual period was 10/01/2018 (exact date).      Past Medical History:   Diagnosis Date     HSIL on Pap smear 09/21/11    MARTA 2 and 3     INFLAM SPONDYLOPATHY NOS   1/7/2008     Leukocytopenia 3/10/2014     Leukocytopenia      Tendinosis      Thrombocytopenia (H) 3/10/2014     Thrombocytopenia (H)      Unspecified  closed fracture of pelvis 2000    left fracture of pelvis after delivery       Past Surgical History:   Procedure Laterality Date     C NONSPECIFIC PROCEDURE  10/00    after delivery 2 units of prbcs secondary to placenta     LEEP TX, CERVICAL  12/19/11    MARTA II     TUBAL LIGATION  5/2009    laparoscopic tubal ligation       MEDICATIONS:  Current Outpatient Prescriptions   Medication     clindamycin-benzoyl peroxide (BENZACLIN) gel     dexamethasone (DECADRON) 4 MG/ML injection     diclofenac (VOLTAREN) 1 % GEL topical gel     docusate sodium (COLACE) 100 MG tablet     DULoxetine (CYMBALTA) 30 MG EC capsule     ferrous sulfate (IRON) 325 (65 Fe) MG tablet     fluticasone (FLONASE) 50 MCG/ACT spray     gabapentin (NEURONTIN) 300 MG capsule     hydrocortisone (WESTCORT) 0.2 % cream     loratadine (CLARITIN) 10 MG tablet     loratadine-pseudoePHEDrine (CLARITIN-D 24-HOUR)  MG per 24 hr tablet     naproxen (NAPROSYN) 500 MG tablet     PATADAY 0.2 % SOLN     traMADol (ULTRAM) 50 MG tablet     traZODone (DESYREL) 50 MG tablet     No current facility-administered medications for this visit.        SOCIAL HISTORY:  Social History   Substance Use Topics     Smoking status: Never Smoker     Smokeless tobacco: Never Used     Alcohol use 0.0 oz/week     0 Standard drinks or equivalent per week      Comment: rarely       Family History   Problem Relation Age of Onset     Hypertension Father      Lipids Father      Hypertension Mother      Lipids Mother      Diabetes No family hx of      Coronary Artery Disease No family hx of      Hyperlipidemia No family hx of      Cerebrovascular Disease No family hx of      Breast Cancer No family hx of      Colon Cancer No family hx of      Prostate Cancer No family hx of      Other Cancer No family hx of      Depression No family hx of      Anxiety Disorder No family hx of      Mental Illness No family hx of      Substance Abuse No family hx of      Anesthesia Reaction No family hx  "of      Asthma No family hx of      Osteoporosis No family hx of      Genetic Disorder No family hx of      Thyroid Disease No family hx of      Obesity No family hx of      Unknown/Adopted No family hx of        Objective:  Blood pressure 120/76, pulse 79, temperature 98.2  F (36.8  C), temperature source Oral, resp. rate 14, height 4' 11\" (1.499 m), weight 115 lb 6.4 oz (52.3 kg), last menstrual period 10/01/2018, SpO2 98 %, not currently breastfeeding.  HEENT:  TM's are clear bilaterally.  Oropharynx is clear without tonsillar hypertrophy or exudate.  Conjuctiva are clear.  Neck:  There is no lymphadenopathy or thyroid tenderness or enlargement  Chest: Clear to auscultation bilaterally.  No wheezes, rales or retractions.  CV: Regular rate and rhythm without murmurs, rubs or gallops.  Abd: mild tenderness in left lower quadrant - no guarding and no masses  Extremities: There is no clubbing, cyanosis or edema.  Peripheral pulses are present bilaterally in the radial and dorsalis pedis arteries.    Assessment:  1. Left lower quadrant pain - wonder about divertic or IBD or ovarian origin  2. Patient with inflammatory disease already     Plan:  1. Will get labs  2. Will get pelvic ultrasound  3. Flu shot  4. If ultrasound is fine, will get colonoscopy  5. Will see back in one month      "

## 2018-10-09 ENCOUNTER — HOSPITAL ENCOUNTER (OUTPATIENT)
Dept: ULTRASOUND IMAGING | Facility: CLINIC | Age: 43
Discharge: HOME | End: 2018-10-09
Attending: FAMILY MEDICINE | Admitting: FAMILY MEDICINE
Payer: COMMERCIAL

## 2018-10-09 DIAGNOSIS — R10.32 LLQ ABDOMINAL PAIN: ICD-10-CM

## 2018-10-09 DIAGNOSIS — R19.7 INTERMITTENT DIARRHEA: ICD-10-CM

## 2018-10-09 DIAGNOSIS — D25.1 INTRAMURAL AND SUBMUCOUS LEIOMYOMA OF UTERUS: Primary | ICD-10-CM

## 2018-10-09 DIAGNOSIS — D25.0 INTRAMURAL AND SUBMUCOUS LEIOMYOMA OF UTERUS: Primary | ICD-10-CM

## 2018-10-09 LAB
ALBUMIN SERPL-MCNC: 4 G/DL (ref 3.4–5)
ALP SERPL-CCNC: 57 U/L (ref 40–150)
ALT SERPL W P-5'-P-CCNC: 18 U/L (ref 0–50)
ANION GAP SERPL CALCULATED.3IONS-SCNC: 11 MMOL/L (ref 3–14)
AST SERPL W P-5'-P-CCNC: 17 U/L (ref 0–45)
BILIRUB SERPL-MCNC: 0.2 MG/DL (ref 0.2–1.3)
BUN SERPL-MCNC: 10 MG/DL (ref 7–30)
CALCIUM SERPL-MCNC: 9.3 MG/DL (ref 8.5–10.1)
CHLORIDE SERPL-SCNC: 102 MMOL/L (ref 94–109)
CO2 SERPL-SCNC: 25 MMOL/L (ref 20–32)
CREAT SERPL-MCNC: 0.55 MG/DL (ref 0.52–1.04)
GFR SERPL CREATININE-BSD FRML MDRD: >90 ML/MIN/1.7M2
GLUCOSE SERPL-MCNC: 95 MG/DL (ref 70–99)
POTASSIUM SERPL-SCNC: 3.8 MMOL/L (ref 3.4–5.3)
PROT SERPL-MCNC: 7.8 G/DL (ref 6.8–8.8)
SODIUM SERPL-SCNC: 138 MMOL/L (ref 133–144)

## 2018-10-09 PROCEDURE — 76830 TRANSVAGINAL US NON-OB: CPT

## 2018-10-09 ASSESSMENT — ANXIETY QUESTIONNAIRES: GAD7 TOTAL SCORE: 1

## 2018-10-23 ENCOUNTER — TELEPHONE (OUTPATIENT)
Dept: FAMILY MEDICINE | Facility: CLINIC | Age: 43
End: 2018-10-23

## 2018-10-23 NOTE — TELEPHONE ENCOUNTER
Received 4 page fax for Sedentary Level Work for Dr Baron to complete. Form in the in-basket at JAIMIE's desk.

## 2018-10-24 NOTE — TELEPHONE ENCOUNTER
2050 PT IS AAO X4 PT HAS NO SIGNS OF DISTRESS BREATHING IS EQUAL AND UNLABORED SKIN PWD PT IS SIGNING AMA FORM AT THIS TIME PT AND WIFE STATES UNDERSTANDING OF AMA EXPLAINED TO PT THAT WE FAXED A COPY OF STRESS TEST SCHEDULING FORM TO SCHEDULING DEPARTMENT AND THAT HE SHOULD HERE FROM THEM BY THE END OF DAY Monday AND IF HE DOES NOT FOR HIM TO CALL Tuesday PT AND WIFE STATES UNDERSTANDING AND IS LEAVING THIS ER     Silvina Vanegas RN  06/08/18 2057     Done and in my outbasket

## 2018-10-29 ENCOUNTER — OFFICE VISIT (OUTPATIENT)
Dept: FAMILY MEDICINE | Facility: CLINIC | Age: 43
End: 2018-10-29
Payer: COMMERCIAL

## 2018-10-29 VITALS
HEART RATE: 83 BPM | TEMPERATURE: 98.4 F | DIASTOLIC BLOOD PRESSURE: 78 MMHG | OXYGEN SATURATION: 99 % | BODY MASS INDEX: 23.71 KG/M2 | HEIGHT: 59 IN | RESPIRATION RATE: 14 BRPM | SYSTOLIC BLOOD PRESSURE: 114 MMHG | WEIGHT: 117.6 LBS

## 2018-10-29 DIAGNOSIS — M77.8 RIGHT SHOULDER TENDONITIS: ICD-10-CM

## 2018-10-29 DIAGNOSIS — M54.2 CERVICALGIA: ICD-10-CM

## 2018-10-29 DIAGNOSIS — M67.88 RIGHT PERONEAL TENDINOSIS: ICD-10-CM

## 2018-10-29 PROCEDURE — 99213 OFFICE O/P EST LOW 20 MIN: CPT | Performed by: FAMILY MEDICINE

## 2018-10-29 RX ORDER — GABAPENTIN 300 MG/1
CAPSULE ORAL
Qty: 120 CAPSULE | Refills: 1
Start: 2018-10-29 | End: 2019-01-21

## 2018-10-29 NOTE — LETTER
[facility]  [address] [place], [place], [address]  [phone]        October 29, 2018    RE: Zo Qureshi                                                                                                                                                       [address] [facility] [address]            To Whom It May Concern,   Zo Qureshi is a patient of mine.   If she is able to find a job within her work restrictions, I would like to see her back for evaluation about 1 month into the job to see how she is doing with her job and her symptoms.           Sincerely,    Terri Robles M.D.

## 2018-10-29 NOTE — PROGRESS NOTES
SUBJECTIVE:   Zo Qureshi is a 43 year old female who presents to clinic today for the following health issues:      Pt is here for a work comp follow up visit.   She is not taking gabapentin in am or afternoon now.   She has one at 5 pm and 2 at bedtime.   The dizziness and fatigue are better but pain is a little worse.   She is dealing with it and feels this is better overall.         Past Medical History:   Diagnosis Date     HSIL on Pap smear 09/21/11    MARTA 2 and 3     INFLAM SPONDYLOPATHY NOS   1/7/2008     Leukocytopenia 3/10/2014     Leukocytopenia      Tendinosis      Thrombocytopenia (H) 3/10/2014     Thrombocytopenia (H)      Unspecified closed fracture of pelvis 2000    left fracture of pelvis after delivery       Past Surgical History:   Procedure Laterality Date     C NONSPECIFIC PROCEDURE  10/00    after delivery 2 units of prbcs secondary to placenta     LEEP TX, CERVICAL  12/19/11    MARTA II     TUBAL LIGATION  5/2009    laparoscopic tubal ligation       MEDICATIONS:  Current Outpatient Prescriptions   Medication     clindamycin-benzoyl peroxide (BENZACLIN) gel     diclofenac (VOLTAREN) 1 % GEL topical gel     docusate sodium (COLACE) 100 MG tablet     DULoxetine (CYMBALTA) 30 MG EC capsule     ferrous sulfate (IRON) 325 (65 Fe) MG tablet     fluticasone (FLONASE) 50 MCG/ACT spray     gabapentin (NEURONTIN) 300 MG capsule     hydrocortisone (WESTCORT) 0.2 % cream     loratadine (CLARITIN) 10 MG tablet     loratadine-pseudoePHEDrine (CLARITIN-D 24-HOUR)  MG per 24 hr tablet     naproxen (NAPROSYN) 500 MG tablet     PATADAY 0.2 % SOLN     traMADol (ULTRAM) 50 MG tablet     traZODone (DESYREL) 50 MG tablet     dexamethasone (DECADRON) 4 MG/ML injection     [DISCONTINUED] gabapentin (NEURONTIN) 300 MG capsule     No current facility-administered medications for this visit.        SOCIAL HISTORY:  Social History   Substance Use Topics     Smoking status: Never Smoker     Smokeless tobacco: Never  "Used     Alcohol use 0.0 oz/week     0 Standard drinks or equivalent per week      Comment: rarely       Family History   Problem Relation Age of Onset     Hypertension Father      Lipids Father      Hypertension Mother      Lipids Mother      Diabetes No family hx of      Coronary Artery Disease No family hx of      Hyperlipidemia No family hx of      Cerebrovascular Disease No family hx of      Breast Cancer No family hx of      Colon Cancer No family hx of      Prostate Cancer No family hx of      Other Cancer No family hx of      Depression No family hx of      Anxiety Disorder No family hx of      Mental Illness No family hx of      Substance Abuse No family hx of      Anesthesia Reaction No family hx of      Asthma No family hx of      Osteoporosis No family hx of      Genetic Disorder No family hx of      Thyroid Disease No family hx of      Obesity No family hx of      Unknown/Adopted No family hx of        Objective:  Blood pressure 114/78, pulse 83, temperature 98.4  F (36.9  C), temperature source Oral, resp. rate 14, height 4' 11\" (1.499 m), weight 117 lb 9.6 oz (53.3 kg), last menstrual period 10/28/2018, SpO2 99 %, not currently breastfeeding.  Chest: Clear to auscultation bilaterally.  No wheezes, rales or retractions.  CV: Regular rate and rhythm without murmurs, rubs or gallops.  Right shoulder with tenderness anteriorly and her right dorsal wrist as well    Assessment:  1. Cervicalgia   2. Right shoulder and peroneal tendonitis - pain a little worse with less gabapentin but side effects better      Plan:  1. Will continue with this regimen   2. Recheck with start of new job on current restrictions      "

## 2018-10-29 NOTE — MR AVS SNAPSHOT
After Visit Summary   10/29/2018    Zo Qureshi    MRN: [id number]           Patient Information     Date Of Birth          1975        Visit Information        Provider Department      10/29/2018 10:15 AM Terri Baron MD [facility]        Today's Diagnoses     Right peroneal tendinosis        Right shoulder tendonitis        Cervicalgia           Follow-ups after your visit        Follow-up notes from your care team     Return in about 1 month (around 11/29/2018) for Medication recheck after starting work.      Your next 10 appointments already scheduled     Nov 16, 2018 10:00 AM CST   [facility]    [address]  [address]  [facility] [address] [phone]           Bring a current list of meds and any records pertaining to this visit. For Physicals, please bring immunization records and any forms needing to be filled out. Please arrive 10 minutes early to complete paperwork.              Who to contact     If you have questions or need follow up information about today's clinic visit or your schedule please contact [facility] directly at [phone].  Normal or non-critical lab and imaging results will be communicated to you by MyChart, letter or phone within 4 business days after the clinic has received the results. If you do not hear from us within 7 days, please contact the clinic through MyChart or phone. If you have a critical or abnormal lab result, we will notify you by phone as soon as possible.  Submit refill requests through [employer] or call your pharmacy and they will forward the refill request to us. Please allow 3 business days for your refill to be completed.          Additional Information About Your Visit        MyChart Information     [employer] gives you secure access to your electronic health record. If you see a primary care provider,  "you can also send messages to your care team and make appointments. If you have questions, please call your primary care clinic.  If you do not have a primary care provider, please call [phone] and they will assist you.        Care EveryWhere ID     This is your Care EveryWhere ID. This could be used by other organizations to access your [place] medical records  [id number]        Your Vitals Were     Pulse Temperature Respirations Height Last Period Pulse Oximetry    83 98.4  F (36.9  C) (Oral) 14 4' 11\" (1.499 m) 10/28/2018 (Exact Date) 99%    Breastfeeding? BMI (Body Mass Index)                No 23.75 kg/m2           Blood Pressure from Last 3 Encounters:   10/29/18 114/78   10/08/18 120/76   09/25/18 128/70    Weight from Last 3 Encounters:   10/29/18 117 lb 9.6 oz (53.3 kg)   10/08/18 115 lb 6.4 oz (52.3 kg)   09/25/18 118 lb (53.5 kg)              Today, you had the following     No orders found for display         Today's Medication Changes          These changes are accurate as of 10/29/18 10:55 AM.  If you have any questions, ask your nurse or doctor.               These medicines have changed or have updated prescriptions.        Dose/Directions    gabapentin 300 MG capsule   Commonly known as:  NEURONTIN   This may have changed:  additional instructions   Used for:  Right peroneal tendinosis, Right shoulder tendonitis, Cervicalgia   Changed by:  Terri Baron MD        1 tab at 5 pm  and 2 at bedtime for (3 per day)   Quantity:  120 capsule   Refills:  1            Where to get your medicines      Some of these will need a paper prescription and others can be bought over the counter.  Ask your nurse if you have questions.     You don't need a prescription for these medications     gabapentin 300 MG capsule                Primary Care Provider Office Phone # Fax #    Terri Baron [phone] [address] [facility] [address]        Equal Access to Services     [facility] " GAAR : Hadii aad ku hadsavannah De La Cruz, waaxda luqadaha, qaybta kaalmada adejoseph, isabella newton [place] orr karthikeyanvivien simpson . So [facility] [phone].    ATENCIÓN: Si habla modesto, tiene a vogel disposición servicios gratuitos de asistencia lingüística. Llame al [phone].    We comply with applicable federal civil rights laws and [place] laws. We do not discriminate on the basis of race, color, national origin, age, disability, sex, sexual orientation, or gender identity.            Thank you!     Thank you for choosing [facility]  for your care. Our goal is always to provide you with excellent care. Hearing back from our patients is one way we can continue to improve our services. Please take a few minutes to complete the written survey that you may receive in the mail after your visit with us. Thank you!             Your Updated Medication List - Protect others around you: Learn how to safely use, store and throw away your medicines at www.disposemymeds.org.          This list is accurate as of 10/29/18 10:55 AM.  Always use your most recent med list.                   Brand Name Dispense Instructions for use Diagnosis    clindamycin-benzoyl peroxide gel    BENZACLIN    50 g    Apply to  Affected area initially once daily for 2 weeks and then increase to 2 times daily if tolerated    Acne       dexamethasone 4 MG/ML injection    DECADRON    1 mL    Inject 1 mL (4 mg) as directed once for 1 dose    Subacromial impingement, unspecified laterality       diclofenac 1 % Gel topical gel    VOLTAREN    100 g    Use small amount and rub into area of chest that is hurting 1-2 times daily as needed    Costochondritis, Atypical chest pain       docusate sodium 100 MG tablet    COLACE    60 tablet    Take 100 mg by mouth daily    External hemorrhoids       DULoxetine 30 MG EC capsule    CYMBALTA    180 capsule    Take 2 capsules (60 mg) by mouth daily    Cervicalgia, Right shoulder tendonitis, Right  peroneal tendinosis       ferrous sulfate 325 (65 Fe) MG tablet    IRON    60 tablet    Take 1 tablet (325 mg) by mouth 2 times daily    Iron deficiency anemia, unspecified iron deficiency anemia type       fluticasone 50 MCG/ACT spray    FLONASE    16 g    Spray 1-2 sprays into both nostrils daily    Throat pain       gabapentin 300 MG capsule    NEURONTIN    120 capsule    1 tab at 5 pm  and 2 at bedtime for (3 per day)    Right peroneal tendinosis, Right shoulder tendonitis, Cervicalgia       hydrocortisone 0.2 % cream    WESTCORT    45 g    Apply sparingly to affected area 2 times daily as needed.    Rash       loratadine 10 MG tablet    CLARITIN    30 tablet    Take 1 tablet (10 mg) by mouth daily    Hives       loratadine-pseudoePHEDrine  MG per 24 hr tablet    CLARITIN-D 24-hour    14 tablet    Take 1 tablet by mouth daily    Acute recurrent maxillary sinusitis       naproxen 500 MG tablet    NAPROSYN    60 tablet    Take 1 tablet (500 mg) by mouth 2 times daily (with meals)    Right peroneal tendinosis, Right shoulder tendonitis, Cervicalgia       PATADAY 0.2 % Soln   Generic drug:  olopatadine HCl      Reported on 5/1/2017        traMADol 50 MG tablet    ULTRAM    60 tablet    Take 1 tablet (50 mg) by mouth every 6 hours as needed for severe pain    Disorder of SI (sacroiliac) joint, Episodic tension-type headache, not intractable       traZODone 50 MG tablet    DESYREL    60 tablet    Take 1 tablet (50 mg) by mouth nightly as needed for sleep    Insomnia, unspecified type

## 2018-11-19 ENCOUNTER — OFFICE VISIT (OUTPATIENT)
Dept: FAMILY MEDICINE | Facility: CLINIC | Age: 43
End: 2018-11-19
Payer: COMMERCIAL

## 2018-11-19 VITALS
BODY MASS INDEX: 23.71 KG/M2 | HEART RATE: 76 BPM | HEIGHT: 59 IN | WEIGHT: 117.6 LBS | OXYGEN SATURATION: 96 % | RESPIRATION RATE: 16 BRPM | TEMPERATURE: 98.1 F | SYSTOLIC BLOOD PRESSURE: 100 MMHG | DIASTOLIC BLOOD PRESSURE: 64 MMHG

## 2018-11-19 DIAGNOSIS — N92.1 MENORRHAGIA WITH IRREGULAR CYCLE: Primary | ICD-10-CM

## 2018-11-19 PROCEDURE — 99214 OFFICE O/P EST MOD 30 MIN: CPT | Performed by: FAMILY MEDICINE

## 2018-11-19 NOTE — MR AVS SNAPSHOT
After Visit Summary   11/19/2018    Zo Qureshi    MRN: [id number]           Patient Information     Date Of Birth          1975        Visit Information        Provider Department      11/19/2018 10:00 AM Terri Baron MD [facility]        Today's Diagnoses     Menorrhagia with irregular cycle    -  1       Follow-ups after your visit        Follow-up notes from your care team     Return in about 6 months (around 5/19/2019) for Medication recheck.      Your next 10 appointments already scheduled     Nov 30, 2018  2:30 PM CST   Office Visit with Rolando Covarrubias MD   [facility] ([facility]    [address] [place]  [address]  [facility] [address] [phone]           Bring a current list of meds and any records pertaining to this visit. For Physicals, please bring immunization records and any forms needing to be filled out. Please arrive 10 minutes early to complete paperwork.              Future tests that were ordered for you today     Open Future Orders        Priority Expected Expires Ordered    **CBC with platelets FUTURE 14d Routine 11/26/2018 12/3/2018 11/19/2018    **Basic metabolic panel FUTURE 14d Routine 11/26/2018 12/3/2018 11/19/2018            Who to contact     If you have questions or need follow up information about today's clinic visit or your schedule please contact [facility] directly at [phone].  Normal or non-critical lab and imaging results will be communicated to you by MyChart, letter or phone within 4 business days after the clinic has received the results. If you do not hear from us within 7 days, please contact the clinic through MyChart or phone. If you have a critical or abnormal lab result, we will notify you by phone as soon as possible.  Submit refill requests through GlassUp or call your pharmacy and they will forward the refill request to us. Please allow 3 business days  "for your refill to be completed.          Additional Information About Your Visit        MyChart Information     UserTestinghart gives you secure access to your electronic health record. If you see a primary care provider, you can also send messages to your care team and make appointments. If you have questions, please call your primary care clinic.  If you do not have a primary care provider, please call [phone] and they will assist you.        Care EveryWhere ID     This is your Care EveryWhere ID. This could be used by other organizations to access your [place] medical records  [id number]        Your Vitals Were     Pulse Temperature Respirations Height Last Period Pulse Oximetry    76 98.1  F (36.7  C) (Oral) 16 4' 11\" (1.499 m) 10/28/2018 (Exact Date) 96%    BMI (Body Mass Index)                   23.75 kg/m2            Blood Pressure from Last 3 Encounters:   11/19/18 100/64   10/29/18 114/78   10/08/18 120/76    Weight from Last 3 Encounters:   11/19/18 117 lb 9.6 oz (53.3 kg)   10/29/18 117 lb 9.6 oz (53.3 kg)   10/08/18 115 lb 6.4 oz (52.3 kg)               Primary Care Provider Office Phone # Fax #    Terri Baron [phone] [address] [facility] [address]        Equal Access to Services     HUMERA HOLCOMB AH: Hadii darlyn ku hadasho Soomaali, waaxda luqadaha, qaybta kaalmada isabella nam. So [facility] [phone].    ATENCIÓN: Si habla español, tiene a vogel disposición servicios gratuitos de asistencia lingüística. Llhuan al [phone].    We comply with applicable federal civil rights laws and [place] laws. We do not discriminate on the basis of race, color, national origin, age, disability, sex, sexual orientation, or gender identity.            Thank you!     Thank you for choosing [facility]  for your care. Our goal is always to provide you with excellent care. Hearing back from our patients is one way we can continue to " improve our services. Please take a few minutes to complete the written survey that you may receive in the mail after your visit with us. Thank you!             Your Updated Medication List - Protect others around you: Learn how to safely use, store and throw away your medicines at www.disposemymeds.org.          This list is accurate as of 11/19/18 10:45 AM.  Always use your most recent med list.                   Brand Name Dispense Instructions for use Diagnosis    clindamycin-benzoyl peroxide gel    BENZACLIN    50 g    Apply to  Affected area initially once daily for 2 weeks and then increase to 2 times daily if tolerated    Acne       dexamethasone 4 MG/ML injection    DECADRON    1 mL    Inject 1 mL (4 mg) as directed once for 1 dose    Subacromial impingement, unspecified laterality       diclofenac 1 % Gel topical gel    VOLTAREN    100 g    Use small amount and rub into area of chest that is hurting 1-2 times daily as needed    Costochondritis, Atypical chest pain       docusate sodium 100 MG tablet    COLACE    60 tablet    Take 100 mg by mouth daily    External hemorrhoids       DULoxetine 30 MG EC capsule    CYMBALTA    180 capsule    Take 2 capsules (60 mg) by mouth daily    Cervicalgia, Right shoulder tendonitis, Right peroneal tendinosis       ferrous sulfate 325 (65 Fe) MG tablet    IRON    60 tablet    Take 1 tablet (325 mg) by mouth 2 times daily    Iron deficiency anemia, unspecified iron deficiency anemia type       fluticasone 50 MCG/ACT spray    FLONASE    16 g    Spray 1-2 sprays into both nostrils daily    Throat pain       gabapentin 300 MG capsule    NEURONTIN    120 capsule    1 tab at 5 pm  and 2 at bedtime for (3 per day)    Right peroneal tendinosis, Right shoulder tendonitis, Cervicalgia       hydrocortisone 0.2 % cream    WESTCORT    45 g    Apply sparingly to affected area 2 times daily as needed.    Rash       loratadine 10 MG tablet    CLARITIN    30 tablet    Take 1 tablet (10  mg) by mouth daily    Hives       loratadine-pseudoePHEDrine  MG per 24 hr tablet    CLARITIN-D 24-hour    14 tablet    Take 1 tablet by mouth daily    Acute recurrent maxillary sinusitis       naproxen 500 MG tablet    NAPROSYN    60 tablet    Take 1 tablet (500 mg) by mouth 2 times daily (with meals)    Right peroneal tendinosis, Right shoulder tendonitis, Cervicalgia       PATADAY 0.2 % Soln   Generic drug:  olopatadine HCl      Reported on 5/1/2017        traMADol 50 MG tablet    ULTRAM    60 tablet    Take 1 tablet (50 mg) by mouth every 6 hours as needed for severe pain    Disorder of SI (sacroiliac) joint, Episodic tension-type headache, not intractable       traZODone 50 MG tablet    DESYREL    60 tablet    Take 1 tablet (50 mg) by mouth nightly as needed for sleep    Insomnia, unspecified type

## 2018-11-19 NOTE — PROGRESS NOTES
SUBJECTIVE:   Zo Qureshi is a 43 year old female who presents to clinic today for the following health issues:    Pt presents to the clinic for Menstrual Issues. States she has intermittent cramping pain in lower abdomen and occasional back pain. Also states she has irregular periods, such as more frequent and never on time. This has been going on for years states patient.   It is worse over the last year where her periods are heavier and closer together.   She gets clots on the first couple days.    She had ultrasound which showed 2 fibroids with the biggest being 3.3 cm/.  She has an appointment next week with GYN.    She has never had a reaction that she can remember to anesthesia in the past.   Her mother had a seizure reaction to anesthesia recently with shoulder surgery.   she  Has not had blood clots or a family history of them.       Review Of Systems  Skin: negative  Eyes: negative  Ears/Nose/Throat: negative  Respiratory: No shortness of breath, dyspnea on exertion, cough, or hemoptysis  Cardiovascular: negative  Gastrointestinal: negative  Genitourinary: negative  Musculoskeletal: positive for right shoulder and wrist pain and neck pain - ongoing workers comp issues  Neurologic: positive for headaches which is ongoing and getting better with current tx regimen  Psychiatric: negative  Hematologic/Lymphatic/Immunologic: negative  Endocrine: negative      Past Medical History:   Diagnosis Date     HSIL on Pap smear 09/21/11    MARTA 2 and 3     INFLAM SPONDYLOPATHY NOS   1/7/2008     Leukocytopenia 3/10/2014     Leukocytopenia      Tendinosis      Thrombocytopenia (H) 3/10/2014     Thrombocytopenia (H)      Unspecified closed fracture of pelvis 2000    left fracture of pelvis after delivery       Past Surgical History:   Procedure Laterality Date     C NONSPECIFIC PROCEDURE  10/00    after delivery 2 units of prbcs secondary to placenta     LEEP TX, CERVICAL  12/19/11    MARTA II     TUBAL LIGATION  5/2009     laparoscopic tubal ligation       MEDICATIONS:  Current Outpatient Prescriptions   Medication     clindamycin-benzoyl peroxide (BENZACLIN) gel     diclofenac (VOLTAREN) 1 % GEL topical gel     docusate sodium (COLACE) 100 MG tablet     DULoxetine (CYMBALTA) 30 MG EC capsule     ferrous sulfate (IRON) 325 (65 Fe) MG tablet     fluticasone (FLONASE) 50 MCG/ACT spray     gabapentin (NEURONTIN) 300 MG capsule     hydrocortisone (WESTCORT) 0.2 % cream     naproxen (NAPROSYN) 500 MG tablet     PATADAY 0.2 % SOLN     traMADol (ULTRAM) 50 MG tablet     traZODone (DESYREL) 50 MG tablet     dexamethasone (DECADRON) 4 MG/ML injection     loratadine (CLARITIN) 10 MG tablet     loratadine-pseudoePHEDrine (CLARITIN-D 24-HOUR)  MG per 24 hr tablet     No current facility-administered medications for this visit.        SOCIAL HISTORY:  Social History   Substance Use Topics     Smoking status: Never Smoker     Smokeless tobacco: Never Used     Alcohol use 0.0 oz/week     0 Standard drinks or equivalent per week      Comment: rarely       Family History   Problem Relation Age of Onset     Hypertension Father      Lipids Father      Hypertension Mother      Lipids Mother      Diabetes No family hx of      Coronary Artery Disease No family hx of      Hyperlipidemia No family hx of      Cerebrovascular Disease No family hx of      Breast Cancer No family hx of      Colon Cancer No family hx of      Prostate Cancer No family hx of      Other Cancer No family hx of      Depression No family hx of      Anxiety Disorder No family hx of      Mental Illness No family hx of      Substance Abuse No family hx of      Anesthesia Reaction No family hx of      Asthma No family hx of      Osteoporosis No family hx of      Genetic Disorder No family hx of      Thyroid Disease No family hx of      Obesity No family hx of      Unknown/Adopted No family hx of        Objective:  Blood pressure 100/64, pulse 76, temperature 98.1  F (36.7  C),  "temperature source Oral, resp. rate 16, height 4' 11\" (1.499 m), weight 117 lb 9.6 oz (53.3 kg), last menstrual period 10/28/2018, SpO2 96 %, not currently breastfeeding.  HEENT:  TM's are clear bilaterally.  Oropharynx is clear without tonsillar hypertrophy or exudate.  Conjuctiva are clear.  Neck:  There is no lymphadenopathy or thyroid tenderness or enlargement  Chest: Clear to auscultation bilaterally.  No wheezes, rales or retractions.  CV: Regular rate and rhythm without murmurs, rubs or gallops.  ABDOMEN:  Positive bowel sounds, soft, nondistended and nontender.  No masses are palpated and there is no organomegaly or inguinal lymphadenopathy.  Extremities: There is no clubbing, cyanosis or edema.  Peripheral pulses are present bilaterally in the radial and dorsalis pedis arteries.  Skin: free of rash or abnormal looking lesion(s)    Component      Latest Ref Rng & Units 10/8/2018   WBC      4.0 - 11.0 10e9/L 7.2   RBC Count      3.8 - 5.2 10e12/L 3.88   Hemoglobin      11.7 - 15.7 g/dL 11.7   Hematocrit      35.0 - 47.0 % 36.6   MCV      78 - 100 fl 94   MCH      26.5 - 33.0 pg 30.2   MCHC      31.5 - 36.5 g/dL 32.0   RDW      10.0 - 15.0 % 12.2   Platelet Count      150 - 450 10e9/L 251       Pelvic ultrasound results:  1. Two probable uterine fibroids, a 1.8 cm intracavitary submucosal  fibroid and a 3.3 cm subserosal fibroid.  2. Unremarkable appearance of the ovaries.  3. No free fluid in the pelvis.    Assessment:  1. Uterine fibroids  2. Heavy and frequent periods  3. Fit for surgery with no further work up other than future labs ordered - if she has surgery stop the naproxen 7 days prior to surgery      Plan:  1. This may count as her preop if surgery in next 30 days  2. Fit for surgery  3. Discussed fibroids and gave handout  4. Discussed hysterectomy - vaginal vs lap hyst vs fibroidectomy - patient has had tubal ligation and does not desire any more children  5. Ordered future labs for her in case " surgery is scheduled in next 30 days  6. Has appt next week with GYN

## 2018-11-30 ENCOUNTER — TELEPHONE (OUTPATIENT)
Dept: OBGYN | Facility: CLINIC | Age: 43
End: 2018-11-30

## 2018-11-30 ENCOUNTER — OFFICE VISIT (OUTPATIENT)
Dept: OBGYN | Facility: CLINIC | Age: 43
End: 2018-11-30
Payer: COMMERCIAL

## 2018-11-30 VITALS
DIASTOLIC BLOOD PRESSURE: 76 MMHG | HEART RATE: 72 BPM | BODY MASS INDEX: 23.63 KG/M2 | WEIGHT: 117 LBS | SYSTOLIC BLOOD PRESSURE: 120 MMHG

## 2018-11-30 DIAGNOSIS — D25.0 SUBMUCOUS LEIOMYOMA OF UTERUS: ICD-10-CM

## 2018-11-30 DIAGNOSIS — N92.0 MENORRHAGIA WITH REGULAR CYCLE: Primary | ICD-10-CM

## 2018-11-30 PROCEDURE — 99213 OFFICE O/P EST LOW 20 MIN: CPT | Performed by: OBSTETRICS & GYNECOLOGY

## 2018-11-30 NOTE — PROGRESS NOTES
SUBJECTIVE:  Zo Qureshi is an 43 year old  P4 woman who presents for discussion of abnormal bleeding and ultrasound      -findings        Menses every 26-28 days; 5-7 days of flow, very heavy first 2-3 days        -compromised ability to perform work activities      Menses associated with pain            Ultrasound; submucous leiomyoma/prominent endometrium/normal ovaries       Does not desire any further children; has tubal ligation    Past Medical History:   Diagnosis Date     HSIL on Pap smear 11    MARTA 2 and 3     INFLAM SPONDYLOPATHY NOS   2008     Leukocytopenia 3/10/2014     Leukocytopenia      Tendinosis      Thrombocytopenia (H) 3/10/2014     Thrombocytopenia (H)      Unspecified closed fracture of pelvis     left fracture of pelvis after delivery     Past Surgical History:   Procedure Laterality Date     C NONSPECIFIC PROCEDURE  10/00    after delivery 2 units of prbcs secondary to placenta     LEEP TX, CERVICAL  11    MARTA II     TUBAL LIGATION  2009    laparoscopic tubal ligation     Current Outpatient Prescriptions   Medication     ferrous sulfate (IRON) 325 (65 Fe) MG tablet     clindamycin-benzoyl peroxide (BENZACLIN) gel     dexamethasone (DECADRON) 4 MG/ML injection     diclofenac (VOLTAREN) 1 % GEL topical gel     docusate sodium (COLACE) 100 MG tablet     DULoxetine (CYMBALTA) 30 MG EC capsule     fluticasone (FLONASE) 50 MCG/ACT spray     gabapentin (NEURONTIN) 300 MG capsule     hydrocortisone (WESTCORT) 0.2 % cream     loratadine (CLARITIN) 10 MG tablet     loratadine-pseudoePHEDrine (CLARITIN-D 24-HOUR)  MG per 24 hr tablet     naproxen (NAPROSYN) 500 MG tablet     PATADAY 0.2 % SOLN     traMADol (ULTRAM) 50 MG tablet     traZODone (DESYREL) 50 MG tablet     No current facility-administered medications for this visit.      Allergies   Allergen Reactions     Advil [Ibuprofen Micronized]      Rash      Contrast Dye      Percocet [Oxycodone-Acetaminophen] Nausea  and Vomiting and Itching     Social History   Substance Use Topics     Smoking status: Never Smoker     Smokeless tobacco: Never Used     Alcohol use 0.0 oz/week     0 Standard drinks or equivalent per week      Comment: rarely       Review of Systems  CONSTITUTIONAL:NEGATIVE  GI: NEGATIVE  : see above  PSYCHIATRIC: NEGATIVE    OBJECTIVE:  /76 (BP Location: Right arm, Patient Position: Chair, Cuff Size: Adult Regular)  Pulse 72  Wt 117 lb (53.1 kg)  LMP 11/25/2018 (Exact Date)  BMI 23.63 kg/m2   EXAM:  GENERAL APPEARANCE: healthy, alert and no distress      ASSESSMENT:  Abnormal uterine bleeding/submucous leiomyoma  Differential diagnosis (PALM-COEIN) reviewed; therapies discussed, OCP, Mirena, endometrial ablation with Myosure, hysterectomy      -she wishes to proceed with option 3    PLAN:  D&C/hysteroscopy/Novasure endometrial ablation/Mysoure resection of submucous leiomyoma       -Risks, benefits, alternatives discussed; including but not limited to risk of infection, bleeding, damage to bowel/bladder and any other intra-abdominal viscera.  Risks also include risks of anesthesia and blood transfusion. Patient understands and agrees to planned procedure.          Health Maintenance   Topic Date Due     PHQ-9 Q1YR  02/21/2019     URINE DRUG SCREEN Q1 YR  04/23/2019     TETANUS IMMUNIZATION (SYSTEM ASSIGNED)  05/13/2019     JOSE DAVID QUESTIONNAIRE 1 YEAR  10/08/2019     PAP Q3 YR  02/21/2021     HIV SCREEN (SYSTEM ASSIGNED)  Completed     INFLUENZA VACCINE  Completed

## 2018-11-30 NOTE — TELEPHONE ENCOUNTER
Type of surgery: novasure endometrial ablation, myosure resection of leiomyoma, d&c  Location of surgery: [facility]  Date and time of surgery: 12/4/18 @ 11:20 am  Surgeon: Dr. Covarrubias  Pre-Op Appt Date: 11/19/18  Post-Op Appt Date:    Packet sent out: Yes  Pre-cert/Authorization completed:  No  Date: 11/30/18

## 2018-11-30 NOTE — MR AVS SNAPSHOT
After Visit Summary   11/30/2018    Zo Qureshi    MRN: [id number]           Patient Information     Date Of Birth          1975        Visit Information        Provider Department      11/30/2018 2:30 PM Rolando Covarrubias MD [facility]        Today's Diagnoses     Menorrhagia with regular cycle    -  1    Submucous leiomyoma of uterus           Follow-ups after your visit        Your next 10 appointments already scheduled     Dec 04, 2018   Procedure with Rolando Covarrubias MD   [facility] (--)    [address] [facility] [id number] [phone]              Who to contact     If you have questions or need follow up information about today's clinic visit or your schedule please contact [facility] directly at [phone].  Normal or non-critical lab and imaging results will be communicated to you by MyChart, letter or phone within 4 business days after the clinic has received the results. If you do not hear from us within 7 days, please contact the clinic through MyChart or phone. If you have a critical or abnormal lab result, we will notify you by phone as soon as possible.  Submit refill requests through SpineForm or call your pharmacy and they will forward the refill request to us. Please allow 3 business days for your refill to be completed.          Additional Information About Your Visit        MyChart Information     SpineForm gives you secure access to your electronic health record. If you see a primary care provider, you can also send messages to your care team and make appointments. If you have questions, please call your primary care clinic.  If you do not have a primary care provider, please call [phone] and they will assist you.        Care EveryWhere ID     This is your Care EveryWhere ID. This could be used by other organizations to access your [place] medical records  [id number]        Your Vitals Were      Pulse Last Period BMI (Body Mass Index)             72 11/25/2018 (Exact Date) 23.63 kg/m2          Blood Pressure from Last 3 Encounters:   11/30/18 120/76   11/19/18 100/64   10/29/18 114/78    Weight from Last 3 Encounters:   11/30/18 117 lb (53.1 kg)   11/19/18 117 lb 9.6 oz (53.3 kg)   10/29/18 117 lb 9.6 oz (53.3 kg)              Today, you had the following     No orders found for display       Primary Care Provider Office Phone # Fax #    Terri DENNIS Baron [phone] [address] [facility] [address]        Equal Access to Services     HUMERA HOLCOMB : Bhanu De La Cruz, waperlita mccullough, qaybta kaalmada cyril, isabella simpson . So [facility] [phone].    ATENCIÓN: Si habla español, tiene a vogel disposición servicios gratuitos de asistencia lingüística. [facility] [phone].    We comply with applicable federal civil rights laws and [place] laws. We do not discriminate on the basis of race, color, national origin, age, disability, sex, sexual orientation, or gender identity.            Thank you!     Thank you for choosing [facility]  for your care. Our goal is always to provide you with excellent care. Hearing back from our patients is one way we can continue to improve our services. Please take a few minutes to complete the written survey that you may receive in the mail after your visit with us. Thank you!             Your Updated Medication List - Protect others around you: Learn how to safely use, store and throw away your medicines at www.disposemymeds.org.          This list is accurate as of 11/30/18  3:20 PM.  Always use your most recent med list.                   Brand Name Dispense Instructions for use Diagnosis    clindamycin-benzoyl peroxide 1-5 % external gel    BENZACLIN    50 g    Apply to  Affected area initially once daily for 2 weeks and then increase to 2 times daily if tolerated    Acne       dexamethasone 4  MG/ML injection    DECADRON    1 mL    Inject 1 mL (4 mg) as directed once for 1 dose    Subacromial impingement, unspecified laterality       diclofenac 1 % topical gel    VOLTAREN    100 g    Use small amount and rub into area of chest that is hurting 1-2 times daily as needed    Costochondritis, Atypical chest pain       docusate sodium 100 MG tablet    COLACE    60 tablet    Take 100 mg by mouth daily    External hemorrhoids       DULoxetine 30 MG capsule    CYMBALTA    180 capsule    Take 2 capsules (60 mg) by mouth daily    Cervicalgia, Right shoulder tendonitis, Right peroneal tendinosis       ferrous sulfate 325 (65 Fe) MG tablet    FEROSUL    60 tablet    Take 1 tablet (325 mg) by mouth 2 times daily    Iron deficiency anemia, unspecified iron deficiency anemia type       fluticasone 50 MCG/ACT nasal spray    FLONASE    16 g    Spray 1-2 sprays into both nostrils daily    Throat pain       gabapentin 300 MG capsule    NEURONTIN    120 capsule    1 tab at 5 pm  and 2 at bedtime for (3 per day)    Right peroneal tendinosis, Right shoulder tendonitis, Cervicalgia       hydrocortisone 0.2 % external cream    WESTCORT    45 g    Apply sparingly to affected area 2 times daily as needed.    Rash       loratadine 10 MG tablet    CLARITIN    30 tablet    Take 1 tablet (10 mg) by mouth daily    Hives       loratadine-pseudoePHEDrine  MG 24 hr tablet    CLARITIN-D 24-HOUR    14 tablet    Take 1 tablet by mouth daily    Acute recurrent maxillary sinusitis       naproxen 500 MG tablet    NAPROSYN    60 tablet    Take 1 tablet (500 mg) by mouth 2 times daily (with meals)    Right peroneal tendinosis, Right shoulder tendonitis, Cervicalgia       PATADAY 0.2 % ophthalmic solution   Generic drug:  olopatadine      Reported on 5/1/2017        traMADol 50 MG tablet    ULTRAM    60 tablet    Take 1 tablet (50 mg) by mouth every 6 hours as needed for severe pain    Disorder of SI (sacroiliac) joint, Episodic tension-type  headache, not intractable       traZODone 50 MG tablet    DESYREL    60 tablet    Take 1 tablet (50 mg) by mouth nightly as needed for sleep    Insomnia, unspecified type

## 2018-11-30 NOTE — NURSING NOTE
"Chief Complaint   Patient presents with     RECHECK     discuss US done on 10/09/18 - last pap 18       Initial /76 (BP Location: Right arm, Patient Position: Chair, Cuff Size: Adult Regular)  Pulse 72  Wt 117 lb (53.1 kg)  LMP 2018 (Exact Date)  BMI 23.63 kg/m2 Estimated body mass index is 23.63 kg/(m^2) as calculated from the following:    Height as of 18: 4' 11\" (1.499 m).    Weight as of this encounter: 117 lb (53.1 kg).  BP completed using cuff size: regular    Questioned patient about current smoking habits.  Pt. has never smoked.          The following HM Due: NONE    Nurse assisted visit.  Unique Mcneil MA.               "

## 2018-12-04 ENCOUNTER — HOSPITAL ENCOUNTER (OUTPATIENT)
Facility: CLINIC | Age: 43
Discharge: HOME | End: 2018-12-04
Attending: OBSTETRICS & GYNECOLOGY | Admitting: OBSTETRICS & GYNECOLOGY
Payer: COMMERCIAL

## 2018-12-04 ENCOUNTER — ANESTHESIA EVENT (OUTPATIENT)
Dept: SURGERY | Facility: CLINIC | Age: 43
End: 2018-12-04
Payer: COMMERCIAL

## 2018-12-04 ENCOUNTER — ANESTHESIA (OUTPATIENT)
Dept: SURGERY | Facility: CLINIC | Age: 43
End: 2018-12-04
Payer: COMMERCIAL

## 2018-12-04 VITALS
HEIGHT: 59 IN | TEMPERATURE: 98.3 F | DIASTOLIC BLOOD PRESSURE: 75 MMHG | WEIGHT: 115 LBS | BODY MASS INDEX: 23.18 KG/M2 | RESPIRATION RATE: 15 BRPM | SYSTOLIC BLOOD PRESSURE: 119 MMHG | OXYGEN SATURATION: 99 %

## 2018-12-04 DIAGNOSIS — R10.9 POSTOPERATIVE ABDOMINAL PAIN: Primary | ICD-10-CM

## 2018-12-04 DIAGNOSIS — G89.18 POSTOPERATIVE ABDOMINAL PAIN: Primary | ICD-10-CM

## 2018-12-04 LAB — HCG UR QL: NEGATIVE

## 2018-12-04 PROCEDURE — 88305 TISSUE EXAM BY PATHOLOGIST: CPT | Mod: 26 | Performed by: OBSTETRICS & GYNECOLOGY

## 2018-12-04 PROCEDURE — 25000132 ZZH RX MED GY IP 250 OP 250 PS 637: Performed by: OBSTETRICS & GYNECOLOGY

## 2018-12-04 PROCEDURE — 58563 HYSTEROSCOPY ABLATION: CPT | Mod: 51 | Performed by: OBSTETRICS & GYNECOLOGY

## 2018-12-04 PROCEDURE — 25000566 ZZH SEVOFLURANE, EA 15 MIN: Performed by: OBSTETRICS & GYNECOLOGY

## 2018-12-04 PROCEDURE — 71000013 ZZH RECOVERY PHASE 1 LEVEL 1 EA ADDTL HR: Performed by: OBSTETRICS & GYNECOLOGY

## 2018-12-04 PROCEDURE — 37000008 ZZH ANESTHESIA TECHNICAL FEE, 1ST 30 MIN: Performed by: OBSTETRICS & GYNECOLOGY

## 2018-12-04 PROCEDURE — 58561 HYSTEROSCOPY REMOVE MYOMA: CPT | Performed by: OBSTETRICS & GYNECOLOGY

## 2018-12-04 PROCEDURE — 27210794 ZZH OR GENERAL SUPPLY STERILE: Performed by: OBSTETRICS & GYNECOLOGY

## 2018-12-04 PROCEDURE — 25000128 H RX IP 250 OP 636: Performed by: ANESTHESIOLOGY

## 2018-12-04 PROCEDURE — 88305 TISSUE EXAM BY PATHOLOGIST: CPT | Performed by: OBSTETRICS & GYNECOLOGY

## 2018-12-04 PROCEDURE — 36000058 ZZH SURGERY LEVEL 3 EA 15 ADDTL MIN: Performed by: OBSTETRICS & GYNECOLOGY

## 2018-12-04 PROCEDURE — 71000012 ZZH RECOVERY PHASE 1 LEVEL 1 FIRST HR: Performed by: OBSTETRICS & GYNECOLOGY

## 2018-12-04 PROCEDURE — 71000027 ZZH RECOVERY PHASE 2 EACH 15 MINS: Performed by: OBSTETRICS & GYNECOLOGY

## 2018-12-04 PROCEDURE — 40000306 ZZH STATISTIC PRE PROC ASSESS II: Performed by: OBSTETRICS & GYNECOLOGY

## 2018-12-04 PROCEDURE — 81025 URINE PREGNANCY TEST: CPT | Performed by: ANESTHESIOLOGY

## 2018-12-04 PROCEDURE — 25000125 ZZHC RX 250: Performed by: NURSE ANESTHETIST, CERTIFIED REGISTERED

## 2018-12-04 PROCEDURE — 93010 ELECTROCARDIOGRAM REPORT: CPT | Performed by: INTERNAL MEDICINE

## 2018-12-04 PROCEDURE — 36000056 ZZH SURGERY LEVEL 3 1ST 30 MIN: Performed by: OBSTETRICS & GYNECOLOGY

## 2018-12-04 PROCEDURE — 25800025 ZZH RX 258: Performed by: OBSTETRICS & GYNECOLOGY

## 2018-12-04 PROCEDURE — C1888 ENDOVAS NON-CARDIAC ABL CATH: HCPCS | Performed by: OBSTETRICS & GYNECOLOGY

## 2018-12-04 PROCEDURE — 25000128 H RX IP 250 OP 636: Performed by: NURSE ANESTHETIST, CERTIFIED REGISTERED

## 2018-12-04 PROCEDURE — 37000009 ZZH ANESTHESIA TECHNICAL FEE, EACH ADDTL 15 MIN: Performed by: OBSTETRICS & GYNECOLOGY

## 2018-12-04 RX ORDER — ONDANSETRON 4 MG/1
4 TABLET, ORALLY DISINTEGRATING ORAL
Status: DISCONTINUED | OUTPATIENT
Start: 2018-12-04 | End: 2018-12-04 | Stop reason: HOSPADM

## 2018-12-04 RX ORDER — FENTANYL CITRATE 50 UG/ML
INJECTION, SOLUTION INTRAMUSCULAR; INTRAVENOUS PRN
Status: DISCONTINUED | OUTPATIENT
Start: 2018-12-04 | End: 2018-12-04

## 2018-12-04 RX ORDER — ONDANSETRON 4 MG/1
4-8 TABLET, ORALLY DISINTEGRATING ORAL EVERY 8 HOURS PRN
Qty: 4 TABLET | Refills: 0 | Status: SHIPPED | OUTPATIENT
Start: 2018-12-04 | End: 2019-07-08

## 2018-12-04 RX ORDER — LIDOCAINE 40 MG/G
CREAM TOPICAL
Status: DISCONTINUED | OUTPATIENT
Start: 2018-12-04 | End: 2018-12-04 | Stop reason: HOSPADM

## 2018-12-04 RX ORDER — GLYCOPYRROLATE 0.2 MG/ML
INJECTION, SOLUTION INTRAMUSCULAR; INTRAVENOUS PRN
Status: DISCONTINUED | OUTPATIENT
Start: 2018-12-04 | End: 2018-12-04

## 2018-12-04 RX ORDER — FENTANYL CITRATE 50 UG/ML
25-50 INJECTION, SOLUTION INTRAMUSCULAR; INTRAVENOUS
Status: DISCONTINUED | OUTPATIENT
Start: 2018-12-04 | End: 2018-12-04 | Stop reason: HOSPADM

## 2018-12-04 RX ORDER — DIPHENHYDRAMINE HYDROCHLORIDE 50 MG/ML
25 INJECTION INTRAMUSCULAR; INTRAVENOUS EVERY 6 HOURS PRN
Status: DISCONTINUED | OUTPATIENT
Start: 2018-12-04 | End: 2018-12-04 | Stop reason: HOSPADM

## 2018-12-04 RX ORDER — PROPOFOL 10 MG/ML
INJECTION, EMULSION INTRAVENOUS CONTINUOUS PRN
Status: DISCONTINUED | OUTPATIENT
Start: 2018-12-04 | End: 2018-12-04

## 2018-12-04 RX ORDER — LABETALOL HYDROCHLORIDE 5 MG/ML
10 INJECTION, SOLUTION INTRAVENOUS
Status: DISCONTINUED | OUTPATIENT
Start: 2018-12-04 | End: 2018-12-04 | Stop reason: HOSPADM

## 2018-12-04 RX ORDER — NALOXONE HYDROCHLORIDE 0.4 MG/ML
.1-.4 INJECTION, SOLUTION INTRAMUSCULAR; INTRAVENOUS; SUBCUTANEOUS
Status: DISCONTINUED | OUTPATIENT
Start: 2018-12-04 | End: 2018-12-04 | Stop reason: HOSPADM

## 2018-12-04 RX ORDER — ONDANSETRON 2 MG/ML
4 INJECTION INTRAMUSCULAR; INTRAVENOUS EVERY 30 MIN PRN
Status: DISCONTINUED | OUTPATIENT
Start: 2018-12-04 | End: 2018-12-04 | Stop reason: HOSPADM

## 2018-12-04 RX ORDER — DEXAMETHASONE SODIUM PHOSPHATE 4 MG/ML
INJECTION, SOLUTION INTRA-ARTICULAR; INTRALESIONAL; INTRAMUSCULAR; INTRAVENOUS; SOFT TISSUE PRN
Status: DISCONTINUED | OUTPATIENT
Start: 2018-12-04 | End: 2018-12-04

## 2018-12-04 RX ORDER — LIDOCAINE HYDROCHLORIDE 10 MG/ML
INJECTION, SOLUTION INFILTRATION; PERINEURAL PRN
Status: DISCONTINUED | OUTPATIENT
Start: 2018-12-04 | End: 2018-12-04

## 2018-12-04 RX ORDER — PROPOFOL 10 MG/ML
INJECTION, EMULSION INTRAVENOUS PRN
Status: DISCONTINUED | OUTPATIENT
Start: 2018-12-04 | End: 2018-12-04

## 2018-12-04 RX ORDER — ONDANSETRON 4 MG/1
4 TABLET, ORALLY DISINTEGRATING ORAL EVERY 30 MIN PRN
Status: DISCONTINUED | OUTPATIENT
Start: 2018-12-04 | End: 2018-12-04 | Stop reason: HOSPADM

## 2018-12-04 RX ORDER — HYDROCODONE BITARTRATE AND ACETAMINOPHEN 5; 325 MG/1; MG/1
1 TABLET ORAL
Status: COMPLETED | OUTPATIENT
Start: 2018-12-04 | End: 2018-12-04

## 2018-12-04 RX ORDER — SODIUM CHLORIDE, SODIUM LACTATE, POTASSIUM CHLORIDE, CALCIUM CHLORIDE 600; 310; 30; 20 MG/100ML; MG/100ML; MG/100ML; MG/100ML
INJECTION, SOLUTION INTRAVENOUS CONTINUOUS
Status: DISCONTINUED | OUTPATIENT
Start: 2018-12-04 | End: 2018-12-04 | Stop reason: HOSPADM

## 2018-12-04 RX ORDER — MEPERIDINE HYDROCHLORIDE 50 MG/ML
12.5 INJECTION INTRAMUSCULAR; INTRAVENOUS; SUBCUTANEOUS
Status: DISCONTINUED | OUTPATIENT
Start: 2018-12-04 | End: 2018-12-04 | Stop reason: HOSPADM

## 2018-12-04 RX ORDER — HYDROCODONE BITARTRATE AND ACETAMINOPHEN 5; 325 MG/1; MG/1
1-2 TABLET ORAL EVERY 4 HOURS PRN
Qty: 10 TABLET | Refills: 0 | Status: SHIPPED | OUTPATIENT
Start: 2018-12-04 | End: 2019-07-08

## 2018-12-04 RX ORDER — KETOROLAC TROMETHAMINE 30 MG/ML
INJECTION, SOLUTION INTRAMUSCULAR; INTRAVENOUS PRN
Status: DISCONTINUED | OUTPATIENT
Start: 2018-12-04 | End: 2018-12-04

## 2018-12-04 RX ORDER — ONDANSETRON 2 MG/ML
INJECTION INTRAMUSCULAR; INTRAVENOUS PRN
Status: DISCONTINUED | OUTPATIENT
Start: 2018-12-04 | End: 2018-12-04

## 2018-12-04 RX ORDER — HYDROCODONE BITARTRATE AND ACETAMINOPHEN 5; 325 MG/1; MG/1
1 TABLET ORAL ONCE
Status: COMPLETED | OUTPATIENT
Start: 2018-12-04 | End: 2018-12-04

## 2018-12-04 RX ADMIN — DEXAMETHASONE SODIUM PHOSPHATE 8 MG: 4 INJECTION, SOLUTION INTRA-ARTICULAR; INTRALESIONAL; INTRAMUSCULAR; INTRAVENOUS; SOFT TISSUE at 11:37

## 2018-12-04 RX ADMIN — GLYCOPYRROLATE 0.2 MG: 0.2 INJECTION, SOLUTION INTRAMUSCULAR; INTRAVENOUS at 11:37

## 2018-12-04 RX ADMIN — PROPOFOL 50 MCG/KG/MIN: 10 INJECTION, EMULSION INTRAVENOUS at 11:40

## 2018-12-04 RX ADMIN — FENTANYL CITRATE 50 MCG: 50 INJECTION, SOLUTION INTRAMUSCULAR; INTRAVENOUS at 13:23

## 2018-12-04 RX ADMIN — SODIUM CHLORIDE, POTASSIUM CHLORIDE, SODIUM LACTATE AND CALCIUM CHLORIDE: 600; 310; 30; 20 INJECTION, SOLUTION INTRAVENOUS at 11:30

## 2018-12-04 RX ADMIN — KETOROLAC TROMETHAMINE 30 MG: 30 INJECTION, SOLUTION INTRAMUSCULAR at 12:12

## 2018-12-04 RX ADMIN — HYDROCODONE BITARTRATE AND ACETAMINOPHEN 1 TABLET: 5; 325 TABLET ORAL at 15:57

## 2018-12-04 RX ADMIN — PROPOFOL 200 MG: 10 INJECTION, EMULSION INTRAVENOUS at 11:37

## 2018-12-04 RX ADMIN — DIPHENHYDRAMINE HYDROCHLORIDE 25 MG: 50 INJECTION, SOLUTION INTRAMUSCULAR; INTRAVENOUS at 14:37

## 2018-12-04 RX ADMIN — FENTANYL CITRATE 50 MCG: 50 INJECTION, SOLUTION INTRAMUSCULAR; INTRAVENOUS at 13:35

## 2018-12-04 RX ADMIN — HYDROCODONE BITARTRATE AND ACETAMINOPHEN 1 TABLET: 5; 325 TABLET ORAL at 14:03

## 2018-12-04 RX ADMIN — SODIUM CHLORIDE, POTASSIUM CHLORIDE, SODIUM LACTATE AND CALCIUM CHLORIDE: 600; 310; 30; 20 INJECTION, SOLUTION INTRAVENOUS at 13:37

## 2018-12-04 RX ADMIN — ONDANSETRON 4 MG: 2 INJECTION INTRAMUSCULAR; INTRAVENOUS at 11:53

## 2018-12-04 RX ADMIN — PHENYLEPHRINE HYDROCHLORIDE 100 MCG: 10 INJECTION, SOLUTION INTRAMUSCULAR; INTRAVENOUS; SUBCUTANEOUS at 11:47

## 2018-12-04 RX ADMIN — FENTANYL CITRATE 100 MCG: 50 INJECTION, SOLUTION INTRAMUSCULAR; INTRAVENOUS at 11:37

## 2018-12-04 RX ADMIN — MIDAZOLAM 2 MG: 1 INJECTION INTRAMUSCULAR; INTRAVENOUS at 11:30

## 2018-12-04 RX ADMIN — LIDOCAINE HYDROCHLORIDE 30 MG: 10 INJECTION, SOLUTION INFILTRATION; PERINEURAL at 11:37

## 2018-12-04 NOTE — IP AVS SNAPSHOT
MRN:[id number]                      After Visit Summary   12/4/2018    Zo Qureshi    MRN: [id number]           Thank you!     Thank you for choosing [facility] for your care. Our goal is always to provide you with excellent care. Hearing back from our patients is one way we can continue to improve our services. Please take a few minutes to complete the written survey that you may receive in the mail after you visit. If you would like to speak to someone directly about your visit please contact Patient Relations at [phone]. Thank you!          Patient Information     Date Of Birth          1975        About your hospital stay     You were admitted on:  December 4, 2018 You last received care in the:  [facility] PreOP/PostOP    You were discharged on:  December 4, 2018       Who to Call     For medical emergencies, please call 911.  For non-urgent questions about your medical care, please call your primary care provider or clinic, [phone]  For questions related to your surgery, please call your surgery clinic        Attending Provider     Provider Rolando Steele MD OB/Gyn       Primary Care Provider Office Phone # Fax #    Terri Baron [phone]      After Care Instructions     Discharge Instructions       Resume pre procedure diet            Discharge Instructions       Pelvic Rest. No tampons, douching or intercourse for  2  weeks.            Discharge Instructions       Patient to arrange follow up appointment in 1-2  weeks            No driving or operating machinery       No driving or operating machinery until day after procedure            Shower        Shower on Post-op day  1                  Further instructions from your care team       DR. ROLANDO ALVARADO M.D.   CLINIC PHONE NUMBER:  [phone].    GENERAL ANESTHESIA OR SEDATION ADULT DISCHARGE INSTRUCTIONS   SPECIAL PRECAUTIONS FOR 24 HOURS AFTER SURGERY    IT  IS NOT UNUSUAL TO FEEL LIGHT-HEADED OR FAINT, UP TO 24 HOURS AFTER SURGERY OR WHILE TAKING PAIN MEDICATION.  IF YOU HAVE THESE SYMPTOMS; SIT FOR A FEW MINUTES BEFORE STANDING AND HAVE SOMEONE ASSIST YOU WHEN YOU GET UP TO WALK OR USE THE BATHROOM.    YOU SHOULD REST AND RELAX FOR THE NEXT 24 HOURS AND YOU MUST MAKE ARRANGEMENTS TO HAVE SOMEONE STAY WITH YOU FOR AT LEAST 24 HOURS AFTER YOUR DISCHARGE.  AVOID HAZARDOUS AND STRENUOUS ACTIVITIES.  DO NOT MAKE IMPORTANT DECISIONS FOR 24 HOURS.    DO NOT DRIVE ANY VEHICLE OR OPERATE MECHANICAL EQUIPMENT FOR 24 HOURS FOLLOWING THE END OF YOUR SURGERY.  EVEN THOUGH YOU MAY FEEL NORMAL, YOUR REACTIONS MAY BE AFFECTED BY THE MEDICATION YOU HAVE RECEIVED.    DO NOT DRINK ALCOHOLIC BEVERAGES FOR 24 HOURS FOLLOWING YOUR SURGERY.    DRINK CLEAR LIQUIDS (APPLE JUICE, GINGER ALE, 7-UP, BROTH, ETC.).  PROGRESS TO YOUR REGULAR DIET AS YOU FEEL ABLE.    YOU MAY HAVE A DRY MOUTH, A SORE THROAT, MUSCLES ACHES OR TROUBLE SLEEPING.  THESE SHOULD GO AWAY AFTER 24 HOURS.    CALL YOUR DOCTOR FOR ANY OF THE FOLLOWING:  SIGNS OF INFECTION (FEVER, GROWING TENDERNESS AT THE SURGERY SITE, A LARGE AMOUNT OF DRAINAGE OR BLEEDING, SEVERE PAIN, FOUL-SMELLING DRAINAGE, REDNESS OR SWELLING.    IT HAS BEEN OVER 8 TO 10 HOURS SINCE SURGERY AND YOU ARE STILL NOT ABLE TO URINATE (PASS WATER).      HYSTEROSCOPY  DISCHARGE INSTRUCTIONS      DO NOT DRIVE A CAR, DRINK ALCOHOL OR USE MACHINERY FOR THE NEXT 24 HOURS.  YOU SHOULD WAIT UNTIL YOU HAVE RECOVERED BEFORE MAKING ANY IMPORTANT DECISIONS.    PAIN  YOU MAY HAVE CRAMPS, SHOULDER PAIN OR A LOW BACKACHE FOR 24 TO 48 HOURS.  TYLENOL (ACETAMINOPHEN) OR MOTRIN (IBUPROFEN) MAY HELP, OR YOUR DOCTOR MAY GIVE YOU PAIN MEDICINE.  CALL YOUR DOCTOR IF PAIN CANNOT BE CONTROLLED.    BLEEDING OR VAGINAL DISCHARGE  YOU MAY HAVE SOME BLEEDING OR DISCHARGE FOR UP TO A WEEK OR LONGER.  DO NOT DOUCHE, USE TAMPONS OR HAVE SEX (INTERCOURSE) FOR 14 DAYS.  CALL YOUR DOCTOR IF YOU  "SOAK MORE THAN ONE MAXI PAD (SANITARY NAPKIN) PER HOUR, OR IF YOU PASS LARGE BLOOD CLOTS.    FEVER  YOU MAY HAVE A LOW FEVER FOR THE FIRST TWO DAYS.  CALL YOUR DOCTOR IF IT GOES OVER 101 DEGREES.    NAUSEA  IF YOU HAVE NAUSEA (FEEL SICK TO YOUR STOMACH), STAY IN BED.  TRY DRINKING A SMALL AMOUNT OF 7-UP, TEA OR SOUP.    SWOLLEN BELLY  IF YOUR ABDOMEN (BELLY AREA) FEELS FIRM OR SWOLLEN, CALL YOUR DOCTOR.    DIZZINESS AND WEAKNESS  YOU MAY FEEL DIZZY OR WEAK FOR A FEW DAYS.  IF SO, YOU SHOULD REST OFTEN, STAND UP SLOWLY AND USE CARE WHEN CLIMBING STAIRS.    DIET AND ACTIVITY  EAT LIGHT MEALS AND DRINK PLENTY OF FLUIDS FOR THE FIRST 24 HOURS (OR LONGER, IF YOU HAVE NAUSEA).  WAIT 5 DAYS BEFORE BATHING.  SHOWERS ARE OKAY.  MOST WOMEN CAN RETURN TO WORK AFTER 24 HOURS.  YOU MAY GO BACK TO YOUR OTHER ACTIVITIES AFTER YOUR PAIN GOES AWAY.        IF YOU HAVE STITCHES  YOU MAY REMOVE YOUR BANDAGE THE DAY AFTER TREATMENT.  YOUR DOCTOR WILL TELL YOU IF YOUR STITCHES NEED TO BE REMOVED.  SOME STITCHES DISSOLVE OVER TIME.         You received Toradol, an IV form of ibuprofen (Motrin) at 12:15 PM.  Do not take any ibuprofen products until 6:15 PM    You received a pain pill (Norco) at 2:00 PM          Pending Results     Date and Time Order Name Status Description    12/4/2018 1217 Surgical pathology exam In process             Admission Information     Date & Time Provider Department Dept. Phone    12/4/2018 Rolando Covarrubias MD [facility] PreOP/PostOP [phone]      Your Vitals Were     Blood Pressure Temperature Respirations Height Weight Last Period    126/82 98.3  F (36.8  C) (Temporal) 15 1.499 m (4' 11\") 52.2 kg (115 lb) 11/25/2018 (Exact Date)    Pulse Oximetry BMI (Body Mass Index)                99% 23.23 kg/m2          [employer] Information     [employer] gives you secure access to your electronic health record. If you see a primary care provider, you can also send messages to your care team and make appointments. " If you have questions, please call your primary care clinic.  If you do not have a primary care provider, please call [phone] and they will assist you.        Care EveryWhere ID     This is your Care EveryWhere ID. This could be used by other organizations to access your [facility] medical records  [id number]        Equal Access to Services     HUMERA HOLCOMB : Hadcyrus vicente hadpageanila Sojignaali, waaxda luqadaha, qaybta kaalmada kirbyadrianapetros, isabella ramostaneshavivien simpson . So [facility] [phone].    ATENCIÓN: Si habla español, tiene a vogel disposición servicios gratuitos de asistencia lingüística. Issac al [phone].    We comply with applicable federal civil rights laws and [place] laws. We do not discriminate on the basis of race, color, national origin, age, disability, sex, sexual orientation, or gender identity.               Review of your medicines      UNREVIEWED medicines. Ask your doctor about these medicines        Dose / Directions    dexamethasone 4 MG/ML injection   Commonly known as:  DECADRON   Used for:  Subacromial impingement, unspecified laterality        Dose:  4 mg   Inject 1 mL (4 mg) as directed once for 1 dose   Quantity:  1 mL   Refills:  0         START taking        Dose / Directions    HYDROcodone-acetaminophen 5-325 MG tablet   Commonly known as:  NORCO   Used for:  Postoperative abdominal pain        Dose:  1-2 tablet   Take 1-2 tablets by mouth every 4 hours as needed for moderate to severe pain   Quantity:  10 tablet   Refills:  0       ondansetron 4 MG ODT tab   Commonly known as:  ZOFRAN-ODT   Used for:  Postoperative abdominal pain        Dose:  4-8 mg   Take 1-2 tablets (4-8 mg) by mouth every 8 hours as needed for nausea   Quantity:  4 tablet   Refills:  0         CONTINUE these medicines which may have CHANGED, or have new prescriptions. If we are uncertain of the size of tablets/capsules you have at home, strength may be listed as something that might have changed.         Dose / Directions    docusate sodium 100 MG tablet   Commonly known as:  COLACE   This may have changed:    - when to take this  - reasons to take this   Used for:  External hemorrhoids        Dose:  100 mg   Take 100 mg by mouth daily   Quantity:  60 tablet   Refills:  1         CONTINUE these medicines which have NOT CHANGED        Dose / Directions    clindamycin-benzoyl peroxide 1-5 % external gel   Commonly known as:  BENZACLIN   Used for:  Acne        Apply to  Affected area initially once daily for 2 weeks and then increase to 2 times daily if tolerated   Quantity:  50 g   Refills:  11       diclofenac 1 % topical gel   Commonly known as:  VOLTAREN   Used for:  Costochondritis, Atypical chest pain        Use small amount and rub into area of chest that is hurting 1-2 times daily as needed   Quantity:  100 g   Refills:  1       DULoxetine 30 MG capsule   Commonly known as:  CYMBALTA   Used for:  Cervicalgia, Right shoulder tendonitis, Right peroneal tendinosis        Dose:  60 mg   Take 2 capsules (60 mg) by mouth daily   Quantity:  180 capsule   Refills:  3       ferrous sulfate 325 (65 Fe) MG tablet   Commonly known as:  FEROSUL   Used for:  Iron deficiency anemia, unspecified iron deficiency anemia type        Dose:  325 mg   Take 1 tablet (325 mg) by mouth 2 times daily   Quantity:  60 tablet   Refills:  2       fluticasone 50 MCG/ACT nasal spray   Commonly known as:  FLONASE   Used for:  Throat pain        Dose:  1-2 spray   Spray 1-2 sprays into both nostrils daily   Quantity:  16 g   Refills:  3       gabapentin 300 MG capsule   Commonly known as:  NEURONTIN   Used for:  Right peroneal tendinosis, Right shoulder tendonitis, Cervicalgia        1 tab at 5 pm  and 2 at bedtime for (3 per day)   Quantity:  120 capsule   Refills:  1       hydrocortisone 0.2 % external cream   Commonly known as:  WESTCORT   Used for:  Rash        Apply sparingly to affected area 2 times daily as needed.   Quantity:  45 g    Refills:  0       loratadine 10 MG tablet   Commonly known as:  CLARITIN   Used for:  Hives        Dose:  10 mg   Take 1 tablet (10 mg) by mouth daily   Quantity:  30 tablet   Refills:  1       loratadine-pseudoePHEDrine  MG 24 hr tablet   Commonly known as:  CLARITIN-D 24-HOUR   Used for:  Acute recurrent maxillary sinusitis        Dose:  1 tablet   Take 1 tablet by mouth daily   Quantity:  14 tablet   Refills:  0       naproxen 500 MG tablet   Commonly known as:  NAPROSYN   Used for:  Right peroneal tendinosis, Right shoulder tendonitis, Cervicalgia        Dose:  500 mg   Take 1 tablet (500 mg) by mouth 2 times daily (with meals)   Quantity:  60 tablet   Refills:  3       PATADAY 0.2 % ophthalmic solution   Generic drug:  olopatadine        Reported on 5/1/2017   Refills:  6       traMADol 50 MG tablet   Commonly known as:  ULTRAM   Used for:  Disorder of SI (sacroiliac) joint, Episodic tension-type headache, not intractable        Dose:  50 mg   Take 1 tablet (50 mg) by mouth every 6 hours as needed for severe pain   Quantity:  60 tablet   Refills:  0       traZODone 50 MG tablet   Commonly known as:  DESYREL   Used for:  Insomnia, unspecified type        Dose:  50 mg   Take 1 tablet (50 mg) by mouth nightly as needed for sleep   Quantity:  60 tablet   Refills:  6            Where to get your medicines      Some of these will need a paper prescription and others can be bought over the counter. Ask your nurse if you have questions.     Bring a paper prescription for each of these medications     HYDROcodone-acetaminophen 5-325 MG tablet    ondansetron 4 MG ODT tab                Protect others around you: Learn how to safely use, store and throw away your medicines at www.disposemymeds.org.        Information about OPIOIDS     PRESCRIPTION OPIOIDS: WHAT YOU NEED TO KNOW   We gave you an opioid (narcotic) pain medicine. It is important to manage your pain, but opioids are not always the best choice. You  should first try all the other options your care team gave you. Take this medicine for as short a time (and as few doses) as possible.    Some activities can increase your pain, such as bandage changes or therapy sessions. It may help to take your pain medicine 30 to 60 minutes before these activities. Reduce your stress by getting enough sleep, working on hobbies you enjoy and practicing relaxation or meditation. Talk to your care team about ways to manage your pain beyond prescription opioids.    These medicines have risks:    DO NOT drive when on new or higher doses of pain medicine. These medicines can affect your alertness and reaction times, and you could be arrested for driving under the influence (DUI). If you need to use opioids long-term, talk to your care team about driving.    DO NOT operate heavy machinery    DO NOT do any other dangerous activities while taking these medicines.    DO NOT drink any alcohol while taking these medicines.     If the opioid prescribed includes acetaminophen, DO NOT take with any other medicines that contain acetaminophen. Read all labels carefully. Look for the word  acetaminophen  or  Tylenol.  Ask your pharmacist if you have questions or are unsure.    You can get addicted to pain medicines, especially if you have a history of addiction (chemical, alcohol or substance dependence). Talk to your care team about ways to reduce this risk.    All opioids tend to cause constipation. Drink plenty of water and eat foods that have a lot of fiber, such as fruits, vegetables, prune juice, apple juice and high-fiber cereal. Take a laxative (Miralax, milk of magnesia, Colace, Senna) if you don t move your bowels at least every other day. Other side effects include upset stomach, sleepiness, dizziness, throwing up, tolerance (needing more of the medicine to have the same effect), physical dependence and slowed breathing.    Store your pills in a secure place, locked if possible. We  will not replace any lost or stolen medicine. If you don t finish your medicine, please throw away (dispose) as directed by your pharmacist. The [place] Pollution Control Agency has more information about safe disposal: https://www.pca.[facility].[place].us/living-green/managing-unwanted-medications             Medication List: This is a list of all your medications and when to take them. Check marks below indicate your daily home schedule. Keep this list as a reference.      Medications           Morning Afternoon Evening Bedtime As Needed    clindamycin-benzoyl peroxide 1-5 % external gel   Commonly known as:  BENZACLIN   Apply to  Affected area initially once daily for 2 weeks and then increase to 2 times daily if tolerated                                dexamethasone 4 MG/ML injection   Commonly known as:  DECADRON   Inject 1 mL (4 mg) as directed once for 1 dose   Last time this was given:  8 mg on 12/4/2018 11:37 AM                                diclofenac 1 % topical gel   Commonly known as:  VOLTAREN   Use small amount and rub into area of chest that is hurting 1-2 times daily as needed                                docusate sodium 100 MG tablet   Commonly known as:  COLACE   Take 100 mg by mouth daily                                DULoxetine 30 MG capsule   Commonly known as:  CYMBALTA   Take 2 capsules (60 mg) by mouth daily                                ferrous sulfate 325 (65 Fe) MG tablet   Commonly known as:  FEROSUL   Take 1 tablet (325 mg) by mouth 2 times daily                                fluticasone 50 MCG/ACT nasal spray   Commonly known as:  FLONASE   Spray 1-2 sprays into both nostrils daily                                gabapentin 300 MG capsule   Commonly known as:  NEURONTIN   1 tab at 5 pm  and 2 at bedtime for (3 per day)                                HYDROcodone-acetaminophen 5-325 MG tablet   Commonly known as:  NORCO   Take 1-2 tablets by mouth every 4 hours as needed for moderate to  severe pain   Last time this was given:  1 tablet on 12/4/2018  2:03 PM                                hydrocortisone 0.2 % external cream   Commonly known as:  WESTCORT   Apply sparingly to affected area 2 times daily as needed.                                loratadine 10 MG tablet   Commonly known as:  CLARITIN   Take 1 tablet (10 mg) by mouth daily                                loratadine-pseudoePHEDrine  MG 24 hr tablet   Commonly known as:  CLARITIN-D 24-HOUR   Take 1 tablet by mouth daily                                naproxen 500 MG tablet   Commonly known as:  NAPROSYN   Take 1 tablet (500 mg) by mouth 2 times daily (with meals)                                ondansetron 4 MG ODT tab   Commonly known as:  ZOFRAN-ODT   Take 1-2 tablets (4-8 mg) by mouth every 8 hours as needed for nausea                                PATADAY 0.2 % ophthalmic solution   Reported on 5/1/2017   Generic drug:  olopatadine                                traMADol 50 MG tablet   Commonly known as:  ULTRAM   Take 1 tablet (50 mg) by mouth every 6 hours as needed for severe pain                                traZODone 50 MG tablet   Commonly known as:  DESYREL   Take 1 tablet (50 mg) by mouth nightly as needed for sleep

## 2018-12-04 NOTE — ANESTHESIA POSTPROCEDURE EVALUATION
Patient: Zo Qureshi    Procedure(s):  novasure endometrial ablation, mysure resection of leiomyoma, dilation and curettage  OPERATIVE HYSTEROSCOPY WITH MORCELLATOR    Diagnosis:submucous leiomyoma and abnormal bleeding  Diagnosis Additional Information: PREOPERATIVE DIAGNOSES:  Abnormal uterine bleeding, submucous uterine leiomyoma.       POSTOPERATIVE DIAGNOSES:  Abnormal uterine bleeding, submucous uterine leiomyoma.       PROCEDURE:  D and C hysteroscopy with MyoSure resection of submucous uterin, e leiomyoma and NovaSure endometrial ablation.     Anesthesia Type:  General, LMA    Note:  Anesthesia Post Evaluation    Patient location during evaluation: PACU  Patient participation: Able to fully participate in evaluation  Level of consciousness: awake and alert  Pain management: adequate  Airway patency: patent  Cardiovascular status: acceptable  Respiratory status: acceptable  Hydration status: acceptable  PONV: none     Anesthetic complications: None          Last vitals:  Vitals:    12/04/18 1345 12/04/18 1400 12/04/18 1419   BP: 126/82 122/74 126/82   Resp: 13 15    Temp:  97.6  F (36.4  C) 98.3  F (36.8  C)   SpO2: 98% 98% 99%         Electronically Signed By: Axel Olvera MD  December 4, 2018  2:28 PM

## 2018-12-04 NOTE — DISCHARGE INSTRUCTIONS
DR. ANÍBAL ALVARADO M.D.   CLINIC PHONE NUMBER:  [phone].    GENERAL ANESTHESIA OR SEDATION ADULT DISCHARGE INSTRUCTIONS   SPECIAL PRECAUTIONS FOR 24 HOURS AFTER SURGERY    IT IS NOT UNUSUAL TO FEEL LIGHT-HEADED OR FAINT, UP TO 24 HOURS AFTER SURGERY OR WHILE TAKING PAIN MEDICATION.  IF YOU HAVE THESE SYMPTOMS; SIT FOR A FEW MINUTES BEFORE STANDING AND HAVE SOMEONE ASSIST YOU WHEN YOU GET UP TO WALK OR USE THE BATHROOM.    YOU SHOULD REST AND RELAX FOR THE NEXT 24 HOURS AND YOU MUST MAKE ARRANGEMENTS TO HAVE SOMEONE STAY WITH YOU FOR AT LEAST 24 HOURS AFTER YOUR DISCHARGE.  AVOID HAZARDOUS AND STRENUOUS ACTIVITIES.  DO NOT MAKE IMPORTANT DECISIONS FOR 24 HOURS.    DO NOT DRIVE ANY VEHICLE OR OPERATE MECHANICAL EQUIPMENT FOR 24 HOURS FOLLOWING THE END OF YOUR SURGERY.  EVEN THOUGH YOU MAY FEEL NORMAL, YOUR REACTIONS MAY BE AFFECTED BY THE MEDICATION YOU HAVE RECEIVED.    DO NOT DRINK ALCOHOLIC BEVERAGES FOR 24 HOURS FOLLOWING YOUR SURGERY.    DRINK CLEAR LIQUIDS (APPLE JUICE, GINGER ALE, 7-UP, BROTH, ETC.).  PROGRESS TO YOUR REGULAR DIET AS YOU FEEL ABLE.    YOU MAY HAVE A DRY MOUTH, A SORE THROAT, MUSCLES ACHES OR TROUBLE SLEEPING.  THESE SHOULD GO AWAY AFTER 24 HOURS.    CALL YOUR DOCTOR FOR ANY OF THE FOLLOWING:  SIGNS OF INFECTION (FEVER, GROWING TENDERNESS AT THE SURGERY SITE, A LARGE AMOUNT OF DRAINAGE OR BLEEDING, SEVERE PAIN, FOUL-SMELLING DRAINAGE, REDNESS OR SWELLING.    IT HAS BEEN OVER 8 TO 10 HOURS SINCE SURGERY AND YOU ARE STILL NOT ABLE TO URINATE (PASS WATER).      HYSTEROSCOPY  DISCHARGE INSTRUCTIONS      DO NOT DRIVE A CAR, DRINK ALCOHOL OR USE MACHINERY FOR THE NEXT 24 HOURS.  YOU SHOULD WAIT UNTIL YOU HAVE RECOVERED BEFORE MAKING ANY IMPORTANT DECISIONS.    PAIN  YOU MAY HAVE CRAMPS, SHOULDER PAIN OR A LOW BACKACHE FOR 24 TO 48 HOURS.  TYLENOL (ACETAMINOPHEN) OR MOTRIN (IBUPROFEN) MAY HELP, OR YOUR DOCTOR MAY GIVE YOU PAIN MEDICINE.  CALL YOUR DOCTOR IF PAIN CANNOT BE CONTROLLED.    BLEEDING  OR VAGINAL DISCHARGE  YOU MAY HAVE SOME BLEEDING OR DISCHARGE FOR UP TO A WEEK OR LONGER.  DO NOT DOUCHE, USE TAMPONS OR HAVE SEX (INTERCOURSE) FOR 14 DAYS.  CALL YOUR DOCTOR IF YOU SOAK MORE THAN ONE MAXI PAD (SANITARY NAPKIN) PER HOUR, OR IF YOU PASS LARGE BLOOD CLOTS.    FEVER  YOU MAY HAVE A LOW FEVER FOR THE FIRST TWO DAYS.  CALL YOUR DOCTOR IF IT GOES OVER 101 DEGREES.    NAUSEA  IF YOU HAVE NAUSEA (FEEL SICK TO YOUR STOMACH), STAY IN BED.  TRY DRINKING A SMALL AMOUNT OF 7-UP, TEA OR SOUP.    SWOLLEN BELLY  IF YOUR ABDOMEN (BELLY AREA) FEELS FIRM OR SWOLLEN, CALL YOUR DOCTOR.    DIZZINESS AND WEAKNESS  YOU MAY FEEL DIZZY OR WEAK FOR A FEW DAYS.  IF SO, YOU SHOULD REST OFTEN, STAND UP SLOWLY AND USE CARE WHEN CLIMBING STAIRS.    DIET AND ACTIVITY  EAT LIGHT MEALS AND DRINK PLENTY OF FLUIDS FOR THE FIRST 24 HOURS (OR LONGER, IF YOU HAVE NAUSEA).  WAIT 5 DAYS BEFORE BATHING.  SHOWERS ARE OKAY.  MOST WOMEN CAN RETURN TO WORK AFTER 24 HOURS.  YOU MAY GO BACK TO YOUR OTHER ACTIVITIES AFTER YOUR PAIN GOES AWAY.        IF YOU HAVE STITCHES  YOU MAY REMOVE YOUR BANDAGE THE DAY AFTER TREATMENT.  YOUR DOCTOR WILL TELL YOU IF YOUR STITCHES NEED TO BE REMOVED.  SOME STITCHES DISSOLVE OVER TIME.         You received Toradol, an IV form of ibuprofen (Motrin) at 12:15 PM.  Do not take any ibuprofen products until 6:15 PM    You received a pain pill (Norco) at 2:00 PM

## 2018-12-04 NOTE — ANESTHESIA PREPROCEDURE EVALUATION
PAC NOTE:       ANESTHESIA PRE EVALUATION:  Anesthesia Evaluation     . Pt has had prior anesthetic. Type: General    No history of anesthetic complications          ROS/MED HX    ENT/Pulmonary:  - neg pulmonary ROS     Neurologic:  - neg neurologic ROS     Cardiovascular:  - neg cardiovascular ROS       METS/Exercise Tolerance:     Hematologic: Comments: Lab Test        10/08/18     09/25/18     03/07/18     11/13/17                       1209          1231          1429          1639          WBC          7.2          9.2          6.9          5.6           HGB          11.7          --          10.2*        10.9*         MCV          94            --          90           90            PLT          251           --          265          245            Lab Test        10/08/18     09/25/18     03/07/18      --          09/26/15                       1209          1231          1429           --           1153          NA           138          142           --           --          137           POTASSIUM    3.8          4.2           --           --          4.0           CHLORIDE     102          106           --           --          103           CO2          25           31            --           --          24            BUN          10           15            --           --          11            CR           0.55         0.50*        0.73           < >        0.64          ANIONGAP     11           5             --           --          10            ALLAN          9.3          9.5           --           --          8.9           GLC          95           98            --           --          87             < > = values in this interval not displayed.                     (+) Anemia, -      Musculoskeletal:  - neg musculoskeletal ROS       GI/Hepatic:  - neg GI/hepatic ROS       Renal/Genitourinary:  - ROS Renal section negative       Endo:  - neg endo ROS       Psychiatric:  - neg psychiatric ROS        Infectious Disease:  - neg infectious disease ROS       Malignancy:         Other:    - neg other ROS                 Physical Exam  Normal systems: cardiovascular, pulmonary and dental    Airway   Mallampati: II  TM distance: >3 FB  Neck ROM: full    Dental     Cardiovascular       Pulmonary              Anesthesia Plan      History & Physical Review  History and physical reviewed and following examination; no interval change.    ASA Status:  2 .    NPO Status:  > 8 hours    Plan for General and LMA with Intravenous induction. Maintenance will be Balanced.    PONV prophylaxis:  Ondansetron (or other 5HT-3) and Dexamethasone or Solumedrol       Postoperative Care  Postoperative pain management:  IV analgesics and Oral pain medications.      Consents  Anesthetic plan, risks, benefits and alternatives discussed with:  Patient..                            .

## 2018-12-04 NOTE — PROGRESS NOTES
Pt arrived in phase 2 with hives on chest and face. MDA was notified and IV benadryl was given. Pt states she was feeling itchy prior to taking Norco, allergy to ibuprofen. Continue to monitor , oncoming RN notified.

## 2018-12-04 NOTE — OP NOTE
Procedure Date: 2018      PREOPERATIVE DIAGNOSES:  Abnormal uterine bleeding, submucous uterine leiomyoma.      POSTOPERATIVE DIAGNOSES:  Abnormal uterine bleeding, submucous uterine leiomyoma.      PROCEDURE:  D and C hysteroscopy with MyoSure resection of submucous uterine leiomyoma and NovaSure endometrial ablation.      SURGEON:  Rolando Covarrubias MD      ANESTHESIA:  General.      COMPLICATIONS:  None apparent.      ESTIMATED BLOOD LOSS:  50 mL.      SPECIMENS:  Uterine leiomyoma and endometrium.      NARRATIVE SUMMARY:  The patient is a 43-year-old  4, para 4 patient who presents to the operating suite for surgical remedy of abnormal uterine bleeding.  She has a suspected submucous leiomyoma.  After explanation of the risks, benefits and alternatives, she gives written and verbal consent.  These risks include but are not limited to infection, bleeding, damage to bowel, bladder or any other intraabdominal viscera, as well as all risks attended to anesthesia and blood transfusion with consent.      DESCRIPTION OF PROCEDURE:  The patient was taken to the operating suite, placed in low lithotomy position, the perineum were prepped and draped in sterile fashion.  Cervix was then visualized, grasped with a single-tooth tenaculum and sounds to 9 cm with the uterine cavity width of 4.5 cm, cervical length of 4 cm and the cavity length of 5 cm.  A hysteroscope is introduced with an approximate 1-1.5 cm polyp in the posterior fundal area of the uterus.  This is resected with MyoSure device with a fluid deficit of 460 mL.  Tubal ostia are seen.  There were no other uterine abnormalities appreciated.  The hysteroscope was then removed and the endometrium is curetted with curettings submitted for histopathologic evaluation along with the resected leiomyoma.  The cervix had been previously dilated to a #7 Hegar dilator and the NovaSure device was then introduced with measurements previously given.  The power was  124 leach and the time of ablation was 59 seconds.  When this had been accomplished, all instruments were removed from the uterus, cervix and vagina.  The patient was awakened from anesthesia and taken to the recovery room in satisfactory condition.      She will be released to home when she is able to ambulate, tolerate p.o. fluids and void and otherwise return in 1-2 weeks for a follow-up visit or sooner for temperature greater than 100.5, increasing abdominal or perineal pain or heavy vaginal bleeding.  Prescriptions on release include Norco if needed for analgesia and Zofran if needed for nausea.         ANÍBAL ALVARADO MD             D: 2018   T: 2018   MT: MELECIO      Name:     JANKI AQUINO   MRN:      [id number]        Account:        [id number]   :      1975           Procedure Date: 2018      Document: [id number]

## 2018-12-04 NOTE — OP NOTE
Gynecology Brief Operative Note    Pre-operative diagnosis: submucous leiomyoma and abnormal bleeding   Post-operative diagnosis: Same   Procedure: D&C/hysteroscopy/Myosure resection of leiomyoma/Novasure endometrial ablation   Surgeon: Rolando Covarrubias MD   Assistant(s): None   Anesthesia: General Laryngeal Mask Airway Anesthesia   Estimated blood loss: 50ml   Total IV fluids: (See anesthesia record)   Blood transfusion: No transfusion was given during surgery   Total urine output: (See anesthesia record)   Drains: None   Specimens: Leiomyoma/endometrium   Findings: See dictated note   Complications: None   Condition: Stable   Comments: See dictated operative report for full details

## 2018-12-04 NOTE — ANESTHESIA CARE TRANSFER NOTE
Patient: Zo Qureshi    Procedure(s):  novasure endometrial ablation, mysure resection of leiomyoma, dilation and curettage  OPERATIVE HYSTEROSCOPY WITH MORCELLATOR    Diagnosis: submucous leiomyoma and abnormal bleeding  Diagnosis Additional Information: No value filed.    Anesthesia Type:   General, LMA     Note:  Airway :Face Mask  Patient transferred to:PACU  Handoff Report: Identifed the Patient, Identified the Reponsible Provider, Reviewed the pertinent medical history, Discussed the surgical course, Reviewed Intra-OP anesthesia mangement and issues during anesthesia, Set expectations for post-procedure period and Allowed opportunity for questions and acknowledgement of understanding      Vitals: (Last set prior to Anesthesia Care Transfer)    CRNA VITALS  12/4/2018 1152 - 12/4/2018 1231      12/4/2018             Pulse: 87    SpO2: 100 %    Resp Rate (observed): 15                Electronically Signed By: MARILEE Zarco CRNA  December 4, 2018  12:31 PM

## 2018-12-04 NOTE — IP AVS SNAPSHOT
[facility] PreOP/PostOP    [address]    [facility] [address]    Phone:  [phone]    Fax:  [phone]                                       After Visit Summary   12/4/2018    Zo Qureshi    MRN: [id number]           After Visit Summary Signature Page     I have received my discharge instructions, and my questions have been answered. I have discussed any challenges I see with this plan with the nurse or doctor.    ..........................................................................................................................................  Patient/Patient Representative Signature      ..........................................................................................................................................  Patient Representative Print Name and Relationship to Patient    ..................................................               ................................................  Date                                   Time    ..........................................................................................................................................  Reviewed by Signature/Title    ...................................................              ..............................................  Date                                               Time          22EPIC Rev 08/18

## 2018-12-05 ENCOUNTER — TELEPHONE (OUTPATIENT)
Dept: FAMILY MEDICINE | Facility: CLINIC | Age: 43
End: 2018-12-05

## 2018-12-05 LAB — COPATH REPORT: NORMAL

## 2018-12-05 NOTE — TELEPHONE ENCOUNTER
Cheo is calling from [facility] and need to know if patient's work condition started on 10/24/18 or a earlier date? On 10/24/18 Dr. Baron signed forms stating patient can't perform the above occupational demand on full-time basis. These forms are scanned into patient's chart and [facility] needs to know the start date on this? Please call Cheo at [phone] to inform him the start date(this is his direct line).    Fely Strong

## 2018-12-06 NOTE — TELEPHONE ENCOUNTER
It started at an earlier date.   On 8/29/18 I wrote letter agreeing with her functional capacity eval so I would say it started on that date  Terri Robles MD

## 2018-12-07 LAB — INTERPRETATION ECG - MUSE: NORMAL

## 2018-12-10 ENCOUNTER — OFFICE VISIT (OUTPATIENT)
Dept: OBGYN | Facility: CLINIC | Age: 43
End: 2018-12-10
Payer: COMMERCIAL

## 2018-12-10 VITALS
BODY MASS INDEX: 23.23 KG/M2 | SYSTOLIC BLOOD PRESSURE: 104 MMHG | WEIGHT: 115 LBS | DIASTOLIC BLOOD PRESSURE: 64 MMHG | HEART RATE: 68 BPM

## 2018-12-10 DIAGNOSIS — Z98.890 POSTOPERATIVE STATE: Primary | ICD-10-CM

## 2018-12-10 PROCEDURE — 99212 OFFICE O/P EST SF 10 MIN: CPT | Performed by: OBSTETRICS & GYNECOLOGY

## 2018-12-10 NOTE — PROGRESS NOTES
Here for postop check      -no gi or gu complaints      -pathology benign    O: no exam    A: satisfactory postop check  P; return as needed

## 2018-12-10 NOTE — NURSING NOTE
"Chief Complaint   Patient presents with     Surgical Followup     Ablation 18 - still having some pain and spotting       Initial /64 (BP Location: Right arm, Patient Position: Chair, Cuff Size: Adult Regular)   Pulse 68   Wt 52.2 kg (115 lb)   LMP 2018 (Exact Date)   BMI 23.23 kg/m   Estimated body mass index is 23.23 kg/m  as calculated from the following:    Height as of 18: 1.499 m (4' 11\").    Weight as of this encounter: 52.2 kg (115 lb).  BP completed using cuff size: regular    Questioned patient about current smoking habits.  Pt. has never smoked.          The following HM Due: NONE      Nurse assisted visit.  Unique Mcneil MA.             "

## 2018-12-28 ENCOUNTER — TELEPHONE (OUTPATIENT)
Dept: RHEUMATOLOGY | Facility: CLINIC | Age: 43
End: 2018-12-28

## 2018-12-28 NOTE — TELEPHONE ENCOUNTER
Attempted to call to clarify information needed. No answer. Triage to try again later.     Kasia PARR RN, BSN, PHN

## 2019-01-10 NOTE — TELEPHONE ENCOUNTER
Multiple attempts made to patient's insurance, with no answer and no call back. Closing encounter.    Yola Camilo, CMA

## 2019-01-11 ENCOUNTER — TELEPHONE (OUTPATIENT)
Dept: FAMILY MEDICINE | Facility: CLINIC | Age: 44
End: 2019-01-11

## 2019-01-11 NOTE — TELEPHONE ENCOUNTER
Received 4 page fax for Disability Work Capacity for Dr Baron to complete. Form in the in-basket at JAIMIE's desk.

## 2019-01-21 ENCOUNTER — OFFICE VISIT (OUTPATIENT)
Dept: FAMILY MEDICINE | Facility: CLINIC | Age: 44
End: 2019-01-21
Payer: OTHER MISCELLANEOUS

## 2019-01-21 VITALS
HEART RATE: 89 BPM | WEIGHT: 114 LBS | RESPIRATION RATE: 12 BRPM | HEIGHT: 58 IN | SYSTOLIC BLOOD PRESSURE: 147 MMHG | DIASTOLIC BLOOD PRESSURE: 87 MMHG | BODY MASS INDEX: 23.93 KG/M2 | TEMPERATURE: 98.1 F

## 2019-01-21 DIAGNOSIS — M53.3 DISORDER OF SI (SACROILIAC) JOINT: ICD-10-CM

## 2019-01-21 DIAGNOSIS — K64.4 EXTERNAL HEMORRHOIDS: ICD-10-CM

## 2019-01-21 DIAGNOSIS — M67.88 RIGHT PERONEAL TENDINOSIS: ICD-10-CM

## 2019-01-21 DIAGNOSIS — G44.219 EPISODIC TENSION-TYPE HEADACHE, NOT INTRACTABLE: ICD-10-CM

## 2019-01-21 DIAGNOSIS — M54.2 CERVICALGIA: ICD-10-CM

## 2019-01-21 DIAGNOSIS — G89.4 CHRONIC PAIN SYNDROME: ICD-10-CM

## 2019-01-21 DIAGNOSIS — G47.00 INSOMNIA, UNSPECIFIED TYPE: ICD-10-CM

## 2019-01-21 DIAGNOSIS — M77.8 RIGHT SHOULDER TENDONITIS: ICD-10-CM

## 2019-01-21 PROCEDURE — 99214 OFFICE O/P EST MOD 30 MIN: CPT | Performed by: FAMILY MEDICINE

## 2019-01-21 RX ORDER — ASPIRIN 81 MG
100 TABLET, DELAYED RELEASE (ENTERIC COATED) ORAL DAILY PRN
Qty: 30 TABLET | Refills: 11 | Status: SHIPPED | OUTPATIENT
Start: 2019-01-21 | End: 2020-11-18

## 2019-01-21 RX ORDER — TRAZODONE HYDROCHLORIDE 50 MG/1
50 TABLET, FILM COATED ORAL
Qty: 60 TABLET | Refills: 6 | Status: SHIPPED | OUTPATIENT
Start: 2019-01-21 | End: 2019-07-08

## 2019-01-21 RX ORDER — DULOXETIN HYDROCHLORIDE 30 MG/1
60 CAPSULE, DELAYED RELEASE ORAL DAILY
Qty: 180 CAPSULE | Refills: 3 | Status: SHIPPED | OUTPATIENT
Start: 2019-01-21 | End: 2019-07-15

## 2019-01-21 RX ORDER — TRAMADOL HYDROCHLORIDE 50 MG/1
50 TABLET ORAL EVERY 6 HOURS PRN
Qty: 60 TABLET | Refills: 0 | Status: SHIPPED | OUTPATIENT
Start: 2019-01-21 | End: 2019-07-08

## 2019-01-21 RX ORDER — GABAPENTIN 300 MG/1
CAPSULE ORAL
Qty: 120 CAPSULE | Refills: 1 | Status: SHIPPED | OUTPATIENT
Start: 2019-01-21 | End: 2019-02-25

## 2019-01-21 RX ORDER — NAPROXEN 500 MG/1
500 TABLET ORAL 2 TIMES DAILY WITH MEALS
Qty: 60 TABLET | Refills: 3 | Status: SHIPPED | OUTPATIENT
Start: 2019-01-21 | End: 2019-07-08

## 2019-01-21 ASSESSMENT — MIFFLIN-ST. JEOR: SCORE: 1070.82

## 2019-01-21 ASSESSMENT — PATIENT HEALTH QUESTIONNAIRE - PHQ9
10. IF YOU CHECKED OFF ANY PROBLEMS, HOW DIFFICULT HAVE THESE PROBLEMS MADE IT FOR YOU TO DO YOUR WORK, TAKE CARE OF THINGS AT HOME, OR GET ALONG WITH OTHER PEOPLE: VERY DIFFICULT
SUM OF ALL RESPONSES TO PHQ QUESTIONS 1-9: 19
SUM OF ALL RESPONSES TO PHQ QUESTIONS 1-9: 19

## 2019-01-21 NOTE — LETTER
[facility]  [address] [place], [address]  [phone]        January 21, 2019    RE: Zo Qureshi                                                                                                                                                       [address] [facility] [address]            To Whom It May Concern,   Zo Qureshi is a patient of mine.  She has been thoroughly evaluated and she has been back at work for a week.   Her original injuries/disabilities are flaring up.   In order to serve her best in her ability to return to work and heal she will need to reduce her hours at work to 2 hours per day with her current restrictions for the next month.   She will be re-evaluated and then it will be decided how much she can increase her work load if any.           Sincerely,    Terri Robles M.D.

## 2019-01-21 NOTE — PROGRESS NOTES
SUBJECTIVE:   Zo Qureshi is a 43 year old female who presents to clinic today for the following health issues:    She is here for recheck.   After 2 years of not working and CFE and ergonomics eval it was determined she could go back to work.  It was recommended she ease back into work 2 hours per day and gradually work up.   The job that hired her put her at 8-9 hours per day despite that.   She has been back for one week and her tendons and shoulders are bothering her. It starts to really bother her after about 1.5 hours.   She has asked if she can get up and rest for a bit from her work station and they do not allow it.       Joint Pain    Onset: Monday but has gotten worse    Description:   Location: left shoulder, right shoulder and left leg and neck  Character: Sharp    Intensity: 9/10    Progression of Symptoms: worse    Accompanying Signs & Symptoms:  Other symptoms: numbness of left leg, weakness of right arm    History:   Previous similar pain: no       Precipitating factors:   Trauma or overuse: YES- repetitive work    Alleviating factors:  Improved by: rest/inactivity, NSAID - tramadol, gabepentin and trazadone, duloxetine     Therapies Tried and outcome: see above    Answers for HPI/ROS submitted by the patient on 1/21/2019   If you checked off any problems, how difficult have these problems made it for you to do your work, take care of things at home, or get along with other people?: Very difficult  PHQ9 TOTAL SCORE: 19    Past Medical History:   Diagnosis Date     Anemia      History of blood transfusion 2000    after vaginal delivery, 2 units PRBCs given     HSIL on Pap smear 09/21/11    MARTA 2 and 3     INFLAM SPONDYLOPATHY NOS   1/7/2008     Leukocytopenia 3/10/2014     Leukocytopenia      Tendinosis      Thrombocytopenia (H) 3/10/2014     Thrombocytopenia (H)      Unspecified closed fracture of pelvis 2000    left fracture of pelvis after delivery       Past Surgical History:   Procedure  Laterality Date     C NONSPECIFIC PROCEDURE  10/00    after delivery 2 units of prbcs secondary to placenta     DILATION AND CURETTAGE, ABLATE ENDOMETRIUM NOVASURE, COMBINED N/A 12/4/2018    Procedure: novasure endometrial ablation, myosure resection of leiomyoma, dilation and curettage;  Surgeon: Rolando Covarrubias MD;  Location: [facility] OR     GYN SURGERY  12/04/2018    fibroid removal     LEEP TX, CERVICAL  12/19/11    MARTA II     OPERATIVE HYSTEROSCOPY WITH MORCELLATOR N/A 12/4/2018    Procedure: OPERATIVE HYSTEROSCOPY WITH MORCELLATOR;  Surgeon: Rolando Covarrubias MD;  Location: [facility] OR     TUBAL LIGATION  5/2009    laparoscopic tubal ligation       MEDICATIONS:  Current Outpatient Medications   Medication     clindamycin-benzoyl peroxide (BENZACLIN) gel     diclofenac (VOLTAREN) 1 % GEL topical gel     docusate sodium (COLACE) 100 MG tablet     DULoxetine (CYMBALTA) 30 MG capsule     ferrous sulfate (IRON) 325 (65 Fe) MG tablet     fluticasone (FLONASE) 50 MCG/ACT spray     gabapentin (NEURONTIN) 300 MG capsule     HYDROcodone-acetaminophen (NORCO) 5-325 MG tablet     hydrocortisone (WESTCORT) 0.2 % cream     loratadine (CLARITIN) 10 MG tablet     naproxen (NAPROSYN) 500 MG tablet     ondansetron (ZOFRAN-ODT) 4 MG ODT tab     PATADAY 0.2 % SOLN     traMADol (ULTRAM) 50 MG tablet     traZODone (DESYREL) 50 MG tablet     dexamethasone (DECADRON) 4 MG/ML injection     No current facility-administered medications for this visit.        SOCIAL HISTORY:  Social History     Tobacco Use     Smoking status: Never Smoker     Smokeless tobacco: Never Used   Substance Use Topics     Alcohol use: Yes     Alcohol/week: 0.0 oz     Comment: rarely       Family History   Problem Relation Age of Onset     Hypertension Father      Lipids Father      Hypertension Mother      Lipids Mother      Diabetes No family hx of      Coronary Artery Disease No family hx of      Hyperlipidemia No family hx of      Cerebrovascular Disease No family  "hx of      Breast Cancer No family hx of      Colon Cancer No family hx of      Prostate Cancer No family hx of      Other Cancer No family hx of      Depression No family hx of      Anxiety Disorder No family hx of      Mental Illness No family hx of      Substance Abuse No family hx of      Anesthesia Reaction No family hx of      Asthma No family hx of      Osteoporosis No family hx of      Genetic Disorder No family hx of      Thyroid Disease No family hx of      Obesity No family hx of      Unknown/Adopted No family hx of        Objective:  Blood pressure 147/87, pulse 89, temperature 98.1  F (36.7  C), temperature source Oral, resp. rate 12, height 1.48 m (4' 10.25\"), weight 51.7 kg (114 lb), not currently breastfeeding.  Recheck 142/88  Gen: tearful when discussing her disability and desire to be useful  Neck:  There is no lymphadenopathy or thyroid tenderness or enlargement  Chest: Clear to auscultation bilaterally.  No wheezes, rales or retractions.  CV: Regular rate and rhythm without murmurs, rubs or gallops.  Shoulders tender anteriorly more on the right and wrists with tenderness along extensor tendons    Assessment:  1. Neck and shoulder pain  2. Peroneal tendinosis  3. Right shoulder tendonitis  4. Elevated blood pressure    Plan:  1. Scale back to 2 hour work days for the next month and recheck in one month  2. Continue current pain meds  3. Refills per epicare  4. Recheck blood pressure in one month as well  5. Letter for work          Answers for HPI/ROS submitted by the patient on 1/21/2019   If you checked off any problems, how difficult have these problems made it for you to do your work, take care of things at home, or get along with other people?: Very difficult  PHQ9 TOTAL SCORE: 19    "

## 2019-01-22 ASSESSMENT — PATIENT HEALTH QUESTIONNAIRE - PHQ9: SUM OF ALL RESPONSES TO PHQ QUESTIONS 1-9: 19

## 2019-02-13 ENCOUNTER — TELEPHONE (OUTPATIENT)
Dept: FAMILY MEDICINE | Facility: CLINIC | Age: 44
End: 2019-02-13

## 2019-02-13 NOTE — TELEPHONE ENCOUNTER
Reason for call:  Other     Dr. Nam Recio with [employer] called on behalf of the patient looking for clarification regarding her long term disability.     Dr. Recio states that he is able to receive phone calls anytime during the day between 8:00-5:00 Eastern Standard Time. He states that it is ok to leave a voicemail if needed.    Phone number: [phone]    Mirta Bansal  FWF - TC/FD  2/13/2019 10:37 AM

## 2019-02-20 ENCOUNTER — TELEPHONE (OUTPATIENT)
Dept: FAMILY MEDICINE | Facility: CLINIC | Age: 44
End: 2019-02-20

## 2019-02-20 NOTE — TELEPHONE ENCOUNTER
Pt calls, wants apt today with [facility] for f/u headache, pt vague, discussed at length, pt only wants to reschedule apt that was made at 6:00, now cancellation showed up, pt aware only short appointment     Next 5 appointments (look out 90 days)    Feb 25, 2019 10:15 AM CST  [facility] [address] [place] [id number]  [phone]   Feb 27, 2019 11:15 AM CST  [facility] [address] [place] [id number]  [phone]            Ellen Fowler RN, BSN  Message handled by Nurse Triage.

## 2019-02-25 ENCOUNTER — OFFICE VISIT (OUTPATIENT)
Dept: FAMILY MEDICINE | Facility: CLINIC | Age: 44
End: 2019-02-25
Payer: OTHER MISCELLANEOUS

## 2019-02-25 VITALS
WEIGHT: 115 LBS | BODY MASS INDEX: 23.18 KG/M2 | HEART RATE: 92 BPM | TEMPERATURE: 98.4 F | RESPIRATION RATE: 18 BRPM | DIASTOLIC BLOOD PRESSURE: 82 MMHG | OXYGEN SATURATION: 98 % | HEIGHT: 59 IN | SYSTOLIC BLOOD PRESSURE: 124 MMHG

## 2019-02-25 DIAGNOSIS — G44.52 NEW DAILY PERSISTENT HEADACHE: Primary | ICD-10-CM

## 2019-02-25 DIAGNOSIS — M77.8 RIGHT SHOULDER TENDONITIS: ICD-10-CM

## 2019-02-25 DIAGNOSIS — M54.2 CERVICALGIA: ICD-10-CM

## 2019-02-25 DIAGNOSIS — G89.4 CHRONIC PAIN SYNDROME: ICD-10-CM

## 2019-02-25 DIAGNOSIS — M46.98 INFLAMMATORY SPONDYLOPATHY OF SACRAL REGION (H): ICD-10-CM

## 2019-02-25 DIAGNOSIS — M67.88 RIGHT PERONEAL TENDINOSIS: ICD-10-CM

## 2019-02-25 PROCEDURE — 99214 OFFICE O/P EST MOD 30 MIN: CPT | Performed by: FAMILY MEDICINE

## 2019-02-25 RX ORDER — PREDNISONE 10 MG/1
TABLET ORAL
Qty: 12 TABLET | Refills: 0 | Status: SHIPPED | OUTPATIENT
Start: 2019-02-25 | End: 2019-04-08

## 2019-02-25 RX ORDER — GABAPENTIN 300 MG/1
CAPSULE ORAL
Qty: 120 CAPSULE | Refills: 1 | COMMUNITY
Start: 2019-02-25 | End: 2019-07-08

## 2019-02-25 ASSESSMENT — ANXIETY QUESTIONNAIRES
GAD7 TOTAL SCORE: 1
GAD7 TOTAL SCORE: 1
6. BECOMING EASILY ANNOYED OR IRRITABLE: SEVERAL DAYS
1. FEELING NERVOUS, ANXIOUS, OR ON EDGE: NOT AT ALL
3. WORRYING TOO MUCH ABOUT DIFFERENT THINGS: NOT AT ALL
2. NOT BEING ABLE TO STOP OR CONTROL WORRYING: NOT AT ALL
5. BEING SO RESTLESS THAT IT IS HARD TO SIT STILL: NOT AT ALL
GAD7 TOTAL SCORE: 1
7. FEELING AFRAID AS IF SOMETHING AWFUL MIGHT HAPPEN: NOT AT ALL
7. FEELING AFRAID AS IF SOMETHING AWFUL MIGHT HAPPEN: NOT AT ALL
4. TROUBLE RELAXING: NOT AT ALL

## 2019-02-25 ASSESSMENT — PATIENT HEALTH QUESTIONNAIRE - PHQ9
SUM OF ALL RESPONSES TO PHQ QUESTIONS 1-9: 19
SUM OF ALL RESPONSES TO PHQ QUESTIONS 1-9: 19
10. IF YOU CHECKED OFF ANY PROBLEMS, HOW DIFFICULT HAVE THESE PROBLEMS MADE IT FOR YOU TO DO YOUR WORK, TAKE CARE OF THINGS AT HOME, OR GET ALONG WITH OTHER PEOPLE: VERY DIFFICULT

## 2019-02-25 ASSESSMENT — MIFFLIN-ST. JEOR: SCORE: 1074.33

## 2019-02-25 NOTE — PROGRESS NOTES
Answers for HPI/ROS submitted by the patient on 2/25/2019   If you checked off any problems, how difficult have these problems made it for you to do your work, take care of things at home, or get along with other people?: Very difficult  PHQ9 TOTAL SCORE: 19  JOSE DAVID 7 TOTAL SCORE: 1    SUBJECTIVE:   Zo Qureshi is a 44 year old female who presents to clinic today for the following health issues:    She is now down to 2 hour work days.   It is not going well.   Her joints are flared very badly now.   Her shoulders and neck and now she has had 2 weeks headache.   She is crying and miserable.   She continue to go to work but is not fairing well.   She has returned to 4 gabapentin per day.       Joint Pain -WORK COMP  (Neck and Shoulder)  With headache started 2 weeks ago.   Neck and shoulder in constant pain, no change. Medication helps.        Past Medical History:   Diagnosis Date     Anemia      History of blood transfusion 2000    after vaginal delivery, 2 units PRBCs given     HSIL on Pap smear 09/21/11    MARTA 2 and 3     INFLAM SPONDYLOPATHY NOS   1/7/2008     Leukocytopenia 3/10/2014     Leukocytopenia      Tendinosis      Thrombocytopenia (H) 3/10/2014     Thrombocytopenia (H)      Unspecified closed fracture of pelvis 2000    left fracture of pelvis after delivery       Past Surgical History:   Procedure Laterality Date     C NONSPECIFIC PROCEDURE  10/00    after delivery 2 units of prbcs secondary to placenta     DILATION AND CURETTAGE, ABLATE ENDOMETRIUM NOVASURE, COMBINED N/A 12/4/2018    Procedure: novasure endometrial ablation, myosure resection of leiomyoma, dilation and curettage;  Surgeon: Rolando Covarrubias MD;  Location: [facility] OR     GYN SURGERY  12/04/2018    fibroid removal     LEEP TX, CERVICAL  12/19/11    MARTA II     OPERATIVE HYSTEROSCOPY WITH MORCELLATOR N/A 12/4/2018    Procedure: OPERATIVE HYSTEROSCOPY WITH MORCELLATOR;  Surgeon: Rolando Covarrubias MD;  Location:  OR     TUBAL LIGATION  5/2009     laparoscopic tubal ligation       MEDICATIONS:  Current Outpatient Medications   Medication     clindamycin-benzoyl peroxide (BENZACLIN) gel     diclofenac (VOLTAREN) 1 % GEL topical gel     docusate sodium (COLACE) 100 MG tablet     DULoxetine (CYMBALTA) 30 MG capsule     ferrous sulfate (IRON) 325 (65 Fe) MG tablet     fluticasone (FLONASE) 50 MCG/ACT spray     gabapentin (NEURONTIN) 300 MG capsule     HYDROcodone-acetaminophen (NORCO) 5-325 MG tablet     hydrocortisone (WESTCORT) 0.2 % cream     loratadine (CLARITIN) 10 MG tablet     naproxen (NAPROSYN) 500 MG tablet     ondansetron (ZOFRAN-ODT) 4 MG ODT tab     PATADAY 0.2 % SOLN     traMADol (ULTRAM) 50 MG tablet     traZODone (DESYREL) 50 MG tablet     No current facility-administered medications for this visit.        SOCIAL HISTORY:  Social History     Tobacco Use     Smoking status: Never Smoker     Smokeless tobacco: Never Used   Substance Use Topics     Alcohol use: Yes     Alcohol/week: 0.0 oz     Comment: rarely       Family History   Problem Relation Age of Onset     Hypertension Father      Lipids Father      Hypertension Mother      Lipids Mother      Diabetes No family hx of      Coronary Artery Disease No family hx of      Hyperlipidemia No family hx of      Cerebrovascular Disease No family hx of      Breast Cancer No family hx of      Colon Cancer No family hx of      Prostate Cancer No family hx of      Other Cancer No family hx of      Depression No family hx of      Anxiety Disorder No family hx of      Mental Illness No family hx of      Substance Abuse No family hx of      Anesthesia Reaction No family hx of      Asthma No family hx of      Osteoporosis No family hx of      Genetic Disorder No family hx of      Thyroid Disease No family hx of      Obesity No family hx of      Unknown/Adopted No family hx of        Objective:  Blood pressure 124/82, pulse 92, temperature 98.4  F (36.9  C), temperature source Oral, resp. rate 18, height  "1.486 m (4' 10.5\"), weight 52.2 kg (115 lb), SpO2 98 %, not currently breastfeeding.  HEENT:  TM's are clear bilaterally.  Oropharynx is clear without tonsillar hypertrophy or exudate.  Conjuctiva are clear.  Chest: Clear to auscultation bilaterally.  No wheezes, rales or retractions.  CV: Regular rate and rhythm without murmurs, rubs or gallops.  Tenderness to touch of base of neck and shoulder   Gen: tearful    Assessment:  1. Tendonitis and cervical pain originally brought on by repetitive work but now suspect inflammation keeping it going  2. Chronic headache - could be due to inflammation or meds or tight cervical muscles    Plan:  1. Get her back to rheum  2. Discussed sulfasalazine but feel going back to Enbrel may be better choice for her as it worked really well in the past ( her side effects were viral infections more often which would be worth it considering what she is going through right now - risk vs benefit)  3. Prednisone burst and taper for headache and pain  4. The potential side effects of the prescription medication were discussed with the patient.  5. Note for work  6. Return to see me after she has begun DMARD tx        "

## 2019-02-25 NOTE — LETTER
[facility]  [address] [place], [address]  [phone]        February 25, 2019    RE: Zo Qureshi                                                                                                                                                       [id number] [facility] [address]          To Whom It May Concern,   Zo Qureshi is a patient of mine.   She will need to be off work until further evaluation by rheumatology.             Sincerely,    Terri Robles M.D.

## 2019-02-26 ASSESSMENT — ANXIETY QUESTIONNAIRES: GAD7 TOTAL SCORE: 1

## 2019-03-04 ENCOUNTER — TELEPHONE (OUTPATIENT)
Dept: FAMILY MEDICINE | Facility: CLINIC | Age: 44
End: 2019-03-04

## 2019-03-04 NOTE — TELEPHONE ENCOUNTER
Unique Simeon, ISMAEL          11:57 AM   Note      Faxed on 9/10/2018      Unique Simeon CMA on 9/10/2018 at 11:58 AM

## 2019-03-04 NOTE — TELEPHONE ENCOUNTER
We received this form back 3/1/19 asking for more information on Question 10.  ROBBIE filled this out and I have faxed it back. Shari Schoenberger, CMA

## 2019-03-04 NOTE — TELEPHONE ENCOUNTER
Jennifer Hensley          1:53 PM   Note      Recd 3 page fax from [employer].  Please complete Health Care Provider Report and fax to [phone].  Form in JAIMIE inbox.     Jennifer Hensley

## 2019-04-01 ENCOUNTER — ANCILLARY PROCEDURE (OUTPATIENT)
Dept: MAMMOGRAPHY | Facility: CLINIC | Age: 44
End: 2019-04-01
Payer: COMMERCIAL

## 2019-04-01 DIAGNOSIS — Z12.31 VISIT FOR SCREENING MAMMOGRAM: ICD-10-CM

## 2019-04-01 PROCEDURE — 77067 SCR MAMMO BI INCL CAD: CPT | Mod: TC

## 2019-04-02 NOTE — PROGRESS NOTES
[facility]     Zo Quresih MRN# [id number]   YOB: 1975    Primary care provider: Terri Baron  Apr 8, 2019          Assessment and Plan:   # Sacroiliitis (MRI evidence 2010) with negative HLA B27; Polyarthralgia in MCPs and also wrists  # Elevated sed rate; anemia  # Pleuritic chest pain  # Chronic NSAID use    Basic blood cell counts, liver and kidney function labs within normal limits except anemia noted.      Inflammatory marker CRP is normal but sed rate is improving when compared to 4 months ago.     Patient was evaluated by Dr. Cuba at [facility] rheumatology in 2010. Humira and enbrel were tried. But patient did not tolerate. So it was not continued for adequate duration to assess the response.   Since there is MCP involvement, we checked RF and CCP antibodies were normal.   MRI SI joints 9/2017 did not show active inflammation in SI joints.   Her symptoms are steroid and NSAID responsive.     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 I gave the option of starting sulfasalazine. Side effects discussed. Lost follow up after 2018 until 4/19. Patient now is ready to start sulfasalazine. If this does not help in 6 weeks, then proceed to anti-TNF.  Check HBV, HCV.   Sulfasalazine likely would take few weeks to start working. Patient interested in doing prednisone taper temporarily in the interim. Patient is also on naproxen.   Labs monthly X 3.     The labs, imaging from patient records are reviewed.     I will be back in touch with the patient through mychart/letter when results are available.     Follow up after discussing with PCP.     F/u in 6 weeks    Data Unavailable    Orders Placed This Encounter   Procedures     Hepatitis B surface antigen     Hepatitis C antibody     CBC with platelets     AST     ALT     Creatinine       Medications Discontinued During This Encounter   Medication Reason     predniSONE (DELTASONE) 10 MG tablet  Therapy completed     Current Outpatient Medications   Medication Sig Dispense Refill     clindamycin-benzoyl peroxide (BENZACLIN) gel Apply to  Affected area initially once daily for 2 weeks and then increase to 2 times daily if tolerated 50 g 11     diclofenac (VOLTAREN) 1 % GEL topical gel Use small amount and rub into area of chest that is hurting 1-2 times daily as needed 100 g 1     docusate sodium (COLACE) 100 MG tablet Take 1 tablet (100 mg) by mouth daily as needed for constipation 30 tablet 11     DULoxetine (CYMBALTA) 30 MG capsule Take 2 capsules (60 mg) by mouth daily 180 capsule 3     ferrous sulfate (IRON) 325 (65 Fe) MG tablet Take 1 tablet (325 mg) by mouth 2 times daily 60 tablet 2     fluticasone (FLONASE) 50 MCG/ACT spray Spray 1-2 sprays into both nostrils daily 16 g 3     gabapentin (NEURONTIN) 300 MG capsule 1 tab in am and 1 at 5 pm  and 2 at bedtime for (4 per day) 120 capsule 1     HYDROcodone-acetaminophen (NORCO) 5-325 MG tablet Take 1-2 tablets by mouth every 4 hours as needed for moderate to severe pain 10 tablet 0     hydrocortisone (WESTCORT) 0.2 % cream Apply sparingly to affected area 2 times daily as needed. 45 g 0     loratadine (CLARITIN) 10 MG tablet Take 1 tablet (10 mg) by mouth daily 30 tablet 1     naproxen (NAPROSYN) 500 MG tablet Take 1 tablet (500 mg) by mouth 2 times daily (with meals) 60 tablet 3     ondansetron (ZOFRAN-ODT) 4 MG ODT tab Take 1-2 tablets (4-8 mg) by mouth every 8 hours as needed for nausea 4 tablet 0     PATADAY 0.2 % SOLN Reported on 5/1/2017  6     predniSONE (DELTASONE) 5 MG tablet April 8, 2019: Take 10 mg PO daily X 2 weeks and then 7.5mg PO daily X 2 weeks and then 5mg Po daily X 2 weeks and then stop. 75 tablet 0     sulfaSALAzine ER (AZULFIDINE EN) 500 MG EC tablet 500mg twice daily for 2 weeks, then 1000mg in the AM and 500mg in the PM for two weeks, then 1000mg in the AM and 1000mg in the PM.  Labs in 1 month 120 tablet 0     traMADol (ULTRAM)  50 MG tablet Take 1 tablet (50 mg) by mouth every 6 hours as needed for severe pain 60 tablet 0     traZODone (DESYREL) 50 MG tablet Take 1 tablet (50 mg) by mouth nightly as needed for sleep 60 tablet 6       Julius Garay MD  [facility]          Active Problem List:     Patient Active Problem List    Diagnosis Date Noted     Right shoulder tendonitis 01/21/2019     Priority: Medium     Submucous leiomyoma of uterus 11/30/2018     Priority: Medium     Menorrhagia with regular cycle 11/30/2018     Priority: Medium     Chronic pain syndrome 04/23/2018     Priority: Medium     Patient is followed by Terri Baron MD for ongoing prescription of pain medication.  All refills should only be approved by this provider, or covering partner.    Medication(s): tramadol.   Maximum quantity per month: 60  Clinic visit frequency required: Q 3 months     Controlled substance agreement:  Encounter-Level CSA - 05/08/2017:          Controlled Substance Agreement - Scan on 5/18/2017 12:15 PM : CONTROLLED SUBSTANCE AGREEMENT (below)              Pain Clinic evaluation in the past: Yes       Date/Location:      DIRE Total Score(s):  No flowsheet data found.    Last [facility] website verification:  none   [email or web address]       Insomnia due to medical condition 01/29/2018     Priority: Medium     Episodic tension-type headache, not intractable 10/18/2017     Priority: Medium     Cervicalgia 10/18/2017     Priority: Medium     Right peroneal tendinosis 04/26/2017     Priority: Medium     Vitamin D deficiency 01/05/2017     Priority: Medium     Influenza B 03/03/2014     Priority: Medium     Acne 09/10/2012     Priority: Medium     S/P LEEP of cervix 12/19/2011     Priority: Medium     HSIL on Pap smear 09/21/2011     Priority: Medium     HGSIL confirmed with biopsy=repeat pap by May 2012  12/19/11 LEEP MARTA II  4/9/12 NIL pap.  Per OV notes, pt to repeat pap in 4 months  8/6/12: NIL pap. Per MD plan pap in  4 months.  12/10/12 NIL pap. Plan per MD, repeat in one year.   02/05/14 Pap reminder letter sent through [employer].  05/21/14 Pap= Normal, Neg HPV. Repeat co-test in 1 yr.  09/21/15 Pap= NIL, Neg HPV. Co-test in 3 yrs per ASCCP guidelines  2/21/18 NIL, Neg HPV. Plan 3 yr pap for 20 yrs post LEEP, 2011         Hypercholesterolemia 10/31/2010     Priority: Medium     Disorder of SI (sacroiliac) joint 09/15/2008     Priority: Medium     Inflammatory spondylopathy (H) 01/07/2008     Priority: Medium     Problem list name updated by automated process. Provider to review              History of Present Illness:     Chief Complaint   Patient presents with     RECHECK       August 8, 2017  Have you ever seen a rheumatologist Yes Who Dr. Cuba  When 4/2010  Joint pain history  Onset: has pain that rotates around her body, primary care wanted her to see rheumatology.   Involved joints: back pain arms, shoulders, neck pain.knees Had epidural injection in June 2017  Pain scale:  7.5/10     Wakes the patient from sleep : Yes  Morning stiffness:Yes for 15 minutes  Meds used:tramadol, voltaren gel; tried humira and enbrel around 2010; had flu-like symptoms after trying couple of shots of humira and enbrel.      Interim history  Since last visit:  1. Infections - Yes/ had a cold a couple weeks ago  2. New symptoms/medical problem - No  3. Any side effects from Rheum medications -NA  3. ER visits/Hospitalizations/surgeries - No  4. Last PCP visit: 8/2/17     Fatigue during flare ups of polyarthralgia    Wt Readings from Last 4 Encounters:   04/08/19 51.7 kg (114 lb)   04/08/19 51.7 kg (114 lb)   02/25/19 52.2 kg (115 lb)   01/21/19 51.7 kg (114 lb)     H/o pleuritic chest pain in middle with taking deep breaths.     No h/o ,unintentional weight loss, loss of appetite, fevers, persistent rash, swollen glands  No family or personal history of psoriasis, ulcerative colitis or chron's disease. No h/o iritis.   Patient denies any  raynauds  No h/o arterial/venous thrombosis in the past  No h/o persistent shortness of breath, cough  No h/o persistent nausea, vomiting, constipation, diarrhea, abdominal pain  No h/o hematochezia (except with constipation), hematuria, hemoptysis, hematemesis  No h/o seizures or strokes  No h/o cancer    November 13, 2017  Have you ever seen a rheumatologist Yes Who You When 8/28/17  Joint pain history  Onset: pt states that she is having pain in her neck, and shoulders and lower back  Involved joints: see above  Pain scale:  7.5/10     Wakes the patient from sleep : Yes  Morning stiffness:Yes for 5 minutes  Meds used: naproxen which helps     Interim history  Since last visit:  1. Infections - No  2. New symptoms/medical problem - Yes, has developed bilateral knee pain and going up stairs  3. Any side effects from Rheum medications -none  3. ER visits/Hospitalizations/surgeries - No  4. Last PCP visit: 10/18/17    March 7, 2018  Have you ever seen a rheumatologist Yes Who You When 11/13/17  Joint pain history  Onset: pt states that she continues with pain in her neck and shoulder and hands, worse on the right side. Pt was put on prednisone back when she saw you, but is not sure if it helped due to she was on other medications at the time  Involved joints: see above  Pain scale:  7/10     Wakes the patient from sleep : Yes  Morning stiffness:Yes for 15 minutes  Meds used:nothing     Interim history  Since last visit:  1. Infections - No  2. New symptoms/medical problem - No  3. Any side effects from Rheum medications -NA  3. ER visits/Hospitalizations/surgeries - No  4. Last PCP visit: 2/26/18 March 28, 2018  Have you ever seen a rheumatologist yes Who  When 3/7/18  Joint pain history  Onset: 10-15 yrs ago  Involved joints: neck, shoulder, R wrist and arms  Pain scale:  8/10     Wakes the patient from sleep : Yes  Morning stiffness:15  Meds used: finished prednisone. Prednisone helped about 25-30% of her  pain. After stopping prednisone, her pain is worse. Started taking gabapentin 300mg, QID, started 1 week ago  Interim history  Since last visit:  1. Infections - No  2. New symptoms/medical problem - Yes- L shoulder and neck are so stiff that she cannot turn her neck. Has to turn whole body to look  3. Any side effects from Rheum medications - some fatigue and dizziness from prednisone  3. ER visits/Hospitalizations/surgeries - No  4. Last PCP visit: 02/2018 April 8, 2019  Have you ever seen a rheumatologist Yes Babak Garay When 3-28-18  Joint pain history  Onset: Ongoing dizziness from medicine, referred back by primary due to symptoms.  Involved joints: Bilateral shoulders, neck/back of head  Pain scale:  8-8.5/10     Wakes the patient from sleep : Yes  Morning stiffness:Yes for 10-15 minutes  Meds used: Gabapentin, Naproxen, Tramadol, Norco, Cymbalta     Interim history  Since last visit:  1. Infections - No  2. New symptoms/medical problem - No  3. Any side effects from Rheum medications -See above  3. ER visits/Hospitalizations/surgeries - Yes-Dec 2018 fibroid removal  4. Last PCP visit: 4-8-19    BP Readings from Last 3 Encounters:   04/08/19 116/76   04/08/19 104/76   02/25/19 124/82              Review of Systems:   Complete ROS negative except for symptoms mentioned in the HPI          Past Medical History:     Past Medical History:   Diagnosis Date     Anemia      History of blood transfusion 2000    after vaginal delivery, 2 units PRBCs given     HSIL on Pap smear 09/21/11    MARTA 2 and 3     INFLAM SPONDYLOPATHY NOS   1/7/2008     Leukocytopenia 3/10/2014     Leukocytopenia      Tendinosis      Thrombocytopenia (H) 3/10/2014     Thrombocytopenia (H)      Unspecified closed fracture of pelvis 2000    left fracture of pelvis after delivery     Past Surgical History:   Procedure Laterality Date     C NONSPECIFIC PROCEDURE  10/00    after delivery 2 units of prbcs secondary to placenta     DILATION AND  CURETTAGE, ABLATE ENDOMETRIUM NOVASURE, COMBINED N/A 12/4/2018    Procedure: novasure endometrial ablation, myosure resection of leiomyoma, dilation and curettage;  Surgeon: Rolando Covarrubias MD;  Location: [facility] OR     GYN SURGERY  12/04/2018    fibroid removal     LEEP TX, CERVICAL  12/19/11    MARTA II     OPERATIVE HYSTEROSCOPY WITH MORCELLATOR N/A 12/4/2018    Procedure: OPERATIVE HYSTEROSCOPY WITH MORCELLATOR;  Surgeon: Rolando Covarrubias MD;  Location: [facility] OR     TUBAL LIGATION  5/2009    laparoscopic tubal ligation            Social History:     Social History     Occupational History     Occupation:      Employer: [employer]   Tobacco Use     Smoking status: Never Smoker     Smokeless tobacco: Never Used   Substance and Sexual Activity     Alcohol use: Yes     Alcohol/week: 0.0 oz     Comment: rarely     Drug use: No     Sexual activity: Not Currently     Partners: Male     Birth control/protection: None     Comment: tubal            Family History:     Family History   Problem Relation Age of Onset     Hypertension Father      Lipids Father      Hypertension Mother      Lipids Mother      Diabetes No family hx of      Coronary Artery Disease No family hx of      Hyperlipidemia No family hx of      Cerebrovascular Disease No family hx of      Breast Cancer No family hx of      Colon Cancer No family hx of      Prostate Cancer No family hx of      Other Cancer No family hx of      Depression No family hx of      Anxiety Disorder No family hx of      Mental Illness No family hx of      Substance Abuse No family hx of      Anesthesia Reaction No family hx of      Asthma No family hx of      Osteoporosis No family hx of      Genetic Disorder No family hx of      Thyroid Disease No family hx of      Obesity No family hx of      Unknown/Adopted No family hx of             Allergies:     Allergies   Allergen Reactions     Advil [Ibuprofen Micronized]      Rash      Contrast Dye Nausea and  Vomiting     Percocet [Oxycodone-Acetaminophen] Nausea and Vomiting and Itching            Medications:     Current Outpatient Medications   Medication Sig Dispense Refill     clindamycin-benzoyl peroxide (BENZACLIN) gel Apply to  Affected area initially once daily for 2 weeks and then increase to 2 times daily if tolerated 50 g 11     diclofenac (VOLTAREN) 1 % GEL topical gel Use small amount and rub into area of chest that is hurting 1-2 times daily as needed 100 g 1     docusate sodium (COLACE) 100 MG tablet Take 1 tablet (100 mg) by mouth daily as needed for constipation 30 tablet 11     DULoxetine (CYMBALTA) 30 MG capsule Take 2 capsules (60 mg) by mouth daily 180 capsule 3     ferrous sulfate (IRON) 325 (65 Fe) MG tablet Take 1 tablet (325 mg) by mouth 2 times daily 60 tablet 2     fluticasone (FLONASE) 50 MCG/ACT spray Spray 1-2 sprays into both nostrils daily 16 g 3     gabapentin (NEURONTIN) 300 MG capsule 1 tab in am and 1 at 5 pm  and 2 at bedtime for (4 per day) 120 capsule 1     HYDROcodone-acetaminophen (NORCO) 5-325 MG tablet Take 1-2 tablets by mouth every 4 hours as needed for moderate to severe pain 10 tablet 0     hydrocortisone (WESTCORT) 0.2 % cream Apply sparingly to affected area 2 times daily as needed. 45 g 0     loratadine (CLARITIN) 10 MG tablet Take 1 tablet (10 mg) by mouth daily 30 tablet 1     naproxen (NAPROSYN) 500 MG tablet Take 1 tablet (500 mg) by mouth 2 times daily (with meals) 60 tablet 3     ondansetron (ZOFRAN-ODT) 4 MG ODT tab Take 1-2 tablets (4-8 mg) by mouth every 8 hours as needed for nausea 4 tablet 0     PATADAY 0.2 % SOLN Reported on 5/1/2017  6     predniSONE (DELTASONE) 5 MG tablet April 8, 2019: Take 10 mg PO daily X 2 weeks and then 7.5mg PO daily X 2 weeks and then 5mg Po daily X 2 weeks and then stop. 75 tablet 0     sulfaSALAzine ER (AZULFIDINE EN) 500 MG EC tablet 500mg twice daily for 2 weeks, then 1000mg in the AM and 500mg in the PM for two weeks, then  1000mg in the AM and 1000mg in the PM.  Labs in 1 month 120 tablet 0     traMADol (ULTRAM) 50 MG tablet Take 1 tablet (50 mg) by mouth every 6 hours as needed for severe pain 60 tablet 0     traZODone (DESYREL) 50 MG tablet Take 1 tablet (50 mg) by mouth nightly as needed for sleep 60 tablet 6            Physical Exam:   Blood pressure 116/76, pulse 74, temperature 97.5  F (36.4  C), temperature source Tympanic, weight 51.7 kg (114 lb), last menstrual period 03/26/2019, SpO2 97 %, not currently breastfeeding.  Wt Readings from Last 4 Encounters:   04/08/19 51.7 kg (114 lb)   04/08/19 51.7 kg (114 lb)   02/25/19 52.2 kg (115 lb)   01/21/19 51.7 kg (114 lb)     Constitutional: well-developed, appearing stated age; cooperative  Eyes: normal conjunctiva, sclera  ENT: nl external ears, nose, lips.No mucous membrane lesions, normal saliva pool  Neck: no cervical lymphadenopathy  Resp: lungs clear to auscultation,   CV: RRR, no added sounds, no edema  GI: Abdomen soft and no tenderness  : not tested  Lymph: no cervical, supraclavicular or epitrochlear nodes  MS: Tenderness in right wrist, MCPs (R> L)- resolved. Tenderness in midline lumbar area, paralumbar area and also in SI area. Cervical and lumbar ROM is reduced.   All shoulder, elbow, wrist, MCP/PIP/DIP, hip, knee, ankle, and foot MTP/IP joints were examined and  found normal. No active synovitis or deformity. Full ROM.  No dactylitis,  tenosynovitis, enthesopathy.  Skin: no rash in exposed areas  Psych: nl judgement, orientation, memory, affect.       Data:   Reviewed following labs:  CBC RESULTS:   Recent Labs   Lab Test  01/05/17   1322   WBC  6.8   RBC  4.19   HGB  12.0   HCT  37.4   MCV  89   MCH  28.6   MCHC  32.1   RDW  12.7   PLT  269       Liver Function Studies -   Recent Labs   Lab Test  09/26/15   1153   PROTTOTAL  8.4   ALBUMIN  4.2   BILITOTAL  0.3   ALKPHOS  60   AST  19   ALT  20       Creatinine   Date Value Ref Range Status   10/08/2018 0.55 0.52 -  1.04 mg/dL Final   ]    No results found for: URIC]    ESR/CRP  Recent Labs   Lab Test  08/02/17   1117  09/26/15   1153  05/21/14   1710   04/16/10   1140   SED  37*  27*  33*   < >  21*   CRP  <2.9   --    --    --   7.1    < > = values in this interval not displayed.       HLA-B27  No results for input(s): [id number], B1 in the last 56487 hours.    RF/CCP  Recent Labs   Lab Test  05/21/14   1710   RHF  <20       RAMÍREZ/RNP/Sm/SSA/SSB  Recent Labs   Lab Test  05/21/14   1710   BOO  <1.0  Interpretation:  Negative           Julius Garay MD    [facility]

## 2019-04-08 ENCOUNTER — OFFICE VISIT (OUTPATIENT)
Dept: FAMILY MEDICINE | Facility: CLINIC | Age: 44
End: 2019-04-08
Payer: COMMERCIAL

## 2019-04-08 ENCOUNTER — OFFICE VISIT (OUTPATIENT)
Dept: RHEUMATOLOGY | Facility: CLINIC | Age: 44
End: 2019-04-08
Payer: COMMERCIAL

## 2019-04-08 VITALS
HEART RATE: 82 BPM | BODY MASS INDEX: 23.42 KG/M2 | DIASTOLIC BLOOD PRESSURE: 76 MMHG | OXYGEN SATURATION: 100 % | WEIGHT: 114 LBS | SYSTOLIC BLOOD PRESSURE: 104 MMHG | TEMPERATURE: 97.9 F

## 2019-04-08 VITALS
TEMPERATURE: 97.5 F | DIASTOLIC BLOOD PRESSURE: 76 MMHG | OXYGEN SATURATION: 97 % | SYSTOLIC BLOOD PRESSURE: 116 MMHG | BODY MASS INDEX: 23.42 KG/M2 | HEART RATE: 74 BPM | WEIGHT: 114 LBS

## 2019-04-08 DIAGNOSIS — Z51.81 ENCOUNTER FOR MONITORING SULFASALAZINE THERAPY: Primary | ICD-10-CM

## 2019-04-08 DIAGNOSIS — R10.31 RLQ ABDOMINAL PAIN: Primary | ICD-10-CM

## 2019-04-08 DIAGNOSIS — M47.819 SPONDYLOARTHROPATHY: ICD-10-CM

## 2019-04-08 DIAGNOSIS — L65.9 HAIR LOSS: ICD-10-CM

## 2019-04-08 DIAGNOSIS — R14.0 BLOATING: ICD-10-CM

## 2019-04-08 DIAGNOSIS — Z79.899 ENCOUNTER FOR MONITORING SULFASALAZINE THERAPY: Primary | ICD-10-CM

## 2019-04-08 LAB
CANCER AG125 SERPL-ACNC: 36 U/ML (ref 0–30)
ERYTHROCYTE [DISTWIDTH] IN BLOOD BY AUTOMATED COUNT: 12.3 % (ref 10–15)
ERYTHROCYTE [SEDIMENTATION RATE] IN BLOOD BY WESTERGREN METHOD: 22 MM/H (ref 0–20)
HCT VFR BLD AUTO: 39.4 % (ref 35–47)
HGB BLD-MCNC: 12.9 G/DL (ref 11.7–15.7)
MCH RBC QN AUTO: 29.9 PG (ref 26.5–33)
MCHC RBC AUTO-ENTMCNC: 32.7 G/DL (ref 31.5–36.5)
MCV RBC AUTO: 91 FL (ref 78–100)
PLATELET # BLD AUTO: 203 10E9/L (ref 150–450)
RBC # BLD AUTO: 4.31 10E12/L (ref 3.8–5.2)
WBC # BLD AUTO: 7 10E9/L (ref 4–11)

## 2019-04-08 PROCEDURE — 99214 OFFICE O/P EST MOD 30 MIN: CPT | Performed by: FAMILY MEDICINE

## 2019-04-08 PROCEDURE — 99214 OFFICE O/P EST MOD 30 MIN: CPT | Performed by: INTERNAL MEDICINE

## 2019-04-08 PROCEDURE — 85027 COMPLETE CBC AUTOMATED: CPT | Performed by: FAMILY MEDICINE

## 2019-04-08 PROCEDURE — 86803 HEPATITIS C AB TEST: CPT | Performed by: INTERNAL MEDICINE

## 2019-04-08 PROCEDURE — 85652 RBC SED RATE AUTOMATED: CPT | Performed by: FAMILY MEDICINE

## 2019-04-08 PROCEDURE — 87340 HEPATITIS B SURFACE AG IA: CPT | Performed by: INTERNAL MEDICINE

## 2019-04-08 PROCEDURE — 80053 COMPREHEN METABOLIC PANEL: CPT | Performed by: FAMILY MEDICINE

## 2019-04-08 PROCEDURE — 86304 IMMUNOASSAY TUMOR CA 125: CPT | Performed by: FAMILY MEDICINE

## 2019-04-08 PROCEDURE — 84443 ASSAY THYROID STIM HORMONE: CPT | Performed by: FAMILY MEDICINE

## 2019-04-08 PROCEDURE — 36415 COLL VENOUS BLD VENIPUNCTURE: CPT | Performed by: FAMILY MEDICINE

## 2019-04-08 RX ORDER — SULFASALAZINE 500 MG/1
TABLET, DELAYED RELEASE ORAL
Qty: 120 TABLET | Refills: 0 | Status: SHIPPED | OUTPATIENT
Start: 2019-04-08 | End: 2019-05-24

## 2019-04-08 RX ORDER — PREDNISONE 5 MG/1
TABLET ORAL
Qty: 75 TABLET | Refills: 0 | Status: SHIPPED | OUTPATIENT
Start: 2019-04-08 | End: 2019-05-24

## 2019-04-08 NOTE — PROGRESS NOTES
SUBJECTIVE:   Zo Qureshi is a 44 year old female who presents to clinic today for the following health issues:    She has been having some lower abdominal  Cramping for 2 weeks.   She had fibroid removed last 12/2018.   She has her last period end of March.   Her upper left thigh is hurting too.   It gets numb too if she sits over an hour.   Sometimes she feels bloated.   The pain is off and on.   At its worst it is hard to handle and then it comes down slowly and sometimes gone.  It will last 1-2 hours at a time.   She has lost about 5 lbs in last few months.    Her last BM was this am.   It was normal.   Eating does not make it worse.   Movement makes it worse.         HPI  ABDOMINAL   PAIN     Onset: 2 weeks    Description:   Character: Sharp and Dull ache - intermitten  Location: right lower quadrant left lower quadrant  Radiation: left leg    Intensity: moderate    Progression of Symptoms:  same    Accompanying Signs & Symptoms:  Fever/Chills?: no   Gas/Bloating: YES  Nausea: no   Vomitting: no   Diarrhea?: no   Constipation:no   Dysuria or Hematuria: no    History:   Trauma: no   Previous similar pain: no    Previous tests done: none    Precipitating factors:   Does the pain change with:     Food: no      BM: no     Urination: no     Alleviating factors:      Therapies Tried and outcome:  Heat and massage helps    LMP:  Spotting - can't tell if it is a period - last time was around March 26     Past Medical History:   Diagnosis Date     Anemia      History of blood transfusion 2000    after vaginal delivery, 2 units PRBCs given     HSIL on Pap smear 09/21/11    MARTA 2 and 3     INFLAM SPONDYLOPATHY NOS   1/7/2008     Leukocytopenia 3/10/2014     Leukocytopenia      Tendinosis      Thrombocytopenia (H) 3/10/2014     Thrombocytopenia (H)      Unspecified closed fracture of pelvis 2000    left fracture of pelvis after delivery       Past Surgical History:   Procedure Laterality Date     C NONSPECIFIC PROCEDURE   10/00    after delivery 2 units of prbcs secondary to placenta     DILATION AND CURETTAGE, ABLATE ENDOMETRIUM NOVASURE, COMBINED N/A 12/4/2018    Procedure: novasure endometrial ablation, myosure resection of leiomyoma, dilation and curettage;  Surgeon: Rolando Covarrubias MD;  Location: [facility] OR     GYN SURGERY  12/04/2018    fibroid removal     LEEP TX, CERVICAL  12/19/11    MARTA II     OPERATIVE HYSTEROSCOPY WITH MORCELLATOR N/A 12/4/2018    Procedure: OPERATIVE HYSTEROSCOPY WITH MORCELLATOR;  Surgeon: Rolando Covarrubias MD;  Location: [facility] OR     TUBAL LIGATION  5/2009    laparoscopic tubal ligation       MEDICATIONS:  Current Outpatient Medications   Medication     clindamycin-benzoyl peroxide (BENZACLIN) gel     diclofenac (VOLTAREN) 1 % GEL topical gel     docusate sodium (COLACE) 100 MG tablet     DULoxetine (CYMBALTA) 30 MG capsule     ferrous sulfate (IRON) 325 (65 Fe) MG tablet     fluticasone (FLONASE) 50 MCG/ACT spray     gabapentin (NEURONTIN) 300 MG capsule     HYDROcodone-acetaminophen (NORCO) 5-325 MG tablet     hydrocortisone (WESTCORT) 0.2 % cream     loratadine (CLARITIN) 10 MG tablet     naproxen (NAPROSYN) 500 MG tablet     ondansetron (ZOFRAN-ODT) 4 MG ODT tab     PATADAY 0.2 % SOLN     predniSONE (DELTASONE) 10 MG tablet     traMADol (ULTRAM) 50 MG tablet     traZODone (DESYREL) 50 MG tablet     No current facility-administered medications for this visit.        SOCIAL HISTORY:  Social History     Tobacco Use     Smoking status: Never Smoker     Smokeless tobacco: Never Used   Substance Use Topics     Alcohol use: Yes     Alcohol/week: 0.0 oz     Comment: rarely       Family History   Problem Relation Age of Onset     Hypertension Father      Lipids Father      Hypertension Mother      Lipids Mother      Diabetes No family hx of      Coronary Artery Disease No family hx of      Hyperlipidemia No family hx of      Cerebrovascular Disease No family hx of      Breast Cancer No family hx of       Colon Cancer No family hx of      Prostate Cancer No family hx of      Other Cancer No family hx of      Depression No family hx of      Anxiety Disorder No family hx of      Mental Illness No family hx of      Substance Abuse No family hx of      Anesthesia Reaction No family hx of      Asthma No family hx of      Osteoporosis No family hx of      Genetic Disorder No family hx of      Thyroid Disease No family hx of      Obesity No family hx of      Unknown/Adopted No family hx of        Objective:  Blood pressure 104/76, pulse 82, temperature 97.9  F (36.6  C), temperature source Oral, weight 51.7 kg (114 lb), last menstrual period 03/26/2019, SpO2 100 %, not currently breastfeeding.  Chest: Clear to auscultation bilaterally.  No wheezes, rales or retractions.  CV: Regular rate and rhythm without murmurs, rubs or gallops.  Abd: tender in lower abdomin - right is more than left with some guarding.   No masses.   + bowel sounds  Extremities: There is no clubbing, cyanosis or edema.  Peripheral pulses are present bilaterally in the radial and dorsalis pedis arteries.    Assessment:  1. Abdominal pain - ? GYN vs GI      Plan:  1. Ultrasound   2. See [facility] orders for labs  3. If ultrasound if fine will get CT of abdomin and pelvis  4. Will be coming back in 1=2 months  5. Seeing rheum today

## 2019-04-08 NOTE — NURSING NOTE
"Chief Complaint   Patient presents with     RECHECK       Initial LMP 03/26/2019 (Within Days)  Estimated body mass index is 23.42 kg/m  as calculated from the following:    Height as of 2/25/19: 1.486 m (4' 10.5\").    Weight as of an earlier encounter on 4/8/19: 51.7 kg (114 lb).  Medication Reconciliation: complete    Have you ever seen a rheumatologist Yes Babak Garay When 3-28-18  Joint pain history  Onset: Ongoing dizziness from medicine, referred back by primary due to symptoms.  Involved joints: Bilateral shoulders, neck/back of head  Pain scale:  8-8.5/10     Wakes the patient from sleep : Yes  Morning stiffness:Yes for 10-15 minutes  Meds used: Gabapentin, Naproxen, Tramadol, Norco, Cymbalta    Interim history  Since last visit:  1. Infections - No  2. New symptoms/medical problem - No  3. Any side effects from Rheum medications -See above  3. ER visits/Hospitalizations/surgeries - Yes-Dec 2018 fibroid removal  4. Last PCP visit: 4-8-19  Wt Readings from Last 4 Encounters:   04/08/19 51.7 kg (114 lb)   02/25/19 52.2 kg (115 lb)   01/21/19 51.7 kg (114 lb)   12/10/18 52.2 kg (115 lb)     BP Readings from Last 3 Encounters:   04/08/19 104/76   02/25/19 124/82   01/21/19 147/87       "

## 2019-04-09 LAB
ALBUMIN SERPL-MCNC: 4.1 G/DL (ref 3.4–5)
ALP SERPL-CCNC: 67 U/L (ref 40–150)
ALT SERPL W P-5'-P-CCNC: 19 U/L (ref 0–50)
ANION GAP SERPL CALCULATED.3IONS-SCNC: 7 MMOL/L (ref 3–14)
AST SERPL W P-5'-P-CCNC: 22 U/L (ref 0–45)
BILIRUB SERPL-MCNC: 0.3 MG/DL (ref 0.2–1.3)
BUN SERPL-MCNC: 8 MG/DL (ref 7–30)
CALCIUM SERPL-MCNC: 9.1 MG/DL (ref 8.5–10.1)
CHLORIDE SERPL-SCNC: 104 MMOL/L (ref 94–109)
CO2 SERPL-SCNC: 28 MMOL/L (ref 20–32)
CREAT SERPL-MCNC: 0.51 MG/DL (ref 0.52–1.04)
GFR SERPL CREATININE-BSD FRML MDRD: >90 ML/MIN/{1.73_M2}
GLUCOSE SERPL-MCNC: 88 MG/DL (ref 70–99)
HBV SURFACE AG SERPL QL IA: NONREACTIVE
HCV AB SERPL QL IA: NONREACTIVE
POTASSIUM SERPL-SCNC: 4.2 MMOL/L (ref 3.4–5.3)
PROT SERPL-MCNC: 8 G/DL (ref 6.8–8.8)
SODIUM SERPL-SCNC: 139 MMOL/L (ref 133–144)
TSH SERPL DL<=0.005 MIU/L-ACNC: 3.07 MU/L (ref 0.4–4)

## 2019-04-10 ENCOUNTER — ANCILLARY PROCEDURE (OUTPATIENT)
Dept: ULTRASOUND IMAGING | Facility: CLINIC | Age: 44
End: 2019-04-10
Attending: FAMILY MEDICINE
Payer: COMMERCIAL

## 2019-04-10 DIAGNOSIS — R10.31 RLQ ABDOMINAL PAIN: ICD-10-CM

## 2019-04-10 PROCEDURE — 76856 US EXAM PELVIC COMPLETE: CPT | Performed by: FAMILY MEDICINE

## 2019-04-10 PROCEDURE — 76830 TRANSVAGINAL US NON-OB: CPT | Performed by: FAMILY MEDICINE

## 2019-04-10 NOTE — RESULT ENCOUNTER NOTE
Results released to [facility]:  Hepatitis B and C screening labs are normal. Good.     Sincerely    Julius Garay MD  [facility]

## 2019-04-15 ENCOUNTER — OFFICE VISIT (OUTPATIENT)
Dept: OBGYN | Facility: CLINIC | Age: 44
End: 2019-04-15
Payer: COMMERCIAL

## 2019-04-15 ENCOUNTER — TELEPHONE (OUTPATIENT)
Dept: FAMILY MEDICINE | Facility: CLINIC | Age: 44
End: 2019-04-15

## 2019-04-15 VITALS
HEIGHT: 59 IN | WEIGHT: 114 LBS | DIASTOLIC BLOOD PRESSURE: 70 MMHG | BODY MASS INDEX: 22.98 KG/M2 | SYSTOLIC BLOOD PRESSURE: 108 MMHG

## 2019-04-15 DIAGNOSIS — R10.2 PELVIC PAIN IN FEMALE: Primary | ICD-10-CM

## 2019-04-15 DIAGNOSIS — N83.202 LEFT OVARIAN CYST: ICD-10-CM

## 2019-04-15 DIAGNOSIS — D25.1 INTRAMURAL LEIOMYOMA OF UTERUS: ICD-10-CM

## 2019-04-15 PROCEDURE — 87591 N.GONORRHOEAE DNA AMP PROB: CPT | Performed by: OBSTETRICS & GYNECOLOGY

## 2019-04-15 PROCEDURE — 87491 CHLMYD TRACH DNA AMP PROBE: CPT | Performed by: OBSTETRICS & GYNECOLOGY

## 2019-04-15 PROCEDURE — 99214 OFFICE O/P EST MOD 30 MIN: CPT | Performed by: OBSTETRICS & GYNECOLOGY

## 2019-04-15 ASSESSMENT — ENCOUNTER SYMPTOMS
VOMITING: 0
FREQUENCY: 0
CHILLS: 0
FEVER: 0
DYSURIA: 0
DIARRHEA: 0
NAUSEA: 0
CONSTIPATION: 0

## 2019-04-15 ASSESSMENT — MIFFLIN-ST. JEOR: SCORE: 1069.79

## 2019-04-15 NOTE — NURSING NOTE
"Chief Complaint   Patient presents with     Follow Up     Ultrasound- cramping, spotting, thinkened uterine lining        Initial /70 (BP Location: Right arm, Patient Position: Sitting, Cuff Size: Adult Regular)   Ht 1.486 m (4' 10.5\")   Wt 51.7 kg (114 lb)   LMP 2019 (Within Days)   BMI 23.42 kg/m   Estimated body mass index is 23.42 kg/m  as calculated from the following:    Height as of this encounter: 1.486 m (4' 10.5\").    Weight as of this encounter: 51.7 kg (114 lb).  BP completed using cuff size: regular    Questioned patient about current smoking habits.  Pt. has never smoked.          The following HM Due: NONE    Yusuf Navarro CMA                "

## 2019-04-15 NOTE — PROGRESS NOTES
SUBJECTIVE:                                                   Zo Qureshi is a 44 year old female who presents to clinic today with the Chief Complaint of:  Patient presents with:  Follow Up: Ultrasound- cramping, spotting, thinkened uterine lining     Pt referred for pelvic pain and abnormal pelvic U/S.  Pt underwent operative hysteroscopy w/ Myosure for submucous myoma, D&C, and Novasure endometrial ablation for menorrhagia 18 by Dr. Covarrubias.  Pt is also s/p LBTL in the past.  She did well initially with minimal to light VB each month until about 1 month ago when she started having worsening mid-pelvic and BLQ pelvic pain, crampy, like menstrual cramps, intermittent and not always associated with the minimal VB she has, she does not Rx it w/ an OTC meds, no abnl vag discharge.  Her PCP got her a pelvic U/S 4/10/19 showing uterus 7.4 x 6.5 x 4.7 cm w/ stripe 7.7 mm, but complex fluid collections and irregular appearance of endometrium, right ovary WNL, left ovary had 2.5 cm simple cyst, no CDS fluid.  Hematometra was in DDx.  Pt had benign myoma and proliferative endometrium at time of D&C.    Last Pap-WNL, HPV Neg 18    Patient's last menstrual period was 2019 (within days)..   Patient is sexually active, .  Using tubal ligation for contraception.    reports that she has never smoked. She has never used smokeless tobacco.    Problem list and histories reviewed & adjusted, as indicated.  Additional history: as documented.    Patient Active Problem List   Diagnosis     Inflammatory spondylopathy (H)     Disorder of SI (sacroiliac) joint     Hypercholesterolemia     HSIL on Pap smear     S/P LEEP of cervix     Acne     Influenza B     Vitamin D deficiency     Right peroneal tendinosis     Episodic tension-type headache, not intractable     Cervicalgia     Insomnia due to medical condition     Chronic pain syndrome     Submucous leiomyoma of uterus     Menorrhagia with regular cycle      Right shoulder tendonitis     Past Surgical History:   Procedure Laterality Date     C NONSPECIFIC PROCEDURE  10/00    after delivery 2 units of prbcs secondary to placenta     DILATION AND CURETTAGE, ABLATE ENDOMETRIUM NOVASURE, COMBINED N/A 12/4/2018    Procedure: novasure endometrial ablation, myosure resection of leiomyoma, dilation and curettage;  Surgeon: Rolando Covarrubias MD;  Location: [facility] OR     GYN SURGERY  12/04/2018    fibroid removal     LEEP TX, CERVICAL  12/19/11    MARTA II     OPERATIVE HYSTEROSCOPY WITH MORCELLATOR N/A 12/4/2018    Procedure: OPERATIVE HYSTEROSCOPY WITH MORCELLATOR;  Surgeon: Rolando Covarrubias MD;  Location: [facility] OR     TUBAL LIGATION  5/2009    laparoscopic tubal ligation      Social History     Tobacco Use     Smoking status: Never Smoker     Smokeless tobacco: Never Used   Substance Use Topics     Alcohol use: Yes     Alcohol/week: 0.0 oz     Comment: rarely      Problem (# of Occurrences) Relation (Name,Age of Onset)    Hypertension (2) Father, Mother    Lipids (2) Father, Mother       Negative family history of: Diabetes, Coronary Artery Disease, Hyperlipidemia, Cerebrovascular Disease, Breast Cancer, Colon Cancer, Prostate Cancer, Other Cancer, Depression, Anxiety Disorder, Mental Illness, Substance Abuse, Anesthesia Reaction, Asthma, Osteoporosis, Genetic Disorder, Thyroid Disease, Obesity, Unknown/Adopted            Current Outpatient Medications   Medication Sig     clindamycin-benzoyl peroxide (BENZACLIN) gel Apply to  Affected area initially once daily for 2 weeks and then increase to 2 times daily if tolerated     diclofenac (VOLTAREN) 1 % GEL topical gel Use small amount and rub into area of chest that is hurting 1-2 times daily as needed     docusate sodium (COLACE) 100 MG tablet Take 1 tablet (100 mg) by mouth daily as needed for constipation     DULoxetine (CYMBALTA) 30 MG capsule Take 2 capsules (60 mg) by mouth daily     ferrous sulfate (IRON) 325 (65 Fe) MG tablet  Take 1 tablet (325 mg) by mouth 2 times daily     fluticasone (FLONASE) 50 MCG/ACT spray Spray 1-2 sprays into both nostrils daily     gabapentin (NEURONTIN) 300 MG capsule 1 tab in am and 1 at 5 pm  and 2 at bedtime for (4 per day)     HYDROcodone-acetaminophen (NORCO) 5-325 MG tablet Take 1-2 tablets by mouth every 4 hours as needed for moderate to severe pain     hydrocortisone (WESTCORT) 0.2 % cream Apply sparingly to affected area 2 times daily as needed.     loratadine (CLARITIN) 10 MG tablet Take 1 tablet (10 mg) by mouth daily     naproxen (NAPROSYN) 500 MG tablet Take 1 tablet (500 mg) by mouth 2 times daily (with meals)     ondansetron (ZOFRAN-ODT) 4 MG ODT tab Take 1-2 tablets (4-8 mg) by mouth every 8 hours as needed for nausea     PATADAY 0.2 % SOLN Reported on 5/1/2017     predniSONE (DELTASONE) 5 MG tablet April 8, 2019: Take 10 mg PO daily X 2 weeks and then 7.5mg PO daily X 2 weeks and then 5mg Po daily X 2 weeks and then stop.     sulfaSALAzine ER (AZULFIDINE EN) 500 MG EC tablet 500mg twice daily for 2 weeks, then 1000mg in the AM and 500mg in the PM for two weeks, then 1000mg in the AM and 1000mg in the PM.  Labs in 1 month     traMADol (ULTRAM) 50 MG tablet Take 1 tablet (50 mg) by mouth every 6 hours as needed for severe pain     traZODone (DESYREL) 50 MG tablet Take 1 tablet (50 mg) by mouth nightly as needed for sleep     No current facility-administered medications for this visit.      Allergies   Allergen Reactions     Contrast Dye Nausea and Vomiting     Percocet [Oxycodone-Acetaminophen] Nausea and Vomiting and Itching     Advil [Ibuprofen Micronized] Rash     Rash        ROS:  Review of Systems   Constitutional: Negative for chills and fever.   Gastrointestinal: Negative for constipation, diarrhea, nausea and vomiting.   Genitourinary: Positive for menstrual problem, pelvic pain and vaginal bleeding. Negative for dysuria, frequency and vaginal discharge.         OBJECTIVE:     /70  "(BP Location: Right arm, Patient Position: Sitting, Cuff Size: Adult Regular)   Ht 1.486 m (4' 10.5\")   Wt 51.7 kg (114 lb)   LMP 03/26/2019 (Within Days)   BMI 23.42 kg/m    Body mass index is 23.42 kg/m .    Exam:  Physical Exam  Abdomen- Abdomen soft, non-tender. BS normal. No masses, organomegaly  Pelvic- Exam chaperoned by nurse, External genitalia normal, Bartholin's glands normal, [place]'s glands normal, Urethral meatus normal, Urethra normal, Bladder normal, Vagina with normal rugae, no abnormal lesions, no abnormal discharge, Normal cervix without lesions or mucopus, no cervical motion tenderness, Uterus normal size, shape, and contour, no masses, non-tender, Adnexa normal size without masses or tenderness bilaterally, Anus normal, GC/Chlam probe was Done      In-Clinic Test Results:  No results found for this or any previous visit (from the past 24 hour(s)).    Prior Pertinent Lab Results Reviewed:  Pap-WNL, HPV Neg 2/21/18, Path from Op Hyst w/ IUM & D&C showed proliferative endometrium and myoma December / 04 / 2018    Prior Pertinent Radiology Images Visualized by Me Today:  Pelvic U/S - Uterus 7 x 7 x 5 cm w/ 7.7 mm stripe, irregular endometrium with complex fluid, right ovary WNL, left ovary with 2.4 cm simple cyst, no CDS fluid.  Not mentioned in reading but apparent in images is a posterior MALVIN myoma that corresponds to a 3.3 cm posterior myoma noted on U/S from 10/9/2018 ( April / 10 / 2019)      ASSESSMENT:                                                      Encounter Diagnoses   Name Primary?     Pelvic pain in female Yes     Left ovarian cyst      Intramural leiomyoma of uterus          PLAN:                                                      1)  GC/Chlam sent    2)  I spent most of today's visit counseling Pt re: the low likelihood if any hyperplasia or endometrial CA in her uterus given she had a neg D&C in 12/2018 (4 months ago).  However, given she had an ablation and a prior LBTL, she " likely has a hematometra as the U/S suggests which can cause the type of pain she is having. The ovarian cyst is c/w a follicle.  I reviewed possible options which at this time are limited to conservative management and f/u U/S to see if the endometrial cavity is enlarging vs D&C to try to evacuate uterus which is unlikely to be totally successful due to scarring of the endometrium and possible recurrence of her current problem, and robotic-TLH, Bilateral salpingectomy, possible left ovarian cystectomy as definitive Rx.  The latter option is most likely to resolve this, but since she is on steroids and other Rx's that my impede wound healing, she would need to be off some of those at least a few weeks prior to hysterectomy to allow the best chance her vaginal cuff and abd incisions heal properly.    3)  After reviewing all these factors and options, she is going to take some time to think about it.  If she decides to manage conservatively, would repeat U/S in 6-8 weeks.  If she decides on surgery, she will need to see her her rheum MD can get her off Rx's a few weeks beforehand that impede wound healing.    4)  I spent 15 minutes counseling time out of a total visit time of 25 minutes in direct face-to-face counseling and discussion of the above issues with the patient.        Tom Mills MD  [facility]

## 2019-04-15 NOTE — TELEPHONE ENCOUNTER
Patient updated with message:     The lining of the uterus appears irregular.   I would like to get her set up with GYN soon.  Please help her arrange this.    Patient stated she has OB GYN appointment today with Dr. Denis in [place].     Nataliia Castillo RN Flex

## 2019-04-16 LAB
C TRACH DNA SPEC QL NAA+PROBE: NEGATIVE
N GONORRHOEA DNA SPEC QL NAA+PROBE: NEGATIVE
SPECIMEN SOURCE: NORMAL
SPECIMEN SOURCE: NORMAL

## 2019-05-08 DIAGNOSIS — Z79.899 ENCOUNTER FOR MONITORING SULFASALAZINE THERAPY: ICD-10-CM

## 2019-05-08 DIAGNOSIS — Z51.81 ENCOUNTER FOR MONITORING SULFASALAZINE THERAPY: ICD-10-CM

## 2019-05-08 LAB
ALT SERPL W P-5'-P-CCNC: 15 U/L (ref 0–50)
AST SERPL W P-5'-P-CCNC: 13 U/L (ref 0–45)
CREAT SERPL-MCNC: 0.57 MG/DL (ref 0.52–1.04)
ERYTHROCYTE [DISTWIDTH] IN BLOOD BY AUTOMATED COUNT: 12.5 % (ref 10–15)
GFR SERPL CREATININE-BSD FRML MDRD: >90 ML/MIN/{1.73_M2}
HCT VFR BLD AUTO: 37.2 % (ref 35–47)
HGB BLD-MCNC: 12.3 G/DL (ref 11.7–15.7)
MCH RBC QN AUTO: 30.4 PG (ref 26.5–33)
MCHC RBC AUTO-ENTMCNC: 33.1 G/DL (ref 31.5–36.5)
MCV RBC AUTO: 92 FL (ref 78–100)
PLATELET # BLD AUTO: 229 10E9/L (ref 150–450)
RBC # BLD AUTO: 4.05 10E12/L (ref 3.8–5.2)
WBC # BLD AUTO: 6.2 10E9/L (ref 4–11)

## 2019-05-08 PROCEDURE — 84450 TRANSFERASE (AST) (SGOT): CPT | Performed by: FAMILY MEDICINE

## 2019-05-08 PROCEDURE — 84460 ALANINE AMINO (ALT) (SGPT): CPT | Performed by: FAMILY MEDICINE

## 2019-05-08 PROCEDURE — 36415 COLL VENOUS BLD VENIPUNCTURE: CPT | Performed by: FAMILY MEDICINE

## 2019-05-08 PROCEDURE — 82565 ASSAY OF CREATININE: CPT | Performed by: FAMILY MEDICINE

## 2019-05-08 PROCEDURE — 85027 COMPLETE CBC AUTOMATED: CPT | Performed by: FAMILY MEDICINE

## 2019-05-09 ENCOUNTER — TELEPHONE (OUTPATIENT)
Dept: FAMILY MEDICINE | Facility: CLINIC | Age: 44
End: 2019-05-09

## 2019-05-09 DIAGNOSIS — H10.13 ALLERGIC CONJUNCTIVITIS, BILATERAL: Primary | ICD-10-CM

## 2019-05-09 NOTE — TELEPHONE ENCOUNTER
Pt calls, wants OTC allergy eye gtt sent, has uses pataday, aware can purchase OTC, ok for JAIMIE, routed, added zaditor rx, may change, [place] and [place] OTC meds, inform pt when final, no associated diagnosis on problem list, need to confirm    Last office visit: 4/8/2019 with prescribing provider:  Hair loss, bloating   Future Office Visit:   Next 5 appointments (look out 90 days)    May 24, 2019  8:00 AM CDT  Return Visit with Julius Garay MD  [facility] ([facility] [address] [place] [id number] [phone]         Ellen Fowler RN, BSN  Message handled by Nurse Triage.

## 2019-05-14 NOTE — PROGRESS NOTES
[facility]     Zo Qureshi MRN# [id number]   YOB: 1975    Primary care provider: Terri Baron  May 24, 2019          Assessment and Plan:   # Sacroiliitis (MRI evidence 2010) with negative HLA B27; Polyarthralgia in MCPs and also wrists  # Elevated sed rate; anemia  # Pleuritic chest pain  # Chronic NSAID use    Basic blood cell counts, liver and kidney function labs within normal limits except anemia noted.      Inflammatory marker CRP is normal but sed rate is improving when compared to 4 months ago.     Patient was evaluated by Dr. Cuba at [facility] rheumatology in 2010. Humira and enbrel were tried. But patient did not tolerate. So it was not continued for adequate duration to assess the response.   Since there is MCP involvement, we checked RF and CCP antibodies were normal.   MRI SI joints 9/2017 did not show active inflammation in SI joints.   Her symptoms are steroid and NSAID responsive.   Lost follow up after 2018 until 4/19.   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  Started sulfasalazine last month. But still on 500mg BID and is improving. Increase to 1000mg BID. Some of the symptoms that patient reports are less likely to be related to sulfasalazine. We will watch.   Screening for Chronic Hepatitis B, Chronic Hepatitis C and TB are negative. These tests are done every few years if you need to be on any immunomodulatory agents.     Sulfasalazine likely would take few weeks to start working  Patient is also on naproxen.   Labs monthly X 3.     The labs, imaging from patient records are reviewed.     I will be back in touch with the patient through mychart/letter when results are available.     Follow up after discussing with PCP.     F/u in 6 weeks    No follow-ups on file.    No orders of the defined types were placed in this encounter.      Medications Discontinued During This Encounter   Medication Reason     predniSONE  (DELTASONE) 5 MG tablet      sulfaSALAzine ER (AZULFIDINE EN) 500 MG EC tablet Reorder     Current Outpatient Medications   Medication Sig Dispense Refill     clindamycin-benzoyl peroxide (BENZACLIN) gel Apply to  Affected area initially once daily for 2 weeks and then increase to 2 times daily if tolerated 50 g 11     diclofenac (VOLTAREN) 1 % GEL topical gel Use small amount and rub into area of chest that is hurting 1-2 times daily as needed 100 g 1     docusate sodium (COLACE) 100 MG tablet Take 1 tablet (100 mg) by mouth daily as needed for constipation 30 tablet 11     DULoxetine (CYMBALTA) 30 MG capsule Take 2 capsules (60 mg) by mouth daily 180 capsule 3     ferrous sulfate (IRON) 325 (65 Fe) MG tablet Take 1 tablet (325 mg) by mouth 2 times daily 60 tablet 2     fluticasone (FLONASE) 50 MCG/ACT spray Spray 1-2 sprays into both nostrils daily (Patient taking differently: Spray 1-2 sprays into both nostrils daily as needed ) 16 g 3     gabapentin (NEURONTIN) 300 MG capsule 1 tab in am and 1 at 5 pm  and 2 at bedtime for (4 per day) 120 capsule 1     HYDROcodone-acetaminophen (NORCO) 5-325 MG tablet Take 1-2 tablets by mouth every 4 hours as needed for moderate to severe pain 10 tablet 0     hydrocortisone (WESTCORT) 0.2 % cream Apply sparingly to affected area 2 times daily as needed. 45 g 0     ketotifen (ZADITOR/REFRESH ANTI-ITCH) 0.025 % ophthalmic solution Place 1 drop into both eyes 2 times daily 1 Bottle 11     loratadine (CLARITIN) 10 MG tablet Take 1 tablet (10 mg) by mouth daily 30 tablet 1     naproxen (NAPROSYN) 500 MG tablet Take 1 tablet (500 mg) by mouth 2 times daily (with meals) 60 tablet 3     ondansetron (ZOFRAN-ODT) 4 MG ODT tab Take 1-2 tablets (4-8 mg) by mouth every 8 hours as needed for nausea 4 tablet 0     PATADAY 0.2 % SOLN as needed Reported on 5/1/2017  6     sulfaSALAzine ER (AZULFIDINE EN) 500 MG EC tablet 1000mg in the AM and 1000mg in the PM daily 120 tablet 0     traMADol  (ULTRAM) 50 MG tablet Take 1 tablet (50 mg) by mouth every 6 hours as needed for severe pain 60 tablet 0     traZODone (DESYREL) 50 MG tablet Take 1 tablet (50 mg) by mouth nightly as needed for sleep 60 tablet 6       Julius Garay MD  [facility]          Active Problem List:     Patient Active Problem List    Diagnosis Date Noted     Intramural leiomyoma of uterus 04/15/2019     Priority: Medium     Pelvic pain in female 04/15/2019     Priority: Medium     Left ovarian cyst 04/15/2019     Priority: Medium     Right shoulder tendonitis 01/21/2019     Priority: Medium     Submucous leiomyoma of uterus 11/30/2018     Priority: Medium     Menorrhagia with regular cycle 11/30/2018     Priority: Medium     Chronic pain syndrome 04/23/2018     Priority: Medium     Patient is followed by Terri Baron MD for ongoing prescription of pain medication.  All refills should only be approved by this provider, or covering partner.    Medication(s): tramadol.   Maximum quantity per month: 60  Clinic visit frequency required: Q 3 months     Controlled substance agreement:  Encounter-Level CSA - 05/08/2017:          Controlled Substance Agreement - Scan on 5/18/2017 12:15 PM : CONTROLLED SUBSTANCE AGREEMENT (below)              Pain Clinic evaluation in the past: Yes       Date/Location:      DIRE Total Score(s):  No flowsheet data found.    Last [facility] website verification:  none   [email or web address]       Insomnia due to medical condition 01/29/2018     Priority: Medium     Episodic tension-type headache, not intractable 10/18/2017     Priority: Medium     Cervicalgia 10/18/2017     Priority: Medium     Right peroneal tendinosis 04/26/2017     Priority: Medium     Vitamin D deficiency 01/05/2017     Priority: Medium     Influenza B 03/03/2014     Priority: Medium     Acne 09/10/2012     Priority: Medium     S/P LEEP of cervix 12/19/2011     Priority: Medium     HSIL on Pap smear 09/21/2011      Priority: Medium     HGSIL confirmed with biopsy=repeat pap by May 2012  12/19/11 LEEP MARTA II  4/9/12 NIL pap.  Per OV notes, pt to repeat pap in 4 months  8/6/12: NIL pap. Per MD plan pap in 4 months.  12/10/12 NIL pap. Plan per MD, repeat in one year.   02/05/14 Pap reminder letter sent through [employer].  05/21/14 Pap= Normal, Neg HPV. Repeat co-test in 1 yr.  09/21/15 Pap= NIL, Neg HPV. Co-test in 3 yrs per ASCCP guidelines  2/21/18 NIL, Neg HPV. Plan 3 yr pap for 20 yrs post LEEP, 2011         Hypercholesterolemia 10/31/2010     Priority: Medium     Disorder of SI (sacroiliac) joint 09/15/2008     Priority: Medium     Inflammatory spondylopathy (H) 01/07/2008     Priority: Medium     Problem list name updated by automated process. Provider to review              History of Present Illness:     Chief Complaint   Patient presents with     RECHECK     6 week follow up        August 8, 2017  Have you ever seen a rheumatologist Yes Who Dr. Cuba  When 4/2010  Joint pain history  Onset: has pain that rotates around her body, primary care wanted her to see rheumatology.   Involved joints: back pain arms, shoulders, neck pain.knees Had epidural injection in June 2017  Pain scale:  7.5/10     Wakes the patient from sleep : Yes  Morning stiffness:Yes for 15 minutes  Meds used:tramadol, voltaren gel; tried humira and enbrel around 2010; had flu-like symptoms after trying couple of shots of humira and enbrel.      Interim history  Since last visit:  1. Infections - Yes/ had a cold a couple weeks ago  2. New symptoms/medical problem - No  3. Any side effects from Rheum medications -NA  3. ER visits/Hospitalizations/surgeries - No  4. Last PCP visit: 8/2/17     Fatigue during flare ups of polyarthralgia    Wt Readings from Last 4 Encounters:   05/24/19 51.2 kg (112 lb 14.4 oz)   04/15/19 51.7 kg (114 lb)   04/08/19 51.7 kg (114 lb)   04/08/19 51.7 kg (114 lb)     H/o pleuritic chest pain in middle with taking deep breaths.      No h/o ,unintentional weight loss, loss of appetite, fevers, persistent rash, swollen glands  No family or personal history of psoriasis, ulcerative colitis or chron's disease. No h/o iritis.   Patient denies any raynauds  No h/o arterial/venous thrombosis in the past  No h/o persistent shortness of breath, cough  No h/o persistent nausea, vomiting, constipation, diarrhea, abdominal pain  No h/o hematochezia (except with constipation), hematuria, hemoptysis, hematemesis  No h/o seizures or strokes  No h/o cancer    November 13, 2017  Have you ever seen a rheumatologist Yes Who You When 8/28/17  Joint pain history  Onset: pt states that she is having pain in her neck, and shoulders and lower back  Involved joints: see above  Pain scale:  7.5/10     Wakes the patient from sleep : Yes  Morning stiffness:Yes for 5 minutes  Meds used: naproxen which helps     Interim history  Since last visit:  1. Infections - No  2. New symptoms/medical problem - Yes, has developed bilateral knee pain and going up stairs  3. Any side effects from Rheum medications -none  3. ER visits/Hospitalizations/surgeries - No  4. Last PCP visit: 10/18/17    March 7, 2018  Have you ever seen a rheumatologist Yes Who You When 11/13/17  Joint pain history  Onset: pt states that she continues with pain in her neck and shoulder and hands, worse on the right side. Pt was put on prednisone back when she saw you, but is not sure if it helped due to she was on other medications at the time  Involved joints: see above  Pain scale:  7/10     Wakes the patient from sleep : Yes  Morning stiffness:Yes for 15 minutes  Meds used:nothing     Interim history  Since last visit:  1. Infections - No  2. New symptoms/medical problem - No  3. Any side effects from Rheum medications -NA  3. ER visits/Hospitalizations/surgeries - No  4. Last PCP visit: 2/26/18 March 28, 2018  Have you ever seen a rheumatologist yes Who  When 3/7/18  Joint pain history  Onset:  10-15 yrs ago  Involved joints: neck, shoulder, R wrist and arms  Pain scale:  8/10     Wakes the patient from sleep : Yes  Morning stiffness:15  Meds used: finished prednisone. Prednisone helped about 25-30% of her pain. After stopping prednisone, her pain is worse. Started taking gabapentin 300mg, QID, started 1 week ago  Interim history  Since last visit:  1. Infections - No  2. New symptoms/medical problem - Yes- L shoulder and neck are so stiff that she cannot turn her neck. Has to turn whole body to look  3. Any side effects from Rheum medications - some fatigue and dizziness from prednisone  3. ER visits/Hospitalizations/surgeries - No  4. Last PCP visit: 02/2018 April 8, 2019  Have you ever seen a rheumatologist Yes Babak Garay When 3-28-18  Joint pain history  Onset: Ongoing dizziness from medicine, referred back by primary due to symptoms.  Involved joints: Bilateral shoulders, neck/back of head  Pain scale:  8-8.5/10     Wakes the patient from sleep : Yes  Morning stiffness:Yes for 10-15 minutes  Meds used: Gabapentin, Naproxen, Tramadol, Norco, Cymbalta     Interim history  Since last visit:  1. Infections - No  2. New symptoms/medical problem - No  3. Any side effects from Rheum medications -See above  3. ER visits/Hospitalizations/surgeries - Yes-Dec 2018 fibroid removal  4. Last PCP visit: 4-8-19    May 24, 2019  Have you ever seen a rheumatologist Yes Who You When 4/8/19   Joint pain history  Onset: Since last visit patient is experiencing more headaches, drowsiness and dizzy spells and weakness she has noticed an increase in illness   Involved joints: bilateral shoulders, neck/back of head, and leg numbness from prolonged sitting or standing,   Pain scale:  7/10     Wakes the patient from sleep : Yes  Morning stiffness:Yes for 10-15 minutes  Meds used:Treated with prednisone and sulfasalazine      Interim history  Since last visit:  1. Infections - No  2. New symptoms/medical problem - No  3.  Any side effects from Rheum medications -drowsiness   3. ER visits/Hospitalizations/surgeries - No  4. Last PCP visit: 4/8/19     BP Readings from Last 3 Encounters:   05/24/19 110/70   04/15/19 108/70   04/08/19 116/76              Review of Systems:   Complete ROS negative except for symptoms mentioned in the HPI          Past Medical History:     Past Medical History:   Diagnosis Date     Anemia      History of blood transfusion 2000    after vaginal delivery, 2 units PRBCs given     HSIL on Pap smear 09/21/11    MARTA 2 and 3     INFLAM SPONDYLOPATHY NOS   1/7/2008     Leukocytopenia 3/10/2014     Leukocytopenia      Tendinosis      Thrombocytopenia (H) 3/10/2014     Thrombocytopenia (H)      Unspecified closed fracture of pelvis 2000    left fracture of pelvis after delivery     Past Surgical History:   Procedure Laterality Date     C NONSPECIFIC PROCEDURE  10/00    after delivery 2 units of prbcs secondary to placenta     DILATION AND CURETTAGE, ABLATE ENDOMETRIUM NOVASURE, COMBINED N/A 12/4/2018    Procedure: novasure endometrial ablation, myosure resection of leiomyoma, dilation and curettage;  Surgeon: Rolando Covarrubias MD;  Location: [facility] OR     GYN SURGERY  12/04/2018    fibroid removal     LEEP TX, CERVICAL  12/19/11    MARTA II     OPERATIVE HYSTEROSCOPY WITH MORCELLATOR N/A 12/4/2018    Procedure: OPERATIVE HYSTEROSCOPY WITH MORCELLATOR;  Surgeon: Rolando Covarrubias MD;  Location: [facility] OR     TUBAL LIGATION  5/2009    laparoscopic tubal ligation            Social History:     Social History     Occupational History     Occupation:      Employer: [employer]   Tobacco Use     Smoking status: Never Smoker     Smokeless tobacco: Never Used   Substance and Sexual Activity     Alcohol use: Yes     Alcohol/week: 0.0 oz     Comment: rarely     Drug use: No     Sexual activity: Not Currently     Partners: Male     Birth control/protection: Female Surgical     Comment: LBTL 2009             Family History:     Family History   Problem Relation Age of Onset     Hypertension Father      Lipids Father      Hypertension Mother      Lipids Mother      Diabetes No family hx of      Coronary Artery Disease No family hx of      Hyperlipidemia No family hx of      Cerebrovascular Disease No family hx of      Breast Cancer No family hx of      Colon Cancer No family hx of      Prostate Cancer No family hx of      Other Cancer No family hx of      Depression No family hx of      Anxiety Disorder No family hx of      Mental Illness No family hx of      Substance Abuse No family hx of      Anesthesia Reaction No family hx of      Asthma No family hx of      Osteoporosis No family hx of      Genetic Disorder No family hx of      Thyroid Disease No family hx of      Obesity No family hx of      Unknown/Adopted No family hx of             Allergies:     Allergies   Allergen Reactions     Contrast Dye Nausea and Vomiting     Percocet [Oxycodone-Acetaminophen] Nausea and Vomiting and Itching     Advil [Ibuprofen Micronized] Rash     Rash             Medications:     Current Outpatient Medications   Medication Sig Dispense Refill     clindamycin-benzoyl peroxide (BENZACLIN) gel Apply to  Affected area initially once daily for 2 weeks and then increase to 2 times daily if tolerated 50 g 11     diclofenac (VOLTAREN) 1 % GEL topical gel Use small amount and rub into area of chest that is hurting 1-2 times daily as needed 100 g 1     docusate sodium (COLACE) 100 MG tablet Take 1 tablet (100 mg) by mouth daily as needed for constipation 30 tablet 11     DULoxetine (CYMBALTA) 30 MG capsule Take 2 capsules (60 mg) by mouth daily 180 capsule 3     ferrous sulfate (IRON) 325 (65 Fe) MG tablet Take 1 tablet (325 mg) by mouth 2 times daily 60 tablet 2     fluticasone (FLONASE) 50 MCG/ACT spray Spray 1-2 sprays into both nostrils daily (Patient taking differently: Spray 1-2 sprays into both nostrils daily as needed ) 16 g 3      "gabapentin (NEURONTIN) 300 MG capsule 1 tab in am and 1 at 5 pm  and 2 at bedtime for (4 per day) 120 capsule 1     HYDROcodone-acetaminophen (NORCO) 5-325 MG tablet Take 1-2 tablets by mouth every 4 hours as needed for moderate to severe pain 10 tablet 0     hydrocortisone (WESTCORT) 0.2 % cream Apply sparingly to affected area 2 times daily as needed. 45 g 0     ketotifen (ZADITOR/REFRESH ANTI-ITCH) 0.025 % ophthalmic solution Place 1 drop into both eyes 2 times daily 1 Bottle 11     loratadine (CLARITIN) 10 MG tablet Take 1 tablet (10 mg) by mouth daily 30 tablet 1     naproxen (NAPROSYN) 500 MG tablet Take 1 tablet (500 mg) by mouth 2 times daily (with meals) 60 tablet 3     ondansetron (ZOFRAN-ODT) 4 MG ODT tab Take 1-2 tablets (4-8 mg) by mouth every 8 hours as needed for nausea 4 tablet 0     PATADAY 0.2 % SOLN as needed Reported on 5/1/2017  6     sulfaSALAzine ER (AZULFIDINE EN) 500 MG EC tablet 1000mg in the AM and 1000mg in the PM daily 120 tablet 0     traMADol (ULTRAM) 50 MG tablet Take 1 tablet (50 mg) by mouth every 6 hours as needed for severe pain 60 tablet 0     traZODone (DESYREL) 50 MG tablet Take 1 tablet (50 mg) by mouth nightly as needed for sleep 60 tablet 6            Physical Exam:   Blood pressure 110/70, pulse 77, temperature 98.2  F (36.8  C), temperature source Oral, height 1.492 m (4' 10.74\"), weight 51.2 kg (112 lb 14.4 oz), SpO2 98 %, not currently breastfeeding.  Wt Readings from Last 4 Encounters:   05/24/19 51.2 kg (112 lb 14.4 oz)   04/15/19 51.7 kg (114 lb)   04/08/19 51.7 kg (114 lb)   04/08/19 51.7 kg (114 lb)     Constitutional: well-developed, appearing stated age; cooperative  MS: Tenderness in right wrist, MCPs (R> L)- resolved. Tenderness in midline lumbar area, paralumbar area and also in SI area. Cervical and lumbar ROM is reduced. No synovotis in peripheral joints.   All shoulder, elbow, wrist, MCP/PIP/DIP, hip, knee, ankle were examined and  found normal. No active " synovitis or deformity. Full ROM.  No dactylitis,  tenosynovitis, enthesopathy.  Skin: no rash in exposed areas  Psych: nl judgement, orientation, memory, affect.       Data:   Reviewed following labs:  CBC RESULTS:   Recent Labs   Lab Test  01/05/17   1322   WBC  6.8   RBC  4.19   HGB  12.0   HCT  37.4   MCV  89   MCH  28.6   MCHC  32.1   RDW  12.7   PLT  269       Liver Function Studies -   Recent Labs   Lab Test  09/26/15   1153   PROTTOTAL  8.4   ALBUMIN  4.2   BILITOTAL  0.3   ALKPHOS  60   AST  19   ALT  20       Creatinine   Date Value Ref Range Status   05/08/2019 0.57 0.52 - 1.04 mg/dL Final   ]    No results found for: URIC]    ESR/CRP  Recent Labs   Lab Test  08/02/17   1117  09/26/15   1153  05/21/14   1710   04/16/10   1140   SED  37*  27*  33*   < >  21*   CRP  <2.9   --    --    --   7.1    < > = values in this interval not displayed.       HLA-B27  No results for input(s): [id number], B1 in the last 84325 hours.    RF/CCP  Recent Labs   Lab Test  05/21/14   1710   RHF  <20       RAMÍREZ/RNP/Sm/SSA/SSB  Recent Labs   Lab Test  05/21/14   1710   BOO  <1.0  Interpretation:  Negative           Julius Garay MD    [facility]

## 2019-05-24 ENCOUNTER — OFFICE VISIT (OUTPATIENT)
Dept: RHEUMATOLOGY | Facility: CLINIC | Age: 44
End: 2019-05-24
Payer: COMMERCIAL

## 2019-05-24 VITALS
HEIGHT: 59 IN | HEART RATE: 77 BPM | OXYGEN SATURATION: 98 % | BODY MASS INDEX: 22.76 KG/M2 | SYSTOLIC BLOOD PRESSURE: 110 MMHG | TEMPERATURE: 98.2 F | DIASTOLIC BLOOD PRESSURE: 70 MMHG | WEIGHT: 112.9 LBS

## 2019-05-24 DIAGNOSIS — M47.819 SPONDYLOARTHROPATHY: ICD-10-CM

## 2019-05-24 PROCEDURE — 99213 OFFICE O/P EST LOW 20 MIN: CPT | Performed by: INTERNAL MEDICINE

## 2019-05-24 RX ORDER — SULFASALAZINE 500 MG/1
TABLET, DELAYED RELEASE ORAL
Qty: 120 TABLET | Refills: 0 | Status: SHIPPED | OUTPATIENT
Start: 2019-05-24 | End: 2019-10-07

## 2019-05-24 RX ORDER — PREDNISONE 5 MG/1
TABLET ORAL
Qty: 75 TABLET | Refills: 0 | Status: CANCELLED | OUTPATIENT
Start: 2019-05-24

## 2019-05-24 ASSESSMENT — MIFFLIN-ST. JEOR: SCORE: 1068.61

## 2019-05-24 NOTE — NURSING NOTE
"Chief Complaint   Patient presents with     RECHECK     6 week follow up        Initial /70 (BP Location: Right arm, Patient Position: Chair, Cuff Size: Adult Regular)   Pulse 77   Temp 98.2  F (36.8  C) (Oral)   Ht 1.492 m (4' 10.74\")   Wt 51.2 kg (112 lb 14.4 oz)   SpO2 98%   BMI 23.01 kg/m   Estimated body mass index is 23.01 kg/m  as calculated from the following:    Height as of this encounter: 1.492 m (4' 10.74\").    Weight as of this encounter: 51.2 kg (112 lb 14.4 oz).  Medication Reconciliation: complete    Have you ever seen a rheumatologist Yes Who You When 4/8/19   Joint pain history  Onset: Since last visit patient is experiencing more headaches, drowsiness and dizzy spells and weakness she has noticed an increase in illness   Involved joints: bilateral shoulders, neck/back of head, and leg numbness from prolonged sitting or standing,   Pain scale:  7/10     Wakes the patient from sleep : Yes  Morning stiffness:Yes for 10-15 minutes  Meds used:Treated with prednisone and sulfasalazine     Interim history  Since last visit:  1. Infections - No  2. New symptoms/medical problem - No  3. Any side effects from Rheum medications -drowsiness   3. ER visits/Hospitalizations/surgeries - No  4. Last PCP visit: 4/8/19   Wt Readings from Last 4 Encounters:   05/24/19 51.2 kg (112 lb 14.4 oz)   04/15/19 51.7 kg (114 lb)   04/08/19 51.7 kg (114 lb)   04/08/19 51.7 kg (114 lb)     BP Readings from Last 3 Encounters:   05/24/19 110/70   04/15/19 108/70   04/08/19 116/76       "

## 2019-06-05 ENCOUNTER — OFFICE VISIT (OUTPATIENT)
Dept: FAMILY MEDICINE | Facility: CLINIC | Age: 44
End: 2019-06-05
Payer: COMMERCIAL

## 2019-06-05 ENCOUNTER — ANCILLARY PROCEDURE (OUTPATIENT)
Dept: GENERAL RADIOLOGY | Facility: CLINIC | Age: 44
End: 2019-06-05
Attending: FAMILY MEDICINE
Payer: COMMERCIAL

## 2019-06-05 VITALS
TEMPERATURE: 98.5 F | BODY MASS INDEX: 23.23 KG/M2 | SYSTOLIC BLOOD PRESSURE: 110 MMHG | DIASTOLIC BLOOD PRESSURE: 70 MMHG | OXYGEN SATURATION: 98 % | WEIGHT: 114 LBS | RESPIRATION RATE: 14 BRPM | HEART RATE: 74 BPM

## 2019-06-05 DIAGNOSIS — R10.32 GROIN PAIN, CHRONIC, LEFT: Primary | ICD-10-CM

## 2019-06-05 DIAGNOSIS — G89.29 GROIN PAIN, CHRONIC, LEFT: ICD-10-CM

## 2019-06-05 DIAGNOSIS — R10.32 GROIN PAIN, CHRONIC, LEFT: ICD-10-CM

## 2019-06-05 DIAGNOSIS — G89.29 GROIN PAIN, CHRONIC, LEFT: Primary | ICD-10-CM

## 2019-06-05 PROCEDURE — 73502 X-RAY EXAM HIP UNI 2-3 VIEWS: CPT

## 2019-06-05 PROCEDURE — 99214 OFFICE O/P EST MOD 30 MIN: CPT | Performed by: FAMILY MEDICINE

## 2019-06-05 ASSESSMENT — PATIENT HEALTH QUESTIONNAIRE - PHQ9
SUM OF ALL RESPONSES TO PHQ QUESTIONS 1-9: 18
10. IF YOU CHECKED OFF ANY PROBLEMS, HOW DIFFICULT HAVE THESE PROBLEMS MADE IT FOR YOU TO DO YOUR WORK, TAKE CARE OF THINGS AT HOME, OR GET ALONG WITH OTHER PEOPLE: EXTREMELY DIFFICULT
SUM OF ALL RESPONSES TO PHQ QUESTIONS 1-9: 18

## 2019-06-05 NOTE — PROGRESS NOTES
Subjective  Answers for HPI/ROS submitted by the patient on 6/5/2019   Chronic problems general questions HPI Form  If you checked off any problems, how difficult have these problems made it for you to do your work, take care of things at home, or get along with other people?: Extremely difficult  PHQ9 TOTAL SCORE: 18      Zo Qureshi is a 44 year old female who presents to clinic today for the following health issues:    HPI   Musculoskeletal problem/pain      Duration: 6 months but worse for past 2 weeks    Description  Location: L groin area    Intensity:  severe    Accompanying signs and symptoms: numbness in L leg, on and off, only when the groin pain comes, he whole leg hurts or feels numb    History  Previous similar problem: no   Previous evaluation:  none    Precipitating or alleviating factors:  Trauma or overuse: no   Aggravating factors include: none    Therapies tried and outcome: heat and Ibuprofen-helped a little    Sitting and standing and walking all make it worse, laying down helps.    PROBLEMS TO ADD ON...    BP Readings from Last 3 Encounters:   06/05/19 110/70   05/24/19 110/70   04/15/19 108/70    Wt Readings from Last 3 Encounters:   06/05/19 51.7 kg (114 lb)   05/24/19 51.2 kg (112 lb 14.4 oz)   04/15/19 51.7 kg (114 lb)                    PROBLEMS TO ADD ON...  Reviewed and updated as needed this visit by Provider         Review of Systems   ROS COMP: Constitutional, HEENT, cardiovascular, pulmonary, gi and gu systems are negative, except as otherwise noted.      Objective    Wt 51.7 kg (114 lb)   BMI 23.23 kg/m    Body mass index is 23.23 kg/m .  Physical Exam   GENERAL: healthy, alert and no distress  RESP: lungs clear to auscultation - no rales, rhonchi or wheezes  CV: regular rate and rhythm, normal S1 S2, no S3 or S4, no murmur, click or rub, no peripheral edema and peripheral pulses strong  ABDOMEN: soft, nontender, no hepatosplenomegaly, no masses and bowel sounds normal  MS: no  gross musculoskeletal defects noted, no edema  MS: extremities normal- no gross deformities noted, right and left hip with F ROM, left groin slightly tender, possible small LN noted in the groin  SKIN: no suspicious lesions or rashes  NEURO: Normal strength and tone, mentation intact and speech normal  PSYCH: mentation appears normal, affect normal/bright    Diagnostic Test Results:  Labs reviewed in Epic        Assessment & Plan     1. Groin pain, chronic, left  Reviewed recent GYN notes and US, this may all be due to GYN issues, will look at x-ray of the hip, due to hx of inflam arthritis, and also check CT scan, as on going pain and US only showing simple cyst  - X-RAY PELVIS 1-2 VIEWS  - CT Pelvis Soft Tissue wo Contrast; Future       Appt with pcp in July already madeAnswers for HPI/ROS submitted by the patient on 6/5/2019   Chronic problems general questions HPI Form  If you checked off any problems, how difficult have these problems made it for you to do your work, take care of things at home, or get along with other people?: Extremely difficult  PHQ9 TOTAL SCORE: 18      No follow-ups on file.    Kay Carrillo MD  [facility]

## 2019-06-06 ASSESSMENT — PATIENT HEALTH QUESTIONNAIRE - PHQ9: SUM OF ALL RESPONSES TO PHQ QUESTIONS 1-9: 18

## 2019-06-10 ENCOUNTER — TELEPHONE (OUTPATIENT)
Dept: FAMILY MEDICINE | Facility: CLINIC | Age: 44
End: 2019-06-10

## 2019-06-10 ENCOUNTER — HOSPITAL ENCOUNTER (OUTPATIENT)
Dept: CT IMAGING | Facility: CLINIC | Age: 44
Discharge: HOME | End: 2019-06-10
Attending: FAMILY MEDICINE | Admitting: FAMILY MEDICINE
Payer: COMMERCIAL

## 2019-06-10 DIAGNOSIS — R10.32 GROIN PAIN, CHRONIC, LEFT: ICD-10-CM

## 2019-06-10 DIAGNOSIS — G89.29 GROIN PAIN, CHRONIC, LEFT: ICD-10-CM

## 2019-06-10 PROCEDURE — 72192 CT PELVIS W/O DYE: CPT

## 2019-06-10 NOTE — TELEPHONE ENCOUNTER
6/10/2019- Patient request Pelvis/Hip images on CD and report printed out.    6/10/2019- CD of Pelvis and Hip complete.    6/10/2019-CD and report at reception area for pick-up.    6/10/2019-ELSA signed.

## 2019-06-24 NOTE — TELEPHONE ENCOUNTER
Reason for Call:  Other call back    Detailed comments: A representative from patient's insurance called needing to discuss restrictions for the patient with a nurse. Would like a call back. [phone]    Best Time: n/a    Can we leave a detailed message on this number? Not Applicable    Call taken on 12/28/2018 at 2:55 PM by Jessica Hernandes     (3) adequate

## 2019-07-08 ENCOUNTER — OFFICE VISIT (OUTPATIENT)
Dept: FAMILY MEDICINE | Facility: CLINIC | Age: 44
End: 2019-07-08
Payer: COMMERCIAL

## 2019-07-08 VITALS
WEIGHT: 112 LBS | BODY MASS INDEX: 22.58 KG/M2 | DIASTOLIC BLOOD PRESSURE: 83 MMHG | HEART RATE: 88 BPM | TEMPERATURE: 98.3 F | SYSTOLIC BLOOD PRESSURE: 134 MMHG | HEIGHT: 59 IN | RESPIRATION RATE: 12 BRPM

## 2019-07-08 DIAGNOSIS — R07.89 ATYPICAL CHEST PAIN: ICD-10-CM

## 2019-07-08 DIAGNOSIS — R10.9 POSTOPERATIVE ABDOMINAL PAIN: ICD-10-CM

## 2019-07-08 DIAGNOSIS — M53.3 DISORDER OF SI (SACROILIAC) JOINT: ICD-10-CM

## 2019-07-08 DIAGNOSIS — G47.00 INSOMNIA, UNSPECIFIED TYPE: ICD-10-CM

## 2019-07-08 DIAGNOSIS — G89.18 POSTOPERATIVE ABDOMINAL PAIN: ICD-10-CM

## 2019-07-08 DIAGNOSIS — M54.2 CERVICALGIA: ICD-10-CM

## 2019-07-08 DIAGNOSIS — M67.88 RIGHT PERONEAL TENDINOSIS: ICD-10-CM

## 2019-07-08 DIAGNOSIS — M77.8 RIGHT SHOULDER TENDONITIS: ICD-10-CM

## 2019-07-08 DIAGNOSIS — M94.0 COSTOCHONDRITIS: ICD-10-CM

## 2019-07-08 DIAGNOSIS — D50.9 IRON DEFICIENCY ANEMIA, UNSPECIFIED IRON DEFICIENCY ANEMIA TYPE: ICD-10-CM

## 2019-07-08 DIAGNOSIS — G44.219 EPISODIC TENSION-TYPE HEADACHE, NOT INTRACTABLE: ICD-10-CM

## 2019-07-08 PROCEDURE — 99214 OFFICE O/P EST MOD 30 MIN: CPT | Performed by: FAMILY MEDICINE

## 2019-07-08 PROCEDURE — 99000 SPECIMEN HANDLING OFFICE-LAB: CPT | Performed by: FAMILY MEDICINE

## 2019-07-08 PROCEDURE — 80307 DRUG TEST PRSMV CHEM ANLYZR: CPT | Mod: 90 | Performed by: FAMILY MEDICINE

## 2019-07-08 RX ORDER — FERROUS SULFATE 325(65) MG
325 TABLET ORAL 2 TIMES DAILY
Qty: 60 TABLET | Refills: 11 | Status: SHIPPED | OUTPATIENT
Start: 2019-07-08 | End: 2020-10-26

## 2019-07-08 RX ORDER — TRAMADOL HYDROCHLORIDE 50 MG/1
50 TABLET ORAL EVERY 6 HOURS PRN
Qty: 60 TABLET | Refills: 0 | Status: SHIPPED | OUTPATIENT
Start: 2019-07-08 | End: 2019-07-15

## 2019-07-08 RX ORDER — TRAZODONE HYDROCHLORIDE 50 MG/1
50 TABLET, FILM COATED ORAL
Qty: 60 TABLET | Refills: 6 | Status: SHIPPED | OUTPATIENT
Start: 2019-07-08 | End: 2020-06-01

## 2019-07-08 RX ORDER — NAPROXEN 500 MG/1
500 TABLET ORAL 2 TIMES DAILY WITH MEALS
Qty: 60 TABLET | Refills: 6 | Status: SHIPPED | OUTPATIENT
Start: 2019-07-08 | End: 2019-07-15

## 2019-07-08 RX ORDER — ONDANSETRON 4 MG/1
4-8 TABLET, ORALLY DISINTEGRATING ORAL EVERY 8 HOURS PRN
Qty: 12 TABLET | Refills: 1 | Status: SHIPPED | OUTPATIENT
Start: 2019-07-08 | End: 2019-10-07

## 2019-07-08 RX ORDER — GABAPENTIN 300 MG/1
CAPSULE ORAL
Qty: 120 CAPSULE | Refills: 6 | Status: SHIPPED | OUTPATIENT
Start: 2019-07-08 | End: 2019-07-15

## 2019-07-08 ASSESSMENT — MIFFLIN-ST. JEOR: SCORE: 1060.72

## 2019-07-08 NOTE — PROGRESS NOTES
Subjective     Zo Qureshi is a 44 year old female who presents to clinic today for the following health issues:    She started new medication from rheum for her pain and it did help but she is having some side effects.   It does wear off.   She takes it twice per day.   It is giving her side effects of headaches.       HPI     Work comp left leg?  Was seen earlier this year for this and had CT.   It was NEG.       Past Medical History:   Diagnosis Date     Anemia      History of blood transfusion 2000    after vaginal delivery, 2 units PRBCs given     HSIL on Pap smear 09/21/11    MARTA 2 and 3     INFLAM SPONDYLOPATHY NOS   1/7/2008     Leukocytopenia 3/10/2014     Leukocytopenia      Tendinosis      Thrombocytopenia (H) 3/10/2014     Thrombocytopenia (H)      Unspecified closed fracture of pelvis 2000    left fracture of pelvis after delivery       Past Surgical History:   Procedure Laterality Date     C NONSPECIFIC PROCEDURE  10/00    after delivery 2 units of prbcs secondary to placenta     DILATION AND CURETTAGE, ABLATE ENDOMETRIUM NOVASURE, COMBINED N/A 12/4/2018    Procedure: novasure endometrial ablation, myosure resection of leiomyoma, dilation and curettage;  Surgeon: Rolando Covarrubias MD;  Location: [facility] OR     GYN SURGERY  12/04/2018    fibroid removal     LEEP TX, CERVICAL  12/19/11    MARTA II     OPERATIVE HYSTEROSCOPY WITH MORCELLATOR N/A 12/4/2018    Procedure: OPERATIVE HYSTEROSCOPY WITH MORCELLATOR;  Surgeon: Rolando Covarrubias MD;  Location: [facility] OR     TUBAL LIGATION  5/2009    laparoscopic tubal ligation       MEDICATIONS:  Current Outpatient Medications   Medication     diclofenac (VOLTAREN) 1 % topical gel     ferrous sulfate (FEROSUL) 325 (65 Fe) MG tablet     gabapentin (NEURONTIN) 300 MG capsule     naproxen (NAPROSYN) 500 MG tablet     ondansetron (ZOFRAN-ODT) 4 MG ODT tab     traMADol (ULTRAM) 50 MG tablet     traZODone (DESYREL) 50 MG tablet     clindamycin-benzoyl peroxide (BENZACLIN) gel  "    docusate sodium (COLACE) 100 MG tablet     DULoxetine (CYMBALTA) 30 MG capsule     fluticasone (FLONASE) 50 MCG/ACT spray     hydrocortisone (WESTCORT) 0.2 % cream     ketotifen (ZADITOR/REFRESH ANTI-ITCH) 0.025 % ophthalmic solution     loratadine (CLARITIN) 10 MG tablet     sulfaSALAzine ER (AZULFIDINE EN) 500 MG EC tablet     No current facility-administered medications for this visit.        SOCIAL HISTORY:  Social History     Tobacco Use     Smoking status: Never Smoker     Smokeless tobacco: Never Used   Substance Use Topics     Alcohol use: Yes     Alcohol/week: 0.0 oz     Comment: rarely       Family History   Problem Relation Age of Onset     Hypertension Father      Lipids Father      Hypertension Mother      Lipids Mother      Diabetes No family hx of      Coronary Artery Disease No family hx of      Hyperlipidemia No family hx of      Cerebrovascular Disease No family hx of      Breast Cancer No family hx of      Colon Cancer No family hx of      Prostate Cancer No family hx of      Other Cancer No family hx of      Depression No family hx of      Anxiety Disorder No family hx of      Mental Illness No family hx of      Substance Abuse No family hx of      Anesthesia Reaction No family hx of      Asthma No family hx of      Osteoporosis No family hx of      Genetic Disorder No family hx of      Thyroid Disease No family hx of      Obesity No family hx of      Unknown/Adopted No family hx of               Review of Systems   ROS COMP: Constitutional, HEENT, cardiovascular, pulmonary, gi and gu systems are negative, except as otherwise noted.      Objective    Ht 1.486 m (4' 10.5\")   Breastfeeding? No   BMI 23.42 kg/m    Body mass index is 23.42 kg/m .  Physical Exam   GENERAL: healthy, alert and no distress  EYES: Eyes grossly normal to inspection, PERRL and conjunctivae and sclerae normal  HENT: ear canals and TM's normal, nose and mouth without ulcers or lesions  NECK: no adenopathy, no asymmetry, " masses, or scars and thyroid normal to palpation  RESP: lungs clear to auscultation - no rales, rhonchi or wheezes  CV: regular rate and rhythm, normal S1 S2, no S3 or S4, no murmur, click or rub, no peripheral edema and peripheral pulses strong  ABDOMEN: soft, nontender, no hepatosplenomegaly, no masses and bowel sounds normal  MS: she continues to have tenderness and pain along the right shoulder and wrist tendons  SKIN: no suspicious lesions or rashes  NEURO: Normal strength and tone, mentation intact and speech normal  PSYCH: mentation appears normal, affect normal/bright  LYMPH: no cervical, supraclavicular, axillary, or inguinal adenopathy    Diagnostic Test Results:  Labs reviewed in [facility]        Assessment:  1. Tendonitis   2. Spondyloarthropathy  3. Neck pain  4. Insomnia due to both  5. Chronic pain      Plan:  1. Refills per [facility]are  2. Follow up with rheum in 1 months  3. Pain referral  4. Note for work  5. Recheck in 3 months

## 2019-07-08 NOTE — LETTER
[facility]  [address] [place], [address]  [phone]        July 8, 2019    RE: Zo Qureshi                                                                                                                                                       [id number] [facility] [id number]            To Whom It May Concern,   Zo Qureshi is a patient of mine.   She is currently seeing rheumatology and under my care and theirs.   She is having some improvement of her symptoms but is still unable to return to work.  I will be seeing her again in 3 months.   She will be evaluated now but a pain clinic and continues ongoing care by rheumatology.             Sincerely,    Terri Robles M.D.

## 2019-07-09 ENCOUNTER — TELEPHONE (OUTPATIENT)
Dept: PALLIATIVE MEDICINE | Facility: CLINIC | Age: 44
End: 2019-07-09

## 2019-07-09 NOTE — TELEPHONE ENCOUNTER
[facility] Referral      1. Confirmed address with patient? Yes  2. Confirmed phone number with patient? Yes  3. Confirmed referring provider? Yes  4. Is the PCP the same as the referring provider? Yes  5. Has the patient been to any previous pain clinics? No  (If yes, send ELSA with welcome letter)  6. Which insurance are we to bill for this appointment?  [employer]    7. Informed pt of cancellation (48 hour) policy? Yes    REGARDING OPIOID MEDICATIONS: We will always address appropriateness of opioid pain medications, but we generally will not automatically take on a prescribing role. When we do take on prescribing of opioids for chronic pain, it is in collaboration with the referring physician for an intermediate period of time (months), with an expectation that the primary physician or provider will assume the prescribing role if medications are effective at stable doses with demonstrated compliance. Therefore, please do not assume that your prescribing responsibilities end on the day of pain clinic consultation.  8. Informed pt of prescribing policy? Yes    9.Please be aware that once you are established with a pain provider and location, you will need to continue have all future visits with that provider and location. It is best to determine what location is the most convenient for you and schedule with that one.    ** PATIENT INFORMED OF THIS POLICY Yes      9. Referring Provider: Terri Robles    [facility]

## 2019-07-12 LAB — COMPREHEN DRUG ANALYSIS UR: NORMAL

## 2019-07-15 DIAGNOSIS — M77.8 RIGHT SHOULDER TENDONITIS: ICD-10-CM

## 2019-07-15 DIAGNOSIS — M54.2 CERVICALGIA: ICD-10-CM

## 2019-07-15 DIAGNOSIS — M67.88 RIGHT PERONEAL TENDINOSIS: ICD-10-CM

## 2019-07-15 DIAGNOSIS — M53.3 DISORDER OF SI (SACROILIAC) JOINT: ICD-10-CM

## 2019-07-15 DIAGNOSIS — G44.219 EPISODIC TENSION-TYPE HEADACHE, NOT INTRACTABLE: ICD-10-CM

## 2019-07-15 RX ORDER — NAPROXEN 500 MG/1
500 TABLET ORAL 2 TIMES DAILY WITH MEALS
Qty: 60 TABLET | Refills: 6 | Status: SHIPPED | OUTPATIENT
Start: 2019-07-15 | End: 2020-02-19

## 2019-07-15 RX ORDER — TRAMADOL HYDROCHLORIDE 50 MG/1
50 TABLET ORAL EVERY 6 HOURS PRN
Qty: 60 TABLET | Refills: 0 | Status: SHIPPED | OUTPATIENT
Start: 2019-07-15 | End: 2019-10-07

## 2019-07-15 RX ORDER — GABAPENTIN 300 MG/1
CAPSULE ORAL
Qty: 120 CAPSULE | Refills: 6 | Status: SHIPPED | OUTPATIENT
Start: 2019-07-15 | End: 2019-10-07

## 2019-07-15 RX ORDER — DULOXETIN HYDROCHLORIDE 30 MG/1
60 CAPSULE, DELAYED RELEASE ORAL DAILY
Qty: 180 CAPSULE | Refills: 3 | Status: SHIPPED | OUTPATIENT
Start: 2019-07-15 | End: 2020-06-01

## 2019-07-15 RX ORDER — TRAMADOL HYDROCHLORIDE 50 MG/1
50 TABLET ORAL EVERY 6 HOURS PRN
Qty: 60 TABLET | Refills: 0 | Status: SHIPPED | OUTPATIENT
Start: 2019-07-15 | End: 2019-07-15

## 2019-07-15 NOTE — TELEPHONE ENCOUNTER
Sent to her workers comp pharmacy  The gabapentin will need to be one tab daily for one week.  Then 1 tab twice daily for one week    Then one tab 3 times daily for one week and then the way it is rx

## 2019-07-15 NOTE — TELEPHONE ENCOUNTER
Called patient.  States was out of medications for over 2 weeks.  Has refills at our pharmacy.  Asked why did not get refills.  States if they are from our pharmacy she has to pay but if sent to work comp pharmacy they are free.  Discussed not running out of medications and medications not going to help her pain if she is starting and stopping medications.  She is wondering if refills can be sent to her work comp pharmacy.  Medications are T'd up.  Checked  and Tramadol and Gabapentin not filled since 1/21/19.  Please advise.  Delma Vaz RN          Notes recorded by Terri Robles MD on 7/15/2019 at 9:40 AM CDT  Please inform her urine screen showed she was not taking cymbalta or gabapentin.   Her pain will not be better if she is not taking the meds prescribed  ------    Notes recorded by Blanche Carranza on 7/12/2019 at 1:33 PM CDT  I contacted [facility] today at 1325 and was told that the urine specimen submitted and tested would have been screened for both medications in question (cymbalta and gabapentin).  Neither was detected.    ------    Notes recorded by Terri Robles MD on 7/12/2019 at 8:23 AM CDT  Should cymbalta and gabapentin show up on this - she is supposed to be taking?

## 2019-08-05 ENCOUNTER — OFFICE VISIT (OUTPATIENT)
Dept: FAMILY MEDICINE | Facility: CLINIC | Age: 44
End: 2019-08-05
Payer: COMMERCIAL

## 2019-08-05 VITALS
RESPIRATION RATE: 12 BRPM | OXYGEN SATURATION: 97 % | WEIGHT: 112 LBS | SYSTOLIC BLOOD PRESSURE: 119 MMHG | BODY MASS INDEX: 23.01 KG/M2 | TEMPERATURE: 98.1 F | DIASTOLIC BLOOD PRESSURE: 76 MMHG | HEART RATE: 71 BPM

## 2019-08-05 DIAGNOSIS — G89.29 CHRONIC GROIN PAIN, LEFT: Primary | ICD-10-CM

## 2019-08-05 DIAGNOSIS — R10.32 CHRONIC GROIN PAIN, LEFT: Primary | ICD-10-CM

## 2019-08-05 PROCEDURE — 99213 OFFICE O/P EST LOW 20 MIN: CPT | Performed by: PHYSICIAN ASSISTANT

## 2019-08-05 NOTE — PROGRESS NOTES
Subjective     Zo Qureshi is a 44 year old female who presents to clinic today for the following health issues:    HPI   Follow up results-CT and Xray, treatment plan. Patient has been seen for this a few times and had a CT and x-ray done which were both negative. Is hoping for an answer to this chronic pain today and how to treat it. She is already taking several pain medications for this and various other pains. States that she did not see Dr. Robles for this although it looks like they did discuss it briefly about 3 weeks ago per Dr. Robles's note. Patient states pain for 3 days and that it is off and on.      Patient Active Problem List   Diagnosis     Inflammatory spondylopathy (H)     Disorder of SI (sacroiliac) joint     Hypercholesterolemia     HSIL on Pap smear     S/P LEEP of cervix     Acne     Influenza B     Vitamin D deficiency     Right peroneal tendinosis     Episodic tension-type headache, not intractable     Cervicalgia     Insomnia due to medical condition     Chronic pain syndrome     Submucous leiomyoma of uterus     Menorrhagia with regular cycle     Right shoulder tendonitis     Intramural leiomyoma of uterus     Pelvic pain in female     Left ovarian cyst     Past Surgical History:   Procedure Laterality Date     C NONSPECIFIC PROCEDURE  10/00    after delivery 2 units of prbcs secondary to placenta     DILATION AND CURETTAGE, ABLATE ENDOMETRIUM NOVASURE, COMBINED N/A 12/4/2018    Procedure: novasure endometrial ablation, myosure resection of leiomyoma, dilation and curettage;  Surgeon: Rolando Covarrubias MD;  Location: [facility] OR     GYN SURGERY  12/04/2018    fibroid removal     LEEP TX, CERVICAL  12/19/11    MARTA II     OPERATIVE HYSTEROSCOPY WITH MORCELLATOR N/A 12/4/2018    Procedure: OPERATIVE HYSTEROSCOPY WITH MORCELLATOR;  Surgeon: Rolando Covarrubias MD;  Location: [facility] OR     TUBAL LIGATION  5/2009    laparoscopic tubal ligation       Social History     Tobacco Use     Smoking status:  Never Smoker     Smokeless tobacco: Never Used   Substance Use Topics     Alcohol use: Yes     Alcohol/week: 0.0 oz     Comment: rarely     Family History   Problem Relation Age of Onset     Hypertension Father      Lipids Father      Hypertension Mother      Lipids Mother      Diabetes No family hx of      Coronary Artery Disease No family hx of      Hyperlipidemia No family hx of      Cerebrovascular Disease No family hx of      Breast Cancer No family hx of      Colon Cancer No family hx of      Prostate Cancer No family hx of      Other Cancer No family hx of      Depression No family hx of      Anxiety Disorder No family hx of      Mental Illness No family hx of      Substance Abuse No family hx of      Anesthesia Reaction No family hx of      Asthma No family hx of      Osteoporosis No family hx of      Genetic Disorder No family hx of      Thyroid Disease No family hx of      Obesity No family hx of      Unknown/Adopted No family hx of          Current Outpatient Medications   Medication Sig Dispense Refill     clindamycin-benzoyl peroxide (BENZACLIN) gel Apply to  Affected area initially once daily for 2 weeks and then increase to 2 times daily if tolerated 50 g 11     diclofenac (VOLTAREN) 1 % topical gel Use small amount and rub into area of chest that is hurting 1-2 times daily as needed 100 g 1     docusate sodium (COLACE) 100 MG tablet Take 1 tablet (100 mg) by mouth daily as needed for constipation 30 tablet 11     DULoxetine (CYMBALTA) 30 MG capsule Take 2 capsules (60 mg) by mouth daily 180 capsule 3     ferrous sulfate (FEROSUL) 325 (65 Fe) MG tablet Take 1 tablet (325 mg) by mouth 2 times daily 60 tablet 11     fluticasone (FLONASE) 50 MCG/ACT spray Spray 1-2 sprays into both nostrils daily (Patient taking differently: Spray 1-2 sprays into both nostrils daily as needed ) 16 g 3     gabapentin (NEURONTIN) 300 MG capsule 1 tab in am and 1 at 5 pm  and 2 at bedtime for (4 per day) 120 capsule 6      hydrocortisone (WESTCORT) 0.2 % cream Apply sparingly to affected area 2 times daily as needed. 45 g 0     ketotifen (ZADITOR/REFRESH ANTI-ITCH) 0.025 % ophthalmic solution Place 1 drop into both eyes 2 times daily 1 Bottle 11     loratadine (CLARITIN) 10 MG tablet Take 1 tablet (10 mg) by mouth daily 30 tablet 1     naproxen (NAPROSYN) 500 MG tablet Take 1 tablet (500 mg) by mouth 2 times daily (with meals) 60 tablet 6     ondansetron (ZOFRAN-ODT) 4 MG ODT tab Take 1-2 tablets (4-8 mg) by mouth every 8 hours as needed for nausea 12 tablet 1     sulfaSALAzine ER (AZULFIDINE EN) 500 MG EC tablet 1000mg in the AM and 1000mg in the PM daily 120 tablet 0     traMADol (ULTRAM) 50 MG tablet Take 1 tablet (50 mg) by mouth every 6 hours as needed for severe pain 60 tablet 0     traZODone (DESYREL) 50 MG tablet Take 1 tablet (50 mg) by mouth nightly as needed for sleep 60 tablet 6     Allergies   Allergen Reactions     Contrast Dye Nausea and Vomiting     Percocet [Oxycodone-Acetaminophen] Nausea and Vomiting and Itching     Advil [Ibuprofen Micronized] Rash     Rash          Reviewed and updated as needed this visit by Provider         Review of Systems   ROS COMP: Constitutional, HEENT, cardiovascular, pulmonary, gi and gu systems are negative, except as otherwise noted.      Objective    /76 (BP Location: Right arm, Patient Position: Chair, Cuff Size: Adult Regular)   Pulse 71   Temp 98.1  F (36.7  C) (Oral)   Resp 12   Wt 50.8 kg (112 lb)   SpO2 97%   BMI 23.01 kg/m    Body mass index is 23.01 kg/m .       Physical Exam   GENERAL: healthy, alert and no distress  EYES: Eyes grossly normal to inspection, PERRL and conjunctivae and sclerae normal  ABDOMEN: soft, nontender, no hepatosplenomegaly, no masses and bowel sounds normal  MS: no gross musculoskeletal defects noted, no edema  SKIN: no suspicious lesions or rashes  NEURO: Normal strength and tone, mentation intact and speech normal  PSYCH: mentation  appears normal, affect normal/bright  Left hip/groin: There are a few small salonpas patches on anterior left hip. Tender to light palpation but no lymphadenopathy or bumps noted. No erythema or ecchymosis. No swelling. Walking normally.    Diagnostic Test Results:  none         Assessment & Plan     (R10.32,  G89.29) Chronic groin pain, left  (primary encounter diagnosis)    Comment: Discussed that I do not want to change her pain regimen at this time especially since this is chronic pain. CT scan and x-ray were negative. Discussed that it is likely muscular which is why it improves and worsens. Conservative treatment recommended along with lidocaine ointment.    Plan: Follow-up with primary care provider for re-check.       Patient Instructions   You can try over the counter lidocaine 4% as needed for pain.    Continue to rest and apply ice or heat as needed.    Follow-up with primary care provider regarding chronic pain.      No follow-ups on file.    Yusuf Vargas PA-C  [facility]

## 2019-08-05 NOTE — PATIENT INSTRUCTIONS
You can try over the counter lidocaine 4% as needed for pain.    Continue to rest and apply ice or heat as needed.    Follow-up with primary care provider regarding chronic pain.

## 2019-08-15 ENCOUNTER — TELEPHONE (OUTPATIENT)
Dept: FAMILY MEDICINE | Facility: CLINIC | Age: 44
End: 2019-08-15

## 2019-08-15 NOTE — TELEPHONE ENCOUNTER
Reason for Call:  Form, our goal is to have forms completed with 72 hours, however, some forms may require a visit or additional information.    Type of letter, form or note:  Disability form    Who is the form from?: Patient    Where did the form come from: Patient or family brought in       What clinic location was the form placed at?: [facility]     Where the form was placed: Dr. Dennis form folder at [employer]    What number is listed as a contact on the form?: [phone]       Additional comments: Please fax to [phone] when completed    Call taken on 8/15/2019 at 11:32 AM by Rachid Silva

## 2019-08-21 ENCOUNTER — ANCILLARY PROCEDURE (OUTPATIENT)
Dept: ULTRASOUND IMAGING | Facility: CLINIC | Age: 44
End: 2019-08-21
Attending: OBSTETRICS & GYNECOLOGY
Payer: COMMERCIAL

## 2019-08-21 DIAGNOSIS — N83.202 LEFT OVARIAN CYST: ICD-10-CM

## 2019-08-21 DIAGNOSIS — R10.2 PELVIC PAIN IN FEMALE: ICD-10-CM

## 2019-08-21 PROCEDURE — 76830 TRANSVAGINAL US NON-OB: CPT | Performed by: OBSTETRICS & GYNECOLOGY

## 2019-08-21 PROCEDURE — 76856 US EXAM PELVIC COMPLETE: CPT | Performed by: OBSTETRICS & GYNECOLOGY

## 2019-09-07 ENCOUNTER — APPOINTMENT (OUTPATIENT)
Dept: CT IMAGING | Facility: CLINIC | Age: 44
End: 2019-09-07
Attending: EMERGENCY MEDICINE
Payer: COMMERCIAL

## 2019-09-07 ENCOUNTER — APPOINTMENT (OUTPATIENT)
Dept: ULTRASOUND IMAGING | Facility: CLINIC | Age: 44
End: 2019-09-07
Attending: EMERGENCY MEDICINE
Payer: COMMERCIAL

## 2019-09-07 ENCOUNTER — HOSPITAL ENCOUNTER (EMERGENCY)
Facility: CLINIC | Age: 44
Discharge: HOME | End: 2019-09-07
Attending: EMERGENCY MEDICINE | Admitting: EMERGENCY MEDICINE
Payer: COMMERCIAL

## 2019-09-07 VITALS
RESPIRATION RATE: 16 BRPM | SYSTOLIC BLOOD PRESSURE: 115 MMHG | HEART RATE: 76 BPM | TEMPERATURE: 98 F | OXYGEN SATURATION: 98 % | DIASTOLIC BLOOD PRESSURE: 82 MMHG

## 2019-09-07 DIAGNOSIS — R10.2 PELVIC PAIN IN FEMALE: ICD-10-CM

## 2019-09-07 DIAGNOSIS — D25.9 UTERINE LEIOMYOMA, UNSPECIFIED LOCATION: ICD-10-CM

## 2019-09-07 DIAGNOSIS — K59.00 CONSTIPATION, UNSPECIFIED CONSTIPATION TYPE: ICD-10-CM

## 2019-09-07 LAB
ALBUMIN SERPL-MCNC: 4.1 G/DL (ref 3.4–5)
ALBUMIN UR-MCNC: NEGATIVE MG/DL
ALP SERPL-CCNC: 63 U/L (ref 40–150)
ALT SERPL W P-5'-P-CCNC: 21 U/L (ref 0–50)
ANION GAP SERPL CALCULATED.3IONS-SCNC: 3 MMOL/L (ref 3–14)
APPEARANCE UR: CLEAR
AST SERPL W P-5'-P-CCNC: 19 U/L (ref 0–45)
BASOPHILS # BLD AUTO: 0.1 10E9/L (ref 0–0.2)
BASOPHILS NFR BLD AUTO: 0.7 %
BILIRUB SERPL-MCNC: 0.3 MG/DL (ref 0.2–1.3)
BILIRUB UR QL STRIP: NEGATIVE
BUN SERPL-MCNC: 15 MG/DL (ref 7–30)
CALCIUM SERPL-MCNC: 9.1 MG/DL (ref 8.5–10.1)
CHLORIDE SERPL-SCNC: 104 MMOL/L (ref 94–109)
CO2 SERPL-SCNC: 30 MMOL/L (ref 20–32)
COLOR UR AUTO: ABNORMAL
CREAT SERPL-MCNC: 0.54 MG/DL (ref 0.52–1.04)
DIFFERENTIAL METHOD BLD: NORMAL
EOSINOPHIL # BLD AUTO: 0.3 10E9/L (ref 0–0.7)
EOSINOPHIL NFR BLD AUTO: 3.5 %
ERYTHROCYTE [DISTWIDTH] IN BLOOD BY AUTOMATED COUNT: 12.3 % (ref 10–15)
GFR SERPL CREATININE-BSD FRML MDRD: >90 ML/MIN/{1.73_M2}
GLUCOSE SERPL-MCNC: 90 MG/DL (ref 70–99)
GLUCOSE UR STRIP-MCNC: NEGATIVE MG/DL
HCT VFR BLD AUTO: 40.2 % (ref 35–47)
HGB BLD-MCNC: 13.2 G/DL (ref 11.7–15.7)
HGB UR QL STRIP: NEGATIVE
IMM GRANULOCYTES # BLD: 0 10E9/L (ref 0–0.4)
IMM GRANULOCYTES NFR BLD: 0.3 %
KETONES UR STRIP-MCNC: NEGATIVE MG/DL
LEUKOCYTE ESTERASE UR QL STRIP: NEGATIVE
LYMPHOCYTES # BLD AUTO: 2.4 10E9/L (ref 0.8–5.3)
LYMPHOCYTES NFR BLD AUTO: 32.7 %
MCH RBC QN AUTO: 30.7 PG (ref 26.5–33)
MCHC RBC AUTO-ENTMCNC: 32.8 G/DL (ref 31.5–36.5)
MCV RBC AUTO: 94 FL (ref 78–100)
MONOCYTES # BLD AUTO: 0.7 10E9/L (ref 0–1.3)
MONOCYTES NFR BLD AUTO: 9.8 %
MUCOUS THREADS #/AREA URNS LPF: PRESENT /LPF
NEUTROPHILS # BLD AUTO: 3.9 10E9/L (ref 1.6–8.3)
NEUTROPHILS NFR BLD AUTO: 53 %
NITRATE UR QL: NEGATIVE
NRBC # BLD AUTO: 0 10*3/UL
NRBC BLD AUTO-RTO: 0 /100
PH UR STRIP: 6 PH (ref 5–7)
PLATELET # BLD AUTO: 256 10E9/L (ref 150–450)
POTASSIUM SERPL-SCNC: 3.8 MMOL/L (ref 3.4–5.3)
PROT SERPL-MCNC: 8.2 G/DL (ref 6.8–8.8)
RBC # BLD AUTO: 4.3 10E12/L (ref 3.8–5.2)
RBC #/AREA URNS AUTO: 1 /HPF (ref 0–2)
SODIUM SERPL-SCNC: 137 MMOL/L (ref 133–144)
SOURCE: ABNORMAL
SP GR UR STRIP: 1.01 (ref 1–1.03)
SQUAMOUS #/AREA URNS AUTO: 2 /HPF (ref 0–1)
UROBILINOGEN UR STRIP-MCNC: NORMAL MG/DL (ref 0–2)
WBC # BLD AUTO: 7.4 10E9/L (ref 4–11)
WBC #/AREA URNS AUTO: 1 /HPF (ref 0–5)

## 2019-09-07 PROCEDURE — 25000132 ZZH RX MED GY IP 250 OP 250 PS 637: Performed by: EMERGENCY MEDICINE

## 2019-09-07 PROCEDURE — 74176 CT ABD & PELVIS W/O CONTRAST: CPT

## 2019-09-07 PROCEDURE — 81001 URINALYSIS AUTO W/SCOPE: CPT | Performed by: EMERGENCY MEDICINE

## 2019-09-07 PROCEDURE — 99284 EMERGENCY DEPT VISIT MOD MDM: CPT | Mod: 25

## 2019-09-07 PROCEDURE — 80053 COMPREHEN METABOLIC PANEL: CPT | Performed by: EMERGENCY MEDICINE

## 2019-09-07 PROCEDURE — 85025 COMPLETE CBC W/AUTO DIFF WBC: CPT | Performed by: EMERGENCY MEDICINE

## 2019-09-07 PROCEDURE — 93976 VASCULAR STUDY: CPT

## 2019-09-07 RX ORDER — ACETAMINOPHEN 325 MG/1
650 TABLET ORAL ONCE
Status: COMPLETED | OUTPATIENT
Start: 2019-09-07 | End: 2019-09-07

## 2019-09-07 RX ADMIN — ACETAMINOPHEN 650 MG: 325 TABLET ORAL at 08:36

## 2019-09-07 ASSESSMENT — ENCOUNTER SYMPTOMS
DIARRHEA: 0
CONSTIPATION: 0
NAUSEA: 0
FEVER: 0
HEMATURIA: 0
VOMITING: 0
DYSURIA: 0

## 2019-09-07 NOTE — DISCHARGE INSTRUCTIONS
Continue home Tramadol, Tylenol, hot packs, pain patch, etc.  Follow up with primary care next week as scheduled.  Follow up with gynecology.  Return as needed for change in pain or development of new concerns.  Treat constipation with increased water, fiber, activity as well as considering use of MiraLAX, Colace, and Senna. If you still do not have bowel movement, use suppository or enema.

## 2019-09-07 NOTE — ED PROVIDER NOTES
"  History     Chief Complaint:  Pelvic Pain    The history is provided by the patient and medical records.      Zo Qureshi is a 44 year old female with chronic pain and inflammatory spondylopathy on sulfasalazine who presents with left pelvic pain.  Zo had a Myosure resection of leiomyoma and endometrial ablation in December 2018.  Since that time she has had intermittent left pelvic pain radiating to her left groin/inguinal region and sometimes to the anterior thigh.  She has this near daily for a couple of hours and when present is always 8/10 and \"poking\" in nature.  She presents because over the last 4 days this pain has become more frequent though the quality, severity, and location of the pain are unchanged.  She did have soft stools yesterday but no true diarrhea and has had no constipation, nausea, vomiting, fever, dysuria, or hematuria.  She remarks when she has pain is improved by Tylenol and she is also been using a heating pack and pain patches on the area.  Review of medical records indicates she has seen both primary care and gynecology regarding this pain with outpatient XR, ultrasounds, and CT (as below). Notably, her last period was about 10 days ago.  They have improved since her endometrial ablation.    XR pelvis and hip left one view 6/5/2019: The hip joint spaces are maintained. There is no significant osseous degenerative change in the left hip. There is mild irregularity of the left pubic bone which is likely chronic. There are degenerative changes of the sacroiliac joints.     CT pelvis soft tissue without contrast 6/10/2019: 1. Bilateral inguinal locations only contain small lymph nodes with no suspicious adenopathy visualized by imaging alone. No fluid collection or hernia is seen.  2. Lobulated uterus may represent underlying fibroids. Suggestion of a small right ovarian/adnexal cyst.    US pelvic complete with transvaginal 8/21/2019: Bladder appears normal. Uterus is retroverted. " Myomatous uterus: Endometrial cavity not affected. Endometrium noted to contain several small anechoic areas consistent with fluid collections of uncertain significance. Normal right ovary. Complex left ovarian cyst, benign appearing, to ensure resolution consider repeat ultrasound in 6-8 weeks if clinically relevant. Small amount of cul-de-sac fluid.      Allergies:  Allergies   Allergen Reactions     Contrast Dye Nausea and Vomiting     Percocet [Oxycodone-Acetaminophen] Nausea and Vomiting and Itching     Advil [Ibuprofen Micronized] Rash     Rash         Medications:      clindamycin-benzoyl peroxide (BENZACLIN) gel   diclofenac (VOLTAREN) 1 % topical gel   docusate sodium (COLACE) 100 MG tablet   DULoxetine (CYMBALTA) 30 MG capsule   ferrous sulfate (FEROSUL) 325 (65 Fe) MG tablet   fluticasone (FLONASE) 50 MCG/ACT spray   gabapentin (NEURONTIN) 300 MG capsule   hydrocortisone (WESTCORT) 0.2 % cream   ketotifen (ZADITOR/REFRESH ANTI-ITCH) 0.025 % ophthalmic solution   loratadine (CLARITIN) 10 MG tablet   naproxen (NAPROSYN) 500 MG tablet   ondansetron (ZOFRAN-ODT) 4 MG ODT tab   sulfaSALAzine ER (AZULFIDINE EN) 500 MG EC tablet   traMADol (ULTRAM) 50 MG tablet   traZODone (DESYREL) 50 MG tablet     Past Medical History:    Past Medical History:   Diagnosis Date     Anemia      History of blood transfusion 2000     HSIL on Pap smear 09/21/11     INFLAM SPONDYLOPATHY NOS 01/07/2008     Leukocytopenia 3/10/2014     Tendinosis -right peroneal      Thrombocytopenia (H) 3/10/2014     Unspecified closed fracture of pelvis 2000   Disorder of sacroiliac joint  Hypercholesterolemia  Acne  Vitamin D deficiency  Cervicalgia  Headache  Insomnia  Menorrhagia  Leiomyoma  Left ovarian cyst  Pelvic pain  Right shoulder tendinitis  Chronic pain syndrome with controlled substance agreement    Past Surgical History:    Past Surgical History:   Procedure Laterality Date     C NONSPECIFIC PROCEDURE  10/00    after delivery 2 units  of prbcs secondary to placenta     DILATION AND CURETTAGE, ABLATE ENDOMETRIUM NOVASURE, COMBINED N/A 12/4/2018    Procedure: novasure endometrial ablation, myosure resection of leiomyoma, dilation and curettage;  Surgeon: Rolando Covarrubias MD;  Location: [facility] OR     GYN SURGERY  12/04/2018    fibroid removal     LEEP TX, CERVICAL  12/19/11    MARTA II     OPERATIVE HYSTEROSCOPY WITH MORCELLATOR N/A 12/4/2018    Procedure: OPERATIVE HYSTEROSCOPY WITH MORCELLATOR;  Surgeon: Rolando Covarrubias MD;  Location: [facility] OR     TUBAL LIGATION  5/2009    laparoscopic tubal ligation       Family History:    Family History   Problem Relation Age of Onset     Hypertension Father      Lipids Father      Hypertension Mother      Lipids Mother      Diabetes No family hx of      Coronary Artery Disease No family hx of      Hyperlipidemia No family hx of      Cerebrovascular Disease No family hx of      Breast Cancer No family hx of      Colon Cancer No family hx of      Prostate Cancer No family hx of      Other Cancer No family hx of      Depression No family hx of      Anxiety Disorder No family hx of      Mental Illness No family hx of      Substance Abuse No family hx of      Anesthesia Reaction No family hx of      Asthma No family hx of      Osteoporosis No family hx of      Genetic Disorder No family hx of      Thyroid Disease No family hx of      Obesity No family hx of      Unknown/Adopted No family hx of        Social History:  Presents with family member.  Marital Status:  Single   Social History     Tobacco Use     Smoking status: Never Smoker     Smokeless tobacco: Never Used   Substance Use Topics     Alcohol use: Yes     Alcohol/week: 0.0 oz     Comment: rarely     Drug use: No        Review of Systems   Constitutional: Negative for fever.   Gastrointestinal: Negative for constipation, diarrhea, nausea and vomiting.   Genitourinary: Positive for pelvic pain. Negative for dysuria, hematuria and vaginal bleeding.   All other  systems reviewed and are negative.    Physical Exam     Patient Vitals for the past 24 hrs:   BP Temp Temp src Pulse Resp SpO2   09/07/19 1058 115/82 98  F (36.7  C) Oral -- 16 98 %   09/07/19 0800 (!) 149/82 -- -- -- -- 97 %   09/07/19 0756 (!) 149/82 97.4  F (36.3  C) Oral 76 16 97 %     Physical Exam  General: Well-developed and well-nourished. Well appearing middle aged  woman. Cooperative.  Head:  Atraumatic.  Eyes:  Conjunctivae, lids, and sclerae are normal.  ENT:    Normal nose. Moist mucous membranes.  Neck:  Supple. Normal range of motion.  CV:  Regular rate and rhythm. Normal heart sounds with no murmurs, rubs, or gallops detected.  Resp:  No respiratory distress. Clear to auscultation bilaterally without decreased breath sounds, wheezing, rales, or rhonchi.  GI:  Soft. Non-distended. Mild tenderness in the low left pelvis without masses or obvious inguinal hernia.   MS:  Normal ROM.   Skin:  Warm. Non-diaphoretic. No pallor.  Neuro:  Awake. A&Ox3. Normal strength.  Psych: Normal mood and affect. Normal speech.  Vitals reviewed.    Emergency Department Course   Imaging:  US Pelvic Complete w Transvaginal & Abd/Pel Duplex Limited   Final Result   IMPRESSION:     1. No specific acute abnormality.   2. Uterine fibroid. Heterogeneous uterus may represent other   underlying uterine fibroids.   3. Small right ovarian simple cyst versus dominant follicle.      MALU MODI MD      Abd/pelvis CT no contrast - Stone Protocol   Final Result   IMPRESSION:   1. No specific acute abnormality is identified.   2. Bilateral nonobstructing intrarenal stones.   3. Very small right ovarian cyst incidentally noted.   4. Prominent rectal distention with stool and gas.      MALU MODI MD        Laboratory:  Abnormal values below only. See Epic for all others.   Labs Ordered and Resulted from Time of ED Arrival Up to the Time of Departure from the ED   ROUTINE UA WITH MICROSCOPIC - Abnormal; Notable for the following  components:       Result Value    Squamous Epithelial /HPF Urine 2 (*)     Mucous Urine Present (*)     All other components within normal limits   COMPREHENSIVE METABOLIC PANEL   CBC WITH PLATELETS DIFFERENTIAL     Interventions:  650 mg PO Tylenol    Emergency Department Course:  1030: Patient reevaluated and appears comfortable.  We discussed the results of her laboratory and imaging studies and plan for discharge.    Impression & Plan    Medical Decision Making:  Zo is a 44-year-old female with chronic pain who presents with increasing frequency of her left pelvic pain which has been worked up as an outpatient as per HPI.  Her exam is remarkable only for mild tenderness in the left pelvis.  Her work-up today is quite reassuring.  Urinalysis reveals no evidence of urinary tract infection and there is no microscopic hematuria to suggest ureterolithiasis. She has no kidney injury or leukocytosis.  Pelvic ultrasound reveals known uterine fibroids and a small right simple ovarian cyst versus dominant follicle.  There is no left adnexal pathology identified.  CT of the abdomen and pelvis without contrast due to allergy is similarly without acute pathology though prominent rectal distention with stool and gas is identified.  Certainly constipation could be causing worsening of her chronic pain and, after I discussed results of laboratory and imaging studies with patient, I discussed plan going forward including treatment of constipation upon discharge.  Zo has a controlled substance agreement and receives tramadol from her primary care provider.  This could be contributing to her constipation.  However, because she has chronic pain so I have recommended she continue this if needed in addition to Tylenol and hot packs. She agrees to follow-up with primary care next week as she already has scheduled and with her gynecologist to discuss her chronic left pelvic pain of uncertain etiology.  She agrees to return with  change in her pain or development of new concerns.  All of her questions were answered and she verbalized understanding.  Amenable to discharge.    Diagnosis:    ICD-10-CM    1. Pelvic pain in female R10.2    2. Uterine leiomyoma, unspecified location D25.9    3. Constipation, unspecified constipation type K59.00        Disposition:  Discharged.    Enriqueta Bradshaw MD  9/7/2019   [facility] EMERGENCY DEPARTMENT       Enriqueta Bradshaw MD  09/09/19 2148

## 2019-09-07 NOTE — ED AVS SNAPSHOT
[facility]  [address]  [facility] [address]  Phone:  [phone]  Fax:  [phone]                                    Zo Qureshi   MRN: [id number]    Department:  [facility]   Date of Visit:  9/7/2019           After Visit Summary Signature Page    I have received my discharge instructions, and my questions have been answered. I have discussed any challenges I see with this plan with the nurse or doctor.    ..........................................................................................................................................  Patient/Patient Representative Signature      ..........................................................................................................................................  Patient Representative Print Name and Relationship to Patient    ..................................................               ................................................  Date                                   Time    ..........................................................................................................................................  Reviewed by Signature/Title    ...................................................              ..............................................  Date                                               Time          22EPIC Rev 08/18

## 2019-09-07 NOTE — ED TRIAGE NOTES
Patient presents to the ED with left sided pelvic pain. States has been having intermittent pain for months since had a fibroid removed. Pain became constant and worse in the past 4 days.

## 2019-09-11 ENCOUNTER — OFFICE VISIT (OUTPATIENT)
Dept: FAMILY MEDICINE | Facility: CLINIC | Age: 44
End: 2019-09-11
Payer: COMMERCIAL

## 2019-09-11 VITALS
OXYGEN SATURATION: 95 % | SYSTOLIC BLOOD PRESSURE: 111 MMHG | TEMPERATURE: 98.3 F | WEIGHT: 112 LBS | DIASTOLIC BLOOD PRESSURE: 77 MMHG | HEIGHT: 59 IN | RESPIRATION RATE: 16 BRPM | BODY MASS INDEX: 22.58 KG/M2 | HEART RATE: 76 BPM

## 2019-09-11 DIAGNOSIS — D25.0 INTRAMURAL AND SUBMUCOUS LEIOMYOMA OF UTERUS: ICD-10-CM

## 2019-09-11 DIAGNOSIS — R21 RASH: ICD-10-CM

## 2019-09-11 DIAGNOSIS — N83.202 CYST OF LEFT OVARY: Primary | ICD-10-CM

## 2019-09-11 DIAGNOSIS — D25.1 INTRAMURAL AND SUBMUCOUS LEIOMYOMA OF UTERUS: ICD-10-CM

## 2019-09-11 PROCEDURE — 99213 OFFICE O/P EST LOW 20 MIN: CPT | Performed by: FAMILY MEDICINE

## 2019-09-11 RX ORDER — HYDROCORTISONE VALERATE CREAM 2 MG/G
CREAM TOPICAL
Qty: 45 G | Refills: 3 | Status: SHIPPED | OUTPATIENT
Start: 2019-09-11 | End: 2022-03-01 | Stop reason: ALTCHOICE

## 2019-09-11 ASSESSMENT — MIFFLIN-ST. JEOR: SCORE: 1060.72

## 2019-09-11 NOTE — PATIENT INSTRUCTIONS
Thank you for choosing [facility] today for your health care needs.     [facility] is transforming primary care  At [facility], we re dedicated to constantly improve how we serve the health care needs of our patients and communities. We re currently making changes to the way we deliver care.     Changes you ll notice include:    An emphasis on building a relationship with a primary care provider    Access to a PAL (personal advocate and liaison) to help guide you with your care needs    Appointment lengths tailored to your specific needs and greater access to a care team to help you and your provider improve and maintain your health and well-being    Improved online access to your care team    Benefits of a primary care provider  If you don t have a designated primary care provider, we encourage you to get to know our care team online and find a provider you d like to see. Most of our providers have a short video on their online provider page. Visit [email or web address] to explore our providers and locations.    Benefits of having a primary care provider include:      They get to know you - your health history, family history and goals, making it easier to make a health plan together.     You get to know them - making health-related conversations and decisions easier      Primary care doctors help you when you re sick or hurt - but also focus on keeping you healthy with preventive care and screenings.      A doctor who sees you regularly is more likely to notice changes in your health.     You ll be connected to a broad care team who partners with your provider to support you.    Patient Advocate Liaison (PAL)   To help make sure you get the right care, at the right time, we include PALs, or Patient Advocate Liaisons, as part of your care team. Your PAL will be your first line of contact. They ll advocate for your needs and help you navigate our services, connecting you with care team members and community resources to ensure  your care is well coordinated. You ll be introduced to a PAL in an upcoming visit.     Expanded care team access with tailored appointment lengths  Depending on your health care needs, you may have longer or shorter appointments and see additional care team providers - including Medication Therapy Management (MTM) pharmacists, diabetes educators, behavioral health clinicians, or social workers. At times, they may be included in your visit with your provider, or you may see them individually.     Online access to your health care records and care team  [employer] is our online tool that makes it easy to see your health care information and communicate with your care team.     [employer] allows you to:     View your health maintenance plan so you know when you re due for a preventive screening    Send secure messages to your care team    View your health history and visit summaries     Schedule appointments     Complete questionnaires and eCheck-in before appointments      Get care from your provider with an e-visit      View and pay your bill     Sign up at [email or web address]. Once you have an account, you also can download the mobile kathleen.

## 2019-09-19 ENCOUNTER — MEDICAL CORRESPONDENCE (OUTPATIENT)
Dept: HEALTH INFORMATION MANAGEMENT | Facility: CLINIC | Age: 44
End: 2019-09-19

## 2019-09-24 ENCOUNTER — TELEPHONE (OUTPATIENT)
Dept: FAMILY MEDICINE | Facility: CLINIC | Age: 44
End: 2019-09-24

## 2019-09-24 NOTE — TELEPHONE ENCOUNTER
Pt called and asked for a pre-op appt before 10/8 (surgery date) with Dr. Baron.  She said she spoke to the Dr and was told to call to fit her in.    Please, call pt back once Dr. Baron approves date and time for this kathleen.    Yani

## 2019-09-24 NOTE — TELEPHONE ENCOUNTER
Patient has a w/c appt on October 7th at 9:30.   Do you want to use that spot instead of w/c if patient is okay with it or have the patient see a different provider?    Maribel Farmer, ISMAEL

## 2019-09-25 NOTE — TELEPHONE ENCOUNTER
Pt calls, informed, wants to see another provider for preop, does not want to reschedule w/c appointment, also informed has eye gtt refills at pharmacy       Ellen Fowler RN, BSN  Message handled by Nurse Triage.

## 2019-10-02 ENCOUNTER — OFFICE VISIT (OUTPATIENT)
Dept: FAMILY MEDICINE | Facility: CLINIC | Age: 44
End: 2019-10-02
Payer: COMMERCIAL

## 2019-10-02 ENCOUNTER — HEALTH MAINTENANCE LETTER (OUTPATIENT)
Age: 44
End: 2019-10-02

## 2019-10-02 VITALS
OXYGEN SATURATION: 98 % | SYSTOLIC BLOOD PRESSURE: 129 MMHG | HEIGHT: 59 IN | DIASTOLIC BLOOD PRESSURE: 80 MMHG | RESPIRATION RATE: 14 BRPM | HEART RATE: 68 BPM | BODY MASS INDEX: 22.64 KG/M2 | TEMPERATURE: 98 F | WEIGHT: 112.3 LBS

## 2019-10-02 DIAGNOSIS — N92.0 MENORRHAGIA WITH REGULAR CYCLE: ICD-10-CM

## 2019-10-02 DIAGNOSIS — D25.9 UTERINE LEIOMYOMA, UNSPECIFIED LOCATION: ICD-10-CM

## 2019-10-02 DIAGNOSIS — Z01.818 PREOP GENERAL PHYSICAL EXAM: Primary | ICD-10-CM

## 2019-10-02 LAB
HBA1C MFR BLD: 5 % (ref 0–5.6)
HGB BLD-MCNC: 12.4 G/DL (ref 11.7–15.7)

## 2019-10-02 PROCEDURE — 36415 COLL VENOUS BLD VENIPUNCTURE: CPT | Performed by: PHYSICIAN ASSISTANT

## 2019-10-02 PROCEDURE — 99214 OFFICE O/P EST MOD 30 MIN: CPT | Performed by: PHYSICIAN ASSISTANT

## 2019-10-02 PROCEDURE — 85018 HEMOGLOBIN: CPT | Performed by: PHYSICIAN ASSISTANT

## 2019-10-02 SDOH — SOCIAL STABILITY: SOCIAL NETWORK: HOW OFTEN DO YOU GET TOGETHER WITH FRIENDS OR RELATIVES?: PATIENT DECLINED

## 2019-10-02 SDOH — SOCIAL STABILITY: SOCIAL NETWORK: HOW OFTEN DO YOU ATTEND CHURCH OR RELIGIOUS SERVICES?: NEVER

## 2019-10-02 SDOH — HEALTH STABILITY: MENTAL HEALTH: HOW OFTEN DO YOU HAVE 6 OR MORE DRINKS ON ONE OCCASION?: NEVER

## 2019-10-02 SDOH — SOCIAL STABILITY: SOCIAL NETWORK: IN A TYPICAL WEEK, HOW MANY TIMES DO YOU TALK ON THE PHONE WITH FAMILY, FRIENDS, OR NEIGHBORS?: PATIENT DECLINED

## 2019-10-02 SDOH — HEALTH STABILITY: MENTAL HEALTH: HOW OFTEN DO YOU HAVE A DRINK CONTAINING ALCOHOL?: NEVER

## 2019-10-02 SDOH — SOCIAL STABILITY: SOCIAL NETWORK: ARE YOU MARRIED, WIDOWED, DIVORCED, SEPARATED, NEVER MARRIED, OR LIVING WITH A PARTNER?: NEVER MARRIED

## 2019-10-02 SDOH — SOCIAL STABILITY: SOCIAL NETWORK
DO YOU BELONG TO ANY CLUBS OR ORGANIZATIONS SUCH AS CHURCH GROUPS UNIONS, FRATERNAL OR ATHLETIC GROUPS, OR SCHOOL GROUPS?: NO

## 2019-10-02 SDOH — HEALTH STABILITY: MENTAL HEALTH: HOW MANY STANDARD DRINKS CONTAINING ALCOHOL DO YOU HAVE ON A TYPICAL DAY?: PATIENT DECLINED

## 2019-10-02 SDOH — SOCIAL STABILITY: SOCIAL NETWORK: HOW OFTEN DO YOU ATTENT MEETINGS OF THE CLUB OR ORGANIZATION YOU BELONG TO?: NEVER

## 2019-10-02 ASSESSMENT — MIFFLIN-ST. JEOR: SCORE: 1064.63

## 2019-10-02 NOTE — PROGRESS NOTES
[facility]  [id number] [place] [id number]  [phone]  Dept: [phone]    PRE-OP EVALUATION:  Today's date: 10/2/2019    Zo Qureshi (: 1975) presents for pre-operative evaluation assessment as requested by Dr. Bowles.  She requires evaluation and anesthesia risk assessment prior to undergoing surgery/procedure for treatment of ovaries .    Fax number for surgical facility: [facility]   Primary Physician: Terri Robles  Type of Anesthesia Anticipated: Choice    Patient has a Health Care Directive or Living Will:  NO    Preop Questions 10/2/2019   Who is doing your surgery? obgyn   What are you having done? hyterectomy   Date of Surgery/Procedure: 10/15/2019   Facility or Hospital where procedure/surgery will be performed: [facility]   1.  Do you have a history of Heart attack, stroke, stent, coronary bypass surgery, or other heart surgery? No   2.  Do you ever have any pain or discomfort in your chest? No   3.  Do you have a history of  Heart Failure? No   4.   Are you troubled by shortness of breath when:  walking on a level surface, or up a slight hill, or at night? No   5.  Do you currently have a cold, bronchitis or other respiratory infection? No   6.  Do you have a cough, shortness of breath, or wheezing? No   7.  Do you sometimes get pains in the calves of your legs when you walk? UNKNOWN - none    8. Do you or anyone in your family have previous history of blood clots? No   9.  Do you or does anyone in your family have a serious bleeding problem such as prolonged bleeding following surgeries or cuts? No   10. Have you ever had problems with anemia or been told to take iron pills? YES -    11. Have you had any abnormal blood loss such as black, tarry or bloody stools, or abnormal vaginal bleeding? No   12. Have you ever had a blood transfusion? YES -    13. Have you or any of your relatives ever had problems with anesthesia? YES - mother.  No personal history.    14. Do you have sleep apnea, excessive snoring or daytime drowsiness? No   15. Do you have any prosthetic heart valves? No   16. Do you have prosthetic joints? UNKNOWN - none   17. Is there any chance that you may be pregnant? No         HPI:     HPI related to upcoming procedure: history of uterine leiomyoma. The above procedure was deemed the next best step in management.       See problem list for active medical problems.  Problems all longstanding and stable, except as noted/documented.  See ROS for pertinent symptoms related to these conditions.    ANEMIA - Patient has a recent history of moderate-severe anemia, which has not been symptomatic. Work up to date has revealed below. Treatment has been as below.       MEDICAL HISTORY:     Patient Active Problem List    Diagnosis Date Noted     Intramural leiomyoma of uterus 04/15/2019     Priority: Medium     Pelvic pain in female 04/15/2019     Priority: Medium     Left ovarian cyst 04/15/2019     Priority: Medium     Right shoulder tendonitis 01/21/2019     Priority: Medium     Submucous leiomyoma of uterus 11/30/2018     Priority: Medium     Menorrhagia with regular cycle 11/30/2018     Priority: Medium     Chronic pain syndrome 04/23/2018     Priority: Medium     Patient is followed by Terri Baron MD for ongoing prescription of pain medication.  All refills should only be approved by this provider, or covering partner.    Medication(s): tramadol.   Maximum quantity per month: 60  Clinic visit frequency required: Q 3 months     Controlled substance agreement:  Encounter-Level CSA - 05/08/2017:          Controlled Substance Agreement - Scan on 5/18/2017 12:15 PM : CONTROLLED SUBSTANCE AGREEMENT (below)              Pain Clinic evaluation in the past: Yes       Date/Location:      DIRE Total Score(s):  No flowsheet data found.    Last [facility] website verification:  none   [email or web address]       Insomnia due to medical condition  01/29/2018     Priority: Medium     Episodic tension-type headache, not intractable 10/18/2017     Priority: Medium     Cervicalgia 10/18/2017     Priority: Medium     Right peroneal tendinosis 04/26/2017     Priority: Medium     Vitamin D deficiency 01/05/2017     Priority: Medium     Influenza B 03/03/2014     Priority: Medium     Acne 09/10/2012     Priority: Medium     S/P LEEP of cervix 12/19/2011     Priority: Medium     HSIL on Pap smear 09/21/2011     Priority: Medium     HGSIL confirmed with biopsy=repeat pap by May 2012  12/19/11 LEEP MARTA II  4/9/12 NIL pap.  Per OV notes, pt to repeat pap in 4 months  8/6/12: NIL pap. Per MD plan pap in 4 months.  12/10/12 NIL pap. Plan per MD, repeat in one year.   02/05/14 Pap reminder letter sent through [employer].  05/21/14 Pap= Normal, Neg HPV. Repeat co-test in 1 yr.  09/21/15 Pap= NIL, Neg HPV. Co-test in 3 yrs per ASCCP guidelines  2/21/18 NIL, Neg HPV. Plan 3 yr pap for 20 yrs post LEEP, 2011         Hypercholesterolemia 10/31/2010     Priority: Medium     Disorder of SI (sacroiliac) joint 09/15/2008     Priority: Medium     Inflammatory spondylopathy (H) 01/07/2008     Priority: Medium     Problem list name updated by automated process. Provider to review        Past Medical History:   Diagnosis Date     Anemia      History of blood transfusion 2000    after vaginal delivery, 2 units PRBCs given     HSIL on Pap smear 09/21/11    MARTA 2 and 3     INFLAM SPONDYLOPATHY NOS 01/07/2008    check labs on sulfasalzine every 3 months and check hep B and C and TB every 2 years     Leukocytopenia 3/10/2014     Tendinosis      Thrombocytopenia (H) 3/10/2014     Unspecified closed fracture of pelvis 2000    left fracture of pelvis after delivery     Past Surgical History:   Procedure Laterality Date     C NONSPECIFIC PROCEDURE  10/00    after delivery 2 units of prbcs secondary to placenta     DILATION AND CURETTAGE, ABLATE ENDOMETRIUM NOVASURE, COMBINED N/A 12/4/2018     Procedure: novasure endometrial ablation, myosure resection of leiomyoma, dilation and curettage;  Surgeon: Rolando Covarrubias MD;  Location: [facility] OR     GYN SURGERY  12/04/2018    fibroid removal     LEEP TX, CERVICAL  12/19/11    MARTA II     OPERATIVE HYSTEROSCOPY WITH MORCELLATOR N/A 12/4/2018    Procedure: OPERATIVE HYSTEROSCOPY WITH MORCELLATOR;  Surgeon: Rolando Covarrubias MD;  Location: [facility] OR     TUBAL LIGATION  5/2009    laparoscopic tubal ligation     Current Outpatient Medications   Medication Sig Dispense Refill     clindamycin-benzoyl peroxide (BENZACLIN) gel Apply to  Affected area initially once daily for 2 weeks and then increase to 2 times daily if tolerated 50 g 11     diclofenac (VOLTAREN) 1 % topical gel Use small amount and rub into area of chest that is hurting 1-2 times daily as needed 100 g 1     docusate sodium (COLACE) 100 MG tablet Take 1 tablet (100 mg) by mouth daily as needed for constipation 30 tablet 11     DULoxetine (CYMBALTA) 30 MG capsule Take 2 capsules (60 mg) by mouth daily 180 capsule 3     ferrous sulfate (FEROSUL) 325 (65 Fe) MG tablet Take 1 tablet (325 mg) by mouth 2 times daily 60 tablet 11     fluticasone (FLONASE) 50 MCG/ACT spray Spray 1-2 sprays into both nostrils daily (Patient taking differently: Spray 1-2 sprays into both nostrils daily as needed ) 16 g 3     gabapentin (NEURONTIN) 300 MG capsule 1 tab in am and 1 at 5 pm  and 2 at bedtime for (4 per day) 120 capsule 6     hydrocortisone (WESTCORT) 0.2 % external cream Apply sparingly to affected area 2 times daily as needed. 45 g 3     ketotifen (ZADITOR/REFRESH ANTI-ITCH) 0.025 % ophthalmic solution Place 1 drop into both eyes 2 times daily 1 Bottle 11     loratadine (CLARITIN) 10 MG tablet Take 1 tablet (10 mg) by mouth daily 30 tablet 1     naproxen (NAPROSYN) 500 MG tablet Take 1 tablet (500 mg) by mouth 2 times daily (with meals) 60 tablet 6     ondansetron (ZOFRAN-ODT) 4 MG ODT tab Take 1-2 tablets (4-8 mg) by  mouth every 8 hours as needed for nausea 12 tablet 1     sulfaSALAzine ER (AZULFIDINE EN) 500 MG EC tablet 1000mg in the AM and 1000mg in the PM daily 120 tablet 0     traMADol (ULTRAM) 50 MG tablet Take 1 tablet (50 mg) by mouth every 6 hours as needed for severe pain 60 tablet 0     traZODone (DESYREL) 50 MG tablet Take 1 tablet (50 mg) by mouth nightly as needed for sleep 60 tablet 6     OTC products: NSAIDS    Allergies   Allergen Reactions     Contrast Dye Nausea and Vomiting     Percocet [Oxycodone-Acetaminophen] Nausea and Vomiting and Itching     Advil [Ibuprofen Micronized] Rash     Rash       Latex Allergy: NO    Social History     Tobacco Use     Smoking status: Never Smoker     Smokeless tobacco: Never Used   Substance Use Topics     Alcohol use: Yes     Alcohol/week: 0.0 standard drinks     Frequency: Never     Drinks per session: Patient refused     Binge frequency: Never     Comment: rarely     History   Drug Use No       REVIEW OF SYSTEMS:   CONSTITUTIONAL: NEGATIVE for fever, chills, change in weight  INTEGUMENTARY/SKIN: NEGATIVE for worrisome rashes, moles or lesions  EYES: NEGATIVE for vision changes or irritation  ENT/MOUTH: NEGATIVE for ear, mouth and throat problems  RESP: NEGATIVE for significant cough or SOB  BREAST: NEGATIVE for masses, tenderness or discharge  CV: NEGATIVE for chest pain, palpitations or peripheral edema  GI: NEGATIVE for nausea, abdominal pain, heartburn, or change in bowel habits  : NEGATIVE for frequency, dysuria, or hematuria  MUSCULOSKELETAL: lower abdominal pain. Otherwise, NEGATIVE for significant arthralgias or myalgia  NEURO: NEGATIVE for weakness, dizziness or paresthesias  ENDOCRINE: NEGATIVE for temperature intolerance, skin/hair changes  HEME: NEGATIVE for bleeding problems  PSYCHIATRIC: NEGATIVE for changes in mood or affect    EXAM:   /80 (BP Location: Right arm, Patient Position: Chair, Cuff Size: Adult Regular)   Pulse 68   Temp 98  F (36.7  C)  "(Oral)   Resp 14   Ht 1.49 m (4' 10.66\")   Wt 50.9 kg (112 lb 4.8 oz)   SpO2 98%   BMI 22.94 kg/m      GENERAL APPEARANCE: healthy, alert and no distress     EYES: EOMI, PERRL     HENT: ear canals and TM's normal and nose and mouth without ulcers or lesions     NECK: no adenopathy, no asymmetry, masses, or scars and thyroid normal to palpation     RESP: lungs clear to auscultation - no rales, rhonchi or wheezes     CV: regular rates and rhythm, normal S1 S2, no S3 or S4 and no murmur, click or rub     ABDOMEN:  soft, nontender, no HSM or masses and bowel sounds normal     MS: extremities normal- no gross deformities noted, no evidence of inflammation in joints, FROM in all extremities.     SKIN: no suspicious lesions or rashes     NEURO: Normal strength and tone, sensory exam grossly normal, mentation intact and speech normal     PSYCH: mentation appears normal. and affect normal/bright     LYMPHATICS: No cervical adenopathy    DIAGNOSTICS:     EKG: Not indicated due to non-vascular surgery and low risk of event (age <65 and without cardiac risk factors)  Labs Resulted Today:   Results for orders placed or performed in visit on 10/02/19   Hemoglobin A1c   Result Value Ref Range    Hemoglobin A1C 5.0 0 - 5.6 %   Hemoglobin   Result Value Ref Range    Hemoglobin 12.4 11.7 - 15.7 g/dL       Recent Labs   Lab Test 09/07/19  0805 05/08/19  0939 04/08/19  1051   HGB 13.2 12.3 12.9    229 203     --  139   POTASSIUM 3.8  --  4.2   CR 0.54 0.57 0.51*        IMPRESSION:   Reason for surgery/procedure: uterine leiomyoma   Diagnosis/reason for consult: preoperative exam for the above procedure     The proposed surgical procedure is considered INTERMEDIATE risk.    REVISED CARDIAC RISK INDEX  The patient has the following serious cardiovascular risks for perioperative complications such as (MI, PE, VFib and 3  AV Block):  No serious cardiac risks  INTERPRETATION: 0 risks: Class I (very low risk - 0.4% " complication rate)    The patient has the following additional risks for perioperative complications:  No identified additional risks      ICD-10-CM    1. Preop general physical exam Z01.818 Hemoglobin   2. Uterine leiomyoma, unspecified location D25.9 OB/GYN REFERRAL   3. Menorrhagia with regular cycle N92.0        RECOMMENDATIONS:     NPO after 8-10 hours prior to surgery. Clears 4 hours prior. No alcohol 24 hours prior.     --Patient is to take all scheduled medications on the day of surgery EXCEPT for modifications listed below.    Anticoagulant or Antiplatelet Medication Use  NSAIDS: stop 1 week prior to surgery.         APPROVAL GIVEN to proceed with proposed procedure, without further diagnostic evaluation       Signed Electronically by: Marc Grady PA-C    Copy of this evaluation report is provided to requesting physician.    [facility] Preop Guidelines    Revised Cardiac Risk IndexSubjective

## 2019-10-02 NOTE — PATIENT INSTRUCTIONS
-Stop your naproxen on 10/7/19  -stop diclofenac gel on 10/7/19    Before Your Surgery      Call your surgeon if there is any change in your health. This includes signs of a cold or flu (such as a sore throat, runny nose, cough, rash or fever).    Do not smoke, drink alcohol or take over the counter medicine (unless your surgeon or primary care doctor tells you to) for the 24 hours before and after surgery.    If you take prescribed drugs: Follow your doctor s orders about which medicines to take and which to stop until after surgery.    Eating and drinking prior to surgery: follow the instructions from your surgeon    Take a shower or bath the night before surgery. Use the soap your surgeon gave you to gently clean your skin. If you do not have soap from your surgeon, use your regular soap. Do not shave or scrub the surgery site.  Wear clean pajamas and have clean sheets on your bed.

## 2019-10-07 ENCOUNTER — OFFICE VISIT (OUTPATIENT)
Dept: FAMILY MEDICINE | Facility: CLINIC | Age: 44
End: 2019-10-07
Payer: OTHER MISCELLANEOUS

## 2019-10-07 VITALS
RESPIRATION RATE: 20 BRPM | HEART RATE: 93 BPM | HEIGHT: 58 IN | SYSTOLIC BLOOD PRESSURE: 116 MMHG | WEIGHT: 113 LBS | DIASTOLIC BLOOD PRESSURE: 79 MMHG | OXYGEN SATURATION: 97 % | TEMPERATURE: 98.3 F | BODY MASS INDEX: 23.72 KG/M2

## 2019-10-07 DIAGNOSIS — H10.13 ALLERGIC CONJUNCTIVITIS, BILATERAL: ICD-10-CM

## 2019-10-07 DIAGNOSIS — R07.89 ATYPICAL CHEST PAIN: ICD-10-CM

## 2019-10-07 DIAGNOSIS — M94.0 COSTOCHONDRITIS: ICD-10-CM

## 2019-10-07 DIAGNOSIS — M47.819 SPONDYLOARTHROPATHY: ICD-10-CM

## 2019-10-07 DIAGNOSIS — G89.4 CHRONIC PAIN SYNDROME: ICD-10-CM

## 2019-10-07 DIAGNOSIS — M77.8 RIGHT SHOULDER TENDONITIS: ICD-10-CM

## 2019-10-07 DIAGNOSIS — Z23 NEED FOR PROPHYLACTIC VACCINATION AND INOCULATION AGAINST INFLUENZA: Primary | ICD-10-CM

## 2019-10-07 DIAGNOSIS — R10.9 POSTOPERATIVE ABDOMINAL PAIN: ICD-10-CM

## 2019-10-07 DIAGNOSIS — M53.3 DISORDER OF SI (SACROILIAC) JOINT: ICD-10-CM

## 2019-10-07 DIAGNOSIS — M67.88 RIGHT PERONEAL TENDINOSIS: ICD-10-CM

## 2019-10-07 DIAGNOSIS — G44.219 EPISODIC TENSION-TYPE HEADACHE, NOT INTRACTABLE: ICD-10-CM

## 2019-10-07 DIAGNOSIS — M54.2 CERVICALGIA: ICD-10-CM

## 2019-10-07 DIAGNOSIS — G89.18 POSTOPERATIVE ABDOMINAL PAIN: ICD-10-CM

## 2019-10-07 PROCEDURE — 99214 OFFICE O/P EST MOD 30 MIN: CPT | Mod: 25 | Performed by: FAMILY MEDICINE

## 2019-10-07 PROCEDURE — 90686 IIV4 VACC NO PRSV 0.5 ML IM: CPT | Performed by: FAMILY MEDICINE

## 2019-10-07 PROCEDURE — 90471 IMMUNIZATION ADMIN: CPT | Performed by: FAMILY MEDICINE

## 2019-10-07 RX ORDER — GABAPENTIN 300 MG/1
CAPSULE ORAL
Qty: 120 CAPSULE | Refills: 6 | Status: SHIPPED | OUTPATIENT
Start: 2019-10-07 | End: 2020-06-01

## 2019-10-07 RX ORDER — ONDANSETRON 4 MG/1
4-8 TABLET, ORALLY DISINTEGRATING ORAL EVERY 8 HOURS PRN
Qty: 12 TABLET | Refills: 1 | Status: SHIPPED | OUTPATIENT
Start: 2019-10-07 | End: 2020-10-26

## 2019-10-07 RX ORDER — SULFASALAZINE 500 MG/1
TABLET, DELAYED RELEASE ORAL
Qty: 120 TABLET | Refills: 2 | Status: SHIPPED | OUTPATIENT
Start: 2019-10-07 | End: 2020-06-01

## 2019-10-07 RX ORDER — TRAMADOL HYDROCHLORIDE 50 MG/1
50 TABLET ORAL EVERY 6 HOURS PRN
Qty: 60 TABLET | Refills: 0 | Status: SHIPPED | OUTPATIENT
Start: 2019-10-07 | End: 2020-06-01

## 2019-10-07 ASSESSMENT — MIFFLIN-ST. JEOR: SCORE: 1057.31

## 2019-10-07 NOTE — PROGRESS NOTES
Subjective     Zo Qureshi is a 44 year old female who presents to clinic today for the following health issues:    She has been off work since March.   It was extended in July.   Since then her right wrist is better.   It hurts off and on but since no fine finger movement is better and no splint.   She has not seen rheum as her rheumatologist has left.   She needs to be set up with new MD.  Also her workers comp denied pain clinic.   She continues with sulfasalazine but has headaches as side effects.       HPI   Chronic Pain Follow-Up       Type / Location of Pain: shoulder and neck pain  Analgesia/pain control:       Recent changes:  worse      Overall control: varies  Activity level/function:      Daily activities:  Able to do light housework, cooking    Work:  not applicable  Adverse effects:  No  Adherance    Taking medication as directed?  Yes    Participating in other treatments: unable - denied from work comp  Risk Factors:    Sleep:  Poor    Mood/anxiety:  slightly worsened    Recent family or social stressors:  none noted    Other aggravating factors: prolonged sitting and prolonged standing  PHQ-9 SCORE 1/21/2019 2/25/2019 6/5/2019   PHQ-9 Total Score - - -   PHQ-9 Total Score MyChart 19 (Moderately severe depression) 19 (Moderately severe depression) 18 (Moderately severe depression)   PHQ-9 Total Score 19 19 18     JOSE DAVID-7 SCORE 10/8/2018 2/25/2019   Total Score - 1 (minimal anxiety)   Total Score 1 1     Encounter-Level CSA - 05/08/2017:    Controlled Substance Agreement - Scan on 5/18/2017 12:15 PM: CONTROLLED SUBSTANCE AGREEMENT     Patient-Level CSA:    There are no patient-level csa.           How many servings of fruits and vegetables do you eat daily?  2-3    On average, how many sweetened beverages do you drink each day (soda, juice, sweet tea, etc)?   0    How many days per week do you miss taking your medication? 0      Past Medical History:   Diagnosis Date     Anemia      History of blood  transfusion 2000    after vaginal delivery, 2 units PRBCs given     HSIL on Pap smear 09/21/11    MARTA 2 and 3     INFLAM SPONDYLOPATHY NOS 01/07/2008    check labs on sulfasalzine every 3 months and check hep B and C and TB every 2 years     Leukocytopenia 3/10/2014     Tendinosis      Thrombocytopenia (H) 3/10/2014     Unspecified closed fracture of pelvis 2000    left fracture of pelvis after delivery       Past Surgical History:   Procedure Laterality Date     C NONSPECIFIC PROCEDURE  10/00    after delivery 2 units of prbcs secondary to placenta     DILATION AND CURETTAGE, ABLATE ENDOMETRIUM NOVASURE, COMBINED N/A 12/4/2018    Procedure: novasure endometrial ablation, myosure resection of leiomyoma, dilation and curettage;  Surgeon: Rolando Covarrubias MD;  Location: [facility] OR     GYN SURGERY  12/04/2018    fibroid removal     LEEP TX, CERVICAL  12/19/11    MARTA II     OPERATIVE HYSTEROSCOPY WITH MORCELLATOR N/A 12/4/2018    Procedure: OPERATIVE HYSTEROSCOPY WITH MORCELLATOR;  Surgeon: Rolando Covarrubias MD;  Location: [facility] OR     TUBAL LIGATION  5/2009    laparoscopic tubal ligation       MEDICATIONS:  Current Outpatient Medications   Medication     diclofenac (VOLTAREN) 1 % topical gel     gabapentin (NEURONTIN) 300 MG capsule     ketotifen (ZADITOR/REFRESH ANTI-ITCH) 0.025 % ophthalmic solution     ondansetron (ZOFRAN-ODT) 4 MG ODT tab     sulfaSALAzine ER (AZULFIDINE EN) 500 MG EC tablet     traMADol (ULTRAM) 50 MG tablet     clindamycin-benzoyl peroxide (BENZACLIN) gel     docusate sodium (COLACE) 100 MG tablet     DULoxetine (CYMBALTA) 30 MG capsule     ferrous sulfate (FEROSUL) 325 (65 Fe) MG tablet     fluticasone (FLONASE) 50 MCG/ACT spray     hydrocortisone (WESTCORT) 0.2 % external cream     loratadine (CLARITIN) 10 MG tablet     naproxen (NAPROSYN) 500 MG tablet     traZODone (DESYREL) 50 MG tablet     No current facility-administered medications for this visit.        SOCIAL HISTORY:  Social History  "    Tobacco Use     Smoking status: Never Smoker     Smokeless tobacco: Never Used   Substance Use Topics     Alcohol use: Yes     Alcohol/week: 0.0 standard drinks     Frequency: Never     Drinks per session: Patient refused     Binge frequency: Never     Comment: rarely       Family History   Problem Relation Age of Onset     Hypertension Father      Lipids Father      Hypertension Mother      Lipids Mother      Diabetes No family hx of      Coronary Artery Disease No family hx of      Hyperlipidemia No family hx of      Cerebrovascular Disease No family hx of      Breast Cancer No family hx of      Colon Cancer No family hx of      Prostate Cancer No family hx of      Other Cancer No family hx of      Depression No family hx of      Anxiety Disorder No family hx of      Mental Illness No family hx of      Substance Abuse No family hx of      Anesthesia Reaction No family hx of      Asthma No family hx of      Osteoporosis No family hx of      Genetic Disorder No family hx of      Thyroid Disease No family hx of      Obesity No family hx of      Unknown/Adopted No family hx of               Review of Systems   ROS COMP: Constitutional, HEENT, cardiovascular, pulmonary, gi and gu systems are negative, except as otherwise noted.      Objective    /79 (BP Location: Right arm, Patient Position: Chair, Cuff Size: Adult Regular)   Pulse 93   Temp 98.3  F (36.8  C) (Oral)   Resp 20   Ht 1.473 m (4' 10\")   Wt 51.3 kg (113 lb)   SpO2 97%   BMI 23.62 kg/m    Body mass index is 23.62 kg/m .  Physical Exam   GENERAL: healthy, alert and no distress  EYES: Eyes grossly normal to inspection, PERRL and conjunctivae and sclerae normal  HENT: ear canals and TM's normal, nose and mouth without ulcers or lesions  NECK: no adenopathy, no asymmetry, masses, or scars and thyroid normal to palpation  RESP: lungs clear to auscultation - no rales, rhonchi or wheezes  CV: regular rate and rhythm, normal S1 S2, no S3 or S4, no " murmur, click or rub, no peripheral edema and peripheral pulses strong  SKIN: no suspicious lesions or rashes  NEURO: Normal strength and tone, mentation intact and speech normal  PSYCH: mentation appears normal, affect normal/bright  Shoulder exam:   Has limited range of motion with shoulder both sides - right is greater than left.   Cannot get right much above horizontal plane.   The left is about 45 degrees above horizontal plane.   She has pain at this point.   With passive range of motion both sides are better but cannot go vertical    Diagnostic Test Results:  Labs reviewed in [facility]        Assessment:  1. Shoulder tendonitis - right greater than left  2. Right peroneal tendonitis - better off work but not all better  3. Allergies  - stable on meds  4. Chronic pain - pain clinic denies  5. Spondyloarthropathy -needs new rheumatologist      Plan:  1. Refills per epicare  2. Other refills not related to WC given and sent to other pharmacy  3. Letter written  4. Will get our  involved and new referral to rheum related tp spondy dx  5. Hearing later this month  6. Recheck in 3 months  7. Adequate labs for sulfasalazine done last month  - CMP and CBC  8. Flu shot    ** only 1,2 and 4 billable dx

## 2019-10-07 NOTE — PATIENT INSTRUCTIONS
Thank you for choosing [facility] today for your health care needs.     [facility] is transforming primary care  At [facility], we re dedicated to constantly improve how we serve the health care needs of our patients and communities. We re currently making changes to the way we deliver care.      Changes you ll notice include:    An emphasis on building a relationship with a primary care provider    Access to a PAL (personal advocate and liaison) to help guide you with your care needs    Appointment lengths tailored to your specific needs    Greater access to a broader care team and community resources     Improved online access to your care team    Benefits of a primary care provider  If you don t have a designated primary care provider, we encourage you to get to know our care team online and find a provider you d like to see. Most of our providers have a short video on their online provider page. Visit [email or web address] to explore our providers and locations.    Benefits of having a primary care provider include:      They get to know you - your health history, family history and goals, making it easier to make a health plan together.     You get to know them - making health-related conversations and decisions easier      Primary care doctors help you when you re sick or hurt - but also focus on keeping you healthy with preventive care and screenings.      A doctor who sees you regularly is more likely to notice changes in your health.     To help make sure you get the right care, at the right time, we include PALs, or Patient Advocate Liaisons, as part of your care team. Your PAL will be your first line of contact. They ll advocate for your needs and help you navigate our services, connecting you with care team members and community resources to ensure your care is well coordinated. You ll be introduced to a PAL in an upcoming visit.     Expanded care team access with tailored appointment lengths  Depending on your health  care needs, you may have longer or shorter appointments and see additional care team providers - including Medication Therapy Management (MTM) pharmacists, diabetes educators, behavioral health clinicians, or social workers. At times, they may be included in your visit with your provider, or you may see them individually.     Navigating [facility] resources   We partner with [facility] [employer] to help patients overcome challenges that can make it hard to get health care, including financial hardship, transportation or mobility concerns.     Online access to your health care records and care team  [employer] is our online tool that makes it easy to see your health care information and communicate with your care team.   [employer] allows you to:     View your health maintenance plan so you know when you re due for a preventive screening    Message your PAL or care team     View your health history and visit summaries     Schedule appointments     Complete questionnaires and eCheck-in before appointments      Get care for minor conditions from your provider with an e-visit     View and pay your bill     Sign up at [email or web address]. Once you have an account, you also can download the mobile [email or web address].

## 2019-10-11 ENCOUNTER — PATIENT OUTREACH (OUTPATIENT)
Dept: CARE COORDINATION | Facility: CLINIC | Age: 44
End: 2019-10-11

## 2019-10-11 DIAGNOSIS — M47.819 SPONDYLOARTHROPATHY: Primary | ICD-10-CM

## 2019-10-11 NOTE — PROGRESS NOTES
Clinic Care Coordination Contact  [facility]/Voicemail    Referral Source: PCP  Clinical Data: Care Coordinator Outreach  Outreach attempted x 1.  Left message on patient's voicemail with call back information and requested return call.  Plan: Care Coordinator will send care coordination introduction letter with care coordinator contact information and explanation of care coordination services via mail. Care Coordinator will try to reach patient again in 1-2 business days.     Care Coordination Team  [phone]

## 2019-10-31 ENCOUNTER — TELEPHONE (OUTPATIENT)
Dept: FAMILY MEDICINE | Facility: CLINIC | Age: 44
End: 2019-10-31

## 2019-10-31 NOTE — TELEPHONE ENCOUNTER
Recd 4 page fax from [employer].  Please complete restrictions and limitations form and fax to [phone].  Records request sent to Medical Records.  Form in JAIMIE in box.    Jennifer Hensley

## 2019-11-13 ENCOUNTER — TRANSFERRED RECORDS (OUTPATIENT)
Dept: HEALTH INFORMATION MANAGEMENT | Facility: CLINIC | Age: 44
End: 2019-11-13

## 2019-12-05 ENCOUNTER — OFFICE VISIT (OUTPATIENT)
Dept: FAMILY MEDICINE | Facility: CLINIC | Age: 44
End: 2019-12-05
Payer: COMMERCIAL

## 2019-12-05 VITALS
DIASTOLIC BLOOD PRESSURE: 76 MMHG | HEIGHT: 60 IN | WEIGHT: 111.5 LBS | HEART RATE: 79 BPM | SYSTOLIC BLOOD PRESSURE: 118 MMHG | BODY MASS INDEX: 21.89 KG/M2 | TEMPERATURE: 98.9 F | RESPIRATION RATE: 14 BRPM | OXYGEN SATURATION: 98 %

## 2019-12-05 DIAGNOSIS — R07.0 THROAT PAIN: Primary | ICD-10-CM

## 2019-12-05 LAB
DEPRECATED S PYO AG THROAT QL EIA: NORMAL
FLUAV+FLUBV AG SPEC QL: NEGATIVE
FLUAV+FLUBV AG SPEC QL: NEGATIVE
SPECIMEN SOURCE: NORMAL
SPECIMEN SOURCE: NORMAL

## 2019-12-05 PROCEDURE — 87081 CULTURE SCREEN ONLY: CPT | Performed by: PHYSICIAN ASSISTANT

## 2019-12-05 PROCEDURE — 87804 INFLUENZA ASSAY W/OPTIC: CPT | Performed by: PHYSICIAN ASSISTANT

## 2019-12-05 PROCEDURE — 99213 OFFICE O/P EST LOW 20 MIN: CPT | Performed by: PHYSICIAN ASSISTANT

## 2019-12-05 PROCEDURE — 87880 STREP A ASSAY W/OPTIC: CPT | Performed by: PHYSICIAN ASSISTANT

## 2019-12-05 RX ORDER — BENZONATATE 100 MG/1
100 CAPSULE ORAL 3 TIMES DAILY PRN
Qty: 42 CAPSULE | Refills: 0 | Status: SHIPPED | OUTPATIENT
Start: 2019-12-05 | End: 2020-08-19

## 2019-12-05 RX ORDER — FLUTICASONE PROPIONATE 50 MCG
1-2 SPRAY, SUSPENSION (ML) NASAL DAILY
Qty: 16 G | Refills: 3 | Status: SHIPPED | OUTPATIENT
Start: 2019-12-05 | End: 2020-10-26

## 2019-12-05 ASSESSMENT — PATIENT HEALTH QUESTIONNAIRE - PHQ9
SUM OF ALL RESPONSES TO PHQ QUESTIONS 1-9: 11
10. IF YOU CHECKED OFF ANY PROBLEMS, HOW DIFFICULT HAVE THESE PROBLEMS MADE IT FOR YOU TO DO YOUR WORK, TAKE CARE OF THINGS AT HOME, OR GET ALONG WITH OTHER PEOPLE: VERY DIFFICULT
SUM OF ALL RESPONSES TO PHQ QUESTIONS 1-9: 11

## 2019-12-05 ASSESSMENT — ENCOUNTER SYMPTOMS
SHORTNESS OF BREATH: 0
SORE THROAT: 1
SWOLLEN GLANDS: 1
COUGH: 1
HEADACHES: 0
TROUBLE SWALLOWING: 1

## 2019-12-05 ASSESSMENT — ANXIETY QUESTIONNAIRES
6. BECOMING EASILY ANNOYED OR IRRITABLE: SEVERAL DAYS
5. BEING SO RESTLESS THAT IT IS HARD TO SIT STILL: NOT AT ALL
7. FEELING AFRAID AS IF SOMETHING AWFUL MIGHT HAPPEN: NOT AT ALL
GAD7 TOTAL SCORE: 1
1. FEELING NERVOUS, ANXIOUS, OR ON EDGE: NOT AT ALL
4. TROUBLE RELAXING: NOT AT ALL
7. FEELING AFRAID AS IF SOMETHING AWFUL MIGHT HAPPEN: NOT AT ALL
3. WORRYING TOO MUCH ABOUT DIFFERENT THINGS: NOT AT ALL
GAD7 TOTAL SCORE: 1
GAD7 TOTAL SCORE: 1
2. NOT BEING ABLE TO STOP OR CONTROL WORRYING: NOT AT ALL

## 2019-12-05 ASSESSMENT — MIFFLIN-ST. JEOR: SCORE: 1074.32

## 2019-12-05 ASSESSMENT — PAIN SCALES - GENERAL: PAINLEVEL: MODERATE PAIN (5)

## 2019-12-05 NOTE — PATIENT INSTRUCTIONS

## 2019-12-05 NOTE — PROGRESS NOTES
Subjective     Zo Qureshi is a 44 year old female who presents to clinic today for the following health issues:    Pharyngitis    This is a new problem. The current episode started 2 days ago. The problem has not changed since onset.The maximum temperature recorded prior to her arrival was 100 to 100.9 F. The fever has been present for less than 1 day. Associated symptoms include congestion, swollen glands, trouble swallowing and cough. Pertinent negatives include no headaches and no shortness of breath. She has had no exposure to strep or mono. She has tried acetaminophen for the symptoms. The treatment provided mild relief.   History of Present Illness        She eats 2-3 servings of fruits and vegetables daily.She consumes 0 sweetened beverage(s) daily.  She is taking medications regularly.         Patient Active Problem List   Diagnosis     Inflammatory spondylopathy (H)     Disorder of SI (sacroiliac) joint     Hypercholesterolemia     HSIL on Pap smear     S/P LEEP of cervix     Acne     Influenza B     Vitamin D deficiency     Right peroneal tendinosis     Episodic tension-type headache, not intractable     Cervicalgia     Insomnia due to medical condition     Chronic pain syndrome     Submucous leiomyoma of uterus     Menorrhagia with regular cycle     Right shoulder tendonitis     Intramural leiomyoma of uterus     Pelvic pain in female     Left ovarian cyst     Past Surgical History:   Procedure Laterality Date     C NONSPECIFIC PROCEDURE  10/00    after delivery 2 units of prbcs secondary to placenta     DILATION AND CURETTAGE, ABLATE ENDOMETRIUM NOVASURE, COMBINED N/A 12/4/2018    Procedure: novasure endometrial ablation, myosure resection of leiomyoma, dilation and curettage;  Surgeon: Rolando Covarrubias MD;  Location: [facility] OR     GYN SURGERY  12/04/2018    fibroid removal     LEEP TX, CERVICAL  12/19/11    MARTA II     OPERATIVE HYSTEROSCOPY WITH MORCELLATOR N/A 12/4/2018    Procedure: OPERATIVE  HYSTEROSCOPY WITH MORCELLATOR;  Surgeon: Rolando Covarrubias MD;  Location: [facility] OR     TUBAL LIGATION  5/2009    laparoscopic tubal ligation       Social History     Tobacco Use     Smoking status: Never Smoker     Smokeless tobacco: Never Used   Substance Use Topics     Alcohol use: Yes     Alcohol/week: 0.0 standard drinks     Frequency: Never     Drinks per session: Patient refused     Binge frequency: Never     Comment: rarely     Family History   Problem Relation Age of Onset     Hypertension Father      Lipids Father      Hypertension Mother      Lipids Mother      Diabetes No family hx of      Coronary Artery Disease No family hx of      Hyperlipidemia No family hx of      Cerebrovascular Disease No family hx of      Breast Cancer No family hx of      Colon Cancer No family hx of      Prostate Cancer No family hx of      Other Cancer No family hx of      Depression No family hx of      Anxiety Disorder No family hx of      Mental Illness No family hx of      Substance Abuse No family hx of      Anesthesia Reaction No family hx of      Asthma No family hx of      Osteoporosis No family hx of      Genetic Disorder No family hx of      Thyroid Disease No family hx of      Obesity No family hx of      Unknown/Adopted No family hx of          Current Outpatient Medications   Medication Sig Dispense Refill     benzonatate (TESSALON) 100 MG capsule Take 1 capsule (100 mg) by mouth 3 times daily as needed for cough 42 capsule 0     fluticasone (FLONASE) 50 MCG/ACT nasal spray Spray 1-2 sprays into both nostrils daily 16 g 3     clindamycin-benzoyl peroxide (BENZACLIN) gel Apply to  Affected area initially once daily for 2 weeks and then increase to 2 times daily if tolerated 50 g 11     diclofenac (VOLTAREN) 1 % topical gel Use small amount and rub into area of chest that is hurting 1-2 times daily as needed 100 g 1     docusate sodium (COLACE) 100 MG tablet Take 1 tablet (100 mg) by mouth daily as needed for  constipation 30 tablet 11     DULoxetine (CYMBALTA) 30 MG capsule Take 2 capsules (60 mg) by mouth daily 180 capsule 3     ferrous sulfate (FEROSUL) 325 (65 Fe) MG tablet Take 1 tablet (325 mg) by mouth 2 times daily 60 tablet 11     gabapentin (NEURONTIN) 300 MG capsule 1 tab in am and 1 at 5 pm  and 2 at bedtime for (4 per day) 120 capsule 6     hydrocortisone (WESTCORT) 0.2 % external cream Apply sparingly to affected area 2 times daily as needed. 45 g 3     ketotifen (ZADITOR/REFRESH ANTI-ITCH) 0.025 % ophthalmic solution Place 1 drop into both eyes 2 times daily 1 Bottle 11     loratadine (CLARITIN) 10 MG tablet Take 1 tablet (10 mg) by mouth daily 30 tablet 1     naproxen (NAPROSYN) 500 MG tablet Take 1 tablet (500 mg) by mouth 2 times daily (with meals) 60 tablet 6     ondansetron (ZOFRAN-ODT) 4 MG ODT tab Take 1-2 tablets (4-8 mg) by mouth every 8 hours as needed for nausea 12 tablet 1     sulfaSALAzine ER (AZULFIDINE EN) 500 MG EC tablet 1000mg in the AM and 1000mg in the PM daily 120 tablet 2     traMADol (ULTRAM) 50 MG tablet Take 1 tablet (50 mg) by mouth every 6 hours as needed for severe pain 60 tablet 0     traZODone (DESYREL) 50 MG tablet Take 1 tablet (50 mg) by mouth nightly as needed for sleep 60 tablet 6     Allergies   Allergen Reactions     Contrast Dye Nausea and Vomiting     Percocet [Oxycodone-Acetaminophen] Nausea and Vomiting and Itching     Advil [Ibuprofen Micronized] Rash     Rash        Reviewed and updated as needed this visit by Provider  Tobacco  Allergies  Meds  Problems  Med Hx  Surg Hx  Fam Hx         Review of Systems   HENT: Positive for congestion, sore throat and trouble swallowing.    Respiratory: Positive for cough. Negative for shortness of breath.    Neurological: Negative for headaches.      ROS COMP: Constitutional, HEENT, cardiovascular, pulmonary, gi and gu systems are negative, except as otherwise noted.      Objective    /76 (BP Location: Right arm,  "Patient Position: Sitting, Cuff Size: Adult Regular)   Pulse 79   Temp 98.9  F (37.2  C) (Oral)   Resp 14   Ht 1.511 m (4' 11.5\")   Wt 50.6 kg (111 lb 8 oz)   LMP 03/26/2019 (Within Days)   SpO2 98%   BMI 22.14 kg/m    Body mass index is 22.14 kg/m .  Physical Exam   GENERAL: alert and no distress  EYES: Eyes grossly normal to inspection, PERRL and conjunctivae and sclerae normal  HENT: normal cephalic/atraumatic, both ears: clear effusion, nasal mucosa edematous , oropharynx clear and oral mucous membranes moist  NECK: cervical adenopathy right posterior, no asymmetry, masses, or scars and thyroid normal to palpation  RESP: lungs clear to auscultation - no rales, rhonchi or wheezes  CV: regular rates and rhythm, normal S1 S2, no S3 or S4 and no murmur, click or rub  PSYCH: mentation appears normal, affect normal/bright    Diagnostic Test Results:  Results for orders placed or performed in visit on 12/05/19 (from the past 24 hour(s))   Strep, Rapid Screen   Result Value Ref Range    Specimen Description Throat     Rapid Strep A Screen       NEGATIVE: No Group A streptococcal antigen detected by immunoassay, await culture report.   Influenza A/B antigen   Result Value Ref Range    Influenza A/B Agn Specimen Nasal     Influenza A Negative NEG^Negative    Influenza B Negative NEG^Negative           Assessment & Plan     (R07.0) Throat pain  (primary encounter diagnosis)  Comment: rapid strep and flu negative. Likely viral. Exam benign. Supportive care measures discussed. See patient instructions. If no improvement in 1 week, rtc. Sooner if worsening.   Plan: Strep, Rapid Screen, Beta strep group A         culture, Influenza A/B antigen, benzonatate         (TESSALON) 100 MG capsule, fluticasone         (FLONASE) 50 MCG/ACT nasal spray        -Medication use and side effects discussed with the patient. Patient is in complete understanding and agreement with plan.           Return in about 3 months (around " 3/5/2020) for Physical Exam, with pcp.    Marc Grady PA-C  [facility]    Answers for HPI/ROS submitted by the patient on 12/5/2019   Chronic problems general questions HPI Form  If you checked off any problems, how difficult have these problems made it for you to do your work, take care of things at home, or get along with other people?: Very difficult  PHQ9 TOTAL SCORE: 11  JOSE DAVID 7 TOTAL SCORE: 1

## 2019-12-06 LAB
BACTERIA SPEC CULT: NORMAL
SPECIMEN SOURCE: NORMAL

## 2019-12-06 ASSESSMENT — ANXIETY QUESTIONNAIRES: GAD7 TOTAL SCORE: 1

## 2020-01-08 ENCOUNTER — TELEPHONE (OUTPATIENT)
Dept: FAMILY MEDICINE | Facility: CLINIC | Age: 45
End: 2020-01-08

## 2020-01-08 NOTE — TELEPHONE ENCOUNTER
Pt calling about cough not getting better, would like a prescription for cough medicine. Call back at [phone] Okay to leave message    Aaliyah Mcmahan Patient Representive

## 2020-01-09 NOTE — TELEPHONE ENCOUNTER
Called pt, discussed, throat pain resolved, c/o dry cough, denies fever, trouble breathing, PND, recommend f/u appointment, pt agrees, will go to urgent care  Ellen Fowler RN, BSN  Message handled by Nurse Triage.

## 2020-01-09 NOTE — TELEPHONE ENCOUNTER
Please call and triage patient. I saw patient over 1 month ago for symptoms. If mild cough, can try cough suppressant. However, if significant symptoms or fever or new symptoms, will need recheck visit.     -humberto jerome, pac

## 2020-01-30 ENCOUNTER — TRANSFERRED RECORDS (OUTPATIENT)
Dept: HEALTH INFORMATION MANAGEMENT | Facility: CLINIC | Age: 45
End: 2020-01-30

## 2020-02-18 NOTE — PROGRESS NOTES
"Pre-Visit Planning     Future Appointments   Date Time Provider Department Center   2/19/2020 11:25 AM Terri Robles MD [facility]   2/24/2020  1:45 PM Terri Robles MD [facility]     Arrival Time for this Appointment: 11:05 AM   Appointment Notes for this encounter:   f/u work comp    Questionnaires Reviewed/Assigned  Additional questionnaires assigned      Patient preferred phone number: [phone]    Spoke to patient via phone. Patient does not have additional questions or concerns.        Appointment is follow up on work comp injury    Health Maintenance Due   Topic Date Due     PREVENTIVE CARE VISIT  02/21/2019     DTAP/TDAP/TD IMMUNIZATION (2 - Td) 05/13/2019     CMP  03/07/2020     Patient is due for:        [employer]  Patient is active on [employer].    Questionnaire Review   Advised patient to arrive early in order to complete questionnaires.    Call Summary  \"Thank you for your time today.  If anything comes up before your appointment, please feel free to contact us at [phone].\"   "

## 2020-02-19 ENCOUNTER — OFFICE VISIT (OUTPATIENT)
Dept: FAMILY MEDICINE | Facility: CLINIC | Age: 45
End: 2020-02-19
Payer: COMMERCIAL

## 2020-02-19 VITALS
TEMPERATURE: 98.4 F | SYSTOLIC BLOOD PRESSURE: 100 MMHG | BODY MASS INDEX: 22.58 KG/M2 | HEIGHT: 59 IN | WEIGHT: 112 LBS | OXYGEN SATURATION: 98 % | DIASTOLIC BLOOD PRESSURE: 68 MMHG | RESPIRATION RATE: 14 BRPM | HEART RATE: 79 BPM

## 2020-02-19 DIAGNOSIS — R07.89 ATYPICAL CHEST PAIN: ICD-10-CM

## 2020-02-19 DIAGNOSIS — M54.2 CERVICALGIA: ICD-10-CM

## 2020-02-19 DIAGNOSIS — M77.8 RIGHT SHOULDER TENDONITIS: ICD-10-CM

## 2020-02-19 DIAGNOSIS — M67.88 RIGHT PERONEAL TENDINOSIS: ICD-10-CM

## 2020-02-19 DIAGNOSIS — M94.0 COSTOCHONDRITIS: ICD-10-CM

## 2020-02-19 PROCEDURE — 99214 OFFICE O/P EST MOD 30 MIN: CPT | Performed by: FAMILY MEDICINE

## 2020-02-19 RX ORDER — NAPROXEN 500 MG/1
500 TABLET ORAL 2 TIMES DAILY WITH MEALS
Qty: 60 TABLET | Refills: 6 | Status: SHIPPED | OUTPATIENT
Start: 2020-02-19 | End: 2020-06-01

## 2020-02-19 ASSESSMENT — MIFFLIN-ST. JEOR: SCORE: 1063.66

## 2020-02-19 NOTE — LETTER
[facility]  [address] [place], [address]  [phone]        February 19, 2020    RE: Zo Qureshi                                                                                                                                                       [id number] [address] [address]            To Whom It May Concern,   Zo Qureshi was seen today.   She has been seen twice by rheumatology and is on treatment.   She suffers from tendonitis and arthritis.   She is permanently disabled from doing her previous job which involved repetitive hand motions and fine finger motions.             Sincerely,    Terri Robles M.D.

## 2020-02-19 NOTE — PROGRESS NOTES
Subjective     Zo Qureshi is a 45 year old female who presents to clinic today for the following health issues:    HPI   Follow up WC  She is doing okay with ADL but even things like blow drying her hair and brushing her hair are hard.   She also cannot open jars.   She has to have her kids open jars for her.   She is still off work as she was told  By me she was permanently disabled.      Past Medical History:   Diagnosis Date     Anemia      History of blood transfusion 2000    after vaginal delivery, 2 units PRBCs given     HSIL on Pap smear 09/21/11    MARTA 2 and 3     INFLAM SPONDYLOPATHY NOS 01/07/2008    check labs on sulfasalzine every 3 months and check hep B and C and TB every 2 years     Leukocytopenia 3/10/2014     Tendinosis      Thrombocytopenia (H) 3/10/2014     Unspecified closed fracture of pelvis 2000    left fracture of pelvis after delivery       Past Surgical History:   Procedure Laterality Date     C NONSPECIFIC PROCEDURE  10/00    after delivery 2 units of prbcs secondary to placenta     DILATION AND CURETTAGE, ABLATE ENDOMETRIUM NOVASURE, COMBINED N/A 12/4/2018    Procedure: novasure endometrial ablation, myosure resection of leiomyoma, dilation and curettage;  Surgeon: Rolando Covarrubias MD;  Location: [facility] OR     GYN SURGERY  12/04/2018    fibroid removal     LEEP TX, CERVICAL  12/19/11    MARTA II     OPERATIVE HYSTEROSCOPY WITH MORCELLATOR N/A 12/4/2018    Procedure: OPERATIVE HYSTEROSCOPY WITH MORCELLATOR;  Surgeon: Rolando Covarrubias MD;  Location: [facility] OR     TUBAL LIGATION  5/2009    laparoscopic tubal ligation       MEDICATIONS:  Current Outpatient Medications   Medication     diclofenac 1 % TD topical gel     naproxen (NAPROSYN) 500 MG PO tablet     benzonatate (TESSALON) 100 MG capsule     clindamycin-benzoyl peroxide (BENZACLIN) gel     docusate sodium (COLACE) 100 MG tablet     DULoxetine (CYMBALTA) 30 MG capsule     ferrous sulfate (FEROSUL) 325 (65 Fe) MG tablet     fluticasone  "(FLONASE) 50 MCG/ACT nasal spray     gabapentin (NEURONTIN) 300 MG capsule     hydrocortisone (WESTCORT) 0.2 % external cream     ketotifen (ZADITOR/REFRESH ANTI-ITCH) 0.025 % ophthalmic solution     loratadine (CLARITIN) 10 MG tablet     ondansetron (ZOFRAN-ODT) 4 MG ODT tab     sulfaSALAzine ER (AZULFIDINE EN) 500 MG EC tablet     traMADol (ULTRAM) 50 MG tablet     traZODone (DESYREL) 50 MG tablet     No current facility-administered medications for this visit.        SOCIAL HISTORY:  Social History     Tobacco Use     Smoking status: Never Smoker     Smokeless tobacco: Never Used   Substance Use Topics     Alcohol use: Yes     Alcohol/week: 0.0 standard drinks     Frequency: Never     Drinks per session: Patient refused     Binge frequency: Never     Comment: rarely       Family History   Problem Relation Age of Onset     Hypertension Father      Lipids Father      Hypertension Mother      Lipids Mother      Diabetes No family hx of      Coronary Artery Disease No family hx of      Hyperlipidemia No family hx of      Cerebrovascular Disease No family hx of      Breast Cancer No family hx of      Colon Cancer No family hx of      Prostate Cancer No family hx of      Other Cancer No family hx of      Depression No family hx of      Anxiety Disorder No family hx of      Mental Illness No family hx of      Substance Abuse No family hx of      Anesthesia Reaction No family hx of      Asthma No family hx of      Osteoporosis No family hx of      Genetic Disorder No family hx of      Thyroid Disease No family hx of      Obesity No family hx of      Unknown/Adopted No family hx of               Review of Systems   ROS COMP: Constitutional, HEENT, cardiovascular, pulmonary, gi and gu systems are negative, except as otherwise noted.      Objective    /68   Pulse 79   Temp 98.4  F (36.9  C) (Oral)   Resp 14   Ht 1.499 m (4' 11\")   Wt 50.8 kg (112 lb)   LMP 03/26/2019 (Within Days)   SpO2 98%   BMI 22.62 kg/m  "   Body mass index is 22.62 kg/m .  Physical Exam   GENERAL: healthy, alert and no distress  EYES: Eyes grossly normal to inspection, PERRL and conjunctivae and sclerae normal  HENT: ear canals and TM's normal, nose and mouth without ulcers or lesions  NECK: no adenopathy, no asymmetry, masses, or scars and thyroid normal to palpation  RESP: lungs clear to auscultation - no rales, rhonchi or wheezes  CV: regular rate and rhythm, normal S1 S2, no S3 or S4, no murmur, click or rub, no peripheral edema and peripheral pulses strong  ABDOMEN: soft, nontender, no hepatosplenomegaly, no masses and bowel sounds normal  SKIN: no suspicious lesions or rashes  NEURO: Normal strength and tone, mentation intact and speech normal  PSYCH: mentation appears normal, affect normal/bright  LYMPH: no cervical, supraclavicular, axillary, or inguinal adenopathy  Shoulders with limited range of motion in mid and upper arch - cannot lift arms above head but can get to about the ears  Tenderness anterior shoulder    Diagnostic Test Results:  Labs reviewed in Epic        Assessment:  1. Right shoulder tendonitis more than left - but both  2. Peroneal tenddinosis both sides but right more than left  3. Arthritis - sees rheum  4. Neck pain  5. Chronic pain      Plan:  1. Continue sulfasalazine now at lower dose  2. Naproxen  3. voltaren gel  4.  Gabapentin tid  5. Follow up in 4 weeks with rheum  6. Physical with me in 5 days  7. hearing was delayed  8. Recheck in 6 months

## 2020-02-19 NOTE — PATIENT INSTRUCTIONS
Thank you for choosing [facility] today for your health care needs.     [facility] is transforming primary care  At [facility], we re dedicated to constantly improve how we serve the health care needs of our patients and communities. We re currently making changes to the way we deliver care.     Changes you ll notice include:    An emphasis on building a relationship with a primary care provider    Access to a PAL (personal advocate and liaison) to help guide you with your care needs    Appointment lengths tailored to your specific needs and greater access to a care team to help you and your provider improve and maintain your health and well-being    Improved online access to your care team    Benefits of a primary care provider  If you don t have a designated primary care provider, we encourage you to get to know our care team online and find a provider you d like to see. Most of our providers have a short video on their online provider page. Visit [email or web address] to explore our providers and locations.    Benefits of having a primary care provider include:      They get to know you - your health history, family history and goals, making it easier to make a health plan together.     You get to know them - making health-related conversations and decisions easier      Primary care doctors help you when you re sick or hurt - but also focus on keeping you healthy with preventive care and screenings.      A doctor who sees you regularly is more likely to notice changes in your health.     You ll be connected to a broad care team who partners with your provider to support you.    Patient Advocate Liaison (PAL)   To help make sure you get the right care, at the right time, we include PALs, or Patient Advocate Liaisons, as part of your care team. Your PAL will be your first line of contact. They ll advocate for your needs and help you navigate our services, connecting you with care team members and community resources to ensure  your care is well coordinated. You ll be introduced to a PAL in an upcoming visit.     Expanded care team access with tailored appointment lengths  Depending on your health care needs, you may have longer or shorter appointments and see additional care team providers - including Medication Therapy Management (MTM) pharmacists, diabetes educators, behavioral health clinicians, or social workers. At times, they may be included in your visit with your provider, or you may see them individually.     Online access to your health care records and care team  [employer] is our online tool that makes it easy to see your health care information and communicate with your care team.     [employer] allows you to:     View your health maintenance plan so you know when you re due for a preventive screening    Send secure messages to your care team    View your health history and visit summaries     Schedule appointments     Complete questionnaires and eCheck-in before appointments      Get care from your provider with an e-visit      View and pay your bill     Sign up at [email or web address]. Once you have an account, you also can download the mobile [email or web address].

## 2020-02-26 ENCOUNTER — OFFICE VISIT (OUTPATIENT)
Dept: FAMILY MEDICINE | Facility: CLINIC | Age: 45
End: 2020-02-26
Payer: COMMERCIAL

## 2020-02-26 VITALS
HEART RATE: 84 BPM | WEIGHT: 114 LBS | RESPIRATION RATE: 14 BRPM | TEMPERATURE: 98.3 F | SYSTOLIC BLOOD PRESSURE: 110 MMHG | OXYGEN SATURATION: 99 % | BODY MASS INDEX: 23.03 KG/M2 | DIASTOLIC BLOOD PRESSURE: 68 MMHG

## 2020-02-26 DIAGNOSIS — M46.99 INFLAMMATORY SPONDYLOPATHY OF MULTIPLE SITES IN SPINE (H): ICD-10-CM

## 2020-02-26 DIAGNOSIS — L65.9 HAIR LOSS: ICD-10-CM

## 2020-02-26 DIAGNOSIS — M47.819 SPONDYLOARTHROPATHY: Primary | ICD-10-CM

## 2020-02-26 DIAGNOSIS — N95.1 MENOPAUSAL SYNDROME (HOT FLASHES): ICD-10-CM

## 2020-02-26 LAB
ALBUMIN SERPL-MCNC: 3.9 G/DL (ref 3.4–5)
ALP SERPL-CCNC: 49 U/L (ref 40–150)
ALT SERPL W P-5'-P-CCNC: 13 U/L (ref 0–50)
ANION GAP SERPL CALCULATED.3IONS-SCNC: 4 MMOL/L (ref 3–14)
AST SERPL W P-5'-P-CCNC: 11 U/L (ref 0–45)
BILIRUB SERPL-MCNC: 0.4 MG/DL (ref 0.2–1.3)
BUN SERPL-MCNC: 10 MG/DL (ref 7–30)
CALCIUM SERPL-MCNC: 8.7 MG/DL (ref 8.5–10.1)
CHLORIDE SERPL-SCNC: 107 MMOL/L (ref 94–109)
CO2 SERPL-SCNC: 26 MMOL/L (ref 20–32)
CREAT SERPL-MCNC: 0.49 MG/DL (ref 0.52–1.04)
CRP SERPL-MCNC: <2.9 MG/L (ref 0–8)
FSH SERPL-ACNC: 1.4 IU/L
GFR SERPL CREATININE-BSD FRML MDRD: >90 ML/MIN/{1.73_M2}
GLUCOSE SERPL-MCNC: 99 MG/DL (ref 70–99)
POTASSIUM SERPL-SCNC: 3.9 MMOL/L (ref 3.4–5.3)
PROT SERPL-MCNC: 7.8 G/DL (ref 6.8–8.8)
SODIUM SERPL-SCNC: 137 MMOL/L (ref 133–144)
TSH SERPL DL<=0.005 MIU/L-ACNC: 1.93 MU/L (ref 0.4–4)

## 2020-02-26 PROCEDURE — 90471 IMMUNIZATION ADMIN: CPT | Performed by: FAMILY MEDICINE

## 2020-02-26 PROCEDURE — 83001 ASSAY OF GONADOTROPIN (FSH): CPT | Performed by: FAMILY MEDICINE

## 2020-02-26 PROCEDURE — 99214 OFFICE O/P EST MOD 30 MIN: CPT | Mod: 25 | Performed by: FAMILY MEDICINE

## 2020-02-26 PROCEDURE — 36415 COLL VENOUS BLD VENIPUNCTURE: CPT | Performed by: FAMILY MEDICINE

## 2020-02-26 PROCEDURE — 84443 ASSAY THYROID STIM HORMONE: CPT | Performed by: FAMILY MEDICINE

## 2020-02-26 PROCEDURE — 86140 C-REACTIVE PROTEIN: CPT | Performed by: FAMILY MEDICINE

## 2020-02-26 PROCEDURE — 90715 TDAP VACCINE 7 YRS/> IM: CPT | Performed by: FAMILY MEDICINE

## 2020-02-26 PROCEDURE — 80053 COMPREHEN METABOLIC PANEL: CPT | Performed by: FAMILY MEDICINE

## 2020-02-26 NOTE — PROGRESS NOTES
Subjective     Zo Qureshi is a 45 year old female who presents to clinic today for the following health issues:    Her mid to low back is hurting for about a month.   Standing an walking is the worst.   Lying down  Is better.    Her gait has changed due to the back pain.   If she sits too long her left leg will do numb.   The left thigh is sore and painful.   She has not noted any weakness that is new but her left legs is weaker than the right and has been that way for a long time.       HPI   Back Pain       Duration: X 1 month        Specific cause: none    Description:   Location of pain: low back bilateral and middle of back bilateral  Character of pain: sharp  Pain radiation:radiates into the right buttocks and radiates into the left buttocks  New numbness or weakness in legs, not attributed to pain:  no     Intensity: Currently 6/10    History:   Pain interferes with job: Not applicable  History of back problems: Yes  Any previous MRI or X-rays: Yes- at [place].  Date MRI Cervical Spine 2/9/17  Sees a specialist for back pain:  No  Therapies tried without relief: has not attempted anything as of yet    Alleviating factors:   Improved by: laying down and sitting      Precipitating factors:  Worsened by: Standing and Walking          Accompanying Signs & Symptoms:  Risk of Fracture:  None  Risk of Cauda Equina:  None  Risk of Infection:  None  Risk of Cancer:  None  Risk of Ankylosing Spondylitis:  Onset at age <35, male, AND morning back stiffness. YES- hx of sacroilitis        Past Medical History:   Diagnosis Date     Anemia      History of blood transfusion 2000    after vaginal delivery, 2 units PRBCs given     HSIL on Pap smear 09/21/11    MARTA 2 and 3     INFLAM SPONDYLOPATHY NOS 01/07/2008    check labs on sulfasalzine every 3 months and check hep B and C and TB every 2 years     Leukocytopenia 3/10/2014     Tendinosis      Thrombocytopenia (H) 3/10/2014     Unspecified closed fracture of pelvis 2000     left fracture of pelvis after delivery       Past Surgical History:   Procedure Laterality Date     C NONSPECIFIC PROCEDURE  10/00    after delivery 2 units of prbcs secondary to placenta     DILATION AND CURETTAGE, ABLATE ENDOMETRIUM NOVASURE, COMBINED N/A 12/4/2018    Procedure: novasure endometrial ablation, myosure resection of leiomyoma, dilation and curettage;  Surgeon: Rolando Covarrubias MD;  Location: [facility] OR     GYN SURGERY  12/04/2018    fibroid removal     LEEP TX, CERVICAL  12/19/11    MARTA II     OPERATIVE HYSTEROSCOPY WITH MORCELLATOR N/A 12/4/2018    Procedure: OPERATIVE HYSTEROSCOPY WITH MORCELLATOR;  Surgeon: Rolando Covarrubias MD;  Location: [facility] OR     TUBAL LIGATION  5/2009    laparoscopic tubal ligation       MEDICATIONS:  Current Outpatient Medications   Medication     benzonatate (TESSALON) 100 MG capsule     clindamycin-benzoyl peroxide (BENZACLIN) gel     diclofenac 1 % TD topical gel     docusate sodium (COLACE) 100 MG tablet     DULoxetine (CYMBALTA) 30 MG capsule     ferrous sulfate (FEROSUL) 325 (65 Fe) MG tablet     fluticasone (FLONASE) 50 MCG/ACT nasal spray     gabapentin (NEURONTIN) 300 MG capsule     hydrocortisone (WESTCORT) 0.2 % external cream     ketotifen (ZADITOR/REFRESH ANTI-ITCH) 0.025 % ophthalmic solution     loratadine (CLARITIN) 10 MG tablet     naproxen (NAPROSYN) 500 MG PO tablet     ondansetron (ZOFRAN-ODT) 4 MG ODT tab     sulfaSALAzine ER (AZULFIDINE EN) 500 MG EC tablet     traMADol (ULTRAM) 50 MG tablet     traZODone (DESYREL) 50 MG tablet     No current facility-administered medications for this visit.        SOCIAL HISTORY:  Social History     Tobacco Use     Smoking status: Never Smoker     Smokeless tobacco: Never Used   Substance Use Topics     Alcohol use: Yes     Alcohol/week: 0.0 standard drinks     Frequency: Never     Drinks per session: Patient refused     Binge frequency: Never     Comment: rarely       Family History   Problem Relation Age of Onset      Hypertension Father      Lipids Father      Hypertension Mother      Lipids Mother      Diabetes No family hx of      Coronary Artery Disease No family hx of      Hyperlipidemia No family hx of      Cerebrovascular Disease No family hx of      Breast Cancer No family hx of      Colon Cancer No family hx of      Prostate Cancer No family hx of      Other Cancer No family hx of      Depression No family hx of      Anxiety Disorder No family hx of      Mental Illness No family hx of      Substance Abuse No family hx of      Anesthesia Reaction No family hx of      Asthma No family hx of      Osteoporosis No family hx of      Genetic Disorder No family hx of      Thyroid Disease No family hx of      Obesity No family hx of      Unknown/Adopted No family hx of             Review of Systems   ROS COMP: Constitutional, HEENT, cardiovascular, pulmonary, gi and gu systems are negative, except as otherwise noted.      Objective    /68 (BP Location: Right arm, Patient Position: Chair, Cuff Size: Adult Regular)   Pulse 84   Temp 98.3  F (36.8  C) (Oral)   Resp 14   Wt 51.7 kg (114 lb)   LMP 03/26/2019 (Within Days)   SpO2 99%   BMI 23.03 kg/m    Body mass index is 23.03 kg/m .  Physical Exam   GENERAL: healthy, alert and no distress  EYES: Eyes grossly normal to inspection, PERRL and conjunctivae and sclerae normal  HENT: ear canals and TM's normal, nose and mouth without ulcers or lesions  NECK: no adenopathy, no asymmetry, masses, or scars and thyroid normal to palpation  RESP: lungs clear to auscultation - no rales, rhonchi or wheezes  CV: regular rate and rhythm, normal S1 S2, no S3 or S4, no murmur, click or rub, no peripheral edema and peripheral pulses strong  ABDOMEN: soft, nontender, no hepatosplenomegaly, no masses and bowel sounds normal  MS: no gross musculoskeletal defects noted, no edema  SKIN: no suspicious lesions or rashes  NEURO: Normal strength and tone, mentation intact and speech  normal  PSYCH: mentation appears normal, affect normal/bright  LYMPH: no cervical, supraclavicular, axillary, or inguinal adenopathy  Back:   +++ straight leg raise on left - weakness of quads on left and hip extensors as well - does NOT walk with limp right now    Diagnostic Test Results:  Labs reviewed in [facility]        Assessment:  1. Low back pain with left sided radiculopathy  2. Hx of sacroiliitis   3. Hair loss  4. Hot flashes      Plan:  1. See [facility] orders for labs  2. MRI of lumbar spine  3. Follow up with rheum  4. May need new DMARD  5. Will check CRP  6. Physical in 1-2 months

## 2020-02-28 ENCOUNTER — HOSPITAL ENCOUNTER (OUTPATIENT)
Dept: MRI IMAGING | Facility: CLINIC | Age: 45
Discharge: HOME | End: 2020-02-28
Attending: FAMILY MEDICINE | Admitting: FAMILY MEDICINE
Payer: COMMERCIAL

## 2020-02-28 DIAGNOSIS — M47.819 SPONDYLOARTHROPATHY: ICD-10-CM

## 2020-02-28 PROCEDURE — 72148 MRI LUMBAR SPINE W/O DYE: CPT

## 2020-04-27 ENCOUNTER — TELEPHONE (OUTPATIENT)
Dept: FAMILY MEDICINE | Facility: CLINIC | Age: 45
End: 2020-04-27

## 2020-04-27 NOTE — TELEPHONE ENCOUNTER
Medication requested is not on the medication list because this request is not for this patient - it is for her mom.     Nothing further to document on this patient's chart as request not for her     Flor Maddox, Registered Nurse   [facility]

## 2020-04-27 NOTE — TELEPHONE ENCOUNTER
General Call:   Who is calling:  Patient  Reason for Call:  Medication request  What are your questions or concerns:  Patient stated that she needs a refill on ear drops. I didn't see ear drops in her chart and she didn't know the name of the medication. Patient stated she isn't experiencing symptoms but tried to get them at the pharmacy and was unable to do so.  Date of last appointment with provider: 2/26/20  Okay to leave a detailed message:No at Home number on file [phone] (home)     Kristy Toussaint,

## 2020-06-01 ENCOUNTER — VIRTUAL VISIT (OUTPATIENT)
Dept: FAMILY MEDICINE | Facility: CLINIC | Age: 45
End: 2020-06-01
Payer: COMMERCIAL

## 2020-06-01 DIAGNOSIS — H10.13 ALLERGIC CONJUNCTIVITIS, BILATERAL: Primary | ICD-10-CM

## 2020-06-01 DIAGNOSIS — M53.3 DISORDER OF SI (SACROILIAC) JOINT: ICD-10-CM

## 2020-06-01 DIAGNOSIS — M67.88 RIGHT PERONEAL TENDINOSIS: ICD-10-CM

## 2020-06-01 DIAGNOSIS — G47.00 INSOMNIA, UNSPECIFIED TYPE: ICD-10-CM

## 2020-06-01 DIAGNOSIS — G44.219 EPISODIC TENSION-TYPE HEADACHE, NOT INTRACTABLE: ICD-10-CM

## 2020-06-01 DIAGNOSIS — M54.2 CERVICALGIA: ICD-10-CM

## 2020-06-01 DIAGNOSIS — M47.819 SPONDYLOARTHROPATHY: ICD-10-CM

## 2020-06-01 DIAGNOSIS — M77.8 RIGHT SHOULDER TENDONITIS: ICD-10-CM

## 2020-06-01 PROCEDURE — 99214 OFFICE O/P EST MOD 30 MIN: CPT | Mod: 95 | Performed by: FAMILY MEDICINE

## 2020-06-01 RX ORDER — TRAMADOL HYDROCHLORIDE 50 MG/1
50 TABLET ORAL EVERY 6 HOURS PRN
Qty: 60 TABLET | Refills: 0 | Status: SHIPPED | OUTPATIENT
Start: 2020-06-01 | End: 2020-08-19

## 2020-06-01 RX ORDER — OLOPATADINE HYDROCHLORIDE 1 MG/ML
1 SOLUTION/ DROPS OPHTHALMIC 2 TIMES DAILY
Qty: 5 ML | Refills: 1 | Status: SHIPPED | OUTPATIENT
Start: 2020-06-01 | End: 2020-10-05

## 2020-06-01 RX ORDER — GABAPENTIN 300 MG/1
CAPSULE ORAL
Qty: 120 CAPSULE | Refills: 6 | Status: SHIPPED | OUTPATIENT
Start: 2020-06-01 | End: 2020-08-19

## 2020-06-01 RX ORDER — TRAZODONE HYDROCHLORIDE 50 MG/1
50 TABLET, FILM COATED ORAL
Qty: 60 TABLET | Refills: 6 | Status: SHIPPED | OUTPATIENT
Start: 2020-06-01 | End: 2020-10-26

## 2020-06-01 RX ORDER — DULOXETIN HYDROCHLORIDE 30 MG/1
60 CAPSULE, DELAYED RELEASE ORAL DAILY
Qty: 180 CAPSULE | Refills: 3 | Status: SHIPPED | OUTPATIENT
Start: 2020-06-01 | End: 2020-10-05

## 2020-06-01 RX ORDER — SULFASALAZINE 500 MG/1
TABLET, DELAYED RELEASE ORAL
Qty: 120 TABLET | Refills: 3 | Status: SHIPPED | OUTPATIENT
Start: 2020-06-01 | End: 2020-10-26

## 2020-06-01 RX ORDER — NAPROXEN 500 MG/1
500 TABLET ORAL 2 TIMES DAILY WITH MEALS
Qty: 60 TABLET | Refills: 6 | Status: SHIPPED | OUTPATIENT
Start: 2020-06-01 | End: 2020-08-19

## 2020-06-01 NOTE — LETTER
[facility]  [address] [place], [address]  [phone]        June 1, 2020    RE: Zo Qureshi                                                                                                                                                       [id number] [address] [address]            To Whom It May Concern,   Zo Qureshi is a patient of mine.   She is on a number of medications.    She is doing fairly well but still having some issues with her joints.   The medications she is on for her tendonitis and pain due to work injury are as follows:  Tramadol  Trazodone   Duloxetine  Naproxen   Gabapentin  These should be covered and filled through her workers comp.           Sincerely,    Terri Robles M.D.

## 2020-06-01 NOTE — PROGRESS NOTES
Pre-Visit Planning     Future Appointments   Date Time Provider Department Center   6/1/2020  2:30 PM Terri Robles MD [facility] CR   7/6/2020  2:50 PM Terri Robles MD [facility] CR   8/19/2020 11:10 AM Terri Robles MD [facility] CR     Arrival Time for this Appointment:  2:30 PM   Appointment Notes for this encounter:   med ck [phone] only wants PV      Health Maintenance Due   Topic Date Due     PREVENTIVE CARE VISIT  02/21/2019     Patient preferred phone number: [phone]

## 2020-06-03 ENCOUNTER — TELEPHONE (OUTPATIENT)
Dept: FAMILY MEDICINE | Facility: CLINIC | Age: 45
End: 2020-06-03

## 2020-06-03 NOTE — TELEPHONE ENCOUNTER
Pt called and she was looking for the letter from todays visit with Dr Robles. Pt is active on my chart so she will get the letter from there to email to the appropriate party.  Advised pt is she needs further assistance to call back to this PAL RN's line [phone].  Sarita Villarreal RN

## 2020-06-03 NOTE — TELEPHONE ENCOUNTER
Faxed letter and let pt know that it was faxed.  Gave pt the fax number and her ID# if needed in the future.  Sarita Villarreal RN

## 2020-06-03 NOTE — TELEPHONE ENCOUNTER
Briana calling from [facility] stating that Sarita needs the fax number to send a form. Briana stated the fax number is [phone] and to reference the ID#[id number].    Kristy Toussaint,

## 2020-06-03 NOTE — TELEPHONE ENCOUNTER
Patient calling stating that someone called her yesterday and left a message. I couldn't find an encounter that someone called her. Patient stated she also had some questions to ask the nurse. Please call patient at [phone].    Kristy Toussaint,

## 2020-08-11 ENCOUNTER — OFFICE VISIT (OUTPATIENT)
Dept: URGENT CARE | Facility: URGENT CARE | Age: 45
End: 2020-08-11
Payer: COMMERCIAL

## 2020-08-11 VITALS
DIASTOLIC BLOOD PRESSURE: 66 MMHG | SYSTOLIC BLOOD PRESSURE: 113 MMHG | BODY MASS INDEX: 22.62 KG/M2 | HEART RATE: 71 BPM | WEIGHT: 112 LBS | OXYGEN SATURATION: 98 % | TEMPERATURE: 98.7 F

## 2020-08-11 DIAGNOSIS — N30.00 ACUTE CYSTITIS WITHOUT HEMATURIA: ICD-10-CM

## 2020-08-11 DIAGNOSIS — R30.0 DYSURIA: Primary | ICD-10-CM

## 2020-08-11 LAB
ALBUMIN UR-MCNC: NEGATIVE MG/DL
APPEARANCE UR: CLEAR
BACTERIA #/AREA URNS HPF: ABNORMAL /HPF
BILIRUB UR QL STRIP: NEGATIVE
COLOR UR AUTO: YELLOW
GLUCOSE UR STRIP-MCNC: NEGATIVE MG/DL
HGB UR QL STRIP: ABNORMAL
KETONES UR STRIP-MCNC: NEGATIVE MG/DL
LEUKOCYTE ESTERASE UR QL STRIP: ABNORMAL
NITRATE UR QL: NEGATIVE
NON-SQ EPI CELLS #/AREA URNS LPF: ABNORMAL /LPF
PH UR STRIP: 6 PH (ref 5–7)
RBC #/AREA URNS AUTO: ABNORMAL /HPF
SOURCE: ABNORMAL
SP GR UR STRIP: 1.01 (ref 1–1.03)
UROBILINOGEN UR STRIP-ACNC: 0.2 EU/DL (ref 0.2–1)
WBC #/AREA URNS AUTO: ABNORMAL /HPF

## 2020-08-11 PROCEDURE — 99213 OFFICE O/P EST LOW 20 MIN: CPT | Performed by: FAMILY MEDICINE

## 2020-08-11 PROCEDURE — 81001 URINALYSIS AUTO W/SCOPE: CPT | Performed by: FAMILY MEDICINE

## 2020-08-11 RX ORDER — NITROFURANTOIN 25; 75 MG/1; MG/1
100 CAPSULE ORAL 2 TIMES DAILY
Qty: 14 CAPSULE | Refills: 0 | Status: SHIPPED | OUTPATIENT
Start: 2020-08-11 | End: 2020-08-18

## 2020-08-11 NOTE — PROGRESS NOTES
SUBJECTIVE:  Zo Qureshi is a 45 year old female who  presents today for a possible UTI. Symptoms of suprapubic pain and pressure have been going on for 1day(s).  Hematuria no.  sudden onset and still presentand moderate.  There is no history of fever, chills, nausea or vomiting.  No history of unusual vaginal discharge. This patient does have a distant history of urinary tract infections. Patient denies long duration, rigors and flank pain.    Past Medical History:   Diagnosis Date     Anemia      History of blood transfusion 2000    after vaginal delivery, 2 units PRBCs given     HSIL on Pap smear 09/21/11    MARTA 2 and 3     INFLAM SPONDYLOPATHY NOS 01/07/2008    check labs on sulfasalzine every 3 months and check hep B and C and TB every 2 years     Leukocytopenia 3/10/2014     Tendinosis      Thrombocytopenia (H) 3/10/2014     Unspecified closed fracture of pelvis 2000    left fracture of pelvis after delivery       Social History     Tobacco Use     Smoking status: Never Smoker     Smokeless tobacco: Never Used   Substance Use Topics     Alcohol use: Not Currently     Alcohol/week: 0.0 standard drinks     Frequency: Never     Drinks per session: Patient refused     Binge frequency: Never     Comment: rarely     ROS:   As above     OBJECTIVE:  /66   Pulse 71   Temp 98.7  F (37.1  C) (Temporal)   Wt 50.8 kg (112 lb)   LMP 03/26/2019 (Within Days)   SpO2 98%   BMI 22.62 kg/m    GENERAL APPEARANCE: healthy, alert and no distress  RESP: lungs clear to auscultation - no rales, rhonchi or wheezes  CV: regular rate and rhythm, normal S1 S2, no murmur noted  BACK: No CVA tenderness  SKIN: no suspicious lesions or rashes    Results for orders placed or performed in visit on 08/11/20   UA reflex to Microscopic and Culture     Status: Abnormal    Specimen: Midstream Urine   Result Value Ref Range    Color Urine Yellow     Appearance Urine Clear     Glucose Urine Negative NEG^Negative mg/dL    Bilirubin Urine  Negative NEG^Negative    Ketones Urine Negative NEG^Negative mg/dL    Specific Gravity Urine 1.010 1.003 - 1.035    Blood Urine Large (A) NEG^Negative    pH Urine 6.0 5.0 - 7.0 pH    Protein Albumin Urine Negative NEG^Negative mg/dL    Urobilinogen Urine 0.2 0.2 - 1.0 EU/dL    Nitrite Urine Negative NEG^Negative    Leukocyte Esterase Urine Small (A) NEG^Negative    Source Midstream Urine    Urine Microscopic     Status: Abnormal   Result Value Ref Range    WBC Urine 5-10 (A) OTO5^0 - 5 /HPF    RBC Urine 5-10 (A) OTO2^O - 2 /HPF    Squamous Epithelial /LPF Urine Few FEW^Few /LPF    Bacteria Urine Few (A) NEG^Negative /HPF         ASSESSMENT:   Lower, uncomplicated urinary tract infection.    PLAN:  Nitrofurantoin 100 mg BID x 7 days per ordered above  Drink plenty of fluids.  Prevention and treatment of UTI's discussed.  Signs and symptoms of pyelonephritis mentioned.  Follow up if not improving after 3 days of antibiotics.

## 2020-08-11 NOTE — PATIENT INSTRUCTIONS
Nitrofurantoin twice a day for 7 days to fight the bladder infection.    Please drink plenty of water.    If you want relief faster, you should also try AZO (or Urostat or pyridium or phenazopyridine).

## 2020-08-17 NOTE — PROGRESS NOTES
Pre-Visit Planning     Future Appointments   Date Time Provider Department Center   8/19/2020 11:10 AM Terri Robles MD [facility] CR   9/2/2020 10:30 AM Terri Robles MD [facility] CR     Arrival Time for this Appointment: 10:50 AM   Appointment Notes for this encounter:   6 month followup f/u WC confirmed     Questionnaires Reviewed/Assigned  No additional questionnaires are needed  Last OV with provider  06/01/2020 JAIMIE conjunctivitis      Hospital ER Visits    Specialty Visits  [facility] 08/11/2020 acute cystitis  Imaging and Lab Review  NONE    Recent Procedures  NONE  Health Maintenance Due   Topic Date Due     PREVENTIVE CARE VISIT  02/21/2019     URINE DRUG SCREEN  07/08/2020     ANNUAL REVIEW OF HM ORDERS  07/08/2020     CMP  08/26/2020     CBC  09/07/2020     Current Outpatient Medications   Medication     benzonatate (TESSALON) 100 MG capsule     clindamycin-benzoyl peroxide (BENZACLIN) gel     diclofenac 1 % TD topical gel     docusate sodium (COLACE) 100 MG tablet     DULoxetine (CYMBALTA) 30 MG capsule     ferrous sulfate (FEROSUL) 325 (65 Fe) MG tablet     fluticasone (FLONASE) 50 MCG/ACT nasal spray     gabapentin (NEURONTIN) 300 MG capsule     hydrocortisone (WESTCORT) 0.2 % external cream     ketotifen (ZADITOR/REFRESH ANTI-ITCH) 0.025 % ophthalmic solution     loratadine (CLARITIN) 10 MG tablet     naproxen (NAPROSYN) 500 MG tablet     nitroFURantoin macrocrystal-monohydrate (MACROBID) 100 MG capsule     olopatadine (PATANOL) 0.1 % ophthalmic solution     ondansetron (ZOFRAN-ODT) 4 MG ODT tab     sulfaSALAzine ER (AZULFIDINE EN) 500 MG EC tablet     traMADol (ULTRAM) 50 MG tablet     traZODone (DESYREL) 50 MG tablet     No current facility-administered medications for this visit.    spoke with pt via tele.  Pt states no changes to medications no refills due.  My chart active?  On mychart and not active.    Patient preferred phone number: [phone]      Sarita Villarreal RN

## 2020-08-19 ENCOUNTER — TELEPHONE (OUTPATIENT)
Dept: FAMILY MEDICINE | Facility: CLINIC | Age: 45
End: 2020-08-19

## 2020-08-19 ENCOUNTER — OFFICE VISIT (OUTPATIENT)
Dept: FAMILY MEDICINE | Facility: CLINIC | Age: 45
End: 2020-08-19
Payer: COMMERCIAL

## 2020-08-19 VITALS
TEMPERATURE: 98.2 F | HEART RATE: 61 BPM | DIASTOLIC BLOOD PRESSURE: 76 MMHG | SYSTOLIC BLOOD PRESSURE: 112 MMHG | OXYGEN SATURATION: 100 % | WEIGHT: 113.2 LBS | BODY MASS INDEX: 22.86 KG/M2 | RESPIRATION RATE: 16 BRPM

## 2020-08-19 DIAGNOSIS — G89.4 CHRONIC PAIN SYNDROME: Primary | ICD-10-CM

## 2020-08-19 DIAGNOSIS — M46.98 INFLAMMATORY SPONDYLOPATHY OF SACRAL REGION (H): ICD-10-CM

## 2020-08-19 DIAGNOSIS — M54.2 CERVICALGIA: ICD-10-CM

## 2020-08-19 DIAGNOSIS — G44.219 EPISODIC TENSION-TYPE HEADACHE, NOT INTRACTABLE: ICD-10-CM

## 2020-08-19 DIAGNOSIS — E78.00 HYPERCHOLESTEROLEMIA: ICD-10-CM

## 2020-08-19 DIAGNOSIS — M77.8 RIGHT SHOULDER TENDONITIS: ICD-10-CM

## 2020-08-19 DIAGNOSIS — M53.3 DISORDER OF SI (SACROILIAC) JOINT: ICD-10-CM

## 2020-08-19 DIAGNOSIS — M67.88 RIGHT PERONEAL TENDINOSIS: ICD-10-CM

## 2020-08-19 LAB
ERYTHROCYTE [DISTWIDTH] IN BLOOD BY AUTOMATED COUNT: 12 % (ref 10–15)
ERYTHROCYTE [SEDIMENTATION RATE] IN BLOOD BY WESTERGREN METHOD: 22 MM/H (ref 0–20)
HCT VFR BLD AUTO: 38.4 % (ref 35–47)
HGB BLD-MCNC: 12.7 G/DL (ref 11.7–15.7)
MCH RBC QN AUTO: 30.7 PG (ref 26.5–33)
MCHC RBC AUTO-ENTMCNC: 33.1 G/DL (ref 31.5–36.5)
MCV RBC AUTO: 93 FL (ref 78–100)
PLATELET # BLD AUTO: 218 10E9/L (ref 150–450)
RBC # BLD AUTO: 4.14 10E12/L (ref 3.8–5.2)
WBC # BLD AUTO: 5.3 10E9/L (ref 4–11)

## 2020-08-19 PROCEDURE — 99214 OFFICE O/P EST MOD 30 MIN: CPT | Performed by: FAMILY MEDICINE

## 2020-08-19 PROCEDURE — 80053 COMPREHEN METABOLIC PANEL: CPT | Performed by: FAMILY MEDICINE

## 2020-08-19 PROCEDURE — 99000 SPECIMEN HANDLING OFFICE-LAB: CPT | Performed by: FAMILY MEDICINE

## 2020-08-19 PROCEDURE — 80061 LIPID PANEL: CPT | Performed by: FAMILY MEDICINE

## 2020-08-19 PROCEDURE — 85652 RBC SED RATE AUTOMATED: CPT | Performed by: FAMILY MEDICINE

## 2020-08-19 PROCEDURE — 80307 DRUG TEST PRSMV CHEM ANLYZR: CPT | Mod: 90 | Performed by: FAMILY MEDICINE

## 2020-08-19 PROCEDURE — 85027 COMPLETE CBC AUTOMATED: CPT | Performed by: FAMILY MEDICINE

## 2020-08-19 PROCEDURE — 36415 COLL VENOUS BLD VENIPUNCTURE: CPT | Performed by: FAMILY MEDICINE

## 2020-08-19 RX ORDER — TRAMADOL HYDROCHLORIDE 50 MG/1
50 TABLET ORAL EVERY 6 HOURS PRN
Qty: 60 TABLET | Refills: 0 | Status: SHIPPED | OUTPATIENT
Start: 2020-08-19 | End: 2020-10-26

## 2020-08-19 RX ORDER — NAPROXEN 500 MG/1
500 TABLET ORAL 2 TIMES DAILY WITH MEALS
Qty: 60 TABLET | Refills: 6 | Status: SHIPPED | OUTPATIENT
Start: 2020-08-19 | End: 2021-05-18

## 2020-08-19 RX ORDER — GABAPENTIN 300 MG/1
CAPSULE ORAL
Qty: 120 CAPSULE | Refills: 6 | Status: SHIPPED | OUTPATIENT
Start: 2020-08-19 | End: 2021-05-18

## 2020-08-19 ASSESSMENT — PATIENT HEALTH QUESTIONNAIRE - PHQ9
SUM OF ALL RESPONSES TO PHQ QUESTIONS 1-9: 15
SUM OF ALL RESPONSES TO PHQ QUESTIONS 1-9: 15
10. IF YOU CHECKED OFF ANY PROBLEMS, HOW DIFFICULT HAVE THESE PROBLEMS MADE IT FOR YOU TO DO YOUR WORK, TAKE CARE OF THINGS AT HOME, OR GET ALONG WITH OTHER PEOPLE: VERY DIFFICULT

## 2020-08-19 NOTE — TELEPHONE ENCOUNTER
Sarita NAVARRO is out of the office today -     This RN PAL sent a message to Sarita NAVARRO to follow up on this in one week per pcp request     Flor Maddox, Registered Nurse, PAL (Patient Advocate Liason)   [facility]

## 2020-08-19 NOTE — PROGRESS NOTES
"Subjective     Zo Qureshi is a 45 year old female who presents to clinic today for the following health issues:    She is not doing very well with regard to pain.    Her headaches are back with more frequency.   She is also having more pain in the neck and shoulders.   She feels this may be due to not receiving her tramadol and gabapentin and naproxen from her  pharmacy.   She has refills but they will not send the medication.   It was working pretty good before.       She was recently tx for UTI and her symptoms are better now.       History of Present Illness        Migraines:   Since the patient's last clinic visit, headaches are: worsened  The patient is getting headaches:  1 to 2 per day  She is not able to do normal daily activities when she has a migraine.  The patient is taking the following rescue/relief medications:  Tylenol   Patient states \"I get only a small amount of relief\" from the rescue/relief medications.   The patient is taking the following medications to prevent migraines:  No medications to prevent migraines  In the past 4 weeks, the patient has gone to an Urgent Care or Emergency Room 1 time times due to headaches.    She eats 2-3 servings of fruits and vegetables daily.She consumes 0 sweetened beverage(s) daily.She exercises with enough effort to increase her heart rate 9 or less minutes per day.  She exercises with enough effort to increase her heart rate 3 or less days per week.          Chronic/Recurring Back Pain Follow Up      Where is your back pain located? (Select all that apply) low back bilateral, middle of back bilateral and neck bilateral    How would you describe your back pain?  stabbing    Where does your back pain spread? the right and left buttock    Since your last clinic visit for back pain, how has your pain changed? always present, but gets better and worse    Does your back pain interfere with your job? Not applicable    Since your last visit, have you tried any new " treatment? No      ED/UC Followup:    Facility:  [facility]   Date of visit: 08/11/2020  Reason for visit: UTI  Current Status: Patient states that she finished the mediation on Monday.     Past Medical History:   Diagnosis Date     Anemia      History of blood transfusion 2000    after vaginal delivery, 2 units PRBCs given     HSIL on Pap smear 09/21/11    MARTA 2 and 3     INFLAM SPONDYLOPATHY NOS 01/07/2008    check labs on sulfasalzine every 3 months and check hep B and C and TB every 2 years     Leukocytopenia 3/10/2014     Tendinosis      Thrombocytopenia (H) 3/10/2014     Unspecified closed fracture of pelvis 2000    left fracture of pelvis after delivery       Past Surgical History:   Procedure Laterality Date     C NONSPECIFIC PROCEDURE  10/00    after delivery 2 units of prbcs secondary to placenta     DILATION AND CURETTAGE, ABLATE ENDOMETRIUM NOVASURE, COMBINED N/A 12/4/2018    Procedure: novasure endometrial ablation, myosure resection of leiomyoma, dilation and curettage;  Surgeon: Rolando Covarrubias MD;  Location: [facility] OR     GYN SURGERY  12/04/2018    fibroid removal     LEEP TX, CERVICAL  12/19/11    MARTA II     OPERATIVE HYSTEROSCOPY WITH MORCELLATOR N/A 12/4/2018    Procedure: OPERATIVE HYSTEROSCOPY WITH MORCELLATOR;  Surgeon: Rolando Covarrubias MD;  Location: [facility] OR     TUBAL LIGATION  5/2009    laparoscopic tubal ligation       MEDICATIONS:  Current Outpatient Medications   Medication     gabapentin (NEURONTIN) 300 MG capsule     naproxen (NAPROSYN) 500 MG tablet     traMADol (ULTRAM) 50 MG tablet     clindamycin-benzoyl peroxide (BENZACLIN) gel     diclofenac 1 % TD topical gel     docusate sodium (COLACE) 100 MG tablet     DULoxetine (CYMBALTA) 30 MG capsule     ferrous sulfate (FEROSUL) 325 (65 Fe) MG tablet     fluticasone (FLONASE) 50 MCG/ACT nasal spray     hydrocortisone (WESTCORT) 0.2 % external cream     ketotifen (ZADITOR/REFRESH ANTI-ITCH) 0.025 % ophthalmic solution      loratadine (CLARITIN) 10 MG tablet     olopatadine (PATANOL) 0.1 % ophthalmic solution     ondansetron (ZOFRAN-ODT) 4 MG ODT tab     sulfaSALAzine ER (AZULFIDINE EN) 500 MG EC tablet     traZODone (DESYREL) 50 MG tablet     No current facility-administered medications for this visit.        SOCIAL HISTORY:  Social History     Tobacco Use     Smoking status: Never Smoker     Smokeless tobacco: Never Used   Substance Use Topics     Alcohol use: Not Currently     Alcohol/week: 0.0 standard drinks     Frequency: Never     Drinks per session: Patient refused     Binge frequency: Never     Comment: rarely       Family History   Problem Relation Age of Onset     Hypertension Father      Lipids Father      Hypertension Mother      Lipids Mother      Diabetes No family hx of      Coronary Artery Disease No family hx of      Hyperlipidemia No family hx of      Cerebrovascular Disease No family hx of      Breast Cancer No family hx of      Colon Cancer No family hx of      Prostate Cancer No family hx of      Other Cancer No family hx of      Depression No family hx of      Anxiety Disorder No family hx of      Mental Illness No family hx of      Substance Abuse No family hx of      Anesthesia Reaction No family hx of      Asthma No family hx of      Osteoporosis No family hx of      Genetic Disorder No family hx of      Thyroid Disease No family hx of      Obesity No family hx of      Unknown/Adopted No family hx of          Review of Systems   Constitutional, HEENT, cardiovascular, pulmonary, gi and gu systems are negative, except as otherwise noted.      Objective    /76 (BP Location: Right arm, Patient Position: Sitting, Cuff Size: Adult Small)   Pulse 61   Temp 98.2  F (36.8  C) (Oral)   Resp 16   Wt 51.3 kg (113 lb 3.2 oz)   LMP 03/26/2019 (Within Days)   SpO2 100%   Breastfeeding No   BMI 22.86 kg/m    Body mass index is 22.86 kg/m .  Physical Exam   GENERAL: alert, no distress and frail  EYES: Eyes  grossly normal to inspection, PERRL and conjunctivae and sclerae normal  HENT: ear canals and TM's normal, nose and mouth without ulcers or lesions  NECK: no adenopathy, no asymmetry, masses, or scars and thyroid normal to palpation  RESP: lungs clear to auscultation - no rales, rhonchi or wheezes  CV: regular rate and rhythm, normal S1 S2, no S3 or S4, no murmur, click or rub, no peripheral edema and peripheral pulses strong  MS: she continues to have sore spots especially in the lateral right neck and right anterior shoulder and tricep insertion as well as bicep  SKIN: no suspicious lesions or rashes  NEURO: Normal strength and tone, mentation intact and speech normal  PSYCH: mentation appears normal, affect normal/bright  LYMPH: no cervical, supraclavicular, axillary, or inguinal adenopathy            Assessment:  1. Chronic pain - not under control  2. Spondylopathy of sacral region  3. Tendonitis - had been better but flaring  4. Migraines - worse again with no gabapentin for 2 weeks    Plan:  1. Refills per [facility]  2. See [facility] orders for labs  3. Recheck in 6 months        Answers for HPI/ROS submitted by the patient on 8/19/2020   Chronic problems general questions HPI Form  If you checked off any problems, how difficult have these problems made it for you to do your work, take care of things at home, or get along with other people?: Very difficult  PHQ9 TOTAL SCORE: 15

## 2020-08-19 NOTE — TELEPHONE ENCOUNTER
Patient in for OV today 08/19/20. Patient has not been receiving the following meds from  pharmacy: gabapentin, naproxen, tramadol, and trazodone. Patient did received duloxetine from same pharmacy. Letter was faxed to [employer] - see letter from 06/01/20.     RN called pharmacy to see whether there was an issue with the prescription. Pharmacy informed her claim is on hold and that the patient would need to contact their  to find out why and what may need to be done to fix.     Called and spoke with patient to relay this message - patient will call her employer for more information on her /claim and get back to us if there is anything she needs.     Gallo PRAJAPATI RN

## 2020-08-20 LAB
ALBUMIN SERPL-MCNC: 3.8 G/DL (ref 3.4–5)
ALP SERPL-CCNC: 63 U/L (ref 40–150)
ALT SERPL W P-5'-P-CCNC: 19 U/L (ref 0–50)
ANION GAP SERPL CALCULATED.3IONS-SCNC: 7 MMOL/L (ref 3–14)
AST SERPL W P-5'-P-CCNC: 15 U/L (ref 0–45)
BILIRUB SERPL-MCNC: 0.4 MG/DL (ref 0.2–1.3)
BUN SERPL-MCNC: 11 MG/DL (ref 7–30)
CALCIUM SERPL-MCNC: 8.9 MG/DL (ref 8.5–10.1)
CHLORIDE SERPL-SCNC: 104 MMOL/L (ref 94–109)
CHOLEST SERPL-MCNC: 242 MG/DL
CO2 SERPL-SCNC: 26 MMOL/L (ref 20–32)
CREAT SERPL-MCNC: 0.51 MG/DL (ref 0.52–1.04)
GFR SERPL CREATININE-BSD FRML MDRD: >90 ML/MIN/{1.73_M2}
GLUCOSE SERPL-MCNC: 86 MG/DL (ref 70–99)
HDLC SERPL-MCNC: 44 MG/DL
LDLC SERPL CALC-MCNC: 165 MG/DL
NONHDLC SERPL-MCNC: 198 MG/DL
POTASSIUM SERPL-SCNC: 4.4 MMOL/L (ref 3.4–5.3)
PROT SERPL-MCNC: 8.2 G/DL (ref 6.8–8.8)
SODIUM SERPL-SCNC: 137 MMOL/L (ref 133–144)
TRIGL SERPL-MCNC: 164 MG/DL

## 2020-08-20 ASSESSMENT — PATIENT HEALTH QUESTIONNAIRE - PHQ9: SUM OF ALL RESPONSES TO PHQ QUESTIONS 1-9: 15

## 2020-08-23 LAB — COMPREHEN DRUG ANALYSIS UR: NORMAL

## 2020-08-27 ENCOUNTER — TELEPHONE (OUTPATIENT)
Dept: FAMILY MEDICINE | Facility: CLINIC | Age: 45
End: 2020-08-27

## 2020-08-27 NOTE — TELEPHONE ENCOUNTER
----- Message from Flor Maddox RN sent at 8/19/2020  3:00 PM CDT -----  Regarding: please follow up per JAIMIE Stein,     Please follow up with patient in regards to her work comp medications     She had not been able to receive them from pharmacy due to an issue with WC claim     Patient was supposed to follow up with her WC CM to discuss     Dr. Baron wants to ensure she has gotten her medications     Thank you,   Flor Maddox, Registered Nurse, PAL (Patient Advocate Liason)   [facility]   [phone]

## 2020-09-03 ENCOUNTER — ALLIED HEALTH/NURSE VISIT (OUTPATIENT)
Dept: FAMILY MEDICINE | Facility: CLINIC | Age: 45
End: 2020-09-03
Payer: COMMERCIAL

## 2020-09-03 DIAGNOSIS — Z23 NEED FOR PROPHYLACTIC VACCINATION AND INOCULATION AGAINST INFLUENZA: Primary | ICD-10-CM

## 2020-09-03 PROCEDURE — 90686 IIV4 VACC NO PRSV 0.5 ML IM: CPT

## 2020-09-03 PROCEDURE — 90471 IMMUNIZATION ADMIN: CPT

## 2020-09-03 PROCEDURE — 99207 ZZC NO CHARGE NURSE ONLY: CPT

## 2020-10-01 RX ORDER — DULOXETIN HYDROCHLORIDE 30 MG/1
60 CAPSULE, DELAYED RELEASE ORAL DAILY
Qty: 180 CAPSULE | Refills: 3 | Status: CANCELLED | OUTPATIENT
Start: 2020-10-01

## 2020-10-01 NOTE — PROGRESS NOTES
Pre-Visit Planning     Future Appointments   Date Time Provider Department Center   10/5/2020  8:40 AM Terri Robles MD [facility]   10/26/2020 10:00 AM Terri Robles MD [facility]     Arrival Time for this Appointment:  8:30 AM   Appointment Notes for this encounter:   MAY GOWN UP hemorrhoids    Questionnaires Reviewed/Assigned  No additional questionnaires are needed    Last OV with provider  08/19/2020 JAIMIE Chronic pain  Assessment:  1. Chronic pain - not under control  2. Spondylopathy of sacral region  3. Tendonitis - had been better but flaring  4. Migraines - worse again with no gabapentin for 2 weeks  Plan:  1. Refills per [facility]  2. See [facility] orders for labs  3. Recheck in 6 months    Hospital ER Visits  none    Specialty Visits  none    Imaging and Lab Review  none    Recent Procedures  none    Health Maintenance Due   Topic Date Due     PREVENTIVE CARE VISIT  02/21/2019     HPV TEST  02/21/2021     PAP  02/21/2021     Current Outpatient Medications   Medication     clindamycin-benzoyl peroxide (BENZACLIN) gel     diclofenac 1 % TD topical gel     docusate sodium (COLACE) 100 MG tablet     DULoxetine (CYMBALTA) 30 MG capsule     ferrous sulfate (FEROSUL) 325 (65 Fe) MG tablet     fluticasone (FLONASE) 50 MCG/ACT nasal spray     gabapentin (NEURONTIN) 300 MG capsule     hydrocortisone (WESTCORT) 0.2 % external cream     ketotifen (ZADITOR/REFRESH ANTI-ITCH) 0.025 % ophthalmic solution     loratadine (CLARITIN) 10 MG tablet     naproxen (NAPROSYN) 500 MG tablet     olopatadine (PATANOL) 0.1 % ophthalmic solution     ondansetron (ZOFRAN-ODT) 4 MG ODT tab     sulfaSALAzine ER (AZULFIDINE EN) 500 MG EC tablet     traMADol (ULTRAM) 50 MG tablet     traZODone (DESYREL) 50 MG tablet     No current facility-administered medications for this visit.        Refills due?  t'd up    Patient is active on MyChart. Does not read her msgs.    Patient preferred phone number: [phone]  Spoke with pt no new problems for the  visit  Sarita Villarreal RN

## 2020-10-05 ENCOUNTER — OFFICE VISIT (OUTPATIENT)
Dept: FAMILY MEDICINE | Facility: CLINIC | Age: 45
End: 2020-10-05
Payer: COMMERCIAL

## 2020-10-05 VITALS
WEIGHT: 114 LBS | HEART RATE: 62 BPM | RESPIRATION RATE: 14 BRPM | HEIGHT: 59 IN | DIASTOLIC BLOOD PRESSURE: 80 MMHG | BODY MASS INDEX: 22.98 KG/M2 | TEMPERATURE: 98.6 F | SYSTOLIC BLOOD PRESSURE: 123 MMHG

## 2020-10-05 DIAGNOSIS — H10.13 ALLERGIC CONJUNCTIVITIS, BILATERAL: ICD-10-CM

## 2020-10-05 DIAGNOSIS — Z12.4 SCREENING FOR MALIGNANT NEOPLASM OF CERVIX: Primary | ICD-10-CM

## 2020-10-05 DIAGNOSIS — M67.88 RIGHT PERONEAL TENDINOSIS: ICD-10-CM

## 2020-10-05 DIAGNOSIS — M54.2 CERVICALGIA: ICD-10-CM

## 2020-10-05 DIAGNOSIS — K64.8 INTERNAL HEMORRHOIDS: ICD-10-CM

## 2020-10-05 DIAGNOSIS — M77.8 RIGHT SHOULDER TENDONITIS: ICD-10-CM

## 2020-10-05 PROCEDURE — 99213 OFFICE O/P EST LOW 20 MIN: CPT | Performed by: FAMILY MEDICINE

## 2020-10-05 RX ORDER — OLOPATADINE HYDROCHLORIDE 1 MG/ML
1 SOLUTION/ DROPS OPHTHALMIC 2 TIMES DAILY
Qty: 5 ML | Refills: 1 | Status: SHIPPED | OUTPATIENT
Start: 2020-10-05 | End: 2020-10-26

## 2020-10-05 RX ORDER — DULOXETIN HYDROCHLORIDE 30 MG/1
60 CAPSULE, DELAYED RELEASE ORAL DAILY
Qty: 180 CAPSULE | Refills: 3 | Status: SHIPPED | OUTPATIENT
Start: 2020-10-05 | End: 2021-08-16

## 2020-10-05 ASSESSMENT — MIFFLIN-ST. JEOR: SCORE: 1072.73

## 2020-10-05 NOTE — PROGRESS NOTES
Subjective     Zo Qureshi is a 45 year old female who presents to clinic today for the following health issues:    FOR THE LAST 2 weeks she has something coming out of her anus which is sore and worse if standing for too long.   It feels better when she lays down.   She had this before and used cream and laid down and it helped.  She is using some cream which is over the counter.   It does not itch and it does not bleed.   It is sore though.       HPI       Future Appointments   Date Time Provider Department Center   10/26/2020 10:00 AM Terri Robles MD [facility]     Appointment Notes for this encounter:   MAY GOWN UP hemorrhoids    Health Maintenance Due   Topic Date Due     PREVENTIVE CARE VISIT  02/21/2019     HPV TEST  02/21/2021     PAP  02/21/2021     Health Maintenance addressed:  Pap Smear    Pap Smear Pt agrees to schedule appointment today or future appointment scheduled    MyChart Status:  Active and Using       Hemorrhoids  Onset/Duration: 2 WEEKS   Description:   Joyce-anal lump: not applicable  Pain: YES  Itching: no  Accompanying Signs & Symptoms:  Blood in stool: no  Changes in stool pattern: YES  History:   Any previous GI studies done:none  Family History of colon cancer: no  Precipitating factors:   None  Alleviating factors:  None  Therapies tried and outcome: preparation H      Past Medical History:   Diagnosis Date     Anemia      History of blood transfusion 2000    after vaginal delivery, 2 units PRBCs given     HSIL on Pap smear 09/21/11    MARTA 2 and 3     INFLAM SPONDYLOPATHY NOS 01/07/2008    check labs on sulfasalzine every 3 months and check hep B and C and TB every 2 years     Leukocytopenia 3/10/2014     Tendinosis      Thrombocytopenia (H) 3/10/2014     Unspecified closed fracture of pelvis 2000    left fracture of pelvis after delivery       Past Surgical History:   Procedure Laterality Date     DILATION AND CURETTAGE, ABLATE ENDOMETRIUM NOVASURE, COMBINED N/A 12/4/2018     Procedure: novasure endometrial ablation, myosure resection of leiomyoma, dilation and curettage;  Surgeon: Rolando Covarrubias MD;  Location: [facility] OR     GYN SURGERY  12/04/2018    fibroid removal     LEEP TX, CERVICAL  12/19/11    MARTA II     OPERATIVE HYSTEROSCOPY WITH MORCELLATOR N/A 12/4/2018    Procedure: OPERATIVE HYSTEROSCOPY WITH MORCELLATOR;  Surgeon: Rolando Covarrubias MD;  Location: [facility] OR     TUBAL LIGATION  5/2009    laparoscopic tubal ligation     ZZC NONSPECIFIC PROCEDURE  10/00    after delivery 2 units of prbcs secondary to placenta       MEDICATIONS:  Current Outpatient Medications   Medication     diclofenac 1 % TD topical gel     docusate sodium (COLACE) 100 MG tablet     DULoxetine (CYMBALTA) 30 MG capsule     ferrous sulfate (FEROSUL) 325 (65 Fe) MG tablet     fluticasone (FLONASE) 50 MCG/ACT nasal spray     gabapentin (NEURONTIN) 300 MG capsule     hydrocortisone-pramoxine (PROCTOFOAM-HC) rectal foam     ketotifen (ZADITOR) 0.025 % ophthalmic solution     loratadine (CLARITIN) 10 MG tablet     naproxen (NAPROSYN) 500 MG tablet     olopatadine (PATANOL) 0.1 % ophthalmic solution     traMADol (ULTRAM) 50 MG tablet     traZODone (DESYREL) 50 MG tablet     clindamycin-benzoyl peroxide (BENZACLIN) gel     hydrocortisone (WESTCORT) 0.2 % external cream     ondansetron (ZOFRAN-ODT) 4 MG ODT tab     sulfaSALAzine ER (AZULFIDINE EN) 500 MG EC tablet     No current facility-administered medications for this visit.        SOCIAL HISTORY:  Social History     Tobacco Use     Smoking status: Never Smoker     Smokeless tobacco: Never Used   Substance Use Topics     Alcohol use: Not Currently     Alcohol/week: 0.0 standard drinks     Frequency: Never     Drinks per session: Patient refused     Binge frequency: Never     Comment: rarely       Family History   Problem Relation Age of Onset     Hypertension Father      Lipids Father      Hypertension Mother      Lipids Mother      Diabetes No family hx of       "Coronary Artery Disease No family hx of      Hyperlipidemia No family hx of      Cerebrovascular Disease No family hx of      Breast Cancer No family hx of      Colon Cancer No family hx of      Prostate Cancer No family hx of      Other Cancer No family hx of      Depression No family hx of      Anxiety Disorder No family hx of      Mental Illness No family hx of      Substance Abuse No family hx of      Anesthesia Reaction No family hx of      Asthma No family hx of      Osteoporosis No family hx of      Genetic Disorder No family hx of      Thyroid Disease No family hx of      Obesity No family hx of      Unknown/Adopted No family hx of            Review of Systems   Constitutional, HEENT, cardiovascular, pulmonary, gi and gu systems are negative, except as otherwise noted.      Objective    /80 (BP Location: Right arm, Patient Position: Chair, Cuff Size: Adult Regular)   Pulse 62   Temp 98.6  F (37  C) (Tympanic)   Resp 14   Ht 1.499 m (4' 11\")   Wt 51.7 kg (114 lb)   LMP 03/26/2019 (Within Days)   Breastfeeding No   BMI 23.03 kg/m    Body mass index is 23.03 kg/m .  Physical Exam   GENERAL: healthy, alert and no distress  EYES: Eyes grossly normal to inspection, PERRL and conjunctivae and sclerae normal  NECK: no adenopathy, no asymmetry, masses, or scars and thyroid normal to palpation  RESP: lungs clear to auscultation - no rales, rhonchi or wheezes  CV: regular rate and rhythm, normal S1 S2, no S3 or S4, no murmur, click or rub, no peripheral edema and peripheral pulses strong  RECTAL: normal sphincter tone, no rectal masses and internal hemorrhoid noted and some external soft hemorrhoid but doubt that is the issue - small and not red and not tender  MS: no gross musculoskeletal defects noted, no edema  SKIN: no suspicious lesions or rashes  NEURO: Normal strength and tone, mentation intact and speech normal  PSYCH: mentation appears normal, affect normal/bright  LYMPH: no cervical, " supraclavicular, axillary, or inguinal adenopathy            Assessment & Plan     Zo was seen today for hemorrhoids.    Diagnoses and all orders for this visit:    Screening for malignant neoplasm of cervix    Allergic conjunctivitis, bilateral  -     ketotifen (ZADITOR) 0.025 % ophthalmic solution; Place 1 drop into both eyes 2 times daily  -     olopatadine (PATANOL) 0.1 % ophthalmic solution; Place 1 drop into both eyes 2 times daily    Cervicalgia  -     DULoxetine (CYMBALTA) 30 MG capsule; Take 2 capsules (60 mg) by mouth daily    Right shoulder tendonitis  -     DULoxetine (CYMBALTA) 30 MG capsule; Take 2 capsules (60 mg) by mouth daily    Right peroneal tendinosis  -     DULoxetine (CYMBALTA) 30 MG capsule; Take 2 capsules (60 mg) by mouth daily    Internal hemorrhoids  -     COLORECTAL SURGERY REFERRAL  -     hydrocortisone-pramoxine (PROCTOFOAM-HC) rectal foam; Place 1 Applicatorful rectally 3 times daily for 14 days            Has appt in 3.5 weeks for physical and pap     Return in about 4 weeks (around 11/2/2020) for Physical Exam already scheduled.    Terri Robles MD  [facility]

## 2020-10-22 NOTE — PROGRESS NOTES
Pre-Visit Planning     Future Appointments   Date Time Provider Department Center   10/26/2020 10:00 AM Terri Robles MD [facility]     Arrival Time for this Appointment:  9:35 AM   Appointment Notes for this encounter:   MAY  GOWN UP AND PAP Physical   Spoke to pt who states she has her surg appt next month.  No changes in medications.  Nothing to add to appt notes.    Questionnaires Reviewed/Assigned  No additional questionnaires are needed    Last OV with provider  10/05/2020  Zo was seen today for hemorrhoids.  Diagnoses and all orders for this visit:  Screening for malignant neoplasm of cervix  Allergic conjunctivitis, bilateral  -     ketotifen (ZADITOR) 0.025 % ophthalmic solution; Place 1 drop into both eyes 2 times daily  -     olopatadine (PATANOL) 0.1 % ophthalmic solution; Place 1 drop into both eyes 2 times daily  Cervicalgia  -     DULoxetine (CYMBALTA) 30 MG capsule; Take 2 capsules (60 mg) by mouth daily  Right shoulder tendonitis  -     DULoxetine (CYMBALTA) 30 MG capsule; Take 2 capsules (60 mg) by mouth daily  Right peroneal tendinosis  -     DULoxetine (CYMBALTA) 30 MG capsule; Take 2 capsules (60 mg) by mouth daily  Internal hemorrhoids  -     COLORECTAL SURGERY REFERRAL  -     hydrocortisone-pramoxine (PROCTOFOAM-HC) rectal foam; Place 1 Applicatorful rectally 3 times daily for 14 days     Hospital ER Visits  none    Specialty Visits  none  Imaging and Lab Review  none    Recent Procedures  none    Health Maintenance Due   Topic Date Due     PREVENTIVE CARE VISIT  02/21/2019     HPV TEST  02/21/2021     PAP  02/21/2021     Current Outpatient Medications   Medication     clindamycin-benzoyl peroxide (BENZACLIN) gel     diclofenac 1 % TD topical gel     docusate sodium (COLACE) 100 MG tablet     DULoxetine (CYMBALTA) 30 MG capsule     ferrous sulfate (FEROSUL) 325 (65 Fe) MG tablet     fluticasone (FLONASE) 50 MCG/ACT nasal spray     gabapentin (NEURONTIN) 300 MG capsule     hydrocortisone  (WESTCORT) 0.2 % external cream     ketotifen (ZADITOR) 0.025 % ophthalmic solution     loratadine (CLARITIN) 10 MG tablet     naproxen (NAPROSYN) 500 MG tablet     olopatadine (PATANOL) 0.1 % ophthalmic solution     ondansetron (ZOFRAN-ODT) 4 MG ODT tab     sulfaSALAzine ER (AZULFIDINE EN) 500 MG EC tablet     traMADol (ULTRAM) 50 MG tablet     traZODone (DESYREL) 50 MG tablet     No current facility-administered medications for this visit.        Refills due? none    Patient is active on [employer].    Patient preferred phone number: [phone]  Sarita Villarreal RN  Spoke to pt 10/22/2020

## 2020-10-26 ENCOUNTER — OFFICE VISIT (OUTPATIENT)
Dept: FAMILY MEDICINE | Facility: CLINIC | Age: 45
End: 2020-10-26
Payer: COMMERCIAL

## 2020-10-26 VITALS
RESPIRATION RATE: 16 BRPM | BODY MASS INDEX: 22.42 KG/M2 | DIASTOLIC BLOOD PRESSURE: 78 MMHG | OXYGEN SATURATION: 96 % | TEMPERATURE: 98 F | SYSTOLIC BLOOD PRESSURE: 123 MMHG | WEIGHT: 114.2 LBS | HEART RATE: 72 BPM | HEIGHT: 60 IN

## 2020-10-26 DIAGNOSIS — Z00.00 ROUTINE GENERAL MEDICAL EXAMINATION AT A HEALTH CARE FACILITY: ICD-10-CM

## 2020-10-26 DIAGNOSIS — G47.00 INSOMNIA, UNSPECIFIED TYPE: ICD-10-CM

## 2020-10-26 DIAGNOSIS — M47.819 SPONDYLOARTHROPATHY: ICD-10-CM

## 2020-10-26 DIAGNOSIS — D50.9 IRON DEFICIENCY ANEMIA, UNSPECIFIED IRON DEFICIENCY ANEMIA TYPE: ICD-10-CM

## 2020-10-26 DIAGNOSIS — Z12.4 SCREENING FOR MALIGNANT NEOPLASM OF CERVIX: Primary | ICD-10-CM

## 2020-10-26 DIAGNOSIS — R10.13 ABDOMINAL PAIN, EPIGASTRIC: ICD-10-CM

## 2020-10-26 DIAGNOSIS — G44.219 EPISODIC TENSION-TYPE HEADACHE, NOT INTRACTABLE: ICD-10-CM

## 2020-10-26 DIAGNOSIS — M53.3 DISORDER OF SI (SACROILIAC) JOINT: ICD-10-CM

## 2020-10-26 DIAGNOSIS — R07.0 THROAT PAIN: ICD-10-CM

## 2020-10-26 DIAGNOSIS — L70.9 ACNE, UNSPECIFIED ACNE TYPE: ICD-10-CM

## 2020-10-26 PROCEDURE — 99396 PREV VISIT EST AGE 40-64: CPT | Performed by: FAMILY MEDICINE

## 2020-10-26 RX ORDER — TRAMADOL HYDROCHLORIDE 50 MG/1
50 TABLET ORAL EVERY 6 HOURS PRN
Qty: 60 TABLET | Refills: 0 | Status: SHIPPED | OUTPATIENT
Start: 2020-10-26 | End: 2021-08-16

## 2020-10-26 RX ORDER — CLINDAMYCIN AND BENZOYL PEROXIDE 10; 50 MG/G; MG/G
GEL TOPICAL
Qty: 50 G | Refills: 11 | Status: SHIPPED | OUTPATIENT
Start: 2020-10-26 | End: 2023-09-25

## 2020-10-26 RX ORDER — ONDANSETRON 4 MG/1
4-8 TABLET, ORALLY DISINTEGRATING ORAL EVERY 8 HOURS PRN
Qty: 12 TABLET | Refills: 1 | Status: SHIPPED | OUTPATIENT
Start: 2020-10-26 | End: 2021-05-07

## 2020-10-26 RX ORDER — TRAZODONE HYDROCHLORIDE 50 MG/1
50 TABLET, FILM COATED ORAL
Qty: 60 TABLET | Refills: 6 | Status: SHIPPED | OUTPATIENT
Start: 2020-10-26 | End: 2021-08-16

## 2020-10-26 RX ORDER — SULFASALAZINE 500 MG/1
TABLET, DELAYED RELEASE ORAL
Qty: 120 TABLET | Refills: 6 | Status: SHIPPED | OUTPATIENT
Start: 2020-10-26 | End: 2021-11-17

## 2020-10-26 RX ORDER — FERROUS SULFATE 325(65) MG
325 TABLET ORAL 2 TIMES DAILY
Qty: 60 TABLET | Refills: 11 | Status: SHIPPED | OUTPATIENT
Start: 2020-10-26 | End: 2021-08-16

## 2020-10-26 RX ORDER — FLUTICASONE PROPIONATE 50 MCG
1-2 SPRAY, SUSPENSION (ML) NASAL DAILY
Qty: 16 G | Refills: 3 | Status: SHIPPED | OUTPATIENT
Start: 2020-10-26 | End: 2021-11-17

## 2020-10-26 ASSESSMENT — ENCOUNTER SYMPTOMS
HEARTBURN: 0
JOINT SWELLING: 0
EYE PAIN: 0
ARTHRALGIAS: 1
WEAKNESS: 1
DIARRHEA: 0
CHILLS: 0
PARESTHESIAS: 0
ABDOMINAL PAIN: 0
DYSURIA: 0
HEMATURIA: 0
COUGH: 0
PALPITATIONS: 0
BREAST MASS: 0
CONSTIPATION: 1
HEADACHES: 1
NERVOUS/ANXIOUS: 0
SORE THROAT: 0
SHORTNESS OF BREATH: 0
FREQUENCY: 0
MYALGIAS: 1
DIZZINESS: 1
NAUSEA: 0
FEVER: 0
HEMATOCHEZIA: 0

## 2020-10-26 ASSESSMENT — MIFFLIN-ST. JEOR: SCORE: 1081.57

## 2020-10-26 NOTE — PROGRESS NOTES
SUBJECTIVE:   CC: Zo Qureshi is an 45 year old woman who presents for preventive health visit.     She is here for physical.   She does not need pap.   She noticed abdominal pain or cramping more like it  Last night and it is still there with some  Nausea.   No vomiting or diarrhea.   She does not really have pain or radiation.   She does need some refills.   She has not seen rheum for 1.5  Years and does not know when she should be seen again.   She has been on sulfasalazine for almost 2 years for sacroiliitis.   It is helping.         Patient has been advised of split billing requirements and indicates understanding: Yes  Healthy Habits:     Getting at least 3 servings of Calcium per day:  Yes    Bi-annual eye exam:  NO    Dental care twice a year:  NO    Sleep apnea or symptoms of sleep apnea:  None    Diet:  Regular (no restrictions)    Frequency of exercise:  None    Taking medications regularly:  Yes    Medication side effects:  Other    PHQ-2 Total Score: 2    Additional concerns today:  No          Patient states that she has been having cramping and just don't feel normal. Patient states that she can't eat. Also would like you to check to make sure her hysterectomy from a year that everything looks good.      Today's PHQ-2 Score:   PHQ-2 ( 1999 Pf[employer]) 10/26/2020   Q1: Little interest or pleasure in doing things 1   Q2: Feeling down, depressed or hopeless 1   PHQ-2 Score 2   Q1: Little interest or pleasure in doing things Several days   Q2: Feeling down, depressed or hopeless Several days   PHQ-2 Score 2       Abuse: Current or Past (Physical, Sexual or Emotional) - No  Do you feel safe in your environment? Yes        Social History     Tobacco Use     Smoking status: Never Smoker     Smokeless tobacco: Never Used   Substance Use Topics     Alcohol use: Not Currently     Alcohol/week: 0.0 standard drinks     Frequency: Never     Drinks per session: Patient refused     Binge frequency: Never      Comment: rarely         Alcohol Use 10/26/2020   Prescreen: >3 drinks/day or >7 drinks/week? No   Prescreen: >3 drinks/day or >7 drinks/week? -       Reviewed orders with patient.  Reviewed health maintenance and updated orders accordingly - Yes  Labs reviewed in [facility]    Mammogram Screening: Patient under age 50, mutual decision reflected in health maintenance.      Pertinent mammograms are reviewed under the imaging tab.  History of abnormal Pap smear: a long time ago but had hysterectomy 2 years ago - ovaries are still in    PAP / HPV Latest Ref Rng & Units 2/21/2018 9/21/2015 5/21/2014   PAP - NIL NIL NIL   HPV 16 DNA NEG:Negative Negative Negative -   HPV 18 DNA NEG:Negative Negative Negative -   OTHER HR HPV NEG:Negative Negative Negative -     Past Medical History:   Diagnosis Date     Anemia      History of blood transfusion 2000    after vaginal delivery, 2 units PRBCs given     HSIL on Pap smear 09/21/11    MARTA 2 and 3     INFLAM SPONDYLOPATHY NOS 01/07/2008    check labs on sulfasalzine every 3 months and check hep B and C and TB every 2 years     Leukocytopenia 3/10/2014     Tendinosis      Thrombocytopenia (H) 3/10/2014     Unspecified closed fracture of pelvis 2000    left fracture of pelvis after delivery       Past Surgical History:   Procedure Laterality Date     DILATION AND CURETTAGE, ABLATE ENDOMETRIUM NOVASURE, COMBINED N/A 12/4/2018    Procedure: novasure endometrial ablation, myosure resection of leiomyoma, dilation and curettage;  Surgeon: Rolando Covarrubias MD;  Location: [facility] OR     GYN SURGERY  12/04/2018    fibroid removal     LEEP TX, CERVICAL  12/19/11    MARTA II     OPERATIVE HYSTEROSCOPY WITH MORCELLATOR N/A 12/4/2018    Procedure: OPERATIVE HYSTEROSCOPY WITH MORCELLATOR;  Surgeon: Rolando Covarrubias MD;  Location: [facility] OR     TUBAL LIGATION  5/2009    laparoscopic tubal ligation     ZZC NONSPECIFIC PROCEDURE  10/00    after delivery 2 units of prbcs secondary to placenta        MEDICATIONS:  Current Outpatient Medications   Medication     clindamycin-benzoyl peroxide (BENZACLIN) 1-5 % external gel     ferrous sulfate (FEROSUL) 325 (65 Fe) MG tablet     fluticasone (FLONASE) 50 MCG/ACT nasal spray     ondansetron (ZOFRAN-ODT) 4 MG ODT tab     sulfaSALAzine ER (AZULFIDINE EN) 500 MG EC tablet     traMADol (ULTRAM) 50 MG tablet     traZODone (DESYREL) 50 MG tablet     diclofenac 1 % TD topical gel     docusate sodium (COLACE) 100 MG tablet     DULoxetine (CYMBALTA) 30 MG capsule     gabapentin (NEURONTIN) 300 MG capsule     hydrocortisone (WESTCORT) 0.2 % external cream     ketotifen (ZADITOR) 0.025 % ophthalmic solution     loratadine (CLARITIN) 10 MG tablet     naproxen (NAPROSYN) 500 MG tablet     No current facility-administered medications for this visit.        SOCIAL HISTORY:  Social History     Tobacco Use     Smoking status: Never Smoker     Smokeless tobacco: Never Used   Substance Use Topics     Alcohol use: Not Currently     Alcohol/week: 0.0 standard drinks     Frequency: Never     Drinks per session: Patient refused     Binge frequency: Never     Comment: rarely       Family History   Problem Relation Age of Onset     Hypertension Father      Lipids Father      Hypertension Mother      Lipids Mother      Diabetes No family hx of      Coronary Artery Disease No family hx of      Hyperlipidemia No family hx of      Cerebrovascular Disease No family hx of      Breast Cancer No family hx of      Colon Cancer No family hx of      Prostate Cancer No family hx of      Other Cancer No family hx of      Depression No family hx of      Anxiety Disorder No family hx of      Mental Illness No family hx of      Substance Abuse No family hx of      Anesthesia Reaction No family hx of      Asthma No family hx of      Osteoporosis No family hx of      Genetic Disorder No family hx of      Thyroid Disease No family hx of      Obesity No family hx of      Unknown/Adopted No family hx of   "      Reviewed and updated as needed this visit by Provider                    Review of Systems   Constitutional: Negative for chills and fever.   HENT: Negative for congestion, ear pain, hearing loss and sore throat.    Eyes: Negative for pain and visual disturbance.   Respiratory: Negative for cough and shortness of breath.    Cardiovascular: Negative for chest pain, palpitations and peripheral edema.   Gastrointestinal: Positive for constipation. Negative for abdominal pain, diarrhea, heartburn, hematochezia and nausea.   Breasts:  Negative for tenderness, breast mass and discharge.   Genitourinary: Negative for dysuria, frequency, genital sores, hematuria, pelvic pain, urgency, vaginal bleeding and vaginal discharge.   Musculoskeletal: Positive for arthralgias and myalgias. Negative for joint swelling.   Skin: Negative for rash.   Neurological: Positive for dizziness, weakness and headaches. Negative for paresthesias.   Psychiatric/Behavioral: Positive for mood changes. The patient is not nervous/anxious.           OBJECTIVE:   /78 (BP Location: Right arm, Patient Position: Sitting, Cuff Size: Adult Regular)   Pulse 72   Temp 98  F (36.7  C) (Tympanic)   Resp 16   Ht 1.511 m (4' 11.5\")   Wt 51.8 kg (114 lb 3.2 oz)   LMP 03/26/2019 (Within Days)   SpO2 96%   Breastfeeding No   BMI 22.68 kg/m    Physical Exam  GENERAL: healthy, alert and no distress  EYES: Eyes grossly normal to inspection, PERRL and conjunctivae and sclerae normal  HENT: ear canals and TM's normal, nose and mouth without ulcers or lesions  NECK: no adenopathy, no asymmetry, masses, or scars and thyroid normal to palpation  RESP: lungs clear to auscultation - no rales, rhonchi or wheezes  BREAST: normal without masses, tenderness or nipple discharge and no palpable axillary masses or adenopathy  CV: regular rate and rhythm, normal S1 S2, no S3 or S4, no murmur, click or rub, no peripheral edema and peripheral pulses " strong  ABDOMEN: tenderness epigastric, RUQ and umbilical but no guarding or rebound - soft and bowel sounds normal   (female): normal female external genitalia, normal urethral meatus, vaginal mucosa pink, moist, well rugated, and normal cervix/adnexa/uterus without masses or discharge  MS: no gross musculoskeletal defects noted, no edema  SKIN: no suspicious lesions or rashes  NEURO: Normal strength and tone, mentation intact and speech normal  PSYCH: mentation appears normal, affect normal/bright  LYMPH: no cervical, supraclavicular, axillary, or inguinal adenopathy    Diagnostic Test Results:  Labs reviewed in Epic    ASSESSMENT/PLAN:   1. Screening for malignant neoplasm of cervix  No need    2. Routine general medical examination at a health care facility  Doing well overall    3. Acne, unspecified acne type  Stable  Refills per [facility]    - clindamycin-benzoyl peroxide (BENZACLIN) 1-5 % external gel; Apply to  Affected area initially once daily for 2 weeks and then increase to 2 times daily if tolerated  Dispense: 50 g; Refill: 11    4. Iron deficiency anemia, unspecified iron deficiency anemia type  stable  - ferrous sulfate (FEROSUL) 325 (65 Fe) MG tablet; Take 1 tablet (325 mg) by mouth 2 times daily  Dispense: 60 tablet; Refill: 11    5. Throat pain  stable  - fluticasone (FLONASE) 50 MCG/ACT nasal spray; Spray 1-2 sprays into both nostrils daily  Dispense: 16 g; Refill: 3    6. Postoperative abdominal pain  Not postop - periumbilical  - ondansetron (ZOFRAN-ODT) 4 MG ODT tab; Take 1-2 tablets (4-8 mg) by mouth every 8 hours as needed for nausea  Dispense: 12 tablet; Refill: 1    7. Spondyloarthropathy  Stable  Refills per [facility]    - sulfaSALAzine ER (AZULFIDINE EN) 500 MG EC tablet; 1000mg in the AM and 1000mg in the PM daily  Dispense: 120 tablet; Refill: 6  - CBC with platelets and differential; Future  - RHEUMATOLOGY REFERRAL; Future    8. Disorder of SI (sacroiliac) joint    - traMADol (ULTRAM) 50  "MG tablet; Take 1 tablet (50 mg) by mouth every 6 hours as needed for severe pain  Dispense: 60 tablet; Refill: 0  - RHEUMATOLOGY REFERRAL; Future    9. Episodic tension-type headache, not intractable  Stable and not very often anymore  - CBC with platelets and differential; Future  - traMADol (ULTRAM) 50 MG tablet; Take 1 tablet (50 mg) by mouth every 6 hours as needed for severe pain  Dispense: 60 tablet; Refill: 0    10. Insomnia, unspecified type  Refills per [facility]    - traZODone (DESYREL) 50 MG tablet; Take 1 tablet (50 mg) by mouth nightly as needed for sleep  Dispense: 60 tablet; Refill: 6    Patient has been advised of split billing requirements and indicates understanding: Yes  COUNSELING:  Reviewed preventive health counseling, as reflected in patient instructions  Special attention given to:        Immunizations    Vaccinated for: already had flu shot this year          Estimated body mass index is 22.68 kg/m  as calculated from the following:    Height as of this encounter: 1.511 m (4' 11.5\").    Weight as of this encounter: 51.8 kg (114 lb 3.2 oz).        She reports that she has never smoked. She has never used smokeless tobacco.     Recheck in 6 months      Counseling Resources:  ATP IV Guidelines  Pooled Cohorts Equation Calculator  Breast Cancer Risk Calculator  BRCA-Related Cancer Risk Assessment: FHS-7 Tool  FRAX Risk Assessment  ICSI Preventive Guidelines  Dietary Guidelines for Americans, 2010  USDA's MyPlate  ASA Prophylaxis  Lung CA Screening    Terri Robles MD  [facility]"

## 2020-11-02 ENCOUNTER — TRANSFERRED RECORDS (OUTPATIENT)
Dept: HEALTH INFORMATION MANAGEMENT | Facility: CLINIC | Age: 45
End: 2020-11-02

## 2020-11-18 ENCOUNTER — OFFICE VISIT (OUTPATIENT)
Dept: FAMILY MEDICINE | Facility: CLINIC | Age: 45
End: 2020-11-18
Payer: COMMERCIAL

## 2020-11-18 VITALS
WEIGHT: 115 LBS | DIASTOLIC BLOOD PRESSURE: 60 MMHG | BODY MASS INDEX: 22.84 KG/M2 | OXYGEN SATURATION: 100 % | HEART RATE: 74 BPM | RESPIRATION RATE: 12 BRPM | SYSTOLIC BLOOD PRESSURE: 106 MMHG | TEMPERATURE: 98.2 F

## 2020-11-18 DIAGNOSIS — K64.4 EXTERNAL HEMORRHOIDS: ICD-10-CM

## 2020-11-18 DIAGNOSIS — K62.9 RECTAL LESION: ICD-10-CM

## 2020-11-18 DIAGNOSIS — Z01.818 PREOP GENERAL PHYSICAL EXAM: Primary | ICD-10-CM

## 2020-11-18 PROCEDURE — 99214 OFFICE O/P EST MOD 30 MIN: CPT | Performed by: FAMILY MEDICINE

## 2020-11-18 RX ORDER — ASPIRIN 81 MG
100 TABLET, DELAYED RELEASE (ENTERIC COATED) ORAL DAILY PRN
Qty: 30 TABLET | Refills: 1 | Status: SHIPPED | OUTPATIENT
Start: 2020-11-18 | End: 2021-02-15

## 2020-11-18 RX ORDER — SODIUM PHOSPHATE, DIBASIC AND SODIUM PHOSPHATE, MONOBASIC 3.5; 9.5 G/66ML; G/66ML
1 ENEMA RECTAL ONCE
Qty: 1 ENEMA | Refills: 0 | Status: SHIPPED | OUTPATIENT
Start: 2020-11-18 | End: 2020-11-18

## 2020-11-18 NOTE — PATIENT INSTRUCTIONS
"  Preparing for Your Surgery  Getting started  A surgery nurse will call you to review your health history and instructions. They will give you an arrival time based on your scheduled surgery time.  Please be ready to share the following:    Your doctor's clinic name and phone number    Your medical, surgical and anesthesia history    A list of allergies and sensitivities    A list of medicines, including herbal treatments and over-the-counter drugs    Whether the patient has a legal guardian (ask how to send us the papers in advance)  If your child is having surgery, please ask for a copy of Preparing for Your Child's Surgery.    Preparing for surgery    Within 30 days of surgery: Have an exam at your family clinic (primary care clinic), or go to a pre-operative clinic. This exam is called a \"History and Physical,\" or H&P.    At your H&P exam, talk to your care team about all medicines you take. If you need to stop any medicines before surgery, ask when to start taking them again.  ? We do this for your safety. Many medicines can make you bleed too much during surgery. Some change how well surgery (anesthesia) drugs work.    Call your insurance company to see what it will and won't pay for. Ask if they need to pre-approve the surgery. (If no insurance, call [phone].)    Call your surgeon's clinic if there's any change in your health. This includes signs of a cold or flu (sore throat, runny nose, cough, rash, fever). It also includes a scrape or scratch near the surgery site.    If you have questions on the day of surgery, call your surgery center.  Eating and drinking guidelines  For your safety: Unless your surgeon tells you otherwise, follow the guidelines below.    Eat and drink as usual until 8 hours before surgery. After that, no food or milk.    Drink clear liquids until 2 hours before surgery. These are liquids you can see through, like water, Gatorade and Propel Water. You may also have black coffee " and tea (no cream or milk).    Nothing by mouth within 2 hours of surgery. This includes gum, candy and breath mints.    Stop alcohol the midnight before surgery.    If your family clinic tells you to take medicine on the morning of surgery, it's okay to take it with a sip of water.  Preventing infection    Shower or bathe the night before and morning of your surgery. Follow the instructions your clinic gave you. (If no instructions, use regular soap.)    Don't shave or clip hair near your surgery site. This can lead to skin infection.    Don't smoke the morning of surgery. Smoking increases the risk of infection. You may chew nicotine gum up to 2 hours before surgery. A nicotine patch is okay.  ? Note: Some surgeries require you to completely quit smoking and nicotine. Check with your surgeon.    Your care team will make every effort to keep you safe from infection. We will:  ? Clean our hands often with soap and water (or an alcohol-based hand rub).  ? Clean the skin at your surgery site with a special soap that kills germs. We'll also remove hair from the site as needed.  ? Wear special hair covers, masks, gowns and gloves during surgery.  ? Give antibiotic medicine, if prescribed. Not all surgeries need antibiotics.  What to bring on the day of surgery    Photo ID and insurance card    Copy of your health care directive, if you have one    Glasses and hearing aides (bring cases)  ? You can't wear contacts during surgery    Inhaler and eye drops, if you use them (tell us about these when you arrive)    CPAP machine or breathing device, if you use them    A few personal items, if spending the night    If you have . . .  ? A pacemaker or ICD (cardiac defibrillator): Bring the ID card.  ? An implanted stimulator: Bring the remote control.  ? A legal guardian: Bring a copy of the certified (court-stamped) guardianship papers.  Please remove any jewelry, including body piercings. Leave jewelry and other valuables at  home.  If you're going home the day of surgery  Important: If you don't follow the rules below, we must cancel your surgery.     Arrange for someone to drive you home after surgery. You may not drive, take a taxi or take public transportation by yourself (unless you'll have local anesthesia only).    Arrange for a responsible adult to stay with you overnight. If you don't, we may keep you in the hospital overnight, and you may need to pay the costs yourself.  Questions?   If you have any questions for your care team, list them here: _________________________________________________________________________________________________________________________________________________________________________________________________________________________________________________________________________________________________________________________  For informational purposes only. Not to replace the advice of your health care provider. Copyright   4848-3377 [employer]. All rights reserved. Clinically reviewed by Zaina Suggs MD. SMARTworks 747849 - REV 07/19.    Before Your Procedure or Hospital Admission  Testing for COVID-19 (Coronavirus)  Thank you for choosing [facility] for your health care needs. This is a very challenging time for everyone. The World Health Organization and the [place] of [place] have declared the COVID-19 virus a pandemic.   Our goal is to keep you and our team here at [facility] safe and healthy. We've taken several steps to make this happen. For example:    We screen our staff, care teams and patients for COVID-19.    Everyone at [facility] must wear a mask and stay 6 feet apart.    We are limiting hospital and clinic visitors.  Before you come in  All patients must get tested for COVID-19. Your test needs to happen 2 to 4 days before you check in to the hospital or surgery site.   A clinic scheduler will call about a week in advance to set up a testing time at  "one of our labs where we'll take a swab of your nose or throat.  Note: If you go to a clinic or pharmacy like [facility] or [facility] for your test, make sure it's a \"RT-PCR\" test, not a \"rapid\" COVID-19 test. (See Questions and Answers below.)  After the test, please stay at home and stay out of contact with other people. It will be harder for you to recover if you get COVID-19 before your treatment.  Please follow all current safety guidelines, including:    Limit trips outside your home.    Limit the number of people you see.    Always wear a mask outside your home.    Use social distancing. (Stay 6 feet away from others whenever you can.)    Wash your hands often.  If your test shows you have COVID-19  If your test is positive, we'll let you know. A positive test means that you have the virus.   We'll probably have to postpone your admission, surgery or procedure. Your doctor will discuss this with you. After that, we'll let you know what to do and when you can reschedule.   We may need to cancel your treatment on short notice for other reasons, too.  If your test shows you DON'T have COVID-19  Even if your test is negative, you may still get COVID-19. It's rare but, sometimes, the test result is wrong. You could also catch the virus after taking the test.   There's a very small chance that you could catch COVID-19 in the hospital or surgery center. [facility] has taken many steps to prevent this from happening.   Day of your surgery or procedure    Please come wearing a mask or something else that covers both your nose and mouth.    When you arrive, we'll ask you some questions to find out if you have any signs or symptoms of COVID-19.    Ask your care team if you can have visitors. All visitors must wear masks and will be screened for signs of COVID-19.  ? Even if no visitors are allowed, you can still have with you:    Your legal guardian or legal decision maker    A parent and one other visitor, if you are " "younger than 18 years old    A partner and a , if you are in labor  ? We might need to teach you about taking care of yourself after surgery. If so, a visitor can come into the hospital to learn about it, too.  ? The rules for visitors change often, depending on how much the virus is spreading. To learn more, see Visiting a Loved One in the Hospital during the COVID-19 Outbreak.  Please call your care team, hospital or surgery center if you have any questions. We thank you for your understanding and for choosing [facility] for your care.   Questions and Answers  Does it matter where I get tested for COVID-19?  Yes. We urge you to get tested at one of our [facility] COVID-19 testing sites. We process these tests in our lab and can get the results quickly. Your [facility] care team needs to get your results before you check in.  What should I do if I can't get tested at [facility]?  You can get tested somewhere else, but you'll need to take these extra steps:  1. Contact your family doctor or clinic to arrange your test.  2. Take the test within 4 days of your surgery or procedure. We can't accept tests older than 4 days.  3. Make sure your doctor or clinic faxes your results to [facility] at [phone].  If we don't get your results in time, we may have to postpone or cancel your treatment.  Ask if you're getting a \"RT-PCR\" COVID-19 test. It should NOT be a \"rapid\" COVID-19 test. Many drug stores use \"rapid\" tests, but they may not be as accurate. We don't accept the results of \"rapid\" tests.  For informational purposes only. Not to replace the advice of your health care provider. Copyright   2020 [facility] [employer]. All rights reserved. Clinically reviewed by Infection Prevention and the [facility] COVID-19 Clinical Team. SMARTworks 946319 - Rev 10/07/20.          "

## 2020-11-18 NOTE — PROGRESS NOTES
Pre-Visit Planning     Future Appointments   Date Time Provider Department Center   11/18/2020 10:50 AM Terri Robles MD [facility]   11/24/2020 11:00 AM [facility]     Arrival Time for this Appointment: 10:30 AM   Appointment Notes for this encounter:   procedure on 12/1/2020  Excision of hypertrophied anal papilla    Questionnaires Reviewed/Assigned  No additional questionnaires are needed    Last OV with provider  10/26 physical  ASSESSMENT/PLAN:   1. Screening for malignant neoplasm of cervix  No need  2. Routine general medical examination at a health care facility  Doing well overal   3. Acne, unspecified acne type  Stable  Refills per [facility]  - clindamycin-benzoyl peroxide (BENZACLIN) 1-5 % external gel; Apply to  Affected area initially once daily for 2 weeks and then increase to 2 times daily if tolerated  Dispense: 50 g; Refill: 11  4. Iron deficiency anemia, unspecified iron deficiency anemia type  stable  - ferrous sulfate (FEROSUL) 325 (65 Fe) MG tablet; Take 1 tablet (325 mg) by mouth 2 times daily  Dispense: 60 tablet; Refill: 11  5. Throat pain  stable  - fluticasone (FLONASE) 50 MCG/ACT nasal spray; Spray 1-2 sprays into both nostrils daily  Dispense: 16 g; Refill: 3  6. Postoperative abdominal pain  Not postop - periumbilical  - ondansetron (ZOFRAN-ODT) 4 MG ODT tab; Take 1-2 tablets (4-8 mg) by mouth every 8 hours as needed for nausea  Dispense: 12 tablet; Refill: 1  7. Spondyloarthropathy  Stable  Refills per [facility]  - sulfaSALAzine ER (AZULFIDINE EN) 500 MG EC tablet; 1000mg in the AM and 1000mg in the PM daily  Dispense: 120 tablet; Refill: 6  - CBC with platelets and differential; Future  - RHEUMATOLOGY REFERRAL; Future  8. Disorder of SI (sacroiliac) joint  - traMADol (ULTRAM) 50 MG tablet; Take 1 tablet (50 mg) by mouth every 6 hours as needed for severe pain  Dispense: 60 tablet; Refill: 0  - RHEUMATOLOGY REFERRAL; Future  9. Episodic tension-type headache, not intractable  Stable and  not very often anymore  - CBC with platelets and differential; Future  - traMADol (ULTRAM) 50 MG tablet; Take 1 tablet (50 mg) by mouth every 6 hours as needed for severe pain  Dispense: 60 tablet; Refill: 0  10. Insomnia, unspecified type  Refills per [facility]  - traZODone (DESYREL) 50 MG tablet; Take 1 tablet (50 mg) by mouth nightly as needed for sleep  Dispense: 60 tablet; Refill: 6      Hospital ER Visits  none  Specialty Visits  11/2/2020   [facility]  [facility]    Imaging and Lab Review  none    Recent Procedures  none    Health Maintenance Due   Topic Date Due     CBC  11/19/2020     Current Outpatient Medications   Medication     clindamycin-benzoyl peroxide (BENZACLIN) 1-5 % external gel     diclofenac 1 % TD topical gel     docusate sodium (COLACE) 100 MG tablet     DULoxetine (CYMBALTA) 30 MG capsule     ferrous sulfate (FEROSUL) 325 (65 Fe) MG tablet     fluticasone (FLONASE) 50 MCG/ACT nasal spray     gabapentin (NEURONTIN) 300 MG capsule     hydrocortisone (WESTCORT) 0.2 % external cream     ketotifen (ZADITOR) 0.025 % ophthalmic solution     loratadine (CLARITIN) 10 MG tablet     naproxen (NAPROSYN) 500 MG tablet     ondansetron (ZOFRAN-ODT) 4 MG ODT tab     sulfaSALAzine ER (AZULFIDINE EN) 500 MG EC tablet     traMADol (ULTRAM) 50 MG tablet     traZODone (DESYREL) 50 MG tablet     No current facility-administered medications for this visit.        Refills due?    Patient is active on [email or web address].    Patient preferred phone number: [phone]  Did not speak to pt pvp same day as appointment  Sarita LEROY, PAL (Patient Advocate Liaison)

## 2020-11-18 NOTE — PROGRESS NOTES
[facility]  [id number] [place] [id number]  Phone: [phone]  Primary Provider: Alicia Robles  Pre-op Performing Provider: ALICIA ROBLES    PREOPERATIVE EVALUATION:  Today's date: 11/18/2020    Zo Qureshi is a 45 year old female who presents for a preoperative evaluation.    Surgical Information:  Surgery/Procedure: anorectal  Surgery Location: [facility]  Surgeon: Afshan Quiros  Surgery Date: 12/1/2020  Time of Surgery: 2:15  Where patient plans to recover: At home with family  Fax number for surgical facility: [phone]    Type of Anesthesia Anticipated: General    Subjective     HPI related to upcoming procedure: Zo Qureshi is a 45 year old women here for preop before anal surgery to removal hypertrophied anal papilla.   She is feeling well with no concerns.   She has lab draw next week.         Preop Questions 11/18/2020   1. Have you ever had a heart attack or stroke? No   2. Have you ever had surgery on your heart or blood vessels, such as a stent placement, a coronary artery bypass, or surgery on an artery in your head, neck, heart, or legs? No   3. Do you have chest pain with activity? Has some tenderness of chest bone - has had many months with her arthritis    4. Do you have a history of  heart failure? No   5. Do you currently have a cold, bronchitis or symptoms of other infection? No   6. Do you have a cough, shortness of breath, or wheezing? No   7. Do you or anyone in your family have previous history of blood clots? No   8. Do you or does anyone in your family have a serious bleeding problem such as prolonged bleeding following surgeries or cuts? No   9. Have you ever had problems with anemia or been told to take iron pills? YES - in the past   10. Have you had any abnormal blood loss such as black, tarry or bloody stools, or abnormal vaginal bleeding? No   11. Have you ever had a blood transfusion? YES - after childbirth    11a. Have you ever had a transfusion reaction? UNKNOWN - she had a rash   12. Are you willing to have a blood transfusion if it is medically needed before, during, or after your surgery? Yes   13. Have you or any of your relatives ever had problems with anesthesia? YES - her mom but not her   14. Do you have sleep apnea, excessive snoring or daytime drowsiness? NO   15. Do you have any artifical heart valves or other implanted medical devices like a pacemaker, defibrillator, or continuous glucose monitor? No   16. Do you have artificial joints? No   17. Are you allergic to latex? No   18. Is there any chance that you may be pregnant? No     Health Care Directive:  Patient does not have a Health Care Directive or Living Will: does not have advanced care directives    Preoperative Review of :   reviewed - controlled substances reflected in medication list.  gabapentin and tramadol     Past Medical History:   Diagnosis Date     Anemia      History of blood transfusion 2000    after vaginal delivery, 2 units PRBCs given     HSIL on Pap smear 09/21/11    MARTA 2 and 3     INFLAM SPONDYLOPATHY NOS 01/07/2008    check labs on sulfasalzine every 3 months and check hep B and C and TB every 2 years     Leukocytopenia 3/10/2014     Tendinosis      Thrombocytopenia (H) 3/10/2014     Unspecified closed fracture of pelvis 2000    left fracture of pelvis after delivery       Past Surgical History:   Procedure Laterality Date     DILATION AND CURETTAGE, ABLATE ENDOMETRIUM NOVASURE, COMBINED N/A 12/04/2018    Procedure: novasure endometrial ablation, myosure resection of leiomyoma, dilation and curettage;  Surgeon: Rolando Covarrubias MD;  Location: [facility] OR     GYN SURGERY  12/04/2018    fibroid removal     LEEP TX, CERVICAL  12/19/2011    MARTA II     OPERATIVE HYSTEROSCOPY WITH MORCELLATOR N/A 12/04/2018    ovaries are still in.   Surgeon: Rolando Covarrubias MD;  Location: [facility] OR     TUBAL LIGATION  05/2009    laparoscopic tubal  ligation     ZZC NONSPECIFIC PROCEDURE  10/2000    after delivery 2 units of prbcs secondary to placenta       MEDICATIONS:  Current Outpatient Medications   Medication     docusate sodium (COLACE) 50 MG capsule     sodium phosphate (FLEET PEDS) 3.5-9.5 GM/59ML enema     clindamycin-benzoyl peroxide (BENZACLIN) 1-5 % external gel     diclofenac 1 % TD topical gel     docusate sodium (COLACE) 100 MG tablet     DULoxetine (CYMBALTA) 30 MG capsule     ferrous sulfate (FEROSUL) 325 (65 Fe) MG tablet     fluticasone (FLONASE) 50 MCG/ACT nasal spray     gabapentin (NEURONTIN) 300 MG capsule     hydrocortisone (WESTCORT) 0.2 % external cream     ketotifen (ZADITOR) 0.025 % ophthalmic solution     loratadine (CLARITIN) 10 MG tablet     naproxen (NAPROSYN) 500 MG tablet     ondansetron (ZOFRAN-ODT) 4 MG ODT tab     sulfaSALAzine ER (AZULFIDINE EN) 500 MG EC tablet     traMADol (ULTRAM) 50 MG tablet     traZODone (DESYREL) 50 MG tablet     No current facility-administered medications for this visit.        SOCIAL HISTORY:  Social History     Tobacco Use     Smoking status: Never Smoker     Smokeless tobacco: Never Used   Substance Use Topics     Alcohol use: Not Currently     Alcohol/week: 0.0 standard drinks     Frequency: Never     Drinks per session: Patient refused     Binge frequency: Never     Comment: rarely       Family History   Problem Relation Age of Onset     Hypertension Father      Lipids Father      Hypertension Mother      Lipids Mother      Diabetes No family hx of      Coronary Artery Disease No family hx of      Hyperlipidemia No family hx of      Cerebrovascular Disease No family hx of      Breast Cancer No family hx of      Colon Cancer No family hx of      Prostate Cancer No family hx of      Other Cancer No family hx of      Depression No family hx of      Anxiety Disorder No family hx of      Mental Illness No family hx of      Substance Abuse No family hx of      Anesthesia Reaction No family hx of       Asthma No family hx of      Osteoporosis No family hx of      Genetic Disorder No family hx of      Thyroid Disease No family hx of      Obesity No family hx of      Unknown/Adopted No family hx of            Review of Systems  Constitutional, neuro, ENT, endocrine, pulmonary, cardiac, gastrointestinal, genitourinary, musculoskeletal, integument and psychiatric systems are negative, except as otherwise noted.    Patient Active Problem List    Diagnosis Date Noted     Intramural leiomyoma of uterus 04/15/2019     Priority: Medium     Pelvic pain in female 04/15/2019     Priority: Medium     Left ovarian cyst 04/15/2019     Priority: Medium     Right shoulder tendonitis 01/21/2019     Priority: Medium     Submucous leiomyoma of uterus 11/30/2018     Priority: Medium     Menorrhagia with regular cycle 11/30/2018     Priority: Medium     Chronic pain syndrome 04/23/2018     Priority: Medium     Patient is followed by Terri Baron MD for ongoing prescription of pain medication.  All refills should only be approved by this provider, or covering partner.    Medication(s): tramadol.   Maximum quantity per month: 60  Clinic visit frequency required: Q 3 months     Controlled substance agreement:  Encounter-Level CSA - 05/08/2017:          Controlled Substance Agreement - Scan on 5/18/2017 12:15 PM : CONTROLLED SUBSTANCE AGREEMENT (below)              Pain Clinic evaluation in the past: Yes       Date/Location:      DIRE Total Score(s):  No flowsheet data found.    Last [facility] website verification:  none   [email or web address]       Insomnia due to medical condition 01/29/2018     Priority: Medium     Episodic tension-type headache, not intractable 10/18/2017     Priority: Medium     Cervicalgia 10/18/2017     Priority: Medium     Right peroneal tendinosis 04/26/2017     Priority: Medium     Vitamin D deficiency 01/05/2017     Priority: Medium     Influenza B 03/03/2014     Priority: Medium     Acne 09/10/2012      Priority: Medium     S/P LEEP of cervix 12/19/2011     Priority: Medium     HSIL on Pap smear 09/21/2011     Priority: Medium     HGSIL confirmed with biopsy=repeat pap by May 2012  12/19/11 LEEP MARTA II  4/9/12 NIL pap.  Per OV notes, pt to repeat pap in 4 months  8/6/12: NIL pap. Per MD plan pap in 4 months.  12/10/12 NIL pap. Plan per MD, repeat in one year.   02/05/14 Pap reminder letter sent through [employer].  05/21/14 Pap= Normal, Neg HPV. Repeat co-test in 1 yr.  09/21/15 Pap= NIL, Neg HPV. Co-test in 3 yrs per ASCCP guidelines  2/21/18 NIL, Neg HPV. Plan 3 yr pap for 20 yrs post LEEP, 2011         Hypercholesterolemia 10/31/2010     Priority: Medium     Disorder of SI (sacroiliac) joint 09/15/2008     Priority: Medium     Inflammatory spondylopathy (H) 01/07/2008     Priority: Medium     Problem list name updated by automated process. Provider to review        Past Medical History:   Diagnosis Date     Anemia      History of blood transfusion 2000    after vaginal delivery, 2 units PRBCs given     HSIL on Pap smear 09/21/11    MARTA 2 and 3     INFLAM SPONDYLOPATHY NOS 01/07/2008    check labs on sulfasalzine every 3 months and check hep B and C and TB every 2 years     Leukocytopenia 3/10/2014     Tendinosis      Thrombocytopenia (H) 3/10/2014     Unspecified closed fracture of pelvis 2000    left fracture of pelvis after delivery     Past Surgical History:   Procedure Laterality Date     DILATION AND CURETTAGE, ABLATE ENDOMETRIUM NOVASURE, COMBINED N/A 12/04/2018    Procedure: novasure endometrial ablation, myosure resection of leiomyoma, dilation and curettage;  Surgeon: Rolando Covarrubias MD;  Location: [facility] OR     GYN SURGERY  12/04/2018    fibroid removal     LEEP TX, CERVICAL  12/19/2011    MARTA II     OPERATIVE HYSTEROSCOPY WITH MORCELLATOR N/A 12/04/2018    ovaries are still in.   Surgeon: Rolando Covarrubias MD;  Location: [facility] OR     TUBAL LIGATION  05/2009    laparoscopic tubal ligation     [facility]  NONSPECIFIC PROCEDURE  10/2000    after delivery 2 units of prbcs secondary to placenta     Current Outpatient Medications   Medication Sig Dispense Refill     clindamycin-benzoyl peroxide (BENZACLIN) 1-5 % external gel Apply to  Affected area initially once daily for 2 weeks and then increase to 2 times daily if tolerated 50 g 11     diclofenac 1 % TD topical gel Use small amount and rub into area of chest that is hurting 1-2 times daily as needed 100 g 1     docusate sodium (COLACE) 100 MG tablet Take 1 tablet (100 mg) by mouth daily as needed for constipation 30 tablet 11     DULoxetine (CYMBALTA) 30 MG capsule Take 2 capsules (60 mg) by mouth daily 180 capsule 3     ferrous sulfate (FEROSUL) 325 (65 Fe) MG tablet Take 1 tablet (325 mg) by mouth 2 times daily 60 tablet 11     fluticasone (FLONASE) 50 MCG/ACT nasal spray Spray 1-2 sprays into both nostrils daily 16 g 3     gabapentin (NEURONTIN) 300 MG capsule 1 tab in am and 1 at 5 pm  and 2 at bedtime for (4 per day) 120 capsule 6     hydrocortisone (WESTCORT) 0.2 % external cream Apply sparingly to affected area 2 times daily as needed. 45 g 3     ketotifen (ZADITOR) 0.025 % ophthalmic solution Place 1 drop into both eyes 2 times daily 1 Bottle 11     loratadine (CLARITIN) 10 MG tablet Take 1 tablet (10 mg) by mouth daily 30 tablet 1     naproxen (NAPROSYN) 500 MG tablet Take 1 tablet (500 mg) by mouth 2 times daily (with meals) 60 tablet 6     ondansetron (ZOFRAN-ODT) 4 MG ODT tab Take 1-2 tablets (4-8 mg) by mouth every 8 hours as needed for nausea 12 tablet 1     sulfaSALAzine ER (AZULFIDINE EN) 500 MG EC tablet 1000mg in the AM and 1000mg in the PM daily 120 tablet 6     traMADol (ULTRAM) 50 MG tablet Take 1 tablet (50 mg) by mouth every 6 hours as needed for severe pain 60 tablet 0     traZODone (DESYREL) 50 MG tablet Take 1 tablet (50 mg) by mouth nightly as needed for sleep 60 tablet 6       Allergies   Allergen Reactions     Contrast Dye Nausea and  Vomiting     Diatrizoate Nausea and Vomiting     Percocet [Oxycodone-Acetaminophen] Nausea and Vomiting and Itching     Advil [Ibuprofen Micronized] Rash     Rash      Ibuprofen Rash        Social History     Tobacco Use     Smoking status: Never Smoker     Smokeless tobacco: Never Used   Substance Use Topics     Alcohol use: Not Currently     Alcohol/week: 0.0 standard drinks     Frequency: Never     Drinks per session: Patient refused     Binge frequency: Never     Comment: rarely     Family History   Problem Relation Age of Onset     Hypertension Father      Lipids Father      Hypertension Mother      Lipids Mother      Diabetes No family hx of      Coronary Artery Disease No family hx of      Hyperlipidemia No family hx of      Cerebrovascular Disease No family hx of      Breast Cancer No family hx of      Colon Cancer No family hx of      Prostate Cancer No family hx of      Other Cancer No family hx of      Depression No family hx of      Anxiety Disorder No family hx of      Mental Illness No family hx of      Substance Abuse No family hx of      Anesthesia Reaction No family hx of      Asthma No family hx of      Osteoporosis No family hx of      Genetic Disorder No family hx of      Thyroid Disease No family hx of      Obesity No family hx of      Unknown/Adopted No family hx of      History   Drug Use No         Objective     LMP 03/26/2019 (Within Days)     Physical Exam    GENERAL APPEARANCE: healthy, alert and no distress     EYES: EOMI, PERRL     HENT: ear canals and TM's normal and nose and mouth without ulcers or lesions     NECK: no adenopathy, no asymmetry, masses, or scars and thyroid normal to palpation     RESP: lungs clear to auscultation - no rales, rhonchi or wheezes     CV: regular rates and rhythm, normal S1 S2, no S3 or S4 and no murmur, click or rub     ABDOMEN:  soft, nontender, no HSM or masses and bowel sounds normal     MS: extremities normal- no gross deformities noted, no evidence  of inflammation in joints, FROM in all extremities.     SKIN: no suspicious lesions or rashes     NEURO: Normal strength and tone, sensory exam grossly normal, mentation intact and speech normal     PSYCH: mentation appears normal. and affect normal/bright     LYMPHATICS: No cervical adenopathy    Recent Labs   Lab Test 08/19/20  1158 02/26/20  0944 10/02/19  0917 10/02/19  0859 09/07/19  0805   HGB 12.7  --  12.4  --  13.2     --   --   --  256    137  --   --  137   POTASSIUM 4.4 3.9  --   --  3.8   CR 0.51* 0.49*  --   --  0.54   A1C  --   --   --  5.0  --         Diagnostics:  No labs were ordered during this visit.   No EKG required for low risk surgery (cataract, skin procedure, breast biopsy, etc).    Revised Cardiac Risk Index (RCRI):  The patient has the following serious cardiovascular risks for perioperative complications:   - No serious cardiac risks = 0 points     RCRI Interpretation: 0 points: Class I (very low risk - 0.4% complication rate)           Assessment & Plan   The proposed surgical procedure is considered LOW risk.    Preop general physical exam    - CBC with platelets and differential; Future  - docusate sodium (COLACE) 50 MG capsule; Take 1 capsule (50 mg) by mouth 2 times daily  - sodium phosphate (FLEET PEDS) 3.5-9.5 GM/59ML enema; Place 1 enema rectally once for 1 dose Adult FLEET enema    Rectal lesion    - docusate sodium (COLACE) 50 MG capsule; Take 1 capsule (50 mg) by mouth 2 times daily  - sodium phosphate (FLEET PEDS) 3.5-9.5 GM/59ML enema; Place 1 enema rectally once for 1 dose Adult FLEET enema              RECOMMENDATION:  APPROVAL GIVEN to proceed with proposed procedure, without further diagnostic evaluation.    Signed Electronically by: Terri Robles MD    Copy of this evaluation report is provided to requesting physician.    [facility] Preop Guidelines    Revised Cardiac Risk Index

## 2020-12-01 ENCOUNTER — HOSPITAL PATHOLOGY (OUTPATIENT)
Dept: OTHER | Facility: CLINIC | Age: 45
End: 2020-12-01

## 2020-12-03 ENCOUNTER — TELEPHONE (OUTPATIENT)
Dept: FAMILY MEDICINE | Facility: CLINIC | Age: 45
End: 2020-12-03

## 2020-12-03 LAB — COPATH REPORT: NORMAL

## 2021-02-11 NOTE — PROGRESS NOTES
Pre-Visit Planning     Future Appointments   Date Time Provider Department Center   2/15/2021  9:00 AM Terri Robles MD [facility]     Arrival Time for this Appointment:  8:40 AM   Appointment Notes for this encounter:   pain in neck, shoulder, arm (chronic) discuss med increase for pain.     Questionnaires Reviewed/Assigned  No additional questionnaires are needed    Last OV with provider  Physical done on 10/26/20  Pre op for hemorrhoid removal 11/18 Referral for rheumatology (not sure she went)     Specialty Visits  none    Imaging and Lab Review  none    Recent Procedures  12/01/20 Hemorrhoid removal    Health Maintenance Due   Topic Date Due     CBC  11/19/2020     CMP  02/19/2021     Current Outpatient Medications   Medication     clindamycin-benzoyl peroxide (BENZACLIN) 1-5 % external gel     diclofenac 1 % TD topical gel     docusate sodium (COLACE) 100 MG tablet     docusate sodium (COLACE) 50 MG capsule     DULoxetine (CYMBALTA) 30 MG capsule     ferrous sulfate (FEROSUL) 325 (65 Fe) MG tablet     fluticasone (FLONASE) 50 MCG/ACT nasal spray     gabapentin (NEURONTIN) 300 MG capsule     hydrocortisone (WESTCORT) 0.2 % external cream     ketotifen (ZADITOR) 0.025 % ophthalmic solution     loratadine (CLARITIN) 10 MG tablet     naproxen (NAPROSYN) 500 MG tablet     ondansetron (ZOFRAN-ODT) 4 MG ODT tab     sulfaSALAzine ER (AZULFIDINE EN) 500 MG EC tablet     traMADol (ULTRAM) 50 MG tablet     traZODone (DESYREL) 50 MG tablet     No current facility-administered medications for this visit.        Refills due?    Patient is active on [employer].    Patient preferred phone number: [phone]

## 2021-02-15 ENCOUNTER — ANCILLARY PROCEDURE (OUTPATIENT)
Dept: MAMMOGRAPHY | Facility: CLINIC | Age: 46
End: 2021-02-15
Payer: COMMERCIAL

## 2021-02-15 ENCOUNTER — OFFICE VISIT (OUTPATIENT)
Dept: FAMILY MEDICINE | Facility: CLINIC | Age: 46
End: 2021-02-15
Payer: COMMERCIAL

## 2021-02-15 VITALS
OXYGEN SATURATION: 97 % | SYSTOLIC BLOOD PRESSURE: 130 MMHG | TEMPERATURE: 97.8 F | WEIGHT: 118 LBS | HEART RATE: 81 BPM | BODY MASS INDEX: 23.43 KG/M2 | DIASTOLIC BLOOD PRESSURE: 85 MMHG

## 2021-02-15 DIAGNOSIS — Z12.31 VISIT FOR SCREENING MAMMOGRAM: ICD-10-CM

## 2021-02-15 DIAGNOSIS — M46.98 INFLAMMATORY SPONDYLOPATHY OF SACRAL REGION (H): ICD-10-CM

## 2021-02-15 DIAGNOSIS — E55.9 VITAMIN D DEFICIENCY: Primary | ICD-10-CM

## 2021-02-15 DIAGNOSIS — D50.9 IRON DEFICIENCY ANEMIA, UNSPECIFIED IRON DEFICIENCY ANEMIA TYPE: ICD-10-CM

## 2021-02-15 DIAGNOSIS — M53.3 SACRAL PAIN: ICD-10-CM

## 2021-02-15 DIAGNOSIS — G89.4 CHRONIC PAIN SYNDROME: ICD-10-CM

## 2021-02-15 LAB
ALBUMIN SERPL-MCNC: 3.9 G/DL (ref 3.4–5)
ALP SERPL-CCNC: 55 U/L (ref 40–150)
ALT SERPL W P-5'-P-CCNC: 17 U/L (ref 0–50)
ANION GAP SERPL CALCULATED.3IONS-SCNC: 5 MMOL/L (ref 3–14)
AST SERPL W P-5'-P-CCNC: 14 U/L (ref 0–45)
BILIRUB SERPL-MCNC: 0.4 MG/DL (ref 0.2–1.3)
BUN SERPL-MCNC: 14 MG/DL (ref 7–30)
CALCIUM SERPL-MCNC: 9 MG/DL (ref 8.5–10.1)
CHLORIDE SERPL-SCNC: 107 MMOL/L (ref 94–109)
CO2 SERPL-SCNC: 26 MMOL/L (ref 20–32)
CREAT SERPL-MCNC: 0.54 MG/DL (ref 0.52–1.04)
ERYTHROCYTE [DISTWIDTH] IN BLOOD BY AUTOMATED COUNT: 12.2 % (ref 10–15)
ERYTHROCYTE [SEDIMENTATION RATE] IN BLOOD BY WESTERGREN METHOD: 20 MM/H (ref 0–20)
GFR SERPL CREATININE-BSD FRML MDRD: >90 ML/MIN/{1.73_M2}
GLUCOSE SERPL-MCNC: 90 MG/DL (ref 70–99)
HCT VFR BLD AUTO: 37.2 % (ref 35–47)
HGB BLD-MCNC: 12.2 G/DL (ref 11.7–15.7)
MCH RBC QN AUTO: 30.2 PG (ref 26.5–33)
MCHC RBC AUTO-ENTMCNC: 32.8 G/DL (ref 31.5–36.5)
MCV RBC AUTO: 92 FL (ref 78–100)
PLATELET # BLD AUTO: 219 10E9/L (ref 150–450)
POTASSIUM SERPL-SCNC: 3.9 MMOL/L (ref 3.4–5.3)
PROT SERPL-MCNC: 7.7 G/DL (ref 6.8–8.8)
RBC # BLD AUTO: 4.04 10E12/L (ref 3.8–5.2)
SODIUM SERPL-SCNC: 138 MMOL/L (ref 133–144)
WBC # BLD AUTO: 6.5 10E9/L (ref 4–11)

## 2021-02-15 PROCEDURE — 77063 BREAST TOMOSYNTHESIS BI: CPT | Mod: TC | Performed by: RADIOLOGY

## 2021-02-15 PROCEDURE — 99214 OFFICE O/P EST MOD 30 MIN: CPT | Performed by: FAMILY MEDICINE

## 2021-02-15 PROCEDURE — 77067 SCR MAMMO BI INCL CAD: CPT | Mod: TC | Performed by: RADIOLOGY

## 2021-02-15 PROCEDURE — 80053 COMPREHEN METABOLIC PANEL: CPT | Performed by: FAMILY MEDICINE

## 2021-02-15 PROCEDURE — 85652 RBC SED RATE AUTOMATED: CPT | Performed by: FAMILY MEDICINE

## 2021-02-15 PROCEDURE — 36415 COLL VENOUS BLD VENIPUNCTURE: CPT | Performed by: FAMILY MEDICINE

## 2021-02-15 PROCEDURE — 85027 COMPLETE CBC AUTOMATED: CPT | Performed by: FAMILY MEDICINE

## 2021-02-15 RX ORDER — LIDOCAINE 50 MG/G
OINTMENT TOPICAL 3 TIMES DAILY PRN
Qty: 150 G | Refills: 3 | Status: SHIPPED | OUTPATIENT
Start: 2021-02-15 | End: 2021-11-17

## 2021-02-15 RX ORDER — LIDOCAINE 50 MG/G
OINTMENT TOPICAL 3 TIMES DAILY PRN
Qty: 150 G | Refills: 3 | Status: SHIPPED | OUTPATIENT
Start: 2021-02-15 | End: 2021-02-15

## 2021-02-15 ASSESSMENT — ANXIETY QUESTIONNAIRES
5. BEING SO RESTLESS THAT IT IS HARD TO SIT STILL: NOT AT ALL
2. NOT BEING ABLE TO STOP OR CONTROL WORRYING: NOT AT ALL
6. BECOMING EASILY ANNOYED OR IRRITABLE: SEVERAL DAYS
3. WORRYING TOO MUCH ABOUT DIFFERENT THINGS: NOT AT ALL
GAD7 TOTAL SCORE: 1
7. FEELING AFRAID AS IF SOMETHING AWFUL MIGHT HAPPEN: NOT AT ALL
7. FEELING AFRAID AS IF SOMETHING AWFUL MIGHT HAPPEN: NOT AT ALL
1. FEELING NERVOUS, ANXIOUS, OR ON EDGE: NOT AT ALL
GAD7 TOTAL SCORE: 1
GAD7 TOTAL SCORE: 1
4. TROUBLE RELAXING: NOT AT ALL

## 2021-02-15 ASSESSMENT — PATIENT HEALTH QUESTIONNAIRE - PHQ9
10. IF YOU CHECKED OFF ANY PROBLEMS, HOW DIFFICULT HAVE THESE PROBLEMS MADE IT FOR YOU TO DO YOUR WORK, TAKE CARE OF THINGS AT HOME, OR GET ALONG WITH OTHER PEOPLE: VERY DIFFICULT
SUM OF ALL RESPONSES TO PHQ QUESTIONS 1-9: 11
SUM OF ALL RESPONSES TO PHQ QUESTIONS 1-9: 11

## 2021-02-15 NOTE — PROGRESS NOTES
Assessment & Plan     Vitamin D deficiency  That was better when checked 3 years ago  - COMPREHENSIVE METABOLIC PANEL    Iron deficiency anemia, unspecified iron deficiency anemia type  Last summer okay - needs to be checked on her meds  - ESR: Erythrocyte sedimentation rate    Chronic pain syndrome  A little worse with the cold weather  Will try adding lidocaine ointment    - COMPREHENSIVE METABOLIC PANEL  - ESR: Erythrocyte sedimentation rate  - lidocaine (XYLOCAINE) 5 % external ointment; Apply topically 3 times daily as needed for moderate pain  - Miscellaneous Order for DME - ONLY FOR DME    Inflammatory spondylopathy of sacral region (H)    - CBC with platelets  - ESR: Erythrocyte sedimentation rate  - Rheumatology Referral; Future    Sacral pain  Time to go back to rheum for follow up  Also will try sacral pillow for the pain    - Miscellaneous Order for DME - ONLY FOR DME  - Rheumatology Referral; Future      35 minutes spent on the date of the encounter doing chart review, review of outside records, review of test results, patient visit and documentation        Depression Screening Follow Up    PHQ 2/15/2021   PHQ-9 Total Score 11   Q9: Thoughts of better off dead/self-harm past 2 weeks Not at all     Last PHQ-9 2/15/2021   1.  Little interest or pleasure in doing things 2   2.  Feeling down, depressed, or hopeless 1   3.  Trouble falling or staying asleep, or sleeping too much 3   4.  Feeling tired or having little energy 2   5.  Poor appetite or overeating 1   6.  Feeling bad about yourself 0   7.  Trouble concentrating 1   8.  Moving slowly or restless 1   Q9: Thoughts of better off dead/self-harm past 2 weeks 0   PHQ-9 Total Score 11       Follow Up Actions Taken  Stable= will check up with her via PAL in 2 months     CONSULTATION/REFERRAL to rheum    Return in about 6 months (around 8/15/2021) for Physical Exam.    Terri Robles MD  [facility]    Shaggy Pathak is  a 46 year old who presents for the following health issues :  Her right shoulder is bothering her more as are her other joints with this cold weather.   Is there anything else she can do for pain?   Also she needs to have a new handicapped parking permit filled out as hers is expiring.       She has a chronic pain plan but also has inflammatory arthritis.   She has not seen her rheumatologist for 2 years.   Her low back and sacral area are really bothering her.       History of Present Illness       Back Pain:  She presents for follow up of back pain. Patient's back pain is a chronic problem.  Location of back pain:  Right lower back, left lower back, right upper back, left upper back, right side of neck, left side of neck, right shoulder and left shoulder  Description of back pain: dull ache, fullness and sharp  Back pain spreads: right knee and left knee    Since patient first noticed back pain, pain is: gradually worsening  Does back pain interfere with her job:  Not applicable      She eats 2-3 servings of fruits and vegetables daily.She consumes 0 sweetened beverage(s) daily.She exercises with enough effort to increase her heart rate 9 or less minutes per day.  She exercises with enough effort to increase her heart rate 3 or less days per week.   She is taking medications regularly.       Past Medical History:   Diagnosis Date     Anemia      History of blood transfusion 2000    after vaginal delivery, 2 units PRBCs given     HSIL on Pap smear 09/21/11    MARTA 2 and 3     INFLAM SPONDYLOPATHY NOS 01/07/2008    check labs on sulfasalzine every 3 months and check hep B and C and TB every 2 years     Leukocytopenia 3/10/2014     Tendinosis      Thrombocytopenia (H) 3/10/2014     Unspecified closed fracture of pelvis 2000    left fracture of pelvis after delivery       Past Surgical History:   Procedure Laterality Date     DILATION AND CURETTAGE, ABLATE ENDOMETRIUM NOVASURE, COMBINED N/A 12/04/2018    Procedure:  novasure endometrial ablation, myosure resection of leiomyoma, dilation and curettage;  Surgeon: Rolando Covarrubias MD;  Location: [facility] OR     GYN SURGERY  12/04/2018    fibroid removal     LEEP TX, CERVICAL  12/19/2011    MARTA II     OPERATIVE HYSTEROSCOPY WITH MORCELLATOR N/A 12/04/2018    ovaries are still in.   Surgeon: Rolando Covarrubias MD;  Location: [facility] OR     TUBAL LIGATION  05/2009    laparoscopic tubal ligation     ZZC NONSPECIFIC PROCEDURE  10/2000    after delivery 2 units of prbcs secondary to placenta       MEDICATIONS:  Current Outpatient Medications   Medication     lidocaine (XYLOCAINE) 5 % external ointment     clindamycin-benzoyl peroxide (BENZACLIN) 1-5 % external gel     diclofenac 1 % TD topical gel     DULoxetine (CYMBALTA) 30 MG capsule     ferrous sulfate (FEROSUL) 325 (65 Fe) MG tablet     fluticasone (FLONASE) 50 MCG/ACT nasal spray     gabapentin (NEURONTIN) 300 MG capsule     hydrocortisone (WESTCORT) 0.2 % external cream     ketotifen (ZADITOR) 0.025 % ophthalmic solution     loratadine (CLARITIN) 10 MG tablet     naproxen (NAPROSYN) 500 MG tablet     ondansetron (ZOFRAN-ODT) 4 MG ODT tab     sulfaSALAzine ER (AZULFIDINE EN) 500 MG EC tablet     traMADol (ULTRAM) 50 MG tablet     traZODone (DESYREL) 50 MG tablet     No current facility-administered medications for this visit.        SOCIAL HISTORY:  Social History     Tobacco Use     Smoking status: Never Smoker     Smokeless tobacco: Never Used   Substance Use Topics     Alcohol use: Not Currently     Alcohol/week: 0.0 standard drinks     Frequency: Never     Drinks per session: Patient refused     Binge frequency: Never     Comment: rarely       Family History   Problem Relation Age of Onset     Hypertension Father      Lipids Father      Hypertension Mother      Lipids Mother      Diabetes No family hx of      Coronary Artery Disease No family hx of      Hyperlipidemia No family hx of      Cerebrovascular Disease No family hx of       Breast Cancer No family hx of      Colon Cancer No family hx of      Prostate Cancer No family hx of      Other Cancer No family hx of      Depression No family hx of      Anxiety Disorder No family hx of      Mental Illness No family hx of      Substance Abuse No family hx of      Anesthesia Reaction No family hx of      Asthma No family hx of      Osteoporosis No family hx of      Genetic Disorder No family hx of      Thyroid Disease No family hx of      Obesity No family hx of      Unknown/Adopted No family hx of          Review of Systems   Constitutional, HEENT, cardiovascular, pulmonary, gi and gu systems are negative, except as otherwise noted.      Objective    /85   Pulse 81   Temp 97.8  F (36.6  C) (Oral)   Wt 53.5 kg (118 lb)   LMP 03/26/2019 (Within Days)   SpO2 97%   BMI 23.43 kg/m    Body mass index is 23.43 kg/m .  Physical Exam   GENERAL: healthy, alert and no distress  EYES: Eyes grossly normal to inspection, PERRL and conjunctivae and sclerae normal  HENT: ear canals and TM's normal, nose and mouth without ulcers or lesions  NECK: no adenopathy, no asymmetry, masses, or scars and thyroid normal to palpation  RESP: lungs clear to auscultation - no rales, rhonchi or wheezes  CV: regular rate and rhythm, normal S1 S2, no S3 or S4, no murmur, click or rub, no peripheral edema and peripheral pulses strong  MS: no gross musculoskeletal defects noted, no edema  SKIN: no suspicious lesions or rashes  NEURO: Normal strength and tone, mentation intact and speech normal  PSYCH: mentation appears normal, affect normal/bright  LYMPH: no cervical, supraclavicular, axillary, or inguinal adenopathy  Right shoulder with some pain with mid arch range of motion but can do it.   She also has some tenderness anterior in area of AC joint.   She has some tenderness in the deltoid area too but  Posterior is better.     Hospital Pathology on 12/01/2020   Component Date Value Ref Range Status     Copath Report  "12/01/2020    Final                    Value:Patient Name: JANKI AQUINO  MR#: [id number]  Specimen #: [id number]  Collected: 12/1/2020  Received: 12/2/2020  Reported: 12/3/2020 09:34  Ordering Phy(s): CHRISTOS WATERMAN    For improved result formatting, select 'View Enhanced Report Format' under   Linked Documents section.    SPECIMEN(S):  Lesion, rectal    FINAL DIAGNOSIS:  Rectal lesions, excision  - Hemorrhoids with thrombosis  - Negative for dysplasia and malignancy    Electronically signed out by:    Ave See M.D.    CLINICAL HISTORY:  45 year old, rectal lesions, prolapsing hypertrophied papilla    GROSS:  The specimen is received in formalin with the patient's name and proper   identification labeled \"rectal lesions  \".  The specimen consists of two tan-pink elevated rubbery wrinkled masses   (1.4 x 1.2 x 1.0 cm and 1.8 x 1.5 x  1.0 cm).  The specimens are inked blue and black respectively and then   bisected.  On section the specimens  have a pink rubbery edematous center throughout.  The specimens are   enti                          rely submitted in one cassette.  (Dictated by: Minor Pleitez 12/2/2020 10:30 AM)    MICROSCOPIC:  The microscopic findings support the diagnosis.    The technical component of this testing was completed at the [facility], with the professional component performed   at the [facility]  Laboratory, [address] [place] [address]  [id number] ([phone])    CPT Codes:  A: 09490-DI0    COLLECTION SITE:  Client: [facility]  Location: [id number] (F)                   "

## 2021-02-15 NOTE — LETTER
[facility]    02/15/21    Patient: Zo Qureshi  YOB: 1975  Medical Record Number: [id number]                                                                  Controlled Substance Agreement  I understand that my care provider has prescribed controlled substances (narcotics, tranquilizers, and/or stimulants) to help manage my condition(s).  I am taking this medicine to help me function or work.  I know that this is strong medicine.  It could have serious side effects and even cause a dependency on the drug.  If I stop these medicines suddenly, I could have severe withdrawal symptoms.    The risks, benefits, and side effects of these medication(s) were explained to me.  I agree that:  1. I will take part in other treatments as advised by my provider.  This may be psychiatry or counseling, physical therapy, behavioral therapy, group treatment, or a referral to a pain clinic.  I will reduce or stop my medicine when my provider tells me to do so.   2. I will take my medicines as prescribed.  I will not change the dose or schedule unless my provider tells me to.  There will be no refills if I  run out early.   I may be contacted at any time without warning and asked to complete a drug test or pill count.   3. I will keep all my appointments at the clinic.  If I miss appointments or fail to follow instructions, my provider may stop my medicine.  4. I will not ask other providers to prescribe controlled substances. And I will not accept controlled substances from other people. If I need another prescribed controlled substance for a new reason, I will notify my provider within one business day.  5. If I enroll in the [place] Medical Marijuana program, I will tell my provider.  I will also sign an agreement to share my medical records with my provider.  6. I will use one pharmacy to fill all of my controlled substance prescriptions.  If my prescription is mailed to my pharmacy, it  may take 5 to 7 days for my medicine to be ready.  7. I understand that my provider, clinic care team, and pharmacy can track controlled substance prescriptions from other providers through a central database (prescription monitoring program).  8. I will bring in my list of medications (or my medicine bottles) each time I come to the clinic.  [id number] REV-  07/2018                                                                                                                                    Page 1 of 2      [facility]    02/15/21    Patient: Zo Qureshi  YOB: 1975  Medical Record Number: [id number]    9. Refills of controlled substances will be made only during office hours.  It is up to me to make sure that I do not run out of my medicines on weekends or holidays.    10. I am responsible for my prescriptions.  If the medicine/prescription is lost or stolen, it will not be replaced.   I also agree not to share these medicines with anyone.  11. I agree to not use ANY illegal or recreational drugs.  This includes marijuana, cocaine, bath salts or other drugs.  I agree not to use alcohol unless my provider says I may.  I agree to give urine samples whenever asked.  If I fail to give a urine sample, the provider may stop my medicine.     12. I will tell my nurse or provider right away if I become pregnant or have a new medical problem treated outside of [facility].  13. I understand that this medicine can affect my thinking and judgment.  It may be unsafe for me to drive, use machinery and do dangerous tasks.  I will not do any of these things until I know how the medicine affects me.  If my dose changes, I will wait to see how it affects me.  I will contact my provider if I have concerns about medicine side effects.  I understand that if I do not follow any of the conditions above, my prescriptions or treatment may be stopped.    I agree that my provider, clinic  care team, and pharmacy may work with any city, state or federal law enforcement agency that investigates the misuse, sale, or other diversion of my controlled medicine. I will allow my provider to discuss my care with or share a copy of this agreement with any other treating provider, pharmacy or emergency room where I receive care.  I agree to give up (waive) any right of privacy or confidentiality with respect to these authorizations.   I have read this agreement and have asked questions about anything I did not understand.   ___________________________________    ___________________________  Patient Signature                                                           Date and Time  ___________________________________     ____________________________  Witness                                                                            Date and Time  ___________________________________  Terri Robles MD  [id number] REV-  07/2018                                                                                                                                                   Page 2 of 2

## 2021-02-16 ENCOUNTER — TELEPHONE (OUTPATIENT)
Dept: FAMILY MEDICINE | Facility: CLINIC | Age: 46
End: 2021-02-16

## 2021-02-16 ASSESSMENT — PATIENT HEALTH QUESTIONNAIRE - PHQ9: SUM OF ALL RESPONSES TO PHQ QUESTIONS 1-9: 11

## 2021-02-16 ASSESSMENT — ANXIETY QUESTIONNAIRES: GAD7 TOTAL SCORE: 1

## 2021-02-16 NOTE — TELEPHONE ENCOUNTER
Pt called question about the DME for a sacral pillow [facility] does not carry. Gave pt the number for [facility].  [phone].  Advised pt to call back with further questions.    Sarita CRENSHAW RN, BSN, PAL (Patient Advocate Liaison)  [facility]   [phone]

## 2021-03-29 ENCOUNTER — TELEPHONE (OUTPATIENT)
Dept: FAMILY MEDICINE | Facility: CLINIC | Age: 46
End: 2021-03-29

## 2021-03-29 ASSESSMENT — ANXIETY QUESTIONNAIRES
1. FEELING NERVOUS, ANXIOUS, OR ON EDGE: SEVERAL DAYS
3. WORRYING TOO MUCH ABOUT DIFFERENT THINGS: NOT AT ALL
6. BECOMING EASILY ANNOYED OR IRRITABLE: SEVERAL DAYS
5. BEING SO RESTLESS THAT IT IS HARD TO SIT STILL: NOT AT ALL
7. FEELING AFRAID AS IF SOMETHING AWFUL MIGHT HAPPEN: NOT AT ALL
2. NOT BEING ABLE TO STOP OR CONTROL WORRYING: NOT AT ALL
GAD7 TOTAL SCORE: 3

## 2021-03-29 ASSESSMENT — PATIENT HEALTH QUESTIONNAIRE - PHQ9
SUM OF ALL RESPONSES TO PHQ QUESTIONS 1-9: 11
5. POOR APPETITE OR OVEREATING: SEVERAL DAYS

## 2021-03-29 NOTE — TELEPHONE ENCOUNTER
PAL RN outreach PHQ-9 today is 11 with no suicidal ideation.      PHQ 8/19/2020 2/15/2021 3/29/2021   PHQ-9 Total Score 15 11 11   Q9: Thoughts of better off dead/self-harm past 2 weeks Not at all Not at all Not at all       Pt states that pain is the main cause of any depression.  She gets irritable when people want anything from her when she is in pain  Pt states she does not want to increase her pain meds at this point.    She states that sleeping is very hard with the pain but the pain meds do help with sleeping.      Pt denies further needs at this time.    Sarita CRENSHAW RN, BSN, PAL (Patient Advocate Liaison)  [facility]   [phone]

## 2021-03-30 ASSESSMENT — ANXIETY QUESTIONNAIRES: GAD7 TOTAL SCORE: 3

## 2021-04-19 ENCOUNTER — TELEPHONE (OUTPATIENT)
Dept: FAMILY MEDICINE | Facility: CLINIC | Age: 46
End: 2021-04-19

## 2021-04-19 NOTE — TELEPHONE ENCOUNTER
Pt is wanting her covid vaccine.  Found appt on 04/22 at [facility] called to pt to let her know.    Sarita CRENSHAW RN, BSN, PAL (Patient Advocate Liaison)  [facility]   [phone]

## 2021-04-22 ENCOUNTER — IMMUNIZATION (OUTPATIENT)
Dept: NURSING | Facility: CLINIC | Age: 46
End: 2021-04-22
Payer: COMMERCIAL

## 2021-04-22 PROCEDURE — 91301 PR COVID VAC MODERNA 100 MCG/0.5 ML IM: CPT

## 2021-04-22 PROCEDURE — 0011A PR COVID VAC MODERNA 100 MCG/0.5 ML IM: CPT

## 2021-05-07 DIAGNOSIS — R10.13 ABDOMINAL PAIN, EPIGASTRIC: ICD-10-CM

## 2021-05-07 DIAGNOSIS — K64.4 EXTERNAL HEMORRHOIDS: ICD-10-CM

## 2021-05-07 RX ORDER — ONDANSETRON 4 MG/1
TABLET, ORALLY DISINTEGRATING ORAL
Qty: 12 TABLET | Refills: 1 | Status: SHIPPED | OUTPATIENT
Start: 2021-05-07 | End: 2021-11-17

## 2021-05-07 NOTE — TELEPHONE ENCOUNTER
Zofran- Prescription approved per [facility] Refill Protocol.    Stool softener Routing refill request to provider for review/approval because:  Drug not active on patient's medication list    Delma Vaz RN

## 2021-05-10 RX ORDER — DOCUSATE SODIUM 100 MG
CAPSULE ORAL
Qty: 30 CAPSULE | Refills: 1 | Status: SHIPPED | OUTPATIENT
Start: 2021-05-10 | End: 2022-03-23

## 2021-05-18 ENCOUNTER — OFFICE VISIT (OUTPATIENT)
Dept: FAMILY MEDICINE | Facility: CLINIC | Age: 46
End: 2021-05-18
Payer: COMMERCIAL

## 2021-05-18 VITALS
SYSTOLIC BLOOD PRESSURE: 120 MMHG | DIASTOLIC BLOOD PRESSURE: 79 MMHG | TEMPERATURE: 97.9 F | WEIGHT: 117 LBS | RESPIRATION RATE: 16 BRPM | BODY MASS INDEX: 23.59 KG/M2 | OXYGEN SATURATION: 96 % | HEART RATE: 74 BPM | HEIGHT: 59 IN

## 2021-05-18 DIAGNOSIS — R82.90 NONSPECIFIC FINDING ON EXAMINATION OF URINE: ICD-10-CM

## 2021-05-18 DIAGNOSIS — M67.88 RIGHT PERONEAL TENDINOSIS: ICD-10-CM

## 2021-05-18 DIAGNOSIS — N30.00 ACUTE CYSTITIS WITHOUT HEMATURIA: Primary | ICD-10-CM

## 2021-05-18 DIAGNOSIS — M77.8 RIGHT SHOULDER TENDONITIS: ICD-10-CM

## 2021-05-18 DIAGNOSIS — M54.2 CERVICALGIA: ICD-10-CM

## 2021-05-18 LAB
ALBUMIN UR-MCNC: NEGATIVE MG/DL
APPEARANCE UR: CLEAR
BACTERIA #/AREA URNS HPF: ABNORMAL /HPF
BILIRUB UR QL STRIP: NEGATIVE
COLOR UR AUTO: YELLOW
GLUCOSE UR STRIP-MCNC: NEGATIVE MG/DL
HGB UR QL STRIP: ABNORMAL
KETONES UR STRIP-MCNC: NEGATIVE MG/DL
LEUKOCYTE ESTERASE UR QL STRIP: ABNORMAL
NITRATE UR QL: NEGATIVE
NON-SQ EPI CELLS #/AREA URNS LPF: ABNORMAL /LPF
PH UR STRIP: 6.5 PH (ref 5–7)
RBC #/AREA URNS AUTO: ABNORMAL /HPF
SOURCE: ABNORMAL
SP GR UR STRIP: 1.01 (ref 1–1.03)
UROBILINOGEN UR STRIP-ACNC: 0.2 EU/DL (ref 0.2–1)
WBC #/AREA URNS AUTO: ABNORMAL /HPF
WBC CLUMPS #/AREA URNS HPF: PRESENT /HPF

## 2021-05-18 PROCEDURE — 87088 URINE BACTERIA CULTURE: CPT | Performed by: PHYSICIAN ASSISTANT

## 2021-05-18 PROCEDURE — 87086 URINE CULTURE/COLONY COUNT: CPT | Performed by: PHYSICIAN ASSISTANT

## 2021-05-18 PROCEDURE — 87186 SC STD MICRODIL/AGAR DIL: CPT | Performed by: PHYSICIAN ASSISTANT

## 2021-05-18 PROCEDURE — 81001 URINALYSIS AUTO W/SCOPE: CPT | Performed by: PHYSICIAN ASSISTANT

## 2021-05-18 PROCEDURE — 99213 OFFICE O/P EST LOW 20 MIN: CPT | Performed by: PHYSICIAN ASSISTANT

## 2021-05-18 RX ORDER — SULFAMETHOXAZOLE/TRIMETHOPRIM 800-160 MG
1 TABLET ORAL 2 TIMES DAILY
Qty: 6 TABLET | Refills: 0 | Status: SHIPPED | OUTPATIENT
Start: 2021-05-18 | End: 2021-05-21

## 2021-05-18 RX ORDER — OLOPATADINE HYDROCHLORIDE 1 MG/ML
SOLUTION/ DROPS OPHTHALMIC
COMMUNITY
Start: 2021-05-03 | End: 2021-07-28

## 2021-05-18 RX ORDER — GABAPENTIN 300 MG/1
CAPSULE ORAL
Qty: 120 CAPSULE | Refills: 6 | Status: SHIPPED | OUTPATIENT
Start: 2021-05-18 | End: 2021-11-17

## 2021-05-18 RX ORDER — NAPROXEN 500 MG/1
500 TABLET ORAL 2 TIMES DAILY WITH MEALS
Qty: 60 TABLET | Refills: 6 | Status: SHIPPED | OUTPATIENT
Start: 2021-05-18 | End: 2021-08-16 | Stop reason: ALTCHOICE

## 2021-05-18 ASSESSMENT — MIFFLIN-ST. JEOR: SCORE: 1081.34

## 2021-05-18 NOTE — PROGRESS NOTES
Assessment & Plan     Acute cystitis without hematuria  UA consistent with bladder infection. Treated with Bactrim as noted below. Culture pending. Will contact patient if antibiotic needs to be changed.  - *UA reflex to Microscopic and Culture ([place] [facility] (except [place] and [place])  - Urine Culture Aerobic Bacterial  - sulfamethoxazole-trimethoprim (BACTRIM DS) 800-160 MG tablet; Take 1 tablet by mouth 2 times daily for 3 days    Nonspecific finding on examination of urine    - Urine Culture Aerobic Bacterial    Right peroneal tendinosis  Refilled per patient request.  - gabapentin (NEURONTIN) 300 MG capsule; 1 tab in am and 1 at 5 pm  and 2 at bedtime for (4 per day)  - naproxen (NAPROSYN) 500 MG tablet; Take 1 tablet (500 mg) by mouth 2 times daily (with meals)    Right shoulder tendonitis  Refilled per patient request.  - gabapentin (NEURONTIN) 300 MG capsule; 1 tab in am and 1 at 5 pm  and 2 at bedtime for (4 per day)  - naproxen (NAPROSYN) 500 MG tablet; Take 1 tablet (500 mg) by mouth 2 times daily (with meals)    Cervicalgia  Refilled per patient request.  - gabapentin (NEURONTIN) 300 MG capsule; 1 tab in am and 1 at 5 pm  and 2 at bedtime for (4 per day)  - naproxen (NAPROSYN) 500 MG tablet; Take 1 tablet (500 mg) by mouth 2 times daily (with meals)    Review of the result(s) of each unique test - UA  Ordering of each unique test  Prescription drug management    See Patient Instructions    Return if symptoms worsen or fail to improve.    Hattie Perez PA-C  [facility] KAMAR Pathak is a 46 year old who presents for the following health issues    HPI     Genitourinary - Female  Onset/Duration: 1 day  Description:   Painful urination (Dysuria): YES           Frequency: YES  Blood in urine (Hematuria): no  Delay in urine (Hesitency): YES  Intensity: mild  Progression of Symptoms:  same  Accompanying Signs & Symptoms:  Fever/chills: YES -  "chills  Flank pain: no  Nausea and vomiting: no  Vaginal symptoms: none  Abdominal/Pelvic Pain: no, some bloating  History:   History of frequent UTI s: no  History of kidney stones: no  Sexually Active: no  Possibility of pregnancy: No  Precipitating or alleviating factors: None  Therapies tried and outcome: Increase fluid intake    Last bladder infection about a year ago.    Review of Systems   GENERAL:  No fevers        Objective    /79 (BP Location: Right arm, Patient Position: Chair, Cuff Size: Adult Regular)   Pulse 74   Temp 97.9  F (36.6  C) (Oral)   Resp 16   Ht 1.499 m (4' 11\")   Wt 53.1 kg (117 lb)   LMP 03/26/2019 (Within Days)   SpO2 96%   BMI 23.63 kg/m    Body mass index is 23.63 kg/m .  Physical Exam   GENERAL: No acute distress  HEENT: Normocephalic  NEURO: Alert and non-focal      Results for orders placed or performed in visit on 05/18/21 (from the past 24 hour(s))   *UA reflex to Microscopic and Culture ([place] and [facility] (except [place] and [place])    Specimen: Midstream Urine   Result Value Ref Range    Color Urine Yellow     Appearance Urine Clear     Glucose Urine Negative NEG^Negative mg/dL    Bilirubin Urine Negative NEG^Negative    Ketones Urine Negative NEG^Negative mg/dL    Specific Gravity Urine 1.015 1.003 - 1.035    Blood Urine Large (A) NEG^Negative    pH Urine 6.5 5.0 - 7.0 pH    Protein Albumin Urine Negative NEG^Negative mg/dL    Urobilinogen Urine 0.2 0.2 - 1.0 EU/dL    Nitrite Urine Negative NEG^Negative    Leukocyte Esterase Urine Moderate (A) NEG^Negative    Source Midstream Urine    Urine Microscopic   Result Value Ref Range    WBC Urine  (A) OTO5^0 - 5 /HPF    RBC Urine 10-25 (A) OTO2^O - 2 /HPF    WBC Clumps Present (A) NEG^Negative /HPF    Squamous Epithelial /LPF Urine Few FEW^Few /LPF    Bacteria Urine Moderate (A) NEG^Negative /HPF             "

## 2021-05-18 NOTE — PATIENT INSTRUCTIONS
Today's lab results does show a bladder infection.  Please take antibiotics as prescribed (finish entire course even if feeling improved).    Bladder irritants also can cause symptoms similar to a bladder infection. Bladder irritants include: caffeine (tea and coffee), alcohol, apple juice, lemon juice, soy sauce, carbonated drinks, pineapple, strawberries, tomatoes, yogurt and vinegar.    Prevention of urinary tract infection:    AVOID CHEMICAL IRRITTANTS: bath gels,  Perfumed products,  Deoderant pads or tampons, douching    CLOTHING that increases moisture and bacterial growth:  Nylon, lycra, pantihose, pantiliners     AVOID: tight clothing and thongs    ACIDIFY URINE: cranberry tablets instead of cranberry juice (with excess sugar) to acidify urine and decrease bacterial growth.     URINATE after intercourse    FLUIDS: 6-8 glasses water per day

## 2021-05-19 LAB
BACTERIA SPEC CULT: ABNORMAL
Lab: ABNORMAL
SPECIMEN SOURCE: ABNORMAL

## 2021-05-20 ENCOUNTER — IMMUNIZATION (OUTPATIENT)
Dept: NURSING | Facility: CLINIC | Age: 46
End: 2021-05-20
Attending: INTERNAL MEDICINE
Payer: COMMERCIAL

## 2021-05-20 PROCEDURE — 0012A PR COVID VAC MODERNA 100 MCG/0.5 ML IM: CPT

## 2021-05-20 PROCEDURE — 91301 PR COVID VAC MODERNA 100 MCG/0.5 ML IM: CPT

## 2021-05-26 NOTE — TELEPHONE ENCOUNTER
NOTES Status Details   OFFICE NOTE from referring provider Internal 10.26.2020 Terri Robles MD   OFFICE NOTE from other specialist N/A    DISCHARGE SUMMARY from hospital N/A    DISCHARGE REPORT from the ER N/A    MEDICATION LIST ce /Internal    LABS (Any and all labs)      Internal    Biopsy/pathology (Anything related to diagnoses I.e. fluid aspirations, lip biopsy, muscle biopsy)      N/A     N/A    Imaging (All imaging related to diagnoses)     Echo N/A    HRCT N/A    CXR N/A    EMG N/A                   Scleroderma/Dermatomyositis diagnoses     Previous Cardiology notes  N/A    Previous Pulmonary notes N/A    Previous Dermatology notes N/A    Previous GI notes N/A    Lupus diagnoses     Previous Nephrology notes N/A    Previous Dermatology notes N/A    Previous Cardiology notes N/A

## 2021-05-27 ENCOUNTER — VIRTUAL VISIT (OUTPATIENT)
Dept: RHEUMATOLOGY | Facility: CLINIC | Age: 46
End: 2021-05-27
Attending: FAMILY MEDICINE
Payer: COMMERCIAL

## 2021-05-27 ENCOUNTER — PRE VISIT (OUTPATIENT)
Dept: RHEUMATOLOGY | Facility: CLINIC | Age: 46
End: 2021-05-27

## 2021-05-27 DIAGNOSIS — M45.8 ANKYLOSING SPONDYLITIS OF SACRAL REGION (H): ICD-10-CM

## 2021-05-27 DIAGNOSIS — M47.819 SERONEGATIVE SPONDYLOARTHROPATHY: Primary | ICD-10-CM

## 2021-05-27 DIAGNOSIS — M53.3 SACRAL PAIN: ICD-10-CM

## 2021-05-27 DIAGNOSIS — M46.98 INFLAMMATORY SPONDYLOPATHY OF SACRAL REGION (H): ICD-10-CM

## 2021-05-27 PROCEDURE — 99205 OFFICE O/P NEW HI 60 MIN: CPT | Mod: 95 | Performed by: INTERNAL MEDICINE

## 2021-05-27 RX ORDER — MELOXICAM 15 MG/1
15 TABLET ORAL DAILY
Qty: 90 TABLET | Refills: 0 | Status: SHIPPED | OUTPATIENT
Start: 2021-05-27 | End: 2021-08-16

## 2021-05-27 ASSESSMENT — PAIN SCALES - GENERAL: PAINLEVEL: SEVERE PAIN (7)

## 2021-05-27 NOTE — PROGRESS NOTES
Zo is a 46 year old who is being evaluated via a billable video visit.      How would you like to obtain your AVS? Mail a copy  If the video visit is dropped, the invitation should be resent by: Send to e-mail at: [email or web address]  Will anyone else be joining your video visit? No        Type of service:  Video Visit    Start: 05/27/2021 11:08 am   Stop: 05/27/2021 11:52 am    Originating Location (pt. Location): Home    Distant Location (provider location):  [facility] [place]     Platform used for Video Visit: Christine Hernandez MD  Rheumatology      Outpatient Rheumatology Consultation  This visit was conducted via synchronous video visit due to the current COVID-19 crisis to reduce patient risk.  Verbal consent was obtained and is documented below.    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 Last seen by Dr. Garay of 5/24/2019. Most recent imaging of SI joints was on 2/28/2020 which showed sclerosis with marginal spurring about the SI joints, likely secondary to her chronic inflammatory disease, though no active inflammatory changes at that time. Had been tried on both humira and enbrel, though did not tolerate these due to side effects so discontinued prior to determining if efficacious. At the time of her last visit with rheumatology it was documented that she was responsive to NSAIDs, prednisone, and SSZ. The dose of her SSZ was increased to 1gm BID. Her disease is also characterized by peripheral arthritis and enthesitis as well. She has been continued on SSZ 1gm BID and naproxen 500mg BID, though has had ongoing axial stiffness which  is AM predominant and improves with activity and stretching.  Also with ongoing swelling of right hand second through fourth digits mostly around her PIPs.  Given her persistently active disease will transition to meloxicam at this time maximum dose of 15 mg daily and continue on sulfasalazine 1 g twice daily.  Will obtain hepatitis B, hepatitis C, HIV, Quant gold in anticipation of starting IL-17 inhibitor.  Once she has her labs done we will call her with the results and let her know that I ordered her new medication Cosentyx.      Plan:  1.  Continue sulfasalazine 1 g twice daily  2.  Switch from naproxen 500 mg twice daily to meloxicam 15 mg once daily.  No other NSAIDs in combination with once daily meloxicam  3.  Labs now. Once she transitions from SSZ, will no longer need q3 labs for drug monitoring for SSZ  4.  I will call the results of the labs and to let the patient know that Cosentyx has been ordered  5. Follow-up in 12 weeks    James Hernandez MD  Rheumatology     I spent a total of 60 minutes on the date of service on chart review, patient video visit, , documentation.   Subjective:   Had flu like symptoms with flu like symptoms. Resolves when stopped. Prednisone, NSAIDs, SSZ used/helpful. Prednisone had nausea and consipation. SSZ has drowsiness/ dizziness/ mental fog worse in the AM. Has continued on SSZ 1gm in the AM and 1gm in the PM. Still with significant drowsiness. If she doesn't want to have drowsiness, reduces the dose, has pain in neck, shoulder, back. Has some swelling of joints. Has tenderness of many joints. Takes naproxen and gabapentin. She takes naproxen 500mg BID. In the AM has stiffness in her neck/back. Gets some better with stretching/ exercise. Takes 15 minutes to get better. No rash. Does have swelling of her right hand 2nd -4th digits. Pain in her PIPs predominantly. Pain also in between the joints. Has recently had some blurred vision, wears glasses. No inflammatory  eye disease history. Intermittent double vision. Has had hair loss, non scarring more that is thinning. More over the last 3-4 years. Intermittent sore in mouth, painful. Has dry eyes, uses eye drops 3-4 times per week. When she wakes up her throat is dry. Drinks frequently. No abdominal pain. No blood in her stool. No n/v. No raynauds. No sclerodactyly. No fevers, weight loss. Occasional weight loss and chills. Has been having ongoing throbbing HA on the left side. The naproxen is some helpful, though returns shortly afterwards. No miscarriages or blood clots.     Past Medical History  Past Medical History:   Diagnosis Date     Anemia      History of blood transfusion 2000    after vaginal delivery, 2 units PRBCs given     HSIL on Pap smear 09/21/11    MARTA 2 and 3     INFLAM SPONDYLOPATHY NOS 01/07/2008    check labs on sulfasalzine every 3 months and check hep B and C and TB every 2 years     Leukocytopenia 3/10/2014     Tendinosis      Thrombocytopenia (H) 3/10/2014     Unspecified closed fracture of pelvis 2000    left fracture of pelvis after delivery     Past Surgical History  Past Surgical History:   Procedure Laterality Date     DILATION AND CURETTAGE, ABLATE ENDOMETRIUM NOVASURE, COMBINED N/A 12/04/2018    Procedure: novasure endometrial ablation, myosure resection of leiomyoma, dilation and curettage;  Surgeon: Rolando Covarrubias MD;  Location: [facility] OR     GYN SURGERY  12/04/2018    fibroid removal     LEEP TX, CERVICAL  12/19/2011    MARTA II     OPERATIVE HYSTEROSCOPY WITH MORCELLATOR N/A 12/04/2018    ovaries are still in.   Surgeon: Rolando Covarrubias MD;  Location: [facility] OR     TUBAL LIGATION  05/2009    laparoscopic tubal ligation     ZZC NONSPECIFIC PROCEDURE  10/2000    after delivery 2 units of prbcs secondary to placenta     Medications  Current Outpatient Medications   Medication     clindamycin-benzoyl peroxide (BENZACLIN) 1-5 % external gel     diclofenac 1 % TD topical gel     DULoxetine  "(CYMBALTA) 30 MG capsule     ferrous sulfate (FEROSUL) 325 (65 Fe) MG tablet     fluticasone (FLONASE) 50 MCG/ACT nasal spray     gabapentin (NEURONTIN) 300 MG capsule     hydrocortisone (WESTCORT) 0.2 % external cream     ketotifen (ZADITOR) 0.025 % ophthalmic solution     lidocaine (XYLOCAINE) 5 % external ointment     loratadine (CLARITIN) 10 MG tablet     naproxen (NAPROSYN) 500 MG tablet     olopatadine (PATANOL) 0.1 % ophthalmic solution     ondansetron (ZOFRAN-ODT) 4 MG ODT tab     STOOL SOFTENER 100 MG capsule     sulfaSALAzine ER (AZULFIDINE EN) 500 MG EC tablet     traMADol (ULTRAM) 50 MG tablet     traZODone (DESYREL) 50 MG tablet     No current facility-administered medications for this visit.      Allergies   Allergies   Allergen Reactions     Contrast Dye Nausea and Vomiting     Diatrizoate Nausea and Vomiting     Percocet [Oxycodone-Acetaminophen] Nausea and Vomiting and Itching     Advil [Ibuprofen Micronized] Rash     Rash      Ibuprofen Rash     Family History: Mother with \"arthritis\"    Social History: Not currently working. 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 No smoking/drugs/ETOH. Not . 4 kids who are healthy.       Objective:    Physical exam:  No vitals for this video visit. Vitals reviewed in Epic.  GEN: Sitting up at table. NAD  HEENT: no facial rash, sclera clear, no inflammatory nose/ external ear changes  CV: no upper extremity dependent edema  Pulm: speaking in full sentences, no cough, no audible wheezing, no use of accessory muscles  Abdomen: not distended  Skin: no acute cutaneous lesions  MSK: full active ROM of bilateral shoulders, elbows, wrists, MCPs, PIPs, DIPs. Can make full fist without difficulty. No erythema or edema of these joints.    Labs:  WBC   Date Value Ref Range Status   02/15/2021 6.5 4.0 - 11.0 10e9/L Final     Hemoglobin   Date Value Ref Range Status   02/15/2021 12.2 11.7 - 15.7 g/dL Final     Platelet Count   Date Value Ref " Range Status   02/15/2021 219 150 - 450 10e9/L Final     Creatinine   Date Value Ref Range Status   02/15/2021 0.54 0.52 - 1.04 mg/dL Final     Lab Results   Component Value Date    ALKPHOS 55 02/15/2021     AST   Date Value Ref Range Status   02/15/2021 14 0 - 45 U/L Final     Lab Results   Component Value Date    ALT 17 02/15/2021     Sed Rate   Date Value Ref Range Status   02/15/2021 20 0 - 20 mm/h Final     CRP Inflammation   Date Value Ref Range Status   02/26/2020 <2.9 0.0 - 8.0 mg/L Final     UA RESULTS:  Recent Labs   Lab Test 05/18/21  0858   COLOR Yellow   APPEARANCE Clear   URINEGLC Negative   URINEBILI Negative   URINEKETONE Negative   SG 1.015   UBLD Large*   URINEPH 6.5   PROTEIN Negative   UROBILINOGEN 0.2   NITRITE Negative   LEUKEST Moderate*   RBCU 10-25*   WBCU *      RAMÍREZ Screen by EIA   Date Value Ref Range Status   05/21/2014 <1.0  Interpretation:  Negative   <1.0 Final     Rheumatoid Factor   Date Value Ref Range Status   05/21/2014 <20 <20 IU/mL Final     Cyclic Citrullinated Peptide Antibody, IgG   Date Value Ref Range Status   08/28/2017 1 <7 U/mL Final     Comment:     Negative       Imaging:  MRI LUMBAR SPINE WITHOUT CONTRAST   2/28/2020 3:51 PM      HISTORY: Radiculopathy, greater than  6 weeks conservative treatment,  persistent symptoms; radiculopathy - history of sacroiliitis. Family  history of spinal disease and severe stenosis - left-sided symptoms.  Spondyloarthropathy.      TECHNIQUE: Multiplanar multisequence MRI of the lumbar spine without  contrast.      COMPARISON: Lumbar spine MRI 9/7/2010. Correlation is also made with  prior pelvic CT 6/10/2019.     FINDINGS:  Alignment is normal. No fracture or significant vertebral  body height loss. The intervertebral discs are normal in height and  signal. No abnormal marrow signal in the lumbar spine. There is no  disc bulge or herniation. There is no spinal or neural foraminal  stenosis. Sclerotic changes about the sacroiliac  joints with marginal  spurring. This appears overall similar (within the field-of-view) to  the most recent examinations.                                                                      IMPRESSION:  1. No acute abnormality of the lumbar spine. Specifically, no disc  bulge/herniation, marrow signal abnormality, or spinal or neural  foraminal stenosis.  2. Redemonstrated sclerosis with marginal spurring about the  sacroiliac joints, potentially representing changes related to  osteoarthritis. This may be secondary to prior sacroiliitis in the  setting of spondyloarthropathy given the patient's clinical history.  This is incompletely included within the field-of-view of this exam.

## 2021-05-27 NOTE — LETTER
5/27/2021       RE: Zo Qureshi  [address]  [place] [address]     Dear Colleague,    Thank you for referring your patient, Zo Qureshi, to the [facility] [place] at [facility]. Please see a copy of my visit note below.      Zo is a 46 year old who is being evaluated via a billable video visit.      How would you like to obtain your AVS? Mail a copy  If the video visit is dropped, the invitation should be resent by: Send to e-mail at: [email or web address]  Will anyone else be joining your video visit? No        Type of service:  Video Visit    Start: 05/27/2021 11:08 am   Stop: 05/27/2021 11:52 am    Originating Location (pt. Location): Home    Distant Location (provider location):  [facility] [place]     Platform used for Video Visit: Christine Hernandez MD  Rheumatology      Outpatient Rheumatology Consultation  This visit was conducted via synchronous video visit due to the current COVID-19 crisis to reduce patient risk.  Verbal consent was obtained and is documented below.    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 Last seen by Dr. Garay of 5/24/2019. Most recent imaging of SI joints was on 2/28/2020 which showed sclerosis with marginal spurring about the SI joints, likely secondary to her chronic inflammatory disease, though no active inflammatory changes at that time. Had been tried on both humira and enbrel, though did not tolerate these due to side effects so discontinued prior to determining if efficacious. At the time of her last visit with  rheumatology it was documented that she was responsive to NSAIDs, prednisone, and SSZ. The dose of her SSZ was increased to 1gm BID. Her disease is also characterized by peripheral arthritis and enthesitis as well. She has been continued on SSZ 1gm BID and naproxen 500mg BID, though has had ongoing axial stiffness which is AM predominant and improves with activity and stretching.  Also with ongoing swelling of right hand second through fourth digits mostly around her PIPs.  Given her persistently active disease will transition to meloxicam at this time maximum dose of 15 mg daily and continue on sulfasalazine 1 g twice daily.  Will obtain hepatitis B, hepatitis C, HIV, Quant gold in anticipation of starting IL-17 inhibitor.  Once she has her labs done we will call her with the results and let her know that I ordered her new medication Cosentyx.      Plan:  1.  Continue sulfasalazine 1 g twice daily  2.  Switch from naproxen 500 mg twice daily to meloxicam 15 mg once daily.  No other NSAIDs in combination with once daily meloxicam  3.  Labs now. Once she transitions from SSZ, will no longer need q3 labs for drug monitoring for SSZ  4.  I will call the results of the labs and to let the patient know that Cosentyx has been ordered  5. Follow-up in 12 weeks    James Hernandez MD  Rheumatology     I spent a total of 60 minutes on the date of service on chart review, patient video visit, , documentation.   Subjective:   Had flu like symptoms with flu like symptoms. Resolves when stopped. Prednisone, NSAIDs, SSZ used/helpful. Prednisone had nausea and consipation. SSZ has drowsiness/ dizziness/ mental fog worse in the AM. Has continued on SSZ 1gm in the AM and 1gm in the PM. Still with significant drowsiness. If she doesn't want to have drowsiness, reduces the dose, has pain in neck, shoulder, back. Has some swelling of joints. Has tenderness of many joints. Takes naproxen and gabapentin. She takes naproxen  500mg BID. In the AM has stiffness in her neck/back. Gets some better with stretching/ exercise. Takes 15 minutes to get better. No rash. Does have swelling of her right hand 2nd -4th digits. Pain in her PIPs predominantly. Pain also in between the joints. Has recently had some blurred vision, wears glasses. No inflammatory eye disease history. Intermittent double vision. Has had hair loss, non scarring more that is thinning. More over the last 3-4 years. Intermittent sore in mouth, painful. Has dry eyes, uses eye drops 3-4 times per week. When she wakes up her throat is dry. Drinks frequently. No abdominal pain. No blood in her stool. No n/v. No raynauds. No sclerodactyly. No fevers, weight loss. Occasional weight loss and chills. Has been having ongoing throbbing HA on the left side. The naproxen is some helpful, though returns shortly afterwards. No miscarriages or blood clots.     Past Medical History  Past Medical History:   Diagnosis Date     Anemia      History of blood transfusion 2000    after vaginal delivery, 2 units PRBCs given     HSIL on Pap smear 09/21/11    MARTA 2 and 3     INFLAM SPONDYLOPATHY NOS 01/07/2008    check labs on sulfasalzine every 3 months and check hep B and C and TB every 2 years     Leukocytopenia 3/10/2014     Tendinosis      Thrombocytopenia (H) 3/10/2014     Unspecified closed fracture of pelvis 2000    left fracture of pelvis after delivery     Past Surgical History  Past Surgical History:   Procedure Laterality Date     DILATION AND CURETTAGE, ABLATE ENDOMETRIUM NOVASURE, COMBINED N/A 12/04/2018    Procedure: novasure endometrial ablation, myosure resection of leiomyoma, dilation and curettage;  Surgeon: Rolando Covarrubias MD;  Location: [facility] OR     GYN SURGERY  12/04/2018    fibroid removal     LEEP TX, CERVICAL  12/19/2011    MARTA II     OPERATIVE HYSTEROSCOPY WITH MORCELLATOR N/A 12/04/2018    ovaries are still in.   Surgeon: Rolando Covarrubias MD;  Location: [facility] OR     TUBAL  "LIGATION  05/2009    laparoscopic tubal ligation     ZZC NONSPECIFIC PROCEDURE  10/2000    after delivery 2 units of prbcs secondary to placenta     Medications  Current Outpatient Medications   Medication     clindamycin-benzoyl peroxide (BENZACLIN) 1-5 % external gel     diclofenac 1 % TD topical gel     DULoxetine (CYMBALTA) 30 MG capsule     ferrous sulfate (FEROSUL) 325 (65 Fe) MG tablet     fluticasone (FLONASE) 50 MCG/ACT nasal spray     gabapentin (NEURONTIN) 300 MG capsule     hydrocortisone (WESTCORT) 0.2 % external cream     ketotifen (ZADITOR) 0.025 % ophthalmic solution     lidocaine (XYLOCAINE) 5 % external ointment     loratadine (CLARITIN) 10 MG tablet     naproxen (NAPROSYN) 500 MG tablet     olopatadine (PATANOL) 0.1 % ophthalmic solution     ondansetron (ZOFRAN-ODT) 4 MG ODT tab     STOOL SOFTENER 100 MG capsule     sulfaSALAzine ER (AZULFIDINE EN) 500 MG EC tablet     traMADol (ULTRAM) 50 MG tablet     traZODone (DESYREL) 50 MG tablet     No current facility-administered medications for this visit.      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 Repetitive motion. No smoking/drugs/ETOH. Not . 4 kids who are healthy.       Objective:    Physical exam:  No vitals for this video visit. Vitals reviewed in Epic.  GEN: Sitting up at table. NAD  HEENT: no facial rash, sclera clear, no inflammatory nose/ external ear changes  CV: no upper extremity dependent edema  Pulm: speaking in full sentences, no cough, no audible wheezing, no use of accessory muscles  Abdomen: not distended  Skin: no acute cutaneous lesions  MSK: full active ROM of bilateral shoulders, " elbows, wrists, MCPs, PIPs, DIPs. Can make full fist without difficulty. No erythema or edema of these joints.    Labs:  WBC   Date Value Ref Range Status   02/15/2021 6.5 4.0 - 11.0 10e9/L Final     Hemoglobin   Date Value Ref Range Status   02/15/2021 12.2 11.7 - 15.7 g/dL Final     Platelet Count   Date Value Ref Range Status   02/15/2021 219 150 - 450 10e9/L Final     Creatinine   Date Value Ref Range Status   02/15/2021 0.54 0.52 - 1.04 mg/dL Final     Lab Results   Component Value Date    ALKPHOS 55 02/15/2021     AST   Date Value Ref Range Status   02/15/2021 14 0 - 45 U/L Final     Lab Results   Component Value Date    ALT 17 02/15/2021     Sed Rate   Date Value Ref Range Status   02/15/2021 20 0 - 20 mm/h Final     CRP Inflammation   Date Value Ref Range Status   02/26/2020 <2.9 0.0 - 8.0 mg/L Final     UA RESULTS:  Recent Labs   Lab Test 05/18/21  0858   COLOR Yellow   APPEARANCE Clear   URINEGLC Negative   URINEBILI Negative   URINEKETONE Negative   SG 1.015   UBLD Large*   URINEPH 6.5   PROTEIN Negative   UROBILINOGEN 0.2   NITRITE Negative   LEUKEST Moderate*   RBCU 10-25*   WBCU *      RAMÍREZ Screen by EIA   Date Value Ref Range Status   05/21/2014 <1.0  Interpretation:  Negative   <1.0 Final     Rheumatoid Factor   Date Value Ref Range Status   05/21/2014 <20 <20 IU/mL Final     Cyclic Citrullinated Peptide Antibody, IgG   Date Value Ref Range Status   08/28/2017 1 <7 U/mL Final     Comment:     Negative       Imaging:  MRI LUMBAR SPINE WITHOUT CONTRAST   2/28/2020 3:51 PM      HISTORY: Radiculopathy, greater than  6 weeks conservative treatment,  persistent symptoms; radiculopathy - history of sacroiliitis. Family  history of spinal disease and severe stenosis - left-sided symptoms.  Spondyloarthropathy.      TECHNIQUE: Multiplanar multisequence MRI of the lumbar spine without  contrast.      COMPARISON: Lumbar spine MRI 9/7/2010. Correlation is also made with  prior pelvic CT  6/10/2019.     FINDINGS:  Alignment is normal. No fracture or significant vertebral  body height loss. The intervertebral discs are normal in height and  signal. No abnormal marrow signal in the lumbar spine. There is no  disc bulge or herniation. There is no spinal or neural foraminal  stenosis. Sclerotic changes about the sacroiliac joints with marginal  spurring. This appears overall similar (within the field-of-view) to  the most recent examinations.                                                                      IMPRESSION:  1. No acute abnormality of the lumbar spine. Specifically, no disc  bulge/herniation, marrow signal abnormality, or spinal or neural  foraminal stenosis.  2. Redemonstrated sclerosis with marginal spurring about the  sacroiliac joints, potentially representing changes related to  osteoarthritis. This may be secondary to prior sacroiliitis in the  setting of spondyloarthropathy given the patient's clinical history.  This is incompletely included within the field-of-view of this exam.          Sincerely,    James Hernandez MD

## 2021-07-26 DIAGNOSIS — H10.13 ALLERGIC CONJUNCTIVITIS, BILATERAL: Primary | ICD-10-CM

## 2021-07-27 NOTE — TELEPHONE ENCOUNTER
Routing refill request to provider for review/approval because:  Medication is reported/historical    SAMANTHA CarranzaN, RN  [facility]

## 2021-07-28 RX ORDER — OLOPATADINE HYDROCHLORIDE 1 MG/ML
SOLUTION/ DROPS OPHTHALMIC
Qty: 5 ML | Refills: 1 | Status: SHIPPED | OUTPATIENT
Start: 2021-07-28 | End: 2023-05-24

## 2021-08-06 ENCOUNTER — NURSE TRIAGE (OUTPATIENT)
Dept: NURSING | Facility: CLINIC | Age: 46
End: 2021-08-06

## 2021-08-06 NOTE — TELEPHONE ENCOUNTER
Pain in shoulder, arm and back. She also has neck pain she rates at 5. I connected with scheduling for an appointment and advised urgent care if they can't get her into the clinic.  Paty Butts RN  [facility]      Reason for Disposition    MODERATE neck pain (e.g., interferes with normal activities like work or school)    Additional Information    Negative: Shock suspected (e.g., cold/pale/clammy skin, too weak to stand, low BP, rapid pulse)    Negative: Similar pain previously and it was from 'heart attack'    Negative: Similar pain previously from 'angina' and not relieved by nitroglycerin    Negative: Difficult to awaken or acting confused (e.g., disoriented, slurred speech)    Negative: Sounds like a life-threatening emergency to the triager    Negative: Followed an injury to neck (e.g., MVA, sports, impact or collision)    Negative: Chest pain    Negative: Lymph node in the neck is swollen or painful to the touch    Negative: Sore throat is the main symptom    Negative: Difficulty breathing or unusual sweating (e.g., sweating without exertion)    Negative: Chest pain lasting longer than 5 minutes    Negative: Stiff neck (can't touch chin to chest) and has headache    Negative: Stiff neck (can't touch chin to chest) and fever    Negative: Weakness of an arm or hand    Negative: Problems with bowel or bladder control    Negative: Patient sounds very sick or weak to the triager    Negative: SEVERE pain (e.g., excruciating, unable to do any normal activities)     Pain at 5    Negative: Head is twisting to one side (or ask 'is it turning against your will?')    Negative: Fever > 103 F (39.4 C)    Negative: Fever > 100.0 F (37.8 C) and IVDA (intravenous drug abuse)    Negative: Fever > 100.0 F (37.8 C) and has diabetes mellitus or a weak immune system (e.g., HIV positive, cancer chemotherapy, organ transplant, splenectomy, chronic steroids)    Negative: Numbness in an arm or hand (i.e., loss of  sensation)    Negative: Painful rash with multiple small blisters grouped together (i.e., dermatomal distribution or 'band' or 'stripe')    Negative: High-risk adult (e.g., history of cancer, HIV, or IV drug abuse)    Negative: Patient wants to be seen    Negative: Tenderness in front of neck over windpipe    Protocols used: NECK PAIN OR [id number]

## 2021-08-13 NOTE — PROGRESS NOTES
Pre-Visit Planning   Next 5 appointments (look out 90 days)    Aug 16, 2021  2:30 PM  (Arrive by 2:10 PM)  Office Visit with Terri Robles MD  [facility] ([facility] ) [address] [place] [id number] [phone]        Appointment Notes for this encounter:   Neck and shoulder pain    Sees rheumatology last on 05/27/21 5/18 office visit with Hattie RABAGO UTI    Questionnaires Reviewed/Assigned  No additional questionnaires are needed   Health Maintenance Due   Topic Date Due     ADVANCE CARE PLANNING  Never done     CBC  05/15/2021     CMP  08/15/2021     URINE DRUG SCREEN  08/19/2021     ANNUAL REVIEW OF HM ORDERS  08/19/2021     Current Outpatient Medications   Medication     clindamycin-benzoyl peroxide (BENZACLIN) 1-5 % external gel     diclofenac 1 % TD topical gel     DULoxetine (CYMBALTA) 30 MG capsule     ferrous sulfate (FEROSUL) 325 (65 Fe) MG tablet     fluticasone (FLONASE) 50 MCG/ACT nasal spray     gabapentin (NEURONTIN) 300 MG capsule     hydrocortisone (WESTCORT) 0.2 % external cream     ketotifen (ZADITOR) 0.025 % ophthalmic solution     lidocaine (XYLOCAINE) 5 % external ointment     loratadine (CLARITIN) 10 MG tablet     meloxicam (MOBIC) 15 MG tablet     naproxen (NAPROSYN) 500 MG tablet     olopatadine (PATANOL) 0.1 % ophthalmic solution     ondansetron (ZOFRAN-ODT) 4 MG ODT tab     STOOL SOFTENER 100 MG capsule     sulfaSALAzine ER (AZULFIDINE EN) 500 MG EC tablet     traMADol (ULTRAM) 50 MG tablet     traZODone (DESYREL) 50 MG tablet     No current facility-administered medications for this visit.     Patient preferred phone number: [phone]    Unable to reach. Left voicemail. Advised patient to call clinic back at [phone].    Sarita CRENSHAW RN, BSN, PAL (Patient Advocate Liaison)  [facility]   [phone]

## 2021-08-16 ENCOUNTER — OFFICE VISIT (OUTPATIENT)
Dept: FAMILY MEDICINE | Facility: CLINIC | Age: 46
End: 2021-08-16
Payer: MEDICARE

## 2021-08-16 VITALS
HEART RATE: 88 BPM | TEMPERATURE: 98.3 F | WEIGHT: 119 LBS | DIASTOLIC BLOOD PRESSURE: 64 MMHG | SYSTOLIC BLOOD PRESSURE: 112 MMHG | OXYGEN SATURATION: 97 % | BODY MASS INDEX: 24.04 KG/M2

## 2021-08-16 DIAGNOSIS — G89.4 CHRONIC PAIN SYNDROME: Primary | ICD-10-CM

## 2021-08-16 DIAGNOSIS — M77.8 RIGHT SHOULDER TENDONITIS: ICD-10-CM

## 2021-08-16 DIAGNOSIS — D50.9 IRON DEFICIENCY ANEMIA, UNSPECIFIED IRON DEFICIENCY ANEMIA TYPE: ICD-10-CM

## 2021-08-16 DIAGNOSIS — M67.88 RIGHT PERONEAL TENDINOSIS: ICD-10-CM

## 2021-08-16 DIAGNOSIS — G44.219 EPISODIC TENSION-TYPE HEADACHE, NOT INTRACTABLE: ICD-10-CM

## 2021-08-16 DIAGNOSIS — M53.3 DISORDER OF SI (SACROILIAC) JOINT: ICD-10-CM

## 2021-08-16 DIAGNOSIS — M54.2 CERVICALGIA: ICD-10-CM

## 2021-08-16 DIAGNOSIS — G47.00 INSOMNIA, UNSPECIFIED TYPE: ICD-10-CM

## 2021-08-16 DIAGNOSIS — H10.13 ALLERGIC CONJUNCTIVITIS, BILATERAL: ICD-10-CM

## 2021-08-16 DIAGNOSIS — M46.98 INFLAMMATORY SPONDYLOPATHY OF SACRAL REGION (H): ICD-10-CM

## 2021-08-16 DIAGNOSIS — M54.2 NECK PAIN: ICD-10-CM

## 2021-08-16 LAB
BASOPHILS # BLD AUTO: 0 10E3/UL (ref 0–0.2)
BASOPHILS NFR BLD AUTO: 0 %
EOSINOPHIL # BLD AUTO: 0.1 10E3/UL (ref 0–0.7)
EOSINOPHIL NFR BLD AUTO: 2 %
ERYTHROCYTE [DISTWIDTH] IN BLOOD BY AUTOMATED COUNT: 12 % (ref 10–15)
ERYTHROCYTE [SEDIMENTATION RATE] IN BLOOD BY WESTERGREN METHOD: 29 MM/HR (ref 0–20)
HCT VFR BLD AUTO: 34.5 % (ref 35–47)
HGB BLD-MCNC: 11.5 G/DL (ref 11.7–15.7)
LYMPHOCYTES # BLD AUTO: 1.6 10E3/UL (ref 0.8–5.3)
LYMPHOCYTES NFR BLD AUTO: 24 %
MCH RBC QN AUTO: 30.7 PG (ref 26.5–33)
MCHC RBC AUTO-ENTMCNC: 33.3 G/DL (ref 31.5–36.5)
MCV RBC AUTO: 92 FL (ref 78–100)
MONOCYTES # BLD AUTO: 0.8 10E3/UL (ref 0–1.3)
MONOCYTES NFR BLD AUTO: 11 %
NEUTROPHILS # BLD AUTO: 4.4 10E3/UL (ref 1.6–8.3)
NEUTROPHILS NFR BLD AUTO: 63 %
PLATELET # BLD AUTO: 247 10E3/UL (ref 150–450)
RBC # BLD AUTO: 3.74 10E6/UL (ref 3.8–5.2)
WBC # BLD AUTO: 6.9 10E3/UL (ref 4–11)

## 2021-08-16 PROCEDURE — 80053 COMPREHEN METABOLIC PANEL: CPT | Performed by: FAMILY MEDICINE

## 2021-08-16 PROCEDURE — 85025 COMPLETE CBC W/AUTO DIFF WBC: CPT | Performed by: FAMILY MEDICINE

## 2021-08-16 PROCEDURE — 87340 HEPATITIS B SURFACE AG IA: CPT | Performed by: FAMILY MEDICINE

## 2021-08-16 PROCEDURE — 87389 HIV-1 AG W/HIV-1&-2 AB AG IA: CPT | Performed by: FAMILY MEDICINE

## 2021-08-16 PROCEDURE — 36415 COLL VENOUS BLD VENIPUNCTURE: CPT | Performed by: FAMILY MEDICINE

## 2021-08-16 PROCEDURE — 99214 OFFICE O/P EST MOD 30 MIN: CPT | Performed by: FAMILY MEDICINE

## 2021-08-16 PROCEDURE — 86140 C-REACTIVE PROTEIN: CPT | Performed by: FAMILY MEDICINE

## 2021-08-16 PROCEDURE — 82570 ASSAY OF URINE CREATININE: CPT | Mod: XU | Performed by: FAMILY MEDICINE

## 2021-08-16 PROCEDURE — 86803 HEPATITIS C AB TEST: CPT | Performed by: FAMILY MEDICINE

## 2021-08-16 PROCEDURE — 85652 RBC SED RATE AUTOMATED: CPT | Performed by: FAMILY MEDICINE

## 2021-08-16 PROCEDURE — 86704 HEP B CORE ANTIBODY TOTAL: CPT | Performed by: FAMILY MEDICINE

## 2021-08-16 PROCEDURE — 80307 DRUG TEST PRSMV CHEM ANLYZR: CPT | Performed by: FAMILY MEDICINE

## 2021-08-16 PROCEDURE — 86481 TB AG RESPONSE T-CELL SUSP: CPT | Performed by: FAMILY MEDICINE

## 2021-08-16 RX ORDER — MELOXICAM 15 MG/1
15 TABLET ORAL DAILY
Qty: 90 TABLET | Refills: 1 | Status: SHIPPED | OUTPATIENT
Start: 2021-08-16 | End: 2021-11-17

## 2021-08-16 RX ORDER — CYCLOBENZAPRINE HCL 5 MG
5 TABLET ORAL 3 TIMES DAILY PRN
Qty: 90 TABLET | Refills: 3 | Status: SHIPPED | OUTPATIENT
Start: 2021-08-16 | End: 2022-03-01

## 2021-08-16 RX ORDER — DULOXETIN HYDROCHLORIDE 30 MG/1
60 CAPSULE, DELAYED RELEASE ORAL DAILY
Qty: 180 CAPSULE | Refills: 3 | Status: SHIPPED | OUTPATIENT
Start: 2021-08-16 | End: 2022-08-29

## 2021-08-16 RX ORDER — TRAZODONE HYDROCHLORIDE 50 MG/1
50 TABLET, FILM COATED ORAL
Qty: 60 TABLET | Refills: 11 | Status: SHIPPED | OUTPATIENT
Start: 2021-08-16 | End: 2022-08-29

## 2021-08-16 RX ORDER — TRAMADOL HYDROCHLORIDE 50 MG/1
50 TABLET ORAL EVERY 6 HOURS PRN
Qty: 60 TABLET | Refills: 0 | Status: SHIPPED | OUTPATIENT
Start: 2021-08-16 | End: 2022-08-29

## 2021-08-16 RX ORDER — FERROUS SULFATE 325(65) MG
325 TABLET ORAL 2 TIMES DAILY
Qty: 60 TABLET | Refills: 11 | Status: SHIPPED | OUTPATIENT
Start: 2021-08-16 | End: 2022-08-29

## 2021-08-17 LAB
ALBUMIN SERPL-MCNC: 3.8 G/DL (ref 3.4–5)
ALP SERPL-CCNC: 50 U/L (ref 40–150)
ALT SERPL W P-5'-P-CCNC: 18 U/L (ref 0–50)
ANION GAP SERPL CALCULATED.3IONS-SCNC: 4 MMOL/L (ref 3–14)
AST SERPL W P-5'-P-CCNC: 15 U/L (ref 0–45)
BILIRUB SERPL-MCNC: 0.2 MG/DL (ref 0.2–1.3)
BUN SERPL-MCNC: 14 MG/DL (ref 7–30)
CALCIUM SERPL-MCNC: 8.5 MG/DL (ref 8.5–10.1)
CHLORIDE BLD-SCNC: 111 MMOL/L (ref 94–109)
CO2 SERPL-SCNC: 26 MMOL/L (ref 20–32)
CREAT SERPL-MCNC: 0.59 MG/DL (ref 0.52–1.04)
CREAT UR-MCNC: 121 MG/DL
CRP SERPL-MCNC: <2.9 MG/L (ref 0–8)
GFR SERPL CREATININE-BSD FRML MDRD: >90 ML/MIN/1.73M2
GLUCOSE BLD-MCNC: 82 MG/DL (ref 70–99)
HBV SURFACE AG SERPL QL IA: NONREACTIVE
HCV AB SERPL QL IA: NONREACTIVE
HIV 1+2 AB+HIV1 P24 AG SERPL QL IA: NONREACTIVE
POTASSIUM BLD-SCNC: 4 MMOL/L (ref 3.4–5.3)
PROT SERPL-MCNC: 7.1 G/DL (ref 6.8–8.8)
SODIUM SERPL-SCNC: 141 MMOL/L (ref 133–144)

## 2021-08-18 LAB
HBV CORE AB SERPL QL IA: REACTIVE
QUANTIFERON MITOGEN: 10 IU/ML
QUANTIFERON NIL TUBE: 0.11 IU/ML
QUANTIFERON TB1 TUBE: 0.1 IU/ML
QUANTIFERON TB2 TUBE: 0.1

## 2021-08-19 LAB
CREATININE URINE MG/DL  (SYNCED VALUE): 121 MG/DL
GABAPENTIN UR QL CFM: PRESENT
GAMMA INTERFERON BACKGROUND BLD IA-ACNC: 0.11 IU/ML
M TB IFN-G BLD-IMP: NEGATIVE
M TB IFN-G CD4+ BCKGRND COR BLD-ACNC: 9.89 IU/ML
MITOGEN IGNF BCKGRD COR BLD-ACNC: -0.01 IU/ML
MITOGEN IGNF BCKGRD COR BLD-ACNC: -0.01 IU/ML

## 2021-08-23 ENCOUNTER — OFFICE VISIT (OUTPATIENT)
Dept: FAMILY MEDICINE | Facility: CLINIC | Age: 46
End: 2021-08-23
Payer: MEDICARE

## 2021-08-23 ENCOUNTER — TELEPHONE (OUTPATIENT)
Dept: FAMILY MEDICINE | Facility: CLINIC | Age: 46
End: 2021-08-23

## 2021-08-23 VITALS
OXYGEN SATURATION: 96 % | TEMPERATURE: 98.5 F | HEIGHT: 59 IN | SYSTOLIC BLOOD PRESSURE: 116 MMHG | HEART RATE: 83 BPM | WEIGHT: 117 LBS | DIASTOLIC BLOOD PRESSURE: 70 MMHG | BODY MASS INDEX: 23.59 KG/M2 | RESPIRATION RATE: 18 BRPM

## 2021-08-23 DIAGNOSIS — M46.98 INFLAMMATORY SPONDYLOPATHY OF SACRAL REGION (H): ICD-10-CM

## 2021-08-23 DIAGNOSIS — Z78.9 IMMUNE TO HEPATITIS B: ICD-10-CM

## 2021-08-23 DIAGNOSIS — Z12.11 SPECIAL SCREENING FOR MALIGNANT NEOPLASMS, COLON: ICD-10-CM

## 2021-08-23 DIAGNOSIS — K59.03 DRUG-INDUCED CONSTIPATION: Primary | ICD-10-CM

## 2021-08-23 DIAGNOSIS — R14.0 ABDOMINAL DISTENSION: ICD-10-CM

## 2021-08-23 PROCEDURE — 99214 OFFICE O/P EST MOD 30 MIN: CPT | Performed by: FAMILY MEDICINE

## 2021-08-23 RX ORDER — POLYETHYLENE GLYCOL 3350 17 G/17G
1 POWDER, FOR SOLUTION ORAL DAILY
Qty: 850 G | Refills: 1 | Status: SHIPPED | OUTPATIENT
Start: 2021-08-23 | End: 2024-03-08

## 2021-08-23 ASSESSMENT — MIFFLIN-ST. JEOR: SCORE: 1081.34

## 2021-08-23 NOTE — TELEPHONE ENCOUNTER
Called pt-scheduled appointment today with Dr Pineda.    Courtney Bowers/RIAZ    
Reason for call:  Other   Patient called regarding (reason for call): call back    Additional comments: per patient , she has a possible UTI and she will like to be seen by pcp asap.    Phone number to reach patient:  Home number on file [phone] (home)    Best Time:  anytime    Can we leave a detailed message on this number?  NO    Travel screening: Not Applicable    
intact

## 2021-08-23 NOTE — TELEPHONE ENCOUNTER
Dr Hernandez,    Pt was seen in the  AV clinic today.  Pt is experiencing constipation bloating and pain from her pain medications.     Noted pt was to start Cosentyx and a lab result notice was to be called to her along with a follow-up visit from your virtual visit with her on 05/27/21. There is also a request for alternative for Meloxicam. Pt states she has not heard from your office and would like a call and and to follow-up with you.      Thank you,  Sarita CRENSHAW RN, BSN, PAL (Patient Advocate Liaison)  [facility]   [phone]

## 2021-08-23 NOTE — PROGRESS NOTES
Assessment & Plan     Drug-induced constipation  Will add low dose  - polyethylene glycol (MIRALAX) 17 GM/Dose powder; Take 17 g (1 capful) by mouth daily    Abdominal distension  Wonder about ovaries - cyst when last checked 2 years ago  - US Pelvic Complete with Transvaginal; Future    Special screening for malignant neoplasms, colon  Due for screening colonoscopy at her age due to new recommendations  - Adult Gastro Ref - Procedure Only; Future    Immune to hepatitis B  Discussed these results    Inflammatory spondy  Due to see rheum and actually was supposed to start cosentyx but that did not happen.   Is still on sulfasalazine  Will have PAL help get on that schedule for follow up            30 minutes spent on the date of the encounter doing chart review, review of test results, interpretation of tests, patient visit and documentation            No follow-ups on file.    Terri Robles MD  [facility]    Shaggy Pathak is a 46 year old who presents for the following health issues - she has been having issues with bloating and abdominal distention for about 2 years.   She feels it may be the meds she is taking for pain.   She is on stool softener and that helps some but not enough.   She has BM every 2 days but on bad days might go 3.   The stools are small and hard.   She does not have blood.  She has never had a colonoscopy.   She wonders also about some flagged results from her last visit.    She is not sure when she is supposed to be seen again  By RHEUM.        HPI     Abdominal/Flank Pain  Onset/Duration: 2 weeks  Description:   Character: Dull ache  Location: left lower quadrant pelvic region  Radiation: None  Intensity: moderate  Progression of Symptoms:  same and intermittent  Accompanying Signs & Symptoms:  Fever/Chills: no  Gas/Bloating: YES  Nausea: no  Vomitting: no  Diarrhea: no  Constipation: YES  Dysuria or Hematuria: no  History:   Trauma: no  Previous  similar pain: no  Previous tests done: none  Precipitating factors:   Does the pain change with:     Food: no    Bowel Movement: no    Urination: no   Other factors:  no  Therapies tried and outcome: None  Patient's last menstrual period was 03/26/2019 (within days).      Past Medical History:   Diagnosis Date     Anemia      History of blood transfusion 2000    after vaginal delivery, 2 units PRBCs given     HSIL on Pap smear 09/21/2011    MARTA 2 and 3     Immune to hepatitis B 08/2021    hep B antigen NEG     INFLAM SPONDYLOPATHY NOS 01/07/2008    check labs on sulfasalzine every 3 months and check hep B and C and TB every 2 years     Leukocytopenia 03/10/2014     Tendinosis      Thrombocytopenia (H) 03/10/2014     Unspecified closed fracture of pelvis 2000    left fracture of pelvis after delivery       Past Surgical History:   Procedure Laterality Date     DILATION AND CURETTAGE, ABLATE ENDOMETRIUM NOVASURE, COMBINED N/A 12/04/2018    Procedure: novasure endometrial ablation, myosure resection of leiomyoma, dilation and curettage;  Surgeon: Rolando Covarrubias MD;  Location: [facility] OR     GYN SURGERY  12/04/2018    fibroid removal     lap hysterectomy and salpingetcomy Bilateral 2019    done at [facility] - ovaries are in     LEEP TX, CERVICAL  12/19/2011    MARTA II     OPERATIVE HYSTEROSCOPY WITH MORCELLATOR N/A 12/04/2018    ovaries are still in.   Surgeon: Rolando Covarrubias MD;  Location: [facility] OR     TUBAL LIGATION  05/2009    laparoscopic tubal ligation     ZZC NONSPECIFIC PROCEDURE  10/2000    after delivery 2 units of prbcs secondary to placenta       MEDICATIONS:  Current Outpatient Medications   Medication     polyethylene glycol (MIRALAX) 17 GM/Dose powder     clindamycin-benzoyl peroxide (BENZACLIN) 1-5 % external gel     cyclobenzaprine (FLEXERIL) 5 MG tablet     diclofenac (VOLTAREN) 1 % topical gel     DULoxetine (CYMBALTA) 30 MG capsule     ferrous sulfate (FEROSUL) 325 (65 Fe) MG tablet     fluticasone  (FLONASE) 50 MCG/ACT nasal spray     gabapentin (NEURONTIN) 300 MG capsule     hydrocortisone (WESTCORT) 0.2 % external cream     ketotifen (ZADITOR) 0.025 % ophthalmic solution     lidocaine (XYLOCAINE) 5 % external ointment     loratadine (CLARITIN) 10 MG tablet     meloxicam (MOBIC) 15 MG tablet     olopatadine (PATANOL) 0.1 % ophthalmic solution     ondansetron (ZOFRAN-ODT) 4 MG ODT tab     STOOL SOFTENER 100 MG capsule     sulfaSALAzine ER (AZULFIDINE EN) 500 MG EC tablet     traMADol (ULTRAM) 50 MG tablet     traZODone (DESYREL) 50 MG tablet     No current facility-administered medications for this visit.       SOCIAL HISTORY:  Social History     Tobacco Use     Smoking status: Never Smoker     Smokeless tobacco: Never Used   Substance Use Topics     Alcohol use: Not Currently     Alcohol/week: 0.0 standard drinks     Comment: rarely       Family History   Problem Relation Age of Onset     Hypertension Father      Lipids Father      Hypertension Mother      Lipids Mother      Diabetes No family hx of      Coronary Artery Disease No family hx of      Hyperlipidemia No family hx of      Cerebrovascular Disease No family hx of      Breast Cancer No family hx of      Colon Cancer No family hx of      Prostate Cancer No family hx of      Other Cancer No family hx of      Depression No family hx of      Anxiety Disorder No family hx of      Mental Illness No family hx of      Substance Abuse No family hx of      Anesthesia Reaction No family hx of      Asthma No family hx of      Osteoporosis No family hx of      Genetic Disorder No family hx of      Thyroid Disease No family hx of      Obesity No family hx of      Unknown/Adopted No family hx of            Review of Systems   Constitutional, HEENT, cardiovascular, pulmonary, gi and gu systems are negative, except as otherwise noted.      Objective    /70 (BP Location: Right arm, Patient Position: Sitting, Cuff Size: Adult Regular)   Pulse 83   Temp 98.5  F  "(36.9  C) (Oral)   Resp 18   Ht 1.499 m (4' 11\")   Wt 53.1 kg (117 lb)   LMP 03/26/2019 (Within Days)   SpO2 96%   BMI 23.63 kg/m    Body mass index is 23.63 kg/m .  Physical Exam   GENERAL: healthy, alert and no distress  EYES: Eyes grossly normal to inspection, PERRL and conjunctivae and sclerae normal  HENT: ear canals and TM's normal, nose and mouth without ulcers or lesions  NECK: no adenopathy, no asymmetry, masses, or scars and thyroid normal to palpation  RESP: lungs clear to auscultation - no rales, rhonchi or wheezes  CV: regular rate and rhythm, normal S1 S2, no S3 or S4, no murmur, click or rub, no peripheral edema and peripheral pulses strong  ABDOMEN: tenderness umbilical and bowel sounds normal  MS: no gross musculoskeletal defects noted, no edema  SKIN: no suspicious lesions or rashes  NEURO: Normal strength and tone, mentation intact and speech normal  PSYCH: mentation appears normal, affect normal/bright  LYMPH: no cervical, supraclavicular, axillary, or inguinal adenopathy    Office Visit on 08/16/2021   Component Date Value Ref Range Status     Sodium 08/16/2021 141  133 - 144 mmol/L Final     Potassium 08/16/2021 4.0  3.4 - 5.3 mmol/L Final     Chloride 08/16/2021 111* 94 - 109 mmol/L Final     Carbon Dioxide (CO2) 08/16/2021 26  20 - 32 mmol/L Final     Anion Gap 08/16/2021 4  3 - 14 mmol/L Final     Urea Nitrogen 08/16/2021 14  7 - 30 mg/dL Final     Creatinine 08/16/2021 0.59  0.52 - 1.04 mg/dL Final     Calcium 08/16/2021 8.5  8.5 - 10.1 mg/dL Final     Glucose 08/16/2021 82  70 - 99 mg/dL Final     Alkaline Phosphatase 08/16/2021 50  40 - 150 U/L Final     AST 08/16/2021 15  0 - 45 U/L Final     ALT 08/16/2021 18  0 - 50 U/L Final     Protein Total 08/16/2021 7.1  6.8 - 8.8 g/dL Final     Albumin 08/16/2021 3.8  3.4 - 5.0 g/dL Final     Bilirubin Total 08/16/2021 0.2  0.2 - 1.3 mg/dL Final     GFR Estimate 08/16/2021 >90  >60 mL/min/1.73m2 Final    As of July 11, 2021, eGFR is " calculated by the CKD-EPI creatinine equation, without race adjustment. eGFR can be influenced by muscle mass, exercise, and diet. The reported eGFR is an estimation only and is only applicable if the renal function is stable.     CRP Inflammation 08/16/2021 <2.9  0.0 - 8.0 mg/L Final     Erythrocyte Sedimentation Rate 08/16/2021 29* 0 - 20 mm/hr Final     Hepatitis B Surface Antigen 08/16/2021 Nonreactive  Nonreactive Final     Hepatitis B Core Antibody Total 08/16/2021 Reactive* Nonreactive Final    A reactive result indicates acute, chronic or past/resolved hepatitis B infection.     Hepatitis C Antibody 08/16/2021 Nonreactive  Nonreactive Final     HIV Antigen Antibody Combo 08/16/2021 Nonreactive  Nonreactive Final    HIV-1 p24 Ag & HIV-1/HIV-2 Ab Not Detected     Gabapentin (Neurontin) 08/16/2021 Present* Absent Final    Sources of gabapentin are prescription medications.     Creatinine Urine mg/dL 08/16/2021 121  mg/dL Final     Creatinine Urine mg/dL 08/16/2021 121  mg/dL Final    The reference range has not been established for creatinine in random urines. The results should be integrated into the clinical context for interpretation.     WBC Count 08/16/2021 6.9  4.0 - 11.0 10e3/uL Final     RBC Count 08/16/2021 3.74* 3.80 - 5.20 10e6/uL Final     Hemoglobin 08/16/2021 11.5* 11.7 - 15.7 g/dL Final     Hematocrit 08/16/2021 34.5* 35.0 - 47.0 % Final     MCV 08/16/2021 92  78 - 100 fL Final     MCH 08/16/2021 30.7  26.5 - 33.0 pg Final     MCHC 08/16/2021 33.3  31.5 - 36.5 g/dL Final     RDW 08/16/2021 12.0  10.0 - 15.0 % Final     Platelet Count 08/16/2021 247  150 - 450 10e3/uL Final     % Neutrophils 08/16/2021 63  % Final     % Lymphocytes 08/16/2021 24  % Final     % Monocytes 08/16/2021 11  % Final     % Eosinophils 08/16/2021 2  % Final     % Basophils 08/16/2021 0  % Final     Absolute Neutrophils 08/16/2021 4.4  1.6 - 8.3 10e3/uL Final     Absolute Lymphocytes 08/16/2021 1.6  0.8 - 5.3 10e3/uL Final      Absolute Monocytes 08/16/2021 0.8  0.0 - 1.3 10e3/uL Final     Absolute Eosinophils 08/16/2021 0.1  0.0 - 0.7 10e3/uL Final     Absolute Basophils 08/16/2021 0.0  0.0 - 0.2 10e3/uL Final     Quantiferon Nil Tube 08/16/2021 0.11  IU/mL Final     Quantiferon TB1 Tube 08/16/2021 0.10  IU/mL Final     Quantiferon TB2 Tube 08/16/2021 0.10   Final     Quantiferon Mitogen 08/16/2021 10.00  IU/mL Final     Quantiferon-TB Gold Plus 08/16/2021 Negative  Negative Final    No interferon gamma response to M.tuberculosis antigens was detected. Infection with M.tuberculosis is unlikely, however a single negative result does not exclude infection. In patients at high risk for infection, a second test should be considered in accordance with the 2017 ATS/IDSA/CDC Clinical Pract  ice Guidelines for Diagnosis of Tuberculosis in Adults and Children      TB1 Ag minus Nil Value 08/16/2021 -0.01  IU/mL Final     TB2 Ag minus Nil Value 08/16/2021 -0.01  IU/mL Final     Mitogen minus Nil Result 08/16/2021 9.89  IU/mL Final     Nil Result 08/16/2021 0.11  IU/mL Final

## 2021-09-04 ENCOUNTER — HEALTH MAINTENANCE LETTER (OUTPATIENT)
Age: 46
End: 2021-09-04

## 2021-09-10 ENCOUNTER — TELEPHONE (OUTPATIENT)
Dept: RHEUMATOLOGY | Facility: CLINIC | Age: 46
End: 2021-09-10

## 2021-09-10 NOTE — TELEPHONE ENCOUNTER
Called patient. Spoke to her at length re: initiating cosentyx in terms of risks and benefits. She had increased risk of viral infections on TNF inhibitors. She wishes to discuss starting IL-17 inhibitor with her family. I sent a [employer] message for her to reply to once she has done so or if she has additional questions.     Will wait for her reply before ordering.     James Hernandez MD  Rheumatology

## 2021-09-10 NOTE — TELEPHONE ENCOUNTER
Called patient to discuss starting cosentyx as pre-DMARD labs were completed at the end of last month. No answer. Went to . I left  that I would try again later today and if no response would try again on Monday to discuss.     James Hernandez MD  Rheumatology

## 2021-09-14 ENCOUNTER — ANCILLARY PROCEDURE (OUTPATIENT)
Dept: ULTRASOUND IMAGING | Facility: CLINIC | Age: 46
End: 2021-09-14
Attending: FAMILY MEDICINE
Payer: MEDICARE

## 2021-09-14 DIAGNOSIS — R14.0 ABDOMINAL DISTENSION: ICD-10-CM

## 2021-09-14 PROCEDURE — 76856 US EXAM PELVIC COMPLETE: CPT | Performed by: OBSTETRICS & GYNECOLOGY

## 2021-09-14 PROCEDURE — 76830 TRANSVAGINAL US NON-OB: CPT | Performed by: OBSTETRICS & GYNECOLOGY

## 2021-09-15 ENCOUNTER — OFFICE VISIT (OUTPATIENT)
Dept: FAMILY MEDICINE | Facility: CLINIC | Age: 46
End: 2021-09-15
Payer: MEDICARE

## 2021-09-15 VITALS
TEMPERATURE: 98 F | RESPIRATION RATE: 12 BRPM | DIASTOLIC BLOOD PRESSURE: 60 MMHG | HEART RATE: 66 BPM | OXYGEN SATURATION: 100 % | BODY MASS INDEX: 23.83 KG/M2 | SYSTOLIC BLOOD PRESSURE: 100 MMHG | WEIGHT: 118 LBS

## 2021-09-15 DIAGNOSIS — H60.92 OTITIS EXTERNA OF LEFT EAR, UNSPECIFIED CHRONICITY, UNSPECIFIED TYPE: Primary | ICD-10-CM

## 2021-09-15 DIAGNOSIS — Z23 NEED FOR PROPHYLACTIC VACCINATION AND INOCULATION AGAINST INFLUENZA: ICD-10-CM

## 2021-09-15 DIAGNOSIS — H69.92 DYSFUNCTION OF LEFT EUSTACHIAN TUBE: ICD-10-CM

## 2021-09-15 PROCEDURE — 99213 OFFICE O/P EST LOW 20 MIN: CPT | Mod: 25 | Performed by: FAMILY MEDICINE

## 2021-09-15 PROCEDURE — 90686 IIV4 VACC NO PRSV 0.5 ML IM: CPT | Performed by: FAMILY MEDICINE

## 2021-09-15 PROCEDURE — G0008 ADMIN INFLUENZA VIRUS VAC: HCPCS | Performed by: FAMILY MEDICINE

## 2021-09-15 RX ORDER — NEOMYCIN SULFATE, POLYMYXIN B SULFATE AND HYDROCORTISONE 10; 3.5; 1 MG/ML; MG/ML; [USP'U]/ML
3 SUSPENSION/ DROPS AURICULAR (OTIC) 4 TIMES DAILY
Qty: 5 ML | Refills: 0 | Status: SHIPPED | OUTPATIENT
Start: 2021-09-15 | End: 2021-09-22

## 2021-09-15 NOTE — PROGRESS NOTES
Assessment & Plan     Otitis externa of left ear, unspecified chronicity, unspecified type    - neomycin-polymyxin-hydrocortisone (CORTISPORIN) 3.5-38399-2 otic suspension; Place 3 drops Into the left ear 4 times daily for 7 days    Dysfunction of left eustachian tube    - loratadine-pseudoePHEDrine (CLARITIN-D 24-HOUR)  MG 24 hr tablet; Take 1 tablet by mouth daily for 3 days    Need for prophylactic vaccination and inoculation against influenza  Flu shot        16 minutes spent on the date of the encounter doing chart review, review of outside records, interpretation of tests, patient visit and documentation            No follow-ups on file.    Terri Robles MD  [facility]    Shaggy Pathak is a 46 year old who presents for the following health issues - she is having some pain with her left ear for about 3 days.   It is sharp.   There is no itching.   There is no hearing loss.   She is not having draining.      She is also wondering about starting cosentyx.   Her rheumatologist would like to start and she wonders my opinion on that.   Her joints continue to bother her.      HPI     Acute Illness  Acute illness concerns: ear pain  Onset/Duration: 3 days  Symptoms:  Fever: no  Chills/Sweats: no  Headache (location?): no  Sinus Pressure: no  Conjunctivitis:  no  Ear Pain: YES: left  Rhinorrhea: no  Congestion: no  Sore Throat: no  Cough: no  Wheeze: no  Decreased Appetite: no  Nausea: no  Vomiting: no  Diarrhea: no  Dysuria/Freq.: no  Dysuria or Hematuria: no  Fatigue/Achiness: no  Sick/Strep Exposure: no  Therapies tried and outcome: tylenol    Past Medical History:   Diagnosis Date     Anemia      History of blood transfusion 2000    after vaginal delivery, 2 units PRBCs given     HSIL on Pap smear 09/21/2011    MARTA 2 and 3     Immune to hepatitis B 08/2021    hep B antigen NEG     INFLAM SPONDYLOPATHY NOS 01/07/2008    check labs on sulfasalzine every 3 months and check  hep B and C and TB every 2 years     Leukocytopenia 03/10/2014     Tendinosis      Thrombocytopenia (H) 03/10/2014     Unspecified closed fracture of pelvis 2000    left fracture of pelvis after delivery       Past Surgical History:   Procedure Laterality Date     DILATION AND CURETTAGE, ABLATE ENDOMETRIUM NOVASURE, COMBINED N/A 12/04/2018    Procedure: novasure endometrial ablation, myosure resection of leiomyoma, dilation and curettage;  Surgeon: Rolando Covarrubias MD;  Location: [facility] OR     GYN SURGERY  12/04/2018    fibroid removal     lap hysterectomy and salpingetcomy Bilateral 2019    done at [facility] - ovaries are in     LEEP TX, CERVICAL  12/19/2011    MARTA II     OPERATIVE HYSTEROSCOPY WITH MORCELLATOR N/A 12/04/2018    ovaries are still in.   Surgeon: Rolando Covarrubias MD;  Location: [facility] OR     TUBAL LIGATION  05/2009    laparoscopic tubal ligation     ZZC NONSPECIFIC PROCEDURE  10/2000    after delivery 2 units of prbcs secondary to placenta       MEDICATIONS:  Current Outpatient Medications   Medication     loratadine-pseudoePHEDrine (CLARITIN-D 24-HOUR)  MG 24 hr tablet     neomycin-polymyxin-hydrocortisone (CORTISPORIN) 3.5-71077-6 otic suspension     clindamycin-benzoyl peroxide (BENZACLIN) 1-5 % external gel     cyclobenzaprine (FLEXERIL) 5 MG tablet     diclofenac (VOLTAREN) 1 % topical gel     DULoxetine (CYMBALTA) 30 MG capsule     ferrous sulfate (FEROSUL) 325 (65 Fe) MG tablet     fluticasone (FLONASE) 50 MCG/ACT nasal spray     gabapentin (NEURONTIN) 300 MG capsule     hydrocortisone (WESTCORT) 0.2 % external cream     ketotifen (ZADITOR) 0.025 % ophthalmic solution     lidocaine (XYLOCAINE) 5 % external ointment     loratadine (CLARITIN) 10 MG tablet     meloxicam (MOBIC) 15 MG tablet     olopatadine (PATANOL) 0.1 % ophthalmic solution     ondansetron (ZOFRAN-ODT) 4 MG ODT tab     polyethylene glycol (MIRALAX) 17 GM/Dose powder     STOOL SOFTENER 100 MG capsule     sulfaSALAzine ER  (AZULFIDINE EN) 500 MG EC tablet     traMADol (ULTRAM) 50 MG tablet     traZODone (DESYREL) 50 MG tablet     No current facility-administered medications for this visit.       SOCIAL HISTORY:  Social History     Tobacco Use     Smoking status: Never Smoker     Smokeless tobacco: Never Used   Substance Use Topics     Alcohol use: Not Currently     Alcohol/week: 0.0 standard drinks     Comment: rarely       Family History   Problem Relation Age of Onset     Hypertension Father      Lipids Father      Hypertension Mother      Lipids Mother      Diabetes No family hx of      Coronary Artery Disease No family hx of      Hyperlipidemia No family hx of      Cerebrovascular Disease No family hx of      Breast Cancer No family hx of      Colon Cancer No family hx of      Prostate Cancer No family hx of      Other Cancer No family hx of      Depression No family hx of      Anxiety Disorder No family hx of      Mental Illness No family hx of      Substance Abuse No family hx of      Anesthesia Reaction No family hx of      Asthma No family hx of      Osteoporosis No family hx of      Genetic Disorder No family hx of      Thyroid Disease No family hx of      Obesity No family hx of      Unknown/Adopted No family hx of            Review of Systems   Constitutional, HEENT, cardiovascular, pulmonary, gi and gu systems are negative, except as otherwise noted.      Objective    /60 (BP Location: Right arm, Patient Position: Chair, Cuff Size: Adult Regular)   Pulse 66   Temp 98  F (36.7  C) (Oral)   Resp 12   Wt 53.5 kg (118 lb)   LMP 03/26/2019 (Within Days)   SpO2 100%   BMI 23.83 kg/m    Body mass index is 23.83 kg/m .  Physical Exam   GENERAL: healthy, alert and no distress  EYES: Eyes grossly normal to inspection, PERRL and conjunctivae and sclerae normal  HENT: normal cephalic/atraumatic, right ear: normal: no effusions, no erythema, normal landmarks, left ear: dull TM and slightly retracted and external canal  mildly red, nose and mouth without ulcers or lesions, oropharynx clear and oral mucous membranes moist  NECK: no adenopathy, no asymmetry, masses, or scars and thyroid normal to palpation  RESP: lungs clear to auscultation - no rales, rhonchi or wheezes  CV: regular rate and rhythm, normal S1 S2, no S3 or S4, no murmur, click or rub, no peripheral edema and peripheral pulses strong  MS: right forearm in splint  SKIN: no suspicious lesions or rashes  NEURO: Normal strength and tone, mentation intact and speech normal  PSYCH: mentation appears normal, affect normal/bright  LYMPH: no cervical, supraclavicular, axillary, or inguinal adenopathy    Office Visit on 08/16/2021   Component Date Value Ref Range Status     Sodium 08/16/2021 141  133 - 144 mmol/L Final     Potassium 08/16/2021 4.0  3.4 - 5.3 mmol/L Final     Chloride 08/16/2021 111* 94 - 109 mmol/L Final     Carbon Dioxide (CO2) 08/16/2021 26  20 - 32 mmol/L Final     Anion Gap 08/16/2021 4  3 - 14 mmol/L Final     Urea Nitrogen 08/16/2021 14  7 - 30 mg/dL Final     Creatinine 08/16/2021 0.59  0.52 - 1.04 mg/dL Final     Calcium 08/16/2021 8.5  8.5 - 10.1 mg/dL Final     Glucose 08/16/2021 82  70 - 99 mg/dL Final     Alkaline Phosphatase 08/16/2021 50  40 - 150 U/L Final     AST 08/16/2021 15  0 - 45 U/L Final     ALT 08/16/2021 18  0 - 50 U/L Final     Protein Total 08/16/2021 7.1  6.8 - 8.8 g/dL Final     Albumin 08/16/2021 3.8  3.4 - 5.0 g/dL Final     Bilirubin Total 08/16/2021 0.2  0.2 - 1.3 mg/dL Final     GFR Estimate 08/16/2021 >90  >60 mL/min/1.73m2 Final    As of July 11, 2021, eGFR is calculated by the CKD-EPI creatinine equation, without race adjustment. eGFR can be influenced by muscle mass, exercise, and diet. The reported eGFR is an estimation only and is only applicable if the renal function is stable.     CRP Inflammation 08/16/2021 <2.9  0.0 - 8.0 mg/L Final     Erythrocyte Sedimentation Rate 08/16/2021 29* 0 - 20 mm/hr Final     Hepatitis B  Surface Antigen 08/16/2021 Nonreactive  Nonreactive Final     Hepatitis B Core Antibody Total 08/16/2021 Reactive* Nonreactive Final    A reactive result indicates acute, chronic or past/resolved hepatitis B infection.     Hepatitis C Antibody 08/16/2021 Nonreactive  Nonreactive Final     HIV Antigen Antibody Combo 08/16/2021 Nonreactive  Nonreactive Final    HIV-1 p24 Ag & HIV-1/HIV-2 Ab Not Detected     Gabapentin (Neurontin) 08/16/2021 Present* Absent Final    Sources of gabapentin are prescription medications.     Creatinine Urine mg/dL 08/16/2021 121  mg/dL Final     Creatinine Urine mg/dL 08/16/2021 121  mg/dL Final    The reference range has not been established for creatinine in random urines. The results should be integrated into the clinical context for interpretation.     WBC Count 08/16/2021 6.9  4.0 - 11.0 10e3/uL Final     RBC Count 08/16/2021 3.74* 3.80 - 5.20 10e6/uL Final     Hemoglobin 08/16/2021 11.5* 11.7 - 15.7 g/dL Final     Hematocrit 08/16/2021 34.5* 35.0 - 47.0 % Final     MCV 08/16/2021 92  78 - 100 fL Final     MCH 08/16/2021 30.7  26.5 - 33.0 pg Final     MCHC 08/16/2021 33.3  31.5 - 36.5 g/dL Final     RDW 08/16/2021 12.0  10.0 - 15.0 % Final     Platelet Count 08/16/2021 247  150 - 450 10e3/uL Final     % Neutrophils 08/16/2021 63  % Final     % Lymphocytes 08/16/2021 24  % Final     % Monocytes 08/16/2021 11  % Final     % Eosinophils 08/16/2021 2  % Final     % Basophils 08/16/2021 0  % Final     Absolute Neutrophils 08/16/2021 4.4  1.6 - 8.3 10e3/uL Final     Absolute Lymphocytes 08/16/2021 1.6  0.8 - 5.3 10e3/uL Final     Absolute Monocytes 08/16/2021 0.8  0.0 - 1.3 10e3/uL Final     Absolute Eosinophils 08/16/2021 0.1  0.0 - 0.7 10e3/uL Final     Absolute Basophils 08/16/2021 0.0  0.0 - 0.2 10e3/uL Final     Quantiferon Nil Tube 08/16/2021 0.11  IU/mL Final     Quantiferon TB1 Tube 08/16/2021 0.10  IU/mL Final     Quantiferon TB2 Tube 08/16/2021 0.10   Final     Quantiferon Mitogen  08/16/2021 10.00  IU/mL Final     Quantiferon-TB Gold Plus 08/16/2021 Negative  Negative Final    No interferon gamma response to M.tuberculosis antigens was detected. Infection with M.tuberculosis is unlikely, however a single negative result does not exclude infection. In patients at high risk for infection, a second test should be considered in accordance with the 2017 ATS/IDSA/CDC Clinical Pract  ice Guidelines for Diagnosis of Tuberculosis in Adults and Children      TB1 Ag minus Nil Value 08/16/2021 -0.01  IU/mL Final     TB2 Ag minus Nil Value 08/16/2021 -0.01  IU/mL Final     Mitogen minus Nil Result 08/16/2021 9.89  IU/mL Final     Nil Result 08/16/2021 0.11  IU/mL Final

## 2021-09-17 ENCOUNTER — TELEPHONE (OUTPATIENT)
Dept: RHEUMATOLOGY | Facility: CLINIC | Age: 46
End: 2021-09-17

## 2021-09-17 NOTE — TELEPHONE ENCOUNTER
Prior Authorization Approval    Authorization Effective Date: 9/17/2021  Authorization Expiration Date: 9/17/2022  Medication: COSENTYX - Approved   Approved Dose/Quantity:  Loading and maintenance   Reference #:     Insurance Company: [employer] - Phone [phone] Fax [phone]  Expected CoPay: unknown       CoPay Card Available:      Foundation Assistance Needed:    Which Pharmacy is filling the prescription (Not needed for infusion/clinic administered): [facility] [place] - [address] [place] [address]  Pharmacy Notified: Yes  Patient Notified: Yes

## 2021-09-17 NOTE — TELEPHONE ENCOUNTER
PA Initiation    Medication: COSENTYX   Insurance Company: [employer] - Phone [phone] Fax [phone]  [facility], [address]  Filling Pharmacy Phone:    Filling Pharmacy Fax:    Start Date: 9/17/2021    JANKI AQUINO (Ambrosio: [id number])

## 2021-10-13 ENCOUNTER — TELEPHONE (OUTPATIENT)
Dept: RHEUMATOLOGY | Facility: CLINIC | Age: 46
End: 2021-10-13

## 2021-10-13 NOTE — TELEPHONE ENCOUNTER
PA Initiation    Medication: COSENTYX MAINTENANCE   Insurance Company: [employer] D - Phone [phone] Fax [phone]  Pharmacy Filling the Rx: [facility] - [place], [place] - [facility] [address]  Filling Pharmacy Phone:    Filling Pharmacy Fax:    Start Date: 10/13/2021    JANKI AQUINO (Ambrosio: [id number])

## 2021-10-14 NOTE — TELEPHONE ENCOUNTER
Prior Authorization Approval    Authorization Effective Date: 10/14/2021  Authorization Expiration Date: 12/31/2022  Medication: COSENTYX MAINTENANCE - Approved   Approved Dose/Quantity: 1 for 28 days   Reference #:     Insurance Company: [employer] Part D - Phone [phone] Fax [phone]  Expected CoPay:       CoPay Card Available:      Foundation Assistance Needed:    Which Pharmacy is filling the prescription (Not needed for infusion/clinic administered): [facility] - [place], [place] - [address]  Pharmacy Notified: Yes  Patient Notified: Yes

## 2021-10-20 DIAGNOSIS — Z11.59 ENCOUNTER FOR SCREENING FOR OTHER VIRAL DISEASES: ICD-10-CM

## 2021-10-22 DIAGNOSIS — Z11.59 ENCOUNTER FOR SCREENING FOR OTHER VIRAL DISEASES: ICD-10-CM

## 2021-11-09 ENCOUNTER — LAB (OUTPATIENT)
Dept: LAB | Facility: CLINIC | Age: 46
End: 2021-11-09
Attending: INTERNAL MEDICINE
Payer: MEDICARE

## 2021-11-09 DIAGNOSIS — Z11.59 ENCOUNTER FOR SCREENING FOR OTHER VIRAL DISEASES: ICD-10-CM

## 2021-11-09 PROCEDURE — U0005 INFEC AGEN DETEC AMPLI PROBE: HCPCS

## 2021-11-09 PROCEDURE — U0003 INFECTIOUS AGENT DETECTION BY NUCLEIC ACID (DNA OR RNA); SEVERE ACUTE RESPIRATORY SYNDROME CORONAVIRUS 2 (SARS-COV-2) (CORONAVIRUS DISEASE [COVID-19]), AMPLIFIED PROBE TECHNIQUE, MAKING USE OF HIGH THROUGHPUT TECHNOLOGIES AS DESCRIBED BY CMS-2020-01-R: HCPCS

## 2021-11-10 LAB — SARS-COV-2 RNA RESP QL NAA+PROBE: NEGATIVE

## 2021-11-12 ENCOUNTER — HOSPITAL ENCOUNTER (OUTPATIENT)
Facility: CLINIC | Age: 46
Discharge: HOME | End: 2021-11-12
Attending: INTERNAL MEDICINE | Admitting: INTERNAL MEDICINE
Payer: MEDICARE

## 2021-11-12 VITALS
HEART RATE: 78 BPM | HEIGHT: 59 IN | DIASTOLIC BLOOD PRESSURE: 88 MMHG | RESPIRATION RATE: 16 BRPM | SYSTOLIC BLOOD PRESSURE: 123 MMHG | WEIGHT: 115 LBS | OXYGEN SATURATION: 97 % | BODY MASS INDEX: 23.18 KG/M2 | TEMPERATURE: 97.6 F

## 2021-11-12 LAB — COLONOSCOPY: NORMAL

## 2021-11-12 PROCEDURE — 250N000011 HC RX IP 250 OP 636: Performed by: INTERNAL MEDICINE

## 2021-11-12 PROCEDURE — 45378 DIAGNOSTIC COLONOSCOPY: CPT | Performed by: INTERNAL MEDICINE

## 2021-11-12 PROCEDURE — G0121 COLON CA SCRN NOT HI RSK IND: HCPCS | Performed by: INTERNAL MEDICINE

## 2021-11-12 PROCEDURE — G0500 MOD SEDAT ENDO SERVICE >5YRS: HCPCS | Performed by: INTERNAL MEDICINE

## 2021-11-12 RX ORDER — FLUMAZENIL 0.1 MG/ML
0.2 INJECTION, SOLUTION INTRAVENOUS
Status: DISCONTINUED | OUTPATIENT
Start: 2021-11-12 | End: 2021-11-12 | Stop reason: HOSPADM

## 2021-11-12 RX ORDER — LIDOCAINE 40 MG/G
CREAM TOPICAL
Status: DISCONTINUED | OUTPATIENT
Start: 2021-11-12 | End: 2021-11-12 | Stop reason: HOSPADM

## 2021-11-12 RX ORDER — ATROPINE SULFATE 0.4 MG/ML
0.4 AMPUL (ML) INJECTION
Status: DISCONTINUED | OUTPATIENT
Start: 2021-11-12 | End: 2021-11-12 | Stop reason: HOSPADM

## 2021-11-12 RX ORDER — PROCHLORPERAZINE MALEATE 10 MG
10 TABLET ORAL EVERY 6 HOURS PRN
Status: DISCONTINUED | OUTPATIENT
Start: 2021-11-12 | End: 2021-11-12 | Stop reason: HOSPADM

## 2021-11-12 RX ORDER — NALOXONE HYDROCHLORIDE 0.4 MG/ML
0.2 INJECTION, SOLUTION INTRAMUSCULAR; INTRAVENOUS; SUBCUTANEOUS
Status: DISCONTINUED | OUTPATIENT
Start: 2021-11-12 | End: 2021-11-12 | Stop reason: HOSPADM

## 2021-11-12 RX ORDER — ONDANSETRON 4 MG/1
4 TABLET, ORALLY DISINTEGRATING ORAL EVERY 6 HOURS PRN
Status: DISCONTINUED | OUTPATIENT
Start: 2021-11-12 | End: 2021-11-12 | Stop reason: HOSPADM

## 2021-11-12 RX ORDER — NALOXONE HYDROCHLORIDE 0.4 MG/ML
0.4 INJECTION, SOLUTION INTRAMUSCULAR; INTRAVENOUS; SUBCUTANEOUS
Status: DISCONTINUED | OUTPATIENT
Start: 2021-11-12 | End: 2021-11-12 | Stop reason: HOSPADM

## 2021-11-12 RX ORDER — FENTANYL CITRATE 50 UG/ML
25 INJECTION, SOLUTION INTRAMUSCULAR; INTRAVENOUS EVERY 5 MIN PRN
Status: DISCONTINUED | OUTPATIENT
Start: 2021-11-12 | End: 2021-11-12 | Stop reason: HOSPADM

## 2021-11-12 RX ORDER — FENTANYL CITRATE 50 UG/ML
50 INJECTION, SOLUTION INTRAMUSCULAR; INTRAVENOUS
Status: DISCONTINUED | OUTPATIENT
Start: 2021-11-12 | End: 2021-11-12 | Stop reason: HOSPADM

## 2021-11-12 RX ORDER — EPINEPHRINE 1 MG/ML
0.1 INJECTION, SOLUTION INTRAMUSCULAR; SUBCUTANEOUS
Status: DISCONTINUED | OUTPATIENT
Start: 2021-11-12 | End: 2021-11-12 | Stop reason: HOSPADM

## 2021-11-12 RX ORDER — FENTANYL CITRATE 50 UG/ML
50-100 INJECTION, SOLUTION INTRAMUSCULAR; INTRAVENOUS
Status: COMPLETED | OUTPATIENT
Start: 2021-11-12 | End: 2021-11-12

## 2021-11-12 RX ORDER — SIMETHICONE 40MG/0.6ML
133 SUSPENSION, DROPS(FINAL DOSAGE FORM)(ML) ORAL
Status: DISCONTINUED | OUTPATIENT
Start: 2021-11-12 | End: 2021-11-12 | Stop reason: HOSPADM

## 2021-11-12 RX ORDER — FENTANYL CITRATE 50 UG/ML
25-50 INJECTION, SOLUTION INTRAMUSCULAR; INTRAVENOUS
Status: DISCONTINUED | OUTPATIENT
Start: 2021-11-12 | End: 2021-11-12 | Stop reason: HOSPADM

## 2021-11-12 RX ORDER — ONDANSETRON 2 MG/ML
4 INJECTION INTRAMUSCULAR; INTRAVENOUS EVERY 6 HOURS PRN
Status: DISCONTINUED | OUTPATIENT
Start: 2021-11-12 | End: 2021-11-12 | Stop reason: HOSPADM

## 2021-11-12 RX ORDER — ONDANSETRON 2 MG/ML
4 INJECTION INTRAMUSCULAR; INTRAVENOUS
Status: DISCONTINUED | OUTPATIENT
Start: 2021-11-12 | End: 2021-11-12 | Stop reason: HOSPADM

## 2021-11-12 RX ADMIN — FENTANYL CITRATE 100 MCG: 0.05 INJECTION, SOLUTION INTRAMUSCULAR; INTRAVENOUS at 12:12

## 2021-11-12 RX ADMIN — MIDAZOLAM 2 MG: 1 INJECTION INTRAMUSCULAR; INTRAVENOUS at 12:10

## 2021-11-12 ASSESSMENT — MIFFLIN-ST. JEOR: SCORE: 1072.27

## 2021-11-12 NOTE — LETTER
October 27, 2021      Zo Qureshi  [address] [id number]        Dear Zo,     Please be aware that coverage of these services is subject to the terms and limitations of your health insurance plan.  Call member services at your health plan with any benefit or coverage questions.    Thank you for choosing [facility]. You are scheduled for the following service(s):    Date:  Friday November 12, 2021            Procedure:  COLONOSCOPY  Doctor:        Denis Harris MD  Arrival Time:  10:45am *Enter and check in at the Main Hospital Entrance*  Procedure Time:  11:30am      Location:   [facility]        Endoscopy Department, First Floor         [address] [place], [place] [address] [phone] or [phone] ([facility]) to reschedule        MIRALAX -GATORADE  PREP  Colonoscopy is the most accurate test to detect colon polyps and colon cancer; and the only test where polyps can be removed. During this procedure, a doctor examines the lining of your large intestine and rectum through a flexible tube.   Transportation  You must arrange for a ride for the day of your procedure with a responsible adult. A taxi , Uber, etc, is not an option unless you are accompanied by a responsible adult. If you fail to arrange transportation with a responsible adult, your procedure will be cancelled and rescheduled.    Purchase the  following supplies at your local pharmacy:  - 2 (two) bisacodyl tablets: each tablet contains 5 mg.  (Dulcolax  laxative NOT Dulcolax  stool softener)   - 1 (one) 8.3 oz bottle of Polyethylene Glycol (PEG) 3350 Powder   (MiraLAX , Smooth LAX , ClearLAX  or equivalent)  - 64 oz Gatorade    Regular Gatorade, Gatorade G2 , Powerade , Powerade Zero  or Pedialyte  is acceptable. Red colored flavors are not allowed; all other colors (yellow, green, orange, purple and blue) are okay. It is also okay to buy two 2.12 oz packets of  powdered Gatorade that can be mixed with water to a total volume of 64 oz of liquid.  - 1 (one) 10 oz bottle of Magnesium Citrate (Red colored flavors are not allowed)  It is also okay for you to use a 0.5 oz package of powdered magnesium citrate (17 g) mixed with 10 oz of water.      PREPARATION FOR COLONOSCOPY    7 days before:    Discontinue fiber supplements and medications containing iron. This includes Metamucil  and Fibercon ; and multivitamins with iron.    3 days before:    Begin a low-fiber diet. A low-fiber diet helps making the cleanout more effective.     Examples of a low-fiber diet include (but are not limited to): white bread, white rice, pasta, crackers, fish, chicken, eggs, ground beef, creamy peanut butter, cooked/steamed/boiled vegetables, canned fruit, bananas, melons, milk, plain yogurt cheese, salad dressing and other condiments.     The following are not allowed on a low-fiber diet: seeds, nuts, popcorn, bran, whole wheat, corn, quinoa, raw fruits and vegetables, berries and dried fruit, beans and lentils.    For additional details on low-fiber diet, please refer to the table on the last page.    2 days before:    Continue the low-fiber diet.     Drink at least 8 glasses of water throughout the day.     Stop eating solid foods at 11:45 pm.    1 day before:    In the morning: begin a clear liquid diet (liquids you can see through).     Examples of a clear liquid diet include: water, clear broth or bouillon, Gatorade, Pedialyte or Powerade, carbonated and non-carbonated soft drinks (Sprite , 7-Up , ginger ale), strained fruit juices without pulp (apple, white grape, white cranberry), Jell-O  and popsicles.     The following are not allowed on a clear liquid diet: red liquids, alcoholic beverages, dairy products (milk, creamer, and yogurt), protein shakes, creamy broths, juice with pulp and chewing tobacco.    At noon: take 2 (two) bisacodyl tablets     At 4 (and no later than 6pm): start  drinking the Miralax-Gatorade preparation (8.3 oz of Miralax mixed with 64 oz of Gatorade in a large pitcher). Drink 1(one) 8 oz glass every 15 minutes thereafter, until the mixture is gone.    COLON CLEANSING TIPS: drink adequate amounts of fluids before and after your colon cleansing to prevent dehydration. Stay near a toilet because you will have diarrhea. Even if you are sitting on the toilet, continue to drink the cleansing solution every 15 minutes. If you feel nauseous or vomit, rinse your mouth with water, take a 15 to 30-minute-break and then continue drinking the solution. You will be uncomfortable until the stool has flushed from your colon (in about 2 to 4 hours). You may feel chilled.    Day of your procedure  You may take all of your morning medications including blood pressure medications, blood thinners (if you have not been instructed to stop these by our office), methadone, anti-seizure medications with sips of water 3 hours prior to your procedure or earlier. Do not take insulin or vitamins prior to your procedure. Continue the clear liquid diet.       4 hours prior: drink 10 oz of magnesium citrate. It may be easier to drink it with a straw.    STOP consuming all liquids after that.     Do not take anything by mouth during this time.     Allow extra time to travel to your procedure as you may need to stop and use a restroom along the way.    You are ready for the procedure, if you followed all instructions and your stool is no longer formed, but clear or yellow liquid. If you are unsure whether your colon is clean, please call our office at [phone] before you leave for your appointment.    Bring the following to your procedure:  - Insurance Card/Photo ID.   - List of current medications including over-the-counter medications and supplements.   - Your rescue inhaler if you currently use one to control asthma.    Canceling or rescheduling your appointment:   If you must cancel or reschedule  your appointment, please call [phone] as soon as possible.      COLONOSCOPY PRE-PROCEDURE CHECKLIST    If you have diabetes, ask your regular doctor for diet and medication restrictions.  If you take an anticoagulant or anti-platelet medication (such as Coumadin , Lovenox , Pradaxa , Xarelto , Eliquis , etc.), please call your primary doctor for advice on holding this medication.  If you take aspirin you may continue to do so.  If you are or may be pregnant, please discuss the risks and benefits of this procedure with your doctor.        What happens during a colonoscopy?    Plan to spend up to two hours, starting at registration time, at the endoscopy center the day of your procedure. The colonoscopy takes an average of 15 to 30 minutes. Recovery time is about 30 minutes.      Before the exam:    You will change into a gown.    Your medical history and medication list will be reviewed with you, unless that has been done over the phone prior to the procedure.     A nurse will insert an intravenous (IV) line into your hand or arm.    The doctor will meet with you and will give you a consent form to sign.  During the exam:     Medicine will be given through the IV line to help you relax.     Your heart rate and oxygen levels will be monitored. If your blood pressure is low, you may be given fluids through the IV line.     The doctor will insert a flexible hollow tube, called a colonoscope, into your rectum. The scope will be advanced slowly through the large intestine (colon).    You may have a feeling of fullness or pressure.     If an abnormal tissue or a polyp is found, the doctor may remove it through the endoscope for closer examination, or biopsy. Tissue removal is painless    After the exam:           Any tissue samples removed during the exam will be sent to a lab for evaluation. It may take 5-7 working days for you to be notified of the results.     A nurse will provide you with complete discharge  instructions before you leave the endoscopy center. Be sure to ask the nurse for specific instructions if you take blood thinners such as Aspirin, Coumadin or Plavix.     The doctor will prepare a full report for you and for the physician who referred you for the procedure.     Your doctor will talk with you about the initial results of your exam.      Medication given during the exam will prohibit you from driving for the rest of the day.     Following the exam, you may resume your normal diet. Your first meal should be light, no greasy foods. Avoid alcohol until the next day.     You may resume your regular activities the day after the procedure.         LOW-FIBER DIET    Foods RECOMMENDED Foods to AVOID   Breads, Cereal, Rice and Pasta:   White bread, rolls, biscuits, croissant and wallace toast.   Waffles, Malay toast and pancakes.   White rice, noodles, pasta, macaroni and peeled cooked potatoes.   Plain crackers and saltines.   Cooked cereals: farina, cream of rice.   Cold cereals: Puffed Rice , Rice Krispies , Corn Flakes  and Special K    Breads, Cereal, Rice and Pasta:   Breads or rolls with nuts, seeds or fruit.   Whole wheat, pumpernickel, rye breads and cornbread.   Potatoes with skin, brown or wild rice, and kasha (buckwheat).     Vegetables:   Tender cooked and canned vegetables without seeds: carrots, asparagus tips, green or wax beans, pumpkin, spinach, lima beans. Vegetables:   Raw or steamed vegetables.   Vegetables with seeds.   Sauerkraut.   Winter squash, peas, broccoli, Brussel sprouts, cabbage, onions, cauliflower, baked beans, peas and corn.   Fruits:   Strained fruit juice.   Canned fruit, except pineapple.   Ripe bananas and melon. Fruits:   Prunes and prune juice.   Raw fruits.   Dried fruits: figs, dates and raisins.   Milk/Dairy:   Milk: plain or flavored.   Yogurt, custard and ice cream.   Cheese and cottage cheese Milk/Dairy:     Meat and other proteins:   ground, well-cooked tender  beef, lamb, ham, veal, pork, fish, poultry and organ meats.   Eggs.   Peanut butter without nuts. Meat and other proteins:   Tough, fibrous meats with gristle.   Dry beans, peas and lentils.   Peanut butter with nuts.   Tofu.   Fats, Snack, Sweets, Condiments and Beverages:   Margarine, butter, oils, mayonnaise, sour cream and salad dressing, plain gravy.   Sugar, hard candy, clear jelly, honey and syrup.   Spices, cooked herbs, bouillon, broth and soups made with allowed vegetable, ketchup and mustard.   Coffee, tea and carbonated drinks.   Plain cakes, cookies and pretzels.   Gelatin, plain puddings, custard, ice cream, sherbet and popsicles. Fats, Snack, Sweets, Condiments and Beverages:   Nuts, seeds and coconut.   Jam, marmalade and preserves.   Pickles, olives, relish and horseradish.   All desserts containing nuts, seeds, dried fruit and coconut; or made from whole grains or bran.   Candy made with nuts or seeds.   Popcorn.         DIRECTIONS TO THE ENDOSCOPY DEPARTMENT    From the north ([facility])  Take [address] South, exit on [address] [address]. Get into the left hand laith, turn left (east), go one-half mile to [address] and turn left. Go north to the second stoplight, take a right on [address] and follow it to the Main Hospital entrance.    From the south ([facility])  Take 35N to the [address]E split and exit on [address] [address]. On [address] [address], turn left (west) to [address]. Turn right (north) on [address]. Go north to the second stoplight, take a right on [address] and follow it to the Main Hospital entrance.    From the east via 35E ([facility])  Take [address]E south to [address] [address] exit. Turn right on [address] [address]. Go west to [address]. Turn right (north) on [address]. Go to the second stoplight, take a right on [address] to the Main Hospital entrance.    From the east via [address] ([facility])  Take [facility] [address]  [address] to [address]. Turn left (south) on [address] to [address], turn left (east) on [address] and follow it to the Main Hospital entrance.    From the west via [address] ([place], [place])  Take [address] east to [address]. Turn right (south) on [address] to [address], turn left (east) on [address] and follow it to the Main Hospital entrance.

## 2021-11-12 NOTE — CONSULTS
Pre-Endoscopy History and Physical     Zo Qureshi MRN# [id number]   YOB: 1975 Age: 46 year old     Date of Procedure: 11/12/2021  Primary care provider: Terri Robles  Type of Endoscopy: colonoscopy  Reason for Procedure: colon cancer screening  Type of Anesthesia Anticipated: Moderate (conscious) sedation    HPI:    Zo is a 46 year old female who will be undergoing the above procedure.      A history and physical has been performed. The patient's medications and allergies have been reviewed. The risks and benefits of the procedure and the sedation options and risks were discussed with the patient.  All questions were answered and informed consent was obtained.      She denies a personal or family history of anesthesia complications or bleeding disorders.     Allergies   Allergen Reactions     Contrast Dye Nausea and Vomiting     Diatrizoate Nausea and Vomiting     Percocet [Oxycodone-Acetaminophen] Nausea and Vomiting and Itching     Advil [Ibuprofen Micronized] Rash     Rash      Ibuprofen Rash        Current Facility-Administered Medications   Medication     0.9% sodium chloride BOLUS     atropine injection 0.4 mg     benzocaine 20% (HURRICAINE/TOPEX) 20 % spray 0.5-2 mL     EPINEPHrine (Anaphylaxis) (ADRENALIN) injection (vial) 0.1 mg     fentaNYL (PF) (SUBLIMAZE) injection 50 mcg    Followed by     fentaNYL (PF) (SUBLIMAZE) injection 25 mcg     fentaNYL (PF) (SUBLIMAZE) injection  mcg    Followed by     fentaNYL (PF) (SUBLIMAZE) injection 25-50 mcg     flumazenil (ROMAZICON) injection 0.2 mg     flumazenil (ROMAZICON) injection 0.2 mg     glucagon injection 0.5 mg     midazolam (VERSED) injection 1-2 mg    Followed by     midazolam (VERSED) injection 1 mg     midazolam (VERSED) injection 2 mg    Followed by     midazolam (VERSED) injection 1 mg     naloxone (NARCAN) injection 0.2 mg     naloxone (NARCAN) injection 0.2 mg     naloxone (NARCAN) injection 0.4 mg     naloxone (NARCAN)  injection 0.4 mg     simethicone (MYLICON) suspension 133 mg     sodium chloride (PF) 0.9% PF flush 3 mL     sodium chloride (PF) 0.9% PF flush 3 mL       Patient Active Problem List   Diagnosis     Inflammatory spondylopathy (H)     Disorder of SI (sacroiliac) joint     Hypercholesterolemia     HSIL on Pap smear     S/P LEEP of cervix     Acne     Influenza B     Vitamin D deficiency     Right peroneal tendinosis     Episodic tension-type headache, not intractable     Cervicalgia     Insomnia due to medical condition     Chronic pain syndrome     Submucous leiomyoma of uterus     Menorrhagia with regular cycle     Right shoulder tendonitis     Intramural leiomyoma of uterus     Pelvic pain in female     Left ovarian cyst     Immune to hepatitis B        Past Medical History:   Diagnosis Date     Anemia      History of blood transfusion 2000    after vaginal delivery, 2 units PRBCs given     HSIL on Pap smear 09/21/2011    MARTA 2 and 3     Immune to hepatitis B 08/2021    hep B antigen NEG     INFLAM SPONDYLOPATHY NOS 01/07/2008    check labs on sulfasalzine every 3 months and check hep B and C and TB every 2 years     Leukocytopenia 03/10/2014     Tendinosis      Thrombocytopenia (H) 03/10/2014     Unspecified closed fracture of pelvis 2000    left fracture of pelvis after delivery        Past Surgical History:   Procedure Laterality Date     DILATION AND CURETTAGE, ABLATE ENDOMETRIUM NOVASURE, COMBINED N/A 12/04/2018    Procedure: novasure endometrial ablation, myosure resection of leiomyoma, dilation and curettage;  Surgeon: Rolando Covarrubias MD;  Location: [facility] OR     GYN SURGERY  12/04/2018    fibroid removal     lap hysterectomy and salpingetcomy Bilateral 2019    done at [facility] - ovaries are in     LEEP TX, CERVICAL  12/19/2011    MARTA II     OPERATIVE HYSTEROSCOPY WITH MORCELLATOR N/A 12/04/2018    ovaries are still in.   Surgeon: Rolando Covarrubias MD;  Location: [facility] OR     TUBAL LIGATION  05/2009     "laparoscopic tubal ligation     ZZC NONSPECIFIC PROCEDURE  10/2000    after delivery 2 units of prbcs secondary to placenta       Social History     Tobacco Use     Smoking status: Never Smoker     Smokeless tobacco: Never Used   Substance Use Topics     Alcohol use: Not Currently     Alcohol/week: 0.0 standard drinks     Comment: rarely       Family History   Problem Relation Age of Onset     Hypertension Father      Lipids Father      Hypertension Mother      Lipids Mother      Diabetes No family hx of      Coronary Artery Disease No family hx of      Hyperlipidemia No family hx of      Cerebrovascular Disease No family hx of      Breast Cancer No family hx of      Colon Cancer No family hx of      Prostate Cancer No family hx of      Other Cancer No family hx of      Depression No family hx of      Anxiety Disorder No family hx of      Mental Illness No family hx of      Substance Abuse No family hx of      Anesthesia Reaction No family hx of      Asthma No family hx of      Osteoporosis No family hx of      Genetic Disorder No family hx of      Thyroid Disease No family hx of      Obesity No family hx of      Unknown/Adopted No family hx of        REVIEW OF SYSTEMS:     5 point ROS negative except as noted above in HPI, including Gen., Resp., CV, GI &  system review.    PHYSICAL EXAM:   LMP 03/26/2019 (Within Days)  Estimated body mass index is 23.83 kg/m  as calculated from the following:    Height as of 8/23/21: 1.499 m (4' 11\").    Weight as of 9/15/21: 53.5 kg (118 lb).   GENERAL APPEARANCE: healthy  MENTAL STATUS: alert  AIRWAY EXAM: Mallampatti Class I (visualization of the soft palate, fauces, uvula, anterior and posterior pillars)  RESP: lungs clear to auscultation - no rales, rhonchi or wheezes  CV: regular rates and rhythm    DIAGNOSTICS:      Not indicated    IMPRESSION     ASA Class 2 - Mild systemic disease    PLAN:     Colonoscopy    The above has been forwarded to the consulting " provider.    Signed Electronically by: Laurent Harris MD  November 12, 2021

## 2021-11-17 ENCOUNTER — OFFICE VISIT (OUTPATIENT)
Dept: FAMILY MEDICINE | Facility: CLINIC | Age: 46
End: 2021-11-17
Payer: MEDICARE

## 2021-11-17 VITALS
WEIGHT: 121 LBS | OXYGEN SATURATION: 98 % | SYSTOLIC BLOOD PRESSURE: 122 MMHG | BODY MASS INDEX: 24.44 KG/M2 | HEART RATE: 85 BPM | DIASTOLIC BLOOD PRESSURE: 76 MMHG | TEMPERATURE: 98 F

## 2021-11-17 DIAGNOSIS — M47.819 SPONDYLOARTHROPATHY: ICD-10-CM

## 2021-11-17 DIAGNOSIS — G89.4 CHRONIC PAIN SYNDROME: ICD-10-CM

## 2021-11-17 DIAGNOSIS — M77.8 RIGHT SHOULDER TENDONITIS: ICD-10-CM

## 2021-11-17 DIAGNOSIS — M54.2 CERVICALGIA: ICD-10-CM

## 2021-11-17 DIAGNOSIS — M67.88 RIGHT PERONEAL TENDINOSIS: ICD-10-CM

## 2021-11-17 DIAGNOSIS — R10.13 ABDOMINAL PAIN, EPIGASTRIC: ICD-10-CM

## 2021-11-17 DIAGNOSIS — R07.0 THROAT PAIN: ICD-10-CM

## 2021-11-17 DIAGNOSIS — M46.98 INFLAMMATORY SPONDYLOPATHY OF SACRAL REGION (H): ICD-10-CM

## 2021-11-17 PROCEDURE — 99214 OFFICE O/P EST MOD 30 MIN: CPT | Mod: 25 | Performed by: FAMILY MEDICINE

## 2021-11-17 PROCEDURE — 0064A COVID-19,PF,MODERNA (18+ YRS BOOSTER .25ML): CPT | Performed by: FAMILY MEDICINE

## 2021-11-17 PROCEDURE — 91306 COVID-19,PF,MODERNA (18+ YRS BOOSTER .25ML): CPT | Performed by: FAMILY MEDICINE

## 2021-11-17 RX ORDER — MELOXICAM 15 MG/1
15 TABLET ORAL DAILY
Qty: 90 TABLET | Refills: 2 | Status: SHIPPED | OUTPATIENT
Start: 2021-11-17 | End: 2022-04-05

## 2021-11-17 RX ORDER — SULFASALAZINE 500 MG/1
TABLET, DELAYED RELEASE ORAL
Qty: 120 TABLET | Refills: 6 | Status: SHIPPED | OUTPATIENT
Start: 2021-11-17 | End: 2023-05-24

## 2021-11-17 RX ORDER — FLUTICASONE PROPIONATE 50 MCG
1-2 SPRAY, SUSPENSION (ML) NASAL DAILY
Qty: 16 G | Refills: 3 | Status: SHIPPED | OUTPATIENT
Start: 2021-11-17

## 2021-11-17 RX ORDER — GABAPENTIN 300 MG/1
CAPSULE ORAL
Qty: 120 CAPSULE | Refills: 6 | Status: SHIPPED | OUTPATIENT
Start: 2021-11-17 | End: 2022-03-01

## 2021-11-17 RX ORDER — LIDOCAINE 50 MG/G
OINTMENT TOPICAL 3 TIMES DAILY PRN
Qty: 150 G | Refills: 3 | Status: SHIPPED | OUTPATIENT
Start: 2021-11-17 | End: 2023-01-18

## 2021-11-17 RX ORDER — ONDANSETRON 4 MG/1
TABLET, ORALLY DISINTEGRATING ORAL
Qty: 12 TABLET | Refills: 4 | Status: SHIPPED | OUTPATIENT
Start: 2021-11-17 | End: 2022-03-01

## 2021-11-17 ASSESSMENT — ANXIETY QUESTIONNAIRES
GAD7 TOTAL SCORE: 3
7. FEELING AFRAID AS IF SOMETHING AWFUL MIGHT HAPPEN: NOT AT ALL
7. FEELING AFRAID AS IF SOMETHING AWFUL MIGHT HAPPEN: NOT AT ALL
2. NOT BEING ABLE TO STOP OR CONTROL WORRYING: NOT AT ALL
8. IF YOU CHECKED OFF ANY PROBLEMS, HOW DIFFICULT HAVE THESE MADE IT FOR YOU TO DO YOUR WORK, TAKE CARE OF THINGS AT HOME, OR GET ALONG WITH OTHER PEOPLE?: VERY DIFFICULT
1. FEELING NERVOUS, ANXIOUS, OR ON EDGE: NOT AT ALL
3. WORRYING TOO MUCH ABOUT DIFFERENT THINGS: SEVERAL DAYS
6. BECOMING EASILY ANNOYED OR IRRITABLE: SEVERAL DAYS
GAD7 TOTAL SCORE: 3
4. TROUBLE RELAXING: SEVERAL DAYS
5. BEING SO RESTLESS THAT IT IS HARD TO SIT STILL: NOT AT ALL
GAD7 TOTAL SCORE: 3

## 2021-11-17 ASSESSMENT — PATIENT HEALTH QUESTIONNAIRE - PHQ9
SUM OF ALL RESPONSES TO PHQ QUESTIONS 1-9: 12
10. IF YOU CHECKED OFF ANY PROBLEMS, HOW DIFFICULT HAVE THESE PROBLEMS MADE IT FOR YOU TO DO YOUR WORK, TAKE CARE OF THINGS AT HOME, OR GET ALONG WITH OTHER PEOPLE: VERY DIFFICULT
SUM OF ALL RESPONSES TO PHQ QUESTIONS 1-9: 12

## 2021-11-17 NOTE — PROGRESS NOTES
Assessment & Plan     Right peroneal tendinosis  Stable  Refills per [facility]    - gabapentin (NEURONTIN) 300 MG capsule  Dispense: 120 capsule; Refill: 6    Right shoulder tendonitis  stable  - gabapentin (NEURONTIN) 300 MG capsule  Dispense: 120 capsule; Refill: 6    Cervicalgia  stable  - gabapentin (NEURONTIN) 300 MG capsule  Dispense: 120 capsule; Refill: 6    Throat pain  Refills per [facility]  This is for allergies  - fluticasone (FLONASE) 50 MCG/ACT nasal spray  Dispense: 16 g; Refill: 3    Spondyloarthropathy  Refills per [facility]  Recommended she proceed to once per month cosyntex at this time rather than stop altogether = may take longer to start working but if this is better tolerated, then so be it    - sulfaSALAzine ER (AZULFIDINE EN) 500 MG EC tablet  Dispense: 120 tablet; Refill: 6    Chronic pain syndrome  Refills per [facility]    - lidocaine (XYLOCAINE) 5 % external ointment  Dispense: 150 g; Refill: 3    Inflammatory spondylopathy of sacral region (H)  Refills per epic[facility]    - meloxicam (MOBIC) 15 MG tablet  Dispense: 90 tablet; Refill: 2    Abdominal pain, epigastric  Refills per epic[facility]  This is for side effects from some of the meds    - ondansetron (ZOFRAN-ODT) 4 MG ODT tab  Dispense: 12 tablet; Refill: 4    ALSO COVID BOOSTER GIVEN TODAY         30 minutes spent on the date of the encounter doing chart review, review of outside records, review of test results, interpretation of tests, patient visit and documentation        Depression Screening Follow Up    PHQ 11/17/2021   PHQ-9 Total Score 12   Q9: Thoughts of better off dead/self-harm past 2 weeks Not at all     Last PHQ-9 11/17/2021   1.  Little interest or pleasure in doing things 2   2.  Feeling down, depressed, or hopeless 1   3.  Trouble falling or staying asleep, or sleeping too much 2   4.  Feeling tired or having little energy 2   5.  Poor appetite or overeating 2   6.  Feeling bad about yourself 0   7.  Trouble concentrating 2   8.   "Moving slowly or restless 1   Q9: Thoughts of better off dead/self-harm past 2 weeks 0   PHQ-9 Total Score 12   Difficulty at work, home, or with people -       Follow Up Actions Taken  Crisis resource information provided in After Visit Summary         Return in about 6 months (around 5/17/2022) for Physical Exam.    Terri Robles MD  [facility] MIGDALIA Pathak is a 46 year old who presents for the following health issues - she is here to recheck on her tendonitis and get some refills of her meds.   She started cosyntex a little over a month ago.   She was to do 5 shots every week and then go to one per month.   After the first 3 she stopped as they made her feel some shortness of breath a few days after and like she was getting sick.   She has not noticed joint improvement yet.       History of Present Illness       Back Pain:  She presents for follow up of back pain. Patient's back pain is a chronic problem.  Location of back pain:  Right lower back, left lower back, right middle of back, left middle of back, right side of neck, left side of neck, right shoulder, left shoulder, right buttock and left buttock  Description of back pain: shooting  Back pain spreads: right buttocks, left buttocks, left foot, right shoulder, left shoulder, right side of neck and left side of neck    Since patient first noticed back pain, pain is: always present, but gets better and worse  Does back pain interfere with her job:  Yes      Migraines:   Since the patient's last clinic visit, headaches are: worsened  The patient is getting headaches:  Almost every day  She is not able to do normal daily activities when she has a migraine.  The patient is taking the following rescue/relief medications:  Naproxyn (Aleve) and other   Patient states \"I get only a small amount of relief\" from the rescue/relief medications.   The patient is taking the following medications to prevent migraines:  Topomax  In " the past 4 weeks, the patient has gone to an Urgent Care or Emergency Room 0 times times due to headaches.    She eats 2-3 servings of fruits and vegetables daily.She consumes 0 sweetened beverage(s) daily.She exercises with enough effort to increase her heart rate 9 or less minutes per day.  She exercises with enough effort to increase her heart rate 3 or less days per week.   She is taking medications regularly.       Past Medical History:   Diagnosis Date     Anemia      History of blood transfusion 2000    after vaginal delivery, 2 units PRBCs given     HSIL on Pap smear 09/21/2011    MARTA 2 and 3     Immune to hepatitis B 08/2021    hep B antigen NEG     INFLAM SPONDYLOPATHY NOS 01/07/2008    check labs on sulfasalzine every 3 months and check hep B and C and TB every 2 years     Leukocytopenia 03/10/2014     Tendinosis      Thrombocytopenia (H) 03/10/2014     Unspecified closed fracture of pelvis 2000    left fracture of pelvis after delivery       Past Surgical History:   Procedure Laterality Date     COLONOSCOPY Left 11/12/2021    recheck in 10 years     DILATION AND CURETTAGE, ABLATE ENDOMETRIUM NOVASURE, COMBINED N/A 12/04/2018    Procedure: novasure endometrial ablation, myosure resection of leiomyoma, dilation and curettage;  Surgeon: Rolando Covarrubias MD;  Location: [facility] OR     GYN SURGERY  12/04/2018    fibroid removal     lap hysterectomy and salpingetcomy Bilateral 2019    done at [facility] - ovaries are in     LEEP TX, CERVICAL  12/19/2011    MARTA II     OPERATIVE HYSTEROSCOPY WITH MORCELLATOR N/A 12/04/2018    ovaries are still in.   Surgeon: Rolando Covarrubias MD;  Location: [facility] OR     TUBAL LIGATION  05/2009    laparoscopic tubal ligation     Z NONSPECIFIC PROCEDURE  10/2000    after delivery 2 units of prbcs secondary to placenta       MEDICATIONS:  Current Outpatient Medications   Medication     fluticasone (FLONASE) 50 MCG/ACT nasal spray     gabapentin (NEURONTIN) 300 MG capsule     lidocaine  (XYLOCAINE) 5 % external ointment     meloxicam (MOBIC) 15 MG tablet     ondansetron (ZOFRAN-ODT) 4 MG ODT tab     sulfaSALAzine ER (AZULFIDINE EN) 500 MG EC tablet     clindamycin-benzoyl peroxide (BENZACLIN) 1-5 % external gel     cyclobenzaprine (FLEXERIL) 5 MG tablet     diclofenac (VOLTAREN) 1 % topical gel     DULoxetine (CYMBALTA) 30 MG capsule     ferrous sulfate (FEROSUL) 325 (65 Fe) MG tablet     hydrocortisone (WESTCORT) 0.2 % external cream     ketotifen (ZADITOR) 0.025 % ophthalmic solution     loratadine (CLARITIN) 10 MG tablet     olopatadine (PATANOL) 0.1 % ophthalmic solution     polyethylene glycol (MIRALAX) 17 GM/Dose powder     secukinumab (COSENTYX SENSOREADY PEN) 150 MG/ML Sensoready pen     STOOL SOFTENER 100 MG capsule     traMADol (ULTRAM) 50 MG tablet     traZODone (DESYREL) 50 MG tablet     No current facility-administered medications for this visit.     Facility-Administered Medications Ordered in Other Visits   Medication     midazolam (VERSED) injection       SOCIAL HISTORY:  Social History     Tobacco Use     Smoking status: Never Smoker     Smokeless tobacco: Never Used   Substance Use Topics     Alcohol use: Not Currently     Alcohol/week: 0.0 standard drinks     Comment: rarely       Family History   Problem Relation Age of Onset     Hypertension Father      Lipids Father      Hypertension Mother      Lipids Mother      Diabetes No family hx of      Coronary Artery Disease No family hx of      Hyperlipidemia No family hx of      Cerebrovascular Disease No family hx of      Breast Cancer No family hx of      Colon Cancer No family hx of      Prostate Cancer No family hx of      Other Cancer No family hx of      Depression No family hx of      Anxiety Disorder No family hx of      Mental Illness No family hx of      Substance Abuse No family hx of      Anesthesia Reaction No family hx of      Asthma No family hx of      Osteoporosis No family hx of      Genetic Disorder No family hx  of      Thyroid Disease No family hx of      Obesity No family hx of      Unknown/Adopted No family hx of            Review of Systems   Constitutional, HEENT, cardiovascular, pulmonary, gi and gu systems are negative, except as otherwise noted.      Objective    /76 (BP Location: Right arm, Patient Position: Sitting, Cuff Size: Adult Regular)   Pulse 85   Temp 98  F (36.7  C) (Oral)   Wt 54.9 kg (121 lb)   LMP 03/26/2019 (Within Days)   SpO2 98%   BMI 24.44 kg/m    Body mass index is 24.44 kg/m .  Physical Exam   GENERAL: healthy, alert and no distress  EYES: Eyes grossly normal to inspection, PERRL and conjunctivae and sclerae normal  HENT: ear canals and TM's normal, nose and mouth without ulcers or lesions  NECK: no adenopathy, no asymmetry, masses, or scars and thyroid normal to palpation  RESP: lungs clear to auscultation - no rales, rhonchi or wheezes  CV: regular rate and rhythm, normal S1 S2, no S3 or S4, no murmur, click or rub, no peripheral edema and peripheral pulses strong  ABDOMEN: soft, nontender, no hepatosplenomegaly, no masses and bowel sounds normal  MS: no gross musculoskeletal defects noted, no edema  SKIN: no suspicious lesions or rashes  NEURO: Normal strength and tone, mentation intact and speech normal  PSYCH: mentation appears normal, affect normal/bright  LYMPH: no cervical, supraclavicular, axillary, or inguinal adenopathy    Admission on 11/12/2021, Discharged on 11/12/2021   Component Date Value Ref Range Status     COLONOSCOPY 11/12/2021    Final                    Value:[facility]  _______________________________________________________________________________  Patient Name: Zo Qureshi           Procedure Date: 11/12/2021 12:03 PM  MRN: [id number]                       Account Number: [id number]  YOB: 1975              Admit Type: Outpatient  Age: 46                               Gender: Female  Attending MD: Laurent Harris ,  MD Total Sedation Time: Minutes of continuous   bedside 1:1:  15 minutes  Instrument Name: 219 - Pediatric Colonoscope   _______________________________________________________________________________     Procedure:                Colonoscopy  Indications:              Screening for colorectal malignant neoplasm  Providers:                Laurent Harris MD (Doctor)  Referring MD:               Medicines:                Midazolam 3 mg IV, Fentanyl 100 micrograms IV  Complications:            No immediate complications.  _____________________________________________________________                          __________________  Procedure:                Pre-Anesthesia Assessment:                            - Prior to the procedure, a History and Physical                             was performed, and patient medications and                             allergies were reviewed. The patient is competent.                             The risks and benefits of the procedure and the                             sedation options and risks were discussed with the                             patient. All questions were answered and informed                             consent was obtained. Patient identification and                             proposed procedure were verified by the physician.                             Mental Status Examination: alert and oriented.                             Airway Examination: normal oropharyngeal airway and                             neck mobility. Respiratory Examination: clear to                             auscultation. CV Examination: normal. Prophylactic                                                       Antibiotics: The patient does not require                             prophylactic antibiotics. Prior Anticoagulants: The                             patient has taken no anticoagulant or antiplatelet                             agents. ASA Grade Assessment: II - A patient with                              mild systemic disease. After reviewing the risks                             and benefits, the patient was deemed in                             satisfactory condition to undergo the procedure.                             The anesthesia plan was to use moderate sedation /                             analgesia (conscious sedation). Immediately prior                             to administration of medications, the patient was                             re-assessed for adequacy to receive sedatives. The                             heart rate, respiratory rate, oxygen saturations,                             blood pressure, adequacy of pulmonary ventilation,                                                       and response to care were monitored throughout the                             procedure. The physical status of the patient was                             re-assessed after the procedure.                            After obtaining informed consent, the colonoscope                             was passed under direct vision. Throughout the                             procedure, the patient's blood pressure, pulse, and                             oxygen saturations were monitored continuously. The                             Olympus, Pediatric Colonoscope, Model # PCF-H190DL,                             [place] # 219, SN # [id number] was introduced through                             the anus and advanced to the cecum, identified by                             appendiceal orifice and ileocecal valve. The                             colonoscopy was performed without difficulty. The                             patient tolerated the procedure well. The quality                                                       of the bowel preparation was good. The ileocecal                             valve, appendiceal orifice, and rectum were                             photographed.                                                                                    Findings:       The colon (entire examined portion) appeared normal.                                                                                   Impression:               - The entire examined colon is normal.                            - No specimens collected.  Recommendation:           - Repeat colonoscopy in 10 years for screening                             purposes.                                                                                   Procedure Code(s):       --- Professional ---       21891, Colonoscopy, flexible; diagnostic, including collection of        specimen(s) by brushing or washing, when performed (separate procedure)  Diagnosis Code(s):       --- Professional ---       Z12.11, Encounter f                          or screening for malignant neoplasm of colon    CPT copyright 2020 [employer]. All rights reserved.    The codes documented in this report are preliminary and upon  review may   be revised to meet current compliance requirements.    Laurent Harris M.D.  ______________________  Laurent Harris MD  11/12/2021 12:29:37 PM  Number of Addenda: 0    Note Initiated On: 11/12/2021 12:03 PM  MRN:                      [id number]  Procedure Date:           11/12/2021 12:03:11 PM  Scope Withdrawal Time: 0 hours 8 minutes 26 seconds   Total Procedure Duration: 0 hours 11 minutes 32 seconds   Estimated Blood Loss:       Scope In: 12:13:38 PM  Scope Out: 12:25:10 PM                   Answers for HPI/ROS submitted by the patient on 11/17/2021  If you checked off any problems, how difficult have these problems made it for you to do your work, take care of things at home, or get along with other people?: Very difficult  PHQ9 TOTAL SCORE: 12  JOSE DAVID 7 TOTAL SCORE: 3

## 2021-11-18 ASSESSMENT — PATIENT HEALTH QUESTIONNAIRE - PHQ9: SUM OF ALL RESPONSES TO PHQ QUESTIONS 1-9: 12

## 2021-11-18 ASSESSMENT — ANXIETY QUESTIONNAIRES: GAD7 TOTAL SCORE: 3

## 2021-12-17 ENCOUNTER — VIRTUAL VISIT (OUTPATIENT)
Dept: RHEUMATOLOGY | Facility: CLINIC | Age: 46
End: 2021-12-17
Attending: INTERNAL MEDICINE
Payer: MEDICARE

## 2021-12-17 DIAGNOSIS — Z79.899 HIGH RISK MEDICATION USE: ICD-10-CM

## 2021-12-17 DIAGNOSIS — T50.905A MEDICATION SIDE EFFECT, INITIAL ENCOUNTER: ICD-10-CM

## 2021-12-17 DIAGNOSIS — M45.8 ANKYLOSING SPONDYLITIS OF SACRAL REGION (H): Primary | ICD-10-CM

## 2021-12-17 PROCEDURE — G0463 HOSPITAL OUTPT CLINIC VISIT: HCPCS | Mod: PN,RTG | Performed by: INTERNAL MEDICINE

## 2021-12-17 PROCEDURE — 99214 OFFICE O/P EST MOD 30 MIN: CPT | Mod: 95 | Performed by: INTERNAL MEDICINE

## 2021-12-17 RX ORDER — TOFACITINIB 11 MG/1
11 TABLET, FILM COATED, EXTENDED RELEASE ORAL DAILY
Qty: 30 TABLET | Refills: 5 | Status: SHIPPED | OUTPATIENT
Start: 2021-12-17 | End: 2022-08-29 | Stop reason: SINTOL

## 2021-12-17 ASSESSMENT — PAIN SCALES - GENERAL: PAINLEVEL: SEVERE PAIN (6)

## 2021-12-17 NOTE — LETTER
12/17/2021       RE: Zo Qureshi  [id number]  [facility] [address]     Dear Colleague,    Thank you for referring your patient, oZ Qureshi, to the [facility] [place] at [facility]. Please see a copy of my visit note below.    Zo is a 46 year old who is being evaluated via a billable video visit.      How would you like to obtain your AVS? MyChart  If the video visit is dropped, the invitation should be resent by: Text to cell phone: [phone]  Will anyone else be joining your video visit? No      Video-Visit Details    Type of service:  Video Visit    Start: 12/17/2021 01:30 pm   Stop: 12/17/2021 01:44 pm    Originating Location (pt. Location): Home    Distant Location (provider location):  [facility] [place]     Platform used for Video Visit: Christine Hernandez MD  Rheumatology        Outpatient Rheumatology Follow-up  This visit was conducted via synchronous video visit due to the current COVID-19 crisis to reduce patient risk.  Verbal consent was obtained and is documented below.    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 Prior to that time she had last been seen by Dr. Garay of 5/24/2019. Most recent imaging of SI joints was on 2/28/2020 which showed sclerosis with marginal spurring about the SI joints, likely secondary to her chronic inflammatory disease, though no active inflammatory changes at that time. Had been tried on both humira and enbrel, though did not tolerate these due to side effects so discontinued prior to determining  if efficacious. At the time of her last visit with rheumatology it was documented that she was responsive to NSAIDs, prednisone, and SSZ. The dose of her SSZ was increased to 1gm BID at the time of her initial visit with me given her active peripheral arthritis and enthesitis. Despite naproxen 500mg BID, she had ongoing axial stiffness which was AM predominant and improved with activity and stretching, so risks and benefits of cosentyx discussed and patient started on this IL-17 inhibitor. She presents today for initial follow-up.    She has interestingly developed the same side effects of severe flu like symptoms after each dose of her biologic therapy- similar to those she was experiencing when on humira and enbrel, with rhinorrhea, muscle aches/pains, severe fatigue, and also hives, low grade fever which come on after her injection and slowly over many weeks will resolve. Has been worse with each injection of cosentyx. She has been mostly bedridden since starting the cosentyx due to severe fatigue so unable to really assess improvement in axial inflammatory symptoms, though she is unable to tolerate the drug as she is currently experiencing these side effects.     We discussed the recent FDA approval of xeljanz for ankylosing spondylitis which is not a biologic DMARD, instead a targeted synthetic DMARD, which may not carry this side effect which she has not experienced with every biologic, regardless of the class, that she has tried. Risks and benefits of this therapy to include infection, thrombosis, bowel perforation, malignancy, increased all cause mortality among others discussed today. She is agreeable given potential benefit.     Plan:  1.  Continue sulfasalazine 1 g twice daily  2.  Continue on meloxicam 15 mg once daily.  No other NSAIDs in combination with once daily meloxicam  3.  Stop cosentyx  4. Will start xeljanz 11mg once daily  5. Will follow-up in 12 weeks to assess response    James Hernandez  MD  Rheumatology        Subjective:   Interval history  12/17/21  Reports feeling of fever/body aches/runny nose. Also with rash. 3-4 days after her first injection she noticed that this started, though took her next dose and the above symptoms were more severe. Then was off for about 1 month due to these severe side effects. She decided to re-try in October and November, though has again had return of above. Has been about 1 month since her last injection and reports side effects are continuing to slowly improve, though still with some fatigue/body aches/ HA. Rash and fever resolved. She finds it hard to comment on axial pain/stiffness/peripiheral joint pain/swelling in setting of severe fatigue/flu like symptoms. Her ROS is otherwise negative.     HPI from initial consultation 5/2021  Had flu like symptoms with flu like symptoms. Resolves when stopped. Prednisone, NSAIDs, SSZ used/helpful. Prednisone had nausea and consipation. SSZ has drowsiness/ dizziness/ mental fog worse in the AM. Has continued on SSZ 1gm in the AM and 1gm in the PM. Still with significant drowsiness. If she doesn't want to have drowsiness, reduces the dose, has pain in neck, shoulder, back. Has some swelling of joints. Has tenderness of many joints. Takes naproxen and gabapentin. She takes naproxen 500mg BID. In the AM has stiffness in her neck/back. Gets some better with stretching/ exercise. Takes 15 minutes to get better. No rash. Does have swelling of her right hand 2nd -4th digits. Pain in her PIPs predominantly. Pain also in between the joints. Has recently had some blurred vision, wears glasses. No inflammatory eye disease history. Intermittent double vision. Has had hair loss, non scarring more that is thinning. More over the last 3-4 years. Intermittent sore in mouth, painful. Has dry eyes, uses eye drops 3-4 times per week. When she wakes up her throat is dry. Drinks frequently. No abdominal pain. No blood in her stool. No n/v. No  raynauds. No sclerodactyly. No fevers, weight loss. Occasional weight loss and chills. Has been having ongoing throbbing HA on the left side. The naproxen is some helpful, though returns shortly afterwards. No miscarriages or blood clots.     Past Medical History  Past Medical History:   Diagnosis Date     Anemia      History of blood transfusion 2000    after vaginal delivery, 2 units PRBCs given     HSIL on Pap smear 09/21/2011    MARTA 2 and 3     Immune to hepatitis B 08/2021    hep B antigen NEG     INFLAM SPONDYLOPATHY NOS 01/07/2008    check labs on sulfasalzine every 3 months and check hep B and C and TB every 2 years     Leukocytopenia 03/10/2014     Tendinosis      Thrombocytopenia (H) 03/10/2014     Unspecified closed fracture of pelvis 2000    left fracture of pelvis after delivery     Past Surgical History  Past Surgical History:   Procedure Laterality Date     COLONOSCOPY Left 11/12/2021    recheck in 10 years     DILATION AND CURETTAGE, ABLATE ENDOMETRIUM NOVASURE, COMBINED N/A 12/04/2018    Procedure: novasure endometrial ablation, myosure resection of leiomyoma, dilation and curettage;  Surgeon: Rolando Covarrubias MD;  Location: [facility] OR     GYN SURGERY  12/04/2018    fibroid removal     lap hysterectomy and salpingetcomy Bilateral 2019    done at [facility] - ovaries are in     LEEP TX, CERVICAL  12/19/2011    MARTA II     OPERATIVE HYSTEROSCOPY WITH MORCELLATOR N/A 12/04/2018    ovaries are still in.   Surgeon: Rolando Covarrubias MD;  Location: [facility] OR     TUBAL LIGATION  05/2009    laparoscopic tubal ligation     ZZC NONSPECIFIC PROCEDURE  10/2000    after delivery 2 units of prbcs secondary to placenta     Medications  Current Outpatient Medications   Medication     clindamycin-benzoyl peroxide (BENZACLIN) 1-5 % external gel     cyclobenzaprine (FLEXERIL) 5 MG tablet     diclofenac (VOLTAREN) 1 % topical gel     DULoxetine (CYMBALTA) 30 MG capsule     ferrous sulfate (FEROSUL) 325 (65 Fe) MG tablet      "fluticasone (FLONASE) 50 MCG/ACT nasal spray     gabapentin (NEURONTIN) 300 MG capsule     hydrocortisone (WESTCORT) 0.2 % external cream     ketotifen (ZADITOR) 0.025 % ophthalmic solution     lidocaine (XYLOCAINE) 5 % external ointment     loratadine (CLARITIN) 10 MG tablet     meloxicam (MOBIC) 15 MG tablet     olopatadine (PATANOL) 0.1 % ophthalmic solution     ondansetron (ZOFRAN-ODT) 4 MG ODT tab     polyethylene glycol (MIRALAX) 17 GM/Dose powder     secukinumab (COSENTYX SENSOREADY PEN) 150 MG/ML Sensoready pen     STOOL SOFTENER 100 MG capsule     sulfaSALAzine ER (AZULFIDINE EN) 500 MG EC tablet     traMADol (ULTRAM) 50 MG tablet     traZODone (DESYREL) 50 MG tablet     No current facility-administered medications for this visit.     Facility-Administered Medications Ordered in Other Visits   Medication     midazolam (VERSED) injection     Allergies   Allergies   Allergen Reactions     Contrast Dye Nausea and Vomiting     Diatrizoate Nausea and Vomiting     Percocet [Oxycodone-Acetaminophen] Nausea and Vomiting and Itching     Advil [Ibuprofen Micronized] Rash     Rash      Ibuprofen Rash     Family History: Mother with \"arthritis\"    Social History: Not currently working. Work at [employer], building them. Repetitive motion. No smoking/drugs/ETOH. Not . 4 kids who are healthy.       Objective:    Physical exam:  No vitals for this video visit. Vitals reviewed in Epic.  GEN: Lying in bed. NAD  HEENT: no facial rash, sclera clear, constant clearing of throat/nose.  CV: no upper extremity dependent edema  Pulm: speaking in full sentences, no audible wheezing, no use of accessory muscles. Frequent cough to clear throat. Non productive  Abdomen: not distended  Skin: no acute cutaneous lesions  MSK: full active ROM of bilateral shoulders, elbows, wrists, MCPs, PIPs, DIPs. Can make full fist though appears to be weaker than expected.     Labs:  WBC   Date Value Ref Range Status   02/15/2021 6.5 " 4.0 - 11.0 10e9/L Final     WBC Count   Date Value Ref Range Status   08/16/2021 6.9 4.0 - 11.0 10e3/uL Final     Hemoglobin   Date Value Ref Range Status   08/16/2021 11.5 (L) 11.7 - 15.7 g/dL Final   02/15/2021 12.2 11.7 - 15.7 g/dL Final     Platelet Count   Date Value Ref Range Status   08/16/2021 247 150 - 450 10e3/uL Final   02/15/2021 219 150 - 450 10e9/L Final     Creatinine   Date Value Ref Range Status   08/16/2021 0.59 0.52 - 1.04 mg/dL Final   02/15/2021 0.54 0.52 - 1.04 mg/dL Final     Lab Results   Component Value Date    ALKPHOS 50 08/16/2021    ALKPHOS 55 02/15/2021     AST   Date Value Ref Range Status   08/16/2021 15 0 - 45 U/L Final   02/15/2021 14 0 - 45 U/L Final     Lab Results   Component Value Date    ALT 18 08/16/2021    ALT 17 02/15/2021     Sed Rate   Date Value Ref Range Status   02/15/2021 20 0 - 20 mm/h Final     Erythrocyte Sedimentation Rate   Date Value Ref Range Status   08/16/2021 29 (H) 0 - 20 mm/hr Final     CRP Inflammation   Date Value Ref Range Status   08/16/2021 <2.9 0.0 - 8.0 mg/L Final   02/26/2020 <2.9 0.0 - 8.0 mg/L Final     UA RESULTS:  Recent Labs   Lab Test 05/18/21  0858   COLOR Yellow   APPEARANCE Clear   URINEGLC Negative   URINEBILI Negative   URINEKETONE Negative   SG 1.015   UBLD Large*   URINEPH 6.5   PROTEIN Negative   UROBILINOGEN 0.2   NITRITE Negative   LEUKEST Moderate*   RBCU 10-25*   WBCU *      RAMÍREZ Screen by EIA   Date Value Ref Range Status   05/21/2014 <1.0  Interpretation:  Negative   <1.0 Final     Rheumatoid Factor   Date Value Ref Range Status   05/21/2014 <20 <20 IU/mL Final     Cyclic Citrullinated Peptide Antibody, IgG   Date Value Ref Range Status   08/28/2017 1 <7 U/mL Final     Comment:     Negative         Imaging:  MRI LUMBAR SPINE WITHOUT CONTRAST   2/28/2020 3:51 PM      HISTORY: Radiculopathy, greater than  6 weeks conservative treatment,  persistent symptoms; radiculopathy - history of sacroiliitis. Family  history of spinal  disease and severe stenosis - left-sided symptoms.  Spondyloarthropathy.      TECHNIQUE: Multiplanar multisequence MRI of the lumbar spine without  contrast.      COMPARISON: Lumbar spine MRI 9/7/2010. Correlation is also made with  prior pelvic CT 6/10/2019.     FINDINGS:  Alignment is normal. No fracture or significant vertebral  body height loss. The intervertebral discs are normal in height and  signal. No abnormal marrow signal in the lumbar spine. There is no  disc bulge or herniation. There is no spinal or neural foraminal  stenosis. Sclerotic changes about the sacroiliac joints with marginal  spurring. This appears overall similar (within the field-of-view) to  the most recent examinations.                                                                      IMPRESSION:  1. No acute abnormality of the lumbar spine. Specifically, no disc  bulge/herniation, marrow signal abnormality, or spinal or neural  foraminal stenosis.  2. Redemonstrated sclerosis with marginal spurring about the  sacroiliac joints, potentially representing changes related to  osteoarthritis. This may be secondary to prior sacroiliitis in the  setting of spondyloarthropathy given the patient's clinical history.  This is incompletely included within the field-of-view of this exam.

## 2021-12-17 NOTE — PROGRESS NOTES
Zo is a 46 year old who is being evaluated via a billable video visit.      How would you like to obtain your AVS? MyChart  If the video visit is dropped, the invitation should be resent by: Text to cell phone: [phone]  Will anyone else be joining your video visit? No      Video-Visit Details    Type of service:  Video Visit    Start: 12/17/2021 01:30 pm   Stop: 12/17/2021 01:44 pm    Originating Location (pt. Location): Home    Distant Location (provider location):  [facility] [place]     Platform used for Video Visit: Christine Hernandez MD  Rheumatology        Outpatient Rheumatology Follow-up  This visit was conducted via synchronous video visit due to the current COVID-19 crisis to reduce patient risk.  Verbal consent was obtained and is documented below.    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 Prior to that time she had last been seen by Dr. Garay of 5/24/2019. Most recent imaging of SI joints was on 2/28/2020 which showed sclerosis with marginal spurring about the SI joints, likely secondary to her chronic inflammatory disease, though no active inflammatory changes at that time. Had been tried on both humira and enbrel, though did not tolerate these due to side effects so discontinued prior to determining if efficacious. At the time of her last visit with rheumatology it was documented that she was responsive to NSAIDs, prednisone, and SSZ. The dose of her SSZ was increased to 1gm BID at the time of her initial visit with me given her active peripheral arthritis and enthesitis. Despite naproxen 500mg BID, she had  ongoing axial stiffness which was AM predominant and improved with activity and stretching, so risks and benefits of cosentyx discussed and patient started on this IL-17 inhibitor. She presents today for initial follow-up.    She has interestingly developed the same side effects of severe flu like symptoms after each dose of her biologic therapy- similar to those she was experiencing when on humira and enbrel, with rhinorrhea, muscle aches/pains, severe fatigue, and also hives, low grade fever which come on after her injection and slowly over many weeks will resolve. Has been worse with each injection of cosentyx. She has been mostly bedridden since starting the cosentyx due to severe fatigue so unable to really assess improvement in axial inflammatory symptoms, though she is unable to tolerate the drug as she is currently experiencing these side effects.     We discussed the recent FDA approval of xeljanz for ankylosing spondylitis which is not a biologic DMARD, instead a targeted synthetic DMARD, which may not carry this side effect which she has not experienced with every biologic, regardless of the class, that she has tried. Risks and benefits of this therapy to include infection, thrombosis, bowel perforation, malignancy, increased all cause mortality among others discussed today. She is agreeable given potential benefit.     Plan:  1.  Continue sulfasalazine 1 g twice daily  2.  Continue on meloxicam 15 mg once daily.  No other NSAIDs in combination with once daily meloxicam  3.  Stop cosentyx  4. Will start xeljanz 11mg once daily  5. Will follow-up in 12 weeks to assess response    James Hernandez MD  Rheumatology        Subjective:   Interval history  12/17/21  Reports feeling of fever/body aches/runny nose. Also with rash. 3-4 days after her first injection she noticed that this started, though took her next dose and the above symptoms were more severe. Then was off for about 1 month due to these severe  side effects. She decided to re-try in October and November, though has again had return of above. Has been about 1 month since her last injection and reports side effects are continuing to slowly improve, though still with some fatigue/body aches/ HA. Rash and fever resolved. She finds it hard to comment on axial pain/stiffness/peripiheral joint pain/swelling in setting of severe fatigue/flu like symptoms. Her ROS is otherwise negative.     HPI from initial consultation 5/2021  Had flu like symptoms with flu like symptoms. Resolves when stopped. Prednisone, NSAIDs, SSZ used/helpful. Prednisone had nausea and consipation. SSZ has drowsiness/ dizziness/ mental fog worse in the AM. Has continued on SSZ 1gm in the AM and 1gm in the PM. Still with significant drowsiness. If she doesn't want to have drowsiness, reduces the dose, has pain in neck, shoulder, back. Has some swelling of joints. Has tenderness of many joints. Takes naproxen and gabapentin. She takes naproxen 500mg BID. In the AM has stiffness in her neck/back. Gets some better with stretching/ exercise. Takes 15 minutes to get better. No rash. Does have swelling of her right hand 2nd -4th digits. Pain in her PIPs predominantly. Pain also in between the joints. Has recently had some blurred vision, wears glasses. No inflammatory eye disease history. Intermittent double vision. Has had hair loss, non scarring more that is thinning. More over the last 3-4 years. Intermittent sore in mouth, painful. Has dry eyes, uses eye drops 3-4 times per week. When she wakes up her throat is dry. Drinks frequently. No abdominal pain. No blood in her stool. No n/v. No raynauds. No sclerodactyly. No fevers, weight loss. Occasional weight loss and chills. Has been having ongoing throbbing HA on the left side. The naproxen is some helpful, though returns shortly afterwards. No miscarriages or blood clots.     Past Medical History  Past Medical History:   Diagnosis Date     Anemia       History of blood transfusion 2000    after vaginal delivery, 2 units PRBCs given     HSIL on Pap smear 09/21/2011    MARTA 2 and 3     Immune to hepatitis B 08/2021    hep B antigen NEG     INFLAM SPONDYLOPATHY NOS 01/07/2008    check labs on sulfasalzine every 3 months and check hep B and C and TB every 2 years     Leukocytopenia 03/10/2014     Tendinosis      Thrombocytopenia (H) 03/10/2014     Unspecified closed fracture of pelvis 2000    left fracture of pelvis after delivery     Past Surgical History  Past Surgical History:   Procedure Laterality Date     COLONOSCOPY Left 11/12/2021    recheck in 10 years     DILATION AND CURETTAGE, ABLATE ENDOMETRIUM NOVASURE, COMBINED N/A 12/04/2018    Procedure: novasure endometrial ablation, myosure resection of leiomyoma, dilation and curettage;  Surgeon: Rolando Covarrubias MD;  Location: [facility] OR     GYN SURGERY  12/04/2018    fibroid removal     lap hysterectomy and salpingetcomy Bilateral 2019    done at [facility] - ovaries are in     LEEP TX, CERVICAL  12/19/2011    MARTA II     OPERATIVE HYSTEROSCOPY WITH MORCELLATOR N/A 12/04/2018    ovaries are still in.   Surgeon: Rolando Covarrubias MD;  Location: [facility] OR     TUBAL LIGATION  05/2009    laparoscopic tubal ligation     ZZC NONSPECIFIC PROCEDURE  10/2000    after delivery 2 units of prbcs secondary to placenta     Medications  Current Outpatient Medications   Medication     clindamycin-benzoyl peroxide (BENZACLIN) 1-5 % external gel     cyclobenzaprine (FLEXERIL) 5 MG tablet     diclofenac (VOLTAREN) 1 % topical gel     DULoxetine (CYMBALTA) 30 MG capsule     ferrous sulfate (FEROSUL) 325 (65 Fe) MG tablet     fluticasone (FLONASE) 50 MCG/ACT nasal spray     gabapentin (NEURONTIN) 300 MG capsule     hydrocortisone (WESTCORT) 0.2 % external cream     ketotifen (ZADITOR) 0.025 % ophthalmic solution     lidocaine (XYLOCAINE) 5 % external ointment     loratadine (CLARITIN) 10 MG tablet     meloxicam (MOBIC) 15 MG tablet      "olopatadine (PATANOL) 0.1 % ophthalmic solution     ondansetron (ZOFRAN-ODT) 4 MG ODT tab     polyethylene glycol (MIRALAX) 17 GM/Dose powder     secukinumab (COSENTYX SENSOREADY PEN) 150 MG/ML Sensoready pen     STOOL SOFTENER 100 MG capsule     sulfaSALAzine ER (AZULFIDINE EN) 500 MG EC tablet     traMADol (ULTRAM) 50 MG tablet     traZODone (DESYREL) 50 MG tablet     No current facility-administered medications for this visit.     Facility-Administered Medications Ordered in Other Visits   Medication     midazolam (VERSED) injection     Allergies   Allergies   Allergen Reactions     Contrast Dye Nausea and Vomiting     Diatrizoate Nausea and Vomiting     Percocet [Oxycodone-Acetaminophen] Nausea and Vomiting and Itching     Advil [Ibuprofen Micronized] Rash     Rash      Ibuprofen Rash     Family History: Mother with \"arthritis\"    Social History: Not currently working. 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 Repetitive motion. No smoking/drugs/ETOH. Not . 4 kids who are healthy.       Objective:    Physical exam:  No vitals for this video visit. Vitals reviewed in Epic.  GEN: Lying in bed. NAD  HEENT: no facial rash, sclera clear, constant clearing of throat/nose.  CV: no upper extremity dependent edema  Pulm: speaking in full sentences, no audible wheezing, no use of accessory muscles. Frequent cough to clear throat. Non productive  Abdomen: not distended  Skin: no acute cutaneous lesions  MSK: full active ROM of bilateral shoulders, elbows, wrists, MCPs, PIPs, DIPs. Can make full fist though appears to be weaker than expected.     Labs:  WBC   Date Value Ref Range Status   02/15/2021 6.5 4.0 - 11.0 10e9/L Final     WBC Count   Date Value Ref Range Status   08/16/2021 6.9 4.0 - 11.0 10e3/uL Final     Hemoglobin   Date Value Ref Range Status   08/16/2021 11.5 (L) 11.7 - 15.7 g/dL Final   02/15/2021 12.2 11.7 - 15.7 g/dL Final     Platelet Count   Date Value Ref Range Status   08/16/2021 247 150 - 450 " 10e3/uL Final   02/15/2021 219 150 - 450 10e9/L Final     Creatinine   Date Value Ref Range Status   08/16/2021 0.59 0.52 - 1.04 mg/dL Final   02/15/2021 0.54 0.52 - 1.04 mg/dL Final     Lab Results   Component Value Date    ALKPHOS 50 08/16/2021    ALKPHOS 55 02/15/2021     AST   Date Value Ref Range Status   08/16/2021 15 0 - 45 U/L Final   02/15/2021 14 0 - 45 U/L Final     Lab Results   Component Value Date    ALT 18 08/16/2021    ALT 17 02/15/2021     Sed Rate   Date Value Ref Range Status   02/15/2021 20 0 - 20 mm/h Final     Erythrocyte Sedimentation Rate   Date Value Ref Range Status   08/16/2021 29 (H) 0 - 20 mm/hr Final     CRP Inflammation   Date Value Ref Range Status   08/16/2021 <2.9 0.0 - 8.0 mg/L Final   02/26/2020 <2.9 0.0 - 8.0 mg/L Final     UA RESULTS:  Recent Labs   Lab Test 05/18/21  0858   COLOR Yellow   APPEARANCE Clear   URINEGLC Negative   URINEBILI Negative   URINEKETONE Negative   SG 1.015   UBLD Large*   URINEPH 6.5   PROTEIN Negative   UROBILINOGEN 0.2   NITRITE Negative   LEUKEST Moderate*   RBCU 10-25*   WBCU *      RAMÍREZ Screen by EIA   Date Value Ref Range Status   05/21/2014 <1.0  Interpretation:  Negative   <1.0 Final     Rheumatoid Factor   Date Value Ref Range Status   05/21/2014 <20 <20 IU/mL Final     Cyclic Citrullinated Peptide Antibody, IgG   Date Value Ref Range Status   08/28/2017 1 <7 U/mL Final     Comment:     Negative         Imaging:  MRI LUMBAR SPINE WITHOUT CONTRAST   2/28/2020 3:51 PM      HISTORY: Radiculopathy, greater than  6 weeks conservative treatment,  persistent symptoms; radiculopathy - history of sacroiliitis. Family  history of spinal disease and severe stenosis - left-sided symptoms.  Spondyloarthropathy.      TECHNIQUE: Multiplanar multisequence MRI of the lumbar spine without  contrast.      COMPARISON: Lumbar spine MRI 9/7/2010. Correlation is also made with  prior pelvic CT 6/10/2019.     FINDINGS:  Alignment is normal. No fracture or  significant vertebral  body height loss. The intervertebral discs are normal in height and  signal. No abnormal marrow signal in the lumbar spine. There is no  disc bulge or herniation. There is no spinal or neural foraminal  stenosis. Sclerotic changes about the sacroiliac joints with marginal  spurring. This appears overall similar (within the field-of-view) to  the most recent examinations.                                                                      IMPRESSION:  1. No acute abnormality of the lumbar spine. Specifically, no disc  bulge/herniation, marrow signal abnormality, or spinal or neural  foraminal stenosis.  2. Redemonstrated sclerosis with marginal spurring about the  sacroiliac joints, potentially representing changes related to  osteoarthritis. This may be secondary to prior sacroiliitis in the  setting of spondyloarthropathy given the patient's clinical history.  This is incompletely included within the field-of-view of this exam.

## 2021-12-20 ENCOUNTER — TELEPHONE (OUTPATIENT)
Dept: RHEUMATOLOGY | Facility: CLINIC | Age: 46
End: 2021-12-20
Payer: MEDICARE

## 2021-12-20 NOTE — TELEPHONE ENCOUNTER
PA Initiation    Medication: XELJANZ   Insurance Company: [employer] Part D - Phone [phone] Fax [phone]  Pharmacy Filling the Rx: [facility] - [place] [address] [address]  Filling Pharmacy Phone:    Filling Pharmacy Fax:    Start Date: 12/20/2021    JANKI AQUINO (Ambrosio: [id number])

## 2021-12-21 NOTE — TELEPHONE ENCOUNTER
PRIOR AUTHORIZATION DENIED    Medication: XELMARIA TERESAZ - DENIED     Denial Date: 12/21/2021    Denial Rational: DX IS OFF LABEL    Appeal Information:  CAN APPEAL

## 2021-12-22 NOTE — TELEPHONE ENCOUNTER
[email or web address]-[email or web address]-[email or web address]

## 2021-12-25 ENCOUNTER — HEALTH MAINTENANCE LETTER (OUTPATIENT)
Age: 46
End: 2021-12-25

## 2021-12-29 NOTE — TELEPHONE ENCOUNTER
MEDICATION APPEAL APPROVED    Medication: XELJANZ - APPROVED   Authorization Effective Date:  12/29/21  Authorization Expiration Date:  12/31/22  Approved Dose/Quantity: 30 for 30 days   Reference #:     Insurance Company: [employer] Part D - Phone [phone] Fax [phone]  Expected CoPay:     $0  CoPay Card Available:      Foundation Assistance Needed:    Which Pharmacy is filling the prescription (Not needed for infusion/clinic administered): [facility] - [place], [place] - [address]      PER PHARMACY TEST CLAIM

## 2022-02-16 ENCOUNTER — TELEPHONE (OUTPATIENT)
Dept: RHEUMATOLOGY | Facility: CLINIC | Age: 47
End: 2022-02-16

## 2022-02-16 NOTE — TELEPHONE ENCOUNTER
----- Message from Janeth Bonilla RN sent at 2/12/2022 10:30 AM CST -----  This patient was met with Dr. Hernandez in 12/2021.  She was asked to return in March to assess response to new medications.      Can a MICHAEL slot be used in March for this patient? Can you please contact her?    Thanks,  Janeth Bonilla RN, BSN, CNOR, RNFA  [facility]

## 2022-03-01 ENCOUNTER — OFFICE VISIT (OUTPATIENT)
Dept: FAMILY MEDICINE | Facility: CLINIC | Age: 47
End: 2022-03-01
Payer: MEDICARE

## 2022-03-01 ENCOUNTER — TELEPHONE (OUTPATIENT)
Dept: FAMILY MEDICINE | Facility: CLINIC | Age: 47
End: 2022-03-01

## 2022-03-01 VITALS
HEART RATE: 79 BPM | WEIGHT: 120.6 LBS | SYSTOLIC BLOOD PRESSURE: 120 MMHG | OXYGEN SATURATION: 97 % | TEMPERATURE: 98.1 F | HEIGHT: 60 IN | BODY MASS INDEX: 23.68 KG/M2 | DIASTOLIC BLOOD PRESSURE: 78 MMHG | RESPIRATION RATE: 16 BRPM

## 2022-03-01 DIAGNOSIS — M77.8 RIGHT SHOULDER TENDONITIS: ICD-10-CM

## 2022-03-01 DIAGNOSIS — M46.98 INFLAMMATORY SPONDYLOPATHY OF SACRAL REGION (H): ICD-10-CM

## 2022-03-01 DIAGNOSIS — R10.13 ABDOMINAL PAIN, EPIGASTRIC: ICD-10-CM

## 2022-03-01 DIAGNOSIS — M54.2 NECK PAIN: ICD-10-CM

## 2022-03-01 DIAGNOSIS — L30.9 DERMATITIS: ICD-10-CM

## 2022-03-01 DIAGNOSIS — E78.00 HYPERCHOLESTEROLEMIA: ICD-10-CM

## 2022-03-01 DIAGNOSIS — G89.4 CHRONIC PAIN SYNDROME: ICD-10-CM

## 2022-03-01 DIAGNOSIS — G44.219 EPISODIC TENSION-TYPE HEADACHE, NOT INTRACTABLE: ICD-10-CM

## 2022-03-01 DIAGNOSIS — Z12.31 VISIT FOR SCREENING MAMMOGRAM: Primary | ICD-10-CM

## 2022-03-01 DIAGNOSIS — R11.0 NAUSEA: ICD-10-CM

## 2022-03-01 DIAGNOSIS — G89.4 CHRONIC PAIN SYNDROME: Primary | ICD-10-CM

## 2022-03-01 DIAGNOSIS — Z00.00 ENCOUNTER FOR MEDICARE ANNUAL WELLNESS EXAM: ICD-10-CM

## 2022-03-01 DIAGNOSIS — M54.2 CERVICALGIA: ICD-10-CM

## 2022-03-01 DIAGNOSIS — M67.88 RIGHT PERONEAL TENDINOSIS: ICD-10-CM

## 2022-03-01 DIAGNOSIS — N95.1 MENOPAUSAL SYNDROME (HOT FLASHES): ICD-10-CM

## 2022-03-01 LAB
ERYTHROCYTE [DISTWIDTH] IN BLOOD BY AUTOMATED COUNT: 12.1 % (ref 10–15)
ERYTHROCYTE [SEDIMENTATION RATE] IN BLOOD BY WESTERGREN METHOD: 27 MM/HR (ref 0–20)
FSH SERPL-ACNC: 23.2 IU/L
HCT VFR BLD AUTO: 39.4 % (ref 35–47)
HGB BLD-MCNC: 12.7 G/DL (ref 11.7–15.7)
MCH RBC QN AUTO: 30 PG (ref 26.5–33)
MCHC RBC AUTO-ENTMCNC: 32.2 G/DL (ref 31.5–36.5)
MCV RBC AUTO: 93 FL (ref 78–100)
PLATELET # BLD AUTO: 259 10E3/UL (ref 150–450)
RBC # BLD AUTO: 4.23 10E6/UL (ref 3.8–5.2)
WBC # BLD AUTO: 5.7 10E3/UL (ref 4–11)

## 2022-03-01 PROCEDURE — 85652 RBC SED RATE AUTOMATED: CPT | Performed by: FAMILY MEDICINE

## 2022-03-01 PROCEDURE — 83001 ASSAY OF GONADOTROPIN (FSH): CPT | Performed by: FAMILY MEDICINE

## 2022-03-01 PROCEDURE — 99396 PREV VISIT EST AGE 40-64: CPT | Performed by: FAMILY MEDICINE

## 2022-03-01 PROCEDURE — 36415 COLL VENOUS BLD VENIPUNCTURE: CPT | Performed by: FAMILY MEDICINE

## 2022-03-01 PROCEDURE — 99213 OFFICE O/P EST LOW 20 MIN: CPT | Mod: 25 | Performed by: FAMILY MEDICINE

## 2022-03-01 PROCEDURE — 80053 COMPREHEN METABOLIC PANEL: CPT | Performed by: FAMILY MEDICINE

## 2022-03-01 PROCEDURE — 80061 LIPID PANEL: CPT | Performed by: FAMILY MEDICINE

## 2022-03-01 PROCEDURE — 85027 COMPLETE CBC AUTOMATED: CPT | Performed by: FAMILY MEDICINE

## 2022-03-01 RX ORDER — GABAPENTIN 300 MG/1
CAPSULE ORAL
Qty: 120 CAPSULE | Refills: 6 | Status: SHIPPED | OUTPATIENT
Start: 2022-03-01 | End: 2023-01-18

## 2022-03-01 RX ORDER — TRIAMCINOLONE ACETONIDE 1 MG/G
CREAM TOPICAL 3 TIMES DAILY
Qty: 45 G | Refills: 3 | Status: SHIPPED | OUTPATIENT
Start: 2022-03-01 | End: 2023-06-19

## 2022-03-01 RX ORDER — ONDANSETRON 4 MG/1
TABLET, ORALLY DISINTEGRATING ORAL
Qty: 12 TABLET | Refills: 4 | Status: SHIPPED | OUTPATIENT
Start: 2022-03-01 | End: 2022-03-02

## 2022-03-01 RX ORDER — CYCLOBENZAPRINE HCL 5 MG
5 TABLET ORAL 3 TIMES DAILY PRN
Qty: 90 TABLET | Refills: 3 | Status: SHIPPED | OUTPATIENT
Start: 2022-03-01 | End: 2023-01-18

## 2022-03-01 SDOH — ECONOMIC STABILITY: INCOME INSECURITY: HOW HARD IS IT FOR YOU TO PAY FOR THE VERY BASICS LIKE FOOD, HOUSING, MEDICAL CARE, AND HEATING?: PATIENT DECLINED

## 2022-03-01 SDOH — HEALTH STABILITY: PHYSICAL HEALTH: ON AVERAGE, HOW MANY DAYS PER WEEK DO YOU ENGAGE IN MODERATE TO STRENUOUS EXERCISE (LIKE A BRISK WALK)?: 1 DAY

## 2022-03-01 SDOH — ECONOMIC STABILITY: FOOD INSECURITY: WITHIN THE PAST 12 MONTHS, YOU WORRIED THAT YOUR FOOD WOULD RUN OUT BEFORE YOU GOT MONEY TO BUY MORE.: OFTEN TRUE

## 2022-03-01 SDOH — ECONOMIC STABILITY: FOOD INSECURITY: WITHIN THE PAST 12 MONTHS, THE FOOD YOU BOUGHT JUST DIDN'T LAST AND YOU DIDN'T HAVE MONEY TO GET MORE.: SOMETIMES TRUE

## 2022-03-01 SDOH — ECONOMIC STABILITY: INCOME INSECURITY: IN THE LAST 12 MONTHS, WAS THERE A TIME WHEN YOU WERE NOT ABLE TO PAY THE MORTGAGE OR RENT ON TIME?: NO

## 2022-03-01 SDOH — ECONOMIC STABILITY: TRANSPORTATION INSECURITY
IN THE PAST 12 MONTHS, HAS LACK OF TRANSPORTATION KEPT YOU FROM MEETINGS, WORK, OR FROM GETTING THINGS NEEDED FOR DAILY LIVING?: PATIENT DECLINED

## 2022-03-01 SDOH — HEALTH STABILITY: PHYSICAL HEALTH: ON AVERAGE, HOW MANY MINUTES DO YOU ENGAGE IN EXERCISE AT THIS LEVEL?: 10 MIN

## 2022-03-01 SDOH — ECONOMIC STABILITY: TRANSPORTATION INSECURITY
IN THE PAST 12 MONTHS, HAS THE LACK OF TRANSPORTATION KEPT YOU FROM MEDICAL APPOINTMENTS OR FROM GETTING MEDICATIONS?: PATIENT DECLINED

## 2022-03-01 ASSESSMENT — ENCOUNTER SYMPTOMS
HEADACHES: 1
PARESTHESIAS: 0
NERVOUS/ANXIOUS: 0
DIZZINESS: 1
SHORTNESS OF BREATH: 0
WEAKNESS: 1
HEMATURIA: 0
HEARTBURN: 0
ABDOMINAL PAIN: 0
NAUSEA: 1
DIARRHEA: 0
CHILLS: 0
JOINT SWELLING: 0
COUGH: 0
PALPITATIONS: 0
FEVER: 0
MYALGIAS: 1
DYSURIA: 0
SORE THROAT: 0
ARTHRALGIAS: 1
BREAST MASS: 0
EYE PAIN: 0
FREQUENCY: 0
HEMATOCHEZIA: 0
CONSTIPATION: 1

## 2022-03-01 ASSESSMENT — SOCIAL DETERMINANTS OF HEALTH (SDOH)
DO YOU BELONG TO ANY CLUBS OR ORGANIZATIONS SUCH AS CHURCH GROUPS UNIONS, FRATERNAL OR ATHLETIC GROUPS, OR SCHOOL GROUPS?: NO
IN A TYPICAL WEEK, HOW MANY TIMES DO YOU TALK ON THE PHONE WITH FAMILY, FRIENDS, OR NEIGHBORS?: TWICE A WEEK
HOW OFTEN DO YOU GET TOGETHER WITH FRIENDS OR RELATIVES?: PATIENT DECLINED
HOW OFTEN DO YOU ATTEND CHURCH OR RELIGIOUS SERVICES?: NEVER
ARE YOU MARRIED, WIDOWED, DIVORCED, SEPARATED, NEVER MARRIED, OR LIVING WITH A PARTNER?: NEVER MARRIED

## 2022-03-01 ASSESSMENT — ACTIVITIES OF DAILY LIVING (ADL)
CURRENT_FUNCTION: SHOPPING REQUIRES ASSISTANCE
CURRENT_FUNCTION: LAUNDRY REQUIRES ASSISTANCE
CURRENT_FUNCTION: HOUSEWORK REQUIRES ASSISTANCE

## 2022-03-01 ASSESSMENT — LIFESTYLE VARIABLES
HOW MANY STANDARD DRINKS CONTAINING ALCOHOL DO YOU HAVE ON A TYPICAL DAY: PATIENT DECLINED
HOW OFTEN DO YOU HAVE A DRINK CONTAINING ALCOHOL: NEVER
HOW OFTEN DO YOU HAVE SIX OR MORE DRINKS ON ONE OCCASION: NEVER

## 2022-03-01 NOTE — PROGRESS NOTES
"SUBJECTIVE:   Zo Qureshi is a 47 year old female who presents for Preventive Visit.    She is seeing rheumatology for spondyloarthropathy   She is now on xeljanz.   This has been for a month.   It is not working much yet but some.   She is not having the level of side effects as the other medication but she is tired.      She has a rash on her right shin that has been there 3 years.   It itches.   The cream she got in the past has not done anything.    Also she is having night sweats off and on the last year.   She has had hyst but her ovaries are in.        Patient has been advised of split billing requirements and indicates understanding: Yes  Are you in the first 12 months of your Medicare coverage?  No    Healthy Habits:     In general, how would you rate your overall health?  Fair    Frequency of exercise:  1 day/week    Duration of exercise:  Less than 15 minutes    Do you usually eat at least 4 servings of fruit and vegetables a day, include whole grains    & fiber and avoid regularly eating high fat or \"junk\" foods?  Yes    Taking medications regularly:  Yes    Medication side effects:  Other    Ability to successfully perform activities of daily living:  Shopping requires assistance, housework requires assistance and laundry requires assistance    Home Safety:  No safety concerns identified    Hearing Impairment:  No hearing concerns    In the past 6 months, have you been bothered by leaking of urine?  No    In general, how would you rate your overall mental or emotional health?  Fair      PHQ-2 Total Score: 2    Additional concerns today:  Yes    Do you feel safe in your environment? Yes    Have you ever done Advance Care Planning? (For example, a Health Directive, POLST, or a discussion with a medical provider or your loved ones about your wishes): Yes, patient states has an Advance Care Planning document and will bring a copy to the clinic.                 Do you have sleep apnea, excessive snoring or " daytime drowsiness?: Drowsiness from meds    Reviewed and updated as needed this visit by clinical staff                  Reviewed and updated as needed this visit by Provider                 Social History     Tobacco Use     Smoking status: Never Smoker     Smokeless tobacco: Never Used   Substance Use Topics     Alcohol use: Not Currently     Alcohol/week: 0.0 standard drinks     Comment: rarely     If you drink alcohol do you typically have >3 drinks per day or >7 drinks per week? No    Alcohol Use 10/26/2020   Prescreen: >3 drinks/day or >7 drinks/week? No   Prescreen: >3 drinks/day or >7 drinks/week? -       Past Medical History:   Diagnosis Date     Anemia      History of blood transfusion 2000    after vaginal delivery, 2 units PRBCs given     HSIL on Pap smear 09/21/2011    MARTA 2 and 3     Immune to hepatitis B 08/2021    hep B antigen NEG     INFLAM SPONDYLOPATHY NOS 01/07/2008    check labs on sulfasalzine every 3 months and check hep B and C and TB every 2 years     Leukocytopenia 03/10/2014     Tendinosis      Thrombocytopenia (H) 03/10/2014     Unspecified closed fracture of pelvis 2000    left fracture of pelvis after delivery       Past Surgical History:   Procedure Laterality Date     COLONOSCOPY Left 11/12/2021    recheck in 10 years     DILATION AND CURETTAGE, ABLATE ENDOMETRIUM NOVASURE, COMBINED N/A 12/04/2018    Procedure: novasure endometrial ablation, myosure resection of leiomyoma, dilation and curettage;  Surgeon: Rolando Covarrubias MD;  Location: [facility] OR     GYN SURGERY  12/04/2018    fibroid removal     lap hysterectomy and salpingetcomy Bilateral 2019    done at [facility] - ovaries are in     LEEP TX, CERVICAL  12/19/2011    MARTA II     OPERATIVE HYSTEROSCOPY WITH MORCELLATOR N/A 12/04/2018    ovaries are still in.   Surgeon: Rolando Covarrubias MD;  Location: [facility] OR     TUBAL LIGATION  05/2009    laparoscopic tubal ligation     ZZC NONSPECIFIC PROCEDURE  10/2000    after delivery 2 units  of prbcs secondary to placenta       MEDICATIONS:  Current Outpatient Medications   Medication     clindamycin-benzoyl peroxide (BENZACLIN) 1-5 % external gel     cyclobenzaprine (FLEXERIL) 5 MG tablet     diclofenac (VOLTAREN) 1 % topical gel     DULoxetine (CYMBALTA) 30 MG capsule     ferrous sulfate (FEROSUL) 325 (65 Fe) MG tablet     fluticasone (FLONASE) 50 MCG/ACT nasal spray     gabapentin (NEURONTIN) 300 MG capsule     hydrocortisone (WESTCORT) 0.2 % external cream     ketotifen (ZADITOR) 0.025 % ophthalmic solution     lidocaine (XYLOCAINE) 5 % external ointment     loratadine (CLARITIN) 10 MG tablet     meloxicam (MOBIC) 15 MG tablet     olopatadine (PATANOL) 0.1 % ophthalmic solution     ondansetron (ZOFRAN-ODT) 4 MG ODT tab     polyethylene glycol (MIRALAX) 17 GM/Dose powder     STOOL SOFTENER 100 MG capsule     sulfaSALAzine ER (AZULFIDINE EN) 500 MG EC tablet     tofacitinib (XELJANZ XR) 11 MG 24 hr tablet     traMADol (ULTRAM) 50 MG tablet     traZODone (DESYREL) 50 MG tablet     No current facility-administered medications for this visit.     Facility-Administered Medications Ordered in Other Visits   Medication     midazolam (VERSED) injection       SOCIAL HISTORY:  Social History     Tobacco Use     Smoking status: Never Smoker     Smokeless tobacco: Never Used   Substance Use Topics     Alcohol use: Not Currently     Alcohol/week: 0.0 standard drinks     Comment: rarely       Family History   Problem Relation Age of Onset     Hypertension Father      Lipids Father      Hypertension Mother      Lipids Mother      Diabetes No family hx of      Coronary Artery Disease No family hx of      Hyperlipidemia No family hx of      Cerebrovascular Disease No family hx of      Breast Cancer No family hx of      Colon Cancer No family hx of      Prostate Cancer No family hx of      Other Cancer No family hx of      Depression No family hx of      Anxiety Disorder No family hx of      Mental Illness No family  hx of      Substance Abuse No family hx of      Anesthesia Reaction No family hx of      Asthma No family hx of      Osteoporosis No family hx of      Genetic Disorder No family hx of      Thyroid Disease No family hx of      Obesity No family hx of      Unknown/Adopted No family hx of            Current providers sharing in care for this patient include:   Patient Care Team:  Terri Robles MD as PCP - General (Family Practice)  Terri Robles MD as Assigned PCP  James Hernandez MD as Assigned Rheumatology Provider    The following health maintenance items are reviewed in Epic and correct as of today:  Health Maintenance Due   Topic Date Due     ADVANCE CARE PLANNING  Never done     MEDICARE ANNUAL WELLNESS VISIT  10/26/2021     CBC  11/16/2021     COVID-19 Vaccine (3 - Moderna risk 4-dose series) 12/15/2021     CMP  02/16/2022     MAMMO SCREENING  02/15/2022     Labs reviewed in [facility]  Mammogram Screening: yearly   Any new diagnosis of family breast, ovarian, or bowel cancer? No    FHS-7: No flowsheet data found.    Mammogram Screening: Recommended annual mammography  Pertinent mammograms are reviewed under the imaging tab.    Review of Systems   Constitutional: Negative for chills and fever.   HENT: Positive for ear pain. Negative for congestion and sore throat.    Eyes: Negative for pain and visual disturbance.   Respiratory: Negative for cough and shortness of breath.    Cardiovascular: Positive for chest pain. Negative for palpitations and peripheral edema.   Gastrointestinal: Positive for constipation and nausea. Negative for abdominal pain, diarrhea, heartburn and hematochezia.   Breasts:  Negative for tenderness, breast mass and discharge.   Genitourinary: Negative for dysuria, frequency, genital sores, hematuria, pelvic pain, urgency, vaginal bleeding and vaginal discharge.   Musculoskeletal: Positive for arthralgias and myalgias. Negative for joint swelling.   Skin: Positive for rash.  "  Neurological: Positive for dizziness, weakness and headaches. Negative for paresthesias.   Psychiatric/Behavioral: The patient is not nervous/anxious.      Constitutional, HEENT, cardiovascular, pulmonary, gi and gu systems are negative, except as otherwise noted.    OBJECTIVE:   LMP 03/26/2019 (Within Days)  Estimated body mass index is 24.44 kg/m  as calculated from the following:    Height as of 11/12/21: 1.499 m (4' 11\").    Weight as of 11/17/21: 54.9 kg (121 lb).  Physical Exam  GENERAL: healthy, alert and no distress  EYES: Eyes grossly normal to inspection, PERRL and conjunctivae and sclerae normal  HENT: ear canals and TM's normal, nose and mouth without ulcers or lesions  NECK: no adenopathy, no asymmetry, masses, or scars and thyroid normal to palpation  RESP: lungs clear to auscultation - no rales, rhonchi or wheezes  BREAST: normal without masses, tenderness or nipple discharge and no palpable axillary masses or adenopathy  CV: regular rate and rhythm, normal S1 S2, no S3 or S4, no murmur, click or rub, no peripheral edema and peripheral pulses strong  ABDOMEN: soft, nontender, no hepatosplenomegaly, no masses and bowel sounds normal  MS: no gross musculoskeletal defects noted, no edema  SKIN: no suspicious lesions or rashes  NEURO: Normal strength and tone, mentation intact and speech normal  PSYCH: mentation appears normal, affect normal/bright  LYMPH: no cervical, supraclavicular, axillary, or inguinal adenopathy    Diagnostic Test Results:  Labs reviewed in Epic    ASSESSMENT / PLAN:   (Z12.31) Visit for screening mammogram  (primary encounter diagnosis)  Comment:   Plan: MA SCREENING DIGITAL BILAT - Future  (s+30)            (Z00.00) Encounter for Medicare annual wellness exam  Comment:   Plan: COMPREHENSIVE METABOLIC PANEL            (M46.98) Inflammatory spondylopathy of sacral region (H)  Comment:  Stable but not ideally controlled yet  Plan: CBC with Platelets, COMPREHENSIVE METABOLIC         " "PANEL, ESR: Erythrocyte sedimentation rate            (L30.9) Dermatitis  Comment: right shin - will stop the westcort and try   Plan: triamcinolone (KENALOG) 0.1 % external cream        The potential side effects of the prescription medication were discussed with the patient.     (G44.219) Episodic tension-type headache, not intractable  Comment: stable  Plan: cyclobenzaprine (FLEXERIL) 5 MG tablet        Refills per epicare     (M54.2) Neck pain  Comment: stable  Plan: cyclobenzaprine (FLEXERIL) 5 MG tablet,         diclofenac (VOLTAREN) 1 % topical gel        Refills per epicare     (G89.4) Chronic pain syndrome  Comment: stable  Plan: diclofenac (VOLTAREN) 1 % topical gel        Refills per epicare     (M67.88) Right peroneal tendinosis  Comment: stable  Plan: gabapentin (NEURONTIN) 300 MG capsule        Refills per epicare     (M77.8) Right shoulder tendonitis  Comment: stable  Plan: gabapentin (NEURONTIN) 300 MG capsule        Refills per epicare     (M54.2) Cervicalgia  Comment: stable  Plan: gabapentin (NEURONTIN) 300 MG capsule            (R10.13) Abdominal pain, epigastric  Comment: gets off and on with gabapentin  Plan: ondansetron (ZOFRAN-ODT) 4 MG ODT tab        Refills per epicare     (E78.00) Hypercholesterolemia  Comment:   Plan: Lipid panel reflex to direct LDL Fasting            (N95.1) Menopausal syndrome (hot flashes)  Comment: will check - has been the last few months  Plan: Follicle stimulating hormone           time spent in chart and review and exam and documentation is 30 minutes    Patient has been advised of split billing requirements and indicates understanding: Yes    COUNSELING:  Reviewed preventive health counseling, as reflected in patient instructions  Special attention given to:       Regular exercise       Healthy diet/nutrition    Estimated body mass index is 24.44 kg/m  as calculated from the following:    Height as of 11/12/21: 1.499 m (4' 11\").    Weight as of 11/17/21: 54.9 kg " (121 lb).        She reports that she has never smoked. She has never used smokeless tobacco.      Appropriate preventive services were discussed with this patient, including applicable screening as appropriate for cardiovascular disease, diabetes, osteopenia/osteoporosis, and glaucoma.  As appropriate for age/gender, discussed screening for colorectal cancer, prostate cancer, breast cancer, and cervical cancer. Checklist reviewing preventive services available has been given to the patient.    Reviewed patients plan of care and provided an AVS. The Intermediate Care Plan ( asthma action plan, low back pain action plan, and migraine action plan) for Zo meets the Care Plan requirement. This Care Plan has been established and reviewed with the Patient.    Counseling Resources:  ATP IV Guidelines  Pooled Cohorts Equation Calculator  Breast Cancer Risk Calculator  Breast Cancer: Medication to Reduce Risk  FRAX Risk Assessment  ICSI Preventive Guidelines  Dietary Guidelines for Americans, 2010  USDA's MyPlate  ASA Prophylaxis  Lung CA Screening    Terri Robles MD  [facility]    Identified Health Risks:

## 2022-03-01 NOTE — PATIENT INSTRUCTIONS
Patient Education   Personalized Prevention Plan  You are due for the preventive services outlined below.  Your care team is available to assist you in scheduling these services.  If you have already completed any of these items, please share that information with your care team to update in your medical record.  Health Maintenance Due   Topic Date Due     Discuss Advance Care Planning  Never done     Annual Wellness Visit  10/26/2021     Complete Blood Count  11/16/2021     COVID-19 Vaccine (3 - Moderna risk 4-dose series) 12/15/2021     Comprehensive Metabolic Panel  02/16/2022     Mammogram  02/15/2022

## 2022-03-01 NOTE — TELEPHONE ENCOUNTER
"Central Prior Authorization Team   Phone: [phone]      PRIOR AUTHORIZATION DENIED    Medication: ondansetron (ZOFRAN-ODT) 4 MG ODT tab - Denied    Denial Date: 3/1/2022    Denial Rational: Medicare allows us to cover a drug only when it is a Part D drug. A Part D drug is one that is used  for a \"medically accepted indication.\" A medically accepted indication means the use is approved  by the Food and Drug Administration (FDA) OR the use is supported by one of the following  accepted references:  (1) [facility] Formulary Service Drug Information.  (2) [employer] DRUGDEX Information System.  Ondansetron Odt is not FDA approved for your medical condition(s): Abdominal pain, epigastric.  These condition(s) are not supported by one of the accepted references. Therefore your drug is  denied because it is not being used for a \"medically accepted indication          Appeal Information: If the provider would like to appeal this denial, please provide a letter of medical necessity. Please also include any therapies that the patient has tried and their outcomes. The patient's insurance company will also require the provider to address why the insurance preferred options are not appropriate in the patient's therapy.  The reason could be that the preferred options will harm the patient; either physically or mentally. They are contraindicated to the patient; or the patient has already been taking the requested medication and changing the therapy would change the outcome of their therapy.    Once it has been completed and placed in the patient's chart, notify the Central PA Team ([facility] PA MED) and the appeal can be initiated on behalf of the patient and provider.              "

## 2022-03-02 LAB
ALBUMIN SERPL-MCNC: 3.8 G/DL (ref 3.4–5)
ALP SERPL-CCNC: 60 U/L (ref 40–150)
ALT SERPL W P-5'-P-CCNC: 21 U/L (ref 0–50)
ANION GAP SERPL CALCULATED.3IONS-SCNC: 8 MMOL/L (ref 3–14)
AST SERPL W P-5'-P-CCNC: 16 U/L (ref 0–45)
BILIRUB SERPL-MCNC: 0.3 MG/DL (ref 0.2–1.3)
BUN SERPL-MCNC: 11 MG/DL (ref 7–30)
CALCIUM SERPL-MCNC: 8.9 MG/DL (ref 8.5–10.1)
CHLORIDE BLD-SCNC: 106 MMOL/L (ref 94–109)
CHOLEST SERPL-MCNC: 266 MG/DL
CO2 SERPL-SCNC: 24 MMOL/L (ref 20–32)
CREAT SERPL-MCNC: 0.59 MG/DL (ref 0.52–1.04)
FASTING STATUS PATIENT QL REPORTED: YES
GFR SERPL CREATININE-BSD FRML MDRD: >90 ML/MIN/1.73M2
GLUCOSE BLD-MCNC: 95 MG/DL (ref 70–99)
HDLC SERPL-MCNC: 51 MG/DL
LDLC SERPL CALC-MCNC: 187 MG/DL
NONHDLC SERPL-MCNC: 215 MG/DL
POTASSIUM BLD-SCNC: 4.3 MMOL/L (ref 3.4–5.3)
PROT SERPL-MCNC: 8 G/DL (ref 6.8–8.8)
SODIUM SERPL-SCNC: 138 MMOL/L (ref 133–144)
TRIGL SERPL-MCNC: 141 MG/DL

## 2022-03-02 RX ORDER — ONDANSETRON 4 MG/1
4 TABLET, FILM COATED ORAL EVERY 8 HOURS PRN
Qty: 12 TABLET | Refills: 4 | Status: SHIPPED | OUTPATIENT
Start: 2022-03-02 | End: 2023-09-25

## 2022-03-02 NOTE — TELEPHONE ENCOUNTER
Routed PA denial to JAIMIE, see PA notes    Hi,   This Medication was denied. If the provider would like to appeal please provide a letter of medicalnecessity and route back to the team pool: [facility]. Otherwise please notify the patient so the next steps can be addressed, if an appeal will be submitted or the preferred formulary medication will be prescribed.     If an appeal is wanted, please also include why the preferred medications are not an option in the letter along with tried and failed from previous therapies. Please also address in the Appeal Letter why the preferred insurances medications are not a therapy option for the patient.     Otherwise, if the therapy will be changed to thepreferred option, please close this encounter once finished.     Please let me know if you have further questions   Thank you,   Saqib Meeks CPhT, PA Specialist   Central PA Team   (P) [phone]     Ellen Fowler RN, BSN  [facility]

## 2022-03-04 ENCOUNTER — VIRTUAL VISIT (OUTPATIENT)
Dept: RHEUMATOLOGY | Facility: CLINIC | Age: 47
End: 2022-03-04
Attending: INTERNAL MEDICINE
Payer: MEDICARE

## 2022-03-04 DIAGNOSIS — Z79.899 HIGH RISK MEDICATION USE: ICD-10-CM

## 2022-03-04 DIAGNOSIS — M45.8 ANKYLOSING SPONDYLITIS OF SACRAL REGION (H): Primary | ICD-10-CM

## 2022-03-04 PROCEDURE — 99214 OFFICE O/P EST MOD 30 MIN: CPT | Mod: 95 | Performed by: INTERNAL MEDICINE

## 2022-03-04 RX ORDER — MELOXICAM 15 MG/1
15 TABLET ORAL DAILY
Qty: 180 TABLET | Refills: 1 | Status: SHIPPED | OUTPATIENT
Start: 2022-03-04 | End: 2023-01-31

## 2022-03-04 RX ORDER — SULFASALAZINE 500 MG/1
1000 TABLET, DELAYED RELEASE ORAL 2 TIMES DAILY
Qty: 360 TABLET | Refills: 0 | Status: SHIPPED | OUTPATIENT
Start: 2022-03-04 | End: 2022-05-18

## 2022-03-04 NOTE — PROGRESS NOTES
Patient is being seen for billable video visit  Patient location: home  Physician location: [facility] rheumatology  Platform used: [employer]  Start time: 10:00AM  End time: 10:19AM  James Hernandez MD  Rheumatology        Outpatient Rheumatology Follow-up  This visit was conducted via synchronous video visit due to the current COVID-19 crisis to reduce patient risk.  Verbal consent was obtained and is documented below.    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 Prior to that time she had last been seen by Dr. Garay of 5/24/2019. Most recent imaging of SI joints was on 2/28/2020 which showed sclerosis with marginal spurring about the SI joints, likely secondary to her chronic inflammatory disease, though no active inflammatory changes at that time. Had been tried on both humira and enbrel, though did not tolerate these due to side effects so discontinued prior to determining if efficacious. At the time of her last visit with rheumatology it was documented that she was responsive to NSAIDs, prednisone, and SSZ. The dose of her SSZ was increased to 1gm BID at the time of her initial visit with me given her active peripheral arthritis and enthesitis. Despite naproxen 500mg BID, she had ongoing axial stiffness which was AM predominant and improved with activity and stretching, so risks and benefits of cosentyx discussed and patient started on this IL-17 inhibitor. She has interestingly developed the same side effects of severe flu like symptoms after each dose of her biologic therapy- similar to those she was experiencing when on humira and enbrel, with rhinorrhea, muscle aches/pains,  severe fatigue, and also hives, low grade fever which come on after her injection and slowly over many weeks will resolve. Has been worse with each subsequent injection of cosentyx. So at the time of her last visit, cosentyx was discontinued and xeljanz 11mg once daily started early January 2022. She presents today for follow-up.    Seronegative spondyloarthropathy: Zo has had some overall improvement in her pain severity and length of AM predominant axial pain/stiffness. Importantly, this is also the first drug that is indicated for axial inflammatory disease that she has tolerated (all prior biologics have caused a serum sickness like reaction severe enough requiring discontinuation). At this time will plan to continue on xeljanz 11mg once daily and her sulfasalzine 1gm BID. For ongoing pain that may not be secondary to ongoing active inflammation, in place of her naproxen use, we discussed the use of mobic for potential improved GI safety along with convenience in dosing. Discussed remainder of side effect/ risk profile being the same as other NSAIDs. Also that no NSAIDs to be taken in the 24 hours after each dose. She will stop her naproxen now.     High risk medication use: Xeljanz, SSZ  -Labs reviewed from 3/1/22 with CBC, CMP which do not show evidence of drug toxicity  -Will continue q3 months   -Risks and benefits of these therapy to include infection, thrombosis, bowel perforation, bone marrow suppression, GI/hepatotoxicity, malignancy, increased all cause mortality among others discussed again today 3/4/22. She is agreeable to continue given potential benefit and lack of side effects to date.    Will plan to follow-up in 3 months with labs due again at that time. If disease is more stable at that time will space out to 6 months for follow-up.     James Hernandez MD  Rheumatology   Subjective:   Interval history 3/4/22  Has not experienced any side effects from xeljanz. Has had ongoing HA/dizziness,  "which was there prior to starting xeljanz. Has intermittent chills/ feeling warm, though no temp. Does not happen everyday, happens 1-2 times per week. Also warm at night, though not wet on clothes. Her pain is less. Rates her pain now around 6/10. No redness/warmth/swelling. Though last week had right wrist pain with minimal movement. Lasted for 2-3 days. Used topical lidocaine which resolved symptoms. When painful, it was somewhat \"puffy\".  At this time her back is still stiff, AM predominant. Back and neck. She sits when she wakes up and stretches. Then one hour later she has improvement and continues her day. Has rash on anterior shin right LE, saw PCP who gave her topical steroid which she has not started. Still with intermittent blurred vision/double vision. Unchanged. No more severe/frequent. Rare sores in her mouth. No interval infections. ROS otherwise negative.       Interval history  12/17/21  Reports feeling of fever/body aches/runny nose. Also with rash. 3-4 days after her first injection she noticed that this started, though took her next dose and the above symptoms were more severe. Then was off for about 1 month due to these severe side effects. She decided to re-try in October and November, though has again had return of above. Has been about 1 month since her last injection and reports side effects are continuing to slowly improve, though still with some fatigue/body aches/ HA. Rash and fever resolved. She finds it hard to comment on axial pain/stiffness/peripiheral joint pain/swelling in setting of severe fatigue/flu like symptoms. Her ROS is otherwise negative.     HPI from initial consultation 5/2021  Had flu like symptoms with flu like symptoms. Resolves when stopped. Prednisone, NSAIDs, SSZ used/helpful. Prednisone had nausea and consipation. SSZ has drowsiness/ dizziness/ mental fog worse in the AM. Has continued on SSZ 1gm in the AM and 1gm in the PM. Still with significant drowsiness. If " she doesn't want to have drowsiness, reduces the dose, has pain in neck, shoulder, back. Has some swelling of joints. Has tenderness of many joints. Takes naproxen and gabapentin. She takes naproxen 500mg BID. In the AM has stiffness in her neck/back. Gets some better with stretching/ exercise. Takes 15 minutes to get better. No rash. Does have swelling of her right hand 2nd -4th digits. Pain in her PIPs predominantly. Pain also in between the joints. Has recently had some blurred vision, wears glasses. No inflammatory eye disease history. Intermittent double vision. Has had hair loss, non scarring more that is thinning. More over the last 3-4 years. Intermittent sore in mouth, painful. Has dry eyes, uses eye drops 3-4 times per week. When she wakes up her throat is dry. Drinks frequently. No abdominal pain. No blood in her stool. No n/v. No raynauds. No sclerodactyly. No fevers, weight loss. Occasional weight loss and chills. Has been having ongoing throbbing HA on the left side. The naproxen is some helpful, though returns shortly afterwards. No miscarriages or blood clots.     Past Medical History  Past Medical History:   Diagnosis Date     Anemia      History of blood transfusion 2000    after vaginal delivery, 2 units PRBCs given     HSIL on Pap smear 09/21/2011    MARTA 2 and 3     Immune to hepatitis B 08/2021    hep B antigen NEG     INFLAM SPONDYLOPATHY NOS 01/07/2008    check labs on sulfasalzine every 3 months and check hep B and C and TB every 2 years     Leukocytopenia 03/10/2014     Tendinosis      Thrombocytopenia (H) 03/10/2014     Unspecified closed fracture of pelvis 2000    left fracture of pelvis after delivery     Past Surgical History  Past Surgical History:   Procedure Laterality Date     COLONOSCOPY Left 11/12/2021    recheck in 10 years     DILATION AND CURETTAGE, ABLATE ENDOMETRIUM NOVASURE, COMBINED N/A 12/04/2018    Procedure: novasure endometrial ablation, myosure resection of leiomyoma,  dilation and curettage;  Surgeon: Rolando Covarrubias MD;  Location: [facility] OR     GYN SURGERY  12/04/2018    fibroid removal     lap hysterectomy and salpingetcomy Bilateral 2019    done at [facility] - ovaries are in     LEEP TX, CERVICAL  12/19/2011    MARTA II     OPERATIVE HYSTEROSCOPY WITH MORCELLATOR N/A 12/04/2018    ovaries are still in.   Surgeon: Rolando Covarrubias MD;  Location: [facility] OR     TUBAL LIGATION  05/2009    laparoscopic tubal ligation     ZZC NONSPECIFIC PROCEDURE  10/2000    after delivery 2 units of prbcs secondary to placenta     Medications  Current Outpatient Medications   Medication     clindamycin-benzoyl peroxide (BENZACLIN) 1-5 % external gel     cyclobenzaprine (FLEXERIL) 5 MG tablet     diclofenac (VOLTAREN) 1 % topical gel     DULoxetine (CYMBALTA) 30 MG capsule     ferrous sulfate (FEROSUL) 325 (65 Fe) MG tablet     fluticasone (FLONASE) 50 MCG/ACT nasal spray     gabapentin (NEURONTIN) 300 MG capsule     ketotifen (ZADITOR) 0.025 % ophthalmic solution     lidocaine (XYLOCAINE) 5 % external ointment     loratadine (CLARITIN) 10 MG tablet     meloxicam (MOBIC) 15 MG tablet     olopatadine (PATANOL) 0.1 % ophthalmic solution     ondansetron (ZOFRAN) 4 MG tablet     polyethylene glycol (MIRALAX) 17 GM/Dose powder     STOOL SOFTENER 100 MG capsule     sulfaSALAzine ER (AZULFIDINE EN) 500 MG EC tablet     tofacitinib (XELJANZ XR) 11 MG 24 hr tablet     traMADol (ULTRAM) 50 MG tablet     traZODone (DESYREL) 50 MG tablet     triamcinolone (KENALOG) 0.1 % external cream     No current facility-administered medications for this visit.     Facility-Administered Medications Ordered in Other Visits   Medication     midazolam (VERSED) injection     Allergies   Allergies   Allergen Reactions     Contrast Dye Nausea and Vomiting     Diatrizoate Nausea and Vomiting     Percocet [Oxycodone-Acetaminophen] Nausea and Vomiting and Itching     Advil [Ibuprofen Micronized] Rash     Rash      Ibuprofen Rash  "    Family History: Mother with \"arthritis\"    Social History: Not currently working. Work at [employer], building them. Repetitive motion. No smoking/drugs/ETOH. Not . 4 kids who are healthy.       Objective:    Physical exam:  No vitals for this video visit. Vitals reviewed in Epic.  GEN: Lying in bed though looks more comfortable than last visit. NAD  HEENT: no facial rash, sclera clear  CV: no upper extremity dependent edema  Pulm: speaking in full sentences, no audible wheezing, no use of accessory muscles.   Abdomen: not distended  Skin: no acute cutaneous lesions  MSK: full active ROM of bilateral shoulders, elbows, wrists, MCPs, PIPs, DIPs.     Labs:  WBC   Date Value Ref Range Status   02/15/2021 6.5 4.0 - 11.0 10e9/L Final     WBC Count   Date Value Ref Range Status   03/01/2022 5.7 4.0 - 11.0 10e3/uL Final     Hemoglobin   Date Value Ref Range Status   03/01/2022 12.7 11.7 - 15.7 g/dL Final   02/15/2021 12.2 11.7 - 15.7 g/dL Final     Platelet Count   Date Value Ref Range Status   03/01/2022 259 150 - 450 10e3/uL Final   02/15/2021 219 150 - 450 10e9/L Final     Creatinine   Date Value Ref Range Status   03/01/2022 0.59 0.52 - 1.04 mg/dL Final   02/15/2021 0.54 0.52 - 1.04 mg/dL Final     Lab Results   Component Value Date    ALKPHOS 60 03/01/2022    ALKPHOS 55 02/15/2021     AST   Date Value Ref Range Status   03/01/2022 16 0 - 45 U/L Final   02/15/2021 14 0 - 45 U/L Final     Lab Results   Component Value Date    ALT 21 03/01/2022    ALT 17 02/15/2021     Sed Rate   Date Value Ref Range Status   02/15/2021 20 0 - 20 mm/h Final     Erythrocyte Sedimentation Rate   Date Value Ref Range Status   03/01/2022 27 (H) 0 - 20 mm/hr Final     CRP Inflammation   Date Value Ref Range Status   08/16/2021 <2.9 0.0 - 8.0 mg/L Final   02/26/2020 <2.9 0.0 - 8.0 mg/L Final     UA RESULTS:  Recent Labs   Lab Test 05/18/21  0858   COLOR Yellow   APPEARANCE Clear   URINEGLC Negative   URINEBILI Negative "   URINEKETONE Negative   SG 1.015   UBLD Large*   URINEPH 6.5   PROTEIN Negative   UROBILINOGEN 0.2   NITRITE Negative   LEUKEST Moderate*   RBCU 10-25*   WBCU *      RAMÍREZ Screen by EIA   Date Value Ref Range Status   05/21/2014 <1.0  Interpretation:  Negative   <1.0 Final     Rheumatoid Factor   Date Value Ref Range Status   05/21/2014 <20 <20 IU/mL Final     Cyclic Citrullinated Peptide Antibody, IgG   Date Value Ref Range Status   08/28/2017 1 <7 U/mL Final     Comment:     Negative       Imaging:  MRI LUMBAR SPINE WITHOUT CONTRAST   2/28/2020 3:51 PM      HISTORY: Radiculopathy, greater than  6 weeks conservative treatment,  persistent symptoms; radiculopathy - history of sacroiliitis. Family  history of spinal disease and severe stenosis - left-sided symptoms.  Spondyloarthropathy.      TECHNIQUE: Multiplanar multisequence MRI of the lumbar spine without  contrast.      COMPARISON: Lumbar spine MRI 9/7/2010. Correlation is also made with  prior pelvic CT 6/10/2019.     FINDINGS:  Alignment is normal. No fracture or significant vertebral  body height loss. The intervertebral discs are normal in height and  signal. No abnormal marrow signal in the lumbar spine. There is no  disc bulge or herniation. There is no spinal or neural foraminal  stenosis. Sclerotic changes about the sacroiliac joints with marginal  spurring. This appears overall similar (within the field-of-view) to  the most recent examinations.                                                                      IMPRESSION:  1. No acute abnormality of the lumbar spine. Specifically, no disc  bulge/herniation, marrow signal abnormality, or spinal or neural  foraminal stenosis.  2. Redemonstrated sclerosis with marginal spurring about the  sacroiliac joints, potentially representing changes related to  osteoarthritis. This may be secondary to prior sacroiliitis in the  setting of spondyloarthropathy given the patient's clinical history.  This is  incompletely included within the field-of-view of this exam.

## 2022-03-04 NOTE — LETTER
3/4/2022       RE: Zo Qureshi  [id number] [facility] [id number]     Dear Colleague,    Thank you for referring your patient, Zo Qureshi, to the [facility]HE[facility]ATOLOG[facility] [place] at [facility]. Please see a copy of my visit note below.    Patient is being seen for billable video visit  Patient location: home  Physician location: [facility] rheumatology  Platform used: [employer]  Start time: 10:00AM  End time: 10:19AM  James Hernandez MD  Rheumatology        Outpatient Rheumatology Follow-up  This visit was conducted via synchronous video visit due to the current COVID-19 crisis to reduce patient risk.  Verbal consent was obtained and is documented below.    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 Prior to that time she had last been seen by Dr. Garay of 5/24/2019. Most recent imaging of SI joints was on 2/28/2020 which showed sclerosis with marginal spurring about the SI joints, likely secondary to her chronic inflammatory disease, though no active inflammatory changes at that time. Had been tried on both humira and enbrel, though did not tolerate these due to side effects so discontinued prior to determining if efficacious. At the time of her last visit with rheumatology it was documented that she was responsive to NSAIDs, prednisone, and SSZ. The dose of her SSZ was increased to 1gm BID at the time of her initial visit with me given her active peripheral arthritis and enthesitis. Despite naproxen 500mg BID, she had ongoing axial stiffness which was AM predominant and improved with activity and stretching,  so risks and benefits of cosentyx discussed and patient started on this IL-17 inhibitor. She has interestingly developed the same side effects of severe flu like symptoms after each dose of her biologic therapy- similar to those she was experiencing when on humira and enbrel, with rhinorrhea, muscle aches/pains, severe fatigue, and also hives, low grade fever which come on after her injection and slowly over many weeks will resolve. Has been worse with each subsequent injection of cosentyx. So at the time of her last visit, cosentyx was discontinued and xeljanz 11mg once daily started early January 2022. She presents today for follow-up.    Seronegative spondyloarthropathy: Zo has had some overall improvement in her pain severity and length of AM predominant axial pain/stiffness. Importantly, this is also the first drug that is indicated for axial inflammatory disease that she has tolerated (all prior biologics have caused a serum sickness like reaction severe enough requiring discontinuation). At this time will plan to continue on xeljanz 11mg once daily and her sulfasalzine 1gm BID. For ongoing pain that may not be secondary to ongoing active inflammation, in place of her naproxen use, we discussed the use of mobic for potential improved GI safety along with convenience in dosing. Discussed remainder of side effect/ risk profile being the same as other NSAIDs. Also that no NSAIDs to be taken in the 24 hours after each dose. She will stop her naproxen now.     High risk medication use: Xeljanz, SSZ  -Labs reviewed from 3/1/22 with CBC, CMP which do not show evidence of drug toxicity  -Will continue q3 months   -Risks and benefits of these therapy to include infection, thrombosis, bowel perforation, bone marrow suppression, GI/hepatotoxicity, malignancy, increased all cause mortality among others discussed again today 3/4/22. She is agreeable to continue given potential benefit and lack of side effects to  "date.    Will plan to follow-up in 3 months with labs due again at that time. If disease is more stable at that time will space out to 6 months for follow-up.     James Hernandez MD  Rheumatology   Subjective:   Interval history 3/4/22  Has not experienced any side effects from xeljanz. Has had ongoing HA/dizziness, which was there prior to starting xeljanz. Has intermittent chills/ feeling warm, though no temp. Does not happen everyday, happens 1-2 times per week. Also warm at night, though not wet on clothes. Her pain is less. Rates her pain now around 6/10. No redness/warmth/swelling. Though last week had right wrist pain with minimal movement. Lasted for 2-3 days. Used topical lidocaine which resolved symptoms. When painful, it was somewhat \"puffy\".  At this time her back is still stiff, AM predominant. Back and neck. She sits when she wakes up and stretches. Then one hour later she has improvement and continues her day. Has rash on anterior shin right LE, saw PCP who gave her topical steroid which she has not started. Still with intermittent blurred vision/double vision. Unchanged. No more severe/frequent. Rare sores in her mouth. No interval infections. ROS otherwise negative.       Interval history  12/17/21  Reports feeling of fever/body aches/runny nose. Also with rash. 3-4 days after her first injection she noticed that this started, though took her next dose and the above symptoms were more severe. Then was off for about 1 month due to these severe side effects. She decided to re-try in October and November, though has again had return of above. Has been about 1 month since her last injection and reports side effects are continuing to slowly improve, though still with some fatigue/body aches/ HA. Rash and fever resolved. She finds it hard to comment on axial pain/stiffness/peripiheral joint pain/swelling in setting of severe fatigue/flu like symptoms. Her ROS is otherwise negative.     HPI from initial " consultation 5/2021  Had flu like symptoms with flu like symptoms. Resolves when stopped. Prednisone, NSAIDs, SSZ used/helpful. Prednisone had nausea and consipation. SSZ has drowsiness/ dizziness/ mental fog worse in the AM. Has continued on SSZ 1gm in the AM and 1gm in the PM. Still with significant drowsiness. If she doesn't want to have drowsiness, reduces the dose, has pain in neck, shoulder, back. Has some swelling of joints. Has tenderness of many joints. Takes naproxen and gabapentin. She takes naproxen 500mg BID. In the AM has stiffness in her neck/back. Gets some better with stretching/ exercise. Takes 15 minutes to get better. No rash. Does have swelling of her right hand 2nd -4th digits. Pain in her PIPs predominantly. Pain also in between the joints. Has recently had some blurred vision, wears glasses. No inflammatory eye disease history. Intermittent double vision. Has had hair loss, non scarring more that is thinning. More over the last 3-4 years. Intermittent sore in mouth, painful. Has dry eyes, uses eye drops 3-4 times per week. When she wakes up her throat is dry. Drinks frequently. No abdominal pain. No blood in her stool. No n/v. No raynauds. No sclerodactyly. No fevers, weight loss. Occasional weight loss and chills. Has been having ongoing throbbing HA on the left side. The naproxen is some helpful, though returns shortly afterwards. No miscarriages or blood clots.     Past Medical History  Past Medical History:   Diagnosis Date     Anemia      History of blood transfusion 2000    after vaginal delivery, 2 units PRBCs given     HSIL on Pap smear 09/21/2011    MARTA 2 and 3     Immune to hepatitis B 08/2021    hep B antigen NEG     INFLAM SPONDYLOPATHY NOS 01/07/2008    check labs on sulfasalzine every 3 months and check hep B and C and TB every 2 years     Leukocytopenia 03/10/2014     Tendinosis      Thrombocytopenia (H) 03/10/2014     Unspecified closed fracture of pelvis 2000    left fracture  of pelvis after delivery     Past Surgical History  Past Surgical History:   Procedure Laterality Date     COLONOSCOPY Left 11/12/2021    recheck in 10 years     DILATION AND CURETTAGE, ABLATE ENDOMETRIUM NOVASURE, COMBINED N/A 12/04/2018    Procedure: novasure endometrial ablation, myosure resection of leiomyoma, dilation and curettage;  Surgeon: Rolando Covarrubias MD;  Location: [facility] OR     GYN SURGERY  12/04/2018    fibroid removal     lap hysterectomy and salpingetcomy Bilateral 2019    done at [facility] - ovaries are in     LEEP TX, CERVICAL  12/19/2011    MARTA II     OPERATIVE HYSTEROSCOPY WITH MORCELLATOR N/A 12/04/2018    ovaries are still in.   Surgeon: Rolando Covarrubias MD;  Location: [facility] OR     TUBAL LIGATION  05/2009    laparoscopic tubal ligation     ZZC NONSPECIFIC PROCEDURE  10/2000    after delivery 2 units of prbcs secondary to placenta     Medications  Current Outpatient Medications   Medication     clindamycin-benzoyl peroxide (BENZACLIN) 1-5 % external gel     cyclobenzaprine (FLEXERIL) 5 MG tablet     diclofenac (VOLTAREN) 1 % topical gel     DULoxetine (CYMBALTA) 30 MG capsule     ferrous sulfate (FEROSUL) 325 (65 Fe) MG tablet     fluticasone (FLONASE) 50 MCG/ACT nasal spray     gabapentin (NEURONTIN) 300 MG capsule     ketotifen (ZADITOR) 0.025 % ophthalmic solution     lidocaine (XYLOCAINE) 5 % external ointment     loratadine (CLARITIN) 10 MG tablet     meloxicam (MOBIC) 15 MG tablet     olopatadine (PATANOL) 0.1 % ophthalmic solution     ondansetron (ZOFRAN) 4 MG tablet     polyethylene glycol (MIRALAX) 17 GM/Dose powder     STOOL SOFTENER 100 MG capsule     sulfaSALAzine ER (AZULFIDINE EN) 500 MG EC tablet     tofacitinib (XELJANZ XR) 11 MG 24 hr tablet     traMADol (ULTRAM) 50 MG tablet     traZODone (DESYREL) 50 MG tablet     triamcinolone (KENALOG) 0.1 % external cream     No current facility-administered medications for this visit.     Facility-Administered Medications Ordered in Other  "Visits   Medication     midazolam (VERSED) injection     Allergies   Allergies   Allergen Reactions     Contrast Dye Nausea and Vomiting     Diatrizoate Nausea and Vomiting     Percocet [Oxycodone-Acetaminophen] Nausea and Vomiting and Itching     Advil [Ibuprofen Micronized] Rash     Rash      Ibuprofen Rash     Family History: Mother with \"arthritis\"    Social History: Not currently working. 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 Repetitive motion. No smoking/drugs/ETOH. Not . 4 kids who are healthy.       Objective:    Physical exam:  No vitals for this video visit. Vitals reviewed in Epic.  GEN: Lying in bed though looks more comfortable than last visit. NAD  HEENT: no facial rash, sclera clear  CV: no upper extremity dependent edema  Pulm: speaking in full sentences, no audible wheezing, no use of accessory muscles.   Abdomen: not distended  Skin: no acute cutaneous lesions  MSK: full active ROM of bilateral shoulders, elbows, wrists, MCPs, PIPs, DIPs.     Labs:  WBC   Date Value Ref Range Status   02/15/2021 6.5 4.0 - 11.0 10e9/L Final     WBC Count   Date Value Ref Range Status   03/01/2022 5.7 4.0 - 11.0 10e3/uL Final     Hemoglobin   Date Value Ref Range Status   03/01/2022 12.7 11.7 - 15.7 g/dL Final   02/15/2021 12.2 11.7 - 15.7 g/dL Final     Platelet Count   Date Value Ref Range Status   03/01/2022 259 150 - 450 10e3/uL Final   02/15/2021 219 150 - 450 10e9/L Final     Creatinine   Date Value Ref Range Status   03/01/2022 0.59 0.52 - 1.04 mg/dL Final   02/15/2021 0.54 0.52 - 1.04 mg/dL Final     Lab Results   Component Value Date    ALKPHOS 60 03/01/2022    ALKPHOS 55 02/15/2021     AST   Date Value Ref Range Status   03/01/2022 16 0 - 45 U/L Final   02/15/2021 14 0 - 45 U/L Final     Lab Results   Component Value Date    ALT 21 03/01/2022    ALT 17 02/15/2021     Sed Rate   Date Value Ref Range Status   02/15/2021 20 0 - 20 mm/h Final     Erythrocyte Sedimentation Rate   Date Value Ref " Range Status   03/01/2022 27 (H) 0 - 20 mm/hr Final     CRP Inflammation   Date Value Ref Range Status   08/16/2021 <2.9 0.0 - 8.0 mg/L Final   02/26/2020 <2.9 0.0 - 8.0 mg/L Final     UA RESULTS:  Recent Labs   Lab Test 05/18/21  0858   COLOR Yellow   APPEARANCE Clear   URINEGLC Negative   URINEBILI Negative   URINEKETONE Negative   SG 1.015   UBLD Large*   URINEPH 6.5   PROTEIN Negative   UROBILINOGEN 0.2   NITRITE Negative   LEUKEST Moderate*   RBCU 10-25*   WBCU *      RAMÍREZ Screen by EIA   Date Value Ref Range Status   05/21/2014 <1.0  Interpretation:  Negative   <1.0 Final     Rheumatoid Factor   Date Value Ref Range Status   05/21/2014 <20 <20 IU/mL Final     Cyclic Citrullinated Peptide Antibody, IgG   Date Value Ref Range Status   08/28/2017 1 <7 U/mL Final     Comment:     Negative       Imaging:  MRI LUMBAR SPINE WITHOUT CONTRAST   2/28/2020 3:51 PM      HISTORY: Radiculopathy, greater than  6 weeks conservative treatment,  persistent symptoms; radiculopathy - history of sacroiliitis. Family  history of spinal disease and severe stenosis - left-sided symptoms.  Spondyloarthropathy.      TECHNIQUE: Multiplanar multisequence MRI of the lumbar spine without  contrast.      COMPARISON: Lumbar spine MRI 9/7/2010. Correlation is also made with  prior pelvic CT 6/10/2019.     FINDINGS:  Alignment is normal. No fracture or significant vertebral  body height loss. The intervertebral discs are normal in height and  signal. No abnormal marrow signal in the lumbar spine. There is no  disc bulge or herniation. There is no spinal or neural foraminal  stenosis. Sclerotic changes about the sacroiliac joints with marginal  spurring. This appears overall similar (within the field-of-view) to  the most recent examinations.                                                                      IMPRESSION:  1. No acute abnormality of the lumbar spine. Specifically, no disc  bulge/herniation, marrow signal abnormality, or  spinal or neural  foraminal stenosis.  2. Redemonstrated sclerosis with marginal spurring about the  sacroiliac joints, potentially representing changes related to  osteoarthritis. This may be secondary to prior sacroiliitis in the  setting of spondyloarthropathy given the patient's clinical history.  This is incompletely included within the field-of-view of this exam.            Left voicemail for patient to call back to set up telemedicine visit, will call again before appointment time.  Yamilex Hernandez CMA on 3/4/2022 at 9:51 AM

## 2022-03-04 NOTE — PROGRESS NOTES
Left voicemail for patient to call back to set up telemedicine visit, will call again before appointment time.  Yamilex Hernandez CMA on 3/4/2022 at 9:51 AM

## 2022-03-11 ENCOUNTER — ANCILLARY PROCEDURE (OUTPATIENT)
Dept: MAMMOGRAPHY | Facility: CLINIC | Age: 47
End: 2022-03-11
Attending: FAMILY MEDICINE
Payer: MEDICARE

## 2022-03-11 DIAGNOSIS — Z12.31 VISIT FOR SCREENING MAMMOGRAM: ICD-10-CM

## 2022-03-11 PROCEDURE — 77067 SCR MAMMO BI INCL CAD: CPT | Mod: TC | Performed by: RADIOLOGY

## 2022-03-11 PROCEDURE — 77063 BREAST TOMOSYNTHESIS BI: CPT | Mod: TC | Performed by: RADIOLOGY

## 2022-03-18 NOTE — PROGRESS NOTES
Assessment & Plan     Chronic pain syndrome    - Drug Confirmation Panel Urine with Creat - lab collect  Refills per epicare    - diclofenac (VOLTAREN) 1 % topical gel; Apply to affected area twice daily  Consider adding amitriptyline for headaches and pain - will wait for this and try cyclobenzaprine for now  The potential side effects of the prescription medication were discussed with the patient.     Inflammatory spondylopathy of sacral region (H)    - CBC with platelets and differential  - REVIEW OF HEALTH MAINTENANCE PROTOCOL ORDERS  - Comprehensive metabolic panel (BMP + Alb, Alk Phos, ALT, AST, Total. Bili, TP)  - meloxicam (MOBIC) 15 MG tablet; Take 1 tablet (15 mg) by mouth daily  - CRP inflammation  - Erythrocyte sedimentation rate auto  - Quantiferon TB Gold Plus  - Hepatitis B surface antigen  - Hepatitis B core antibody  - Hepatitis C antibody  - HIV Antigen Antibody Combo    Disorder of SI (sacroiliac) joint  Stable  Has had 3 refills for total of 140 tab in the last 12 months averaging 11.6 per month  [facility]  Urine drug screen  - traMADol (ULTRAM) 50 MG tablet; Take 1 tablet (50 mg) by mouth every 6 hours as needed for severe pain    Episodic tension-type headache, not intractable  Will try flexeril and then consider amitriptyline if this does not help  - traMADol (ULTRAM) 50 MG tablet; Take 1 tablet (50 mg) by mouth every 6 hours as needed for severe pain  - cyclobenzaprine (FLEXERIL) 5 MG tablet; Take 1 tablet (5 mg) by mouth 3 times daily as needed for muscle spasms    Neck pain    - cyclobenzaprine (FLEXERIL) 5 MG tablet; Take 1 tablet (5 mg) by mouth 3 times daily as needed for muscle spasms  - diclofenac (VOLTAREN) 1 % topical gel; Apply to affected area twice daily    Cervicalgia    - DULoxetine (CYMBALTA) 30 MG capsule; Take 2 capsules (60 mg) by mouth daily    Right shoulder tendonitis    - DULoxetine (CYMBALTA) 30 MG capsule; Take 2 capsules (60 mg) by mouth daily    Right  1.95 peroneal tendinosis    - DULoxetine (CYMBALTA) 30 MG capsule; Take 2 capsules (60 mg) by mouth daily    Iron deficiency anemia, unspecified iron deficiency anemia type  Nothing evaluated today but dropped some labs ordered by rheum  - ferrous sulfate (FEROSUL) 325 (65 Fe) MG tablet; Take 1 tablet (325 mg) by mouth 2 times daily    Allergic conjunctivitis, bilateral  Nothing evaluated today but dropped some labs ordered by rheum  - ketotifen (ZADITOR) 0.025 % ophthalmic solution; Place 1 drop into both eyes 2 times daily    Insomnia, unspecified type  Stable  Refills per epicare    - traZODone (DESYREL) 50 MG tablet; Take 1 tablet (50 mg) by mouth nightly as needed for sleep      25 minutes spent on the date of the encounter doing chart review, review of test results, interpretation of tests, patient visit and documentation        Come back in 1-2 weeks to eval lower left abdominal discomfort she has recently been having    Return in about 6 months (around 2/16/2022) for Physical Exam.    Terri Robles MD  [facility]    Shaggy Pathak is a 46 year old who presents for the following health issues - she is here due to follow up on her chronic pain due to workers comp injury.   She continues to have neck pain which is radiating to her head.   She gets about 3 headaches per week.   She also is having reoccurance of right wrist pain.   It has been about 2 weeks.   This has come and gone over the years she has had this issue.   She does have a wrist splint that was made for her and she can wear this as needed.       HPI       Pain History:  When did you first notice your pain? - More than 6 weeks   Have you seen this provider for your pain in the past?   Yes   Where in your body do you have pain? Both shoulders, neck and wrists. Pts right wrist is becoming more painful  Are you seeing anyone else for your pain? No    PHQ-9 SCORE 8/19/2020 2/15/2021 3/29/2021   PHQ-9 Total Score - - -    PHQ-9 Total Score MyChart 15 (Moderately severe depression) 11 (Moderate depression) -   PHQ-9 Total Score 15 11 11       JOSE DAVID-7 SCORE 12/5/2019 2/15/2021 3/29/2021   Total Score 1 (minimal anxiety) 1 (minimal anxiety) -   Total Score 1 1 3         PEG Score 8/16/2021 8/16/2021   PEG Total Score 6.33 6.67       Chronic Pain Follow Up:    Location of pain: neck and head and right wrist more right now  Analgesia/pain control:    - Recent changes:  none    - Overall control: Inadequate pain control    - Current treatments: naproxen and gabapentin and lidocaine ointment   Adherence:     - Do you ever take more pain medicine than prescribed? No    - When did you take your last dose of pain medicine?     Adverse effects: No   PDMP Review       Value Time User    State PDMP site checked  Yes 8/16/2021  2:24 PM O&#39;Terri Parmar MD        Last CSA Agreement:   Patient-Level CSA:    Controlled Substance Agreement - Opioid - Scan on 3/15/2021  8:19 PM       Last UDS: 8/23/2020      Review of Systems   Constitutional, HEENT, cardiovascular, pulmonary, gi and gu systems are negative, except as otherwise noted.      Objective    /64 (BP Location: Right arm, Patient Position: Sitting, Cuff Size: Adult Regular)   Pulse 88   Temp 98.3  F (36.8  C) (Oral)   Wt 54 kg (119 lb)   LMP 03/26/2019 (Within Days)   SpO2 97%   BMI 24.04 kg/m    Body mass index is 24.04 kg/m .  Physical Exam   GENERAL: healthy, alert and no distress  EYES: Eyes grossly normal to inspection, PERRL and conjunctivae and sclerae normal  HENT: ear canals and TM's normal, nose and mouth without ulcers or lesions  NECK: no adenopathy, no asymmetry, masses, or scars and thyroid normal to palpation  RESP: lungs clear to auscultation - no rales, rhonchi or wheezes  CV: regular rate and rhythm, normal S1 S2, no S3 or S4, no murmur, click or rub, no peripheral edema and peripheral pulses strong  ABDOMEN: soft, nontender, no hepatosplenomegaly, no masses  and bowel sounds normal  MS: left neck and trap tenderness - no swelling - also right dorsal wrist tenderness - no redness or swelling  SKIN: no suspicious lesions or rashes  NEURO: Normal strength and tone, mentation intact and speech normal  PSYCH: mentation appears normal, affect normal/bright  LYMPH: no cervical, supraclavicular, axillary, or inguinal adenopathy    Office Visit on 05/18/2021   Component Date Value Ref Range Status     Color Urine 05/18/2021 Yellow   Final     Appearance Urine 05/18/2021 Clear   Final     Glucose Urine 05/18/2021 Negative  NEG^Negative mg/dL Final     Bilirubin Urine 05/18/2021 Negative  NEG^Negative Final     Ketones Urine 05/18/2021 Negative  NEG^Negative mg/dL Final     Specific Gravity Urine 05/18/2021 1.015  1.003 - 1.035 Final     Blood Urine 05/18/2021 Large* NEG^Negative Final     pH Urine 05/18/2021 6.5  5.0 - 7.0 pH Final     Protein Albumin Urine 05/18/2021 Negative  NEG^Negative mg/dL Final     Urobilinogen Urine 05/18/2021 0.2  0.2 - 1.0 EU/dL Final     Nitrite Urine 05/18/2021 Negative  NEG^Negative Final     Leukocyte Esterase Urine 05/18/2021 Moderate* NEG^Negative Final     Source 05/18/2021 Midstream Urine   Final     Specimen Description 05/18/2021 Midstream Urine   Final     Special Requests 05/18/2021 Specimen received in preservative   Final     Culture Micro 05/18/2021 *  Final                    Value:>100,000 colonies/mL  Escherichia coli       WBC Urine 05/18/2021 * OTO5^0 - 5 /HPF Final     RBC Urine 05/18/2021 10-25* OTO2^O - 2 /HPF Final     WBC Clumps 05/18/2021 Present* NEG^Negative /HPF Final     Squamous Epithelial /LPF Urine 05/18/2021 Few  FEW^Few /LPF Final     Bacteria Urine 05/18/2021 Moderate* NEG^Negative /HPF Final

## 2022-03-21 DIAGNOSIS — K64.4 EXTERNAL HEMORRHOIDS: ICD-10-CM

## 2022-03-23 RX ORDER — MENTHOL 5.8 MG/1
LOZENGE ORAL
Qty: 30 CAPSULE | Refills: 1 | Status: SHIPPED | OUTPATIENT
Start: 2022-03-23

## 2022-03-29 ENCOUNTER — TELEPHONE (OUTPATIENT)
Dept: FAMILY MEDICINE | Facility: CLINIC | Age: 47
End: 2022-03-29
Payer: MEDICARE

## 2022-03-29 ENCOUNTER — MYC MEDICAL ADVICE (OUTPATIENT)
Dept: FAMILY MEDICINE | Facility: CLINIC | Age: 47
End: 2022-03-29
Payer: MEDICARE

## 2022-03-29 NOTE — TELEPHONE ENCOUNTER
It looks like this is disability form.    Not sure if it is new or renewal of something else.    I would like to speak with her on phone to go over this.   Can we arrange phone visit for tomorrow?

## 2022-03-29 NOTE — TELEPHONE ENCOUNTER
Forms/Letter Request    Name of form/letter: [employer]    Have you been seen for this request: N/A    Do we have the form/letter: Yes:    When is form/letter needed by: ASAP    How would you like the form/letter returned:  / Fax    Patient Notified form requests are processed in 3-5 business days:Yes    Okay to leave a detailed message? Yes Cell number on file:    Telephone Information:   Mobile [phone]

## 2022-03-29 NOTE — TELEPHONE ENCOUNTER
Forms received and placed in provider box.     Alesha MELLO  [facility]   [facility]

## 2022-03-29 NOTE — TELEPHONE ENCOUNTER
RN advised patient visit needed to complete forms     Visit scheduled 4/5/22    Flor Maddox, Registered Nurse, PAL (Patient Advocate Liason)   [facility]   [phone]

## 2022-03-29 NOTE — TELEPHONE ENCOUNTER
Scheduled visit on 4/5/2022 to review and fill out forms - virtual okay per Dr. Tod Maddox, Registered Nurse, PAL (Patient Advocate Liason)   [facility]   [phone]

## 2022-04-05 ENCOUNTER — VIRTUAL VISIT (OUTPATIENT)
Dept: FAMILY MEDICINE | Facility: CLINIC | Age: 47
End: 2022-04-05
Payer: MEDICARE

## 2022-04-05 VITALS — WEIGHT: 115 LBS | HEIGHT: 59 IN | BODY MASS INDEX: 23.18 KG/M2 | RESPIRATION RATE: 17 BRPM

## 2022-04-05 DIAGNOSIS — M47.819 SPONDYLOARTHROPATHY: ICD-10-CM

## 2022-04-05 DIAGNOSIS — M54.2 CERVICALGIA: ICD-10-CM

## 2022-04-05 DIAGNOSIS — M77.8 RIGHT SHOULDER TENDONITIS: ICD-10-CM

## 2022-04-05 DIAGNOSIS — M26.609 TMJ (TEMPOROMANDIBULAR JOINT SYNDROME): ICD-10-CM

## 2022-04-05 DIAGNOSIS — M67.88 RIGHT PERONEAL TENDINOSIS: Primary | ICD-10-CM

## 2022-04-05 PROCEDURE — 99214 OFFICE O/P EST MOD 30 MIN: CPT | Mod: 95 | Performed by: FAMILY MEDICINE

## 2022-04-05 ASSESSMENT — PATIENT HEALTH QUESTIONNAIRE - PHQ9
SUM OF ALL RESPONSES TO PHQ QUESTIONS 1-9: 10
10. IF YOU CHECKED OFF ANY PROBLEMS, HOW DIFFICULT HAVE THESE PROBLEMS MADE IT FOR YOU TO DO YOUR WORK, TAKE CARE OF THINGS AT HOME, OR GET ALONG WITH OTHER PEOPLE: VERY DIFFICULT
SUM OF ALL RESPONSES TO PHQ QUESTIONS 1-9: 10

## 2022-04-05 ASSESSMENT — ANXIETY QUESTIONNAIRES
3. WORRYING TOO MUCH ABOUT DIFFERENT THINGS: SEVERAL DAYS
GAD7 TOTAL SCORE: 4
4. TROUBLE RELAXING: SEVERAL DAYS
GAD7 TOTAL SCORE: 4
1. FEELING NERVOUS, ANXIOUS, OR ON EDGE: SEVERAL DAYS
6. BECOMING EASILY ANNOYED OR IRRITABLE: SEVERAL DAYS
5. BEING SO RESTLESS THAT IT IS HARD TO SIT STILL: NOT AT ALL
2. NOT BEING ABLE TO STOP OR CONTROL WORRYING: NOT AT ALL
7. FEELING AFRAID AS IF SOMETHING AWFUL MIGHT HAPPEN: NOT AT ALL
GAD7 TOTAL SCORE: 4
7. FEELING AFRAID AS IF SOMETHING AWFUL MIGHT HAPPEN: NOT AT ALL

## 2022-04-05 ASSESSMENT — PAIN SCALES - GENERAL: PAINLEVEL: SEVERE PAIN (6)

## 2022-04-05 NOTE — PATIENT INSTRUCTIONS
Patient Education     TMJ Syndrome  The temporomandibular joint (TMJ) is the joint that connects your lower jaw to your skull. You can feel it in front of your ears when you open and close your mouth. TMJ disorders cause chronic or recurrent pain and problems in the jaw joint and the muscles that control jaw movement. Symptoms of TMJ disorders are usually relieved with minor treatments. But symptoms may come back, especially in times of stress.   Causes  There is no widely agreed-on cause of TMJ disorders. They have been linked to injury, arthritis, tooth and jaw alignment, chronic fatigue syndrome, and fibromyalgia. A definite connection has not been shown to these, though.   Symptoms    Pain in the face, jaw, or neck    Pain with jaw movement or chewing    Locking or catching sensation of the jaw    Clicking, popping, or grinding sounds with movement of the TMJ    Headache    Ear pain    Home care  Modest treatments are a good first step toward relieving symptoms. Try these methods.     Reduce stress on your jaw by not eating crunchy or hard-to-chew foods. Don t eat hard or sticky candies. Soft foods and liquids are easier on the jaw.    Protect your jaw while yawning. If you need to yawn, put your fist under your chin to prevent your mouth from opening too wide.    To help relieve pain, put hot or cold packs on the area that hurts. Try both hot and cold to find out which works best for you. To make a cold pack, put ice cubes in a plastic bag that seals at the top. Wrap the bag in a clean, thin towel or cloth. Never put ice or an ice pack directly on the skin. If you use hot packs (small towels soaked in hot water), be careful not to burn yourself.    You may take acetaminophen or ibuprofen for pain, unless you were given a different pain medicine. (Note: If you have chronic liver or kidney disease or have ever had a stomach ulcer or gastrointestinal bleeding, talk with your healthcare provider before using  these medicines. Also talk to your provider if you are taking medicine to prevent blood clots.) Don t give aspirin to a child younger than age 19 unless directed by the child s provider. Taking aspirin can put a child at risk for Reye syndrome. This is a rare but very serious disorder that most often affects the brain and the liver.  Reducing stress  If stress seems to be contributing to your symptoms, try to find the sources of stress in your life. These aren t always obvious. Common stressors include:     Everyday hassles. These include things such as traffic jams, missed appointments, or car trouble.    Major life changes. These can be good, such as a new baby or job promotion. Or they can be bad, such as losing a job or losing a loved one.    Overload. This is the feeling that you have too many responsibilities and can't take care of everything at once.    Helplessness. This is when you feel like your problems are more than you can solve.  When possible, try to make changes in your sources of stress. See if you can avoid hassles, limit the amount of change in your life at one time, and take breaks when you feel overloaded.   Many stressful situations can't be avoided. So learning how to manage stress is very important. To make everyday stress more manageable:     Getting regular exercise.    Eat nutritious, balanced meals.    Get enough rest.    Try relaxation and breathing exercises, visualization, biofeedback, or meditation.    Take some time out to clear your mind.  For more information, talk with your healthcare provider.  Follow-up care  Follow up with your healthcare provider, or as advised. More testing and other treatment may be needed. If changes to your lifestyle do not improve your symptoms, talk with your healthcare provider about other treatments. These include bite guards for help with teeth grinding, stress management methods, and more. If stress is an important factor and does not respond to the  above lifestyle changes, talk with your healthcare provider about a referral for stress management.   If X-rays were done, they will be reviewed by a specialist. You will be told the results and if they affect your treatment.   Call 911  Call 911 if you have any of these:     Trouble breathing or swallowing    Wheezing    Confusion    Extreme drowsiness or trouble awakening    Fainting or loss of consciousness    Fast heart rate  When to seek medical advice  Call your healthcare provider right away if you have any of these:     Swollen or red face    Jaw or face pain that gets worse    Neck, mouth, tooth, or throat pain that gets worse    Fever of 100.4 F (38 C) or higher, or as directed by your healthcare provider  [employer] last reviewed this educational content on 8/1/2020 2000-2021 [employer]. All rights reserved. This information is not intended as a substitute for professional medical care. Always follow your healthcare professional's instructions.           Patient Education     Pain Relief Methods for Temporomandibular Disorders (TMD)  You have been diagnosed with temporomandibular disorder (TMD). This term describes a group of problems linked to the temporomandibular joint (TMJ)and nearby jaw muscles. The TMJ is located where the upper and lower jaws meet. TMD can cause painful and frustrating symptoms. Your healthcare provider can advise different pain relief methods as part of your treatment. These may include medicines and certain types of therapy, such as massage or gentle exercise.   Using medicines    Medicines may be prescribed to treat TMD. Other medicines may be available over the counter. The medicine type and dosage will depend on the problem you have. For your safety, tell your healthcare provider if you are currently taking any medicines, vitamins, herbs, or supplements. Common medicines used to treat TMD include:     Anti-inflammatories and analgesics. These treat pain,  inflammation, osteoarthritis, and rheumatoid arthritis. Anti-inflammatories reduce swelling, heat, redness, and pain. They also help restore function. Analgesics reduce pain. Nonsteroidal anti-inflammatories (NSAIDs) ease inflammation as well as pain.    Muscle relaxants. These treat myofascial pain. This is pain that occurs in the soft tissues or muscles around the TMJ. Muscle relaxants help ease muscle tension which reduces pressure on the TMJ from tight jaw muscles.    Antidepressants. These can be used to reduce pain or teeth grinding (bruxism). At higher dosages, these medicines are used to treat depression. Given at low dosages, antidepressants may help ease TMD symptoms. They can reduce muscle pain. They also raise the level of serotonin, a body chemical that improves sleep. This in turn can decrease teeth grinding during the night.  Treating painful muscles  A trigger point is a painful spot in a tight muscle. It is often painful to the touch and may refer pain to other places. Your healthcare provider can focus on trigger points using:     Massage. This can be done both inside and outside the mouth. This relaxes muscles and improves circulation.    Palpation. This is done by applying pressure to points of the jaw and face with the fingers.    Cold spray and stretching of the muscles. This is done to relax the muscles.    An anesthetic. This can ease pain. This may be given as an injection by your dentist.  Treating the joint  Therapy may focus directly on the TMJ. There are different ways to treat the joint:    A self-care program. This helps you treat and manage symptoms on your own. Your program may include exercises. It may also include using ice and heat to ease pain.    Gentle manipulation. This reduces pain and restores range of motion. The healthcare provider uses their hands to relax muscles and ligaments around the joint.    Exercises. These strengthen muscles in the jaw and  face.    Ultrasound. This uses sound waves to reduce pain and swelling.  Treating inflammation  When the joint is inflamed, movement becomes difficult and painful. Your healthcare provider can help. Treatment may include:     Rest and gentle exercise. This is done to increase range of motion. One common exercise is to apply pressure to the jaw and resist the movement (isometric exercise).    A cold pack. This eases swelling and reduces pain. A cold pack may be applied for  10 to 20 minutes. Repeat 3 or 4 times a day. To make a cold pack, put ice cubes in a plastic bag that seals at the top. Wrap the bag in a clean, thin towel or cloth. Never put ice or a cold pack directly on the skin.    Massage and gentle manipulation.  As described above.  [employer] last reviewed this educational content on 6/1/2020 2000-2021 [employer]. All rights reserved. This information is not intended as a substitute for professional medical care. Always follow your healthcare professional's instructions.           Patient Education     Self-Care for Temporomandibular Disorders (TMD)  You have temporomandibular disorder (TMD). This term describes a group of problems related to the temporomandibular joint (TMJ) and nearby muscles. The TMJ is located where the upper and lower jaws meet. Treatment will get your jaw back to normal function. But your care doesn t end there. Once you ve had TMD, it s important to avoid reinjury. Get in the habit of doing self-checks. This can make you aware of any symptoms that begin to return, so you can take action right away.    Doing self-checks  Make it a habit to assess your body a few times each day. Try writing yourself a reminder. Or set an alarm on your watch or computer. When doing a self-check, ask yourself:    Do I feel stressed?    Are my muscles tense?    Am I grinding or clenching my teeth?    Is my posture healthy for my body?    Is there anything I can do to make myself more  comfortable?  If you answer yes to any of the questions above, you need to take action. Changing your posture or taking a short break can help prevent or relieve TMD symptoms.  Listening to your body  Many people get used to ignoring pain. But pain is a signal that your body needs care. To maintain your TMJ health:    Don t eat hard or chewy foods. Even if you feel fine, eating such foods can trigger symptoms again.    Be aware of your body. Don t ignore TMD symptoms. The nagging pain in your neck or jaw may be a sign that you need care.    Keep follow-up appointments. Be sure to keep all appointments with your healthcare team.                                                                                                                                Managing stress  Stress is a key factor in TMD. Stress can make you clench your muscles or grind your teeth. It can also affect your sleep, reducing your body s ability to heal. Here are a few tips to manage stress:    Learn ways to relax. Try listening to music or gently stretching. Take a few slow deep breaths. Or close your eyes and imagine a place or object that is calming.    Get plenty of rest and sleep.    Set goals you know you can attain.    Make time for people and things you enjoy.    Ask for help if you need it. Friends and family can run errands and cook meals for you.   Staying active  Activity helps the body in many ways. You stay looser and more relaxed. It also helps keep muscles and tissues conditioned. That way you can heal faster and make reinjury less likely. Here are some tips to get you started:    Talk with your healthcare provider before starting an exercise program.    Always warm up and stretch before each activity. This helps prevent injury.    Try walking or swimming. These activities are easy on your joints. They also benefit your heart and lungs.    Try yoga or wang chi. These are relaxing activities known for reducing stress.  [employer]  last reviewed this educational content on 6/1/2020 2000-2021 [employer]. All rights reserved. This information is not intended as a substitute for professional medical care. Always follow your healthcare professional's instructions.

## 2022-04-05 NOTE — PROGRESS NOTES
Zo is a 47 year old who is being evaluated via a billable video visit.      How would you like to obtain your AVS? Mail a copy  If the video visit is dropped, the invitation should be resent by: Text to cell phone: [phone]  Will anyone else be joining your video visit? No    Video Start Time: 11:43 PM    Assessment & Plan     Right peroneal tendinosis  Stable   Meds and she wears brace    Right shoulder tendonitis  stable    Cervicalgia  Continues - not better yet    Spondyloarthropathy  Seeing rheum at [facility]    TMJ (temporomandibular joint syndrome)  New  Handout on the above diagnosis was given to the patient and discussed         25 minutes spent on the date of the encounter doing chart review, review of outside records, patient visit and documentation        Depression Screening Follow Up    PHQ 4/5/2022   PHQ-9 Total Score 10   Q9: Thoughts of better off dead/self-harm past 2 weeks Not at all     Last PHQ-9 4/5/2022   1.  Little interest or pleasure in doing things 2   2.  Feeling down, depressed, or hopeless 1   3.  Trouble falling or staying asleep, or sleeping too much 1   4.  Feeling tired or having little energy 2   5.  Poor appetite or overeating 1   6.  Feeling bad about yourself 0   7.  Trouble concentrating 2   8.  Moving slowly or restless 1   Q9: Thoughts of better off dead/self-harm past 2 weeks 0   PHQ-9 Total Score 10   Difficulty at work, home, or with people -       Follow Up Actions Taken       CONSULTATION/REFERRAL to - ongoing care with rheumatology    Return in about 4 weeks (around 5/3/2022) for has appt already.    Terri Robles MD  [facility]   Zo is a 47 year old who presents for the following health issues - she is here to discuss her paperwork for work restrictions.   She has spondyloarthropathy and sees rheum and also has right sided shoulder and arm and wrist tendonitis.   Since starting xelganz in additon to her other meds  she is feeling a little better on the front of side effects but the joints are not feeling better yet.       She also notes some trouble with jaw pain and crunching when she chews.   This is new and she wonders if this is related.     HPI     Chronic/Recurring Back Pain Follow Up      Where is your back pain located? (Select all that apply) low back bilateral and middle of back bilateral    How would you describe your back pain?  sharp and stabbing    Where does your back pain spread? the right and left buttock and the right and left  thigh    Since your last clinic visit for back pain, how has your pain changed? always present, but gets better and worse    Does your back pain interfere with your job? YES    Since your last visit, have you tried any new treatment? No      How many servings of fruits and vegetables do you eat daily?  2-3    On average, how many sweetened beverages do you drink each day (Examples: soda, juice, sweet tea, etc.  Do NOT count diet or artificially sweetened beverages)?   0    How many days per week do you exercise enough to make your heart beat faster? 3 or less    How many minutes a day do you exercise enough to make your heart beat faster? 9 or less    How many days per week do you miss taking your medication? 0    Past Medical History:   Diagnosis Date     Anemia      History of blood transfusion 2000    after vaginal delivery, 2 units PRBCs given     HSIL on Pap smear 09/21/2011    MARTA 2 and 3     Immune to hepatitis B 08/2021    hep B antigen NEG     INFLAM SPONDYLOPATHY NOS 01/07/2008    check labs on sulfasalzine every 3 months and check hep B and C and TB every 2 years     Leukocytopenia 03/10/2014     Tendinosis      Thrombocytopenia (H) 03/10/2014     Unspecified closed fracture of pelvis 2000    left fracture of pelvis after delivery       Past Surgical History:   Procedure Laterality Date     COLONOSCOPY Left 11/12/2021    recheck in 10 years     DILATION AND CURETTAGE, ABLATE  ENDOMETRIUM NOVASURE, COMBINED N/A 12/04/2018    Procedure: novasure endometrial ablation, myosure resection of leiomyoma, dilation and curettage;  Surgeon: Rolando Covarrubias MD;  Location: [facility] OR     GYN SURGERY  12/04/2018    fibroid removal     lap hysterectomy and salpingetcomy Bilateral 2019    done at [facility] - ovaries are in     LEEP TX, CERVICAL  12/19/2011    MARTA II     OPERATIVE HYSTEROSCOPY WITH MORCELLATOR N/A 12/04/2018    ovaries are still in.   Surgeon: Rolando Covarrubias MD;  Location: [facility] OR     TUBAL LIGATION  05/2009    laparoscopic tubal ligation     ZZC NONSPECIFIC PROCEDURE  10/2000    after delivery 2 units of prbcs secondary to placenta       MEDICATIONS:  Current Outpatient Medications   Medication     clindamycin-benzoyl peroxide (BENZACLIN) 1-5 % external gel     cyclobenzaprine (FLEXERIL) 5 MG tablet     diclofenac (VOLTAREN) 1 % topical gel     DULoxetine (CYMBALTA) 30 MG capsule     ferrous sulfate (FEROSUL) 325 (65 Fe) MG tablet     fluticasone (FLONASE) 50 MCG/ACT nasal spray     gabapentin (NEURONTIN) 300 MG capsule     ketotifen (ZADITOR) 0.025 % ophthalmic solution     lidocaine (XYLOCAINE) 5 % external ointment     loratadine (CLARITIN) 10 MG tablet     meloxicam (MOBIC) 15 MG tablet     olopatadine (PATANOL) 0.1 % ophthalmic solution     ondansetron (ZOFRAN) 4 MG tablet     polyethylene glycol (MIRALAX) 17 GM/Dose powder     SM STOOL SOFTENER 100 MG capsule     sulfaSALAzine ER (AZULFIDINE EN) 500 MG EC tablet     sulfaSALAzine ER (AZULFIDINE EN) 500 MG EC tablet     tofacitinib (XELJANZ XR) 11 MG 24 hr tablet     traMADol (ULTRAM) 50 MG tablet     traZODone (DESYREL) 50 MG tablet     triamcinolone (KENALOG) 0.1 % external cream     No current facility-administered medications for this visit.     Facility-Administered Medications Ordered in Other Visits   Medication     midazolam (VERSED) injection       SOCIAL HISTORY:  Social History     Tobacco Use     Smoking status: Never  Smoker     Smokeless tobacco: Never Used   Substance Use Topics     Alcohol use: Not Currently     Alcohol/week: 0.0 standard drinks     Comment: rarely       Family History   Problem Relation Age of Onset     Hypertension Father      Lipids Father      Hypertension Mother      Lipids Mother      Diabetes No family hx of      Coronary Artery Disease No family hx of      Hyperlipidemia No family hx of      Cerebrovascular Disease No family hx of      Breast Cancer No family hx of      Colon Cancer No family hx of      Prostate Cancer No family hx of      Other Cancer No family hx of      Depression No family hx of      Anxiety Disorder No family hx of      Mental Illness No family hx of      Substance Abuse No family hx of      Anesthesia Reaction No family hx of      Asthma No family hx of      Osteoporosis No family hx of      Genetic Disorder No family hx of      Thyroid Disease No family hx of      Obesity No family hx of      Unknown/Adopted No family hx of          Review of Systems   Constitutional, HEENT, cardiovascular, pulmonary, gi and gu systems are negative, except as otherwise noted.      Objective           Vitals:  No vitals were obtained today due to virtual visit.    Physical Exam   GENERAL: Healthy, alert and no distress  EYES: Eyes grossly normal to inspection.  No discharge or erythema, or obvious scleral/conjunctival abnormalities.  RESP: No audible wheeze, cough, or visible cyanosis.  No visible retractions or increased work of breathing.    SKIN: Visible skin clear. No significant rash, abnormal pigmentation or lesions.  NEURO: Cranial nerves grossly intact.  Mentation and speech appropriate for age.  PSYCH: Mentation appears normal, affect normal/bright, judgement and insight intact, normal speech and appearance well-groomed.    Office Visit on 03/01/2022   Component Date Value Ref Range Status     WBC Count 03/01/2022 5.7  4.0 - 11.0 10e3/uL Final     RBC Count 03/01/2022 4.23  3.80 - 5.20  10e6/uL Final     Hemoglobin 03/01/2022 12.7  11.7 - 15.7 g/dL Final     Hematocrit 03/01/2022 39.4  35.0 - 47.0 % Final     MCV 03/01/2022 93  78 - 100 fL Final     MCH 03/01/2022 30.0  26.5 - 33.0 pg Final     MCHC 03/01/2022 32.2  31.5 - 36.5 g/dL Final     RDW 03/01/2022 12.1  10.0 - 15.0 % Final     Platelet Count 03/01/2022 259  150 - 450 10e3/uL Final     Sodium 03/01/2022 138  133 - 144 mmol/L Final     Potassium 03/01/2022 4.3  3.4 - 5.3 mmol/L Final     Chloride 03/01/2022 106  94 - 109 mmol/L Final     Carbon Dioxide (CO2) 03/01/2022 24  20 - 32 mmol/L Final     Anion Gap 03/01/2022 8  3 - 14 mmol/L Final     Urea Nitrogen 03/01/2022 11  7 - 30 mg/dL Final     Creatinine 03/01/2022 0.59  0.52 - 1.04 mg/dL Final     Calcium 03/01/2022 8.9  8.5 - 10.1 mg/dL Final     Glucose 03/01/2022 95  70 - 99 mg/dL Final     Alkaline Phosphatase 03/01/2022 60  40 - 150 U/L Final     AST 03/01/2022 16  0 - 45 U/L Final     ALT 03/01/2022 21  0 - 50 U/L Final     Protein Total 03/01/2022 8.0  6.8 - 8.8 g/dL Final     Albumin 03/01/2022 3.8  3.4 - 5.0 g/dL Final     Bilirubin Total 03/01/2022 0.3  0.2 - 1.3 mg/dL Final     GFR Estimate 03/01/2022 >90  >60 mL/min/1.73m2 Final    Effective December 21, 2021 eGFRcr in adults is calculated using the 2021 CKD-EPI creatinine equation which includes age and gender (Mauricio et al., NEJM, DOI: 10.1056/WSYVyd8997324)     Erythrocyte Sedimentation Rate 03/01/2022 27 (A) 0 - 20 mm/hr Final     Cholesterol 03/01/2022 266 (A) <200 mg/dL Final     Triglycerides 03/01/2022 141  <150 mg/dL Final     Direct Measure HDL 03/01/2022 51  >=50 mg/dL Final     LDL Cholesterol Calculated 03/01/2022 187 (A) <=100 mg/dL Final     Non HDL Cholesterol 03/01/2022 215 (A) <130 mg/dL Final     Patient Fasting > 8hrs? 03/01/2022 Yes   Final     FSH 03/01/2022 23.2  IU/L Final    FEMALE:   Age                         0 to 7 days: 3.4 U/L or less   8 to 15 days: 1.0 U/L or less  16 days to 10 years: 0.3-6.9  U/L  11 years: 0.4-9.0 U/L   12 years: 1.0-17.2 U/L   13 years: 1.8-9.9 U/L   14 to 16 years: 0.9-12.4 U/L   17 years: 1.2-9.6 U/L     Premenopausal, 18 and older:   Follicular: 2.5-10.2 U/L  Mid-cycle: 3.4-33.4 U/L  Luteal: 1.5-9.1 U/L  Postmenopausal: 23.0-116.3 U/L    Female Bandar Stages   Stage I: 0.4-6.7 IU/L   Stage II: 0.5-8.7 IU/L   Stage III: 1.2-11.4 IU/L   Stage IV: 0.7-12.8 IU/L   Stage V: 1.0-11.6 IU/L     Puberty onset (transition from Bandar Stage I to Bandar Stage II) occurs for girls at a median age of 10.5 years.   There is evidence that it may occur up to 1 year earlier in obese girls and in  girls.   Progression through Bandar stages is variable. Bandar Stage V (adult) should be reached by age 18.                Video-Visit Details    Type of service:  Video Visit    Video End Time:12:09 PM    Originating Location (pt. Location): Home    Distant Location (provider location):  [facility] [employer]     Platform used for Video Visit: [employer]

## 2022-04-06 ASSESSMENT — PATIENT HEALTH QUESTIONNAIRE - PHQ9: SUM OF ALL RESPONSES TO PHQ QUESTIONS 1-9: 10

## 2022-04-06 ASSESSMENT — ANXIETY QUESTIONNAIRES: GAD7 TOTAL SCORE: 4

## 2022-05-18 ENCOUNTER — OFFICE VISIT (OUTPATIENT)
Dept: FAMILY MEDICINE | Facility: CLINIC | Age: 47
End: 2022-05-18
Payer: MEDICARE

## 2022-05-18 VITALS
WEIGHT: 119.8 LBS | SYSTOLIC BLOOD PRESSURE: 115 MMHG | HEIGHT: 59 IN | TEMPERATURE: 97.9 F | OXYGEN SATURATION: 98 % | HEART RATE: 83 BPM | DIASTOLIC BLOOD PRESSURE: 72 MMHG | BODY MASS INDEX: 24.15 KG/M2

## 2022-05-18 DIAGNOSIS — H53.8 BLURRED VISION: ICD-10-CM

## 2022-05-18 DIAGNOSIS — R51.9 CHRONIC DAILY HEADACHE: ICD-10-CM

## 2022-05-18 DIAGNOSIS — M26.609 TMJ (TEMPOROMANDIBULAR JOINT SYNDROME): ICD-10-CM

## 2022-05-18 DIAGNOSIS — H53.60 NIGHT VISION LOSS: ICD-10-CM

## 2022-05-18 DIAGNOSIS — D50.9 IRON DEFICIENCY ANEMIA, UNSPECIFIED IRON DEFICIENCY ANEMIA TYPE: Primary | ICD-10-CM

## 2022-05-18 PROCEDURE — 99214 OFFICE O/P EST MOD 30 MIN: CPT | Performed by: FAMILY MEDICINE

## 2022-05-18 RX ORDER — AMLODIPINE BESYLATE 2.5 MG/1
2.5 TABLET ORAL DAILY
Qty: 30 TABLET | Refills: 5 | Status: SHIPPED | OUTPATIENT
Start: 2022-05-18 | End: 2022-08-29

## 2022-05-18 ASSESSMENT — ANXIETY QUESTIONNAIRES
GAD7 TOTAL SCORE: 6
3. WORRYING TOO MUCH ABOUT DIFFERENT THINGS: SEVERAL DAYS
7. FEELING AFRAID AS IF SOMETHING AWFUL MIGHT HAPPEN: NOT AT ALL
GAD7 TOTAL SCORE: 6
5. BEING SO RESTLESS THAT IT IS HARD TO SIT STILL: NOT AT ALL
6. BECOMING EASILY ANNOYED OR IRRITABLE: MORE THAN HALF THE DAYS
2. NOT BEING ABLE TO STOP OR CONTROL WORRYING: SEVERAL DAYS
GAD7 TOTAL SCORE: 6
1. FEELING NERVOUS, ANXIOUS, OR ON EDGE: SEVERAL DAYS
4. TROUBLE RELAXING: SEVERAL DAYS
7. FEELING AFRAID AS IF SOMETHING AWFUL MIGHT HAPPEN: NOT AT ALL
8. IF YOU CHECKED OFF ANY PROBLEMS, HOW DIFFICULT HAVE THESE MADE IT FOR YOU TO DO YOUR WORK, TAKE CARE OF THINGS AT HOME, OR GET ALONG WITH OTHER PEOPLE?: VERY DIFFICULT

## 2022-05-18 ASSESSMENT — PATIENT HEALTH QUESTIONNAIRE - PHQ9
10. IF YOU CHECKED OFF ANY PROBLEMS, HOW DIFFICULT HAVE THESE PROBLEMS MADE IT FOR YOU TO DO YOUR WORK, TAKE CARE OF THINGS AT HOME, OR GET ALONG WITH OTHER PEOPLE: VERY DIFFICULT
SUM OF ALL RESPONSES TO PHQ QUESTIONS 1-9: 12
SUM OF ALL RESPONSES TO PHQ QUESTIONS 1-9: 12

## 2022-05-18 NOTE — PROGRESS NOTES
Assessment & Plan     Iron deficiency anemia, unspecified iron deficiency anemia type  In the past - checked every 3 months while on xeljanz    Chronic daily headache  Will try calcium channel blocker for headache prevention.   Will start very low dose since her blood pressure is fine  Also discussed Excedrin migraine for headache when it comes - allergic to ibuprofen but has tolerated naproxen and meloxicam - try this with caution - reaction NOT hives    - aspirin-acetaminophen-caffeine (EXCEDRIN MIGRAINE) 250-250-65 MG tablet  - amLODIPine (NORVASC) 2.5 MG tablet  Dispense: 30 tablet; Refill: 5    Night vision loss  Harder to see when it is dark  - Adult Eye Referral    Blurred vision  Will refer  - Adult Eye Referral        34 minutes spent on the date of the encounter doing chart review, review of test results, patient visit and documentation        Depression Screening Follow Up    PHQ 5/18/2022   PHQ-9 Total Score 12   Q9: Thoughts of better off dead/self-harm past 2 weeks Not at all     Last PHQ-9 5/18/2022   1.  Little interest or pleasure in doing things 2   2.  Feeling down, depressed, or hopeless 1   3.  Trouble falling or staying asleep, or sleeping too much 2   4.  Feeling tired or having little energy 2   5.  Poor appetite or overeating 1   6.  Feeling bad about yourself 1   7.  Trouble concentrating 2   8.  Moving slowly or restless 1   Q9: Thoughts of better off dead/self-harm past 2 weeks 0   PHQ-9 Total Score 12   Difficulty at work, home, or with people -       Follow Up Actions Taken  Crisis resource information provided in After Visit Summary     MEDICATIONS:        - Decrease cymbalta to 30 mg per day and if not worse in 4 weeks may stop entirely - also will consider taper off gabapentin since she is feeling so tired.        - Continue other medications without change  See Patient Instructions    Return in about 3 months (around 8/18/2022) for Medication recheck- can be virtual.    Terri KINGSTON  "MD Travis  [facility] MIGDALIA Pathak is a 47 year old who presents for the following health issues - she has been having headaches and neck pain.it can start on base of skull and left parietal region and temples.    She has had headaches before but last 2 weeks have been worse.   She gets 1-2 times per day.  The shortest might be 10 minutes and the longest can be 2 hours.   She usually takes naproxen and tylenol.   It helps but they come back.    She wonders about all the medications she is on and whether they are contributing.       She also get muscle jerks at night and they sometimes wake her up.   She feels exhausted and that has affected her mood.   She sees rheum every 3-6 months.   She is doing okay on her current med.   It makes her less sick than the previous ones.      History of Present Illness       Migraines:   Since the patient's last clinic visit, headaches are: worsened  The patient is getting headaches:  1 to 2 per day  She is not able to do normal daily activities when she has a migraine.  The patient is taking the following rescue/relief medications:  Naproxyn (Aleve), Tylenol and other   Patient states \"I get some relief\" from the rescue/relief medications.   The patient is taking the following medications to prevent migraines:  No medications to prevent migraines  In the past 4 weeks, the patient has gone to an Urgent Care or Emergency Room 0 times times due to headaches.    Reason for visit:  Follow up    She eats 2-3 servings of fruits and vegetables daily.She consumes 0 sweetened beverage(s) daily.She exercises with enough effort to increase her heart rate 9 or less minutes per day.  She exercises with enough effort to increase her heart rate 3 or less days per week.   She is taking medications regularly.    Today's PHQ-9         PHQ-9 Total Score: 12    PHQ-9 Q9 Thoughts of better off dead/self-harm past 2 weeks :   Not at all    How difficult have these " problems made it for you to do your work, take care of things at home, or get along with other people: Very difficult  Today's JOSE DAVID-7 Score: 6     Past Medical History:   Diagnosis Date     Anemia      History of blood transfusion 2000    after vaginal delivery, 2 units PRBCs given     HSIL on Pap smear 09/21/2011    MARTA 2 and 3     Immune to hepatitis B 08/2021    hep B antigen NEG     INFLAM SPONDYLOPATHY NOS 01/07/2008    check labs on sulfasalzine every 3 months and check hep B and C and TB every 2 years     Leukocytopenia 03/10/2014     Tendinosis      Thrombocytopenia (H) 03/10/2014     Unspecified closed fracture of pelvis 2000    left fracture of pelvis after delivery       Past Surgical History:   Procedure Laterality Date     COLONOSCOPY Left 11/12/2021    recheck in 10 years     DILATION AND CURETTAGE, ABLATE ENDOMETRIUM NOVASURE, COMBINED N/A 12/04/2018    Procedure: novasure endometrial ablation, myosure resection of leiomyoma, dilation and curettage;  Surgeon: Rolando Covrarubias MD;  Location: [facility] OR     GYN SURGERY  12/04/2018    fibroid removal     lap hysterectomy and salpingetcomy Bilateral 2019    done at [facility] - ovaries are in     LEEP TX, CERVICAL  12/19/2011    MARTA II     OPERATIVE HYSTEROSCOPY WITH MORCELLATOR N/A 12/04/2018    ovaries are still in.   Surgeon: Rolando Covarrubias MD;  Location: [facility] OR     TUBAL LIGATION  05/2009    laparoscopic tubal ligation     [facility] NONSPECIFIC PROCEDURE  10/2000    after delivery 2 units of prbcs secondary to placenta       MEDICATIONS:  Current Outpatient Medications   Medication     clindamycin-benzoyl peroxide (BENZACLIN) 1-5 % external gel     cyclobenzaprine (FLEXERIL) 5 MG tablet     diclofenac (VOLTAREN) 1 % topical gel     DULoxetine (CYMBALTA) 30 MG capsule     ferrous sulfate (FEROSUL) 325 (65 Fe) MG tablet     fluticasone (FLONASE) 50 MCG/ACT nasal spray     gabapentin (NEURONTIN) 300 MG capsule     ketotifen (ZADITOR) 0.025 % ophthalmic  solution     lidocaine (XYLOCAINE) 5 % external ointment     loratadine (CLARITIN) 10 MG tablet     meloxicam (MOBIC) 15 MG tablet     olopatadine (PATANOL) 0.1 % ophthalmic solution     ondansetron (ZOFRAN) 4 MG tablet     polyethylene glycol (MIRALAX) 17 GM/Dose powder     SM STOOL SOFTENER 100 MG capsule     sulfaSALAzine ER (AZULFIDINE EN) 500 MG EC tablet     tofacitinib (XELJANZ XR) 11 MG 24 hr tablet     traMADol (ULTRAM) 50 MG tablet     traZODone (DESYREL) 50 MG tablet     triamcinolone (KENALOG) 0.1 % external cream     No current facility-administered medications for this visit.     Facility-Administered Medications Ordered in Other Visits   Medication     midazolam (VERSED) injection       SOCIAL HISTORY:  Social History     Tobacco Use     Smoking status: Never Smoker     Smokeless tobacco: Never Used   Substance Use Topics     Alcohol use: Not Currently     Alcohol/week: 0.0 standard drinks     Comment: rarely       Family History   Problem Relation Age of Onset     Hypertension Father      Lipids Father      Hypertension Mother      Lipids Mother      Diabetes No family hx of      Coronary Artery Disease No family hx of      Hyperlipidemia No family hx of      Cerebrovascular Disease No family hx of      Breast Cancer No family hx of      Colon Cancer No family hx of      Prostate Cancer No family hx of      Other Cancer No family hx of      Depression No family hx of      Anxiety Disorder No family hx of      Mental Illness No family hx of      Substance Abuse No family hx of      Anesthesia Reaction No family hx of      Asthma No family hx of      Osteoporosis No family hx of      Genetic Disorder No family hx of      Thyroid Disease No family hx of      Obesity No family hx of      Unknown/Adopted No family hx of            Review of Systems   Constitutional, HEENT, cardiovascular, pulmonary, gi and gu systems are negative, except as otherwise noted.      Objective    /72 (BP Location: Right  "arm, Patient Position: Sitting, Cuff Size: Adult Regular)   Pulse 83   Temp 97.9  F (36.6  C) (Oral)   Ht 1.499 m (4' 11\")   Wt 54.3 kg (119 lb 12.8 oz)   LMP 03/26/2019 (Within Days)   SpO2 98%   Breastfeeding No   BMI 24.20 kg/m    Body mass index is 24.2 kg/m .  Physical Exam   GENERAL: healthy, alert and no distress  EYES: Eyes grossly normal to inspection, PERRL and conjunctivae and sclerae normal  HENT: ear canals and TM's normal, nose and mouth without ulcers or lesions  NECK: no adenopathy, no asymmetry, masses, or scars and thyroid normal to palpation  RESP: lungs clear to auscultation - no rales, rhonchi or wheezes  CV: regular rate and rhythm, normal S1 S2, no S3 or S4, no murmur, click or rub, no peripheral edema and peripheral pulses strong  MS: no gross musculoskeletal defects noted, no edema  SKIN: no suspicious lesions or rashes  NEURO: Normal strength and tone, sensory exam grossly normal, light touch and pinprick normal, mentation intact, speech normal, cranial nerves 2-12 intact, DTR's normal and symmetric 2+, Romberg normal and rapid alternating movements normal  PSYCH: mentation appears normal, affect normal/bright  LYMPH: no cervical, supraclavicular, axillary, or inguinal adenopathy    Office Visit on 03/01/2022   Component Date Value Ref Range Status     WBC Count 03/01/2022 5.7  4.0 - 11.0 10e3/uL Final     RBC Count 03/01/2022 4.23  3.80 - 5.20 10e6/uL Final     Hemoglobin 03/01/2022 12.7  11.7 - 15.7 g/dL Final     Hematocrit 03/01/2022 39.4  35.0 - 47.0 % Final     MCV 03/01/2022 93  78 - 100 fL Final     MCH 03/01/2022 30.0  26.5 - 33.0 pg Final     MCHC 03/01/2022 32.2  31.5 - 36.5 g/dL Final     RDW 03/01/2022 12.1  10.0 - 15.0 % Final     Platelet Count 03/01/2022 259  150 - 450 10e3/uL Final     Sodium 03/01/2022 138  133 - 144 mmol/L Final     Potassium 03/01/2022 4.3  3.4 - 5.3 mmol/L Final     Chloride 03/01/2022 106  94 - 109 mmol/L Final     Carbon Dioxide (CO2) " 03/01/2022 24  20 - 32 mmol/L Final     Anion Gap 03/01/2022 8  3 - 14 mmol/L Final     Urea Nitrogen 03/01/2022 11  7 - 30 mg/dL Final     Creatinine 03/01/2022 0.59  0.52 - 1.04 mg/dL Final     Calcium 03/01/2022 8.9  8.5 - 10.1 mg/dL Final     Glucose 03/01/2022 95  70 - 99 mg/dL Final     Alkaline Phosphatase 03/01/2022 60  40 - 150 U/L Final     AST 03/01/2022 16  0 - 45 U/L Final     ALT 03/01/2022 21  0 - 50 U/L Final     Protein Total 03/01/2022 8.0  6.8 - 8.8 g/dL Final     Albumin 03/01/2022 3.8  3.4 - 5.0 g/dL Final     Bilirubin Total 03/01/2022 0.3  0.2 - 1.3 mg/dL Final     GFR Estimate 03/01/2022 >90  >60 mL/min/1.73m2 Final    Effective December 21, 2021 eGFRcr in adults is calculated using the 2021 CKD-EPI creatinine equation which includes age and gender (Mauricio alcala al., NEJ, DOI: 10.1056/LRNYdn2886544)     Erythrocyte Sedimentation Rate 03/01/2022 27 (A) 0 - 20 mm/hr Final     Cholesterol 03/01/2022 266 (A) <200 mg/dL Final     Triglycerides 03/01/2022 141  <150 mg/dL Final     Direct Measure HDL 03/01/2022 51  >=50 mg/dL Final     LDL Cholesterol Calculated 03/01/2022 187 (A) <=100 mg/dL Final     Non HDL Cholesterol 03/01/2022 215 (A) <130 mg/dL Final     Patient Fasting > 8hrs? 03/01/2022 Yes   Final     FSH 03/01/2022 23.2  IU/L Final    FEMALE:   Age                         0 to 7 days: 3.4 U/L or less   8 to 15 days: 1.0 U/L or less  16 days to 10 years: 0.3-6.9 U/L  11 years: 0.4-9.0 U/L   12 years: 1.0-17.2 U/L   13 years: 1.8-9.9 U/L   14 to 16 years: 0.9-12.4 U/L   17 years: 1.2-9.6 U/L     Premenopausal, 18 and older:   Follicular: 2.5-10.2 U/L  Mid-cycle: 3.4-33.4 U/L  Luteal: 1.5-9.1 U/L  Postmenopausal: 23.0-116.3 U/L    Female Bandar Stages   Stage I: 0.4-6.7 IU/L   Stage II: 0.5-8.7 IU/L   Stage III: 1.2-11.4 IU/L   Stage IV: 0.7-12.8 IU/L   Stage V: 1.0-11.6 IU/L     Puberty onset (transition from Bandar Stage I to Bandar Stage II) occurs for girls at a median age of 10.5 years.    There is evidence that it may occur up to 1 year earlier in obese girls and in  girls.   Progression through Bandar stages is variable. Bandar Stage V (adult) should be reached by age 18.                Answers for HPI/ROS submitted by the patient on 5/18/2022  If you checked off any problems, how difficult have these problems made it for you to do your work, take care of things at home, or get along with other people?: Very difficult  PHQ9 TOTAL SCORE: 12  JOSE DAVID 7 TOTAL SCORE: 6

## 2022-06-01 ENCOUNTER — OFFICE VISIT (OUTPATIENT)
Dept: URGENT CARE | Facility: URGENT CARE | Age: 47
End: 2022-06-01
Payer: MEDICARE

## 2022-06-01 VITALS
SYSTOLIC BLOOD PRESSURE: 122 MMHG | OXYGEN SATURATION: 97 % | HEART RATE: 94 BPM | TEMPERATURE: 100.3 F | RESPIRATION RATE: 20 BRPM | DIASTOLIC BLOOD PRESSURE: 83 MMHG

## 2022-06-01 DIAGNOSIS — R50.9 FEVER, UNSPECIFIED FEVER CAUSE: Primary | ICD-10-CM

## 2022-06-01 LAB
DEPRECATED S PYO AG THROAT QL EIA: NEGATIVE
FLUAV AG SPEC QL IA: NEGATIVE
FLUBV AG SPEC QL IA: NEGATIVE
GROUP A STREP BY PCR: NOT DETECTED

## 2022-06-01 PROCEDURE — U0003 INFECTIOUS AGENT DETECTION BY NUCLEIC ACID (DNA OR RNA); SEVERE ACUTE RESPIRATORY SYNDROME CORONAVIRUS 2 (SARS-COV-2) (CORONAVIRUS DISEASE [COVID-19]), AMPLIFIED PROBE TECHNIQUE, MAKING USE OF HIGH THROUGHPUT TECHNOLOGIES AS DESCRIBED BY CMS-2020-01-R: HCPCS | Performed by: FAMILY MEDICINE

## 2022-06-01 PROCEDURE — U0005 INFEC AGEN DETEC AMPLI PROBE: HCPCS | Performed by: FAMILY MEDICINE

## 2022-06-01 PROCEDURE — 87804 INFLUENZA ASSAY W/OPTIC: CPT | Performed by: FAMILY MEDICINE

## 2022-06-01 PROCEDURE — 87651 STREP A DNA AMP PROBE: CPT | Performed by: FAMILY MEDICINE

## 2022-06-01 PROCEDURE — 99213 OFFICE O/P EST LOW 20 MIN: CPT | Mod: CS | Performed by: FAMILY MEDICINE

## 2022-06-01 NOTE — PROGRESS NOTES
Assessment & Plan     Fever, unspecified fever cause    - Streptococcus A Rapid Screen w/Reflex to PCR - Clinic Collect  - Symptomatic; Unknown COVID-19 Virus (Coronavirus) by PCR Nose  - Influenza A & B Antigen - Clinic Collect  - Group A Streptococcus PCR Throat Swab    Patient reassured RST and flu swabs are negative. COVID test pending.   Continue with supportive home cares at this time.  Close Follow-up if any new or worsening sx prn.    Alejandra Salas MD  [facility] URG[facility] C[facility] [place] is a 47 year old who presents for the following health issues     HPI     Son with + strep dxed last night.  COVID vaccinated and boosted.  Has not had COVID yet.  Presents with 2 day history of postnasal drip, body aches, cough and congestion.  No sinus pain or pressure.  No fever.    Review of Systems   Constitutional, HEENT, cardiovascular, pulmonary, GI, , musculoskeletal, neuro, skin, endocrine and psych systems are negative, except as otherwise noted.      Objective    /83   Pulse 94   Temp 100.3  F (37.9  C) (Tympanic)   Resp 20   LMP 03/26/2019 (Within Days)   SpO2 97%   Breastfeeding No   There is no height or weight on file to calculate BMI.  Physical Exam   GENERAL: healthy, alert and no distress  EYES: Eyes grossly normal to inspection, PERRL and conjunctivae and sclerae normal  HENT: ear canals and TM's normal, nose and mouth without ulcers or lesions  NECK: no adenopathy, no asymmetry, masses, or scars and thyroid normal to palpation  RESP: lungs clear to auscultation - no rales, rhonchi or wheezes  CV: regular rate and rhythm, normal S1 S2, no S3 or S4, no murmur, click or rub, no peripheral edema and peripheral pulses strong  ABDOMEN: soft, nontender, no hepatosplenomegaly, no masses and bowel sounds normal  MS: no gross musculoskeletal defects noted, no edema  PSYCH: mentation appears normal, affect normal/bright    Results for orders placed or performed in  visit on 06/01/22 (from the past 24 hour(s))   Streptococcus A Rapid Screen w/Reflex to PCR - Clinic Collect    Specimen: Throat; Swab   Result Value Ref Range    Group A Strep antigen Negative Negative   Influenza A & B Antigen - Clinic Collect    Specimen: Nose; Swab   Result Value Ref Range    Influenza A antigen Negative Negative    Influenza B antigen Negative Negative    Narrative    Test results must be correlated with clinical data. If necessary, results should be confirmed by a molecular assay or viral culture.

## 2022-06-02 ENCOUNTER — TELEPHONE (OUTPATIENT)
Dept: NURSING | Facility: CLINIC | Age: 47
End: 2022-06-02
Payer: MEDICARE

## 2022-06-02 LAB — SARS-COV-2 RNA RESP QL NAA+PROBE: POSITIVE

## 2022-06-02 NOTE — TELEPHONE ENCOUNTER
Patient classified as COVID treatment eligible by Epic high risk algorithm:  Yes    Coronavirus (COVID-19) Notification    Reason for call  Notify of POSITIVE COVID-19 lab result, assess symptoms,  review [facility] recommendations    Lab Result   Lab test for 2019-nCoV rRt-PCR or SARS-COV-2 PCR  Oropharyngeal AND/OR nasopharyngeal swabs were POSITIVE for 2019-nCoV RNA [OR] SARS-COV-2 RNA (COVID-19) RNA     We have been unable to reach patient by phone at this time to notify of their Positive COVID-19 result.    Left voicemail message requesting a call back to [phone] [facility] for results.        A Positive COVID-19 letter will be sent via [employer] or the mail. (Exception, no letters sent to Presurgerical/Preprocedure Patients)    Pamela Bosch

## 2022-06-02 NOTE — TELEPHONE ENCOUNTER
Coronavirus (COVID-19) Notification    Caller Name (Patient, parent, daughter/son, grandparent, etc)  The patient      Reason for call  Notify of Positive Coronavirus (COVID-19) lab results, assess symptoms,  review [facility] recommendations    Lab Result    Lab test:  2019-nCoV rRt-PCR or SARS-CoV-2 PCR    Oropharyngeal AND/OR nasopharyngeal swabs is POSITIVE for 2019-nCoV RNA/SARS-COV-2 PCR (COVID-19 virus)      Gather patient reported symptoms   Assessment   Current Symptoms at time of phone call, reported by patient: (if no symptoms, document: No symptoms] Hoarse voice, fever   Date of symptom(s) onset (if applicable) 5/31/22     If at time of call, Patients symptoms have worsened, the Patient should contact 911 or have someone drive them to Emergency Dept promptly:      If Patient calling 911, inform 911 personal that you have tested positive for the Coronavirus (COVID-19).  Place mask on and await 911 to arrive.    If Emergency Dept, If possible, please have another adult drive you to the Emergency Dept but you need to wear mask when in contact with other people.      Treatment Options:   Patient classified as COVID treatment eligible by Epic high risk algorithm: Yes  Is the patient symptomatic at the time of result notification? Yes. Was the onset of symptoms within the last 5 days? Yes.   There are now oral medications available for the treatment of COVID-19.  Taking one of these medications within the first five days of symptoms (when people may not yet feel severely ill) has been shown to make people feel better, prevent them from getting sicker, and preventing hospitalization and death.   Does the patient agree to have a visit with a provider to discuss treatment options? No.  Reason patient declined:  Not that sick and don't think I will get worse (save for people who possibly need it more)      Review information with Patient    Your result was positive. This means you have COVID-19  (coronavirus).    How can I protect others?    These guidelines are for isolating before returning to work, school or .    If you DO have symptoms    Stay home and away from others     For at least 5 days after your symptoms started, AND    You are fever free for 24 hours (with no medicine that reduces fever), AND    Your other symptoms are better    Wear a mask for 10 full days anytime you are around others    If you DON'T have symptoms    Stay home and away from others for at least 5 days after your positive test    Wear a mask for 10 full days anytime you are around others    There may be different guidelines for healthcare facilities.  Please check with the specific sites before arriving.    If you have been told by a doctor that you were severely ill with COVID-19 or are immunocompromised, you should isolate for at least 10 days.    You should not go back to work until you meet the guidelines above for ending your home isolation. You don't need to be retested for COVID-19 before going back to work--studies show that you won't spread the virus if it's been at least 10 days since your symptoms started (or 20 days, if you have a weak immune system).    Employers, schools, and daycares: This is an official notice for this person's medical guidelines for returning in-person.  They must meet the above guidelines before going back to work, school or  in person.    You will receive a positive COVID-19 letter via [employer] or the mail soon with additional self-care information (exception, no letters will be sent to presurgical/preprocedure patients).    Would you like me to review some of that information with you now?  No    If you were tested for an upcoming procedure, please contact your provider for next steps.    Lauar Salas LPN

## 2022-08-29 ENCOUNTER — VIRTUAL VISIT (OUTPATIENT)
Dept: FAMILY MEDICINE | Facility: CLINIC | Age: 47
End: 2022-08-29
Payer: MEDICARE

## 2022-08-29 DIAGNOSIS — R42 VERTIGO: ICD-10-CM

## 2022-08-29 DIAGNOSIS — G47.00 INSOMNIA, UNSPECIFIED TYPE: ICD-10-CM

## 2022-08-29 DIAGNOSIS — R51.9 CHRONIC DAILY HEADACHE: ICD-10-CM

## 2022-08-29 DIAGNOSIS — M67.88 RIGHT PERONEAL TENDINOSIS: ICD-10-CM

## 2022-08-29 DIAGNOSIS — H10.13 ALLERGIC CONJUNCTIVITIS, BILATERAL: ICD-10-CM

## 2022-08-29 DIAGNOSIS — D50.9 IRON DEFICIENCY ANEMIA, UNSPECIFIED IRON DEFICIENCY ANEMIA TYPE: ICD-10-CM

## 2022-08-29 DIAGNOSIS — M77.8 RIGHT SHOULDER TENDONITIS: ICD-10-CM

## 2022-08-29 DIAGNOSIS — Z79.899 ENCOUNTER FOR LONG-TERM (CURRENT) USE OF MEDICATIONS: Primary | ICD-10-CM

## 2022-08-29 DIAGNOSIS — M54.2 CERVICALGIA: ICD-10-CM

## 2022-08-29 PROCEDURE — 99214 OFFICE O/P EST MOD 30 MIN: CPT | Mod: 95 | Performed by: FAMILY MEDICINE

## 2022-08-29 RX ORDER — TRAZODONE HYDROCHLORIDE 50 MG/1
100 TABLET, FILM COATED ORAL
Qty: 60 TABLET | Refills: 11 | Status: SHIPPED | OUTPATIENT
Start: 2022-08-29 | End: 2023-09-25

## 2022-08-29 RX ORDER — TOFACITINIB 11 MG/1
1 TABLET, FILM COATED, EXTENDED RELEASE ORAL DAILY
COMMUNITY
Start: 2022-03-04 | End: 2022-08-29 | Stop reason: SINTOL

## 2022-08-29 RX ORDER — DULOXETIN HYDROCHLORIDE 30 MG/1
60 CAPSULE, DELAYED RELEASE ORAL DAILY
Qty: 180 CAPSULE | Refills: 3 | Status: SHIPPED | OUTPATIENT
Start: 2022-08-29 | End: 2023-05-24

## 2022-08-29 RX ORDER — FERROUS SULFATE 325(65) MG
325 TABLET ORAL
Qty: 90 TABLET | Refills: 3 | Status: SHIPPED | OUTPATIENT
Start: 2022-08-29 | End: 2023-09-25

## 2022-08-29 RX ORDER — AMLODIPINE BESYLATE 2.5 MG/1
2.5 TABLET ORAL DAILY
Qty: 90 TABLET | Refills: 2 | Status: SHIPPED | OUTPATIENT
Start: 2022-08-29 | End: 2023-05-24

## 2022-08-29 RX ORDER — CHOLECALCIFEROL (VITAMIN D3) 50 MCG
1 TABLET ORAL DAILY
Qty: 90 TABLET | Refills: 3 | Status: SHIPPED | OUTPATIENT
Start: 2022-08-29 | End: 2023-09-25

## 2022-08-29 RX ORDER — MECLIZINE HYDROCHLORIDE 25 MG/1
25 TABLET ORAL 3 TIMES DAILY PRN
Qty: 30 TABLET | Refills: 1 | Status: SHIPPED | OUTPATIENT
Start: 2022-08-29

## 2022-08-29 ASSESSMENT — ANXIETY QUESTIONNAIRES
GAD7 TOTAL SCORE: 4
6. BECOMING EASILY ANNOYED OR IRRITABLE: SEVERAL DAYS
1. FEELING NERVOUS, ANXIOUS, OR ON EDGE: NOT AT ALL
IF YOU CHECKED OFF ANY PROBLEMS ON THIS QUESTIONNAIRE, HOW DIFFICULT HAVE THESE PROBLEMS MADE IT FOR YOU TO DO YOUR WORK, TAKE CARE OF THINGS AT HOME, OR GET ALONG WITH OTHER PEOPLE: VERY DIFFICULT
7. FEELING AFRAID AS IF SOMETHING AWFUL MIGHT HAPPEN: NOT AT ALL
3. WORRYING TOO MUCH ABOUT DIFFERENT THINGS: SEVERAL DAYS
GAD7 TOTAL SCORE: 4
5. BEING SO RESTLESS THAT IT IS HARD TO SIT STILL: NOT AT ALL
2. NOT BEING ABLE TO STOP OR CONTROL WORRYING: SEVERAL DAYS
4. TROUBLE RELAXING: SEVERAL DAYS
7. FEELING AFRAID AS IF SOMETHING AWFUL MIGHT HAPPEN: NOT AT ALL
GAD7 TOTAL SCORE: 4
8. IF YOU CHECKED OFF ANY PROBLEMS, HOW DIFFICULT HAVE THESE MADE IT FOR YOU TO DO YOUR WORK, TAKE CARE OF THINGS AT HOME, OR GET ALONG WITH OTHER PEOPLE?: VERY DIFFICULT

## 2022-08-29 NOTE — PROGRESS NOTES
Zo is a 47 year old who is being evaluated via a billable video visit.      How would you like to obtain your AVS? Mail a copy  If the video visit is dropped, the invitation should be resent by: Text to cell phone: [phone]  Will anyone else be joining your video visit? No          Assessment & Plan      Headaches - better with low dose amlodipine  Refills per epicare     Encounter for long-term (current) use of medications  Stable   Sees rheum    Vertigo  New - could be BPPV or labyrinthitis    - CBC with Platelets  - REVIEW OF HEALTH MAINTENANCE PROTOCOL ORDERS  - COMPREHENSIVE METABOLIC PANEL  - Physical Therapy Referral  - vitamin D3 (CHOLECALCIFEROL) 50 mcg (2000 units) tablet  Dispense: 90 tablet; Refill: 3  - meclizine (ANTIVERT) 25 MG tablet  Dispense: 30 tablet; Refill: 1    Cervicalgia  Stable  Refills per epicare   - DULoxetine (CYMBALTA) 30 MG capsule  Dispense: 180 capsule; Refill: 3    Right shoulder tendonitis  stable  - DULoxetine (CYMBALTA) 30 MG capsule  Dispense: 180 capsule; Refill: 3    Right peroneal tendinosis  stable  - DULoxetine (CYMBALTA) 30 MG capsule  Dispense: 180 capsule; Refill: 3    Iron deficiency anemia, unspecified iron deficiency anemia type  Stable  Will go to one per day and maybe every other day if next CBC is fine  - ferrous sulfate (FEROSUL) 325 (65 Fe) MG tablet  Dispense: 90 tablet; Refill: 3    Allergic conjunctivitis, bilateral  Stable  Refills per epicare   - ketotifen (ZADITOR) 0.025 % ophthalmic solution  Dispense: 10 mL; Refill: 3    Insomnia, unspecified type  Will increase to 2 per night  - traZODone (DESYREL) 50 MG tablet  Dispense: 60 tablet; Refill: 11        29 minutes spent on the date of the encounter doing chart review, review of outside records, review of test results, interpretation of tests, patient visit and documentation        Depression Screening Follow Up    PHQ 8/29/2022   PHQ-9 Total Score 11   Q9: Thoughts of better off dead/self-harm past  2 weeks Not at all     Last PHQ-9 8/29/2022   1.  Little interest or pleasure in doing things 1   2.  Feeling down, depressed, or hopeless 1   3.  Trouble falling or staying asleep, or sleeping too much 2   4.  Feeling tired or having little energy 2   5.  Poor appetite or overeating 2   6.  Feeling bad about yourself 0   7.  Trouble concentrating 2   8.  Moving slowly or restless 1   Q9: Thoughts of better off dead/self-harm past 2 weeks 0   PHQ-9 Total Score 11   Difficulty at work, home, or with people -       Follow Up Actions Taken  Crisis resource information provided in After Visit Summary     See Patient Instructions    No follow-ups on file.   Follow-up Visit   Expected date:  Mar 01, 2023 (Approximate)      Follow Up Appointment Details:     Follow-up with whom?: Me    Follow-Up for what?: Adult Preventive    How?: In Person    Is this an as-needed follow-up?: Drea Robles MD  [facility]    Shaggy Pathak is a 47 year old, presenting for the following health issues:  Her back is worse now that she stopped the xeljanz.   Her headaches are better on the norvasc.    She does not get side effects on this but for the last 3 weeks she has had vertigo with ear movement and body movement and some left ear symptoms.    Headache and Back Pain  she had left ear pain about a month ago.    It is better now.     Now for 3 weeks she feels like her left ear cannot hear as well    She also needs refills on some of her meds.       History of Present Illness       Back Pain:  She presents for follow up of back pain. Patient's back pain is a chronic problem.  Location of back pain:  Right lower back, left lower back, right buttock and left buttock  Description of back pain: dull ache  Back pain spreads: right buttocks, left buttocks, right thigh, left thigh, right knee and left knee    Since patient first noticed back pain, pain is: always present, but gets better and  "worse  Does back pain interfere with her job:  Yes      Headaches:   Since the patient's last clinic visit, headaches are: no change  The patient is getting headaches:  2/3 per week  She is not able to do normal daily activities when she has a migraine.  The patient is taking the following rescue/relief medications:  Tylenol and other   Patient states \"The relief is inconsistent\" from the rescue/relief medications.   The patient is taking the following medications to prevent migraines:  Other  In the past 4 weeks, the patient has gone to an Urgent Care or Emergency Room 0 times times due to headaches.    She eats 0-1 servings of fruits and vegetables daily.She consumes 0 sweetened beverage(s) daily.She exercises with enough effort to increase her heart rate 9 or less minutes per day.  She exercises with enough effort to increase her heart rate 3 or less days per week.   She is taking medications regularly.    Today's PHQ-9         PHQ-9 Total Score: 11    PHQ-9 Q9 Thoughts of better off dead/self-harm past 2 weeks :   Not at all    How difficult have these problems made it for you to do your work, take care of things at home, or get along with other people: Very difficult  Today's JOSE DAVID-7 Score: 4       Past Medical History:   Diagnosis Date     Anemia      History of blood transfusion 2000    after vaginal delivery, 2 units PRBCs given     HSIL on Pap smear 09/21/2011    MARTA 2 and 3     Immune to hepatitis B 08/2021    hep B antigen NEG     INFLAM SPONDYLOPATHY NOS 01/07/2008    check labs on sulfasalzine every 3 months and check hep B and C and TB every 2 years     Leukocytopenia 03/10/2014     Tendinosis      Thrombocytopenia (H) 03/10/2014     Unspecified closed fracture of pelvis 2000    left fracture of pelvis after delivery       Past Surgical History:   Procedure Laterality Date     COLONOSCOPY Left 11/12/2021    recheck in 10 years     DILATION AND CURETTAGE, ABLATE ENDOMETRIUM NOVASURE, COMBINED N/A " 12/04/2018    Procedure: novasure endometrial ablation, myosure resection of leiomyoma, dilation and curettage;  Surgeon: Rolando Covarrubias MD;  Location: [facility] OR     GYN SURGERY  12/04/2018    fibroid removal     lap hysterectomy and salpingetcomy Bilateral 2019    done at [facility] - ovaries are in     LEEP TX, CERVICAL  12/19/2011    MARTA II     OPERATIVE HYSTEROSCOPY WITH MORCELLATOR N/A 12/04/2018    ovaries are still in.   Surgeon: Rolando Covarrubias MD;  Location: [facility] OR     TUBAL LIGATION  05/2009    laparoscopic tubal ligation     ZZC NONSPECIFIC PROCEDURE  10/2000    after delivery 2 units of prbcs secondary to placenta       MEDICATIONS:  Current Outpatient Medications   Medication     tofacitinib (XELJANZ XR) 11 MG 24 hr tablet     amLODIPine (NORVASC) 2.5 MG tablet     aspirin-acetaminophen-caffeine (EXCEDRIN MIGRAINE) 250-250-65 MG tablet     clindamycin-benzoyl peroxide (BENZACLIN) 1-5 % external gel     cyclobenzaprine (FLEXERIL) 5 MG tablet     diclofenac (VOLTAREN) 1 % topical gel     DULoxetine (CYMBALTA) 30 MG capsule     ferrous sulfate (FEROSUL) 325 (65 Fe) MG tablet     fluticasone (FLONASE) 50 MCG/ACT nasal spray     gabapentin (NEURONTIN) 300 MG capsule     ketotifen (ZADITOR) 0.025 % ophthalmic solution     lidocaine (XYLOCAINE) 5 % external ointment     loratadine (CLARITIN) 10 MG tablet     meloxicam (MOBIC) 15 MG tablet     olopatadine (PATANOL) 0.1 % ophthalmic solution     ondansetron (ZOFRAN) 4 MG tablet     polyethylene glycol (MIRALAX) 17 GM/Dose powder     SM STOOL SOFTENER 100 MG capsule     sulfaSALAzine ER (AZULFIDINE EN) 500 MG EC tablet     tofacitinib (XELJANZ XR) 11 MG 24 hr tablet     traMADol (ULTRAM) 50 MG tablet     traZODone (DESYREL) 50 MG tablet     triamcinolone (KENALOG) 0.1 % external cream     No current facility-administered medications for this visit.     Facility-Administered Medications Ordered in Other Visits   Medication     midazolam (VERSED) injection  "      SOCIAL HISTORY:  Social History     Tobacco Use     Smoking status: Never Smoker     Smokeless tobacco: Never Used   Substance Use Topics     Alcohol use: Not Currently       Family History   Problem Relation Age of Onset     Hypertension Father      Lipids Father      Hypertension Mother      Lipids Mother      Diabetes No family hx of      Coronary Artery Disease No family hx of      Hyperlipidemia No family hx of      Cerebrovascular Disease No family hx of      Breast Cancer No family hx of      Colon Cancer No family hx of      Prostate Cancer No family hx of      Other Cancer No family hx of      Depression No family hx of      Anxiety Disorder No family hx of      Mental Illness No family hx of      Substance Abuse No family hx of      Anesthesia Reaction No family hx of      Asthma No family hx of      Osteoporosis No family hx of      Genetic Disorder No family hx of      Thyroid Disease No family hx of      Obesity No family hx of      Unknown/Adopted No family hx of          Review of Systems   Constitutional, HEENT, cardiovascular, pulmonary, gi and gu systems are negative, except as otherwise noted.      Objective    Vitals - Patient Reported  Weight (Patient Reported): 52.2 kg (115 lb)  Height (Patient Reported): 149.9 cm (4' 11\")  BMI (Based on Pt Reported Ht/Wt): 23.23      Vitals:  No vitals were obtained today due to virtual visit.    Physical Exam   GENERAL: Healthy, alert and no distress  EYES: Eyes grossly normal to inspection.  No discharge or erythema, or obvious scleral/conjunctival abnormalities.  RESP: No audible wheeze, cough, or visible cyanosis.  No visible retractions or increased work of breathing.    SKIN: Visible skin clear. No significant rash, abnormal pigmentation or lesions.  NEURO: Cranial nerves grossly intact.  Mentation and speech appropriate for age.  PSYCH: Mentation appears normal, affect normal/bright, judgement and insight intact, normal speech and appearance " well-groomed.    Office Visit on 06/01/2022   Component Date Value Ref Range Status     Group A Strep antigen 06/01/2022 Negative  Negative Final     SARS CoV2 PCR 06/01/2022 Positive (A) Negative, Testing sent to reference lab. Results will be returned via unsolicited result Final    POSITIVE: SARS-CoV-2 (COVID-19) RNA detected, presumed positive.     Influenza A antigen 06/01/2022 Negative  Negative Final     Influenza B antigen 06/01/2022 Negative  Negative Final     Group A strep by PCR 06/01/2022 Not Detected  Not Detected Final               Video-Visit Details    Video Start Time: 10:39 AM    Type of service:  Video Visit    Video End Time:11:02 AM    Originating Location (pt. Location): Home    Distant Location (provider location):  [facility] [place] [place]     Platform used for Video Visit: [employer]    Sarika Guzman

## 2022-10-22 ENCOUNTER — HEALTH MAINTENANCE LETTER (OUTPATIENT)
Age: 47
End: 2022-10-22

## 2022-11-10 ENCOUNTER — IMMUNIZATION (OUTPATIENT)
Dept: FAMILY MEDICINE | Facility: CLINIC | Age: 47
End: 2022-11-10
Payer: MEDICARE

## 2022-11-10 PROCEDURE — G0008 ADMIN INFLUENZA VIRUS VAC: HCPCS

## 2022-11-10 PROCEDURE — 90686 IIV4 VACC NO PRSV 0.5 ML IM: CPT

## 2022-12-05 ENCOUNTER — OFFICE VISIT (OUTPATIENT)
Dept: FAMILY MEDICINE | Facility: CLINIC | Age: 47
End: 2022-12-05
Payer: MEDICARE

## 2022-12-05 VITALS
DIASTOLIC BLOOD PRESSURE: 78 MMHG | RESPIRATION RATE: 16 BRPM | SYSTOLIC BLOOD PRESSURE: 116 MMHG | OXYGEN SATURATION: 97 % | BODY MASS INDEX: 24.6 KG/M2 | HEIGHT: 59 IN | WEIGHT: 122 LBS | HEART RATE: 72 BPM | TEMPERATURE: 98.1 F

## 2022-12-05 DIAGNOSIS — N64.4 BREAST PAIN, LEFT: Primary | ICD-10-CM

## 2022-12-05 PROCEDURE — 99213 OFFICE O/P EST LOW 20 MIN: CPT | Performed by: PHYSICIAN ASSISTANT

## 2022-12-05 NOTE — PROGRESS NOTES
"  Assessment & Plan     Breast pain, left  Further evaluation with mammogram and ultrasound as noted below.  - MA Diagnostic Digital Bilateral; Future  - US Breast Left Limited 1-3 Quadrants; Future    Ordering of each unique test    Return for previously scheduled visit.    Hattie Perez PA-C  [facility] MIGDALIA Pathak is a 47 year old, presenting for the following health issues:  Breast Pain (Left breast)      History of Present Illness       Reason for visit:  Left breast pain  Symptom onset:  More than a month (about a month)  Symptoms include:  Pain (present with pressing over the area), worse at night when laying on that side  Symptom intensity:  Mild  Symptom progression:  Staying the same  Had these symptoms before:  No  What makes it worse:  Put pressure  What makes it better:  Dont touch it    She eats 2-3 servings of fruits and vegetables daily.She consumes 0 sweetened beverage(s) daily.She exercises with enough effort to increase her heart rate 9 or less minutes per day.  She exercises with enough effort to increase her heart rate 3 or less days per week.   She is taking medications regularly.     March 2022 had normal mammogram    Symptoms not worse with exertion. Worse with palpation.      Review of Systems   GENERAL:  No fevers        Objective    /78 (BP Location: Right arm, Patient Position: Sitting, Cuff Size: Adult Regular)   Pulse 72   Temp 98.1  F (36.7  C) (Oral)   Resp 16   Ht 1.499 m (4' 11\")   Wt 55.3 kg (122 lb)   LMP 03/26/2019 (Within Days)   SpO2 97%   BMI 24.64 kg/m    Body mass index is 24.64 kg/m .  Physical Exam   GENERAL: No acute distress  HEENT: Normocephalic  BREAST: Right breast: No tenderness or discrete masses throughout breast. No lymphadenopathy in the axilla. Nipple without discharge  Left breast: Tenderness along the lateral aspect of the breast. No obvious discrete masses throughout breast. No lymphadenopathy in the " axilla. Nipple without discharge  NEURO: Alert and non-focal

## 2022-12-14 ENCOUNTER — HOSPITAL ENCOUNTER (OUTPATIENT)
Dept: MAMMOGRAPHY | Facility: CLINIC | Age: 47
Discharge: HOME | End: 2022-12-14
Attending: PHYSICIAN ASSISTANT
Payer: MEDICARE

## 2022-12-14 ENCOUNTER — HOSPITAL ENCOUNTER (OUTPATIENT)
Dept: ULTRASOUND IMAGING | Facility: CLINIC | Age: 47
Discharge: HOME | End: 2022-12-14
Attending: PHYSICIAN ASSISTANT
Payer: MEDICARE

## 2022-12-14 DIAGNOSIS — N64.4 BREAST PAIN, LEFT: ICD-10-CM

## 2022-12-14 PROCEDURE — 76642 ULTRASOUND BREAST LIMITED: CPT | Mod: LT

## 2022-12-14 PROCEDURE — 77062 BREAST TOMOSYNTHESIS BI: CPT

## 2023-01-18 ENCOUNTER — OFFICE VISIT (OUTPATIENT)
Dept: FAMILY MEDICINE | Facility: CLINIC | Age: 48
End: 2023-01-18
Payer: MEDICARE

## 2023-01-18 VITALS
SYSTOLIC BLOOD PRESSURE: 101 MMHG | RESPIRATION RATE: 16 BRPM | HEIGHT: 59 IN | WEIGHT: 121.2 LBS | BODY MASS INDEX: 24.44 KG/M2 | TEMPERATURE: 98.1 F | OXYGEN SATURATION: 97 % | DIASTOLIC BLOOD PRESSURE: 68 MMHG | HEART RATE: 86 BPM

## 2023-01-18 DIAGNOSIS — N64.4 BREAST PAIN: Primary | ICD-10-CM

## 2023-01-18 DIAGNOSIS — M54.2 NECK PAIN: ICD-10-CM

## 2023-01-18 DIAGNOSIS — G44.219 EPISODIC TENSION-TYPE HEADACHE, NOT INTRACTABLE: ICD-10-CM

## 2023-01-18 DIAGNOSIS — G89.4 CHRONIC PAIN SYNDROME: ICD-10-CM

## 2023-01-18 DIAGNOSIS — M67.88 RIGHT PERONEAL TENDINOSIS: ICD-10-CM

## 2023-01-18 DIAGNOSIS — Z11.59 ENCOUNTER FOR SCREENING FOR OTHER VIRAL DISEASES: ICD-10-CM

## 2023-01-18 DIAGNOSIS — Z20.5 EXPOSURE TO HEPATITIS B: ICD-10-CM

## 2023-01-18 DIAGNOSIS — E78.00 HYPERCHOLESTEROLEMIA: ICD-10-CM

## 2023-01-18 DIAGNOSIS — M54.2 CERVICALGIA: ICD-10-CM

## 2023-01-18 DIAGNOSIS — M77.8 RIGHT SHOULDER TENDONITIS: ICD-10-CM

## 2023-01-18 DIAGNOSIS — M46.98 INFLAMMATORY SPONDYLOPATHY OF SACRAL REGION (H): ICD-10-CM

## 2023-01-18 PROCEDURE — 99214 OFFICE O/P EST MOD 30 MIN: CPT | Mod: 25 | Performed by: FAMILY MEDICINE

## 2023-01-18 PROCEDURE — 90677 PCV20 VACCINE IM: CPT | Performed by: FAMILY MEDICINE

## 2023-01-18 PROCEDURE — G0009 ADMIN PNEUMOCOCCAL VACCINE: HCPCS | Performed by: FAMILY MEDICINE

## 2023-01-18 RX ORDER — EVENING PRIMROSE OIL 500 MG
500 CAPSULE ORAL 2 TIMES DAILY
Qty: 60 CAPSULE | Refills: 11 | Status: SHIPPED | OUTPATIENT
Start: 2023-01-18

## 2023-01-18 RX ORDER — CYCLOBENZAPRINE HCL 5 MG
5 TABLET ORAL 3 TIMES DAILY PRN
Qty: 90 TABLET | Refills: 3 | Status: SHIPPED | OUTPATIENT
Start: 2023-01-18 | End: 2024-03-11

## 2023-01-18 RX ORDER — GABAPENTIN 300 MG/1
CAPSULE ORAL
Qty: 120 CAPSULE | Refills: 6 | Status: SHIPPED | OUTPATIENT
Start: 2023-01-18 | End: 2023-05-24

## 2023-01-18 RX ORDER — LIDOCAINE 50 MG/G
OINTMENT TOPICAL 3 TIMES DAILY PRN
Qty: 150 G | Refills: 3 | Status: SHIPPED | OUTPATIENT
Start: 2023-01-18

## 2023-01-18 ASSESSMENT — PAIN SCALES - GENERAL: PAINLEVEL: NO PAIN (0)

## 2023-01-18 NOTE — PROGRESS NOTES
Assessment & Plan     Breast pain  Ultrasound showed fibrocystic breasts and on problems  Handout on the above diagnosis was given to the patient and discussed  recommended  - Evening Primrose Oil 500 MG CAPS  Dispense: 60 capsule; Refill: 11  - Hepatitis B Surface Antibody  - Hepatitis Be antigen  - Comprehensive metabolic panel  - Follicle stimulating hormone  - Lipid panel reflex to direct LDL Fasting  - CBC with platelets    Episodic tension-type headache, not intractable  Stable  Refills per epicare    - cyclobenzaprine (FLEXERIL) 5 MG tablet  Dispense: 90 tablet; Refill: 3  - Hepatitis B Surface Antibody  - Hepatitis Be antigen  - Comprehensive metabolic panel  - Follicle stimulating hormone  - Lipid panel reflex to direct LDL Fasting  - CBC with platelets    Neck pain  Stable  Refills per epicare    - cyclobenzaprine (FLEXERIL) 5 MG tablet  Dispense: 90 tablet; Refill: 3  - diclofenac (VOLTAREN) 1 % topical gel  Dispense: 150 g; Refill: 3  - Hepatitis B Surface Antibody  - Hepatitis Be antigen  - Comprehensive metabolic panel  - Follicle stimulating hormone  - Lipid panel reflex to direct LDL Fasting  - CBC with platelets    Chronic pain syndrome  Stable  Refills per epicare    - diclofenac (VOLTAREN) 1 % topical gel  Dispense: 150 g; Refill: 3  - lidocaine (XYLOCAINE) 5 % external ointment  Dispense: 150 g; Refill: 3  - Hepatitis B Surface Antibody  - Hepatitis Be antigen  - Comprehensive metabolic panel  - Follicle stimulating hormone  - Lipid panel reflex to direct LDL Fasting  - CBC with platelets    Right peroneal tendinosis  stable  - gabapentin (NEURONTIN) 300 MG capsule  Dispense: 120 capsule; Refill: 6  - Hepatitis B Surface Antibody  - Hepatitis Be antigen  - Comprehensive metabolic panel  - Follicle stimulating hormone  - Lipid panel reflex to direct LDL Fasting  - CBC with platelets    Right shoulder tendonitis  stable  - gabapentin (NEURONTIN) 300 MG capsule  Dispense: 120 capsule; Refill: 6  -  Hepatitis B Surface Antibody  - Hepatitis Be antigen  - Comprehensive metabolic panel  - Follicle stimulating hormone  - Lipid panel reflex to direct LDL Fasting  - CBC with platelets    Cervicalgia  stable  - gabapentin (NEURONTIN) 300 MG capsule  Dispense: 120 capsule; Refill: 6  - Hepatitis B Surface Antibody  - Hepatitis Be antigen  - Comprehensive metabolic panel  - Follicle stimulating hormone  - Lipid panel reflex to direct LDL Fasting  - CBC with platelets    Exposure to hepatitis B  Not sure but feel with C antibody + and AG negative, would believe she has had Hep B and has recovered and is immune, therefore would not need the vaccine    - Hepatitis B Surface Antibody  - Hepatitis Be antigen  - Comprehensive metabolic panel  - Follicle stimulating hormone  - Lipid panel reflex to direct LDL Fasting  - CBC with platelets    Hypercholesterolemia  Stable  Recheck    - Hepatitis B Surface Antibody  - Hepatitis Be antigen  - Comprehensive metabolic panel  - Follicle stimulating hormone  - Lipid panel reflex to direct LDL Fasting  - CBC with platelets    Encounter for screening for other viral diseases    - Hepatitis B Surface Antibody  - Hepatitis Be antigen  - Comprehensive metabolic panel  - Follicle stimulating hormone  - Lipid panel reflex to direct LDL Fasting  - CBC with platelets    Inflammatory spondylopathy of sacral region (H)  stable  - Hepatitis B Surface Antibody  - Hepatitis Be antigen  - Comprehensive metabolic panel  - Follicle stimulating hormone  - Lipid panel reflex to direct LDL Fasting  - CBC with platelets            26 minutes spent on the date of the encounter doing chart review, history and exam, documentation and further activities per the note       See Patient Instructions    No follow-ups on file.   Follow-up Visit   Expected date:  Jul 18, 2023 (Approximate)      Follow Up Appointment Details:     Follow-up with whom?: Me    Follow-Up for what?: Adult Preventive    How?: In Person     Is this an as-needed follow-up?: No                    Terri Robles MD  [facility] MIGDALIA Pathak is a 47 year old, presenting for the following health issues:  RECHECK    Patient here for follow-up of left breast pain - had US and mammogram done - saw DEBBI Crosss for issue on 12/5/22 - Patient states still having intermittent pain.:    History of Present Illness       Reason for visit:  Breast pain follow    She eats 2-3 servings of fruits and vegetables daily.She consumes 0 sweetened beverage(s) daily.She exercises with enough effort to increase her heart rate 9 or less minutes per day.  She exercises with enough effort to increase her heart rate 3 or less days per week.   She is taking medications regularly.       Past Medical History:   Diagnosis Date     Anemia      History of blood transfusion 2000    after vaginal delivery, 2 units PRBCs given     HSIL on Pap smear 09/21/2011    MARTA 2 and 3     Immune to hepatitis B 08/2021    hep B antigen NEG     INFLAM SPONDYLOPATHY NOS 01/07/2008    check labs on sulfasalzine every 3 months and check hep B and C and TB every 2 years     Leukocytopenia 03/10/2014     Tendinosis      Thrombocytopenia (H) 03/10/2014     Unspecified closed fracture of pelvis 2000    left fracture of pelvis after delivery       Past Surgical History:   Procedure Laterality Date     COLONOSCOPY Left 11/12/2021    recheck in 10 years     DILATION AND CURETTAGE, ABLATE ENDOMETRIUM NOVASURE, COMBINED N/A 12/04/2018    Procedure: novasure endometrial ablation, myosure resection of leiomyoma, dilation and curettage;  Surgeon: Rolando Covarrubias MD;  Location: [facility] OR     GYN SURGERY  12/04/2018    fibroid removal     lap hysterectomy and salpingetcomy Bilateral 2019    done at [facility] - ovaries are in     LEEP TX, CERVICAL  12/19/2011    MARTA II     OPERATIVE HYSTEROSCOPY WITH MORCELLATOR N/A 12/04/2018    ovaries are still in.   Surgeon: Rolando Covarrubias,  MD;  Location: [facility] OR     TUBAL LIGATION  05/2009    laparoscopic tubal ligation     ZZC NONSPECIFIC PROCEDURE  10/2000    after delivery 2 units of prbcs secondary to placenta       MEDICATIONS:  Current Outpatient Medications   Medication     amLODIPine (NORVASC) 2.5 MG tablet     aspirin-acetaminophen-caffeine (EXCEDRIN MIGRAINE) 250-250-65 MG tablet     clindamycin-benzoyl peroxide (BENZACLIN) 1-5 % external gel     cyclobenzaprine (FLEXERIL) 5 MG tablet     diclofenac (VOLTAREN) 1 % topical gel     DULoxetine (CYMBALTA) 30 MG capsule     Evening Primrose Oil 500 MG CAPS     ferrous sulfate (FEROSUL) 325 (65 Fe) MG tablet     fluticasone (FLONASE) 50 MCG/ACT nasal spray     gabapentin (NEURONTIN) 300 MG capsule     ketotifen (ZADITOR) 0.025 % ophthalmic solution     lidocaine (XYLOCAINE) 5 % external ointment     loratadine (CLARITIN) 10 MG tablet     meclizine (ANTIVERT) 25 MG tablet     meloxicam (MOBIC) 15 MG tablet     olopatadine (PATANOL) 0.1 % ophthalmic solution     ondansetron (ZOFRAN) 4 MG tablet     polyethylene glycol (MIRALAX) 17 GM/Dose powder     SM STOOL SOFTENER 100 MG capsule     sulfaSALAzine ER (AZULFIDINE EN) 500 MG EC tablet     traZODone (DESYREL) 50 MG tablet     triamcinolone (KENALOG) 0.1 % external cream     vitamin D3 (CHOLECALCIFEROL) 50 mcg (2000 units) tablet     No current facility-administered medications for this visit.     Facility-Administered Medications Ordered in Other Visits   Medication     midazolam (VERSED) injection       SOCIAL HISTORY:  Social History     Tobacco Use     Smoking status: Never     Smokeless tobacco: Never   Substance Use Topics     Alcohol use: Not Currently       Family History   Problem Relation Age of Onset     Hypertension Father      Lipids Father      Hypertension Mother      Lipids Mother      Diabetes No family hx of      Coronary Artery Disease No family hx of      Hyperlipidemia No family hx of      Cerebrovascular Disease No family hx of   "    Breast Cancer No family hx of      Colon Cancer No family hx of      Prostate Cancer No family hx of      Other Cancer No family hx of      Depression No family hx of      Anxiety Disorder No family hx of      Mental Illness No family hx of      Substance Abuse No family hx of      Anesthesia Reaction No family hx of      Asthma No family hx of      Osteoporosis No family hx of      Genetic Disorder No family hx of      Thyroid Disease No family hx of      Obesity No family hx of      Unknown/Adopted No family hx of          Review of Systems   Constitutional, HEENT, cardiovascular, pulmonary, gi and gu systems are negative, except as otherwise noted.      Objective    /68 (BP Location: Right arm, Cuff Size: Adult Regular)   Pulse 86   Temp 98.1  F (36.7  C) (Oral)   Resp 16   Ht 1.499 m (4' 11\")   Wt 55 kg (121 lb 3.2 oz)   LMP 03/26/2019 (Within Days)   SpO2 97%   BMI 24.48 kg/m    Body mass index is 24.48 kg/m .  Physical Exam   GENERAL: healthy, alert and no distress  EYES: Eyes grossly normal to inspection, PERRL and conjunctivae and sclerae normal  HENT: ear canals and TM's normal, nose and mouth without ulcers or lesions  NECK: no adenopathy, no asymmetry, masses, or scars and thyroid normal to palpation  RESP: lungs clear to auscultation - no rales, rhonchi or wheezes  BREAST: normal without masses, tenderness or nipple discharge and no palpable axillary masses or adenopathy  CV: regular rate and rhythm, normal S1 S2, no S3 or S4, no murmur, click or rub, no peripheral edema and peripheral pulses strong  MS: no gross musculoskeletal defects noted, no edema  SKIN: no suspicious lesions or rashes  NEURO: Normal strength and tone, mentation intact and speech normal  PSYCH: mentation appears normal, affect normal/bright  LYMPH: no cervical, supraclavicular, axillary, or inguinal adenopathy    Office Visit on 06/01/2022   Component Date Value Ref Range Status     Group A Strep antigen 06/01/2022 " Negative  Negative Final     SARS CoV2 PCR 06/01/2022 Positive (A)  Negative, Testing sent to reference lab. Results will be returned via unsolicited result Final    POSITIVE: SARS-CoV-2 (COVID-19) RNA detected, presumed positive.     Influenza A antigen 06/01/2022 Negative  Negative Final     Influenza B antigen 06/01/2022 Negative  Negative Final     Group A strep by PCR 06/01/2022 Not Detected  Not Detected Final

## 2023-01-27 DIAGNOSIS — M45.8 ANKYLOSING SPONDYLITIS OF SACRAL REGION (H): ICD-10-CM

## 2023-01-31 RX ORDER — MELOXICAM 15 MG/1
15 TABLET ORAL DAILY
Qty: 180 TABLET | Refills: 1 | Status: SHIPPED | OUTPATIENT
Start: 2023-01-31 | End: 2024-09-11

## 2023-01-31 NOTE — TELEPHONE ENCOUNTER
meloxicam (MOBIC) 15 MG tablet     NSAID Medications Passed         3/4/2022  [facility] [place]   James Hernandez MD  Rheumatology        Warnings Override History for meloxicam (MOBIC) 15 MG tablet [[id number]]    Overridden by James Hernandez MD on Mar 4, 2022 10:08 AM   Allergy/Contraindication   1. IBUPROFEN [Level: Drug Class Match] [Reason: Tolerated medication/side effects in past]   2. IBUPROFEN MICRONIZED [Level: Drug Class Match] [Reason: Tolerated medication/side effects in past]

## 2023-02-03 ENCOUNTER — LAB (OUTPATIENT)
Dept: LAB | Facility: CLINIC | Age: 48
End: 2023-02-03
Payer: MEDICARE

## 2023-02-03 DIAGNOSIS — M54.2 CERVICALGIA: ICD-10-CM

## 2023-02-03 DIAGNOSIS — Z11.59 ENCOUNTER FOR SCREENING FOR OTHER VIRAL DISEASES: ICD-10-CM

## 2023-02-03 DIAGNOSIS — Z20.5 EXPOSURE TO HEPATITIS B: ICD-10-CM

## 2023-02-03 DIAGNOSIS — E78.00 HYPERCHOLESTEROLEMIA: ICD-10-CM

## 2023-02-03 DIAGNOSIS — N64.4 BREAST PAIN: ICD-10-CM

## 2023-02-03 DIAGNOSIS — M67.88 RIGHT PERONEAL TENDINOSIS: ICD-10-CM

## 2023-02-03 DIAGNOSIS — M54.2 NECK PAIN: ICD-10-CM

## 2023-02-03 DIAGNOSIS — M77.8 RIGHT SHOULDER TENDONITIS: ICD-10-CM

## 2023-02-03 DIAGNOSIS — M46.98 INFLAMMATORY SPONDYLOPATHY OF SACRAL REGION (H): ICD-10-CM

## 2023-02-03 DIAGNOSIS — G44.219 EPISODIC TENSION-TYPE HEADACHE, NOT INTRACTABLE: ICD-10-CM

## 2023-02-03 DIAGNOSIS — G89.4 CHRONIC PAIN SYNDROME: ICD-10-CM

## 2023-02-03 LAB
ALBUMIN SERPL BCG-MCNC: 4.6 G/DL (ref 3.5–5.2)
ALP SERPL-CCNC: 57 U/L (ref 35–104)
ALT SERPL W P-5'-P-CCNC: 12 U/L (ref 10–35)
ANION GAP SERPL CALCULATED.3IONS-SCNC: 11 MMOL/L (ref 7–15)
AST SERPL W P-5'-P-CCNC: 23 U/L (ref 10–35)
BILIRUB SERPL-MCNC: 0.3 MG/DL
BUN SERPL-MCNC: 11.9 MG/DL (ref 6–20)
CALCIUM SERPL-MCNC: 9.4 MG/DL (ref 8.6–10)
CHLORIDE SERPL-SCNC: 102 MMOL/L (ref 98–107)
CHOLEST SERPL-MCNC: 281 MG/DL
CREAT SERPL-MCNC: 0.6 MG/DL (ref 0.51–0.95)
DEPRECATED HCO3 PLAS-SCNC: 25 MMOL/L (ref 22–29)
ERYTHROCYTE [DISTWIDTH] IN BLOOD BY AUTOMATED COUNT: 12.6 % (ref 10–15)
FSH SERPL IRP2-ACNC: 1.6 MIU/ML
GFR SERPL CREATININE-BSD FRML MDRD: >90 ML/MIN/1.73M2
GLUCOSE SERPL-MCNC: 95 MG/DL (ref 70–99)
HBV SURFACE AB SERPL IA-ACNC: 28.57 M[IU]/ML
HBV SURFACE AB SERPL IA-ACNC: REACTIVE M[IU]/ML
HCT VFR BLD AUTO: 38.8 % (ref 35–47)
HDLC SERPL-MCNC: 52 MG/DL
HGB BLD-MCNC: 12.5 G/DL (ref 11.7–15.7)
LDLC SERPL CALC-MCNC: 211 MG/DL
MCH RBC QN AUTO: 30 PG (ref 26.5–33)
MCHC RBC AUTO-ENTMCNC: 32.2 G/DL (ref 31.5–36.5)
MCV RBC AUTO: 93 FL (ref 78–100)
NONHDLC SERPL-MCNC: 229 MG/DL
PLATELET # BLD AUTO: 255 10E3/UL (ref 150–450)
POTASSIUM SERPL-SCNC: 4.2 MMOL/L (ref 3.4–5.3)
PROT SERPL-MCNC: 8.1 G/DL (ref 6.4–8.3)
RBC # BLD AUTO: 4.16 10E6/UL (ref 3.8–5.2)
SODIUM SERPL-SCNC: 138 MMOL/L (ref 136–145)
TRIGL SERPL-MCNC: 89 MG/DL
WBC # BLD AUTO: 6 10E3/UL (ref 4–11)

## 2023-02-03 PROCEDURE — 85027 COMPLETE CBC AUTOMATED: CPT

## 2023-02-03 PROCEDURE — 80061 LIPID PANEL: CPT

## 2023-02-03 PROCEDURE — 99000 SPECIMEN HANDLING OFFICE-LAB: CPT

## 2023-02-03 PROCEDURE — 83001 ASSAY OF GONADOTROPIN (FSH): CPT

## 2023-02-03 PROCEDURE — 80053 COMPREHEN METABOLIC PANEL: CPT

## 2023-02-03 PROCEDURE — 36415 COLL VENOUS BLD VENIPUNCTURE: CPT

## 2023-02-03 PROCEDURE — 86706 HEP B SURFACE ANTIBODY: CPT

## 2023-02-03 PROCEDURE — 87350 HEPATITIS BE AG IA: CPT | Mod: 90

## 2023-02-05 LAB — HBV E AG SERPL QL IA: NEGATIVE

## 2023-02-07 ENCOUNTER — TELEPHONE (OUTPATIENT)
Dept: FAMILY MEDICINE | Facility: CLINIC | Age: 48
End: 2023-02-07
Payer: MEDICARE

## 2023-02-07 DIAGNOSIS — E78.5 HYPERLIPIDEMIA LDL GOAL <130: Primary | ICD-10-CM

## 2023-02-07 RX ORDER — ROSUVASTATIN CALCIUM 20 MG/1
20 TABLET, COATED ORAL DAILY
Qty: 30 TABLET | Refills: 4 | Status: SHIPPED | OUTPATIENT
Start: 2023-02-07 | End: 2023-05-24

## 2023-02-07 NOTE — TELEPHONE ENCOUNTER
Spoke to patient and informed rx sent. Advised to take medication daily. Patient verbalized understanding and is agreeable. Scheduled return lab only appointment. Advised patient fasting lab.     Patient was given an opportunity to ask questions, verbalized understanding of plan, and is agreeable.    Hayde MONTEIRO RN, BSN, PHN - PAL (patient advocate liaison)  [facility]  [phone]

## 2023-02-07 NOTE — TELEPHONE ENCOUNTER
Spoke to patient and informed of lab result and recommendation below. Patient is agreeable to starting statin. Preferred pharmacy added.      SB#3 welcome letter sent, added to care team.    Hayde MONTEIRO RN, BSN, PHN - PAL (patient advocate liaison)  [facility]  [phone]

## 2023-02-07 NOTE — TELEPHONE ENCOUNTER
----- Message from Terri Robles MD sent at 2/6/2023  6:47 PM CST -----  The cholesterol is very very high.   I recommend a statin to lower your risk of heart attack and stroke.   Can you call and see what she thinks and where she would like to fill this if she is agreeable?

## 2023-03-02 ENCOUNTER — OFFICE VISIT (OUTPATIENT)
Dept: FAMILY MEDICINE | Facility: CLINIC | Age: 48
End: 2023-03-02
Payer: MEDICARE

## 2023-03-02 VITALS
BODY MASS INDEX: 24.52 KG/M2 | HEIGHT: 59 IN | DIASTOLIC BLOOD PRESSURE: 70 MMHG | TEMPERATURE: 98.2 F | OXYGEN SATURATION: 98 % | WEIGHT: 121.6 LBS | HEART RATE: 74 BPM | RESPIRATION RATE: 14 BRPM | SYSTOLIC BLOOD PRESSURE: 108 MMHG

## 2023-03-02 DIAGNOSIS — K58.0 IRRITABLE BOWEL SYNDROME WITH DIARRHEA: Primary | ICD-10-CM

## 2023-03-02 DIAGNOSIS — Z87.898 H/O MOTION SICKNESS: ICD-10-CM

## 2023-03-02 PROCEDURE — 99213 OFFICE O/P EST LOW 20 MIN: CPT | Performed by: FAMILY MEDICINE

## 2023-03-02 RX ORDER — SCOLOPAMINE TRANSDERMAL SYSTEM 1 MG/1
1 PATCH, EXTENDED RELEASE TRANSDERMAL
Qty: 10 PATCH | Refills: 0 | Status: SHIPPED | OUTPATIENT
Start: 2023-03-02 | End: 2024-09-11

## 2023-03-02 RX ORDER — LOPERAMIDE HYDROCHLORIDE 2 MG/1
2 TABLET ORAL 4 TIMES DAILY PRN
Qty: 30 TABLET | Refills: 1 | Status: SHIPPED | OUTPATIENT
Start: 2023-03-02

## 2023-03-02 NOTE — PROGRESS NOTES
Assessment & Plan      diarrhea  Pt does have episodes of diarrhea occasionally, may start on   - loperamide (IMODIUM A-D) 2 MG tablet; Take 1 tablet (2 mg) by mouth 4 times daily as needed for diarrhea  To use as needed    H/O motion sickness  Also pt is asking for medications for motion sickness, will start on   - scopolamine (TRANSDERM) 1 MG/3DAYS 72 hr patch; Place 1 patch onto the skin every 72 hours                 No follow-ups on file.   Follow-up Visit   Expected date:  Jun 02, 2023 (Approximate)      Follow Up Appointment Details:     Follow-up with whom?: PCP    Follow-Up for what?: Adult Preventive    How?: In Person    Is this an as-needed follow-up?: No                    Kayla Cunningham MD  [facility] KAMAR Pathak is a 48 year old, presenting for the following health issues:  Abdominal Pain      History of Present Illness       Reason for visit:  Stomach pain  Symptom onset:  3-7 days ago  Symptom intensity:  Mild  Symptom progression:  Improving  Had these symptoms before:  Yes  Has tried/received treatment for these symptoms:  No  What makes it better:  Lay down    She eats 2-3 servings of fruits and vegetables daily.She consumes 0 sweetened beverage(s) daily.She exercises with enough effort to increase her heart rate 9 or less minutes per day.  She exercises with enough effort to increase her heart rate 3 or less days per week.   She is taking medications regularly.       Diarrhea  Onset/Duration: 5 days ago.  Description:       Consistency of stool: loose       Blood in stool: No       Number of loose stools past 24 hours: started with 7 times a day, then goes down significantly, and it goes away in 3 to 4 days.  Progression of Symptoms: on and off.  Accompanying signs and symptoms:       Fever: No       Nausea/Vomiting: YES       Abdominal pain: YES       Weight loss: No       Episodes of constipation: No  History   Ill contacts: No  Recent use of antibiotics:  "No  Recent travels: No  Recent medication-new or changes(Rx or OTC): No  Precipitating or alleviating factors: certain foods.  Therapies tried and outcome: none.    Also pt has been experiencing motion sickness when she is in the car (back seat) or in the boat.     Review of Systems   CONSTITUTIONAL: NEGATIVE for fever, chills, change in weight      Objective    /70 (BP Location: Right arm, Patient Position: Sitting, Cuff Size: Adult Regular)   Pulse 74   Temp 98.2  F (36.8  C) (Oral)   Resp 14   Ht 1.499 m (4' 11\")   Wt 55.2 kg (121 lb 9.6 oz)   LMP 03/26/2019 (Within Days)   SpO2 98%   BMI 24.56 kg/m    Body mass index is 24.56 kg/m .  Physical Exam   GENERAL: healthy, alert and no distress  ABDOMEN: soft, nontender, no hepatosplenomegaly, no masses and bowel sounds normal                    "

## 2023-03-15 ENCOUNTER — TELEPHONE (OUTPATIENT)
Dept: RHEUMATOLOGY | Facility: CLINIC | Age: 48
End: 2023-03-15
Payer: MEDICARE

## 2023-03-15 NOTE — TELEPHONE ENCOUNTER
Patient out of tofacitinib (Xeljanz), stopped therapy, now experiencing symptoms, wants to see provider sooner than next available.    Message sent to provider.    Kacie Mc RN  Rheumatology Clinic

## 2023-03-15 NOTE — TELEPHONE ENCOUNTER
Spoke with Dr. Hernandez and patient has accepted the virtual visit with him on 4/14 at 10am.    MyC message sent per patient request with same info.    Kacie Mc RN  Rheumatology Clinic

## 2023-03-15 NOTE — TELEPHONE ENCOUNTER
Health Call Center    Phone Message    May a detailed message be left on voicemail: yes     Reason for Call: Pt was scheduled for first available appt with Dr. Hernandez-gera on 6/30/23, she is wondering if it would be possible for her to be seen earlier?   Reports that her arthritis symptoms have been flaring up, would like to go back on her Xeljanz (would like this to go to the [facility] if possible).   She reports she previously stopped taking this because she was not sure where to get her refills from.   Says that she did get some intermittent side effects while she was on this (headache, fever, runny nose, cold-like symptoms that would last for a few days, go away for a few days, and then return), although she feels these side effects were much more tolerable than what she experienced while on the Cosentyx.    Action Taken: Message routed to:  [facility] ([facility]    Travel Screening: Not Applicable

## 2023-03-16 DIAGNOSIS — L50.9 HIVES: ICD-10-CM

## 2023-03-16 RX ORDER — LORATADINE 10 MG/1
10 TABLET ORAL DAILY
Qty: 30 TABLET | Refills: 1 | Status: SHIPPED | OUTPATIENT
Start: 2023-03-16 | End: 2023-05-24

## 2023-03-23 DIAGNOSIS — J30.1 ALLERGIC RHINITIS DUE TO POLLEN, UNSPECIFIED SEASONALITY: Primary | ICD-10-CM

## 2023-03-23 NOTE — TELEPHONE ENCOUNTER
Hello,     Patient is requesting a RX for this medication so she can bypass the pseudoephedrine limitations.    Thank you,  Kristy King & [facility] Staff Technician   [facility]  [email or web address]   Ph#: [phone]  Fax#: [phone]

## 2023-04-01 ENCOUNTER — HEALTH MAINTENANCE LETTER (OUTPATIENT)
Age: 48
End: 2023-04-01

## 2023-04-14 ENCOUNTER — VIRTUAL VISIT (OUTPATIENT)
Dept: RHEUMATOLOGY | Facility: CLINIC | Age: 48
End: 2023-04-14
Attending: INTERNAL MEDICINE
Payer: MEDICARE

## 2023-04-14 DIAGNOSIS — M47.819 SERONEGATIVE SPONDYLOARTHROPATHY: ICD-10-CM

## 2023-04-14 DIAGNOSIS — Z79.899 HIGH RISK MEDICATION USE: ICD-10-CM

## 2023-04-14 DIAGNOSIS — M45.8 ANKYLOSING SPONDYLITIS OF SACRAL REGION (H): Primary | ICD-10-CM

## 2023-04-14 PROCEDURE — 99213 OFFICE O/P EST LOW 20 MIN: CPT | Mod: VID | Performed by: INTERNAL MEDICINE

## 2023-04-14 RX ORDER — TOFACITINIB 11 MG/1
11 TABLET, FILM COATED, EXTENDED RELEASE ORAL DAILY
Qty: 30 TABLET | Refills: 11 | Status: SHIPPED | OUTPATIENT
Start: 2023-04-14 | End: 2024-03-01

## 2023-04-14 RX ORDER — MELOXICAM 15 MG/1
15 TABLET ORAL DAILY
Qty: 90 TABLET | Refills: 1 | Status: SHIPPED | OUTPATIENT
Start: 2023-04-14 | End: 2023-05-24

## 2023-04-14 RX ORDER — SULFASALAZINE 500 MG/1
1000 TABLET, DELAYED RELEASE ORAL 2 TIMES DAILY
Qty: 360 TABLET | Refills: 0 | Status: SHIPPED | OUTPATIENT
Start: 2023-04-14 | End: 2023-06-30

## 2023-04-14 NOTE — NURSING NOTE
"Is the patient currently in the state of [place]? YES    Visit mode:VIDEO    If the visit is dropped, the patient can be reconnected by: VIDEO VISIT: Text to cell phone: [phone]    Will anyone else be joining the visit? NO      How would you like to obtain your AVS? MyChart    Are changes needed to the allergy or medication list? NO    Reason for visit: Follow Up    Pt. States in regards to PHQ, she has a hard time doing activities due to pain, and stated she is \"on medication for that stuff\".     Pt. Reports she is in pain today at a level of 8. Pt. Reports pain is located in her joints, as well as upper back, neck and shoulders.     Lanie Marie on 4/14/2023 at 9:47 AM      "

## 2023-04-14 NOTE — PATIENT INSTRUCTIONS
1) restart xeljanz 11mg once daily  2) restart sulfasalazine 1,000mg (2 tabs) in the AM and 1,000mg (2 tabs) In the PM  3) restart meloxicam 15mg (1 tab) once daily. Remember- you cannot take any other NSAIDs when you take meloxicam- the only over the counter pain medication that you can take is tylenol because it is not an NSAID.   4) we will call you to schedule a lab appointment in the week or two before you follow-up with me that is already scheduled on 6/30

## 2023-04-14 NOTE — LETTER
4/14/2023       RE: Zo Qureshi  [id number] [address]     Dear Colleague,    Thank you for referring your patient, Zo Qureshi, to the [facility] [place] [facility]. Please see a copy of my visit note below.    Virtual Visit Details    Type of service:  Video Visit   Joined the call at 4/14/2023, 10:08:13 am.  Left the call at 4/14/2023, 10:29:32 am.  You were on the call for 21 minutes 19 seconds .    Originating Location (pt. Location): home    Distant Location (provider location):  off site  Platform used for Video Visit: [facility]      Outpatient Rheumatology Follow-up  This visit was conducted via synchronous video visit due to the current COVID-19 crisis to reduce patient risk.  Verbal consent was obtained and is documented below.    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 Prior to that time she had last been seen by Dr. Garay of 5/24/2019. Most recent imaging of SI joints was on 2/28/2020 which showed sclerosis with marginal spurring about the SI joints, likely secondary to her chronic inflammatory disease, though no active inflammatory changes at that time. Had been tried on both humira and enbrel, though did not tolerate these due to side effects so discontinued prior to determining if efficacious. At the time of her last visit with rheumatology it was documented that she was responsive to NSAIDs, prednisone, and SSZ. The dose of her SSZ was increased to 1gm BID at the time of her initial visit with me given her active peripheral  arthritis and enthesitis. Despite naproxen 500mg BID, she had ongoing axial stiffness which was AM predominant and improved with activity and stretching, so risks and benefits of cosentyx discussed and patient started on this IL-17 inhibitor. She has interestingly developed the same side effects of severe flu like symptoms after each dose of her biologic therapy- similar to those she was experiencing when on humira and enbrel, with rhinorrhea, muscle aches/pains, severe fatigue, and also hives, low grade fever which come on after her injection and slowly over many weeks will resolve. Has been worse with each subsequent injection of cosentyx. So at the time of her last visit, cosentyx was discontinued and xeljanz 11mg once daily started early January 2022. She self discontinued this since the time of her last visit. She presents today for follow-up.     Seronegative spondyloarthropathy: Has been off of SSZ and xeljanz since January 2023 when she ran out of the medications.  Prior to that time she was tolerating both without any noticeable side effects and had positive response of this therapy combination regimen in regards to pain and stiffness.  After discontinuation she has had worsening of both of these components diffuse joint involvement.  Her disease currently active after being off of these therapies for the last 4 months.  Plan to restart at this time.  Plan:  - Restart Xeljanz 11 mg once daily  - Restart sulfasalazine 1 g twice daily  - Restart meloxicam 15 mg once daily as needed.  Counseled not to take any other NSAIDs in 24 hours after each meloxicam dose.  Risk and benefits discussed including renal cardiac and other.    High risk medication use: Xeljanz, SSZ  -Labs reviewed from 2/3/2023 with CBC, CMP which do not show evidence of drug toxicity  -Will continue q3 month routine screening drug toxicity monitoring with CBC with differential AST ALT creatinine ESR and CRP.  Standing orders placed  today.  -Risks and benefits of these therapies to include infection, thrombosis, bowel perforation, bone marrow suppression, GI/hepatotoxicity, malignancy, increased all cause mortality among others discussed again today 4/14/2023. She is agreeable to restart given potential benefit and lack of side effects to date.    She has appointment already scheduled on June 30.  We will keep that appointment and she will have labs in a week or 2 prior.    James Hernandez MD  Rheumatology   Subjective:   April 14, 2023  -Up until January 2023, had continued on xeljanz 11mg once daily, SSZ 1gm BID. But then ran out of both medications and did not follow-up/request refill and so stopped.   -While on both therapies, she noted that if she took her doses late, would experience worse pain/stiffness.   -Currently reporting pain stiffness in almost all joint in her body, both axial and peripheral. In shoulder/neck. Has pain/stiffness in hands. 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 Stretches. Pain in the shoulder, right side also but left more than right.   Has tried massage which is temporarily helpful for pain, but immediately returns. No redness, Has swelling of her wrist on right more than left.   - Still with some HA/dizziness. Still gets warm, though not fever. Intermittent coughing/runny nose. Has nasal spray.  -has some red patches of that come and go, even now not on medication. No different on medication. Not medication side effects  -Middle to low back also much more severe pain and stiffness which is worse in the AM and improves with use. Has radiating pain down her left leg.   -Has ongoing RLS.   -no unintentional weight loss.   -Chronic mild hair thinning over the last 2-3 years    Interval history 3/4/22  Has not experienced any side effects from xeljanz. Has had ongoing HA/dizziness, which was there prior to starting xeljanz. Has intermittent chills/ feeling warm, though no temp. Does not happen everyday, happens 1-2 times per week.  "Also warm at night, though not wet on clothes. Her pain is less. Rates her pain now around 6/10. No redness/warmth/swelling. Though last week had right wrist pain with minimal movement. Lasted for 2-3 days. Used topical lidocaine which resolved symptoms. When painful, it was somewhat \"puffy\".  At this time her back is still stiff, AM predominant. Back and neck. She sits when she wakes up and stretches. Then one hour later she has improvement and continues her day. Has rash on anterior shin right LE, saw PCP who gave her topical steroid which she has not started. Still with intermittent blurred vision/double vision. Unchanged. No more severe/frequent. Rare sores in her mouth. No interval infections. ROS otherwise negative.       Interval history  12/17/21  Reports feeling of fever/body aches/runny nose. Also with rash. 3-4 days after her first injection she noticed that this started, though took her next dose and the above symptoms were more severe. Then was off for about 1 month due to these severe side effects. She decided to re-try in October and November, though has again had return of above. Has been about 1 month since her last injection and reports side effects are continuing to slowly improve, though still with some fatigue/body aches/ HA. Rash and fever resolved. She finds it hard to comment on axial pain/stiffness/peripiheral joint pain/swelling in setting of severe fatigue/flu like symptoms. Her ROS is otherwise negative.     HPI from initial consultation 5/2021  Had flu like symptoms with flu like symptoms. Resolves when stopped. Prednisone, NSAIDs, SSZ used/helpful. Prednisone had nausea and consipation. SSZ has drowsiness/ dizziness/ mental fog worse in the AM. Has continued on SSZ 1gm in the AM and 1gm in the PM. Still with significant drowsiness. If she doesn't want to have drowsiness, reduces the dose, has pain in neck, shoulder, back. Has some swelling of joints. Has tenderness of many joints. Takes " naproxen and gabapentin. She takes naproxen 500mg BID. In the AM has stiffness in her neck/back. Gets some better with stretching/ exercise. Takes 15 minutes to get better. No rash. Does have swelling of her right hand 2nd -4th digits. Pain in her PIPs predominantly. Pain also in between the joints. Has recently had some blurred vision, wears glasses. No inflammatory eye disease history. Intermittent double vision. Has had hair loss, non scarring more that is thinning. More over the last 3-4 years. Intermittent sore in mouth, painful. Has dry eyes, uses eye drops 3-4 times per week. When she wakes up her throat is dry. Drinks frequently. No abdominal pain. No blood in her stool. No n/v. No raynauds. No sclerodactyly. No fevers, weight loss. Occasional weight loss and chills. Has been having ongoing throbbing HA on the left side. The naproxen is some helpful, though returns shortly afterwards. No miscarriages or blood clots.     Past Medical History  Past Medical History:   Diagnosis Date    Anemia     History of blood transfusion 2000    after vaginal delivery, 2 units PRBCs given    HSIL on Pap smear 09/21/2011    MARTA 2 and 3    Immune to hepatitis B 08/2021    hep B antigen NEG    INFLAM SPONDYLOPATHY NOS 01/07/2008    check labs on sulfasalzine every 3 months and check hep B and C and TB every 2 years    Leukocytopenia 03/10/2014    Tendinosis     Thrombocytopenia (H) 03/10/2014    Unspecified closed fracture of pelvis 2000    left fracture of pelvis after delivery     Past Surgical History  Past Surgical History:   Procedure Laterality Date    COLONOSCOPY Left 11/12/2021    recheck in 10 years    DILATION AND CURETTAGE, ABLATE ENDOMETRIUM NOVASURE, COMBINED N/A 12/04/2018    Procedure: novasure endometrial ablation, myosure resection of leiomyoma, dilation and curettage;  Surgeon: Rolando Covarrubias MD;  Location: [facility] OR    GYN SURGERY  12/04/2018    fibroid removal    lap hysterectomy and salpingetcomy  Bilateral 2019    done at [facility] - ovaries are in    LEEP TX, CERVICAL  12/19/2011    MARTA II    OPERATIVE HYSTEROSCOPY WITH MORCELLATOR N/A 12/04/2018    ovaries are still in.   Surgeon: Rolando Covarrubias MD;  Location: [facility] OR    TUBAL LIGATION  05/2009    laparoscopic tubal ligation    ZZC NONSPECIFIC PROCEDURE  10/2000    after delivery 2 units of prbcs secondary to placenta     Medications  Current Outpatient Medications   Medication    amLODIPine (NORVASC) 2.5 MG tablet    aspirin-acetaminophen-caffeine (EXCEDRIN MIGRAINE) 250-250-65 MG tablet    clindamycin-benzoyl peroxide (BENZACLIN) 1-5 % external gel    cyclobenzaprine (FLEXERIL) 5 MG tablet    diclofenac (VOLTAREN) 1 % topical gel    DULoxetine (CYMBALTA) 30 MG capsule    Evening Primrose Oil 500 MG CAPS    ferrous sulfate (FEROSUL) 325 (65 Fe) MG tablet    fluticasone (FLONASE) 50 MCG/ACT nasal spray    gabapentin (NEURONTIN) 300 MG capsule    ketotifen (ZADITOR) 0.025 % ophthalmic solution    lidocaine (XYLOCAINE) 5 % external ointment    loperamide (IMODIUM A-D) 2 MG tablet    loratadine (CLARITIN) 10 MG tablet    loratadine-pseudoePHEDrine (CLARITIN-D 24-HOUR)  MG 24 hr tablet    meclizine (ANTIVERT) 25 MG tablet    meloxicam (MOBIC) 15 MG tablet    olopatadine (PATANOL) 0.1 % ophthalmic solution    ondansetron (ZOFRAN) 4 MG tablet    polyethylene glycol (MIRALAX) 17 GM/Dose powder    rosuvastatin (CRESTOR) 20 MG tablet    scopolamine (TRANSDERM) 1 MG/3DAYS 72 hr patch    SM STOOL SOFTENER 100 MG capsule    sulfaSALAzine ER (AZULFIDINE EN) 500 MG EC tablet    traZODone (DESYREL) 50 MG tablet    triamcinolone (KENALOG) 0.1 % external cream    vitamin D3 (CHOLECALCIFEROL) 50 mcg (2000 units) tablet     No current facility-administered medications for this visit.     Facility-Administered Medications Ordered in Other Visits   Medication    midazolam (VERSED) injection     Allergies   Allergies   Allergen Reactions    Contrast Dye Nausea and  "Vomiting    Diatrizoate Nausea and Vomiting    Percocet [Oxycodone-Acetaminophen] Nausea and Vomiting and Itching    Advil [Ibuprofen Micronized] Rash     Rash     Ibuprofen Rash     Family History: Mother with \"arthritis\"    Social History: Not currently working. Work at [employer], building them. Repetitive motion. No smoking/drugs/ETOH. Not . 4 kids who are healthy.       Objective:    Physical exam:  No vitals  Laying in bed  No facial rash  Unable to abduct bilateral shoulders above 90 degrees without discomfort.  Unable to make a full fist bilaterally due to pain and stiffness in her MCPs and PIPs.  Mild loss of MCP valleys bilaterally.  She has tenderness to self palpation over her bilateral wrists elbows and MCPs.    Labs:  WBC   Date Value Ref Range Status   02/15/2021 6.5 4.0 - 11.0 10e9/L Final     WBC Count   Date Value Ref Range Status   02/03/2023 6.0 4.0 - 11.0 10e3/uL Final     Hemoglobin   Date Value Ref Range Status   02/03/2023 12.5 11.7 - 15.7 g/dL Final   02/15/2021 12.2 11.7 - 15.7 g/dL Final     Platelet Count   Date Value Ref Range Status   02/03/2023 255 150 - 450 10e3/uL Final   02/15/2021 219 150 - 450 10e9/L Final     Creatinine   Date Value Ref Range Status   02/03/2023 0.60 0.51 - 0.95 mg/dL Final   02/15/2021 0.54 0.52 - 1.04 mg/dL Final     Lab Results   Component Value Date    ALKPHOS 60 03/01/2022    ALKPHOS 55 02/15/2021     AST   Date Value Ref Range Status   02/03/2023 23 10 - 35 U/L Final   02/15/2021 14 0 - 45 U/L Final     Lab Results   Component Value Date    ALT 21 03/01/2022    ALT 17 02/15/2021     Sed Rate   Date Value Ref Range Status   02/15/2021 20 0 - 20 mm/h Final     Erythrocyte Sedimentation Rate   Date Value Ref Range Status   03/01/2022 27 (H) 0 - 20 mm/hr Final     CRP Inflammation   Date Value Ref Range Status   08/16/2021 <2.9 0.0 - 8.0 mg/L Final   02/26/2020 <2.9 0.0 - 8.0 mg/L Final     UA RESULTS:  Recent Labs   Lab Test 05/18/21  0858 "   COLOR Yellow   APPEARANCE Clear   URINEGLC Negative   URINEBILI Negative   URINEKETONE Negative   SG 1.015   UBLD Large*   URINEPH 6.5   PROTEIN Negative   UROBILINOGEN 0.2   NITRITE Negative   LEUKEST Moderate*   RBCU 10-25*   WBCU *      RAMÍREZ Screen by EIA   Date Value Ref Range Status   05/21/2014 <1.0  Interpretation:  Negative   <1.0 Final     Rheumatoid Factor   Date Value Ref Range Status   05/21/2014 <20 <20 IU/mL Final     Cyclic Citrullinated Peptide Antibody, IgG   Date Value Ref Range Status   08/28/2017 1 <7 U/mL Final     Comment:     Negative       Imaging:  MRI LUMBAR SPINE WITHOUT CONTRAST   2/28/2020 3:51 PM      HISTORY: Radiculopathy, greater than  6 weeks conservative treatment,  persistent symptoms; radiculopathy - history of sacroiliitis. Family  history of spinal disease and severe stenosis - left-sided symptoms.  Spondyloarthropathy.      TECHNIQUE: Multiplanar multisequence MRI of the lumbar spine without  contrast.      COMPARISON: Lumbar spine MRI 9/7/2010. Correlation is also made with  prior pelvic CT 6/10/2019.     FINDINGS:  Alignment is normal. No fracture or significant vertebral  body height loss. The intervertebral discs are normal in height and  signal. No abnormal marrow signal in the lumbar spine. There is no  disc bulge or herniation. There is no spinal or neural foraminal  stenosis. Sclerotic changes about the sacroiliac joints with marginal  spurring. This appears overall similar (within the field-of-view) to  the most recent examinations.                                                                      IMPRESSION:  1. No acute abnormality of the lumbar spine. Specifically, no disc  bulge/herniation, marrow signal abnormality, or spinal or neural  foraminal stenosis.  2. Redemonstrated sclerosis with marginal spurring about the  sacroiliac joints, potentially representing changes related to  osteoarthritis. This may be secondary to prior sacroiliitis in  the  setting of spondyloarthropathy given the patient's clinical history.  This is incompletely included within the field-of-view of this exam.     James Hernandez MD

## 2023-04-14 NOTE — PROGRESS NOTES
Virtual Visit Details    Type of service:  Video Visit   Joined the call at 4/14/2023, 10:08:13 am.  Left the call at 4/14/2023, 10:29:32 am.  You were on the call for 21 minutes 19 seconds .    Originating Location (pt. Location): home    Distant Location (provider location):  off site  Platform used for Video Visit: [facility]      Outpatient Rheumatology Follow-up  This visit was conducted via synchronous video visit due to the current COVID-19 crisis to reduce patient risk.  Verbal consent was obtained and is documented below.    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 Prior to that time she had last been seen by Dr. Garay of 5/24/2019. Most recent imaging of SI joints was on 2/28/2020 which showed sclerosis with marginal spurring about the SI joints, likely secondary to her chronic inflammatory disease, though no active inflammatory changes at that time. Had been tried on both humira and enbrel, though did not tolerate these due to side effects so discontinued prior to determining if efficacious. At the time of her last visit with rheumatology it was documented that she was responsive to NSAIDs, prednisone, and SSZ. The dose of her SSZ was increased to 1gm BID at the time of her initial visit with me given her active peripheral arthritis and enthesitis. Despite naproxen 500mg BID, she had ongoing axial stiffness which was AM predominant and improved with activity and stretching, so risks and benefits of cosentyx discussed and patient started on this IL-17 inhibitor. She has interestingly developed the same side effects of severe flu like symptoms  after each dose of her biologic therapy- similar to those she was experiencing when on humira and enbrel, with rhinorrhea, muscle aches/pains, severe fatigue, and also hives, low grade fever which come on after her injection and slowly over many weeks will resolve. Has been worse with each subsequent injection of cosentyx. So at the time of her last visit, cosentyx was discontinued and xeljanz 11mg once daily started early January 2022. She self discontinued this since the time of her last visit. She presents today for follow-up.     Seronegative spondyloarthropathy: Has been off of SSZ and xeljanz since January 2023 when she ran out of the medications.  Prior to that time she was tolerating both without any noticeable side effects and had positive response of this therapy combination regimen in regards to pain and stiffness.  After discontinuation she has had worsening of both of these components diffuse joint involvement.  Her disease currently active after being off of these therapies for the last 4 months.  Plan to restart at this time.  Plan:  - Restart Xeljanz 11 mg once daily  - Restart sulfasalazine 1 g twice daily  - Restart meloxicam 15 mg once daily as needed.  Counseled not to take any other NSAIDs in 24 hours after each meloxicam dose.  Risk and benefits discussed including renal cardiac and other.    High risk medication use: Xeljanz, SSZ  -Labs reviewed from 2/3/2023 with CBC, CMP which do not show evidence of drug toxicity  -Will continue q3 month routine screening drug toxicity monitoring with CBC with differential AST ALT creatinine ESR and CRP.  Standing orders placed today.  -Risks and benefits of these therapies to include infection, thrombosis, bowel perforation, bone marrow suppression, GI/hepatotoxicity, malignancy, increased all cause mortality among others discussed again today 4/14/2023. She is agreeable to restart given potential benefit and lack of side effects to date.    She has  "appointment already scheduled on June 30.  We will keep that appointment and she will have labs in a week or 2 prior.    James Hernandez MD  Rheumatology   Subjective:   April 14, 2023  -Up until January 2023, had continued on xeljanz 11mg once daily, SSZ 1gm BID. But then ran out of both medications and did not follow-up/request refill and so stopped.   -While on both therapies, she noted that if she took her doses late, would experience worse pain/stiffness.   -Currently reporting pain stiffness in almost all joint in her body, both axial and peripheral. In shoulder/neck. Has pain/stiffness in hands. 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 Stretches. Pain in the shoulder, right side also but left more than right.   Has tried massage which is temporarily helpful for pain, but immediately returns. No redness, Has swelling of her wrist on right more than left.   - Still with some HA/dizziness. Still gets warm, though not fever. Intermittent coughing/runny nose. Has nasal spray.  -has some red patches of that come and go, even now not on medication. No different on medication. Not medication side effects  -Middle to low back also much more severe pain and stiffness which is worse in the AM and improves with use. Has radiating pain down her left leg.   -Has ongoing RLS.   -no unintentional weight loss.   -Chronic mild hair thinning over the last 2-3 years    Interval history 3/4/22  Has not experienced any side effects from xeljanz. Has had ongoing HA/dizziness, which was there prior to starting xeljanz. Has intermittent chills/ feeling warm, though no temp. Does not happen everyday, happens 1-2 times per week. Also warm at night, though not wet on clothes. Her pain is less. Rates her pain now around 6/10. No redness/warmth/swelling. Though last week had right wrist pain with minimal movement. Lasted for 2-3 days. Used topical lidocaine which resolved symptoms. When painful, it was somewhat \"puffy\".  At this time her back is still " stiff, AM predominant. Back and neck. She sits when she wakes up and stretches. Then one hour later she has improvement and continues her day. Has rash on anterior shin right LE, saw PCP who gave her topical steroid which she has not started. Still with intermittent blurred vision/double vision. Unchanged. No more severe/frequent. Rare sores in her mouth. No interval infections. ROS otherwise negative.       Interval history  12/17/21  Reports feeling of fever/body aches/runny nose. Also with rash. 3-4 days after her first injection she noticed that this started, though took her next dose and the above symptoms were more severe. Then was off for about 1 month due to these severe side effects. She decided to re-try in October and November, though has again had return of above. Has been about 1 month since her last injection and reports side effects are continuing to slowly improve, though still with some fatigue/body aches/ HA. Rash and fever resolved. She finds it hard to comment on axial pain/stiffness/peripiheral joint pain/swelling in setting of severe fatigue/flu like symptoms. Her ROS is otherwise negative.     HPI from initial consultation 5/2021  Had flu like symptoms with flu like symptoms. Resolves when stopped. Prednisone, NSAIDs, SSZ used/helpful. Prednisone had nausea and consipation. SSZ has drowsiness/ dizziness/ mental fog worse in the AM. Has continued on SSZ 1gm in the AM and 1gm in the PM. Still with significant drowsiness. If she doesn't want to have drowsiness, reduces the dose, has pain in neck, shoulder, back. Has some swelling of joints. Has tenderness of many joints. Takes naproxen and gabapentin. She takes naproxen 500mg BID. In the AM has stiffness in her neck/back. Gets some better with stretching/ exercise. Takes 15 minutes to get better. No rash. Does have swelling of her right hand 2nd -4th digits. Pain in her PIPs predominantly. Pain also in between the joints. Has recently had some  blurred vision, wears glasses. No inflammatory eye disease history. Intermittent double vision. Has had hair loss, non scarring more that is thinning. More over the last 3-4 years. Intermittent sore in mouth, painful. Has dry eyes, uses eye drops 3-4 times per week. When she wakes up her throat is dry. Drinks frequently. No abdominal pain. No blood in her stool. No n/v. No raynauds. No sclerodactyly. No fevers, weight loss. Occasional weight loss and chills. Has been having ongoing throbbing HA on the left side. The naproxen is some helpful, though returns shortly afterwards. No miscarriages or blood clots.     Past Medical History  Past Medical History:   Diagnosis Date     Anemia      History of blood transfusion 2000    after vaginal delivery, 2 units PRBCs given     HSIL on Pap smear 09/21/2011    MARTA 2 and 3     Immune to hepatitis B 08/2021    hep B antigen NEG     INFLAM SPONDYLOPATHY NOS 01/07/2008    check labs on sulfasalzine every 3 months and check hep B and C and TB every 2 years     Leukocytopenia 03/10/2014     Tendinosis      Thrombocytopenia (H) 03/10/2014     Unspecified closed fracture of pelvis 2000    left fracture of pelvis after delivery     Past Surgical History  Past Surgical History:   Procedure Laterality Date     COLONOSCOPY Left 11/12/2021    recheck in 10 years     DILATION AND CURETTAGE, ABLATE ENDOMETRIUM NOVASURE, COMBINED N/A 12/04/2018    Procedure: novasure endometrial ablation, myosure resection of leiomyoma, dilation and curettage;  Surgeon: Rolando Covarrubias MD;  Location:  OR     GYN SURGERY  12/04/2018    fibroid removal     lap hysterectomy and salpingetcomy Bilateral 2019    done at [facility] - ovaries are in     LEEP TX, CERVICAL  12/19/2011    MARTA II     OPERATIVE HYSTEROSCOPY WITH MORCELLATOR N/A 12/04/2018    ovaries are still in.   Surgeon: Rolando Covarrubias MD;  Location:  OR     TUBAL LIGATION  05/2009    laparoscopic tubal ligation     [facility] NONSPECIFIC  "PROCEDURE  10/2000    after delivery 2 units of prbcs secondary to placenta     Medications  Current Outpatient Medications   Medication     amLODIPine (NORVASC) 2.5 MG tablet     aspirin-acetaminophen-caffeine (EXCEDRIN MIGRAINE) 250-250-65 MG tablet     clindamycin-benzoyl peroxide (BENZACLIN) 1-5 % external gel     cyclobenzaprine (FLEXERIL) 5 MG tablet     diclofenac (VOLTAREN) 1 % topical gel     DULoxetine (CYMBALTA) 30 MG capsule     Evening Primrose Oil 500 MG CAPS     ferrous sulfate (FEROSUL) 325 (65 Fe) MG tablet     fluticasone (FLONASE) 50 MCG/ACT nasal spray     gabapentin (NEURONTIN) 300 MG capsule     ketotifen (ZADITOR) 0.025 % ophthalmic solution     lidocaine (XYLOCAINE) 5 % external ointment     loperamide (IMODIUM A-D) 2 MG tablet     loratadine (CLARITIN) 10 MG tablet     loratadine-pseudoePHEDrine (CLARITIN-D 24-HOUR)  MG 24 hr tablet     meclizine (ANTIVERT) 25 MG tablet     meloxicam (MOBIC) 15 MG tablet     olopatadine (PATANOL) 0.1 % ophthalmic solution     ondansetron (ZOFRAN) 4 MG tablet     polyethylene glycol (MIRALAX) 17 GM/Dose powder     rosuvastatin (CRESTOR) 20 MG tablet     scopolamine (TRANSDERM) 1 MG/3DAYS 72 hr patch     SM STOOL SOFTENER 100 MG capsule     sulfaSALAzine ER (AZULFIDINE EN) 500 MG EC tablet     traZODone (DESYREL) 50 MG tablet     triamcinolone (KENALOG) 0.1 % external cream     vitamin D3 (CHOLECALCIFEROL) 50 mcg (2000 units) tablet     No current facility-administered medications for this visit.     Facility-Administered Medications Ordered in Other Visits   Medication     midazolam (VERSED) injection     Allergies   Allergies   Allergen Reactions     Contrast Dye Nausea and Vomiting     Diatrizoate Nausea and Vomiting     Percocet [Oxycodone-Acetaminophen] Nausea and Vomiting and Itching     Advil [Ibuprofen Micronized] Rash     Rash      Ibuprofen Rash     Family History: Mother with \"arthritis\"    Social History: Not currently working. Work at " [employer], building them. Repetitive motion. No smoking/drugs/ETOH. Not . 4 kids who are healthy.       Objective:    Physical exam:  No vitals  Laying in bed  No facial rash  Unable to abduct bilateral shoulders above 90 degrees without discomfort.  Unable to make a full fist bilaterally due to pain and stiffness in her MCPs and PIPs.  Mild loss of MCP valleys bilaterally.  She has tenderness to self palpation over her bilateral wrists elbows and MCPs.    Labs:  WBC   Date Value Ref Range Status   02/15/2021 6.5 4.0 - 11.0 10e9/L Final     WBC Count   Date Value Ref Range Status   02/03/2023 6.0 4.0 - 11.0 10e3/uL Final     Hemoglobin   Date Value Ref Range Status   02/03/2023 12.5 11.7 - 15.7 g/dL Final   02/15/2021 12.2 11.7 - 15.7 g/dL Final     Platelet Count   Date Value Ref Range Status   02/03/2023 255 150 - 450 10e3/uL Final   02/15/2021 219 150 - 450 10e9/L Final     Creatinine   Date Value Ref Range Status   02/03/2023 0.60 0.51 - 0.95 mg/dL Final   02/15/2021 0.54 0.52 - 1.04 mg/dL Final     Lab Results   Component Value Date    ALKPHOS 60 03/01/2022    ALKPHOS 55 02/15/2021     AST   Date Value Ref Range Status   02/03/2023 23 10 - 35 U/L Final   02/15/2021 14 0 - 45 U/L Final     Lab Results   Component Value Date    ALT 21 03/01/2022    ALT 17 02/15/2021     Sed Rate   Date Value Ref Range Status   02/15/2021 20 0 - 20 mm/h Final     Erythrocyte Sedimentation Rate   Date Value Ref Range Status   03/01/2022 27 (H) 0 - 20 mm/hr Final     CRP Inflammation   Date Value Ref Range Status   08/16/2021 <2.9 0.0 - 8.0 mg/L Final   02/26/2020 <2.9 0.0 - 8.0 mg/L Final     UA RESULTS:  Recent Labs   Lab Test 05/18/21  0858   COLOR Yellow   APPEARANCE Clear   URINEGLC Negative   URINEBILI Negative   URINEKETONE Negative   SG 1.015   UBLD Large*   URINEPH 6.5   PROTEIN Negative   UROBILINOGEN 0.2   NITRITE Negative   LEUKEST Moderate*   RBCU 10-25*   WBCU *      RAMÍREZ Screen by EIA   Date  Value Ref Range Status   05/21/2014 <1.0  Interpretation:  Negative   <1.0 Final     Rheumatoid Factor   Date Value Ref Range Status   05/21/2014 <20 <20 IU/mL Final     Cyclic Citrullinated Peptide Antibody, IgG   Date Value Ref Range Status   08/28/2017 1 <7 U/mL Final     Comment:     Negative       Imaging:  MRI LUMBAR SPINE WITHOUT CONTRAST   2/28/2020 3:51 PM      HISTORY: Radiculopathy, greater than  6 weeks conservative treatment,  persistent symptoms; radiculopathy - history of sacroiliitis. Family  history of spinal disease and severe stenosis - left-sided symptoms.  Spondyloarthropathy.      TECHNIQUE: Multiplanar multisequence MRI of the lumbar spine without  contrast.      COMPARISON: Lumbar spine MRI 9/7/2010. Correlation is also made with  prior pelvic CT 6/10/2019.     FINDINGS:  Alignment is normal. No fracture or significant vertebral  body height loss. The intervertebral discs are normal in height and  signal. No abnormal marrow signal in the lumbar spine. There is no  disc bulge or herniation. There is no spinal or neural foraminal  stenosis. Sclerotic changes about the sacroiliac joints with marginal  spurring. This appears overall similar (within the field-of-view) to  the most recent examinations.                                                                      IMPRESSION:  1. No acute abnormality of the lumbar spine. Specifically, no disc  bulge/herniation, marrow signal abnormality, or spinal or neural  foraminal stenosis.  2. Redemonstrated sclerosis with marginal spurring about the  sacroiliac joints, potentially representing changes related to  osteoarthritis. This may be secondary to prior sacroiliitis in the  setting of spondyloarthropathy given the patient's clinical history.  This is incompletely included within the field-of-view of this exam.

## 2023-05-18 ENCOUNTER — LAB (OUTPATIENT)
Dept: LAB | Facility: CLINIC | Age: 48
End: 2023-05-18
Payer: MEDICARE

## 2023-05-18 DIAGNOSIS — E78.5 HYPERLIPIDEMIA LDL GOAL <130: ICD-10-CM

## 2023-05-18 LAB
ALT SERPL W P-5'-P-CCNC: 17 U/L (ref 10–35)
CHOLEST SERPL-MCNC: 154 MG/DL
HDLC SERPL-MCNC: 46 MG/DL
LDLC SERPL CALC-MCNC: 88 MG/DL
NONHDLC SERPL-MCNC: 108 MG/DL
TRIGL SERPL-MCNC: 98 MG/DL

## 2023-05-18 PROCEDURE — 36415 COLL VENOUS BLD VENIPUNCTURE: CPT

## 2023-05-18 PROCEDURE — 84460 ALANINE AMINO (ALT) (SGPT): CPT

## 2023-05-18 PROCEDURE — 80061 LIPID PANEL: CPT

## 2023-05-24 ENCOUNTER — OFFICE VISIT (OUTPATIENT)
Dept: FAMILY MEDICINE | Facility: CLINIC | Age: 48
End: 2023-05-24
Payer: MEDICARE

## 2023-05-24 VITALS
DIASTOLIC BLOOD PRESSURE: 84 MMHG | OXYGEN SATURATION: 96 % | TEMPERATURE: 98.3 F | RESPIRATION RATE: 16 BRPM | HEIGHT: 59 IN | SYSTOLIC BLOOD PRESSURE: 128 MMHG | HEART RATE: 70 BPM | BODY MASS INDEX: 24.39 KG/M2 | WEIGHT: 121 LBS

## 2023-05-24 DIAGNOSIS — M77.8 RIGHT SHOULDER TENDONITIS: ICD-10-CM

## 2023-05-24 DIAGNOSIS — M67.88 RIGHT PERONEAL TENDINOSIS: ICD-10-CM

## 2023-05-24 DIAGNOSIS — E78.5 HYPERLIPIDEMIA LDL GOAL <130: ICD-10-CM

## 2023-05-24 DIAGNOSIS — H10.13 ALLERGIC CONJUNCTIVITIS, BILATERAL: ICD-10-CM

## 2023-05-24 DIAGNOSIS — R51.9 CHRONIC DAILY HEADACHE: ICD-10-CM

## 2023-05-24 DIAGNOSIS — M54.2 CERVICALGIA: ICD-10-CM

## 2023-05-24 PROCEDURE — 99214 OFFICE O/P EST MOD 30 MIN: CPT | Performed by: FAMILY MEDICINE

## 2023-05-24 RX ORDER — DULOXETIN HYDROCHLORIDE 30 MG/1
60 CAPSULE, DELAYED RELEASE ORAL DAILY
Qty: 180 CAPSULE | Refills: 1 | Status: SHIPPED | OUTPATIENT
Start: 2023-05-24 | End: 2023-11-06

## 2023-05-24 RX ORDER — OLOPATADINE HYDROCHLORIDE 1 MG/ML
SOLUTION/ DROPS OPHTHALMIC
Qty: 5 ML | Refills: 3 | Status: SHIPPED | OUTPATIENT
Start: 2023-05-24 | End: 2023-08-22

## 2023-05-24 RX ORDER — AMLODIPINE BESYLATE 2.5 MG/1
2.5 TABLET ORAL DAILY
Qty: 90 TABLET | Refills: 3 | Status: SHIPPED | OUTPATIENT
Start: 2023-05-24 | End: 2024-03-11

## 2023-05-24 RX ORDER — GABAPENTIN 300 MG/1
CAPSULE ORAL
Qty: 360 CAPSULE | Refills: 1 | Status: SHIPPED | OUTPATIENT
Start: 2023-05-24 | End: 2023-11-06

## 2023-05-24 RX ORDER — ROSUVASTATIN CALCIUM 20 MG/1
20 TABLET, COATED ORAL DAILY
Qty: 90 TABLET | Refills: 2 | Status: SHIPPED | OUTPATIENT
Start: 2023-05-24 | End: 2023-11-06

## 2023-05-24 ASSESSMENT — PAIN SCALES - GENERAL: PAINLEVEL: SEVERE PAIN (6)

## 2023-05-24 NOTE — Clinical Note
Can you forward this to referrals?  Could not find link on here.   Wonder if insurance would cover massage therapy with spondyloarthropathy at [facility] management - having muscle pains and tightness

## 2023-05-24 NOTE — PROGRESS NOTES
Assessment & Plan     Hyperlipidemia LDL goal <130  Under control  continue  - rosuvastatin (CRESTOR) 20 MG tablet  Dispense: 90 tablet; Refill: 2    Allergic conjunctivitis, bilateral  Stable  Refills per epicare   - olopatadine (PATANOL) 0.1 % ophthalmic solution  Dispense: 5 mL; Refill: 3    Right peroneal tendinosis  Stable but ongoing - splint can really cause rash and rub especially in the summer - wonders if there is anything else that will be more comfortable  Her current splint is at least 2 years old    - gabapentin (NEURONTIN) 300 MG capsule  Dispense: 360 capsule; Refill: 1  - DULoxetine (CYMBALTA) 30 MG capsule  Dispense: 180 capsule; Refill: 1  - Wrist/Arm Supplies Order Wrist Brace; Right; non-thumb spica    Right shoulder tendonitis  Stable  continue  - gabapentin (NEURONTIN) 300 MG capsule  Dispense: 360 capsule; Refill: 1  - DULoxetine (CYMBALTA) 30 MG capsule  Dispense: 180 capsule; Refill: 1    Cervicalgia  stable  - gabapentin (NEURONTIN) 300 MG capsule  Dispense: 360 capsule; Refill: 1  - DULoxetine (CYMBALTA) 30 MG capsule  Dispense: 180 capsule; Refill: 1    Chronic daily headache  stable  - amLODIPine (NORVASC) 2.5 MG tablet  Dispense: 90 tablet; Refill: 3        28 minutes spent by me on the date of the encounter doing chart review, history and exam, documentation and further activities per the note       See Patient Instructions    Terri Robles MD  [facility]    Shaggy Pathak is a 48 year old, presenting for the following health issues:  Pain (Neck and shoulder pain/)  she is here for recheck.   She would to stay on her same meds.   She has lab appt next month for her rheum labs - could do  Today but not sure if that would be okay with her rheum doc.   Will hold off.            5/24/2023    10:29 AM   Additional Questions   Roomed by Zarina PARR CMA     Pain    History of Present Illness       Back Pain:  She presents for follow up of back pain. Patient's  "back pain is a chronic problem.  Location of back pain:  Right lower back, left lower back, right middle of back, left middle of back, right side of neck, left side of neck, right shoulder, left shoulder, right buttock and left buttock  Description of back pain: shooting  Back pain spreads: right buttocks, left buttocks, left thigh, left knee, left foot, right shoulder, left shoulder, right side of neck and left side of neck    Since patient first noticed back pain, pain is: always present, but gets better and worse  Does back pain interfere with her job:  Yes      Hyperlipidemia:  She presents for follow up of hyperlipidemia.  She is taking medication to lower cholesterol. She is not having myalgia or other side effects to statin medications.    Headaches:   Since the patient's last clinic visit, headaches are: worsened  The patient is getting headaches:  3-4 per week  She is not able to do normal daily activities when she has a migraine.  The patient is taking the following rescue/relief medications:  Tylenol and other   Patient states \"The relief is inconsistent\" from the rescue/relief medications.   The patient is taking the following medications to prevent migraines:  Other  In the past 4 weeks, the patient has gone to an Urgent Care or Emergency Room 0 times times due to headaches.    Reason for visit:  Neck and shoulders pain wrist thump finger    She eats 0-1 servings of fruits and vegetables daily.She consumes 0 sweetened beverage(s) daily.She exercises with enough effort to increase her heart rate 9 or less minutes per day.  She exercises with enough effort to increase her heart rate 3 or less days per week.   She is taking medications regularly.     Past Medical History:   Diagnosis Date     Anemia      History of blood transfusion 2000    after vaginal delivery, 2 units PRBCs given     HSIL on Pap smear 09/21/2011    MARTA 2 and 3     Immune to hepatitis B 08/2021    hep B antigen NEG     INFLAM " SPONDYLOPATHY NOS 01/07/2008    check labs on sulfasalzine every 3 months and check hep B and C and TB every 2 years     Leukocytopenia 03/10/2014     Tendinosis      Thrombocytopenia (H) 03/10/2014     Unspecified closed fracture of pelvis 2000    left fracture of pelvis after delivery       Past Surgical History:   Procedure Laterality Date     COLONOSCOPY Left 11/12/2021    recheck in 10 years     DILATION AND CURETTAGE, ABLATE ENDOMETRIUM NOVASURE, COMBINED N/A 12/04/2018    Procedure: novasure endometrial ablation, myosure resection of leiomyoma, dilation and curettage;  Surgeon: Rolando Covarrubias MD;  Location: [facility] OR     GYN SURGERY  12/04/2018    fibroid removal     lap hysterectomy and salpingetcomy Bilateral 2019    done at [facility] - ovaries are in     LEEP TX, CERVICAL  12/19/2011    MARTA II     OPERATIVE HYSTEROSCOPY WITH MORCELLATOR N/A 12/04/2018    ovaries are still in.   Surgeon: Rolando Covarrubias MD;  Location: [facility] OR     TUBAL LIGATION  05/2009    laparoscopic tubal ligation     ZC NONSPECIFIC PROCEDURE  10/2000    after delivery 2 units of prbcs secondary to placenta       MEDICATIONS:  Current Outpatient Medications   Medication     amLODIPine (NORVASC) 2.5 MG tablet     aspirin-acetaminophen-caffeine (EXCEDRIN MIGRAINE) 250-250-65 MG tablet     clindamycin-benzoyl peroxide (BENZACLIN) 1-5 % external gel     cyclobenzaprine (FLEXERIL) 5 MG tablet     diclofenac (VOLTAREN) 1 % topical gel     DULoxetine (CYMBALTA) 30 MG capsule     Evening Primrose Oil 500 MG CAPS     ferrous sulfate (FEROSUL) 325 (65 Fe) MG tablet     fluticasone (FLONASE) 50 MCG/ACT nasal spray     gabapentin (NEURONTIN) 300 MG capsule     ketotifen (ZADITOR) 0.025 % ophthalmic solution     lidocaine (XYLOCAINE) 5 % external ointment     loperamide (IMODIUM A-D) 2 MG tablet     loratadine-pseudoePHEDrine (CLARITIN-D 24-HOUR)  MG 24 hr tablet     meclizine (ANTIVERT) 25 MG tablet     meloxicam (MOBIC) 15 MG tablet      olopatadine (PATANOL) 0.1 % ophthalmic solution     ondansetron (ZOFRAN) 4 MG tablet     polyethylene glycol (MIRALAX) 17 GM/Dose powder     rosuvastatin (CRESTOR) 20 MG tablet     scopolamine (TRANSDERM) 1 MG/3DAYS 72 hr patch     SM STOOL SOFTENER 100 MG capsule     tofacitinib (XELJANZ XR) 11 MG 24 hr tablet     traZODone (DESYREL) 50 MG tablet     triamcinolone (KENALOG) 0.1 % external cream     vitamin D3 (CHOLECALCIFEROL) 50 mcg (2000 units) tablet     sulfaSALAzine ER (AZULFIDINE EN) 500 MG EC tablet     No current facility-administered medications for this visit.     Facility-Administered Medications Ordered in Other Visits   Medication     midazolam (VERSED) injection       SOCIAL HISTORY:  Social History     Tobacco Use     Smoking status: Never     Smokeless tobacco: Never   Vaping Use     Vaping status: Never Used   Substance Use Topics     Alcohol use: Not Currently       Family History   Problem Relation Age of Onset     Hypertension Father      Lipids Father      Hypertension Mother      Lipids Mother      Diabetes No family hx of      Coronary Artery Disease No family hx of      Hyperlipidemia No family hx of      Cerebrovascular Disease No family hx of      Breast Cancer No family hx of      Colon Cancer No family hx of      Prostate Cancer No family hx of      Other Cancer No family hx of      Depression No family hx of      Anxiety Disorder No family hx of      Mental Illness No family hx of      Substance Abuse No family hx of      Anesthesia Reaction No family hx of      Asthma No family hx of      Osteoporosis No family hx of      Genetic Disorder No family hx of      Thyroid Disease No family hx of      Obesity No family hx of      Unknown/Adopted No family hx of              Review of Systems   Constitutional, HEENT, cardiovascular, pulmonary, gi and gu systems are negative, except as otherwise noted.      Objective    /84 (BP Location: Right arm, Patient Position: Sitting, Cuff Size:  "Adult Regular)   Pulse 70   Temp 98.3  F (36.8  C) (Oral)   Resp 16   Ht 1.499 m (4' 11\")   Wt 54.9 kg (121 lb)   LMP 03/26/2019 (Within Days)   SpO2 96%   BMI 24.44 kg/m    Body mass index is 24.44 kg/m .  Physical Exam   GENERAL: healthy, alert and no distress  EYES: Eyes grossly normal to inspection, PERRL and conjunctivae and sclerae normal  NECK: no adenopathy, no asymmetry, masses, or scars and thyroid normal to palpation  RESP: lungs clear to auscultation - no rales, rhonchi or wheezes  CV: regular rate and rhythm, normal S1 S2, no S3 or S4, no murmur, click or rub, no peripheral edema and peripheral pulses strong  MS: tenderness anterior shoulder and back of wrist on the right - no redness or deformity or swelling   SKIN: no suspicious lesions or rashes  NEURO: Normal strength and tone, mentation intact and speech normal  PSYCH: mentation appears normal, affect normal/bright  LYMPH: no cervical, supraclavicular, axillary, or inguinal adenopathy    Lab on 05/18/2023   Component Date Value Ref Range Status     Cholesterol 05/18/2023 154  <200 mg/dL Final     Triglycerides 05/18/2023 98  <150 mg/dL Final     Direct Measure HDL 05/18/2023 46 (L)  >=50 mg/dL Final     LDL Cholesterol Calculated 05/18/2023 88  <=100 mg/dL Final     Non HDL Cholesterol 05/18/2023 108  <130 mg/dL Final     ALT 05/18/2023 17  10 - 35 U/L Final                   "

## 2023-06-23 ENCOUNTER — LAB (OUTPATIENT)
Dept: LAB | Facility: CLINIC | Age: 48
End: 2023-06-23
Payer: MEDICARE

## 2023-06-23 DIAGNOSIS — M47.819 SERONEGATIVE SPONDYLOARTHROPATHY: ICD-10-CM

## 2023-06-23 DIAGNOSIS — M45.8 ANKYLOSING SPONDYLITIS OF SACRAL REGION (H): ICD-10-CM

## 2023-06-23 DIAGNOSIS — Z79.899 HIGH RISK MEDICATION USE: ICD-10-CM

## 2023-06-23 LAB
ALT SERPL W P-5'-P-CCNC: 20 U/L (ref 0–50)
AST SERPL W P-5'-P-CCNC: 27 U/L (ref 0–45)
BASOPHILS # BLD AUTO: 0 10E3/UL (ref 0–0.2)
BASOPHILS NFR BLD AUTO: 0 %
CREAT SERPL-MCNC: 0.59 MG/DL (ref 0.51–0.95)
CRP SERPL-MCNC: <3 MG/L
EOSINOPHIL # BLD AUTO: 0.2 10E3/UL (ref 0–0.7)
EOSINOPHIL NFR BLD AUTO: 3 %
ERYTHROCYTE [DISTWIDTH] IN BLOOD BY AUTOMATED COUNT: 12.1 % (ref 10–15)
ERYTHROCYTE [SEDIMENTATION RATE] IN BLOOD BY WESTERGREN METHOD: 28 MM/HR (ref 0–20)
GFR SERPL CREATININE-BSD FRML MDRD: >90 ML/MIN/1.73M2
HCT VFR BLD AUTO: 37.4 % (ref 35–47)
HGB BLD-MCNC: 12.2 G/DL (ref 11.7–15.7)
IMM GRANULOCYTES # BLD: 0 10E3/UL
IMM GRANULOCYTES NFR BLD: 0 %
LYMPHOCYTES # BLD AUTO: 1.8 10E3/UL (ref 0.8–5.3)
LYMPHOCYTES NFR BLD AUTO: 38 %
MCH RBC QN AUTO: 29.8 PG (ref 26.5–33)
MCHC RBC AUTO-ENTMCNC: 32.6 G/DL (ref 31.5–36.5)
MCV RBC AUTO: 91 FL (ref 78–100)
MONOCYTES # BLD AUTO: 0.4 10E3/UL (ref 0–1.3)
MONOCYTES NFR BLD AUTO: 9 %
NEUTROPHILS # BLD AUTO: 2.4 10E3/UL (ref 1.6–8.3)
NEUTROPHILS NFR BLD AUTO: 50 %
PLATELET # BLD AUTO: 213 10E3/UL (ref 150–450)
RBC # BLD AUTO: 4.09 10E6/UL (ref 3.8–5.2)
WBC # BLD AUTO: 4.9 10E3/UL (ref 4–11)

## 2023-06-23 PROCEDURE — 85025 COMPLETE CBC W/AUTO DIFF WBC: CPT

## 2023-06-23 PROCEDURE — 86140 C-REACTIVE PROTEIN: CPT

## 2023-06-23 PROCEDURE — 84450 TRANSFERASE (AST) (SGOT): CPT

## 2023-06-23 PROCEDURE — 84460 ALANINE AMINO (ALT) (SGPT): CPT

## 2023-06-23 PROCEDURE — 82565 ASSAY OF CREATININE: CPT

## 2023-06-23 PROCEDURE — 85652 RBC SED RATE AUTOMATED: CPT

## 2023-06-23 PROCEDURE — 36415 COLL VENOUS BLD VENIPUNCTURE: CPT

## 2023-06-30 ENCOUNTER — VIRTUAL VISIT (OUTPATIENT)
Dept: RHEUMATOLOGY | Facility: CLINIC | Age: 48
End: 2023-06-30
Attending: INTERNAL MEDICINE
Payer: MEDICARE

## 2023-06-30 VITALS — HEIGHT: 59 IN | WEIGHT: 115 LBS | BODY MASS INDEX: 23.18 KG/M2

## 2023-06-30 DIAGNOSIS — Z79.899 HIGH RISK MEDICATION USE: ICD-10-CM

## 2023-06-30 DIAGNOSIS — M47.819 SERONEGATIVE SPONDYLOARTHROPATHY: ICD-10-CM

## 2023-06-30 DIAGNOSIS — M45.8 ANKYLOSING SPONDYLITIS OF SACRAL REGION (H): Primary | ICD-10-CM

## 2023-06-30 PROCEDURE — 99214 OFFICE O/P EST MOD 30 MIN: CPT | Mod: 95 | Performed by: INTERNAL MEDICINE

## 2023-06-30 RX ORDER — SULFASALAZINE 500 MG/1
1000 TABLET, DELAYED RELEASE ORAL 2 TIMES DAILY
Qty: 360 TABLET | Refills: 0 | Status: SHIPPED | OUTPATIENT
Start: 2023-06-30 | End: 2023-09-28

## 2023-06-30 ASSESSMENT — PAIN SCALES - GENERAL: PAINLEVEL: SEVERE PAIN (6)

## 2023-06-30 NOTE — LETTER
6/30/2023       RE: Zo Qureshi  [address] [facility] [address]     Dear Colleague,    Thank you for referring your patient, Zo Qureshi, to the [facility] [place] at [facility]. Please see a copy of my visit note below.    Virtual Visit Details    Type of service:  Video Visit   Joined the call at 6/30/2023, 2:19:29 pm.  Left the call at 6/30/2023, 2:34:03 pm.  You were on the call for 14 minutes 33 seconds .    Originating Location (pt. Location): Home    Distant Location (provider location):  Off-site  Platform used for Video Visit: [facility]       Outpatient Rheumatology Follow-up  This visit was conducted via synchronous video visit due to the current COVID-19 crisis to reduce patient risk.  Verbal consent was obtained and is documented below.    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 Prior to that time she had last been seen by Dr. Garay of 5/24/2019. Most recent imaging of SI joints was on 2/28/2020 which showed sclerosis with marginal spurring about the SI joints, likely secondary to her chronic inflammatory disease, though no active inflammatory changes at that time. Had been tried on both humira and enbrel, though did not tolerate these due to side effects so discontinued prior to determining if efficacious. At the time of her last visit with rheumatology it was documented that she was responsive to NSAIDs, prednisone, and SSZ. The dose of her SSZ was increased to 1gm BID at the time of her initial visit with me given her active peripheral  arthritis and enthesitis. Despite naproxen 500mg BID, she had ongoing axial stiffness which was AM predominant and improved with activity and stretching, so risks and benefits of cosentyx discussed and patient started on this IL-17 inhibitor. She interestingly developed the same side effects of severe flu like symptoms after each dose of her biologic therapy- similar to those she was experiencing when on humira and enbrel, with rhinorrhea, muscle aches/pains, severe fatigue, and also hives, low grade fever which come on after her injection and slowly over many weeks will resolve. Had been worse with each subsequent injection of cosentyx.  Cosentyx was discontinued and xeljanz 11mg once daily started early January 2022. She self discontinued this for a short period however it was restarted in April of 2023. She continues on xeljanz 11mg once daily SSZ 1gm BID and mobic 15mg once daily. She presents today for follow-up.       Seronegative spondyloarthropathy:  Has been back on SSZ 1gm BID/xeljanz 11mg once daily/mobic 15mg once daily for almost 8 weeks at this point. Has had positive improvement already, though still early in the treatment course and discussed that additional improvement is still to come as we have not yet reached the 3+ month merlin where we will be able to assess efficacy of this treatment for her disease. She is tolerating her therapy without noticeable side effects. She has ongoing joint pains, particularly in the right wrist/CMC, which I think is at least in part driven by non inflammatory OA. Will not be making changes to her therapy plan at this time.  Plan:  - continue Xeljanz 11 mg once daily  - continue sulfasalazine 1 g twice daily  - continue meloxicam 15 mg once daily as needed.  Counseled not to take any other NSAIDs in 24 hours after each meloxicam dose.  Risk and benefits discussed including renal cardiac and other.  - Follow-up with me in 3 months  -with her ongoing hand/wrist pain,  will obtain xrays of bilateral hands and wrists    High risk medication use: Xeljanz, SSZ  -CBC, AST, ALT, creatinine, ESR, CRP reviewed from 6/23/23 and did not show evidence of acute drug toxicity.   -Will continue q3 month routine screening drug toxicity monitoring with CBC with differential AST ALT creatinine ESR and CRP.  Standing orders in place  -Risks and benefits of these therapies to include infection, thrombosis, bowel perforation, bone marrow suppression, GI/hepatotoxicity, malignancy, increased all cause mortality among others discussed again today 6/30/23. She is agreeable to continue given potential benefit and lack of side effects to date.    James Hernandez MD  Rheumatology   Subjective:   June 30, 2023   -at the time of her last visit, we restarted both xeljanz and SSZ. Mobic restarted at that time as well.   -Has a cold, runny nose, raspy voice. Has night sweats/chills. No fever. Has been going on for 3-4 days  -started xeljanz and SSZ, feels some less pain. Mobic as well. Started these in May, 1st or 2nd. Has been 7-8 weeks now that she has been on both medications  -If she misses any doses then pain returns.   -Rates her pain 5/10. Involves shoulder/neck/hands.   -Wakes up and stretches for 10 minutes then her stiffness is improved for the day  -Symptoms on the right hand are worse than the left hand. She has been wearing a brace on the left hand that was special molded for her by ortho/PT she reports  -no new rashes  -outside of above possible URI, no new or progressive fevers/chills/night sweats. No other interval infections.   -no unintentional weight loss  -Has intermittent swelling of PIPs/DIPs.   -In her hands has most pain in her CMCs and wrists  She is tolerating xeljanz, mobic, SSZ without any noticeable side effects, GI/cutaneous/other.  14 point ROS collected and negative if not documented above      April 14, 2023  -Up until January 2023, had continued on xeljanz 11mg once daily,  "SSZ 1gm BID. But then ran out of both medications and did not follow-up/request refill and so stopped.   -While on both therapies, she noted that if she took her doses late, would experience worse pain/stiffness.   -Currently reporting pain stiffness in almost all joint in her body, both axial and peripheral. In shoulder/neck. Has pain/stiffness in hands. 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 Stretches. Pain in the shoulder, right side also but left more than right.   Has tried massage which is temporarily helpful for pain, but immediately returns. No redness, Has swelling of her wrist on right more than left.   - Still with some HA/dizziness. Still gets warm, though not fever. Intermittent coughing/runny nose. Has nasal spray.  -has some red patches of that come and go, even now not on medication. No different on medication. Not medication side effects  -Middle to low back also much more severe pain and stiffness which is worse in the AM and improves with use. Has radiating pain down her left leg.   -Has ongoing RLS.   -no unintentional weight loss.   -Chronic mild hair thinning over the last 2-3 years    Interval history 3/4/22  Has not experienced any side effects from xeljanz. Has had ongoing HA/dizziness, which was there prior to starting xeljanz. Has intermittent chills/ feeling warm, though no temp. Does not happen everyday, happens 1-2 times per week. Also warm at night, though not wet on clothes. Her pain is less. Rates her pain now around 6/10. No redness/warmth/swelling. Though last week had right wrist pain with minimal movement. Lasted for 2-3 days. Used topical lidocaine which resolved symptoms. When painful, it was somewhat \"puffy\".  At this time her back is still stiff, AM predominant. Back and neck. She sits when she wakes up and stretches. Then one hour later she has improvement and continues her day. Has rash on anterior shin right LE, saw PCP who gave her topical steroid which she has not started. Still with " intermittent blurred vision/double vision. Unchanged. No more severe/frequent. Rare sores in her mouth. No interval infections. ROS otherwise negative.       Interval history  12/17/21  Reports feeling of fever/body aches/runny nose. Also with rash. 3-4 days after her first injection she noticed that this started, though took her next dose and the above symptoms were more severe. Then was off for about 1 month due to these severe side effects. She decided to re-try in October and November, though has again had return of above. Has been about 1 month since her last injection and reports side effects are continuing to slowly improve, though still with some fatigue/body aches/ HA. Rash and fever resolved. She finds it hard to comment on axial pain/stiffness/peripiheral joint pain/swelling in setting of severe fatigue/flu like symptoms. Her ROS is otherwise negative.     HPI from initial consultation 5/2021  Had flu like symptoms with flu like symptoms. Resolves when stopped. Prednisone, NSAIDs, SSZ used/helpful. Prednisone had nausea and consipation. SSZ has drowsiness/ dizziness/ mental fog worse in the AM. Has continued on SSZ 1gm in the AM and 1gm in the PM. Still with significant drowsiness. If she doesn't want to have drowsiness, reduces the dose, has pain in neck, shoulder, back. Has some swelling of joints. Has tenderness of many joints. Takes naproxen and gabapentin. She takes naproxen 500mg BID. In the AM has stiffness in her neck/back. Gets some better with stretching/ exercise. Takes 15 minutes to get better. No rash. Does have swelling of her right hand 2nd -4th digits. Pain in her PIPs predominantly. Pain also in between the joints. Has recently had some blurred vision, wears glasses. No inflammatory eye disease history. Intermittent double vision. Has had hair loss, non scarring more that is thinning. More over the last 3-4 years. Intermittent sore in mouth, painful. Has dry eyes, uses eye drops 3-4 times  per week. When she wakes up her throat is dry. Drinks frequently. No abdominal pain. No blood in her stool. No n/v. No raynauds. No sclerodactyly. No fevers, weight loss. Occasional weight loss and chills. Has been having ongoing throbbing HA on the left side. The naproxen is some helpful, though returns shortly afterwards. No miscarriages or blood clots.     Past Medical History  Past Medical History:   Diagnosis Date    Anemia     History of blood transfusion 2000    after vaginal delivery, 2 units PRBCs given    HSIL on Pap smear 09/21/2011    MARTA 2 and 3    Immune to hepatitis B 08/2021    hep B antigen NEG    INFLAM SPONDYLOPATHY NOS 01/07/2008    check labs on sulfasalzine every 3 months and check hep B and C and TB every 2 years    Leukocytopenia 03/10/2014    Tendinosis     Thrombocytopenia (H) 03/10/2014    Unspecified closed fracture of pelvis 2000    left fracture of pelvis after delivery     Past Surgical History  Past Surgical History:   Procedure Laterality Date    COLONOSCOPY Left 11/12/2021    recheck in 10 years    DILATION AND CURETTAGE, ABLATE ENDOMETRIUM NOVASURE, COMBINED N/A 12/04/2018    Procedure: novasure endometrial ablation, myosure resection of leiomyoma, dilation and curettage;  Surgeon: Rolando Covarrubias MD;  Location: [facility] OR    GYN SURGERY  12/04/2018    fibroid removal    lap hysterectomy and salpingetcomy Bilateral 2019    done at [facility] - ovaries are in    LEEP TX, CERVICAL  12/19/2011    MARTA II    OPERATIVE HYSTEROSCOPY WITH MORCELLATOR N/A 12/04/2018    ovaries are still in.   Surgeon: Rolando Covarrubias MD;  Location: [facility] OR    TUBAL LIGATION  05/2009    laparoscopic tubal ligation    ZC NONSPECIFIC PROCEDURE  10/2000    after delivery 2 units of prbcs secondary to placenta     Medications  Current Outpatient Medications   Medication    amLODIPine (NORVASC) 2.5 MG tablet    aspirin-acetaminophen-caffeine (EXCEDRIN MIGRAINE) 250-250-65 MG tablet    clindamycin-benzoyl  "peroxide (BENZACLIN) 1-5 % external gel    cyclobenzaprine (FLEXERIL) 5 MG tablet    diclofenac (VOLTAREN) 1 % topical gel    DULoxetine (CYMBALTA) 30 MG capsule    Evening Primrose Oil 500 MG CAPS    ferrous sulfate (FEROSUL) 325 (65 Fe) MG tablet    fluticasone (FLONASE) 50 MCG/ACT nasal spray    gabapentin (NEURONTIN) 300 MG capsule    ketotifen (ZADITOR) 0.025 % ophthalmic solution    lidocaine (XYLOCAINE) 5 % external ointment    loperamide (IMODIUM A-D) 2 MG tablet    loratadine-pseudoePHEDrine (CLARITIN-D 24-HOUR)  MG 24 hr tablet    meclizine (ANTIVERT) 25 MG tablet    meloxicam (MOBIC) 15 MG tablet    olopatadine (PATANOL) 0.1 % ophthalmic solution    ondansetron (ZOFRAN) 4 MG tablet    polyethylene glycol (MIRALAX) 17 GM/Dose powder    rosuvastatin (CRESTOR) 20 MG tablet    scopolamine (TRANSDERM) 1 MG/3DAYS 72 hr patch    SM STOOL SOFTENER 100 MG capsule    sulfaSALAzine ER (AZULFIDINE EN) 500 MG EC tablet    tofacitinib (XELJANZ XR) 11 MG 24 hr tablet    traZODone (DESYREL) 50 MG tablet    triamcinolone (KENALOG) 0.1 % external cream    vitamin D3 (CHOLECALCIFEROL) 50 mcg (2000 units) tablet     No current facility-administered medications for this visit.     Facility-Administered Medications Ordered in Other Visits   Medication    midazolam (VERSED) injection     Allergies   Allergies   Allergen Reactions    Contrast Dye Nausea and Vomiting    Diatrizoate Nausea and Vomiting    Percocet [Oxycodone-Acetaminophen] Nausea and Vomiting and Itching    Advil [Ibuprofen Micronized] Rash     Rash     Ibuprofen Rash     Family History: Mother with \"arthritis\"    Social History: Not currently working. 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 Repetitive motion. No smoking/drugs/ETOH. Not . 4 kids who are healthy.       Objective:    Physical exam:  No vitals  Laying in bed  No facial rash  Unable to abduct bilateral shoulders above 90 degrees without discomfort.  Unable to make a full fist " bilaterally due to pain and stiffness in her MCPs and PIPs.  Mild loss of MCP valleys bilaterally.  She has tenderness to self palpation over her bilateral wrists elbows and MCPs.    Labs:  WBC   Date Value Ref Range Status   02/15/2021 6.5 4.0 - 11.0 10e9/L Final     WBC Count   Date Value Ref Range Status   06/23/2023 4.9 4.0 - 11.0 10e3/uL Final     Hemoglobin   Date Value Ref Range Status   06/23/2023 12.2 11.7 - 15.7 g/dL Final   02/15/2021 12.2 11.7 - 15.7 g/dL Final     Platelet Count   Date Value Ref Range Status   06/23/2023 213 150 - 450 10e3/uL Final   02/15/2021 219 150 - 450 10e9/L Final     Creatinine   Date Value Ref Range Status   06/23/2023 0.59 0.51 - 0.95 mg/dL Final   02/15/2021 0.54 0.52 - 1.04 mg/dL Final     Lab Results   Component Value Date    ALKPHOS 60 03/01/2022    ALKPHOS 55 02/15/2021     AST   Date Value Ref Range Status   06/23/2023 27 0 - 45 U/L Final     Comment:     Reference intervals for this test were updated on 6/12/2023 to more accurately reflect our healthy population. There may be differences in the flagging of prior results with similar values performed with this method. Interpretation of those prior results can be made in the context of the updated reference intervals.   02/15/2021 14 0 - 45 U/L Final     Lab Results   Component Value Date    ALT 21 03/01/2022    ALT 17 02/15/2021     Sed Rate   Date Value Ref Range Status   02/15/2021 20 0 - 20 mm/h Final     Erythrocyte Sedimentation Rate   Date Value Ref Range Status   06/23/2023 28 (H) 0 - 20 mm/hr Final     CRP Inflammation   Date Value Ref Range Status   08/16/2021 <2.9 0.0 - 8.0 mg/L Final   02/26/2020 <2.9 0.0 - 8.0 mg/L Final     UA RESULTS:  Recent Labs   Lab Test 05/18/21  0858   COLOR Yellow   APPEARANCE Clear   URINEGLC Negative   URINEBILI Negative   URINEKETONE Negative   SG 1.015   UBLD Large*   URINEPH 6.5   PROTEIN Negative   UROBILINOGEN 0.2   NITRITE Negative   LEUKEST Moderate*   RBCU 10-25*   WBCU  *      RAMÍREZ Screen by EIA   Date Value Ref Range Status   05/21/2014 <1.0  Interpretation:  Negative   <1.0 Final     Rheumatoid Factor   Date Value Ref Range Status   05/21/2014 <20 <20 IU/mL Final     Cyclic Citrullinated Peptide Antibody, IgG   Date Value Ref Range Status   08/28/2017 1 <7 U/mL Final     Comment:     Negative       Imaging:  MRI LUMBAR SPINE WITHOUT CONTRAST   2/28/2020 3:51 PM      HISTORY: Radiculopathy, greater than  6 weeks conservative treatment,  persistent symptoms; radiculopathy - history of sacroiliitis. Family  history of spinal disease and severe stenosis - left-sided symptoms.  Spondyloarthropathy.      TECHNIQUE: Multiplanar multisequence MRI of the lumbar spine without  contrast.      COMPARISON: Lumbar spine MRI 9/7/2010. Correlation is also made with  prior pelvic CT 6/10/2019.     FINDINGS:  Alignment is normal. No fracture or significant vertebral  body height loss. The intervertebral discs are normal in height and  signal. No abnormal marrow signal in the lumbar spine. There is no  disc bulge or herniation. There is no spinal or neural foraminal  stenosis. Sclerotic changes about the sacroiliac joints with marginal  spurring. This appears overall similar (within the field-of-view) to  the most recent examinations.                                                                      IMPRESSION:  1. No acute abnormality of the lumbar spine. Specifically, no disc  bulge/herniation, marrow signal abnormality, or spinal or neural  foraminal stenosis.  2. Redemonstrated sclerosis with marginal spurring about the  sacroiliac joints, potentially representing changes related to  osteoarthritis. This may be secondary to prior sacroiliitis in the  setting of spondyloarthropathy given the patient's clinical history.  This is incompletely included within the field-of-view of this exam.

## 2023-06-30 NOTE — NURSING NOTE
Is the patient currently in the state of [place]? YES    Visit mode:VIDEO    If the visit is dropped, the patient can be reconnected by: VIDEO VISIT: Text to cell phone: [phone]    Will anyone else be joining the visit? NO      How would you like to obtain your AVS? MyChart    Are changes needed to the allergy or medication list? NO    Reason for visit: RECHECK    Lynsey MARION

## 2023-06-30 NOTE — PROGRESS NOTES
Virtual Visit Details    Type of service:  Video Visit   Joined the call at 6/30/2023, 2:19:29 pm.  Left the call at 6/30/2023, 2:34:03 pm.  You were on the call for 14 minutes 33 seconds .    Originating Location (pt. Location): Home    Distant Location (provider location):  Off-site  Platform used for Video Visit: [facility]       Outpatient Rheumatology Follow-up  This visit was conducted via synchronous video visit due to the current COVID-19 crisis to reduce patient risk.  Verbal consent was obtained and is documented below.    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 Prior to that time she had last been seen by Dr. Garay of 5/24/2019. Most recent imaging of SI joints was on 2/28/2020 which showed sclerosis with marginal spurring about the SI joints, likely secondary to her chronic inflammatory disease, though no active inflammatory changes at that time. Had been tried on both humira and enbrel, though did not tolerate these due to side effects so discontinued prior to determining if efficacious. At the time of her last visit with rheumatology it was documented that she was responsive to NSAIDs, prednisone, and SSZ. The dose of her SSZ was increased to 1gm BID at the time of her initial visit with me given her active peripheral arthritis and enthesitis. Despite naproxen 500mg BID, she had ongoing axial stiffness which was AM predominant and improved with activity and stretching, so risks and benefits of cosentyx discussed and patient started on this IL-17 inhibitor. She interestingly developed the same side effects of severe flu like  symptoms after each dose of her biologic therapy- similar to those she was experiencing when on humira and enbrel, with rhinorrhea, muscle aches/pains, severe fatigue, and also hives, low grade fever which come on after her injection and slowly over many weeks will resolve. Had been worse with each subsequent injection of cosentyx.  Cosentyx was discontinued and xeljanz 11mg once daily started early January 2022. She self discontinued this for a short period however it was restarted in April of 2023. She continues on xeljanz 11mg once daily SSZ 1gm BID and mobic 15mg once daily. She presents today for follow-up.       Seronegative spondyloarthropathy:  Has been back on SSZ 1gm BID/xeljanz 11mg once daily/mobic 15mg once daily for almost 8 weeks at this point. Has had positive improvement already, though still early in the treatment course and discussed that additional improvement is still to come as we have not yet reached the 3+ month merlin where we will be able to assess efficacy of this treatment for her disease. She is tolerating her therapy without noticeable side effects. She has ongoing joint pains, particularly in the right wrist/CMC, which I think is at least in part driven by non inflammatory OA. Will not be making changes to her therapy plan at this time.  Plan:  - continue Xeljanz 11 mg once daily  - continue sulfasalazine 1 g twice daily  - continue meloxicam 15 mg once daily as needed.  Counseled not to take any other NSAIDs in 24 hours after each meloxicam dose.  Risk and benefits discussed including renal cardiac and other.  - Follow-up with me in 3 months  -with her ongoing hand/wrist pain, will obtain xrays of bilateral hands and wrists    High risk medication use: Xeljanz, SSZ  -CBC, AST, ALT, creatinine, ESR, CRP reviewed from 6/23/23 and did not show evidence of acute drug toxicity.   -Will continue q3 month routine screening drug toxicity monitoring with CBC with differential AST ALT creatinine  ESR and CRP.  Standing orders in place  -Risks and benefits of these therapies to include infection, thrombosis, bowel perforation, bone marrow suppression, GI/hepatotoxicity, malignancy, increased all cause mortality among others discussed again today 6/30/23. She is agreeable to continue given potential benefit and lack of side effects to date.    James Hernandez MD  Rheumatology   Subjective:   June 30, 2023   -at the time of her last visit, we restarted both xeljanz and SSZ. Mobic restarted at that time as well.   -Has a cold, runny nose, raspy voice. Has night sweats/chills. No fever. Has been going on for 3-4 days  -started xeljanz and SSZ, feels some less pain. Mobic as well. Started these in May, 1st or 2nd. Has been 7-8 weeks now that she has been on both medications  -If she misses any doses then pain returns.   -Rates her pain 5/10. Involves shoulder/neck/hands.   -Wakes up and stretches for 10 minutes then her stiffness is improved for the day  -Symptoms on the right hand are worse than the left hand. She has been wearing a brace on the left hand that was special molded for her by ortho/PT she reports  -no new rashes  -outside of above possible URI, no new or progressive fevers/chills/night sweats. No other interval infections.   -no unintentional weight loss  -Has intermittent swelling of PIPs/DIPs.   -In her hands has most pain in her CMCs and wrists  She is tolerating xeljanz, mobic, SSZ without any noticeable side effects, GI/cutaneous/other.  14 point ROS collected and negative if not documented above      April 14, 2023  -Up until January 2023, had continued on xeljanz 11mg once daily, SSZ 1gm BID. But then ran out of both medications and did not follow-up/request refill and so stopped.   -While on both therapies, she noted that if she took her doses late, would experience worse pain/stiffness.   -Currently reporting pain stiffness in almost all joint in her body, both axial and peripheral. In  "shoulder/neck. Has pain/stiffness in hands. 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 Stretches. Pain in the shoulder, right side also but left more than right.   Has tried massage which is temporarily helpful for pain, but immediately returns. No redness, Has swelling of her wrist on right more than left.   - Still with some HA/dizziness. Still gets warm, though not fever. Intermittent coughing/runny nose. Has nasal spray.  -has some red patches of that come and go, even now not on medication. No different on medication. Not medication side effects  -Middle to low back also much more severe pain and stiffness which is worse in the AM and improves with use. Has radiating pain down her left leg.   -Has ongoing RLS.   -no unintentional weight loss.   -Chronic mild hair thinning over the last 2-3 years    Interval history 3/4/22  Has not experienced any side effects from xeljanz. Has had ongoing HA/dizziness, which was there prior to starting xeljanz. Has intermittent chills/ feeling warm, though no temp. Does not happen everyday, happens 1-2 times per week. Also warm at night, though not wet on clothes. Her pain is less. Rates her pain now around 6/10. No redness/warmth/swelling. Though last week had right wrist pain with minimal movement. Lasted for 2-3 days. Used topical lidocaine which resolved symptoms. When painful, it was somewhat \"puffy\".  At this time her back is still stiff, AM predominant. Back and neck. She sits when she wakes up and stretches. Then one hour later she has improvement and continues her day. Has rash on anterior shin right LE, saw PCP who gave her topical steroid which she has not started. Still with intermittent blurred vision/double vision. Unchanged. No more severe/frequent. Rare sores in her mouth. No interval infections. ROS otherwise negative.       Interval history  12/17/21  Reports feeling of fever/body aches/runny nose. Also with rash. 3-4 days after her first injection she noticed that this started, " though took her next dose and the above symptoms were more severe. Then was off for about 1 month due to these severe side effects. She decided to re-try in October and November, though has again had return of above. Has been about 1 month since her last injection and reports side effects are continuing to slowly improve, though still with some fatigue/body aches/ HA. Rash and fever resolved. She finds it hard to comment on axial pain/stiffness/peripiheral joint pain/swelling in setting of severe fatigue/flu like symptoms. Her ROS is otherwise negative.     HPI from initial consultation 5/2021  Had flu like symptoms with flu like symptoms. Resolves when stopped. Prednisone, NSAIDs, SSZ used/helpful. Prednisone had nausea and consipation. SSZ has drowsiness/ dizziness/ mental fog worse in the AM. Has continued on SSZ 1gm in the AM and 1gm in the PM. Still with significant drowsiness. If she doesn't want to have drowsiness, reduces the dose, has pain in neck, shoulder, back. Has some swelling of joints. Has tenderness of many joints. Takes naproxen and gabapentin. She takes naproxen 500mg BID. In the AM has stiffness in her neck/back. Gets some better with stretching/ exercise. Takes 15 minutes to get better. No rash. Does have swelling of her right hand 2nd -4th digits. Pain in her PIPs predominantly. Pain also in between the joints. Has recently had some blurred vision, wears glasses. No inflammatory eye disease history. Intermittent double vision. Has had hair loss, non scarring more that is thinning. More over the last 3-4 years. Intermittent sore in mouth, painful. Has dry eyes, uses eye drops 3-4 times per week. When she wakes up her throat is dry. Drinks frequently. No abdominal pain. No blood in her stool. No n/v. No raynauds. No sclerodactyly. No fevers, weight loss. Occasional weight loss and chills. Has been having ongoing throbbing HA on the left side. The naproxen is some helpful, though returns shortly  afterwards. No miscarriages or blood clots.     Past Medical History  Past Medical History:   Diagnosis Date    Anemia     History of blood transfusion 2000    after vaginal delivery, 2 units PRBCs given    HSIL on Pap smear 09/21/2011    MARTA 2 and 3    Immune to hepatitis B 08/2021    hep B antigen NEG    INFLAM SPONDYLOPATHY NOS 01/07/2008    check labs on sulfasalzine every 3 months and check hep B and C and TB every 2 years    Leukocytopenia 03/10/2014    Tendinosis     Thrombocytopenia (H) 03/10/2014    Unspecified closed fracture of pelvis 2000    left fracture of pelvis after delivery     Past Surgical History  Past Surgical History:   Procedure Laterality Date    COLONOSCOPY Left 11/12/2021    recheck in 10 years    DILATION AND CURETTAGE, ABLATE ENDOMETRIUM NOVASURE, COMBINED N/A 12/04/2018    Procedure: novasure endometrial ablation, myosure resection of leiomyoma, dilation and curettage;  Surgeon: Rolando Covarrubias MD;  Location: [facility] OR    GYN SURGERY  12/04/2018    fibroid removal    lap hysterectomy and salpingetcomy Bilateral 2019    done at [facility] - ovaries are in    LEEP TX, CERVICAL  12/19/2011    MARTA II    OPERATIVE HYSTEROSCOPY WITH MORCELLATOR N/A 12/04/2018    ovaries are still in.   Surgeon: Rolando Covarrubias MD;  Location: [facility] OR    TUBAL LIGATION  05/2009    laparoscopic tubal ligation    ZC NONSPECIFIC PROCEDURE  10/2000    after delivery 2 units of prbcs secondary to placenta     Medications  Current Outpatient Medications   Medication    amLODIPine (NORVASC) 2.5 MG tablet    aspirin-acetaminophen-caffeine (EXCEDRIN MIGRAINE) 250-250-65 MG tablet    clindamycin-benzoyl peroxide (BENZACLIN) 1-5 % external gel    cyclobenzaprine (FLEXERIL) 5 MG tablet    diclofenac (VOLTAREN) 1 % topical gel    DULoxetine (CYMBALTA) 30 MG capsule    Evening Primrose Oil 500 MG CAPS    ferrous sulfate (FEROSUL) 325 (65 Fe) MG tablet    fluticasone (FLONASE) 50 MCG/ACT nasal spray    gabapentin  "(NEURONTIN) 300 MG capsule    ketotifen (ZADITOR) 0.025 % ophthalmic solution    lidocaine (XYLOCAINE) 5 % external ointment    loperamide (IMODIUM A-D) 2 MG tablet    loratadine-pseudoePHEDrine (CLARITIN-D 24-HOUR)  MG 24 hr tablet    meclizine (ANTIVERT) 25 MG tablet    meloxicam (MOBIC) 15 MG tablet    olopatadine (PATANOL) 0.1 % ophthalmic solution    ondansetron (ZOFRAN) 4 MG tablet    polyethylene glycol (MIRALAX) 17 GM/Dose powder    rosuvastatin (CRESTOR) 20 MG tablet    scopolamine (TRANSDERM) 1 MG/3DAYS 72 hr patch    SM STOOL SOFTENER 100 MG capsule    sulfaSALAzine ER (AZULFIDINE EN) 500 MG EC tablet    tofacitinib (XELJANZ XR) 11 MG 24 hr tablet    traZODone (DESYREL) 50 MG tablet    triamcinolone (KENALOG) 0.1 % external cream    vitamin D3 (CHOLECALCIFEROL) 50 mcg (2000 units) tablet     No current facility-administered medications for this visit.     Facility-Administered Medications Ordered in Other Visits   Medication    midazolam (VERSED) injection     Allergies   Allergies   Allergen Reactions    Contrast Dye Nausea and Vomiting    Diatrizoate Nausea and Vomiting    Percocet [Oxycodone-Acetaminophen] Nausea and Vomiting and Itching    Advil [Ibuprofen Micronized] Rash     Rash     Ibuprofen Rash     Family History: Mother with \"arthritis\"    Social History: Not currently working. Work at [employer], building them. Repetitive motion. No smoking/drugs/ETOH. Not . 4 kids who are healthy.       Objective:    Physical exam:  No vitals  Laying in bed  No facial rash  Unable to abduct bilateral shoulders above 90 degrees without discomfort.  Unable to make a full fist bilaterally due to pain and stiffness in her MCPs and PIPs.  Mild loss of MCP valleys bilaterally.  She has tenderness to self palpation over her bilateral wrists elbows and MCPs.    Labs:  WBC   Date Value Ref Range Status   02/15/2021 6.5 4.0 - 11.0 10e9/L Final     WBC Count   Date Value Ref Range Status "   06/23/2023 4.9 4.0 - 11.0 10e3/uL Final     Hemoglobin   Date Value Ref Range Status   06/23/2023 12.2 11.7 - 15.7 g/dL Final   02/15/2021 12.2 11.7 - 15.7 g/dL Final     Platelet Count   Date Value Ref Range Status   06/23/2023 213 150 - 450 10e3/uL Final   02/15/2021 219 150 - 450 10e9/L Final     Creatinine   Date Value Ref Range Status   06/23/2023 0.59 0.51 - 0.95 mg/dL Final   02/15/2021 0.54 0.52 - 1.04 mg/dL Final     Lab Results   Component Value Date    ALKPHOS 60 03/01/2022    ALKPHOS 55 02/15/2021     AST   Date Value Ref Range Status   06/23/2023 27 0 - 45 U/L Final     Comment:     Reference intervals for this test were updated on 6/12/2023 to more accurately reflect our healthy population. There may be differences in the flagging of prior results with similar values performed with this method. Interpretation of those prior results can be made in the context of the updated reference intervals.   02/15/2021 14 0 - 45 U/L Final     Lab Results   Component Value Date    ALT 21 03/01/2022    ALT 17 02/15/2021     Sed Rate   Date Value Ref Range Status   02/15/2021 20 0 - 20 mm/h Final     Erythrocyte Sedimentation Rate   Date Value Ref Range Status   06/23/2023 28 (H) 0 - 20 mm/hr Final     CRP Inflammation   Date Value Ref Range Status   08/16/2021 <2.9 0.0 - 8.0 mg/L Final   02/26/2020 <2.9 0.0 - 8.0 mg/L Final     UA RESULTS:  Recent Labs   Lab Test 05/18/21  0858   COLOR Yellow   APPEARANCE Clear   URINEGLC Negative   URINEBILI Negative   URINEKETONE Negative   SG 1.015   UBLD Large*   URINEPH 6.5   PROTEIN Negative   UROBILINOGEN 0.2   NITRITE Negative   LEUKEST Moderate*   RBCU 10-25*   WBCU *      RAMÍREZ Screen by EIA   Date Value Ref Range Status   05/21/2014 <1.0  Interpretation:  Negative   <1.0 Final     Rheumatoid Factor   Date Value Ref Range Status   05/21/2014 <20 <20 IU/mL Final     Cyclic Citrullinated Peptide Antibody, IgG   Date Value Ref Range Status   08/28/2017 1 <7 U/mL Final      Comment:     Negative       Imaging:  MRI LUMBAR SPINE WITHOUT CONTRAST   2/28/2020 3:51 PM      HISTORY: Radiculopathy, greater than  6 weeks conservative treatment,  persistent symptoms; radiculopathy - history of sacroiliitis. Family  history of spinal disease and severe stenosis - left-sided symptoms.  Spondyloarthropathy.      TECHNIQUE: Multiplanar multisequence MRI of the lumbar spine without  contrast.      COMPARISON: Lumbar spine MRI 9/7/2010. Correlation is also made with  prior pelvic CT 6/10/2019.     FINDINGS:  Alignment is normal. No fracture or significant vertebral  body height loss. The intervertebral discs are normal in height and  signal. No abnormal marrow signal in the lumbar spine. There is no  disc bulge or herniation. There is no spinal or neural foraminal  stenosis. Sclerotic changes about the sacroiliac joints with marginal  spurring. This appears overall similar (within the field-of-view) to  the most recent examinations.                                                                      IMPRESSION:  1. No acute abnormality of the lumbar spine. Specifically, no disc  bulge/herniation, marrow signal abnormality, or spinal or neural  foraminal stenosis.  2. Redemonstrated sclerosis with marginal spurring about the  sacroiliac joints, potentially representing changes related to  osteoarthritis. This may be secondary to prior sacroiliitis in the  setting of spondyloarthropathy given the patient's clinical history.  This is incompletely included within the field-of-view of this exam.

## 2023-06-30 NOTE — PATIENT INSTRUCTIONS
1) continue xeljanz and sulfasalazine (sulfasalazine refilled) and mobic  2) we will call you to schedule  -xrays of both hands and both wrists  -labs in 3 months  -virtual visit follow-up in 3 months

## 2023-07-05 ENCOUNTER — ANCILLARY PROCEDURE (OUTPATIENT)
Dept: GENERAL RADIOLOGY | Facility: CLINIC | Age: 48
End: 2023-07-05
Attending: INTERNAL MEDICINE
Payer: MEDICARE

## 2023-07-05 DIAGNOSIS — M47.819 SERONEGATIVE SPONDYLOARTHROPATHY: ICD-10-CM

## 2023-07-05 DIAGNOSIS — M45.8 ANKYLOSING SPONDYLITIS OF SACRAL REGION (H): ICD-10-CM

## 2023-07-05 PROCEDURE — 73130 X-RAY EXAM OF HAND: CPT | Mod: TC | Performed by: RADIOLOGY

## 2023-07-05 PROCEDURE — 73110 X-RAY EXAM OF WRIST: CPT | Mod: TC | Performed by: RADIOLOGY

## 2023-07-26 ENCOUNTER — TELEPHONE (OUTPATIENT)
Dept: RHEUMATOLOGY | Facility: CLINIC | Age: 48
End: 2023-07-26
Payer: MEDICARE

## 2023-07-26 NOTE — TELEPHONE ENCOUNTER
ROSSY and migdalia sent for the patient to call back and schedule the following:    Appointment type: Return  Provider: Dr. Hernandez  Return date: September 2023  Specialty phone number: [phone]  Additional appointment(s) needed: labs in 3 months   Additonal Notes:

## 2023-08-01 NOTE — TELEPHONE ENCOUNTER
Yes, patient can be seen in November as this is the first available appointment after she is due to be seen.  Dr. Hernandez will contact patient if lab results require a change in treatment plan.   Meghan Gustafson RN  [facility]

## 2023-08-01 NOTE — TELEPHONE ENCOUNTER
Pt schedule for labs 09/25. Pt was not able to get an apt sooner than 11/10. Please advise if okay for pt to wait till then

## 2023-08-22 DIAGNOSIS — H10.13 ALLERGIC CONJUNCTIVITIS, BILATERAL: ICD-10-CM

## 2023-08-22 RX ORDER — OLOPATADINE HYDROCHLORIDE 1 MG/ML
SOLUTION/ DROPS OPHTHALMIC
Qty: 15 ML | Refills: 0 | Status: SHIPPED | OUTPATIENT
Start: 2023-08-22 | End: 2024-04-25

## 2023-09-25 ENCOUNTER — LAB (OUTPATIENT)
Dept: LAB | Facility: CLINIC | Age: 48
End: 2023-09-25
Payer: MEDICARE

## 2023-09-25 ENCOUNTER — OFFICE VISIT (OUTPATIENT)
Dept: FAMILY MEDICINE | Facility: CLINIC | Age: 48
End: 2023-09-25
Attending: FAMILY MEDICINE
Payer: MEDICARE

## 2023-09-25 VITALS
WEIGHT: 118 LBS | DIASTOLIC BLOOD PRESSURE: 80 MMHG | OXYGEN SATURATION: 97 % | TEMPERATURE: 98.3 F | RESPIRATION RATE: 14 BRPM | BODY MASS INDEX: 23.79 KG/M2 | HEART RATE: 74 BPM | SYSTOLIC BLOOD PRESSURE: 122 MMHG | HEIGHT: 59 IN

## 2023-09-25 DIAGNOSIS — M45.8 ANKYLOSING SPONDYLITIS OF SACRAL REGION (H): ICD-10-CM

## 2023-09-25 DIAGNOSIS — M47.819 SERONEGATIVE SPONDYLOARTHROPATHY: ICD-10-CM

## 2023-09-25 DIAGNOSIS — Z79.899 HIGH RISK MEDICATION USE: ICD-10-CM

## 2023-09-25 DIAGNOSIS — H60.92 OTITIS EXTERNA OF LEFT EAR, UNSPECIFIED CHRONICITY, UNSPECIFIED TYPE: ICD-10-CM

## 2023-09-25 DIAGNOSIS — Z00.00 ENCOUNTER FOR MEDICARE ANNUAL WELLNESS EXAM: Primary | ICD-10-CM

## 2023-09-25 DIAGNOSIS — G47.00 INSOMNIA, UNSPECIFIED TYPE: ICD-10-CM

## 2023-09-25 DIAGNOSIS — G89.4 CHRONIC PAIN SYNDROME: ICD-10-CM

## 2023-09-25 DIAGNOSIS — H10.13 ALLERGIC CONJUNCTIVITIS, BILATERAL: ICD-10-CM

## 2023-09-25 DIAGNOSIS — Z23 ENCOUNTER FOR IMMUNIZATION: ICD-10-CM

## 2023-09-25 DIAGNOSIS — D50.9 IRON DEFICIENCY ANEMIA, UNSPECIFIED IRON DEFICIENCY ANEMIA TYPE: ICD-10-CM

## 2023-09-25 DIAGNOSIS — E78.00 HYPERCHOLESTEROLEMIA: ICD-10-CM

## 2023-09-25 DIAGNOSIS — R42 VERTIGO: ICD-10-CM

## 2023-09-25 DIAGNOSIS — R11.0 NAUSEA: ICD-10-CM

## 2023-09-25 DIAGNOSIS — M46.98 INFLAMMATORY SPONDYLOPATHY OF SACRAL REGION (H): ICD-10-CM

## 2023-09-25 DIAGNOSIS — Z00.00 WELLNESS EXAMINATION: ICD-10-CM

## 2023-09-25 DIAGNOSIS — L30.9 DERMATITIS: ICD-10-CM

## 2023-09-25 LAB
ALT SERPL W P-5'-P-CCNC: 6 U/L (ref 0–50)
AST SERPL W P-5'-P-CCNC: 21 U/L (ref 0–45)
BASOPHILS # BLD AUTO: 0 10E3/UL (ref 0–0.2)
BASOPHILS NFR BLD AUTO: 1 %
CREAT SERPL-MCNC: 0.53 MG/DL (ref 0.51–0.95)
CRP SERPL-MCNC: <3 MG/L
EGFRCR SERPLBLD CKD-EPI 2021: >90 ML/MIN/1.73M2
EOSINOPHIL # BLD AUTO: 0.2 10E3/UL (ref 0–0.7)
EOSINOPHIL NFR BLD AUTO: 3 %
ERYTHROCYTE [DISTWIDTH] IN BLOOD BY AUTOMATED COUNT: 11.8 % (ref 10–15)
ERYTHROCYTE [SEDIMENTATION RATE] IN BLOOD BY WESTERGREN METHOD: 35 MM/HR (ref 0–20)
HCT VFR BLD AUTO: 36.6 % (ref 35–47)
HGB BLD-MCNC: 12.5 G/DL (ref 11.7–15.7)
IMM GRANULOCYTES # BLD: 0 10E3/UL
IMM GRANULOCYTES NFR BLD: 0 %
LYMPHOCYTES # BLD AUTO: 1.9 10E3/UL (ref 0.8–5.3)
LYMPHOCYTES NFR BLD AUTO: 32 %
MCH RBC QN AUTO: 30.3 PG (ref 26.5–33)
MCHC RBC AUTO-ENTMCNC: 34.2 G/DL (ref 31.5–36.5)
MCV RBC AUTO: 89 FL (ref 78–100)
MONOCYTES # BLD AUTO: 0.5 10E3/UL (ref 0–1.3)
MONOCYTES NFR BLD AUTO: 8 %
NEUTROPHILS # BLD AUTO: 3.3 10E3/UL (ref 1.6–8.3)
NEUTROPHILS NFR BLD AUTO: 56 %
PLATELET # BLD AUTO: 237 10E3/UL (ref 150–450)
RBC # BLD AUTO: 4.12 10E6/UL (ref 3.8–5.2)
WBC # BLD AUTO: 5.9 10E3/UL (ref 4–11)

## 2023-09-25 PROCEDURE — 84450 TRANSFERASE (AST) (SGOT): CPT

## 2023-09-25 PROCEDURE — G0008 ADMIN INFLUENZA VIRUS VAC: HCPCS | Performed by: FAMILY MEDICINE

## 2023-09-25 PROCEDURE — 36415 COLL VENOUS BLD VENIPUNCTURE: CPT

## 2023-09-25 PROCEDURE — 99213 OFFICE O/P EST LOW 20 MIN: CPT | Mod: 25 | Performed by: FAMILY MEDICINE

## 2023-09-25 PROCEDURE — 84460 ALANINE AMINO (ALT) (SGPT): CPT

## 2023-09-25 PROCEDURE — 90686 IIV4 VACC NO PRSV 0.5 ML IM: CPT | Performed by: FAMILY MEDICINE

## 2023-09-25 PROCEDURE — 86140 C-REACTIVE PROTEIN: CPT

## 2023-09-25 PROCEDURE — 85025 COMPLETE CBC W/AUTO DIFF WBC: CPT

## 2023-09-25 PROCEDURE — 85652 RBC SED RATE AUTOMATED: CPT

## 2023-09-25 PROCEDURE — G0438 PPPS, INITIAL VISIT: HCPCS | Performed by: FAMILY MEDICINE

## 2023-09-25 PROCEDURE — 82565 ASSAY OF CREATININE: CPT

## 2023-09-25 RX ORDER — TRIAMCINOLONE ACETONIDE 1 MG/G
CREAM TOPICAL 2 TIMES DAILY
Qty: 30 G | Refills: 1 | Status: SHIPPED | OUTPATIENT
Start: 2023-09-25

## 2023-09-25 RX ORDER — CHOLECALCIFEROL (VITAMIN D3) 50 MCG
50 TABLET ORAL DAILY
Qty: 90 TABLET | Refills: 3 | Status: SHIPPED | OUTPATIENT
Start: 2023-09-25 | End: 2024-08-26

## 2023-09-25 RX ORDER — ONDANSETRON 4 MG/1
4 TABLET, FILM COATED ORAL EVERY 8 HOURS PRN
Qty: 12 TABLET | Refills: 4 | Status: SHIPPED | OUTPATIENT
Start: 2023-09-25

## 2023-09-25 RX ORDER — NEOMYCIN SULFATE, POLYMYXIN B SULFATE, HYDROCORTISONE 3.5; 10000; 1 MG/ML; [USP'U]/ML; MG/ML
3 SOLUTION/ DROPS AURICULAR (OTIC) 4 TIMES DAILY
Qty: 3 ML | Refills: 0 | Status: SHIPPED | OUTPATIENT
Start: 2023-09-25 | End: 2023-09-30

## 2023-09-25 RX ORDER — TRAZODONE HYDROCHLORIDE 50 MG/1
100 TABLET, FILM COATED ORAL
Qty: 60 TABLET | Refills: 11 | Status: SHIPPED | OUTPATIENT
Start: 2023-09-25 | End: 2024-09-11

## 2023-09-25 RX ORDER — FERROUS SULFATE 325(65) MG
TABLET ORAL
Qty: 90 TABLET | Refills: 3 | Status: SHIPPED | OUTPATIENT
Start: 2023-09-25 | End: 2024-08-26

## 2023-09-25 SDOH — HEALTH STABILITY: PHYSICAL HEALTH: ON AVERAGE, HOW MANY DAYS PER WEEK DO YOU ENGAGE IN MODERATE TO STRENUOUS EXERCISE (LIKE A BRISK WALK)?: 0 DAYS

## 2023-09-25 ASSESSMENT — LIFESTYLE VARIABLES
AUDIT-C TOTAL SCORE: 0
HOW MANY STANDARD DRINKS CONTAINING ALCOHOL DO YOU HAVE ON A TYPICAL DAY: PATIENT DOES NOT DRINK
HOW OFTEN DO YOU HAVE SIX OR MORE DRINKS ON ONE OCCASION: NEVER
HOW OFTEN DO YOU HAVE A DRINK CONTAINING ALCOHOL: NEVER
SKIP TO QUESTIONS 9-10: 1

## 2023-09-25 ASSESSMENT — ENCOUNTER SYMPTOMS
HEMATURIA: 0
PALPITATIONS: 0
FEVER: 0
COUGH: 0
DIZZINESS: 1
MYALGIAS: 1
EYE PAIN: 0
HEMATOCHEZIA: 0
BREAST MASS: 0
HEARTBURN: 0
DYSURIA: 0
SHORTNESS OF BREATH: 0
CONSTIPATION: 0
PARESTHESIAS: 0
ABDOMINAL PAIN: 0
HEADACHES: 1
CHILLS: 1
DIARRHEA: 0
NERVOUS/ANXIOUS: 0
ARTHRALGIAS: 1
FREQUENCY: 0
SORE THROAT: 0

## 2023-09-25 ASSESSMENT — ACTIVITIES OF DAILY LIVING (ADL)
CURRENT_FUNCTION: LAUNDRY REQUIRES ASSISTANCE
CURRENT_FUNCTION: PREPARING MEALS REQUIRES ASSISTANCE
CURRENT_FUNCTION: HOUSEWORK REQUIRES ASSISTANCE
CURRENT_FUNCTION: SHOPPING REQUIRES ASSISTANCE

## 2023-09-25 ASSESSMENT — SOCIAL DETERMINANTS OF HEALTH (SDOH)
DO YOU BELONG TO ANY CLUBS OR ORGANIZATIONS SUCH AS CHURCH GROUPS UNIONS, FRATERNAL OR ATHLETIC GROUPS, OR SCHOOL GROUPS?: NO
HOW OFTEN DO YOU GET TOGETHER WITH FRIENDS OR RELATIVES?: NEVER
HOW OFTEN DO YOU ATTENT MEETINGS OF THE CLUB OR ORGANIZATION YOU BELONG TO?: NEVER
IN A TYPICAL WEEK, HOW MANY TIMES DO YOU TALK ON THE PHONE WITH FAMILY, FRIENDS, OR NEIGHBORS?: TWICE A WEEK
ARE YOU MARRIED, WIDOWED, DIVORCED, SEPARATED, NEVER MARRIED, OR LIVING WITH A PARTNER?: NEVER MARRIED
HOW OFTEN DO YOU ATTEND CHURCH OR RELIGIOUS SERVICES?: NEVER

## 2023-09-25 NOTE — PROGRESS NOTES
"SUBJECTIVE:   oZ is a 48 year old who presents for Preventive Visit.  She has a rash on her right lower leg that flares off and on.  It comes and goes for years.  She does not have a cream..   it itches off and on.   The itching makes it puffy.     Also her left ear has been hurting.   It does itch at times too.   This has been for a couple weeks.   No drainage.   Her arthritis is stable but not great.   Her shoulders and elbows hurt.   She continues to see rheum.   Her hands are puffy and stiff too at times making it hard to .   She had labs done today for rheum.          9/25/2023    10:31 AM   Additional Questions   Roomed by Jennifer       Are you in the first 12 months of your Medicare coverage?  No    Healthy Habits:     In general, how would you rate your overall health?  Poor    Frequency of exercise:  None    Do you usually eat at least 4 servings of fruit and vegetables a day, include whole grains    & fiber and avoid regularly eating high fat or \"junk\" foods?  Yes    Taking medications regularly:  Yes    Medication side effects:  Other    Ability to successfully perform activities of daily living:  Shopping requires assistance, preparing meals requires assistance, housework requires assistance and laundry requires assistance    Home Safety:  No safety concerns identified    Hearing Impairment:  No hearing concerns    In the past 6 months, have you been bothered by leaking of urine? Yes    In general, how would you rate your overall mental or emotional health?  Fair    Additional concerns today:  No          Have you ever done Advance Care Planning? (For example, a Health Directive, POLST, or a discussion with a medical provider or your loved ones about your wishes): Yes, patient states has an Advance Care Planning document and will bring a copy to the clinic.       Fall risk  Fallen 2 or more times in the past year?: No  Any fall with injury in the past year?: No    Cognitive Screening   1) Repeat 3 " items (Leader, Season, Table)    2) Clock draw: NORMAL  3) 3 item recall: Recalls 1 object   Results: NORMAL clock, 1-2 items recalled: COGNITIVE IMPAIRMENT LESS LIKELY    Mini-CogTM Copyright ERNA Dubois. Licensed by the author for use in [facility]; reprinted with permission (nancy@[facility]). All rights reserved.      Do you have sleep apnea, excessive snoring or daytime drowsiness? : no    Reviewed and updated as needed this visit by clinical staff   Tobacco  Allergies  Meds              Reviewed and updated as needed this visit by Provider                 Social History     Tobacco Use    Smoking status: Never    Smokeless tobacco: Never   Substance Use Topics    Alcohol use: Not Currently             9/25/2023    10:22 AM   Alcohol Use   Prescreen: >3 drinks/day or >7 drinks/week? Not Applicable     Do you have a current opioid prescription? No  Do you use any other controlled substances or medications that are not prescribed by a provider? None          Current providers sharing in care for this patient include:   Patient Care Team:  Terri Robles MD as PCP - General (Family Practice)  Terri Robles MD as Assigned PCP  James Hernandez MD as Assigned Rheumatology Provider  Hayde Godwin RN as Personal Advocate & Liaison (PAL) (Family Medicine)    The following health maintenance items are reviewed in Epic and correct as of today:  Health Maintenance   Topic Date Due    COVID-19 Vaccine (3 - Moderna risk series) 12/15/2021    MEDICARE ANNUAL WELLNESS VISIT  03/01/2023    CMP  08/03/2023    ANNUAL REVIEW OF HM ORDERS  08/29/2023    INFLUENZA VACCINE (1) 09/01/2023    MAMMO SCREENING  12/14/2023    CBC  12/25/2023    ADVANCE CARE PLANNING  03/01/2027    LIPID  05/18/2028    DTAP/TDAP/TD IMMUNIZATION (3 - Td or Tdap) 02/26/2030    COLORECTAL CANCER SCREENING  11/12/2031    HEPATITIS C SCREENING  Completed    HIV SCREENING  Completed    PHQ-2 (once per calendar year)  Completed     "Pneumococcal Vaccine: Pediatrics (0 to 5 Years) and At-Risk Patients (6 to 64 Years)  Completed    IPV IMMUNIZATION  Aged Out    HPV IMMUNIZATION  Aged Out    MENINGITIS IMMUNIZATION  Aged Out    HPV TEST  Discontinued    URINE DRUG SCREEN  Discontinued    PAP  Discontinued    HEPATITIS B IMMUNIZATION  Discontinued             3/1/2022    10:54 AM 3/11/2022     4:42 PM   Breast CA Risk Assessment (FHS-7)   Do you have a family history of breast, colon, or ovarian cancer? No / Unknown No / Unknown         Mammogram Screening: Recommended annual mammography  Pertinent mammograms are reviewed under the imaging tab.    Review of Systems   Constitutional:  Positive for chills. Negative for fever.   HENT:  Positive for ear pain. Negative for congestion, hearing loss and sore throat.    Eyes:  Negative for pain and visual disturbance.   Respiratory:  Negative for cough and shortness of breath.    Cardiovascular:  Positive for chest pain. Negative for palpitations.   Gastrointestinal:  Negative for abdominal pain, constipation, diarrhea, heartburn and hematochezia.   Breasts:  Negative for tenderness, breast mass and discharge.   Genitourinary:  Negative for dysuria, frequency, genital sores, hematuria, pelvic pain, urgency, vaginal bleeding and vaginal discharge.   Musculoskeletal:  Positive for arthralgias and myalgias.   Skin:  Negative for rash.   Neurological:  Positive for dizziness and headaches. Negative for paresthesias.   Psychiatric/Behavioral:  The patient is not nervous/anxious.          OBJECTIVE:   /80 (BP Location: Right arm, Patient Position: Chair, Cuff Size: Adult Regular)   Pulse 74   Temp 98.3  F (36.8  C) (Oral)   Resp 14   Ht 1.499 m (4' 11\")   Wt 53.5 kg (118 lb)   LMP 03/26/2019 (Within Days)   SpO2 97%   BMI 23.83 kg/m   Estimated body mass index is 23.83 kg/m  as calculated from the following:    Height as of this encounter: 1.499 m (4' 11\").    Weight as of this encounter: 53.5 kg " (118 lb).  Physical Exam  GENERAL: healthy, alert and no distress  EYES: Eyes grossly normal to inspection, PERRL and conjunctivae and sclerae normal  HENT: TM's are normal,   the left canal with  mild redness and Left TM slightly dull but not red or bulging, nose and mouth without ulcers or lesions  NECK: no adenopathy, no asymmetry, masses, or scars and thyroid normal to palpation  RESP: lungs clear to auscultation - no rales, rhonchi or wheezes  BREAST: normal without masses, tenderness or nipple discharge and no palpable axillary masses or adenopathy  CV: regular rate and rhythm, normal S1 S2, no S3 or S4, no murmur, click or rub, no peripheral edema and peripheral pulses strong  ABDOMEN: soft, nontender, no hepatosplenomegaly, no masses and bowel sounds normal  MS: her MCP joints a mildly puffy and pink with some tenderness.   SKIN:anterior right shin with patch about the size of quarter - mildly red and bumpy and slightly itchy   NEURO: Normal strength and tone, mentation intact and speech normal  PSYCH: mentation appears normal, affect normal/bright  LYMPH: no cervical, supraclavicular, axillary, or inguinal adenopathy    Diagnostic Test Results:  Labs reviewed in Epic    ASSESSMENT / PLAN:   (Z00.00) Encounter for Medicare annual wellness exam  (primary encounter diagnosis)  Comment:   Plan: no labs and mammo up to date - another due in the winter     (Z00.00) Wellness examination  Comment:   Plan: PRIMARY CARE FOLLOW-UP SCHEDULING, REVIEW OF         HEALTH MAINTENANCE PROTOCOL ORDERS, INFLUENZA         VACCINE IM > 6 MONTHS VALENT IIV4         (AFLURIA/FLUZONE)            (M46.98) Inflammatory spondylopathy of sacral region (H)  Comment: stable  Plan: ondansetron (ZOFRAN) 4 MG tablet        Refills per epicare     (E78.00) Hypercholesterolemia  Comment: stable  Plan: not due for check - done last spring     (G47.00) Insomnia, unspecified type  Comment:   Plan: traZODone (DESYREL) 50 MG tablet        Refills  per epicare     (G89.4) Chronic pain syndrome  Comment: stable  Plan: ondansetron (ZOFRAN) 4 MG tablet        Uses prn     (R11.0) Nausea  Comment:   Plan: ondansetron (ZOFRAN) 4 MG tablet        Refills per epicare     (H10.13) Allergic conjunctivitis, bilateral  Comment: off and on   Plan: ketotifen fumarate 0.035%, ketotifen 0.025%,         (ZADITOR) 0.025 % ophthalmic solution        Added refills    (L30.9) Dermatitis  Comment: on leg  Plan: triamcinolone (KENALOG) 0.1 % external cream            (Z23) Encounter for immunization  Comment:   Plan: INFLUENZA VACCINE IM > 6 MONTHS VALENT IIV4         (AFLURIA/FLUZONE)            (H60.92) Otitis externa of left ear, unspecified chronicity, unspecified type  Comment:   Plan: neomycin-polymyxin-hydrocortisone (CORTISPORIN)        [id number] otic solution            Patient has been advised of split billing requirements and indicates understanding: Yes      COUNSELING:  Reviewed preventive health counseling, as reflected in patient instructions  Special attention given to:       Regular exercise       Dental care       Immunizations  Vaccinated for: Influenza           Osteoporosis prevention/bone health       Colon cancer screening        She reports that she has never smoked. She has never used smokeless tobacco.      Appropriate preventive services were discussed with this patient, including applicable screening as appropriate for cardiovascular disease, diabetes, osteopenia/osteoporosis, and glaucoma.  As appropriate for age/gender, discussed screening for colorectal cancer, prostate cancer, breast cancer, and cervical cancer. Checklist reviewing preventive services available has been given to the patient.    Reviewed patients plan of care and provided an AVS. The Basic Care Plan (routine screening as documented in Health Maintenance) for Zo meets the Care Plan requirement. This Care Plan has been established and reviewed with the Patient.          Terri KINGSTON  MD [place]  [facility]    Identified Health Risks:

## 2023-09-25 NOTE — COMMUNITY RESOURCES LIST (ENGLISH)
09/25/2023   [facility] - Outpatient Clinics  N/A  For additional resource needs, please contact your health insurance member services or your primary care team.  Phone: [phone]   Email: N/A   Address: [address] [place] [id number]   Hours: N/A        Exercise and Recreation       Gym or workout facility  1  [facility] - Kickboxing Classes Distance: 1.41 miles      In-Person   [address] [place] [address]  Language: English  Hours: Mon - Fri 6:00 AM - 12:30 PM , Mon - Fri 3:00 PM - 8:00 PM , Sat 8:00 AM - 12:00 PM  Fees: Self Pay   Phone: ([phone]) [phone] Website: [email or web address]     2  [employer] [facility] [place] Distance: 5.08 miles      In-Person   [address] [place] [address]  Language: English  Hours: Mon - Sun Open 24 Hours  Fees: Insurance, Self Pay, Sliding Fee   Phone: [phone]) [phone] Email: priyamn@4INFO Website: [email or web address]/[email or web address]/pbazuvmzn-[email or web address]          Important Numbers & Websites       [address]org  Poison Control   (951) 881-7262 [place]  Suicide and Crisis Lifeline   [address]  Childhelp National Child Abuse Hotline   629.536.3967 Childhelphotline.org  National Sexual Assault Hotline   (798) 263-4391 (HOPE) Rainn.org  National Runaway Safeline   (875) 845-1976 (RUNAWAY) [facility].org  Pregnancy & Postpartum Support [place]   Call/text 729-380-7071 Ppsupportmn.org  Substance Abuse National Helpline ([facility]   344-085-HELP (7343) Findtreatment.gov  Emergency Services   911

## 2023-09-25 NOTE — COMMUNITY RESOURCES LIST (ENGLISH)
09/25/2023    [employer]  N/A  For questions about this resource list or additional care needs, please contact your primary care clinic or care manager.  Phone: [phone]   Email: N/A   Address: [address] [place] [address]   Hours: N/A        Exercise and Recreation       Gym or workout facility  1  [address] [place] [address] - Kickboxing Classes Distance: 1.41 miles      In-Person   [address] [place] [address]  Language: English  Hours: Mon - Fri 6:00 AM - 12:30 PM , Mon - Fri 3:00 PM - 8:00 PM , Sat 8:00 AM - 12:00 PM  Fees: Self Pay   Phone: [phone] Website: [email or web address]     2  [employer] [facility] [place] Distance: 5.08 miles      In-Person   [address] [place] [address]  Language: English  Hours: Mon - Sun Open 24 Hours  Fees: Insurance, Self Pay, Sliding Fee   Phone: [phone]) [phone] Email: [email or web address]@[email or web address] Website: [email or web address]          Important Numbers & Websites       Emergency Services   911  [facility] Services   311  Poison Control   (369) 882-4206  Suicide Prevention Lifeline   (499) 105-4899 (TALK)  Child Abuse Hotline   (118) 839-5864 (4-A-Child)  Sexual Assault Hotline   (628) 528-7837 (HOPE)  National Runaway Safeline   (299) 638-5501 (RUNAWAY)  All-Options Talkline   (700) 822-9788  Substance Abuse Referral   (307) 412-9966 (HELP)

## 2023-09-25 NOTE — PATIENT INSTRUCTIONS
Patient Education   Personalized Prevention Plan  You are due for the preventive services outlined below.  Your care team is available to assist you in scheduling these services.  If you have already completed any of these items, please share that information with your care team to update in your medical record.  Health Maintenance Due   Topic Date Due     COVID-19 Vaccine (3 - Moderna risk series) 12/15/2021     Annual Wellness Visit  03/01/2023     Comprehensive Metabolic Panel  08/03/2023     ANNUAL REVIEW OF HM ORDERS  08/29/2023     Flu Vaccine (1) 09/01/2023

## 2023-09-26 ENCOUNTER — MYC MEDICAL ADVICE (OUTPATIENT)
Dept: FAMILY MEDICINE | Facility: CLINIC | Age: 48
End: 2023-09-26
Payer: MEDICARE

## 2023-09-26 DIAGNOSIS — L20.9 ATOPIC DERMATITIS OF SCALP: Primary | ICD-10-CM

## 2023-09-26 RX ORDER — BETAMETHASONE DIPROPIONATE 0.5 MG/ML
LOTION, AUGMENTED TOPICAL 2 TIMES DAILY
Qty: 60 ML | Refills: 0 | Status: SHIPPED | OUTPATIENT
Start: 2023-09-26 | End: 2023-09-27

## 2023-09-27 NOTE — TELEPHONE ENCOUNTER
Sent scalp med to pharmacy.   Can you let them know and also set up recheck in 4 weeks - can be virtual

## 2023-09-27 NOTE — TELEPHONE ENCOUNTER
RN spoke to Zo who reports itching scalp concern is regarding her mother. RN advised that patient information must be sent under correct patient chart for many reasons, including patient safety.  Advised if Zo if she would like to access another patients mychart, she and the patient will need to come to the clinic to fill out accordingly. Advised questions and concerns need to be put in the correct patient chart. Zo verbalized understanding.     RN cancelled prescription as medication is not for Zo.      Encounter created under correct patient, will close this encounter.     Zo requests follow-up appointment with PCP. RN scheduled.     Hayde MONTEIRO RN, BSN, PHN - PAL (patient advocate liaison)  [facility]  [phone]

## 2023-09-28 RX ORDER — SULFASALAZINE 500 MG/1
1000 TABLET, DELAYED RELEASE ORAL 2 TIMES DAILY
Qty: 360 TABLET | Refills: 0 | Status: SHIPPED | OUTPATIENT
Start: 2023-09-28 | End: 2023-12-29

## 2023-09-28 NOTE — TELEPHONE ENCOUNTER
sulfaSALAzine ER (AZULFIDINE EN) 500 MG EC tablet     360 tablet 0 6/30/2023         CBC RESULTS:   Recent Labs   Lab Test 09/25/23  1010   WBC 5.9   RBC 4.12   HGB 12.5   HCT 36.6   MCV 89   MCH 30.3   MCHC 34.2   RDW 11.8          Creatinine   Date Value Ref Range Status   09/25/2023 0.53 0.51 - 0.95 mg/dL Final   02/15/2021 0.54 0.52 - 1.04 mg/dL Final   ]    Liver Function Studies -   Recent Labs   Lab Test 09/25/23  1010 05/18/23  1030 02/03/23  0928   PROTTOTAL  --   --  8.1   ALBUMIN  --   --  4.6   BILITOTAL  --   --  0.3   ALKPHOS  --   --  57   AST 21   < > 23   ALT 6   < > 12    < > = values in this interval not displayed.       Routing refill request to provider for review/approval because: provider review

## 2023-09-28 NOTE — PROGRESS NOTES
"Zo Qureshi is a 45 year old female who is being evaluated via a billable telephone visit.      The patient has been notified of following:     \"This telephone visit will be conducted via a call between you and your physician/provider. We have found that certain health care needs can be provided without the need for a physical exam.  This service lets us provide the care you need with a short phone conversation.  If a prescription is necessary we can send it directly to your pharmacy.  If lab work is needed we can place an order for that and you can then stop by our lab to have the test done at a later time.    Telephone visits are billed at different rates depending on your insurance coverage. During this emergency period, for some insurers they may be billed the same as an in-person visit.  Please reach out to your insurance provider with any questions.    If during the course of the call the physician/provider feels a telephone visit is not appropriate, you will not be charged for this service.\"    Patient has given verbal consent for Telephone visit?  Yes    What phone number would you like to be contacted at? [phone]    How would you like to obtain your AVS? Taj Coon     Zo Qureshi is a 45 year old female who presents via phone visit today for the following health issues:    Her medications are not being covered.   Her insurance is requesting a written statement saying that the meds are required and being used for work injury.    The medications are working fairly well for her.   She also is having some allergies with her eyes and would like an eye drops that she got in the past.     HPI  Medication Followup of Gabapentin    Taking Medication as prescribed: yes    Side Effects:  None    Medication Helping Symptoms:  yes     Past Medical History:   Diagnosis Date     Anemia      History of blood transfusion 2000    after vaginal delivery, 2 units PRBCs given     HSIL on Pap smear 09/21/11    " MARTA 2 and 3     INFLAM SPONDYLOPATHY NOS 01/07/2008    check labs on sulfasalzine every 3 months and check hep B and C and TB every 2 years     Leukocytopenia 3/10/2014     Tendinosis      Thrombocytopenia (H) 3/10/2014     Unspecified closed fracture of pelvis 2000    left fracture of pelvis after delivery       Past Surgical History:   Procedure Laterality Date     C NONSPECIFIC PROCEDURE  10/00    after delivery 2 units of prbcs secondary to placenta     DILATION AND CURETTAGE, ABLATE ENDOMETRIUM NOVASURE, COMBINED N/A 12/4/2018    Procedure: novasure endometrial ablation, myosure resection of leiomyoma, dilation and curettage;  Surgeon: Rolando Covarrubias MD;  Location: [facility] OR     GYN SURGERY  12/04/2018    fibroid removal     LEEP TX, CERVICAL  12/19/11    MARTA II     OPERATIVE HYSTEROSCOPY WITH MORCELLATOR N/A 12/4/2018    Procedure: OPERATIVE HYSTEROSCOPY WITH MORCELLATOR;  Surgeon: Rolando Covarrubias MD;  Location: [facility] OR     TUBAL LIGATION  5/2009    laparoscopic tubal ligation       MEDICATIONS:  Current Outpatient Medications   Medication     benzonatate (TESSALON) 100 MG capsule     clindamycin-benzoyl peroxide (BENZACLIN) gel     diclofenac 1 % TD topical gel     docusate sodium (COLACE) 100 MG tablet     DULoxetine (CYMBALTA) 30 MG capsule     ferrous sulfate (FEROSUL) 325 (65 Fe) MG tablet     fluticasone (FLONASE) 50 MCG/ACT nasal spray     gabapentin (NEURONTIN) 300 MG capsule     hydrocortisone (WESTCORT) 0.2 % external cream     ketotifen (ZADITOR/REFRESH ANTI-ITCH) 0.025 % ophthalmic solution     loratadine (CLARITIN) 10 MG tablet     naproxen (NAPROSYN) 500 MG PO tablet     ondansetron (ZOFRAN-ODT) 4 MG ODT tab     sulfaSALAzine ER (AZULFIDINE EN) 500 MG EC tablet     traMADol (ULTRAM) 50 MG tablet     traZODone (DESYREL) 50 MG tablet     No current facility-administered medications for this visit.        SOCIAL HISTORY:  Social History     Tobacco Use     Smoking status: Never Smoker      Smokeless tobacco: Never Used   Substance Use Topics     Alcohol use: Not Currently     Alcohol/week: 0.0 standard drinks     Frequency: Never     Drinks per session: Patient refused     Binge frequency: Never     Comment: rarely       Family History   Problem Relation Age of Onset     Hypertension Father      Lipids Father      Hypertension Mother      Lipids Mother      Diabetes No family hx of      Coronary Artery Disease No family hx of      Hyperlipidemia No family hx of      Cerebrovascular Disease No family hx of      Breast Cancer No family hx of      Colon Cancer No family hx of      Prostate Cancer No family hx of      Other Cancer No family hx of      Depression No family hx of      Anxiety Disorder No family hx of      Mental Illness No family hx of      Substance Abuse No family hx of      Anesthesia Reaction No family hx of      Asthma No family hx of      Osteoporosis No family hx of      Genetic Disorder No family hx of      Thyroid Disease No family hx of      Obesity No family hx of      Unknown/Adopted No family hx of               Review of Systems   Constitutional, HEENT, cardiovascular, pulmonary, gi and gu systems are negative, except as otherwise noted.       Objective   Reported vitals:  LMP 03/26/2019 (Within Days)    healthy, alert and no distress  PSYCH: Alert and oriented times 3; coherent speech, normal   rate and volume, able to articulate logical thoughts, able   to abstract reason, no tangential thoughts, no hallucinations   or delusions  Her affect is normal, pleasant and full  RESP: No cough, no audible wheezing, able to talk in full sentences  Remainder of exam unable to be completed due to telephone visits    Diagnostic Test Results:  Labs reviewed in Epic        Assessment/Plan:  1. Cervicalgia  Stable  Refills per epicare     - DULoxetine (CYMBALTA) 30 MG capsule; Take 2 capsules (60 mg) by mouth daily  Dispense: 180 capsule; Refill: 3  - gabapentin (NEURONTIN) 300 MG capsule;  1 tab in am and 1 at 5 pm  and 2 at bedtime for (4 per day)  Dispense: 120 capsule; Refill: 6  - naproxen (NAPROSYN) 500 MG tablet; Take 1 tablet (500 mg) by mouth 2 times daily (with meals)  Dispense: 60 tablet; Refill: 6    2. Right shoulder tendonitis  Stable  The potential side effects of the prescription medication were discussed with the patient.     - DULoxetine (CYMBALTA) 30 MG capsule; Take 2 capsules (60 mg) by mouth daily  Dispense: 180 capsule; Refill: 3  - gabapentin (NEURONTIN) 300 MG capsule; 1 tab in am and 1 at 5 pm  and 2 at bedtime for (4 per day)  Dispense: 120 capsule; Refill: 6  - naproxen (NAPROSYN) 500 MG tablet; Take 1 tablet (500 mg) by mouth 2 times daily (with meals)  Dispense: 60 tablet; Refill: 6    3. Right peroneal tendinosis  stable  - DULoxetine (CYMBALTA) 30 MG capsule; Take 2 capsules (60 mg) by mouth daily  Dispense: 180 capsule; Refill: 3  - gabapentin (NEURONTIN) 300 MG capsule; 1 tab in am and 1 at 5 pm  and 2 at bedtime for (4 per day)  Dispense: 120 capsule; Refill: 6  - naproxen (NAPROSYN) 500 MG tablet; Take 1 tablet (500 mg) by mouth 2 times daily (with meals)  Dispense: 60 tablet; Refill: 6    4. Disorder of SI (sacroiliac) joint  stable  - traMADol (ULTRAM) 50 MG tablet; Take 1 tablet (50 mg) by mouth every 6 hours as needed for severe pain  Dispense: 60 tablet; Refill: 0    5. Episodic tension-type headache, not intractable  stable  - traMADol (ULTRAM) 50 MG tablet; Take 1 tablet (50 mg) by mouth every 6 hours as needed for severe pain  Dispense: 60 tablet; Refill: 0    6. Insomnia, unspecified type due to above  Stable   - traZODone (DESYREL) 50 MG tablet; Take 1 tablet (50 mg) by mouth nightly as needed for sleep  Dispense: 60 tablet; Refill: 6    7. Spondyloarthropathy  Underlying condition - not workers comp related  - sulfaSALAzine ER (AZULFIDINE EN) 500 MG EC tablet; 1000mg in the AM and 1000mg in the PM daily  Dispense: 120 tablet; Refill: 3    8. Allergic  conjunctivitis, bilateral  Not workers comp related  - olopatadine (PATANOL) 0.1 % ophthalmic solution; Place 1 drop into both eyes 2 times daily  Dispense: 5 mL; Refill: 1    Return in about 3 months (around 9/1/2020) for Physical Exam.      Phone call duration:  10 minutes    Terri Robles MD         no

## 2023-10-06 ENCOUNTER — TELEPHONE (OUTPATIENT)
Dept: RHEUMATOLOGY | Facility: CLINIC | Age: 48
End: 2023-10-06
Payer: MEDICARE

## 2023-10-06 NOTE — TELEPHONE ENCOUNTER
Patient Contacted for the patient to call back and schedule the following:    Appointment type: Virtual return visit  Provider: Dr. Hernandez  Return date: November 24th 2023  Specialty phone number: [phone]  Additional appointment(s) needed: NA  Additonal Notes: NA

## 2023-11-06 ENCOUNTER — OFFICE VISIT (OUTPATIENT)
Dept: FAMILY MEDICINE | Facility: CLINIC | Age: 48
End: 2023-11-06
Payer: MEDICARE

## 2023-11-06 VITALS
TEMPERATURE: 98.4 F | HEART RATE: 75 BPM | DIASTOLIC BLOOD PRESSURE: 82 MMHG | SYSTOLIC BLOOD PRESSURE: 120 MMHG | OXYGEN SATURATION: 97 % | BODY MASS INDEX: 24.23 KG/M2 | WEIGHT: 120.2 LBS | HEIGHT: 59 IN | RESPIRATION RATE: 14 BRPM

## 2023-11-06 DIAGNOSIS — M46.98 INFLAMMATORY SPONDYLOPATHY OF SACRAL REGION (H): ICD-10-CM

## 2023-11-06 DIAGNOSIS — E78.5 HYPERLIPIDEMIA LDL GOAL <130: ICD-10-CM

## 2023-11-06 DIAGNOSIS — M77.8 RIGHT SHOULDER TENDONITIS: ICD-10-CM

## 2023-11-06 DIAGNOSIS — G89.4 CHRONIC PAIN SYNDROME: Primary | ICD-10-CM

## 2023-11-06 DIAGNOSIS — M67.88 RIGHT PERONEAL TENDINOSIS: ICD-10-CM

## 2023-11-06 DIAGNOSIS — M54.2 CERVICALGIA: ICD-10-CM

## 2023-11-06 DIAGNOSIS — M79.662 PAIN OF LEFT LOWER LEG: ICD-10-CM

## 2023-11-06 PROBLEM — H53.021 ANISOMETROPIC AMBLYOPIA OF RIGHT EYE: Status: ACTIVE | Noted: 2022-05-24

## 2023-11-06 PROCEDURE — 99214 OFFICE O/P EST MOD 30 MIN: CPT | Performed by: FAMILY MEDICINE

## 2023-11-06 RX ORDER — ROSUVASTATIN CALCIUM 20 MG/1
20 TABLET, COATED ORAL DAILY
Qty: 90 TABLET | Refills: 3 | Status: SHIPPED | OUTPATIENT
Start: 2023-11-06 | End: 2024-09-11

## 2023-11-06 RX ORDER — DULOXETIN HYDROCHLORIDE 30 MG/1
60 CAPSULE, DELAYED RELEASE ORAL DAILY
Qty: 180 CAPSULE | Refills: 1 | Status: SHIPPED | OUTPATIENT
Start: 2023-11-06 | End: 2024-03-11

## 2023-11-06 RX ORDER — GABAPENTIN 300 MG/1
CAPSULE ORAL
Qty: 360 CAPSULE | Refills: 1 | Status: SHIPPED | OUTPATIENT
Start: 2023-11-06 | End: 2024-03-11

## 2023-11-06 RX ORDER — TRAMADOL HYDROCHLORIDE 50 MG/1
TABLET ORAL
Qty: 10 TABLET | Refills: 0 | Status: SHIPPED | OUTPATIENT
Start: 2023-11-06 | End: 2024-03-11

## 2023-11-06 NOTE — PROGRESS NOTES
Assessment & Plan     Chronic pain syndrome  Signed CSA and gave 10 tramadol but discussed this will not be ideal for long term management if that ends up being what we need  - XR Lumbar Spine 2/3 Views  - traMADol (ULTRAM) 50 MG tablet  Dispense: 10 tablet; Refill: 0  The potential side effects of the prescription medication were discussed with the patient.     Inflammatory spondylopathy of sacral region (H24)  Seeing rheum - likely contributing to the situation   - XR Lumbar Spine 2/3 Views  - traMADol (ULTRAM) 50 MG tablet  Dispense: 10 tablet; Refill: 0  - Physical Therapy Referral    Pain of left lower leg  Wonder about muscular vs spinal   - XR Lumbar Spine 2/3 Views  - traMADol (ULTRAM) 50 MG tablet  Dispense: 10 tablet; Refill: 0  - Physical Therapy Referral    Hyperlipidemia LDL goal <130  Under control  Added refills   - rosuvastatin (CRESTOR) 20 MG tablet  Dispense: 90 tablet; Refill: 3    Right peroneal tendinosis  Stable  Continue  Refills per epicare   - gabapentin (NEURONTIN) 300 MG capsule  Dispense: 360 capsule; Refill: 1  - DULoxetine (CYMBALTA) 30 MG capsule  Dispense: 180 capsule; Refill: 1    Right shoulder tendonitis  stable  - gabapentin (NEURONTIN) 300 MG capsule  Dispense: 360 capsule; Refill: 1  - DULoxetine (CYMBALTA) 30 MG capsule  Dispense: 180 capsule; Refill: 1    Cervicalgia  stable  - gabapentin (NEURONTIN) 300 MG capsule  Dispense: 360 capsule; Refill: 1  - DULoxetine (CYMBALTA) 30 MG capsule  Dispense: 180 capsule; Refill: 1        28 minutes spent by me on the date of the encounter doing chart review, history and exam, documentation and further activities per the note       See Patient Instructions    Terri Robles MD  [facility]    Shaggy Pathak is a 48 year old, presenting for the following health issues:   she is here to recheck on neck and right arm/wrist pain and now more left sided pain starting in low back and going into the left buttock  and down back of left leg.   She also notes even more troublesome is left posterior leg numbness.    This has been for about a month.    She had MRI done 3.5 years ago which did not show any foraminal stenosis or disc issues.   Her father has severe spinal stenosis and multiple levels and she has been dx with inflammatory spondylopathy since 2008.   She sees Dr. David mccormack 3-6 months and has regular labs.   She is on sulfasalazine and xeljanz for this.   It has stabilized her issues but she feels it has not helped them get better.     RECHECK (Follow up mid-lower back, shoulder and neck pain-5/24/23 visit)      11/6/2023     1:24 PM   Additional Questions   Roomed by Shanon   Accompanied by Self       History of Present Illness       Back Pain:  She presents for follow up of back pain. Patient's back pain is a chronic problem.  Location of back pain:  Right lower back, left lower back, right middle of back, left middle of back, right side of neck, left side of neck, right shoulder, left shoulder, right buttock and left buttock  Description of back pain: sharp  Back pain spreads: right buttocks, left buttocks, left thigh, left knee, left foot, right shoulder, left shoulder, right side of neck and left side of neck    Since patient first noticed back pain, pain is: always present, but gets better and worse  Does back pain interfere with her job:  Yes       Reason for visit:  Neck and shoulders    She eats 2-3 servings of fruits and vegetables daily.She consumes 0 sweetened beverage(s) daily.   She is taking medications regularly.       Past Medical History:   Diagnosis Date    Anemia     History of blood transfusion 2000    after vaginal delivery, 2 units PRBCs given    HSIL on Pap smear 09/21/2011    MARTA 2 and 3    Immune to hepatitis B 08/2021    hep B antigen NEG    INFLAM SPONDYLOPATHY NOS 01/07/2008    check labs on sulfasalzine every 3 months and check hep B and C and TB every 2 years    Leukocytopenia  03/10/2014    Tendinosis     Thrombocytopenia (H24) 03/10/2014    Unspecified closed fracture of pelvis 2000    left fracture of pelvis after delivery       Past Surgical History:   Procedure Laterality Date    COLONOSCOPY Left 11/12/2021    recheck in 10 years    DILATION AND CURETTAGE, ABLATE ENDOMETRIUM NOVASURE, COMBINED N/A 12/04/2018    Procedure: novasure endometrial ablation, myosure resection of leiomyoma, dilation and curettage;  Surgeon: Rolando Covarrubias MD;  Location: [facility] OR    GYN SURGERY  12/04/2018    fibroid removal    lap hysterectomy and salpingetcomy Bilateral 2019    done at [facility] - ovaries are in    LEEP TX, CERVICAL  12/19/2011    MARTA II    OPERATIVE HYSTEROSCOPY WITH MORCELLATOR N/A 12/04/2018    ovaries are still in.   Surgeon: Rolando Covarrubias MD;  Location: [facility] OR    TUBAL LIGATION  05/2009    laparoscopic tubal ligation    ZZC NONSPECIFIC PROCEDURE  10/2000    after delivery 2 units of prbcs secondary to placenta       MEDICATIONS:  Current Outpatient Medications   Medication    amLODIPine (NORVASC) 2.5 MG tablet    DULoxetine (CYMBALTA) 30 MG capsule    Evening Primrose Oil 500 MG CAPS    FEROSUL 325 (65 Fe) MG tablet    fluticasone (FLONASE) 50 MCG/ACT nasal spray    gabapentin (NEURONTIN) 300 MG capsule    ketotifen fumarate 0.035%, ketotifen 0.025%, (ZADITOR) 0.025 % ophthalmic solution    loratadine-pseudoePHEDrine (CLARITIN-D 24-HOUR)  MG 24 hr tablet    meloxicam (MOBIC) 15 MG tablet    olopatadine (PATANOL) 0.1 % ophthalmic solution    polyethylene glycol (MIRALAX) 17 GM/Dose powder    rosuvastatin (CRESTOR) 20 MG tablet    scopolamine (TRANSDERM) 1 MG/3DAYS 72 hr patch    sulfaSALAzine ER (AZULFIDINE EN) 500 MG EC tablet    tofacitinib (XELJANZ XR) 11 MG 24 hr tablet    traMADol (ULTRAM) 50 MG tablet    traZODone (DESYREL) 50 MG tablet    triamcinolone (KENALOG) 0.1 % external cream    VITAMIN D3 50 MCG (2000 UT) tablet    aspirin-acetaminophen-caffeine (EXCEDRIN  "MIGRAINE) 250-250-65 MG tablet    cyclobenzaprine (FLEXERIL) 5 MG tablet    diclofenac (VOLTAREN) 1 % topical gel    lidocaine (XYLOCAINE) 5 % external ointment    loperamide (IMODIUM A-D) 2 MG tablet    meclizine (ANTIVERT) 25 MG tablet    ondansetron (ZOFRAN) 4 MG tablet    SM STOOL SOFTENER 100 MG capsule     No current facility-administered medications for this visit.     Facility-Administered Medications Ordered in Other Visits   Medication    midazolam (VERSED) injection       SOCIAL HISTORY:  Social History     Tobacco Use    Smoking status: Never    Smokeless tobacco: Never   Substance Use Topics    Alcohol use: Not Currently       Family History   Problem Relation Age of Onset    Hypertension Father     Lipids Father     Hypertension Mother     Lipids Mother     Diabetes No family hx of     Coronary Artery Disease No family hx of     Hyperlipidemia No family hx of     Cerebrovascular Disease No family hx of     Breast Cancer No family hx of     Colon Cancer No family hx of     Prostate Cancer No family hx of     Other Cancer No family hx of     Depression No family hx of     Anxiety Disorder No family hx of     Mental Illness No family hx of     Substance Abuse No family hx of     Anesthesia Reaction No family hx of     Asthma No family hx of     Osteoporosis No family hx of     Genetic Disorder No family hx of     Thyroid Disease No family hx of     Obesity No family hx of     Unknown/Adopted No family hx of          Follow up 5/24/23 visit for pain in mid-lower back, shoulder and neck        Review of Systems   Constitutional, HEENT, cardiovascular, pulmonary, gi and gu systems are negative, except as otherwise noted.      Objective    /82 (BP Location: Left arm, Patient Position: Sitting, Cuff Size: Adult Regular)   Pulse 75   Temp 98.4  F (36.9  C) (Oral)   Resp 14   Ht 1.499 m (4' 11\")   Wt 54.5 kg (120 lb 3.2 oz)   LMP 03/26/2019 (Within Days)   SpO2 97%   BMI 24.28 kg/m    Body mass " index is 24.28 kg/m .  Physical Exam   GENERAL: healthy, alert and no distress  EYES: Eyes grossly normal to inspection, PERRL and conjunctivae and sclerae normal  NECK: no adenopathy, no asymmetry, masses, or scars and thyroid normal to palpation  RESP: lungs clear to auscultation - no rales, rhonchi or wheezes  CV: regular rate and rhythm, normal S1 S2, no S3 or S4, no murmur, click or rub, no peripheral edema and peripheral pulses strong  MS: normal muscle tone, RUE exam shows no deformities and she does wear chronic thumb spica splint - xrays have been normal (thought to be tendonitis), and + left leg raise   SKIN: no suspicious lesions or rashes  NEURO: Normal strength and tone, mentation intact and speech normal  PSYCH: mentation appears normal, affect normal/bright  LYMPH: no cervical, supraclavicular, axillary, or inguinal adenopathy    Lab on 09/25/2023   Component Date Value Ref Range Status    ALT 09/25/2023 6  0 - 50 U/L Final    Reference intervals for this test were updated on 6/12/2023 to more accurately reflect our healthy population. There may be differences in the flagging of prior results with similar values performed with this method. Interpretation of those prior results can be made in the context of the updated reference intervals.      AST 09/25/2023 21  0 - 45 U/L Final    Reference intervals for this test were updated on 6/12/2023 to more accurately reflect our healthy population. There may be differences in the flagging of prior results with similar values performed with this method. Interpretation of those prior results can be made in the context of the updated reference intervals.    Creatinine 09/25/2023 0.53  0.51 - 0.95 mg/dL Final    GFR Estimate 09/25/2023 >90  >60 mL/min/1.73m2 Final    CRP Inflammation 09/25/2023 <3.00  <5.00 mg/L Final    Erythrocyte Sedimentation Rate 09/25/2023 35 (H)  0 - 20 mm/hr Final    WBC Count 09/25/2023 5.9  4.0 - 11.0 10e3/uL Final    RBC Count  09/25/2023 4.12  3.80 - 5.20 10e6/uL Final    Hemoglobin 09/25/2023 12.5  11.7 - 15.7 g/dL Final    Hematocrit 09/25/2023 36.6  35.0 - 47.0 % Final    MCV 09/25/2023 89  78 - 100 fL Final    MCH 09/25/2023 30.3  26.5 - 33.0 pg Final    MCHC 09/25/2023 34.2  31.5 - 36.5 g/dL Final    RDW 09/25/2023 11.8  10.0 - 15.0 % Final    Platelet Count 09/25/2023 237  150 - 450 10e3/uL Final    % Neutrophils 09/25/2023 56  % Final    % Lymphocytes 09/25/2023 32  % Final    % Monocytes 09/25/2023 8  % Final    % Eosinophils 09/25/2023 3  % Final    % Basophils 09/25/2023 1  % Final    % Immature Granulocytes 09/25/2023 0  % Final    Absolute Neutrophils 09/25/2023 3.3  1.6 - 8.3 10e3/uL Final    Absolute Lymphocytes 09/25/2023 1.9  0.8 - 5.3 10e3/uL Final    Absolute Monocytes 09/25/2023 0.5  0.0 - 1.3 10e3/uL Final    Absolute Eosinophils 09/25/2023 0.2  0.0 - 0.7 10e3/uL Final    Absolute Basophils 09/25/2023 0.0  0.0 - 0.2 10e3/uL Final    Absolute Immature Granulocytes 09/25/2023 0.0  <=0.4 10e3/uL Final

## 2023-11-06 NOTE — LETTER
Opioid / Opioid Plus Controlled Substance Agreement    This is an agreement between you and your provider about the safe and appropriate use of controlled substance/opioids prescribed by your care team. Controlled substances are medicines that can cause physical and mental dependence (abuse).    There are strict laws about having and using these medicines. We here at [facility] are committing to working with you in your efforts to get better. To support you in this work, we ll help you schedule regular office appointments for medicine refills. If we must cancel or change your appointment for any reason, we ll make sure you have enough medicine to last until your next appointment.     As a Provider, I will:  Listen carefully to your concerns and treat you with respect.   Recommend a treatment plan that I believe is in your best interest. This plan may involve therapies other than opioid pain medication.   Talk with you often about the possible benefits, and the risk of harm of any medicine that we prescribe for you.   Provide a plan on how to taper (discontinue or go off) using this medicine if the decision is made to stop its use.    As a Patient, I understand that opioid(s):   Are a controlled substance prescribed by my care team to help me function or work and manage my condition(s).   Are strong medicines and can cause serious side effects such as:  Drowsiness, which can seriously affect my driving ability  A lower breathing rate, enough to cause death  Harm to my thinking ability   Depression   Abuse of and addiction to this medicine  Need to be taken exactly as prescribed. Combining opioids with certain medicines or chemicals (such as illegal drugs, sedatives, sleeping pills, and benzodiazepines) can be dangerous or even fatal. If I stop opioids suddenly, I may have severe withdrawal symptoms.  Do not work for all types of pain nor for all patients. If they re not helpful, I may be asked to stop  them.        The risks, benefits and side effects of these medicine(s) were explained to me. I agree that:  I will take part in other treatments as advised by my care team. This may be psychiatry or counseling, physical therapy, behavioral therapy, group treatment or a referral to a specialist.     I will keep all my appointments. I understand that this is part of the monitoring of opioids. My care team may require an office visit for EVERY opioid/controlled substance refill. If I miss appointments or don t follow instructions, my care team may stop my medicine.    I will take my medicines as prescribed. I will not change the dose or schedule unless my care team tells me to. There will be no refills if I run out early.     I may be asked to come to the clinic and complete a urine drug test or complete a pill count at any time. If I don t give a urine sample or participate in a pill count, the care team may stop my medicine.    I will only receive prescriptions from this clinic for chronic pain. If I am treated by another provider for acute pain issues, I will tell them that I am taking opioid pain medication for chronic pain and that I have a treatment agreement with this provider. I will inform my [facility] care team within one business day if I am given a prescription for any pain medication by another healthcare provider. My [facility] care team can contact other providers and pharmacists about my use of any medicines.    It is up to me to make sure that I don t run out of my medicines on weekends or holidays. If my care team is willing to refill my opioid prescription without a visit, I must request refills only during office hours. Refills may take up to 3 business days to process. I will use one pharmacy to fill all my opioid and other controlled substance prescriptions. I will notify the clinic about any changes to my insurance or medication availability.    I am responsible for my  prescriptions. If the medicine/prescription is lost, stolen or destroyed, it will not be replaced. I also agree not to share controlled substance medicines with anyone.    I am aware I should not use any illegal or recreational drugs. I agree not to drink alcohol unless my care team says I can.       If I enroll in the [place] [facility] program, I will tell my care team prior to my next refill.     I will tell my care team right away if I become pregnant, have a new medical problem treated outside of my regular clinic, or have a change in my medications.    I understand that this medicine can affect my thinking, judgment and reaction time. Alcohol and drugs affect the brain and body, which can affect the safety of my driving. Being under the influence of alcohol or drugs can affect my decision-making, behaviors, personal safety, and the safety of others. Driving while impaired (DWI) can occur if a person is driving, operating, or in physical control of a car, motorcycle, boat, snowmobile, ATV, motorbike, off-road vehicle, or any other motor vehicle ([place] Statute 169A.20). I understand the risk if I choose to drive or operate any vehicle or machinery.    I understand that if I do not follow any of the conditions above, my prescriptions or treatment may be stopped or changed.          Opioids  What You Need to Know    What are opioids?   Opioids are pain medicines that must be prescribed by a doctor. They are also known as narcotics.     Examples are:   morphine (MS Contin, Silke)  oxycodone (Oxycontin)  oxycodone and acetaminophen (Percocet)  hydrocodone and acetaminophen (Vicodin, Norco)   fentanyl patch (Duragesic)   hydromorphone (Dilaudid)   methadone  codeine (Tylenol #3)     What do opioids do well?   Opioids are best for severe short-term pain such as after a surgery or injury. They may work well for cancer pain. They may help some people with long-lasting (chronic) pain.     What do opioids NOT do  well?   Opioids never get rid of pain entirely, and they don t work well for most patients with chronic pain. Opioids don t reduce swelling, one of the causes of pain.                                    Other ways to manage chronic pain and improve function include:     Treat the health problem that may be causing pain  Anti-inflammation medicines, which reduce swelling and tenderness, such as ibuprofen (Advil, Motrin) or naproxen (Aleve)  Acetaminophen (Tylenol)  Antidepressants and anti-seizure medicines, especially for nerve pain  Topical treatments such as patches or creams  Injections or nerve blocks  Chiropractic or osteopathic treatment  Acupuncture, massage, deep breathing, meditation, visual imagery, aromatherapy  Use heat or ice at the pain site  Physical therapy   Exercise  Stop smoking  Take part in therapy       Risks and side effects     Talk to your doctor before you start or decide to keep taking opioids. Possible side effects include:    Lowering your breathing rate enough to cause death  Overdose, including death, especially if taking higher than prescribed doses  Worse depression symptoms; less pleasure in things you usually enjoy  Feeling tired or sluggish  Slower thoughts or cloudy thinking  Being more sensitive to pain over time; pain is harder to control  Trouble sleeping or restless sleep  Changes in hormone levels (for example, less testosterone)  Changes in sex drive or ability to have sex  Constipation  Unsafe driving  Itching and sweating  Dizziness  Nausea, throwing up and dry mouth    What else should I know about opioids?    Opioids may lead to dependence, tolerance, or addiction.    Dependence means that if you stop or reduce the medicine too quickly, you will have withdrawal symptoms. These include loose poop (diarrhea), jitters, flu-like symptoms, nervousness and tremors. Dependence is not the same as addiction.                     Tolerance means needing higher doses over time to  get the same effect. This may increase the chance of serious side effects.    Addiction is when people improperly use a substance that harms their body, their mind or their relations with others. Use of opiates can cause a relapse of addiction if you have a history of drug or alcohol abuse.    People who have used opioids for a long time may have a lower quality of life, worse depression, higher levels of pain and more visits to doctors.    You can overdose on opioids. Take these steps to lower your risk of overdose:    Recognize the signs:  Signs of overdose include decrease or loss of consciousness (blackout), slowed breathing, trouble waking up and blue lips. If someone is worried about overdose, they should call 911.    Talk to your doctor about Narcan (naloxone).   If you are at risk for overdose, you may be given a prescription for Narcan. This medicine very quickly reverses the effects of opioids.   If you overdose, a friend or family member can give you Narcan while waiting for the ambulance. They need to know the signs of overdose and how to give Narcan.     Don't use alcohol or street drugs.   Taking them with opioids can cause death.    Do not take any of these medicines unless your doctor says it s OK. Taking these with opioids can cause death:  Benzodiazepines, such as lorazepam (Ativan), alprazolam (Xanax) or diazepam (Valium)  Muscle relaxers, such as cyclobenzaprine (Flexeril)  Sleeping pills like zolpidem (Ambien)   Other opioids      How to keep you and other people safe while taking opioids:    Never share your opioids with others.  Opioid medicines are regulated by the Drug Enforcement Agency (SAMIR). Selling or sharing medications is a criminal act.    2. Be sure to store opioids in a secure place, locked up if possible. Young children can easily swallow them and overdose.    3. When you are traveling with your medicines, keep them in the original bottles. If you use a pill box, be sure you also  carry a copy of your medicine list from your clinic or pharmacy.    4. Safe disposal of opioids    Most pharmacies have places to get rid of medicine, called disposal kiosks. Medicine disposal options are also available in every [facility]. Search your county and  medication disposal  to find more options. You can find more details at:  https://[email or web address].[email or web address]./living-green/managing-unwanted-medications     I agree that my provider, clinic care team, and pharmacy may work with any city, state or federal law enforcement agency that investigates the misuse, sale, or other diversion of my controlled medicine. I will allow my provider to discuss my care with, or share a copy of, this agreement with any other treating provider, pharmacy or emergency room where I receive care.    I have read this agreement and have asked questions about anything I did not understand.    _______________________________________________________  Patient Signature - Zo Qureshi _____________________                   Date     _______________________________________________________  Provider Signature - Terri Robles MD   _____________________                   Date     _______________________________________________________  Witness Signature (required if provider not present while patient signing)   _____________________                   Date

## 2023-11-07 ENCOUNTER — ANCILLARY PROCEDURE (OUTPATIENT)
Dept: GENERAL RADIOLOGY | Facility: CLINIC | Age: 48
End: 2023-11-07
Attending: FAMILY MEDICINE
Payer: MEDICARE

## 2023-11-07 DIAGNOSIS — M79.662 PAIN OF LEFT LOWER LEG: ICD-10-CM

## 2023-11-07 DIAGNOSIS — G89.4 CHRONIC PAIN SYNDROME: ICD-10-CM

## 2023-11-07 DIAGNOSIS — M46.98 INFLAMMATORY SPONDYLOPATHY OF SACRAL REGION (H): ICD-10-CM

## 2023-11-07 PROCEDURE — 72100 X-RAY EXAM L-S SPINE 2/3 VWS: CPT | Mod: TC | Performed by: RADIOLOGY

## 2023-11-14 ENCOUNTER — PATIENT OUTREACH (OUTPATIENT)
Dept: CARE COORDINATION | Facility: CLINIC | Age: 48
End: 2023-11-14
Payer: MEDICARE

## 2023-11-24 ENCOUNTER — VIRTUAL VISIT (OUTPATIENT)
Dept: RHEUMATOLOGY | Facility: CLINIC | Age: 48
End: 2023-11-24
Attending: INTERNAL MEDICINE
Payer: MEDICARE

## 2023-11-24 DIAGNOSIS — M47.819 SERONEGATIVE SPONDYLOARTHROPATHY: ICD-10-CM

## 2023-11-24 DIAGNOSIS — M45.8 ANKYLOSING SPONDYLITIS OF SACRAL REGION (H): Primary | ICD-10-CM

## 2023-11-24 DIAGNOSIS — Z79.899 HIGH RISK MEDICATION USE: ICD-10-CM

## 2023-11-24 PROCEDURE — 99214 OFFICE O/P EST MOD 30 MIN: CPT | Mod: VID | Performed by: INTERNAL MEDICINE

## 2023-11-24 RX ORDER — SULFASALAZINE 500 MG/1
1500 TABLET, DELAYED RELEASE ORAL 2 TIMES DAILY
Qty: 540 TABLET | Refills: 0 | Status: SHIPPED | OUTPATIENT
Start: 2023-11-24 | End: 2024-03-13

## 2023-11-24 ASSESSMENT — PAIN SCALES - GENERAL: PAINLEVEL: SEVERE PAIN (6)

## 2023-11-24 NOTE — NURSING NOTE
Is the patient currently in the state of [place]? YES    Visit mode:VIDEO    If the visit is dropped, the patient can be reconnected by: VIDEO VISIT: Text to cell phone:   Telephone Information:   Mobile [phone]       Will anyone else be joining the visit? NO  (If patient encounters technical issues they should call [phone]    How would you like to obtain your AVS? MyChart    Are changes needed to the allergy or medication list? No    Reason for visit: No chief complaint on file.    Jillian JO

## 2023-11-24 NOTE — LETTER
11/24/2023       RE: oZ Qureshi  [address] [place] [address]     Dear Colleague,    Thank you for referring your patient, Zo Qureshi, to the [facility] RHEUMATOLOGY [facility] [place] at [facility]. Please see a copy of my visit note below.    Virtual Visit Details    Type of service:  Video Visit   Joined the call at 11/24/2023, 8:24:21 am.  Left the call at 11/24/2023, 8:40:40 am.  You were on the call for 16 minutes 19 seconds .    Originating Location (pt. Location): Home    Distant Location (provider location):  Off-site  Platform used for Video Visit: [facility]           Outpatient Rheumatology Follow-up  This visit was conducted via synchronous video visit due to the current COVID-19 crisis to reduce patient risk.  Verbal consent was obtained and is documented below.    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 Prior to that time she had last been seen by Dr. Garay of 5/24/2019. Most recent imaging of SI joints was on 2/28/2020 which showed sclerosis with marginal spurring about the SI joints, likely secondary to her chronic inflammatory disease, though no active inflammatory changes at that time. Had been tried on both humira and enbrel, though did not tolerate these due to side effects so discontinued prior to determining if efficacious. At the time of her last visit with rheumatology it was documented that she was responsive to NSAIDs, prednisone, and SSZ. The dose of her SSZ was increased to 1gm BID at the time of her initial visit with me given her active  peripheral arthritis and enthesitis. Despite naproxen 500mg BID, she had ongoing axial stiffness which was AM predominant and improved with activity and stretching, so risks and benefits of cosentyx discussed and patient started on this IL-17 inhibitor. She interestingly developed the same side effects of severe flu like symptoms after each dose of her biologic therapy- similar to those she was experiencing when on humira and enbrel, with rhinorrhea, muscle aches/pains, severe fatigue, and also hives, low grade fever which come on after her injection and slowly over many weeks will resolve. Had been worse with each subsequent injection of cosentyx.  Cosentyx was discontinued and xeljanz 11mg once daily started early January 2022. She self discontinued this for a short period however it was restarted in April of 2023. She continues on xeljanz 11mg once daily SSZ 1gm BID and mobic 15mg once daily. She presents today for follow-up.       Seronegative spondyloarthropathy:  At the time of her last appointment in June had been back on SSZ 1gm BID/xeljanz 11mg once daily/mobic 15mg once daily for almost 8 weeks. Dad positive improvement already, though still early in the treatment course. Now that we are 3+ months into her current therapy and with her ongoing inflammatory arthritis symptoms, will increase her SSZ up from her current dose of 1gm BID to 1.5gm BID. Will continue on xeljanz and mobic without changes. She has axial symptoms. Had recent xray of the lumbar spine on 11/7 by her PCP which showed multilevel degenerative facet arthropathy. She is reporting ongoing cervical spine pain that is also a  of HA. Will evaluate with cervical spine xray. Have instructed the patient to send me a Prescreen message once completed and if necessary based on the results will consider ordering MRI of the cervical spine if appropriate. I do note that ongoing joint pains, particularly in the right wrist/CMC, are I think is at  least in part driven by non inflammatory OA.   Plan:  - continue Xeljanz 11 mg once daily  - increase sulfasalazine 1 g twice daily up to 1.5gm BID  - continue meloxicam 15 mg once daily as needed.  Counseled not to take any other NSAIDs in 24 hours after each meloxicam dose.  Risk and benefits discussed including renal cardiac GI other.  -cervical spine xray with consideration of additional follow-up advanced imaging with MRI of the cervical spine and lumbar spine  - Follow-up with me in 3 months      High risk medication use: Xeljanz, SSZ  -most recent labs reviewed from 9/25/23 to include CBC, AST, ALT, creatinine, ESR, CRP. All within normal limits except her ESR was 35. No evidence of acute drug toxicity identified on these labs. Will continue to monitor routine screening drug toxicity labs every 6 months while on this therapy. Standing orders are in place. She is due for routine screening drug toxicity monitoring labs again in March of 2024.   -Risks and benefits of these therapies to include infection, thrombosis, bowel perforation, bone marrow suppression, GI/hepatotoxicity, malignancy, increased all cause mortality among others discussed again today. She is agreeable to continue given potential benefit and lack of significant side effects to date.    James Hernandez MD  Rheumatology       Subjective:   November 24, 2023   -at the time of her last appointment she was continued on xeljanz, SSZ, mobic. Had only been back on systemic immunosuppression for about 8 weeks at that point but was noticing improvement already  -we obtained bilateral hand and wrist xrays to assess for inflammatory sequelae, no erosions or acute osseous abnormalities were identified.   -most recent labs reviewed from 9/25/23 to include CBC, AST, ALT, creatinine, ESR, CRP. All within normal limits except her ESR was 35. No evidence of acute drug toxicity identified on these labs. Will continue to monitor routine screening drug toxicity  labs every 6 months while on this therapy. Standing orders are in place. She is due for routine screening drug toxicity monitoring labs again in March of 2024.   -The patient, last seen in June, presented with ongoing symptoms. They reported experiencing cold-like symptoms intermittently, including body aches, runny nose, and chills. These symptoms have been occurring with a frequency of about one to two times per month since their last visit. The patient mentioned that during these episodes, when they discontinue their rheumatologic medication, specifically Xeljanz, they experience significant discomfort and pain. However, when on medication, including sulfasalazine, xeljanz, and Mobic (meloxicam), their symptoms are notably less severe. The patient continues to take Mobic and sulfasalazine during these episodes but stops Xeljanz.    In addition to these symptoms, the patient also reported experiencing dizziness, headaches, and joint pain, particularly in the fingers. They described the pain as being primarily in the joints and worsening with movement or upon impact. They noted that the pain initially affected the thumb and middle finger but has now progressed to include the index finger and ring finger, sparing the pinky. The patient has been using braces to manage the pain, which has been somewhat effective.    The patient's most recent imaging studies, including x-rays of the hands and wrists, showed no signs of erosions, fractures, or degenerative changes. However, they have been experiencing neck pain, which radiates to the back of the skull and is exacerbated by certain movements, leading to headaches. This neck pain has been a concern for some time, and their primary care doctor had previously recommended an MRI, which was not yet conducted. The patient's history of lumbar spine issues was also noted, with the last MRI performed approximately three to four years ago.    In summary, the patient's primary  issues include recurrent cold-like symptoms affecting their rheumatologic treatment adherence, ongoing joint pain in the fingers, and significant neck pain leading to headaches, all of which impact their overall quality of life and require ongoing management and further evaluation.    14 point review of systems collected and negative if not documented above.    June 30, 2023   -at the time of her last visit, we restarted both xeljanz and SSZ. Mobic restarted at that time as well.   -Has a cold, runny nose, raspy voice. Has night sweats/chills. No fever. Has been going on for 3-4 days  -started xeljanz and SSZ, feels some less pain. Mobic as well. Started these in May, 1st or 2nd. Has been 7-8 weeks now that she has been on both medications  -If she misses any doses then pain returns.   -Rates her pain 5/10. Involves shoulder/neck/hands.   -Wakes up and stretches for 10 minutes then her stiffness is improved for the day  -Symptoms on the right hand are worse than the left hand. She has been wearing a brace on the left hand that was special molded for her by ortho/PT she reports  -no new rashes  -outside of above possible URI, no new or progressive fevers/chills/night sweats. No other interval infections.   -no unintentional weight loss  -Has intermittent swelling of PIPs/DIPs.   -In her hands has most pain in her CMCs and wrists  She is tolerating xeljanz, mobic, SSZ without any noticeable side effects, GI/cutaneous/other.  14 point ROS collected and negative if not documented above      April 14, 2023  -Up until January 2023, had continued on xeljanz 11mg once daily, SSZ 1gm BID. But then ran out of both medications and did not follow-up/request refill and so stopped.   -While on both therapies, she noted that if she took her doses late, would experience worse pain/stiffness.   -Currently reporting pain stiffness in almost all joint in her body, both axial and peripheral. In shoulder/neck. Has pain/stiffness in  "hands. 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 Stretches. Pain in the shoulder, right side also but left more than right.   Has tried massage which is temporarily helpful for pain, but immediately returns. No redness, Has swelling of her wrist on right more than left.   - Still with some HA/dizziness. Still gets warm, though not fever. Intermittent coughing/runny nose. Has nasal spray.  -has some red patches of that come and go, even now not on medication. No different on medication. Not medication side effects  -Middle to low back also much more severe pain and stiffness which is worse in the AM and improves with use. Has radiating pain down her left leg.   -Has ongoing RLS.   -no unintentional weight loss.   -Chronic mild hair thinning over the last 2-3 years    Interval history 3/4/22  Has not experienced any side effects from xeljanz. Has had ongoing HA/dizziness, which was there prior to starting xeljanz. Has intermittent chills/ feeling warm, though no temp. Does not happen everyday, happens 1-2 times per week. Also warm at night, though not wet on clothes. Her pain is less. Rates her pain now around 6/10. No redness/warmth/swelling. Though last week had right wrist pain with minimal movement. Lasted for 2-3 days. Used topical lidocaine which resolved symptoms. When painful, it was somewhat \"puffy\".  At this time her back is still stiff, AM predominant. Back and neck. She sits when she wakes up and stretches. Then one hour later she has improvement and continues her day. Has rash on anterior shin right LE, saw PCP who gave her topical steroid which she has not started. Still with intermittent blurred vision/double vision. Unchanged. No more severe/frequent. Rare sores in her mouth. No interval infections. ROS otherwise negative.       Interval history  12/17/21  Reports feeling of fever/body aches/runny nose. Also with rash. 3-4 days after her first injection she noticed that this started, though took her next dose and the " above symptoms were more severe. Then was off for about 1 month due to these severe side effects. She decided to re-try in October and November, though has again had return of above. Has been about 1 month since her last injection and reports side effects are continuing to slowly improve, though still with some fatigue/body aches/ HA. Rash and fever resolved. She finds it hard to comment on axial pain/stiffness/peripiheral joint pain/swelling in setting of severe fatigue/flu like symptoms. Her ROS is otherwise negative.     HPI from initial consultation 5/2021  Had flu like symptoms with flu like symptoms. Resolves when stopped. Prednisone, NSAIDs, SSZ used/helpful. Prednisone had nausea and consipation. SSZ has drowsiness/ dizziness/ mental fog worse in the AM. Has continued on SSZ 1gm in the AM and 1gm in the PM. Still with significant drowsiness. If she doesn't want to have drowsiness, reduces the dose, has pain in neck, shoulder, back. Has some swelling of joints. Has tenderness of many joints. Takes naproxen and gabapentin. She takes naproxen 500mg BID. In the AM has stiffness in her neck/back. Gets some better with stretching/ exercise. Takes 15 minutes to get better. No rash. Does have swelling of her right hand 2nd -4th digits. Pain in her PIPs predominantly. Pain also in between the joints. Has recently had some blurred vision, wears glasses. No inflammatory eye disease history. Intermittent double vision. Has had hair loss, non scarring more that is thinning. More over the last 3-4 years. Intermittent sore in mouth, painful. Has dry eyes, uses eye drops 3-4 times per week. When she wakes up her throat is dry. Drinks frequently. No abdominal pain. No blood in her stool. No n/v. No raynauds. No sclerodactyly. No fevers, weight loss. Occasional weight loss and chills. Has been having ongoing throbbing HA on the left side. The naproxen is some helpful, though returns shortly afterwards. No miscarriages or  blood clots.     Past Medical History  Past Medical History:   Diagnosis Date    Anemia     History of blood transfusion 2000    after vaginal delivery, 2 units PRBCs given    HSIL on Pap smear 09/21/2011    MARTA 2 and 3    Immune to hepatitis B 08/2021    hep B antigen NEG    INFLAM SPONDYLOPATHY NOS 01/07/2008    check labs on sulfasalzine every 3 months and check hep B and C and TB every 2 years    Leukocytopenia 03/10/2014    Tendinosis     Thrombocytopenia (H24) 03/10/2014    Unspecified closed fracture of pelvis 2000    left fracture of pelvis after delivery     Past Surgical History  Past Surgical History:   Procedure Laterality Date    COLONOSCOPY Left 11/12/2021    recheck in 10 years    DILATION AND CURETTAGE, ABLATE ENDOMETRIUM NOVASURE, COMBINED N/A 12/04/2018    Procedure: novasure endometrial ablation, myosure resection of leiomyoma, dilation and curettage;  Surgeon: Rolando Covarrubias MD;  Location: [facility] OR    GYN SURGERY  12/04/2018    fibroid removal    lap hysterectomy and salpingetcomy Bilateral 2019    done at [facility] - ovaries are in    LEEP TX, CERVICAL  12/19/2011    MARTA II    OPERATIVE HYSTEROSCOPY WITH MORCELLATOR N/A 12/04/2018    ovaries are still in.   Surgeon: Rolando Covarrubias MD;  Location: [facility] OR    TUBAL LIGATION  05/2009    laparoscopic tubal ligation    ZC NONSPECIFIC PROCEDURE  10/2000    after delivery 2 units of prbcs secondary to placenta     Medications  Current Outpatient Medications   Medication    amLODIPine (NORVASC) 2.5 MG tablet    aspirin-acetaminophen-caffeine (EXCEDRIN MIGRAINE) 250-250-65 MG tablet    cyclobenzaprine (FLEXERIL) 5 MG tablet    diclofenac (VOLTAREN) 1 % topical gel    DULoxetine (CYMBALTA) 30 MG capsule    Evening Primrose Oil 500 MG CAPS    FEROSUL 325 (65 Fe) MG tablet    fluticasone (FLONASE) 50 MCG/ACT nasal spray    gabapentin (NEURONTIN) 300 MG capsule    ketotifen fumarate 0.035%, ketotifen 0.025%, (ZADITOR) 0.025 % ophthalmic solution     "lidocaine (XYLOCAINE) 5 % external ointment    loperamide (IMODIUM A-D) 2 MG tablet    loratadine-pseudoePHEDrine (CLARITIN-D 24-HOUR)  MG 24 hr tablet    meclizine (ANTIVERT) 25 MG tablet    meloxicam (MOBIC) 15 MG tablet    olopatadine (PATANOL) 0.1 % ophthalmic solution    ondansetron (ZOFRAN) 4 MG tablet    polyethylene glycol (MIRALAX) 17 GM/Dose powder    rosuvastatin (CRESTOR) 20 MG tablet    scopolamine (TRANSDERM) 1 MG/3DAYS 72 hr patch    SM STOOL SOFTENER 100 MG capsule    sulfaSALAzine ER (AZULFIDINE EN) 500 MG EC tablet    tofacitinib (XELJANZ XR) 11 MG 24 hr tablet    traMADol (ULTRAM) 50 MG tablet    traZODone (DESYREL) 50 MG tablet    triamcinolone (KENALOG) 0.1 % external cream    VITAMIN D3 50 MCG (2000 UT) tablet     No current facility-administered medications for this visit.     Facility-Administered Medications Ordered in Other Visits   Medication    midazolam (VERSED) injection     Allergies   Allergies   Allergen Reactions    Contrast Dye Nausea and Vomiting    Diatrizoate Nausea and Vomiting    Percocet [Oxycodone-Acetaminophen] Nausea and Vomiting and Itching    Advil [Ibuprofen Micronized] Rash     Rash     Ibuprofen Rash     Family History: Mother with \"arthritis\"    Social History: Not currently working. Work at [employer], building them. Repetitive motion. No smoking/drugs/ETOH. Not . 4 kids who are healthy.       Objective:    Physical exam:  No vitals  Laying in bed  Sclera appear clear  No facial rash  Breathing comfortably on room air, no cough, no audible wheeze, no use of accessory muscles  Wearing wrist splint/brace on the right wrist. No red edema/erythema of the MCPs/PIPs bilaterally.     Labs:  WBC   Date Value Ref Range Status   02/15/2021 6.5 4.0 - 11.0 10e9/L Final     WBC Count   Date Value Ref Range Status   09/25/2023 5.9 4.0 - 11.0 10e3/uL Final     Hemoglobin   Date Value Ref Range Status   09/25/2023 12.5 11.7 - 15.7 g/dL Final   02/15/2021 " 12.2 11.7 - 15.7 g/dL Final     Platelet Count   Date Value Ref Range Status   09/25/2023 237 150 - 450 10e3/uL Final   02/15/2021 219 150 - 450 10e9/L Final     Creatinine   Date Value Ref Range Status   09/25/2023 0.53 0.51 - 0.95 mg/dL Final   02/15/2021 0.54 0.52 - 1.04 mg/dL Final     Lab Results   Component Value Date    ALKPHOS 60 03/01/2022    ALKPHOS 55 02/15/2021     AST   Date Value Ref Range Status   09/25/2023 21 0 - 45 U/L Final     Comment:     Reference intervals for this test were updated on 6/12/2023 to more accurately reflect our healthy population. There may be differences in the flagging of prior results with similar values performed with this method. Interpretation of those prior results can be made in the context of the updated reference intervals.   02/15/2021 14 0 - 45 U/L Final     Lab Results   Component Value Date    ALT 21 03/01/2022    ALT 17 02/15/2021     Sed Rate   Date Value Ref Range Status   02/15/2021 20 0 - 20 mm/h Final     Erythrocyte Sedimentation Rate   Date Value Ref Range Status   09/25/2023 35 (H) 0 - 20 mm/hr Final     CRP Inflammation   Date Value Ref Range Status   08/16/2021 <2.9 0.0 - 8.0 mg/L Final   02/26/2020 <2.9 0.0 - 8.0 mg/L Final     UA RESULTS:  Recent Labs   Lab Test 05/18/21  0858   COLOR Yellow   APPEARANCE Clear   URINEGLC Negative   URINEBILI Negative   URINEKETONE Negative   SG 1.015   UBLD Large*   URINEPH 6.5   PROTEIN Negative   UROBILINOGEN 0.2   NITRITE Negative   LEUKEST Moderate*   RBCU 10-25*   WBCU *      RAMÍREZ Screen by EIA   Date Value Ref Range Status   05/21/2014 <1.0  Interpretation:  Negative   <1.0 Final     Rheumatoid Factor   Date Value Ref Range Status   05/21/2014 <20 <20 IU/mL Final     Cyclic Citrullinated Peptide Antibody, IgG   Date Value Ref Range Status   08/28/2017 1 <7 U/mL Final     Comment:     Negative       Imaging:  MRI LUMBAR SPINE WITHOUT CONTRAST   2/28/2020 3:51 PM      HISTORY: Radiculopathy, greater than  6  weeks conservative treatment,  persistent symptoms; radiculopathy - history of sacroiliitis. Family  history of spinal disease and severe stenosis - left-sided symptoms.  Spondyloarthropathy.      TECHNIQUE: Multiplanar multisequence MRI of the lumbar spine without  contrast.      COMPARISON: Lumbar spine MRI 9/7/2010. Correlation is also made with  prior pelvic CT 6/10/2019.     FINDINGS:  Alignment is normal. No fracture or significant vertebral  body height loss. The intervertebral discs are normal in height and  signal. No abnormal marrow signal in the lumbar spine. There is no  disc bulge or herniation. There is no spinal or neural foraminal  stenosis. Sclerotic changes about the sacroiliac joints with marginal  spurring. This appears overall similar (within the field-of-view) to  the most recent examinations.                                                                      IMPRESSION:  1. No acute abnormality of the lumbar spine. Specifically, no disc  bulge/herniation, marrow signal abnormality, or spinal or neural  foraminal stenosis.  2. Redemonstrated sclerosis with marginal spurring about the  sacroiliac joints, potentially representing changes related to  osteoarthritis. This may be secondary to prior sacroiliitis in the  setting of spondyloarthropathy given the patient's clinical history.  This is incompletely included within the field-of-view of this exam.        James Hernandez MD

## 2023-11-24 NOTE — PROGRESS NOTES
Virtual Visit Details    Type of service:  Video Visit   Joined the call at 11/24/2023, 8:24:21 am.  Left the call at 11/24/2023, 8:40:40 am.  You were on the call for 16 minutes 19 seconds .    Originating Location (pt. Location): Home    Distant Location (provider location):  Off-site  Platform used for Video Visit: [facility]           Outpatient Rheumatology Follow-up  This visit was conducted via synchronous video visit due to the current COVID-19 crisis to reduce patient risk.  Verbal consent was obtained and is documented below.    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 Prior to that time she had last been seen by Dr. Garay of 5/24/2019. Most recent imaging of SI joints was on 2/28/2020 which showed sclerosis with marginal spurring about the SI joints, likely secondary to her chronic inflammatory disease, though no active inflammatory changes at that time. Had been tried on both humira and enbrel, though did not tolerate these due to side effects so discontinued prior to determining if efficacious. At the time of her last visit with rheumatology it was documented that she was responsive to NSAIDs, prednisone, and SSZ. The dose of her SSZ was increased to 1gm BID at the time of her initial visit with me given her active peripheral arthritis and enthesitis. Despite naproxen 500mg BID, she had ongoing axial stiffness which was AM predominant and improved with activity and stretching, so risks and benefits of cosentyx discussed and patient started on this IL-17 inhibitor. She interestingly developed the same side effects of  severe flu like symptoms after each dose of her biologic therapy- similar to those she was experiencing when on humira and enbrel, with rhinorrhea, muscle aches/pains, severe fatigue, and also hives, low grade fever which come on after her injection and slowly over many weeks will resolve. Had been worse with each subsequent injection of cosentyx.  Cosentyx was discontinued and xeljanz 11mg once daily started early January 2022. She self discontinued this for a short period however it was restarted in April of 2023. She continues on xeljanz 11mg once daily SSZ 1gm BID and mobic 15mg once daily. She presents today for follow-up.       Seronegative spondyloarthropathy:  At the time of her last appointment in June had been back on SSZ 1gm BID/xeljanz 11mg once daily/mobic 15mg once daily for almost 8 weeks. Dad positive improvement already, though still early in the treatment course. Now that we are 3+ months into her current therapy and with her ongoing inflammatory arthritis symptoms, will increase her SSZ up from her current dose of 1gm BID to 1.5gm BID. Will continue on xeljanz and mobic without changes. She has axial symptoms. Had recent xray of the lumbar spine on 11/7 by her PCP which showed multilevel degenerative facet arthropathy. She is reporting ongoing cervical spine pain that is also a  of HA. Will evaluate with cervical spine xray. Have instructed the patient to send me a ExecNote message once completed and if necessary based on the results will consider ordering MRI of the cervical spine if appropriate. I do note that ongoing joint pains, particularly in the right wrist/CMC, are I think is at least in part driven by non inflammatory OA.   Plan:  - continue Xeljanz 11 mg once daily  - increase sulfasalazine 1 g twice daily up to 1.5gm BID  - continue meloxicam 15 mg once daily as needed.  Counseled not to take any other NSAIDs in 24 hours after each meloxicam dose.  Risk and benefits discussed  including renal cardiac GI other.  -cervical spine xray with consideration of additional follow-up advanced imaging with MRI of the cervical spine and lumbar spine  - Follow-up with me in 3 months      High risk medication use: Xeljanz, SSZ  -most recent labs reviewed from 9/25/23 to include CBC, AST, ALT, creatinine, ESR, CRP. All within normal limits except her ESR was 35. No evidence of acute drug toxicity identified on these labs. Will continue to monitor routine screening drug toxicity labs every 6 months while on this therapy. Standing orders are in place. She is due for routine screening drug toxicity monitoring labs again in March of 2024.   -Risks and benefits of these therapies to include infection, thrombosis, bowel perforation, bone marrow suppression, GI/hepatotoxicity, malignancy, increased all cause mortality among others discussed again today. She is agreeable to continue given potential benefit and lack of significant side effects to date.    James Hernandez MD  Rheumatology       Subjective:   November 24, 2023   -at the time of her last appointment she was continued on xeljanz, SSZ, mobic. Had only been back on systemic immunosuppression for about 8 weeks at that point but was noticing improvement already  -we obtained bilateral hand and wrist xrays to assess for inflammatory sequelae, no erosions or acute osseous abnormalities were identified.   -most recent labs reviewed from 9/25/23 to include CBC, AST, ALT, creatinine, ESR, CRP. All within normal limits except her ESR was 35. No evidence of acute drug toxicity identified on these labs. Will continue to monitor routine screening drug toxicity labs every 6 months while on this therapy. Standing orders are in place. She is due for routine screening drug toxicity monitoring labs again in March of 2024.   -The patient, last seen in June, presented with ongoing symptoms. They reported experiencing cold-like symptoms intermittently, including body  aches, runny nose, and chills. These symptoms have been occurring with a frequency of about one to two times per month since their last visit. The patient mentioned that during these episodes, when they discontinue their rheumatologic medication, specifically Xeljanz, they experience significant discomfort and pain. However, when on medication, including sulfasalazine, xeljanz, and Mobic (meloxicam), their symptoms are notably less severe. The patient continues to take Mobic and sulfasalazine during these episodes but stops Xeljanz.    In addition to these symptoms, the patient also reported experiencing dizziness, headaches, and joint pain, particularly in the fingers. They described the pain as being primarily in the joints and worsening with movement or upon impact. They noted that the pain initially affected the thumb and middle finger but has now progressed to include the index finger and ring finger, sparing the pinky. The patient has been using braces to manage the pain, which has been somewhat effective.    The patient's most recent imaging studies, including x-rays of the hands and wrists, showed no signs of erosions, fractures, or degenerative changes. However, they have been experiencing neck pain, which radiates to the back of the skull and is exacerbated by certain movements, leading to headaches. This neck pain has been a concern for some time, and their primary care doctor had previously recommended an MRI, which was not yet conducted. The patient's history of lumbar spine issues was also noted, with the last MRI performed approximately three to four years ago.    In summary, the patient's primary issues include recurrent cold-like symptoms affecting their rheumatologic treatment adherence, ongoing joint pain in the fingers, and significant neck pain leading to headaches, all of which impact their overall quality of life and require ongoing management and further evaluation.    14 point review of  systems collected and negative if not documented above.    June 30, 2023   -at the time of her last visit, we restarted both xeljanz and SSZ. Mobic restarted at that time as well.   -Has a cold, runny nose, raspy voice. Has night sweats/chills. No fever. Has been going on for 3-4 days  -started xeljanz and SSZ, feels some less pain. Mobic as well. Started these in May, 1st or 2nd. Has been 7-8 weeks now that she has been on both medications  -If she misses any doses then pain returns.   -Rates her pain 5/10. Involves shoulder/neck/hands.   -Wakes up and stretches for 10 minutes then her stiffness is improved for the day  -Symptoms on the right hand are worse than the left hand. She has been wearing a brace on the left hand that was special molded for her by ortho/PT she reports  -no new rashes  -outside of above possible URI, no new or progressive fevers/chills/night sweats. No other interval infections.   -no unintentional weight loss  -Has intermittent swelling of PIPs/DIPs.   -In her hands has most pain in her CMCs and wrists  She is tolerating xeljanz, mobic, SSZ without any noticeable side effects, GI/cutaneous/other.  14 point ROS collected and negative if not documented above      April 14, 2023  -Up until January 2023, had continued on xeljanz 11mg once daily, SSZ 1gm BID. But then ran out of both medications and did not follow-up/request refill and so stopped.   -While on both therapies, she noted that if she took her doses late, would experience worse pain/stiffness.   -Currently reporting pain stiffness in almost all joint in her body, both axial and peripheral. In shoulder/neck. Has pain/stiffness in hands. 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 Stretches. Pain in the shoulder, right side also but left more than right.   Has tried massage which is temporarily helpful for pain, but immediately returns. No redness, Has swelling of her wrist on right more than left.   - Still with some HA/dizziness. Still gets warm, though  "not fever. Intermittent coughing/runny nose. Has nasal spray.  -has some red patches of that come and go, even now not on medication. No different on medication. Not medication side effects  -Middle to low back also much more severe pain and stiffness which is worse in the AM and improves with use. Has radiating pain down her left leg.   -Has ongoing RLS.   -no unintentional weight loss.   -Chronic mild hair thinning over the last 2-3 years    Interval history 3/4/22  Has not experienced any side effects from xeljanz. Has had ongoing HA/dizziness, which was there prior to starting xeljanz. Has intermittent chills/ feeling warm, though no temp. Does not happen everyday, happens 1-2 times per week. Also warm at night, though not wet on clothes. Her pain is less. Rates her pain now around 6/10. No redness/warmth/swelling. Though last week had right wrist pain with minimal movement. Lasted for 2-3 days. Used topical lidocaine which resolved symptoms. When painful, it was somewhat \"puffy\".  At this time her back is still stiff, AM predominant. Back and neck. She sits when she wakes up and stretches. Then one hour later she has improvement and continues her day. Has rash on anterior shin right LE, saw PCP who gave her topical steroid which she has not started. Still with intermittent blurred vision/double vision. Unchanged. No more severe/frequent. Rare sores in her mouth. No interval infections. ROS otherwise negative.       Interval history  12/17/21  Reports feeling of fever/body aches/runny nose. Also with rash. 3-4 days after her first injection she noticed that this started, though took her next dose and the above symptoms were more severe. Then was off for about 1 month due to these severe side effects. She decided to re-try in October and November, though has again had return of above. Has been about 1 month since her last injection and reports side effects are continuing to slowly improve, though still with some " fatigue/body aches/ HA. Rash and fever resolved. She finds it hard to comment on axial pain/stiffness/peripiheral joint pain/swelling in setting of severe fatigue/flu like symptoms. Her ROS is otherwise negative.     HPI from initial consultation 5/2021  Had flu like symptoms with flu like symptoms. Resolves when stopped. Prednisone, NSAIDs, SSZ used/helpful. Prednisone had nausea and consipation. SSZ has drowsiness/ dizziness/ mental fog worse in the AM. Has continued on SSZ 1gm in the AM and 1gm in the PM. Still with significant drowsiness. If she doesn't want to have drowsiness, reduces the dose, has pain in neck, shoulder, back. Has some swelling of joints. Has tenderness of many joints. Takes naproxen and gabapentin. She takes naproxen 500mg BID. In the AM has stiffness in her neck/back. Gets some better with stretching/ exercise. Takes 15 minutes to get better. No rash. Does have swelling of her right hand 2nd -4th digits. Pain in her PIPs predominantly. Pain also in between the joints. Has recently had some blurred vision, wears glasses. No inflammatory eye disease history. Intermittent double vision. Has had hair loss, non scarring more that is thinning. More over the last 3-4 years. Intermittent sore in mouth, painful. Has dry eyes, uses eye drops 3-4 times per week. When she wakes up her throat is dry. Drinks frequently. No abdominal pain. No blood in her stool. No n/v. No raynauds. No sclerodactyly. No fevers, weight loss. Occasional weight loss and chills. Has been having ongoing throbbing HA on the left side. The naproxen is some helpful, though returns shortly afterwards. No miscarriages or blood clots.     Past Medical History  Past Medical History:   Diagnosis Date    Anemia     History of blood transfusion 2000    after vaginal delivery, 2 units PRBCs given    HSIL on Pap smear 09/21/2011    MARTA 2 and 3    Immune to hepatitis B 08/2021    hep B antigen NEG    INFLAM SPONDYLOPATHY NOS 01/07/2008     check labs on sulfasalzine every 3 months and check hep B and C and TB every 2 years    Leukocytopenia 03/10/2014    Tendinosis     Thrombocytopenia (H24) 03/10/2014    Unspecified closed fracture of pelvis 2000    left fracture of pelvis after delivery     Past Surgical History  Past Surgical History:   Procedure Laterality Date    COLONOSCOPY Left 11/12/2021    recheck in 10 years    DILATION AND CURETTAGE, ABLATE ENDOMETRIUM NOVASURE, COMBINED N/A 12/04/2018    Procedure: novasure endometrial ablation, myosure resection of leiomyoma, dilation and curettage;  Surgeon: Rolando Covarrubias MD;  Location: [facility] OR    GYN SURGERY  12/04/2018    fibroid removal    lap hysterectomy and salpingetcomy Bilateral 2019    done at [facility] - ovaries are in    LEEP TX, CERVICAL  12/19/2011    MARTA II    OPERATIVE HYSTEROSCOPY WITH MORCELLATOR N/A 12/04/2018    ovaries are still in.   Surgeon: Rolando Covarrubias MD;  Location: [facility] OR    TUBAL LIGATION  05/2009    laparoscopic tubal ligation    ZZC NONSPECIFIC PROCEDURE  10/2000    after delivery 2 units of prbcs secondary to placenta     Medications  Current Outpatient Medications   Medication    amLODIPine (NORVASC) 2.5 MG tablet    aspirin-acetaminophen-caffeine (EXCEDRIN MIGRAINE) 250-250-65 MG tablet    cyclobenzaprine (FLEXERIL) 5 MG tablet    diclofenac (VOLTAREN) 1 % topical gel    DULoxetine (CYMBALTA) 30 MG capsule    Evening Primrose Oil 500 MG CAPS    FEROSUL 325 (65 Fe) MG tablet    fluticasone (FLONASE) 50 MCG/ACT nasal spray    gabapentin (NEURONTIN) 300 MG capsule    ketotifen fumarate 0.035%, ketotifen 0.025%, (ZADITOR) 0.025 % ophthalmic solution    lidocaine (XYLOCAINE) 5 % external ointment    loperamide (IMODIUM A-D) 2 MG tablet    loratadine-pseudoePHEDrine (CLARITIN-D 24-HOUR)  MG 24 hr tablet    meclizine (ANTIVERT) 25 MG tablet    meloxicam (MOBIC) 15 MG tablet    olopatadine (PATANOL) 0.1 % ophthalmic solution    ondansetron (ZOFRAN) 4 MG tablet     "polyethylene glycol (MIRALAX) 17 GM/Dose powder    rosuvastatin (CRESTOR) 20 MG tablet    scopolamine (TRANSDERM) 1 MG/3DAYS 72 hr patch    SM STOOL SOFTENER 100 MG capsule    sulfaSALAzine ER (AZULFIDINE EN) 500 MG EC tablet    tofacitinib (XELJANZ XR) 11 MG 24 hr tablet    traMADol (ULTRAM) 50 MG tablet    traZODone (DESYREL) 50 MG tablet    triamcinolone (KENALOG) 0.1 % external cream    VITAMIN D3 50 MCG (2000 UT) tablet     No current facility-administered medications for this visit.     Facility-Administered Medications Ordered in Other Visits   Medication    midazolam (VERSED) injection     Allergies   Allergies   Allergen Reactions    Contrast Dye Nausea and Vomiting    Diatrizoate Nausea and Vomiting    Percocet [Oxycodone-Acetaminophen] Nausea and Vomiting and Itching    Advil [Ibuprofen Micronized] Rash     Rash     Ibuprofen Rash     Family History: Mother with \"arthritis\"    Social History: Not currently working. 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 Repetitive motion. No smoking/drugs/ETOH. Not . 4 kids who are healthy.       Objective:    Physical exam:  No vitals  Laying in bed  Sclera appear clear  No facial rash  Breathing comfortably on room air, no cough, no audible wheeze, no use of accessory muscles  Wearing wrist splint/brace on the right wrist. No red edema/erythema of the MCPs/PIPs bilaterally.     Labs:  WBC   Date Value Ref Range Status   02/15/2021 6.5 4.0 - 11.0 10e9/L Final     WBC Count   Date Value Ref Range Status   09/25/2023 5.9 4.0 - 11.0 10e3/uL Final     Hemoglobin   Date Value Ref Range Status   09/25/2023 12.5 11.7 - 15.7 g/dL Final   02/15/2021 12.2 11.7 - 15.7 g/dL Final     Platelet Count   Date Value Ref Range Status   09/25/2023 237 150 - 450 10e3/uL Final   02/15/2021 219 150 - 450 10e9/L Final     Creatinine   Date Value Ref Range Status   09/25/2023 0.53 0.51 - 0.95 mg/dL Final   02/15/2021 0.54 0.52 - 1.04 mg/dL Final     Lab Results   Component Value " Date    ALKPHOS 60 03/01/2022    ALKPHOS 55 02/15/2021     AST   Date Value Ref Range Status   09/25/2023 21 0 - 45 U/L Final     Comment:     Reference intervals for this test were updated on 6/12/2023 to more accurately reflect our healthy population. There may be differences in the flagging of prior results with similar values performed with this method. Interpretation of those prior results can be made in the context of the updated reference intervals.   02/15/2021 14 0 - 45 U/L Final     Lab Results   Component Value Date    ALT 21 03/01/2022    ALT 17 02/15/2021     Sed Rate   Date Value Ref Range Status   02/15/2021 20 0 - 20 mm/h Final     Erythrocyte Sedimentation Rate   Date Value Ref Range Status   09/25/2023 35 (H) 0 - 20 mm/hr Final     CRP Inflammation   Date Value Ref Range Status   08/16/2021 <2.9 0.0 - 8.0 mg/L Final   02/26/2020 <2.9 0.0 - 8.0 mg/L Final     UA RESULTS:  Recent Labs   Lab Test 05/18/21  0858   COLOR Yellow   APPEARANCE Clear   URINEGLC Negative   URINEBILI Negative   URINEKETONE Negative   SG 1.015   UBLD Large*   URINEPH 6.5   PROTEIN Negative   UROBILINOGEN 0.2   NITRITE Negative   LEUKEST Moderate*   RBCU 10-25*   WBCU *      RAMÍREZ Screen by EIA   Date Value Ref Range Status   05/21/2014 <1.0  Interpretation:  Negative   <1.0 Final     Rheumatoid Factor   Date Value Ref Range Status   05/21/2014 <20 <20 IU/mL Final     Cyclic Citrullinated Peptide Antibody, IgG   Date Value Ref Range Status   08/28/2017 1 <7 U/mL Final     Comment:     Negative       Imaging:  MRI LUMBAR SPINE WITHOUT CONTRAST   2/28/2020 3:51 PM      HISTORY: Radiculopathy, greater than  6 weeks conservative treatment,  persistent symptoms; radiculopathy - history of sacroiliitis. Family  history of spinal disease and severe stenosis - left-sided symptoms.  Spondyloarthropathy.      TECHNIQUE: Multiplanar multisequence MRI of the lumbar spine without  contrast.      COMPARISON: Lumbar spine MRI 9/7/2010.  Correlation is also made with  prior pelvic CT 6/10/2019.     FINDINGS:  Alignment is normal. No fracture or significant vertebral  body height loss. The intervertebral discs are normal in height and  signal. No abnormal marrow signal in the lumbar spine. There is no  disc bulge or herniation. There is no spinal or neural foraminal  stenosis. Sclerotic changes about the sacroiliac joints with marginal  spurring. This appears overall similar (within the field-of-view) to  the most recent examinations.                                                                      IMPRESSION:  1. No acute abnormality of the lumbar spine. Specifically, no disc  bulge/herniation, marrow signal abnormality, or spinal or neural  foraminal stenosis.  2. Redemonstrated sclerosis with marginal spurring about the  sacroiliac joints, potentially representing changes related to  osteoarthritis. This may be secondary to prior sacroiliitis in the  setting of spondyloarthropathy given the patient's clinical history.  This is incompletely included within the field-of-view of this exam.

## 2023-11-29 NOTE — LETTER
[facility]  [address] [place], [place], [address]  [phone]        October 7, 2019    RE: Zo Qureshi                                                                                                                                                       [address] [facility] [address]            To Whom It May Concern,   Zo Qureshi is a patient of mine.  She continues to suffer from bilateral shoulder tendonitis with the right being worse.   She has limited range of motion and pain.   She is on treatment and is doing better but still limited.   She is permanently disabled from the standpoint of the type of work she was doing before.   She will be seen again in 3 months.   She will continue treatment and see her rheumatologist in the meantime.             Sincerely,    Terri Robles M.D.    
n/a

## 2023-12-12 ENCOUNTER — PATIENT OUTREACH (OUTPATIENT)
Dept: CARE COORDINATION | Facility: CLINIC | Age: 48
End: 2023-12-12
Payer: MEDICARE

## 2023-12-13 ENCOUNTER — TELEPHONE (OUTPATIENT)
Dept: RHEUMATOLOGY | Facility: CLINIC | Age: 48
End: 2023-12-13
Payer: MEDICARE

## 2023-12-13 NOTE — TELEPHONE ENCOUNTER
Left Voicemail (1st Attempt) and Sent Mychart (1st Attempt) for the patient to call back and schedule the following:    Return date: March 2024  Specialty phone number: [phone]  Additional appointment(s) needed: X-ray completed before visit  Additonal Notes: FORMER [facility] PT. PLEASE EITHER SCHEDULE AS RETURN VISIT WITH DR. HERNANDEZ AT EITHER [facility] [place], OR WITH EITHER DR. CEDENO, [place], OR JU[place].

## 2023-12-15 ENCOUNTER — TELEPHONE (OUTPATIENT)
Dept: RHEUMATOLOGY | Facility: CLINIC | Age: 48
End: 2023-12-15
Payer: MEDICARE

## 2023-12-15 NOTE — TELEPHONE ENCOUNTER
Left Voicemail (2nd Attempt) for the patient to call back and schedule the following:    Return date: March 2024  Specialty phone number: [phone]  Additional appointment(s) needed: X-ray completed before visit  Additonal Notes: FORMER [facility] PT. PLEASE EITHER SCHEDULE AS RETURN VISIT WITH DR. HERNANDEZ AT E[place] [facility] [place], OR [place], OR WITH EITHER DR. CEDENO, [place]IL[place], OR JUNAID

## 2023-12-25 DIAGNOSIS — M47.819 SERONEGATIVE SPONDYLOARTHROPATHY: ICD-10-CM

## 2023-12-25 DIAGNOSIS — M45.8 ANKYLOSING SPONDYLITIS OF SACRAL REGION (H): ICD-10-CM

## 2023-12-29 RX ORDER — SULFASALAZINE 500 MG/1
1000 TABLET, DELAYED RELEASE ORAL 2 TIMES DAILY
Qty: 360 TABLET | Refills: 0 | Status: SHIPPED | OUTPATIENT
Start: 2023-12-29 | End: 2024-05-22

## 2023-12-29 NOTE — TELEPHONE ENCOUNTER
sulfaSALAzine ER (AZULFIDINE EN) 500 MG EC tablet 360 tablet 0 9/28/2023     Last Office Visit: 11/24/23  Future Office visit:  none    CBC RESULTS:   Recent Labs   Lab Test 09/25/23  1010   WBC 5.9   RBC 4.12   HGB 12.5   HCT 36.6   MCV 89   MCH 30.3   MCHC 34.2   RDW 11.8          Creatinine   Date Value Ref Range Status   09/25/2023 0.53 0.51 - 0.95 mg/dL Final   02/15/2021 0.54 0.52 - 1.04 mg/dL Final       Liver Function Studies -   Recent Labs   Lab Test 09/25/23  1010 05/18/23  1030 02/03/23  0928   PROTTOTAL  --   --  8.1   ALBUMIN  --   --  4.6   BILITOTAL  --   --  0.3   ALKPHOS  --   --  57   AST 21   < > 23   ALT 6   < > 12    < > = values in this interval not displayed.       Routing refill request to provider for review/approval because:  Provider must authorize    Eloise Baron RN

## 2024-01-02 ENCOUNTER — MYC MEDICAL ADVICE (OUTPATIENT)
Dept: FAMILY MEDICINE | Facility: CLINIC | Age: 49
End: 2024-01-02
Payer: MEDICARE

## 2024-01-02 DIAGNOSIS — K59.04 CHRONIC IDIOPATHIC CONSTIPATION: Primary | ICD-10-CM

## 2024-01-02 RX ORDER — POLYETHYLENE GLYCOL 3350 17 G/17G
1 POWDER, FOR SOLUTION ORAL DAILY
Qty: 510 G | Refills: 1 | Status: SHIPPED | OUTPATIENT
Start: 2024-01-02

## 2024-01-02 RX ORDER — POLYETHYLENE GLYCOL 3350 17 G/17G
1 POWDER, FOR SOLUTION ORAL DAILY
Status: CANCELLED | OUTPATIENT
Start: 2024-01-02

## 2024-01-02 NOTE — TELEPHONE ENCOUNTER
Dr. Cline   Please see my chart message     RN t'd up, please route back if you would like visit     Thank you   Flor Maddox, Registered Nurse, PAL (Patient Advocate Liaison)   [facility]   [phone]

## 2024-01-03 ENCOUNTER — OFFICE VISIT (OUTPATIENT)
Dept: URGENT CARE | Facility: URGENT CARE | Age: 49
End: 2024-01-03
Payer: MEDICARE

## 2024-01-03 ENCOUNTER — ANCILLARY PROCEDURE (OUTPATIENT)
Dept: GENERAL RADIOLOGY | Facility: CLINIC | Age: 49
End: 2024-01-03
Attending: INTERNAL MEDICINE
Payer: MEDICARE

## 2024-01-03 VITALS
TEMPERATURE: 98.8 F | OXYGEN SATURATION: 97 % | SYSTOLIC BLOOD PRESSURE: 110 MMHG | DIASTOLIC BLOOD PRESSURE: 70 MMHG | RESPIRATION RATE: 14 BRPM | HEART RATE: 79 BPM

## 2024-01-03 DIAGNOSIS — N30.00 ACUTE CYSTITIS WITHOUT HEMATURIA: Primary | ICD-10-CM

## 2024-01-03 DIAGNOSIS — M47.819 SERONEGATIVE SPONDYLOARTHROPATHY: ICD-10-CM

## 2024-01-03 DIAGNOSIS — R30.0 DYSURIA: ICD-10-CM

## 2024-01-03 LAB
ALBUMIN UR-MCNC: 30 MG/DL
APPEARANCE UR: CLEAR
BACTERIA #/AREA URNS HPF: ABNORMAL /HPF
BILIRUB UR QL STRIP: NEGATIVE
COLOR UR AUTO: YELLOW
GLUCOSE UR STRIP-MCNC: NEGATIVE MG/DL
HGB UR QL STRIP: ABNORMAL
KETONES UR STRIP-MCNC: NEGATIVE MG/DL
LEUKOCYTE ESTERASE UR QL STRIP: ABNORMAL
MUCOUS THREADS #/AREA URNS LPF: PRESENT /LPF
NITRATE UR QL: NEGATIVE
PH UR STRIP: 7.5 [PH] (ref 5–7)
RBC #/AREA URNS AUTO: >100 /HPF
SP GR UR STRIP: 1.02 (ref 1–1.03)
SQUAMOUS #/AREA URNS AUTO: ABNORMAL /LPF
UROBILINOGEN UR STRIP-ACNC: 0.2 E.U./DL
WBC #/AREA URNS AUTO: ABNORMAL /HPF
WBC CLUMPS #/AREA URNS HPF: PRESENT /HPF

## 2024-01-03 PROCEDURE — 81001 URINALYSIS AUTO W/SCOPE: CPT

## 2024-01-03 PROCEDURE — 99213 OFFICE O/P EST LOW 20 MIN: CPT | Performed by: FAMILY MEDICINE

## 2024-01-03 PROCEDURE — 87186 SC STD MICRODIL/AGAR DIL: CPT | Performed by: FAMILY MEDICINE

## 2024-01-03 PROCEDURE — 72050 X-RAY EXAM NECK SPINE 4/5VWS: CPT | Mod: TC | Performed by: RADIOLOGY

## 2024-01-03 PROCEDURE — 87086 URINE CULTURE/COLONY COUNT: CPT | Performed by: FAMILY MEDICINE

## 2024-01-03 RX ORDER — NITROFURANTOIN 25; 75 MG/1; MG/1
100 CAPSULE ORAL 2 TIMES DAILY
Qty: 14 CAPSULE | Refills: 0 | Status: SHIPPED | OUTPATIENT
Start: 2024-01-03 | End: 2024-01-10

## 2024-01-03 NOTE — PROGRESS NOTES
ICD-10-CM    1. Acute cystitis without hematuria  N30.00 nitroFURantoin macrocrystal-monohydrate (MACROBID) 100 MG capsule      2. Dysuria  R30.0 UA Macroscopic with reflex to Microscopic and Culture - Lab Collect     UA Macroscopic with reflex to Microscopic and Culture - Lab Collect     Urine Microscopic Exam     Urine Culture        Low suspicion for pyelo at this time.    PLAN:  Patient Instructions   Start nitrofurantoin twice daily for the next 7 days.  Be sure to take all of the pills in the bottle even if you're feeling better.    Drink plenty of water and other fluids.    SUBJECTIVE:  Zo Qureshi is a 48 year old female who presents to  today with dysuria since last night.  Hasn't noticed any blood in her urine but did notice a little blood when wiping after giving her UA sample in clinic today.  No fevers.  Feels like prior UTIs.    OBJECTIVE:  /70 (BP Location: Right arm, Patient Position: Sitting, Cuff Size: Adult Regular)   Pulse 79   Temp 98.8  F (37.1  C) (Tympanic)   Resp 14   LMP 03/26/2019 (Within Days)   SpO2 97%   GEN: well-appearing, in NAD  Back: no CVA tenderness    Results for orders placed or performed in visit on 01/03/24   UA Macroscopic with reflex to Microscopic and Culture - Lab Collect     Status: Abnormal    Specimen: Urine, Clean Catch   Result Value Ref Range    Color Urine Yellow Colorless, Straw, Light Yellow, Yellow    Appearance Urine Clear Clear    Glucose Urine Negative Negative mg/dL    Bilirubin Urine Negative Negative    Ketones Urine Negative Negative mg/dL    Specific Gravity Urine 1.020 1.003 - 1.035    Blood Urine Large (A) Negative    pH Urine 7.5 (H) 5.0 - 7.0    Protein Albumin Urine 30 (A) Negative mg/dL    Urobilinogen Urine 0.2 0.2, 1.0 E.U./dL    Nitrite Urine Negative Negative    Leukocyte Esterase Urine Moderate (A) Negative   Urine Microscopic Exam     Status: Abnormal   Result Value Ref Range    Bacteria Urine Moderate (A) None Seen /HPF     RBC Urine >100 (A) 0-2 /HPF /HPF    WBC Urine 25-50 (A) 0-5 /HPF /HPF    Squamous Epithelials Urine Few (A) None Seen /LPF    WBC Clumps Urine Present (A) None Seen /HPF    Mucus Urine Present (A) None Seen /LPF

## 2024-01-03 NOTE — PATIENT INSTRUCTIONS
Start nitrofurantoin twice daily for the next 7 days.  Be sure to take all of the pills in the bottle even if you're feeling better.    Drink plenty of water and other fluids.

## 2024-01-05 LAB — BACTERIA UR CULT: ABNORMAL

## 2024-02-04 NOTE — TELEPHONE ENCOUNTER
Done and in my outbasket    
Faxed on 9/10/2018     Unique Simeon, CMA on 9/10/2018 at 11:58 AM  
Recd 3 page fax from [employer].  Please complete Health Care Provider Report and fax to [phone].  Form in [facility] inbox.    Jennifer Hensley    
Orthopedic Surgery

## 2024-02-14 ENCOUNTER — TELEPHONE (OUTPATIENT)
Dept: RHEUMATOLOGY | Facility: CLINIC | Age: 49
End: 2024-02-14
Payer: MEDICARE

## 2024-02-14 NOTE — TELEPHONE ENCOUNTER
Call to patient to offer a sooner appt with Dr. Cuba, patient declined.    Meghan Lake, BSN, RN  RN Care Coordinator Rheumatology

## 2024-02-21 ENCOUNTER — THERAPY VISIT (OUTPATIENT)
Dept: PHYSICAL THERAPY | Facility: CLINIC | Age: 49
End: 2024-02-21
Attending: FAMILY MEDICINE
Payer: MEDICARE

## 2024-02-21 DIAGNOSIS — M46.98 INFLAMMATORY SPONDYLOPATHY OF SACRAL REGION (H): ICD-10-CM

## 2024-02-21 DIAGNOSIS — M79.662 PAIN OF LEFT LOWER LEG: ICD-10-CM

## 2024-02-21 PROCEDURE — 97110 THERAPEUTIC EXERCISES: CPT | Mod: GP | Performed by: PHYSICAL THERAPIST

## 2024-02-21 PROCEDURE — 97161 PT EVAL LOW COMPLEX 20 MIN: CPT | Mod: GP | Performed by: PHYSICAL THERAPIST

## 2024-02-21 NOTE — PROGRESS NOTES
PHYSICAL THERAPY EVALUATION  Type of Visit: Evaluation  History of lumbar pain for years secondary to inflammatory spondylopathy. Pt referred for physical therapy on 23  See electronic medical record for Abuse and Falls Screening details.    Subjective       Presenting condition or subjective complaint:    Date of onset:      Relevant medical history:     Dates & types of surgery:      Prior diagnostic imaging/testing results:       Prior therapy history for the same diagnosis, illness or injury:        Prior Level of Function  Transfers: Independent  Ambulation: Independent  ADL: Independent  IADL: Housekeeping    Living Environment  Social support:     Type of home:     Stairs to enter the home:         Ramp:     Stairs inside the home:         Help at home:    Equipment owned:       Employment:      Hobbies/Interests:      Patient goals for therapy:      Pain assessment: Pain present  Location: bilateral lumbar L>R radiating into the left lower extremity/Ratin-8/10     Objective     LUMBAR:    Posture: increased lumbar lordosis    Gait:     SL Balance:  R:  sec (R/L hip drop)  L:  sec (R/L hip drop)    Functional:  - Squat/ SL squat     Neurological: pt demonstrates decreased left patellar and ankle reflexes left lower extremity. Pt demonstrates decreased strength left lower extremity. Pt demonstreates decreased sensation to light touch entire left lower extremity    Motor Deficit:  Myotomes L R   L1-2 (hip flexion)     L3 (knee extension)     L4 (ankle DF)     L5 (g. toe ext)     S1 (ankle PF or knee flex)     Sensory Deficit, Reflexes:     Dural Signs:   L R   Slump     SLR - -       AROM: (Major, Moderate, Minimal or Nil loss)  Movement Loss Cameron Mod Min Nil Pain   Flexion  x      Extension x    x   Side Gliding/Bend L  x      Side Gliding/Bend R              Lumbar Mobility/Spring Testing:     Palpation: point tenderness L3-L5 spinous processes    Other Tests: decreased flexibility bilateral  hamstrings           Assessment & Plan   CLINICAL IMPRESSIONS  Medical Diagnosis: inflammatory spondylopathy of the pelvic region    Treatment Diagnosis: lumbar pain, decreased mobility and strength   Impression/Assessment: Patient is a 49 year old female with lumbar  complaints.  The following significant findings have been identified: Pain, Decreased ROM/flexibility, and Decreased strength. These impairments interfere with their ability to perform self care tasks, recreational activities, household chores, household mobility, and community mobility as compared to previous level of function.     Clinical Decision Making (Complexity):  Clinical Presentation: Stable/Uncomplicated  Clinical Presentation Rationale: based on medical and personal factors listed in PT evaluation  Clinical Decision Making (Complexity): Low complexity    PLAN OF CARE  Treatment Interventions:  Interventions: Therapeutic Exercise    Long Term Goals            Frequency of Treatment: 1x/week  Duration of Treatment: 6 weeks    Recommended Referrals to Other Professionals:   Education Assessment:   Learner/Method: No Barriers to Learning    Risks and benefits of evaluation/treatment have been explained.   Patient/Family/caregiver agrees with Plan of Care.     Evaluation Time:             Signing Clinician: Armando Lai, PT      [facility]                                                                                   OUTPATIENT PHYSICAL THERAPY      PLAN OF TREATMENT FOR OUTPATIENT REHABILITATION   Patient's Last Name, First Name, Zo Mullen YOB: 1975   Provider's Name   [facility]   Medical Record No.  [id number]     Onset Date:    Start of Care Date:       Medical Diagnosis:  inflammatory spondylopathy of the pelvic region      PT Treatment Diagnosis:  lumbar pain, decreased mobility and strength Plan of Treatment  Frequency/Duration: 1x/week/ 6  weeks    Certification date from   to           See note for plan of treatment details and functional goals     Armando Lai, PT                         I CERTIFY THE NEED FOR THESE SERVICES FURNISHED UNDER        THIS PLAN OF TREATMENT AND WHILE UNDER MY CARE     (Physician attestation of this document indicates review and certification of the therapy plan).              Referring Provider:  Terri Robles    Initial Assessment  See Epic Evaluation-

## 2024-02-26 ENCOUNTER — THERAPY VISIT (OUTPATIENT)
Dept: PHYSICAL THERAPY | Facility: CLINIC | Age: 49
End: 2024-02-26
Payer: MEDICARE

## 2024-02-26 DIAGNOSIS — M46.98 INFLAMMATORY SPONDYLOPATHY OF SACRAL REGION (H): Primary | ICD-10-CM

## 2024-02-26 DIAGNOSIS — M79.662 PAIN OF LEFT LOWER LEG: ICD-10-CM

## 2024-02-26 PROCEDURE — 97110 THERAPEUTIC EXERCISES: CPT | Mod: GP | Performed by: PHYSICAL THERAPIST

## 2024-02-29 NOTE — PROGRESS NOTES
Virtual Visit Details    Type of service:  Video Visit   Video Start Time: 9:23 AM  Video End Time:9:45 AM    Originating Location (pt. Location): Home    Distant Location (provider location):  On-site  Platform used for Video Visit: [facility]         Rheumatology Clinic Visit  Ray Cuba M.D.     Zo Qureshi MRN# [id number]   YOB: 1975 Age: 49 year old     Date of Visit: 03/01/2024  Primary care provider: Terri Robles        Assessment & Plan:   # HLA-B27 negative seronegative spondyloarthropathy:   # Viral syndrome 3-2024    She has R shoulder pain with some stiffness in the neck.  Video exam: painful movement of R thumb, 2nd, 3rd digits, wearing a R volar wrist splint. +brown red macules R leg, no skin breakdown    Data: Blood work in September 2023 showed normal comprehensive metabolic panel and CBC.  Neck film performed in 2024 showed stable mild listhesis, but no fracture or malalignment, no change since previous MRI.    Discusssion: Inflammatory joint pain from spondyloarthropathy is much improved compared to prior to starting combination immunomodulatory therapy.  I recommend continuing combination tofacitinib and sulfasalazine, with use of as needed meloxicam for residual joint pain.  I think that cervical and right shoulder pain is likely muscular in origin, as x-ray does not show change in bony anatomy of the cervical spine.  I recommend physical therapy for the symptoms.    # High risk medication use: Xelclairez, SSZ  Data: 9/25/23 to include CBC, AST, ALT, creatinine, ESR, CRP. All within normal limits except her ESR was 35. No evidence of acute drug toxicity identified on these labs. Will continue to monitor routine screening drug toxicity labs every 6 months while on this therapy. She is due for routine screening drug toxicity monitoring labs again in March of 2024.   -Risks and benefits of these therapies to include infection, thrombosis, bowel perforation, bone marrow suppression,  GI/hepatotoxicity, malignancy, increased all cause mortality among others discussed again today. She is agreeable to continue given potential benefit and lack of significant side effects to date.    Plan:    - continue Xeljanz 11 mg once daily  - continue sulfasalazine 1 g twice daily up to 1.5gm BID  - continue meloxicam 15 mg once daily as needed.  Counseled not to take any other NSAIDs in 24 hours after each meloxicam dose.  Risk and benefits discussed including renal cardiac GI other.  - Physical therapy for shoulder/neck maxine.    RTC 7-8 mos    Orders Placed This Encounter   Procedures    Comprehensive metabolic panel    Erythrocyte sedimentation rate auto    CRP inflammation    Med Therapy Management Referral    CBC with platelets differential       Ray Cuba MD  Staff Rheumatologist, [facility]    On the day of the encounter, a total of 50 minutes was spent in chart review, and in counseling and coordination of care, regarding the patient's complex medical problem of spondyloarthropathy, shoulder pain, viral syndrome.    The longitudinal plan of care for the diagnosis(es)/condition(s) as documented were addressed during this visit. Due to the added complexity in care, I will continue to support Zo in the subsequent management and with ongoing continuity of care.         Subjective:     HPI: Patient presents as a transfer of care from Dr. Hernandez.  She had previously seen me and Dr. Garay for spondyloarthropathy.  At the last visit in November 2023, daily joint pain was continuing.  Recommendation was to continue Xeljanz 11 mg daily, sulfasalazine and increased dose of 1.5 mg twice daily, and continue meloxicam once daily.    Background: SpA, previously evaluated by [place] and Tanisha of [facility] who established care with me on 5/27/21. Prior to that time she had last been seen by Dr. Garay of 5/24/2019. Most recent imaging of SI joints was on 2/28/2020 which showed  "sclerosis with marginal spurring about the SI joints, likely secondary to her chronic inflammatory disease, though no active inflammatory changes at that time. Had been tried on both humira and enbrel, though did not tolerate these due to side effects so discontinued prior to determining if efficacious. At the time of her last visit with rheumatology it was documented that she was responsive to NSAIDs, prednisone, and SSZ. The dose of her SSZ was increased to 1gm BID at the time of her initial visit with me given her active peripheral arthritis and enthesitis. Despite naproxen 500mg BID, she had ongoing axial stiffness which was AM predominant and improved with activity and stretching, so risks and benefits of cosentyx discussed and patient started on this IL-17 inhibitor. She interestingly developed the same side effects of severe flu like symptoms after each dose of her biologic therapy- similar to those she was experiencing when on humira and enbrel, with rhinorrhea, muscle aches/pains, severe fatigue, and also hives, low grade fever which come on after her injection and slowly over many weeks will resolve. Had been worse with each subsequent injection of cosentyx.  Cosentyx was discontinued and xeljanz 11mg once daily started early January 2022. She self discontinued this for a short period however it was restarted in April of 2023. She continues on xeljanz 11mg once daily SSZ 1gm BID and mobic 15mg once daily.    Interval history March 1, 2024    She reports \"sick\" for 4 days with body aches, chills, HA, +cough, non-productive. +tactile fever. No contacts sick.  COVID19 negative.    She held xeljanz for the last 4 days. Symptoms are improving.  Before falling ill, she had been doing well. She notes persistent low  level pain in her shoulders, back, neck. Pain radiates into buttock.   She is doing PTx for her lower back; she feels more pain with the exercises  Her R shoulder and neck are painful.  She thinks " "that DMARD help with pain  She    She endorses hx of medication use (Xeljanz, sulfasalazine, meloxicam). She reports frequent \"flu\" symptoms recurrent while on DMARDs. She has better tolerability of meds than with anti-TNF.    November 24, 2023 Dr. Rowell  -at the time of her last appointment she was continued on xeljanz, SSZ, mobic. Had only been back on systemic immunosuppression for about 8 weeks at that point but was noticing improvement already  -we obtained bilateral hand and wrist xrays to assess for inflammatory sequelae, no erosions or acute osseous abnormalities were identified.   -most recent labs reviewed from 9/25/23 to include CBC, AST, ALT, creatinine, ESR, CRP. All within normal limits except her ESR was 35. No evidence of acute drug toxicity identified on these labs. Will continue to monitor routine screening drug toxicity labs every 6 months while on this therapy. Standing orders are in place. She is due for routine screening drug toxicity monitoring labs again in March of 2024.   -The patient, last seen in June, presented with ongoing symptoms. They reported experiencing cold-like symptoms intermittently, including body aches, runny nose, and chills. These symptoms have been occurring with a frequency of about one to two times per month since their last visit. The patient mentioned that during these episodes, when they discontinue their rheumatologic medication, specifically Xeljanz, they experience significant discomfort and pain. However, when on medication, including sulfasalazine, xeljanz, and Mobic (meloxicam), their symptoms are notably less severe. The patient continues to take Mobic and sulfasalazine during these episodes but stops Xeljanz.    In addition to these symptoms, the patient also reported experiencing dizziness, headaches, and joint pain, particularly in the fingers. They described the pain as being primarily in the joints and worsening with movement or upon impact. They " noted that the pain initially affected the thumb and middle finger but has now progressed to include the index finger and ring finger, sparing the pinky. The patient has been using braces to manage the pain, which has been somewhat effective.    The patient's most recent imaging studies, including x-rays of the hands and wrists, showed no signs of erosions, fractures, or degenerative changes. However, they have been experiencing neck pain, which radiates to the back of the skull and is exacerbated by certain movements, leading to headaches. This neck pain has been a concern for some time, and their primary care doctor had previously recommended an MRI, which was not yet conducted. The patient's history of lumbar spine issues was also noted, with the last MRI performed approximately three to four years ago.    In summary, the patient's primary issues include recurrent cold-like symptoms affecting their rheumatologic treatment adherence, ongoing joint pain in the fingers, and significant neck pain leading to headaches, all of which impact their overall quality of life and require ongoing management and further evaluation.    14 point review of systems collected and negative if not documented above.    June 30, 2023   -at the time of her last visit, we restarted both xeljanz and SSZ. Mobic restarted at that time as well.   -Has a cold, runny nose, raspy voice. Has night sweats/chills. No fever. Has been going on for 3-4 days  -started xeljanz and SSZ, feels some less pain. Mobic as well. Started these in May, 1st or 2nd. Has been 7-8 weeks now that she has been on both medications  -If she misses any doses then pain returns.   -Rates her pain 5/10. Involves shoulder/neck/hands.   -Wakes up and stretches for 10 minutes then her stiffness is improved for the day  -Symptoms on the right hand are worse than the left hand. She has been wearing a brace on the left hand that was special molded for her by ortho/PT she  reports  -no new rashes  -outside of above possible URI, no new or progressive fevers/chills/night sweats. No other interval infections.   -no unintentional weight loss  -Has intermittent swelling of PIPs/DIPs.   -In her hands has most pain in her CMCs and wrists  She is tolerating xeljanz, mobic, SSZ without any noticeable side effects, GI/cutaneous/other.  14 point ROS collected and negative if not documented above      April 14, 2023  -Up until January 2023, had continued on xeljanz 11mg once daily, SSZ 1gm BID. But then ran out of both medications and did not follow-up/request refill and so stopped.   -While on both therapies, she noted that if she took her doses late, would experience worse pain/stiffness.   -Currently reporting pain stiffness in almost all joint in her body, both axial and peripheral. In shoulder/neck. Has pain/stiffness in hands. 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 Stretches. Pain in the shoulder, right side also but left more than right.   Has tried massage which is temporarily helpful for pain, but immediately returns. No redness, Has swelling of her wrist on right more than left.   - Still with some HA/dizziness. Still gets warm, though not fever. Intermittent coughing/runny nose. Has nasal spray.  -has some red patches of that come and go, even now not on medication. No different on medication. Not medication side effects  -Middle to low back also much more severe pain and stiffness which is worse in the AM and improves with use. Has radiating pain down her left leg.   -Has ongoing RLS.   -no unintentional weight loss.   -Chronic mild hair thinning over the last 2-3 years    Interval history 3/4/22  Has not experienced any side effects from xeljanz. Has had ongoing HA/dizziness, which was there prior to starting xeljanz. Has intermittent chills/ feeling warm, though no temp. Does not happen everyday, happens 1-2 times per week. Also warm at night, though not wet on clothes. Her pain is less. Rates her  "pain now around 6/10. No redness/warmth/swelling. Though last week had right wrist pain with minimal movement. Lasted for 2-3 days. Used topical lidocaine which resolved symptoms. When painful, it was somewhat \"puffy\".  At this time her back is still stiff, AM predominant. Back and neck. She sits when she wakes up and stretches. Then one hour later she has improvement and continues her day. Has rash on anterior shin right LE, saw PCP who gave her topical steroid which she has not started. Still with intermittent blurred vision/double vision. Unchanged. No more severe/frequent. Rare sores in her mouth. No interval infections. ROS otherwise negative.       Interval history  12/17/21  Reports feeling of fever/body aches/runny nose. Also with rash. 3-4 days after her first injection she noticed that this started, though took her next dose and the above symptoms were more severe. Then was off for about 1 month due to these severe side effects. She decided to re-try in October and November, though has again had return of above. Has been about 1 month since her last injection and reports side effects are continuing to slowly improve, though still with some fatigue/body aches/ HA. Rash and fever resolved. She finds it hard to comment on axial pain/stiffness/peripiheral joint pain/swelling in setting of severe fatigue/flu like symptoms. Her ROS is otherwise negative.     HPI from initial consultation 5/2021  Had flu like symptoms with flu like symptoms. Resolves when stopped. Prednisone, NSAIDs, SSZ used/helpful. Prednisone had nausea and consipation. SSZ has drowsiness/ dizziness/ mental fog worse in the AM. Has continued on SSZ 1gm in the AM and 1gm in the PM. Still with significant drowsiness. If she doesn't want to have drowsiness, reduces the dose, has pain in neck, shoulder, back. Has some swelling of joints. Has tenderness of many joints. Takes naproxen and gabapentin. She takes naproxen 500mg BID. In the AM has " stiffness in her neck/back. Gets some better with stretching/ exercise. Takes 15 minutes to get better. No rash. Does have swelling of her right hand 2nd -4th digits. Pain in her PIPs predominantly. Pain also in between the joints. Has recently had some blurred vision, wears glasses. No inflammatory eye disease history. Intermittent double vision. Has had hair loss, non scarring more that is thinning. More over the last 3-4 years. Intermittent sore in mouth, painful. Has dry eyes, uses eye drops 3-4 times per week. When she wakes up her throat is dry. Drinks frequently. No abdominal pain. No blood in her stool. No n/v. No raynauds. No sclerodactyly. No fevers, weight loss. Occasional weight loss and chills. Has been having ongoing throbbing HA on the left side. The naproxen is some helpful, though returns shortly afterwards. No miscarriages or blood clots.     Past Medical History  Past Medical History:   Diagnosis Date    Anemia     History of blood transfusion 2000    after vaginal delivery, 2 units PRBCs given    HSIL on Pap smear 09/21/2011    MARTA 2 and 3    Immune to hepatitis B 08/2021    hep B antigen NEG    INFLAM SPONDYLOPATHY NOS 01/07/2008    check labs on sulfasalzine every 3 months and check hep B and C and TB every 2 years    Leukocytopenia 03/10/2014    Tendinosis     Thrombocytopenia (H24) 03/10/2014    Unspecified closed fracture of pelvis 2000    left fracture of pelvis after delivery     Past Surgical History  Past Surgical History:   Procedure Laterality Date    COLONOSCOPY Left 11/12/2021    recheck in 10 years    DILATION AND CURETTAGE, ABLATE ENDOMETRIUM NOVASURE, COMBINED N/A 12/04/2018    Procedure: novasure endometrial ablation, myosure resection of leiomyoma, dilation and curettage;  Surgeon: Rolando Covarrubias MD;  Location: [facility] OR    GYN SURGERY  12/04/2018    fibroid removal    lap hysterectomy and salpingetcomy Bilateral 2019    done at [facility] - ovaries are in    LEEP TX, CERVICAL   12/19/2011    MARTA II    OPERATIVE HYSTEROSCOPY WITH MORCELLATOR N/A 12/04/2018    ovaries are still in.   Surgeon: Rolando Covarrubias MD;  Location: [facility] OR    TUBAL LIGATION  05/2009    laparoscopic tubal ligation    ZZC NONSPECIFIC PROCEDURE  10/2000    after delivery 2 units of prbcs secondary to placenta     Medications  Current Outpatient Medications   Medication    amLODIPine (NORVASC) 2.5 MG tablet    aspirin-acetaminophen-caffeine (EXCEDRIN MIGRAINE) 250-250-65 MG tablet    cyclobenzaprine (FLEXERIL) 5 MG tablet    diclofenac (VOLTAREN) 1 % topical gel    DULoxetine (CYMBALTA) 30 MG capsule    Evening Primrose Oil 500 MG CAPS    FEROSUL 325 (65 Fe) MG tablet    fluticasone (FLONASE) 50 MCG/ACT nasal spray    gabapentin (NEURONTIN) 300 MG capsule    ketotifen fumarate 0.035%, ketotifen 0.025%, (ZADITOR) 0.025 % ophthalmic solution    lidocaine (XYLOCAINE) 5 % external ointment    loperamide (IMODIUM A-D) 2 MG tablet    loratadine-pseudoePHEDrine (CLARITIN-D 24-HOUR)  MG 24 hr tablet    meclizine (ANTIVERT) 25 MG tablet    meloxicam (MOBIC) 15 MG tablet    olopatadine (PATANOL) 0.1 % ophthalmic solution    ondansetron (ZOFRAN) 4 MG tablet    polyethylene glycol (MIRALAX) 17 GM/Dose powder    polyethylene glycol (MIRALAX) 17 GM/Dose powder    rosuvastatin (CRESTOR) 20 MG tablet    scopolamine (TRANSDERM) 1 MG/3DAYS 72 hr patch    SM STOOL SOFTENER 100 MG capsule    sulfaSALAzine ER (AZULFIDINE EN) 500 MG EC tablet    sulfaSALAzine ER (AZULFIDINE EN) 500 MG EC tablet    tofacitinib (XELJANZ XR) 11 MG 24 hr tablet    traMADol (ULTRAM) 50 MG tablet    traZODone (DESYREL) 50 MG tablet    triamcinolone (KENALOG) 0.1 % external cream    VITAMIN D3 50 MCG (2000 UT) tablet     No current facility-administered medications for this visit.     Facility-Administered Medications Ordered in Other Visits   Medication    midazolam (VERSED) injection     Allergies   Allergies   Allergen Reactions    Contrast Dye Nausea and  "Vomiting    Diatrizoate Nausea and Vomiting    Morphine And Related Itching and Nausea and Vomiting    Percocet [Oxycodone-Acetaminophen] Nausea and Vomiting and Itching    Advil [Ibuprofen Micronized] Rash     Rash     Ibuprofen Rash     Family History: Mother with \"arthritis\"    Social History: Not currently working. Work at [employer], building them. Repetitive motion. No smoking/drugs/ETOH. Not . 4 kids who are healthy.       Objective:    Physical exam:  No vitals  Laying in bed  Sclera appear clear  No facial rash  Breathing comfortably on room air, no cough, no audible wheeze, no use of accessory muscles  Wearing wrist splint/brace on the right wrist. No red edema/erythema of the MCPs/PIPs bilaterally.     Labs:  WBC   Date Value Ref Range Status   02/15/2021 6.5 4.0 - 11.0 10e9/L Final     WBC Count   Date Value Ref Range Status   09/25/2023 5.9 4.0 - 11.0 10e3/uL Final     Hemoglobin   Date Value Ref Range Status   09/25/2023 12.5 11.7 - 15.7 g/dL Final   02/15/2021 12.2 11.7 - 15.7 g/dL Final     Platelet Count   Date Value Ref Range Status   09/25/2023 237 150 - 450 10e3/uL Final   02/15/2021 219 150 - 450 10e9/L Final     Creatinine   Date Value Ref Range Status   09/25/2023 0.53 0.51 - 0.95 mg/dL Final   02/15/2021 0.54 0.52 - 1.04 mg/dL Final     Lab Results   Component Value Date    ALKPHOS 60 03/01/2022    ALKPHOS 55 02/15/2021     AST   Date Value Ref Range Status   09/25/2023 21 0 - 45 U/L Final     Comment:     Reference intervals for this test were updated on 6/12/2023 to more accurately reflect our healthy population. There may be differences in the flagging of prior results with similar values performed with this method. Interpretation of those prior results can be made in the context of the updated reference intervals.   02/15/2021 14 0 - 45 U/L Final     Lab Results   Component Value Date    ALT 21 03/01/2022    ALT 17 02/15/2021     Sed Rate   Date Value Ref Range Status "   02/15/2021 20 0 - 20 mm/h Final     Erythrocyte Sedimentation Rate   Date Value Ref Range Status   09/25/2023 35 (H) 0 - 20 mm/hr Final     CRP Inflammation   Date Value Ref Range Status   08/16/2021 <2.9 0.0 - 8.0 mg/L Final   02/26/2020 <2.9 0.0 - 8.0 mg/L Final     UA RESULTS:  Recent Labs   Lab Test 05/18/21  0858   COLOR Yellow   APPEARANCE Clear   URINEGLC Negative   URINEBILI Negative   URINEKETONE Negative   SG 1.015   UBLD Large*   URINEPH 6.5   PROTEIN Negative   UROBILINOGEN 0.2   NITRITE Negative   LEUKEST Moderate*   RBCU 10-25*   WBCU *      RAMÍREZ Screen by EIA   Date Value Ref Range Status   05/21/2014 <1.0  Interpretation:  Negative   <1.0 Final     Rheumatoid Factor   Date Value Ref Range Status   05/21/2014 <20 <20 IU/mL Final     Cyclic Citrullinated Peptide Antibody, IgG   Date Value Ref Range Status   08/28/2017 1 <7 U/mL Final     Comment:     Negative       Imaging:  MRI LUMBAR SPINE WITHOUT CONTRAST   2/28/2020 3:51 PM      HISTORY: Radiculopathy, greater than  6 weeks conservative treatment,  persistent symptoms; radiculopathy - history of sacroiliitis. Family  history of spinal disease and severe stenosis - left-sided symptoms.  Spondyloarthropathy.      TECHNIQUE: Multiplanar multisequence MRI of the lumbar spine without  contrast.      COMPARISON: Lumbar spine MRI 9/7/2010. Correlation is also made with  prior pelvic CT 6/10/2019.     FINDINGS:  Alignment is normal. No fracture or significant vertebral  body height loss. The intervertebral discs are normal in height and  signal. No abnormal marrow signal in the lumbar spine. There is no  disc bulge or herniation. There is no spinal or neural foraminal  stenosis. Sclerotic changes about the sacroiliac joints with marginal  spurring. This appears overall similar (within the field-of-view) to  the most recent examinations.                                                                      IMPRESSION:  1. No acute abnormality of the  lumbar spine. Specifically, no disc  bulge/herniation, marrow signal abnormality, or spinal or neural  foraminal stenosis.  2. Redemonstrated sclerosis with marginal spurring about the  sacroiliac joints, potentially representing changes related to  osteoarthritis. This may be secondary to prior sacroiliitis in the  setting of spondyloarthropathy given the patient's clinical history.  This is incompletely included within the field-of-view of this exam.

## 2024-03-01 ENCOUNTER — VIRTUAL VISIT (OUTPATIENT)
Dept: RHEUMATOLOGY | Facility: CLINIC | Age: 49
End: 2024-03-01
Attending: INTERNAL MEDICINE
Payer: MEDICARE

## 2024-03-01 VITALS — BODY MASS INDEX: 23.59 KG/M2 | HEIGHT: 59 IN | WEIGHT: 117 LBS

## 2024-03-01 DIAGNOSIS — Z79.899 HIGH RISK MEDICATION USE: Primary | ICD-10-CM

## 2024-03-01 DIAGNOSIS — M47.819 SERONEGATIVE SPONDYLOARTHROPATHY: ICD-10-CM

## 2024-03-01 DIAGNOSIS — M45.8 ANKYLOSING SPONDYLITIS OF SACRAL REGION (H): ICD-10-CM

## 2024-03-01 PROCEDURE — G2211 COMPLEX E/M VISIT ADD ON: HCPCS | Performed by: INTERNAL MEDICINE

## 2024-03-01 PROCEDURE — 99215 OFFICE O/P EST HI 40 MIN: CPT | Mod: 95 | Performed by: INTERNAL MEDICINE

## 2024-03-01 RX ORDER — TOFACITINIB 11 MG/1
11 TABLET, FILM COATED, EXTENDED RELEASE ORAL DAILY
Qty: 30 TABLET | Refills: 11 | Status: SHIPPED | OUTPATIENT
Start: 2024-03-01 | End: 2024-03-13

## 2024-03-01 ASSESSMENT — PATIENT HEALTH QUESTIONNAIRE - PHQ9: SUM OF ALL RESPONSES TO PHQ QUESTIONS 1-9: 13

## 2024-03-01 ASSESSMENT — PAIN SCALES - GENERAL: PAINLEVEL: SEVERE PAIN (7)

## 2024-03-01 NOTE — LETTER
3/1/2024       RE: Zo Qureshi  [id number] [place] [address]     Dear Colleague,    Thank you for referring your patient, Zo Qureshi, to the [facility] RHEUMATOLOGY [facility] [place] at [facility]. Please see a copy of my visit note below.         Rheumatology Clinic Visit  Ray Cuba M.D.     Zo Qureshi MRN# [id number]   YOB: 1975 Age: 49 year old     Date of Visit: 03/01/2024  Primary care provider: Terri Robles        Assessment & Plan:   # HLA-B27 negative seronegative spondyloarthropathy:   # Viral syndrome 3-2024    She has R shoulder pain with some stiffness in the neck.  Video exam: painful movement of R thumb, 2nd, 3rd digits, wearing a R volar wrist splint. +brown red macules R leg, no skin breakdown    Data: Blood work in September 2023 showed normal comprehensive metabolic panel and CBC.  Neck film performed in 2024 showed stable mild listhesis, but no fracture or malalignment, no change since previous MRI.    Discusssion: Inflammatory joint pain from spondyloarthropathy is much improved compared to prior to starting combination immunomodulatory therapy.  I recommend continuing combination tofacitinib and sulfasalazine, with use of as needed meloxicam for residual joint pain.  I think that cervical and right shoulder pain is likely muscular in origin, as x-ray does not show change in bony anatomy of the cervical spine.  I recommend physical therapy for the symptoms.    # High risk medication use: Xeljanz, SSZ  Data: 9/25/23 to include CBC, AST, ALT, creatinine, ESR, CRP. All within normal limits except her ESR was 35. No evidence of acute drug toxicity identified on these labs. Will continue to monitor routine screening drug toxicity labs every 6 months while on this therapy. She is due for routine screening drug toxicity monitoring labs again in March of 2024.   -Risks and benefits of these therapies to include  infection, thrombosis, bowel perforation, bone marrow suppression, GI/hepatotoxicity, malignancy, increased all cause mortality among others discussed again today. She is agreeable to continue given potential benefit and lack of significant side effects to date.    Plan:    - continue Xeljanz 11 mg once daily  - continue sulfasalazine 1 g twice daily up to 1.5gm BID  - continue meloxicam 15 mg once daily as needed.  Counseled not to take any other NSAIDs in 24 hours after each meloxicam dose.  Risk and benefits discussed including renal cardiac GI other.  - Physical therapy for shoulder/neck maxine.    RTC 7-8 mos    Orders Placed This Encounter   Procedures    Comprehensive metabolic panel    Erythrocyte sedimentation rate auto    CRP inflammation    Med Therapy Management Referral    CBC with platelets differential       Ray Cuba MD  Staff Rheumatologist, [facility]    On the day of the encounter, a total of 50 minutes was spent in chart review, and in counseling and coordination of care, regarding the patient's complex medical problem of spondyloarthropathy, shoulder pain, viral syndrome.    The longitudinal plan of care for the diagnosis(es)/condition(s) as documented were addressed during this visit. Due to the added complexity in care, I will continue to support Zo in the subsequent management and with ongoing continuity of care.         Subjective:     HPI: Patient presents as a transfer of care from Dr. Hernandez.  She had previously seen me and Dr. Garay for spondyloarthropathy.  At the last visit in November 2023, daily joint pain was continuing.  Recommendation was to continue Xeljanz 11 mg daily, sulfasalazine and increased dose of 1.5 mg twice daily, and continue meloxicam once daily.    Background: SpA, previously evaluated by [place] and Tanisha of [facility] who established care with me on 5/27/21. Prior to that time she had last been seen by Dr. Garay of 5/24/2019.  "Most recent imaging of SI joints was on 2/28/2020 which showed sclerosis with marginal spurring about the SI joints, likely secondary to her chronic inflammatory disease, though no active inflammatory changes at that time. Had been tried on both humira and enbrel, though did not tolerate these due to side effects so discontinued prior to determining if efficacious. At the time of her last visit with rheumatology it was documented that she was responsive to NSAIDs, prednisone, and SSZ. The dose of her SSZ was increased to 1gm BID at the time of her initial visit with me given her active peripheral arthritis and enthesitis. Despite naproxen 500mg BID, she had ongoing axial stiffness which was AM predominant and improved with activity and stretching, so risks and benefits of cosentyx discussed and patient started on this IL-17 inhibitor. She interestingly developed the same side effects of severe flu like symptoms after each dose of her biologic therapy- similar to those she was experiencing when on humira and enbrel, with rhinorrhea, muscle aches/pains, severe fatigue, and also hives, low grade fever which come on after her injection and slowly over many weeks will resolve. Had been worse with each subsequent injection of cosentyx.  Cosentyx was discontinued and xeljanz 11mg once daily started early January 2022. She self discontinued this for a short period however it was restarted in April of 2023. She continues on xeljanz 11mg once daily SSZ 1gm BID and mobic 15mg once daily.    Interval history March 1, 2024    She reports \"sick\" for 4 days with body aches, chills, HA, +cough, non-productive. +tactile fever. No contacts sick.  COVID19 negative.    She held xeljanz for the last 4 days. Symptoms are improving.  Before falling ill, she had been doing well. She notes persistent low  level pain in her shoulders, back, neck. Pain radiates into buttock.   She is doing PTx for her lower back; she feels more pain with the " "exercises  Her R shoulder and neck are painful.  She thinks that DMARD help with pain  She    She endorses hx of medication use (Xeljanz, sulfasalazine, meloxicam). She reports frequent \"flu\" symptoms recurrent while on DMARDs. She has better tolerability of meds than with anti-TNF.    November 24, 2023 Dr. Rowell  -at the time of her last appointment she was continued on xeljanz, SSZ, mobic. Had only been back on systemic immunosuppression for about 8 weeks at that point but was noticing improvement already  -we obtained bilateral hand and wrist xrays to assess for inflammatory sequelae, no erosions or acute osseous abnormalities were identified.   -most recent labs reviewed from 9/25/23 to include CBC, AST, ALT, creatinine, ESR, CRP. All within normal limits except her ESR was 35. No evidence of acute drug toxicity identified on these labs. Will continue to monitor routine screening drug toxicity labs every 6 months while on this therapy. Standing orders are in place. She is due for routine screening drug toxicity monitoring labs again in March of 2024.   -The patient, last seen in June, presented with ongoing symptoms. They reported experiencing cold-like symptoms intermittently, including body aches, runny nose, and chills. These symptoms have been occurring with a frequency of about one to two times per month since their last visit. The patient mentioned that during these episodes, when they discontinue their rheumatologic medication, specifically Xeljanz, they experience significant discomfort and pain. However, when on medication, including sulfasalazine, xeljanz, and Mobic (meloxicam), their symptoms are notably less severe. The patient continues to take Mobic and sulfasalazine during these episodes but stops Xeljanz.    In addition to these symptoms, the patient also reported experiencing dizziness, headaches, and joint pain, particularly in the fingers. They described the pain as being primarily in the " joints and worsening with movement or upon impact. They noted that the pain initially affected the thumb and middle finger but has now progressed to include the index finger and ring finger, sparing the pinky. The patient has been using braces to manage the pain, which has been somewhat effective.    The patient's most recent imaging studies, including x-rays of the hands and wrists, showed no signs of erosions, fractures, or degenerative changes. However, they have been experiencing neck pain, which radiates to the back of the skull and is exacerbated by certain movements, leading to headaches. This neck pain has been a concern for some time, and their primary care doctor had previously recommended an MRI, which was not yet conducted. The patient's history of lumbar spine issues was also noted, with the last MRI performed approximately three to four years ago.    In summary, the patient's primary issues include recurrent cold-like symptoms affecting their rheumatologic treatment adherence, ongoing joint pain in the fingers, and significant neck pain leading to headaches, all of which impact their overall quality of life and require ongoing management and further evaluation.    14 point review of systems collected and negative if not documented above.    June 30, 2023   -at the time of her last visit, we restarted both xeljanz and SSZ. Mobic restarted at that time as well.   -Has a cold, runny nose, raspy voice. Has night sweats/chills. No fever. Has been going on for 3-4 days  -started xeljanz and SSZ, feels some less pain. Mobic as well. Started these in May, 1st or 2nd. Has been 7-8 weeks now that she has been on both medications  -If she misses any doses then pain returns.   -Rates her pain 5/10. Involves shoulder/neck/hands.   -Wakes up and stretches for 10 minutes then her stiffness is improved for the day  -Symptoms on the right hand are worse than the left hand. She has been wearing a brace on the left hand  that was special molded for her by ortho/PT she reports  -no new rashes  -outside of above possible URI, no new or progressive fevers/chills/night sweats. No other interval infections.   -no unintentional weight loss  -Has intermittent swelling of PIPs/DIPs.   -In her hands has most pain in her CMCs and wrists  She is tolerating xeljanz, mobic, SSZ without any noticeable side effects, GI/cutaneous/other.  14 point ROS collected and negative if not documented above      April 14, 2023  -Up until January 2023, had continued on xeljanz 11mg once daily, SSZ 1gm BID. But then ran out of both medications and did not follow-up/request refill and so stopped.   -While on both therapies, she noted that if she took her doses late, would experience worse pain/stiffness.   -Currently reporting pain stiffness in almost all joint in her body, both axial and peripheral. In shoulder/neck. Has pain/stiffness in hands. 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 Stretches. Pain in the shoulder, right side also but left more than right.   Has tried massage which is temporarily helpful for pain, but immediately returns. No redness, Has swelling of her wrist on right more than left.   - Still with some HA/dizziness. Still gets warm, though not fever. Intermittent coughing/runny nose. Has nasal spray.  -has some red patches of that come and go, even now not on medication. No different on medication. Not medication side effects  -Middle to low back also much more severe pain and stiffness which is worse in the AM and improves with use. Has radiating pain down her left leg.   -Has ongoing RLS.   -no unintentional weight loss.   -Chronic mild hair thinning over the last 2-3 years    Interval history 3/4/22  Has not experienced any side effects from xeljanz. Has had ongoing HA/dizziness, which was there prior to starting xeljanz. Has intermittent chills/ feeling warm, though no temp. Does not happen everyday, happens 1-2 times per week. Also warm at night, though  "not wet on clothes. Her pain is less. Rates her pain now around 6/10. No redness/warmth/swelling. Though last week had right wrist pain with minimal movement. Lasted for 2-3 days. Used topical lidocaine which resolved symptoms. When painful, it was somewhat \"puffy\".  At this time her back is still stiff, AM predominant. Back and neck. She sits when she wakes up and stretches. Then one hour later she has improvement and continues her day. Has rash on anterior shin right LE, saw PCP who gave her topical steroid which she has not started. Still with intermittent blurred vision/double vision. Unchanged. No more severe/frequent. Rare sores in her mouth. No interval infections. ROS otherwise negative.       Interval history  12/17/21  Reports feeling of fever/body aches/runny nose. Also with rash. 3-4 days after her first injection she noticed that this started, though took her next dose and the above symptoms were more severe. Then was off for about 1 month due to these severe side effects. She decided to re-try in October and November, though has again had return of above. Has been about 1 month since her last injection and reports side effects are continuing to slowly improve, though still with some fatigue/body aches/ HA. Rash and fever resolved. She finds it hard to comment on axial pain/stiffness/peripiheral joint pain/swelling in setting of severe fatigue/flu like symptoms. Her ROS is otherwise negative.     HPI from initial consultation 5/2021  Had flu like symptoms with flu like symptoms. Resolves when stopped. Prednisone, NSAIDs, SSZ used/helpful. Prednisone had nausea and consipation. SSZ has drowsiness/ dizziness/ mental fog worse in the AM. Has continued on SSZ 1gm in the AM and 1gm in the PM. Still with significant drowsiness. If she doesn't want to have drowsiness, reduces the dose, has pain in neck, shoulder, back. Has some swelling of joints. Has tenderness of many joints. Takes naproxen and gabapentin. " She takes naproxen 500mg BID. In the AM has stiffness in her neck/back. Gets some better with stretching/ exercise. Takes 15 minutes to get better. No rash. Does have swelling of her right hand 2nd -4th digits. Pain in her PIPs predominantly. Pain also in between the joints. Has recently had some blurred vision, wears glasses. No inflammatory eye disease history. Intermittent double vision. Has had hair loss, non scarring more that is thinning. More over the last 3-4 years. Intermittent sore in mouth, painful. Has dry eyes, uses eye drops 3-4 times per week. When she wakes up her throat is dry. Drinks frequently. No abdominal pain. No blood in her stool. No n/v. No raynauds. No sclerodactyly. No fevers, weight loss. Occasional weight loss and chills. Has been having ongoing throbbing HA on the left side. The naproxen is some helpful, though returns shortly afterwards. No miscarriages or blood clots.     Past Medical History  Past Medical History:   Diagnosis Date    Anemia     History of blood transfusion 2000    after vaginal delivery, 2 units PRBCs given    HSIL on Pap smear 09/21/2011    MARTA 2 and 3    Immune to hepatitis B 08/2021    hep B antigen NEG    INFLAM SPONDYLOPATHY NOS 01/07/2008    check labs on sulfasalzine every 3 months and check hep B and C and TB every 2 years    Leukocytopenia 03/10/2014    Tendinosis     Thrombocytopenia (H24) 03/10/2014    Unspecified closed fracture of pelvis 2000    left fracture of pelvis after delivery     Past Surgical History  Past Surgical History:   Procedure Laterality Date    COLONOSCOPY Left 11/12/2021    recheck in 10 years    DILATION AND CURETTAGE, ABLATE ENDOMETRIUM NOVASURE, COMBINED N/A 12/04/2018    Procedure: novasure endometrial ablation, myosure resection of leiomyoma, dilation and curettage;  Surgeon: Rolando Covarrubisa MD;  Location: [facility] OR    GYN SURGERY  12/04/2018    fibroid removal    lap hysterectomy and salpingetcomy Bilateral 2019    done at  [facility] - ovaries are in    LEEP TX, CERVICAL  12/19/2011    MARTA II    OPERATIVE HYSTEROSCOPY WITH MORCELLATOR N/A 12/04/2018    ovaries are still in.   Surgeon: Rolando Covarrubias MD;  Location: [facility] OR    TUBAL LIGATION  05/2009    laparoscopic tubal ligation    ZZC NONSPECIFIC PROCEDURE  10/2000    after delivery 2 units of prbcs secondary to placenta     Medications  Current Outpatient Medications   Medication    amLODIPine (NORVASC) 2.5 MG tablet    aspirin-acetaminophen-caffeine (EXCEDRIN MIGRAINE) 250-250-65 MG tablet    cyclobenzaprine (FLEXERIL) 5 MG tablet    diclofenac (VOLTAREN) 1 % topical gel    DULoxetine (CYMBALTA) 30 MG capsule    Evening Primrose Oil 500 MG CAPS    FEROSUL 325 (65 Fe) MG tablet    fluticasone (FLONASE) 50 MCG/ACT nasal spray    gabapentin (NEURONTIN) 300 MG capsule    ketotifen fumarate 0.035%, ketotifen 0.025%, (ZADITOR) 0.025 % ophthalmic solution    lidocaine (XYLOCAINE) 5 % external ointment    loperamide (IMODIUM A-D) 2 MG tablet    loratadine-pseudoePHEDrine (CLARITIN-D 24-HOUR)  MG 24 hr tablet    meclizine (ANTIVERT) 25 MG tablet    meloxicam (MOBIC) 15 MG tablet    olopatadine (PATANOL) 0.1 % ophthalmic solution    ondansetron (ZOFRAN) 4 MG tablet    polyethylene glycol (MIRALAX) 17 GM/Dose powder    polyethylene glycol (MIRALAX) 17 GM/Dose powder    rosuvastatin (CRESTOR) 20 MG tablet    scopolamine (TRANSDERM) 1 MG/3DAYS 72 hr patch    SM STOOL SOFTENER 100 MG capsule    sulfaSALAzine ER (AZULFIDINE EN) 500 MG EC tablet    sulfaSALAzine ER (AZULFIDINE EN) 500 MG EC tablet    tofacitinib (XELJANZ XR) 11 MG 24 hr tablet    traMADol (ULTRAM) 50 MG tablet    traZODone (DESYREL) 50 MG tablet    triamcinolone (KENALOG) 0.1 % external cream    VITAMIN D3 50 MCG (2000 UT) tablet     No current facility-administered medications for this visit.     Facility-Administered Medications Ordered in Other Visits   Medication    midazolam (VERSED) injection     Allergies  "  Allergies   Allergen Reactions    Contrast Dye Nausea and Vomiting    Diatrizoate Nausea and Vomiting    Morphine And Related Itching and Nausea and Vomiting    Percocet [Oxycodone-Acetaminophen] Nausea and Vomiting and Itching    Advil [Ibuprofen Micronized] Rash     Rash     Ibuprofen Rash     Family History: Mother with \"arthritis\"    Social History: Not currently working. 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 Repetitive motion. No smoking/drugs/ETOH. Not . 4 kids who are healthy.       Objective:    Physical exam:  No vitals  Laying in bed  Sclera appear clear  No facial rash  Breathing comfortably on room air, no cough, no audible wheeze, no use of accessory muscles  Wearing wrist splint/brace on the right wrist. No red edema/erythema of the MCPs/PIPs bilaterally.     Labs:  WBC   Date Value Ref Range Status   02/15/2021 6.5 4.0 - 11.0 10e9/L Final     WBC Count   Date Value Ref Range Status   09/25/2023 5.9 4.0 - 11.0 10e3/uL Final     Hemoglobin   Date Value Ref Range Status   09/25/2023 12.5 11.7 - 15.7 g/dL Final   02/15/2021 12.2 11.7 - 15.7 g/dL Final     Platelet Count   Date Value Ref Range Status   09/25/2023 237 150 - 450 10e3/uL Final   02/15/2021 219 150 - 450 10e9/L Final     Creatinine   Date Value Ref Range Status   09/25/2023 0.53 0.51 - 0.95 mg/dL Final   02/15/2021 0.54 0.52 - 1.04 mg/dL Final     Lab Results   Component Value Date    ALKPHOS 60 03/01/2022    ALKPHOS 55 02/15/2021     AST   Date Value Ref Range Status   09/25/2023 21 0 - 45 U/L Final     Comment:     Reference intervals for this test were updated on 6/12/2023 to more accurately reflect our healthy population. There may be differences in the flagging of prior results with similar values performed with this method. Interpretation of those prior results can be made in the context of the updated reference intervals.   02/15/2021 14 0 - 45 U/L Final     Lab Results   Component Value Date    ALT 21 03/01/2022    " ALT 17 02/15/2021     Sed Rate   Date Value Ref Range Status   02/15/2021 20 0 - 20 mm/h Final     Erythrocyte Sedimentation Rate   Date Value Ref Range Status   09/25/2023 35 (H) 0 - 20 mm/hr Final     CRP Inflammation   Date Value Ref Range Status   08/16/2021 <2.9 0.0 - 8.0 mg/L Final   02/26/2020 <2.9 0.0 - 8.0 mg/L Final     UA RESULTS:  Recent Labs   Lab Test 05/18/21  0858   COLOR Yellow   APPEARANCE Clear   URINEGLC Negative   URINEBILI Negative   URINEKETONE Negative   SG 1.015   UBLD Large*   URINEPH 6.5   PROTEIN Negative   UROBILINOGEN 0.2   NITRITE Negative   LEUKEST Moderate*   RBCU 10-25*   WBCU *      RAMÍREZ Screen by EIA   Date Value Ref Range Status   05/21/2014 <1.0  Interpretation:  Negative   <1.0 Final     Rheumatoid Factor   Date Value Ref Range Status   05/21/2014 <20 <20 IU/mL Final     Cyclic Citrullinated Peptide Antibody, IgG   Date Value Ref Range Status   08/28/2017 1 <7 U/mL Final     Comment:     Negative       Imaging:  MRI LUMBAR SPINE WITHOUT CONTRAST   2/28/2020 3:51 PM      HISTORY: Radiculopathy, greater than  6 weeks conservative treatment,  persistent symptoms; radiculopathy - history of sacroiliitis. Family  history of spinal disease and severe stenosis - left-sided symptoms.  Spondyloarthropathy.      TECHNIQUE: Multiplanar multisequence MRI of the lumbar spine without  contrast.      COMPARISON: Lumbar spine MRI 9/7/2010. Correlation is also made with  prior pelvic CT 6/10/2019.     FINDINGS:  Alignment is normal. No fracture or significant vertebral  body height loss. The intervertebral discs are normal in height and  signal. No abnormal marrow signal in the lumbar spine. There is no  disc bulge or herniation. There is no spinal or neural foraminal  stenosis. Sclerotic changes about the sacroiliac joints with marginal  spurring. This appears overall similar (within the field-of-view) to  the most recent examinations.                                                                       IMPRESSION:  1. No acute abnormality of the lumbar spine. Specifically, no disc  bulge/herniation, marrow signal abnormality, or spinal or neural  foraminal stenosis.  2. Redemonstrated sclerosis with marginal spurring about the  sacroiliac joints, potentially representing changes related to  osteoarthritis. This may be secondary to prior sacroiliitis in the  setting of spondyloarthropathy given the patient's clinical history.  This is incompletely included within the field-of-view of this exam.         Again, thank you for allowing me to participate in the care of your patient.      Sincerely,    Ray Cuba MD

## 2024-03-01 NOTE — PATIENT INSTRUCTIONS
Diagnosis:  1.  Spondyloarthropathy: Symptoms overall well-controlled.  Plan continue current medication after resolution of viral infection.  2.  Viral infection causing rash, muscle aches, cough: Remain off Xeljanz until 48 hours after resolution of symptoms.  3.  Neck and right shoulder pain: X-ray does not show bony change; cause of pain is likely muscular.  I recommend physical therapy for range of motion exercises in the neck and shoulder.    Plan:    - continue Xeljanz 11 mg once daily  - continue sulfasalazine 1 g twice daily up to 1.5gm BID  - continue meloxicam 15 mg once daily as needed.  Counseled not to take any other NSAIDs in 24 hours after each meloxicam dose.  Risk and benefits discussed including renal cardiac GI other.  - Physical therapy for shoulder/neck maxine.

## 2024-03-01 NOTE — NURSING NOTE
Patient declined medication review with VF.     Patient scored 13 on PHQ-9, pt declined a Mental Health Referral.       Depression Response    Patient completed the PHQ-9 assessment for depression and scored >9? Yes  Question 9 on the PHQ-9 was positive for suicidality? No  Does patient have current mental health provider? No, pt declined Mental     Is this a virtual visit? Yes   Does patient have suicidal ideation (positive question 9)? No    I personally notified the following: visit provider  Put PHQ-9 score in MSG Column and send provider a Secure Chat MSG for awareness.           Is the patient currently in the state of [place]? YES    Visit mode:VIDEO    If the visit is dropped, the patient can be reconnected by: VIDEO VISIT: Text to cell phone:   Telephone Information:   Mobile [phone]       Will anyone else be joining the visit? NO  (If patient encounters technical issues they should call [phone])    How would you like to obtain your AVS? MyChart    Are changes needed to the allergy or medication list? Pt declined med review    Reason for visit: BOO AYERSF

## 2024-03-04 ENCOUNTER — TELEPHONE (OUTPATIENT)
Dept: RHEUMATOLOGY | Facility: CLINIC | Age: 49
End: 2024-03-04
Payer: MEDICARE

## 2024-03-04 NOTE — TELEPHONE ENCOUNTER
Left Voicemail (1st Attempt) and Sent Mychart (1st Attempt) for the patient to call back and schedule the following:    Appointment type: Return  Provider: Dr. Cuba  Return date: November 1st 2024  Specialty phone number: [phone]  Additional appointment(s) needed: NA  Additonal Notes: NA

## 2024-03-06 ENCOUNTER — THERAPY VISIT (OUTPATIENT)
Dept: PHYSICAL THERAPY | Facility: CLINIC | Age: 49
End: 2024-03-06
Payer: MEDICARE

## 2024-03-06 ENCOUNTER — TELEPHONE (OUTPATIENT)
Dept: RHEUMATOLOGY | Facility: CLINIC | Age: 49
End: 2024-03-06

## 2024-03-06 DIAGNOSIS — M46.98 INFLAMMATORY SPONDYLOPATHY OF SACRAL REGION (H): Primary | ICD-10-CM

## 2024-03-06 DIAGNOSIS — M79.662 PAIN OF LEFT LOWER LEG: ICD-10-CM

## 2024-03-06 PROCEDURE — 97110 THERAPEUTIC EXERCISES: CPT | Mod: GP | Performed by: PHYSICAL THERAPIST

## 2024-03-06 NOTE — TELEPHONE ENCOUNTER
MTM referral from: [facility] visit (referral by provider)    MTM referral outreach attempt #2 on March 6, 2024 at 9:37 AM      Outcome: Patient not reachable after several attempts, will route to MTM Pharmacist/Provider as an FYI.  MT scheduling number is .  Thank you for the referral.    Use hbc for the carrier/Plan on the flowsheet      [employer] Message Sent    Chayo Saldaña CPhT  MT      Patient called back and scheduled visit.

## 2024-03-08 ENCOUNTER — VIRTUAL VISIT (OUTPATIENT)
Dept: RHEUMATOLOGY | Facility: CLINIC | Age: 49
End: 2024-03-08
Attending: INTERNAL MEDICINE
Payer: MEDICARE

## 2024-03-08 DIAGNOSIS — T75.3XXA MOTION SICKNESS: ICD-10-CM

## 2024-03-08 DIAGNOSIS — M47.819 SERONEGATIVE SPONDYLOARTHROPATHY: ICD-10-CM

## 2024-03-08 DIAGNOSIS — E78.5 HYPERLIPIDEMIA: ICD-10-CM

## 2024-03-08 DIAGNOSIS — Z71.85 VACCINE COUNSELING: Primary | ICD-10-CM

## 2024-03-08 DIAGNOSIS — R19.7 DIARRHEA: ICD-10-CM

## 2024-03-08 DIAGNOSIS — M45.8 ANKYLOSING SPONDYLITIS OF SACRAL REGION (H): ICD-10-CM

## 2024-03-08 DIAGNOSIS — I10 HYPERTENSION: ICD-10-CM

## 2024-03-08 DIAGNOSIS — G47.00 INSOMNIA: ICD-10-CM

## 2024-03-08 DIAGNOSIS — K64.9 HEMORRHOIDS: ICD-10-CM

## 2024-03-08 NOTE — PROGRESS NOTES
Medication Therapy Management (MTM) Encounter    ASSESSMENT:                            Medication Adherence/Access: No issues identified.    Spondyloarthropathy: Symptoms are stable on current therapy and no significant side effects reported. No changes needed at this time. She is due for 6 month lab recheck, orders have been placed.    Vaccine counseling: Indicated for updated COVID booster and Shingrix per ACIP recommendations.    Hemorrhoids/diarrhea: Symptoms are well-controlled on current therapy, no changes needed at this time.    Hypertension: Blood pressure is at goal of < 140/90 mmHg, no changes needed at this time.    Hyperlipidemia: LDL has been reduced by > 50% on statin, would benefit from annual checks given she is on Mouna therapy.    Motion sickness: Symptoms are well-controlled on as needed therapy, no changes needed at this time.    Insomnia: Trazodone working well for falling/staying asleep at current dose, no changes needed at this time.    PLAN:                            Continue Xeljanz 11 mg daily, sulfasalazine 1000 mg twice daily, and meloxicam 15 mg as needed. Please have your 6 month labs updated.  Consider the following vaccines: COVID booster and Shingrix (shingles). Shingrix is a 2-part vaccine and is usually covered when given at a pharmacy.    Follow-up: Lab review then 6 months routine follow-up, patient will reach out if concerns before then.    SUBJECTIVE/OBJECTIVE:                          Zo Qureshi is a 49 year old female called for an initial visit. She was referred to me from Ray Cuba MD.    Reason for visit: CMR/medication review.    Allergies/ADRs: Reviewed in chart  Past Medical History: Reviewed in chart  Tobacco: She reports that she has never smoked. She has never used smokeless tobacco.  Alcohol: not assessed today    Medication Adherence/Access: No issues identified.    Spondyloarthropathy:   Xeljanz 11 mg daily  Sulfasalazine 1000 mg twice daily  Meloxicam 15  mg daily as needed  Duloxetine 30 mg daily  Gabapentin 300 mg, 4 caps daily  Cyclobenzaprine 5 mg three times daily as needed  Tramadol 50 mg, 1-2 tabs daily as needed for severe pain  Diclofenac 1% gel as needed  Lidocaine 5% ointment as needed    Patient reports pain and stiffness have improved considerably on current regimen. Currently taking sulfasalazine 2 tablets twice daily, did not tolerate 3 tabs twice daily. Has been on Xeljanz for about a year. Does still use topicals most days, meloxicam as needed. Last assessment by rheumatologist was 3/1/24. Previously failed Humira, Enbrel, or Cosentyx due to severe flu-like symptoms and hives. Naproxen did not sufficiently control symptoms at dose of 500 mg twice daily. Takes the Flexeril every couple of days and tramadol for severe pain. Has not needed the tramadol as much now that her spondyloarthropathy is better controlled. No concerns with side effects or medication access today.    Component      Latest Ref Rng 9/25/2023  10:10 AM   WBC      4.0 - 11.0 10e3/uL 5.9    RBC Count      3.80 - 5.20 10e6/uL 4.12    Hemoglobin      11.7 - 15.7 g/dL 12.5    Hematocrit      35.0 - 47.0 % 36.6    MCV      78 - 100 fL 89    MCH      26.5 - 33.0 pg 30.3    MCHC      31.5 - 36.5 g/dL 34.2    RDW      10.0 - 15.0 % 11.8    Platelet Count      150 - 450 10e3/uL 237    % Neutrophils      % 56    % Lymphocytes      % 32    % Monocytes      % 8    % Eosinophils      % 3    % Basophils      % 1    % Immature Granulocytes      % 0    Absolute Neutrophils      1.6 - 8.3 10e3/uL 3.3    Absolute Lymphocytes      0.8 - 5.3 10e3/uL 1.9    Absolute Monocytes      0.0 - 1.3 10e3/uL 0.5    Absolute Eosinophils      0.0 - 0.7 10e3/uL 0.2    Absolute Basophils      0.0 - 0.2 10e3/uL 0.0    Absolute Immature Granulocytes      <=0.4 10e3/uL 0.0    Creatinine      0.51 - 0.95 mg/dL 0.53    GFR Estimate      >60 mL/min/1.73m2 >90    ALT      0 - 50 U/L 6    AST      0 - 45 U/L 21    CRP  Inflammation      <5.00 mg/L <3.00    Sed Rate      0 - 20 mm/hr 35 (H)       Vaccine counseling: Patient is open to receiving recommended vaccines.    Immunization History   Administered Date(s) Administered    COVID-19 Monovalent 18+ (Moderna) 04/22/2021, 05/20/2021    COVID-19 Monovalent Booster 18+ (Moderna) 11/17/2021    Influenza (H1N1) 11/13/2009    Influenza (IIV3) PF 10/27/2010, 09/21/2015, 11/01/2016    Influenza Vaccine >6 months,quad, PF 10/18/2017, 10/08/2018, 10/07/2019, 09/03/2020, 09/15/2021, 11/10/2022, 09/25/2023    Influenza Vaccine, 6+MO IM (QUADRIVALENT W/PRESERVATIVES) 10/13/2016    Mantoux Tuberculin Skin Test 11/28/2011, 04/17/2018    Pneumococcal 20 valent Conjugate (Prevnar 20) 01/18/2023    TDAP Vaccine (Adacel) 05/13/2009, 02/26/2020      Hemorrhoids/diarrhea:  Docusate 100 mg daily daily  Miralax 17 g daily  Loperamide 2 mg four times daily as needed    Takes docusate and Miralax daily for chronic hemorrhoids. Diarrhea occurs less frequently, uses loperamide as needed.    Hypertension:  Amlodipine 2.5 mg daily    Does not take blood pressure at home. No side effects including edema or dizziness reported today.    BP Readings from Last 3 Encounters:   03/11/24 122/82   01/03/24 110/70   11/06/23 120/82      Hyperlipidemia:  Rosuvastatin 20 mg daily    Notes it is hard to tell if rosuvastatin causing muscle pain given her other conditions. Did not notice any significant changes/worsening when she started the statin.    Recent Labs   Lab Test 05/18/23  1030 02/03/23  0928   CHOL 154 281*   HDL 46* 52   LDL 88 211*   TRIG 98 89      Motion sickness:  Meclizine 25 mg three times daily as needed  Scopolamine 1 mg/72 hour patch as needed  Zofran 4 mg every 8 hours as needed    Takes these for motion sickness in car/moving vehicles, they do work well. Only takes Zofran occasionally.    Insomnia:  Trazodone 100 mg nightly as needed    Working well at current dose for falling/staying asleep, helps  with sleeping when in pain too. No next-day grogginess reported.    Today's Vitals: none taken today  ----------------    I spent 45 minutes with this patient today. All changes were made via collaborative practice agreement with Ray Cuba. A copy of the visit note was provided to the patient's provider(s).    A summary of these recommendations was sent via [employer].    Chelly Cedeno, PharmD  Medication Therapy Management Pharmacist  [facility]    Telemedicine Visit Details  Type of service:  Telephone visit  Start Time:  1100  End Time:  1145     Medication Therapy Recommendations  Spondyloarthropathy    Current Medication: tofacitinib (XELJANZ XR) 11 MG 24 hr tablet   Rationale: Medication requires monitoring - Needs additional monitoring   Recommendation: Order Lab - Due for 6 month labs   Status: Accepted per CPA         Vaccine counseling    Rationale: Preventive therapy - Needs additional medication therapy - Indication   Recommendation: Start Medication - Consider the following vaccines: COVID booster, Shingrix   Status: Patient Agreed - Adherence/Education

## 2024-03-08 NOTE — Clinical Note
3/8/2024       RE: Zo Qureshi  [address] [place] [address]     Dear Colleague,    Thank you for referring your patient, Zo Qureshi, to the [facility] [place] at [facility]. Please see a copy of my visit note below.    Medication Therapy Management (MTM) Encounter    ASSESSMENT:                            Medication Adherence/Access: {adherencechoices:107931}    Spondyloarthropathy: ***     RESEND XELJANZ    Vaccine counseling: Indicated for updated COVID booster, Shingrix per ACIP recommendations.    PLAN:                            ***    Follow-up: {followuptest2:462104}    SUBJECTIVE/OBJECTIVE:                          Zo Qureshi is a 49 year old female called for an initial visit. She was referred to me from Ray Cuba MD.    Reason for visit: CMR/medication review.    Allergies/ADRs: Reviewed in chart  Past Medical History: Reviewed in chart  Tobacco: She reports that she has never smoked. She has never used smokeless tobacco.  Alcohol: not assessed today    Medication Adherence/Access: ***    Spondyloarthropathy:   Xeljanz 11 mg daily  Sulfasalazine 1000 mg twice daily  Meloxicam 15 mg daily  Diclofenac 1% gel as needed  Lidocaine 5% ointment as needed    Currently taking sulfasalazine 2 tablets twice daily, did not tolerate the 3 tabs twice daily. Uses the topicals every day.    Pain/tendonitis:  Duloxetine 30 mg daily  Gabapentin 300 mg, 4 caps daily  Cyclobenzaprine 5 mg three times daily as needed  Tramadol 50 mg, 1-2 tabs daily as needed for severe pain    Takes the Flexeril every couple of days and tramadol for severe pain.    Vaccine counseling: ***     Immunization History   Administered Date(s) Administered    COVID-19 Monovalent 18+ (Moderna) 04/22/2021, 05/20/2021    COVID-19 Monovalent Booster 18+ (Moderna) 11/17/2021    Influenza (H1N1) 11/13/2009    Influenza (IIV3) PF 10/27/2010, 09/21/2015, 11/01/2016     Influenza Vaccine >6 months,quad, PF 10/18/2017, 10/08/2018, 10/07/2019, 09/03/2020, 09/15/2021, 11/10/2022, 09/25/2023    Influenza Vaccine, 6+MO IM (QUADRIVALENT W/PRESERVATIVES) 10/13/2016    Mantoux Tuberculin Skin Test 11/28/2011, 04/17/2018    Pneumococcal 20 valent Conjugate (Prevnar 20) 01/18/2023    TDAP Vaccine (Adacel) 05/13/2009, 02/26/2020      Takes docusate and Miralax daily. Just uses loperamide as needed. Zofran is only used occasionally.    Does not take BP at home    BP Readings from Last 3 Encounters:   01/03/24 110/70   11/06/23 120/82   09/25/23 122/80      Hard to tell if rosuvastatin causing muscle pain.    Recent Labs   Lab Test 05/18/23  1030 02/03/23  0928   CHOL 154 281*   HDL 46* 52   LDL 88 211*   TRIG 98 89      Meclizine/scopolamine are for motion sickness in car    Trazodone working well at current, helps with sleeping when in pain too    Today's Vitals: none taken today  ----------------    I spent {[facility] total time 3:383447} with this patient today. All changes were made via collaborative practice agreement with Ray Cuba. A copy of the visit note was provided to the patient's provider(s).    A summary of these recommendations was sent via [employer].    Chelly Cedeno, PharmD  Medication Therapy Management Pharmacist  [facility]     Telemedicine Visit Details  Type of service:  Telephone visit  Start Time: {video/phone visit start time:521598}  End Time: {video/phone visit end time:892271}     Medication Therapy Recommendations  No medication therapy recommendations to display       Again, thank you for allowing me to participate in the care of your patient.      Sincerely,    Chelly Cedeno, [facility]

## 2024-03-11 ENCOUNTER — ANCILLARY PROCEDURE (OUTPATIENT)
Dept: GENERAL RADIOLOGY | Facility: CLINIC | Age: 49
End: 2024-03-11
Attending: FAMILY MEDICINE
Payer: MEDICARE

## 2024-03-11 ENCOUNTER — OFFICE VISIT (OUTPATIENT)
Dept: FAMILY MEDICINE | Facility: CLINIC | Age: 49
End: 2024-03-11
Payer: MEDICARE

## 2024-03-11 VITALS
WEIGHT: 122.2 LBS | TEMPERATURE: 97.9 F | HEIGHT: 59 IN | HEART RATE: 68 BPM | SYSTOLIC BLOOD PRESSURE: 122 MMHG | OXYGEN SATURATION: 97 % | BODY MASS INDEX: 24.64 KG/M2 | RESPIRATION RATE: 10 BRPM | DIASTOLIC BLOOD PRESSURE: 82 MMHG

## 2024-03-11 DIAGNOSIS — G47.00 INSOMNIA, UNSPECIFIED TYPE: ICD-10-CM

## 2024-03-11 DIAGNOSIS — M79.662 PAIN OF LEFT LOWER LEG: ICD-10-CM

## 2024-03-11 DIAGNOSIS — M54.2 CERVICALGIA: ICD-10-CM

## 2024-03-11 DIAGNOSIS — G89.4 CHRONIC PAIN SYNDROME: ICD-10-CM

## 2024-03-11 DIAGNOSIS — M46.98 INFLAMMATORY SPONDYLOPATHY OF SACRAL REGION (H): ICD-10-CM

## 2024-03-11 DIAGNOSIS — E78.00 HYPERCHOLESTEROLEMIA: ICD-10-CM

## 2024-03-11 DIAGNOSIS — M67.88 RIGHT PERONEAL TENDINOSIS: ICD-10-CM

## 2024-03-11 DIAGNOSIS — M77.8 RIGHT SHOULDER TENDONITIS: ICD-10-CM

## 2024-03-11 DIAGNOSIS — G44.219 EPISODIC TENSION-TYPE HEADACHE, NOT INTRACTABLE: ICD-10-CM

## 2024-03-11 DIAGNOSIS — M54.2 NECK PAIN: ICD-10-CM

## 2024-03-11 DIAGNOSIS — R51.9 CHRONIC DAILY HEADACHE: ICD-10-CM

## 2024-03-11 DIAGNOSIS — M46.98 INFLAMMATORY SPONDYLOPATHY OF SACRAL REGION (H): Primary | ICD-10-CM

## 2024-03-11 PROCEDURE — 99214 OFFICE O/P EST MOD 30 MIN: CPT | Performed by: FAMILY MEDICINE

## 2024-03-11 PROCEDURE — 72070 X-RAY EXAM THORAC SPINE 2VWS: CPT | Mod: TC | Performed by: RADIOLOGY

## 2024-03-11 RX ORDER — GABAPENTIN 300 MG/1
CAPSULE ORAL
Qty: 360 CAPSULE | Refills: 3 | Status: SHIPPED | OUTPATIENT
Start: 2024-03-11

## 2024-03-11 RX ORDER — TRAMADOL HYDROCHLORIDE 50 MG/1
TABLET ORAL
Qty: 10 TABLET | Refills: 0 | Status: SHIPPED | OUTPATIENT
Start: 2024-03-11 | End: 2024-05-07

## 2024-03-11 RX ORDER — AMLODIPINE BESYLATE 2.5 MG/1
2.5 TABLET ORAL DAILY
Qty: 90 TABLET | Refills: 3 | Status: SHIPPED | OUTPATIENT
Start: 2024-03-11

## 2024-03-11 RX ORDER — CYCLOBENZAPRINE HCL 5 MG
5 TABLET ORAL 3 TIMES DAILY PRN
Qty: 90 TABLET | Refills: 3 | Status: SHIPPED | OUTPATIENT
Start: 2024-03-11 | End: 2024-07-26

## 2024-03-11 RX ORDER — DULOXETIN HYDROCHLORIDE 30 MG/1
60 CAPSULE, DELAYED RELEASE ORAL DAILY
Qty: 180 CAPSULE | Refills: 3 | Status: SHIPPED | OUTPATIENT
Start: 2024-03-11

## 2024-03-11 ASSESSMENT — PATIENT HEALTH QUESTIONNAIRE - PHQ9
SUM OF ALL RESPONSES TO PHQ QUESTIONS 1-9: 15
10. IF YOU CHECKED OFF ANY PROBLEMS, HOW DIFFICULT HAVE THESE PROBLEMS MADE IT FOR YOU TO DO YOUR WORK, TAKE CARE OF THINGS AT HOME, OR GET ALONG WITH OTHER PEOPLE: VERY DIFFICULT
SUM OF ALL RESPONSES TO PHQ QUESTIONS 1-9: 15

## 2024-03-11 ASSESSMENT — ENCOUNTER SYMPTOMS: BACK PAIN: 1

## 2024-03-11 NOTE — PROGRESS NOTES
Assessment & Plan     Chronic pain syndrome  Suspect muscular issues in paraspinal muscles in thoracic region  Will get xray today  Renew meds for pain    - diclofenac (VOLTAREN) 1 % topical gel  Dispense: 150 g; Refill: 3  - traMADol (ULTRAM) 50 MG tablet  Dispense: 10 tablet; Refill: 0  - Neck/Shoulder/Back/Abdomen Bracing Supplies Order Lumbosacral Brace      Inflammatory spondylopathy of sacral region (H24)  Has seen MTM with rheum recently  Has not had any tramadol since last fall  Will use only for more severe pain  Also will get new brace as her old one is starting to stretch out and not be as supportive    - traMADol (ULTRAM) 50 MG tablet  Dispense: 10 tablet; Refill: 0  - Neck/Shoulder/Back/Abdomen Bracing Supplies Order Lumbosacral Brace    Right peroneal tendinosis  Wears wrist splint and this helps    - gabapentin (NEURONTIN) 300 MG capsule  Dispense: 360 capsule; Refill: 3  - DULoxetine (CYMBALTA) 30 MG capsule  Dispense: 180 capsule; Refill: 3    Right shoulder tendonitis   Continue   - gabapentin (NEURONTIN) 300 MG capsule  Dispense: 360 capsule; Refill: 3  - DULoxetine (CYMBALTA) 30 MG capsule  Dispense: 180 capsule; Refill: 3    Cervicalgia  Stable and not the main issues now  - gabapentin (NEURONTIN) 300 MG capsule  Dispense: 360 capsule; Refill: 3  - DULoxetine (CYMBALTA) 30 MG capsule  Dispense: 180 capsule; Refill: 3    Neck pain  Stable  Refills per epicare    - diclofenac (VOLTAREN) 1 % topical gel  Dispense: 150 g; Refill: 3  - cyclobenzaprine (FLEXERIL) 5 MG tablet  Dispense: 90 tablet; Refill: 3        Episodic tension-type headache, not intractable  Refills per epicare    - cyclobenzaprine (FLEXERIL) 5 MG tablet  Dispense: 90 tablet; Refill: 3    Chronic daily headache  Continue   - amLODIPine (NORVASC) 2.5 MG tablet  Dispense: 90 tablet; Refill: 3    Insomnia, unspecified type  Did not address today     Pain of left lower leg  Refills per epicare   This is not the pain that is issue  today but have used tramadol in past for this   - traMADol (ULTRAM) 50 MG tablet  Dispense: 10 tablet; Refill: 0    Hypercholesterolemia  Under control  Due for this lab   - Lipid panel reflex to direct LDL Fasting        27 minutes spent by me on the date of the encounter doing chart review, history and exam, documentation and further activities per the note      Depression Screening Follow Up        3/11/2024     9:07 AM   PHQ   PHQ-9 Total Score 15   Q9: Thoughts of better off dead/self-harm past 2 weeks Not at all         3/11/2024     9:07 AM   Last PHQ-9   1.  Little interest or pleasure in doing things 3   2.  Feeling down, depressed, or hopeless 2   3.  Trouble falling or staying asleep, or sleeping too much 2   4.  Feeling tired or having little energy 2   5.  Poor appetite or overeating 2   6.  Feeling bad about yourself 0   7.  Trouble concentrating 2   8.  Moving slowly or restless 2   Q9: Thoughts of better off dead/self-harm past 2 weeks 0   PHQ-9 Total Score 15       Follow Up Actions Taken  Crisis resource information provided in After Visit Summary  Will discuss at next visit per her request         FUTURE APPOINTMENTS:       - Follow-up visit in 6 months for physical   See Patient Instructions    Shaggy Pathak is a 49 year old, presenting for the following health issues:   she had PT last Wednesday and then on Thursday she noted pain in between her shoulder blades.   She notes that it feels best when completely still lying down better than standing.   If she moves it really hurts.   She is not sure if this is due to the PT or not.     Back Pain and Musculoskeletal Problem (Neck, shoulder pain)      3/11/2024     9:10 AM   Additional Questions   Roomed by AT     Back Pain   This is a chronic problem. The current episode started more than 1 week ago. The problem occurs constantly. The problem has not changed since onset.The pain is associated with no known injury. The quality of the pain is  described as shooting. The pain radiates to the left thigh, left knee and right thigh. The pain is at a severity of 8/10. The pain is moderate. The symptoms are aggravated by bending and twisting. The pain is The same all the time. Stiffness is present All day. She has tried nothing for the symptoms. The treatment provided no relief.   Musculoskeletal Problem    History of Present Illness       Back Pain:  She presents for follow up of back pain. Patient's back pain is a chronic problem.  Location of back pain:  Right lower back, left lower back, right middle of back, left middle of back, right upper back, right side of neck, left side of neck, right shoulder, left shoulder, right buttock, left buttock, right hip, left hip and right side of waist  Description of back pain: sharp  Back pain spreads: right buttocks, left buttocks, left thigh, left knee, right shoulder, left shoulder, right side of neck and left side of neck    Since last visit, how has back pain changed: always present, but gets better and worse  Since patient first noticed back pain, pain is: always present, but gets better and worse  Does back pain interfere with her job:  Yes  Has the patient tried any new treatments:  Yes If Yes, which: Physical Therapy and stretching      Reason for visit:  Shoulders and neck pain  Symptom onset:  Today  Symptom intensity:  Moderate  Symptom progression:  Staying the same  Had these symptoms before:  Yes  Has tried/received treatment for these symptoms:  No    She eats 2-3 servings of fruits and vegetables daily.She consumes 0 sweetened beverage(s) daily.She exercises with enough effort to increase her heart rate 9 or less minutes per day.  She exercises with enough effort to increase her heart rate 3 or less days per week.   She is taking medications regularly.         Chronic/Recurring Back Pain Follow Up    Where is your back pain located? (Select all that apply) low back both, middle of back both, upper back  right, neck both, shoulders both, gluteus both, hip both, and waist right  How would you describe your back pain?  sharp  Where does your back pain spread? the right and left buttock, the left  thigh, the left  knee, the right and left shoulder, and the right and left neck  Since your last clinic visit for back pain, how has your pain changed? unchanged  Does your back pain interfere with your job? YES  Since your last visit, have you tried any new treatment? Yes -  Physical Therapy and stretching      Past Medical History:   Diagnosis Date    Anemia     History of blood transfusion 2000    after vaginal delivery, 2 units PRBCs given    HSIL on Pap smear 09/21/2011    MARTA 2 and 3    Immune to hepatitis B 08/2021    hep B antigen NEG    INFLAM SPONDYLOPATHY NOS 01/07/2008    check labs on sulfasalzine every 3 months and check hep B and C and TB every 2 years    Leukocytopenia 03/10/2014    Tendinosis     Thrombocytopenia (H24) 03/10/2014    Unspecified closed fracture of pelvis 2000    left fracture of pelvis after delivery       Past Surgical History:   Procedure Laterality Date    COLONOSCOPY Left 11/12/2021    recheck in 10 years    DILATION AND CURETTAGE, ABLATE ENDOMETRIUM NOVASURE, COMBINED N/A 12/04/2018    Procedure: novasure endometrial ablation, myosure resection of leiomyoma, dilation and curettage;  Surgeon: Rolando Covarrubias MD;  Location: [facility] OR    GYN SURGERY  12/04/2018    fibroid removal    lap hysterectomy and salpingetcomy Bilateral 2019    done at [facility] - ovaries are in    LEEP TX, CERVICAL  12/19/2011    MARTA II    OPERATIVE HYSTEROSCOPY WITH MORCELLATOR N/A 12/04/2018    ovaries are still in.   Surgeon: Rolando Covarrubias MD;  Location: [facility] OR    TUBAL LIGATION  05/2009    laparoscopic tubal ligation    ZZC NONSPECIFIC PROCEDURE  10/2000    after delivery 2 units of prbcs secondary to placenta       MEDICATIONS:  Current Outpatient Medications   Medication    amLODIPine (NORVASC) 2.5 MG  tablet    aspirin-acetaminophen-caffeine (EXCEDRIN MIGRAINE) 250-250-65 MG tablet    cyclobenzaprine (FLEXERIL) 5 MG tablet    diclofenac (VOLTAREN) 1 % topical gel    DULoxetine (CYMBALTA) 30 MG capsule    Evening Primrose Oil 500 MG CAPS    FEROSUL 325 (65 Fe) MG tablet    fluticasone (FLONASE) 50 MCG/ACT nasal spray    gabapentin (NEURONTIN) 300 MG capsule    ketotifen fumarate 0.035%, ketotifen 0.025%, (ZADITOR) 0.025 % ophthalmic solution    lidocaine (XYLOCAINE) 5 % external ointment    loperamide (IMODIUM A-D) 2 MG tablet    loratadine-pseudoePHEDrine (CLARITIN-D 24-HOUR)  MG 24 hr tablet    meclizine (ANTIVERT) 25 MG tablet    meloxicam (MOBIC) 15 MG tablet    olopatadine (PATANOL) 0.1 % ophthalmic solution    ondansetron (ZOFRAN) 4 MG tablet    polyethylene glycol (MIRALAX) 17 GM/Dose powder    rosuvastatin (CRESTOR) 20 MG tablet    scopolamine (TRANSDERM) 1 MG/3DAYS 72 hr patch    SM STOOL SOFTENER 100 MG capsule    sulfaSALAzine ER (AZULFIDINE EN) 500 MG EC tablet    sulfaSALAzine ER (AZULFIDINE EN) 500 MG EC tablet    tofacitinib (XELJANZ XR) 11 MG 24 hr tablet    traMADol (ULTRAM) 50 MG tablet    traZODone (DESYREL) 50 MG tablet    triamcinolone (KENALOG) 0.1 % external cream    VITAMIN D3 50 MCG (2000 UT) tablet     No current facility-administered medications for this visit.     Facility-Administered Medications Ordered in Other Visits   Medication    midazolam (VERSED) injection       SOCIAL HISTORY:  Social History     Tobacco Use    Smoking status: Never    Smokeless tobacco: Never   Substance Use Topics    Alcohol use: Not Currently       Family History   Problem Relation Age of Onset    Hypertension Father     Lipids Father     Hypertension Mother     Lipids Mother     Diabetes No family hx of     Coronary Artery Disease No family hx of     Hyperlipidemia No family hx of     Cerebrovascular Disease No family hx of     Breast Cancer No family hx of     Colon Cancer No family hx of      "Prostate Cancer No family hx of     Other Cancer No family hx of     Depression No family hx of     Anxiety Disorder No family hx of     Mental Illness No family hx of     Substance Abuse No family hx of     Anesthesia Reaction No family hx of     Asthma No family hx of     Osteoporosis No family hx of     Genetic Disorder No family hx of     Thyroid Disease No family hx of     Obesity No family hx of     Unknown/Adopted No family hx of              Review of Systems  Constitutional, HEENT, cardiovascular, pulmonary, gi and gu systems are negative, except as otherwise noted.      Objective    /82 (BP Location: Left arm, Patient Position: Chair, Cuff Size: Adult Regular)   Pulse 68   Temp 97.9  F (36.6  C) (Oral)   Resp 10   Ht 1.499 m (4' 11\")   Wt 55.4 kg (122 lb 3.2 oz)   LMP 03/26/2019 (Within Days)   SpO2 97%   BMI 24.68 kg/m    Body mass index is 24.68 kg/m .  Physical Exam   GENERAL: alert, no distress, and wearing right wrist splint   EYES: Eyes grossly normal to inspection, PERRL and conjunctivae and sclerae normal  HENT: ear canals and TM's normal, nose and mouth without ulcers or lesions  NECK: no adenopathy, no asymmetry, masses, or scars  RESP: lungs clear to auscultation - no rales, rhonchi or wheezes  CV: regular rate and rhythm, normal S1 S2, no S3 or S4, no murmur, click or rub, no peripheral edema  MS: tenderness to each side of thoracic spine.   No swelling or redness   SKIN: no suspicious lesions or rashes  NEURO: Normal strength and tone, mentation intact and speech normal  PSYCH: mentation appears normal, affect normal/bright    Office Visit on 01/03/2024   Component Date Value Ref Range Status    Color Urine 01/03/2024 Yellow  Colorless, Straw, Light Yellow, Yellow Final    Appearance Urine 01/03/2024 Clear  Clear Final    Glucose Urine 01/03/2024 Negative  Negative mg/dL Final    Bilirubin Urine 01/03/2024 Negative  Negative Final    Ketones Urine 01/03/2024 Negative  Negative " mg/dL Final    Specific Gravity Urine 01/03/2024 1.020  1.003 - 1.035 Final    Blood Urine 01/03/2024 Large (A)  Negative Final    pH Urine 01/03/2024 7.5 (H)  5.0 - 7.0 Final    Protein Albumin Urine 01/03/2024 30 (A)  Negative mg/dL Final    Urobilinogen Urine 01/03/2024 0.2  0.2, 1.0 E.U./dL Final    Nitrite Urine 01/03/2024 Negative  Negative Final    Leukocyte Esterase Urine 01/03/2024 Moderate (A)  Negative Final    Bacteria Urine 01/03/2024 Moderate (A)  None Seen /HPF Final    RBC Urine 01/03/2024 >100 (A)  0-2 /HPF /HPF Final    WBC Urine 01/03/2024 25-50 (A)  0-5 /HPF /HPF Final    Squamous Epithelials Urine 01/03/2024 Few (A)  None Seen /LPF Final    WBC Clumps Urine 01/03/2024 Present (A)  None Seen /HPF Final    Mucus Urine 01/03/2024 Present (A)  None Seen /LPF Final    Culture 01/03/2024 >100,000 CFU/mL Escherichia coli (A)   Final           Signed Electronically by: Terri Robles MD

## 2024-03-13 RX ORDER — TOFACITINIB 11 MG/1
11 TABLET, FILM COATED, EXTENDED RELEASE ORAL DAILY
Qty: 30 TABLET | Refills: 11 | Status: SHIPPED | OUTPATIENT
Start: 2024-03-13

## 2024-03-13 NOTE — PATIENT INSTRUCTIONS
"Recommendations from today's MTM visit:                                                      Continue Xeljanz 11 mg daily, sulfasalazine 1000 mg twice daily, and meloxicam 15 mg as needed. Please have your 6 month labs updated.  Consider the following vaccines: COVID booster and Shingrix (shingles). Shingrix is a 2-part vaccine and is usually covered when given at a pharmacy.    Follow-up: Lab review then 6 months routine follow-up, patient will reach out if concerns before then.    It was great speaking with you today.  I value your experience and would be very thankful for your time in providing feedback in our clinic survey. In the next few days, you may receive an email or text message from [facility] [employer] with a link to a survey related to your  clinical pharmacist.\"     To schedule another MTM appointment, please call the clinic directly or you may call the MTM scheduling line at [phone] or toll-free at [phone].     My Clinical Pharmacist's contact information:                                                      Please feel free to contact me with any questions or concerns you have.      Chelly Cedeno, PharmD  Medication Therapy Management Pharmacist  [facility]   Ph: [phone]   "

## 2024-04-19 PROBLEM — M79.662 PAIN OF LEFT LOWER LEG: Status: RESOLVED | Noted: 2024-02-21 | Resolved: 2024-04-19

## 2024-04-25 DIAGNOSIS — H10.13 ALLERGIC CONJUNCTIVITIS, BILATERAL: ICD-10-CM

## 2024-04-25 RX ORDER — OLOPATADINE HYDROCHLORIDE 1 MG/ML
SOLUTION/ DROPS OPHTHALMIC
Qty: 15 ML | Refills: 0 | Status: SHIPPED | OUTPATIENT
Start: 2024-04-25 | End: 2024-09-11

## 2024-04-29 ENCOUNTER — TELEPHONE (OUTPATIENT)
Dept: FAMILY MEDICINE | Facility: CLINIC | Age: 49
End: 2024-04-29
Payer: MEDICARE

## 2024-04-29 DIAGNOSIS — M47.819 SPONDYLOARTHROPATHY: Primary | ICD-10-CM

## 2024-04-29 NOTE — TELEPHONE ENCOUNTER
Patient needs forms done  I will need to have phone visit or virtual visit with her because I cannot find the old forms and not sure exactly what to put in physical limitations.   Want to discuss and make sure that we have the form right  The patients portion on the top still needs to be filled out but can have her fill that out when she picks it up

## 2024-05-07 ENCOUNTER — VIRTUAL VISIT (OUTPATIENT)
Dept: FAMILY MEDICINE | Facility: CLINIC | Age: 49
End: 2024-05-07
Payer: MEDICARE

## 2024-05-07 DIAGNOSIS — H10.13 ALLERGIC CONJUNCTIVITIS, BILATERAL: ICD-10-CM

## 2024-05-07 DIAGNOSIS — M46.98 INFLAMMATORY SPONDYLOPATHY OF SACRAL REGION (H): ICD-10-CM

## 2024-05-07 DIAGNOSIS — M79.662 PAIN OF LEFT LOWER LEG: ICD-10-CM

## 2024-05-07 DIAGNOSIS — G89.4 CHRONIC PAIN SYNDROME: ICD-10-CM

## 2024-05-07 PROCEDURE — 99442 PR PHYSICIAN TELEPHONE EVALUATION 11-20 MIN: CPT | Mod: 93 | Performed by: FAMILY MEDICINE

## 2024-05-07 RX ORDER — TRAMADOL HYDROCHLORIDE 50 MG/1
TABLET ORAL
Qty: 10 TABLET | Refills: 0 | Status: SHIPPED | OUTPATIENT
Start: 2024-05-07 | End: 2024-07-03

## 2024-05-07 RX ORDER — OLOPATADINE HYDROCHLORIDE 1 MG/ML
SOLUTION/ DROPS OPHTHALMIC
Qty: 15 ML | Refills: 0 | Status: CANCELLED | OUTPATIENT
Start: 2024-05-07

## 2024-05-07 RX ORDER — OLOPATADINE HYDROCHLORIDE 2 MG/ML
1 SOLUTION/ DROPS OPHTHALMIC DAILY
Qty: 2.5 ML | Refills: 3 | Status: SHIPPED | OUTPATIENT
Start: 2024-05-07

## 2024-05-07 NOTE — TELEPHONE ENCOUNTER
Faxed to [facility] @ [phone] copy to abstract, mailed to patient and copy filed in [employer] file.  Sima Chandler, TC

## 2024-05-07 NOTE — PROGRESS NOTES
Zo is a 49 year old who is being evaluated via a billable telephone visit.    What phone number would you like to be contacted at? [phone]  How would you like to obtain your AVS? Taj  Originating Location (pt. Location): Home    Distant Location (provider location):  On-site    Assessment & Plan     Allergic conjunctivitis, bilateral  Will try pataday but if not covered she can try [employer] or [employer] or goodrx for patanol for her itchy eyes due to allergies    - olopatadine (PATADAY) 0.2 % ophthalmic solution  Dispense: 2.5 mL; Refill: 3    Inflammatory spondylopathy of sacral region (H24)  Cause of arthritis and tendonitis and pain and limitations to work her job  Refills per [facility]  Paperwork filled out   - traMADol (ULTRAM) 50 MG tablet  Dispense: 10 tablet; Refill: 0    Chronic pain syndrome  Refills per [facility]    - traMADol (ULTRAM) 50 MG tablet  Dispense: 10 tablet; Refill: 0    Pain of left lower leg    - traMADol (ULTRAM) 50 MG tablet  Dispense: 10 tablet; Refill: 0        15 minutes spent by me on the date of the encounter doing chart review, history and exam, documentation and further activities per the note        FUTURE APPOINTMENTS:       - Follow-up visit in as scheduled 9/2024   See Patient Instructions    Subjective   Zo is a 49 year old, presenting for the following health issues:  Forms ([employer] form fax when completed / also would like to  a copy leave at the [facility])  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    The main areas affected are back and neck and shoulders and her right elbow and hand.   She sees rheum and is on a lot of medication for management   she has seen PT.   She needs her forms filled out again.       She also needs refills on her tramadol she uses for severe pain and also patanol is not covered for her allergic conjunctivitis and she is really suffering the last few weeks.             5/7/2024    11:27  AM   Additional Questions   Roomed by abby cruz     History of Present Illness       Back Pain:  She presents for follow up of back pain. Patient's back pain is a chronic problem.  Location of back pain:  Right middle of back, left middle of back, right side of neck and left side of neck  Description of back pain: dull ache and sharp  Back pain spreads: right buttocks, left buttocks, right thigh, left thigh, right side of neck and left side of neck    Since patient first noticed back pain, pain is: unchanged  Does back pain interfere with her job:  Not applicable       She eats 2-3 servings of fruits and vegetables daily.She consumes 0 sweetened beverage(s) daily.She exercises with enough effort to increase her heart rate 9 or less minutes per day.  She exercises with enough effort to increase her heart rate 3 or less days per week.   She is taking medications regularly.         Past Medical History:   Diagnosis Date    Anemia     History of blood transfusion 2000    after vaginal delivery, 2 units PRBCs given    HSIL on Pap smear 09/21/2011    MARTA 2 and 3    Immune to hepatitis B 08/2021    hep B antigen NEG    INFLAM SPONDYLOPATHY NOS 01/07/2008    check labs on sulfasalzine every 3 months and check hep B and C and TB every 2 years    Leukocytopenia 03/10/2014    Tendinosis     Thrombocytopenia (H24) 03/10/2014    Unspecified closed fracture of pelvis 2000    left fracture of pelvis after delivery       Past Surgical History:   Procedure Laterality Date    COLONOSCOPY Left 11/12/2021    recheck in 10 years    DILATION AND CURETTAGE, ABLATE ENDOMETRIUM NOVASURE, COMBINED N/A 12/04/2018    Procedure: novasure endometrial ablation, myosure resection of leiomyoma, dilation and curettage;  Surgeon: Rolando Covarrubias MD;  Location: [facility] OR    GYN SURGERY  12/04/2018    fibroid removal    lap hysterectomy and salpingetcomy Bilateral 2019    done at [facility] - ovaries are in    LEEP TX, CERVICAL  12/19/2011     MARTA II    OPERATIVE HYSTEROSCOPY WITH MORCELLATOR N/A 12/04/2018    ovaries are still in.   Surgeon: Rolando Covarrubias MD;  Location: [facility] OR    TUBAL LIGATION  05/2009    laparoscopic tubal ligation    ZZC NONSPECIFIC PROCEDURE  10/2000    after delivery 2 units of prbcs secondary to placenta       MEDICATIONS:  Current Outpatient Medications   Medication Sig Dispense Refill    amLODIPine (NORVASC) 2.5 MG tablet Take 1 tablet (2.5 mg) by mouth daily 90 tablet 3    aspirin-acetaminophen-caffeine (EXCEDRIN MIGRAINE) 250-250-65 MG tablet Take 1 tablet by mouth every 6 hours as needed for headaches      cyclobenzaprine (FLEXERIL) 5 MG tablet Take 1 tablet (5 mg) by mouth 3 times daily as needed for muscle spasms 90 tablet 3    diclofenac (VOLTAREN) 1 % topical gel Apply to affected area twice daily 150 g 3    DULoxetine (CYMBALTA) 30 MG capsule Take 2 capsules (60 mg) by mouth daily 180 capsule 3    Evening Primrose Oil 500 MG CAPS Take 500 mg by mouth 2 times daily 60 capsule 11    FEROSUL 325 (65 Fe) MG tablet TAKE 1 TABLET (325 MG) BY MOUTH DAILY WITH BREAKFAST 90 tablet 3    fluticasone (FLONASE) 50 MCG/ACT nasal spray Spray 1-2 sprays into both nostrils daily 16 g 3    gabapentin (NEURONTIN) 300 MG capsule 1 tab in am and 1 at 5 pm  and 2 at bedtime for (4 per day)1 tab in am and 1 at 5 pm  and 2 at bedtime for (4 per day) 360 capsule 3    ketotifen fumarate 0.035%, ketotifen 0.025%, (ZADITOR) 0.025 % ophthalmic solution Place 1 drop into both eyes 2 times daily 10 mL 3    lidocaine (XYLOCAINE) 5 % external ointment Apply topically 3 times daily as needed for moderate pain (4-6) 150 g 3    loperamide (IMODIUM A-D) 2 MG tablet Take 1 tablet (2 mg) by mouth 4 times daily as needed for diarrhea 30 tablet 1    loratadine-pseudoePHEDrine (CLARITIN-D 24-HOUR)  MG 24 hr tablet Take 1 tablet by mouth daily 30 tablet 1    meclizine (ANTIVERT) 25 MG tablet Take 1 tablet (25 mg) by mouth 3 times daily as needed for  dizziness 30 tablet 1    meloxicam (MOBIC) 15 MG tablet Take 1 tablet (15 mg) by mouth daily 180 tablet 1    olopatadine (PATADAY) 0.2 % ophthalmic solution Place 0.05 mLs (1 drop) into both eyes daily 2.5 mL 3    olopatadine (PATANOL) 0.1 % ophthalmic solution INSTILL ONE DROP IN EACH EYE TWO TIMES A DAY 15 mL 0    ondansetron (ZOFRAN) 4 MG tablet Take 1 tablet (4 mg) by mouth every 8 hours as needed for nausea or vomiting 12 tablet 4    polyethylene glycol (MIRALAX) 17 GM/Dose powder Take 17 g (1 Capful) by mouth daily 510 g 1    rosuvastatin (CRESTOR) 20 MG tablet Take 1 tablet (20 mg) by mouth daily 90 tablet 3    scopolamine (TRANSDERM) 1 MG/3DAYS 72 hr patch Place 1 patch onto the skin every 72 hours 10 patch 0    SM STOOL SOFTENER 100 MG capsule TAKE ONE CAPSULE BY MOUTH ONCE DAILY AS NEEDED FOR CONSTIPATION 30 capsule 1    sulfaSALAzine ER (AZULFIDINE EN) 500 MG EC tablet Take 2 tablets (1,000 mg) by mouth 2 times daily 360 tablet 0    tofacitinib (XELJANZ XR) 11 MG 24 hr tablet Take 1 tablet (11 mg) by mouth daily Hold for signs of infection, then seek medical attention. 30 tablet 11    traMADol (ULTRAM) 50 MG tablet Use 1-2 daily prn for severe pain 10 tablet 0    traZODone (DESYREL) 50 MG tablet Take 2 tablets (100 mg) by mouth nightly as needed for sleep 60 tablet 11    triamcinolone (KENALOG) 0.1 % external cream Apply topically 2 times daily 30 g 1    VITAMIN D3 50 MCG (2000 UT) tablet TAKE 1 TABLET (50 MCG) BY MOUTH DAILY 90 tablet 3     No current facility-administered medications for this visit.     Facility-Administered Medications Ordered in Other Visits   Medication Dose Route Frequency Provider Last Rate Last Admin    midazolam (VERSED) injection    PRN Laurent Harris MD   1 mg at 11/12/21 1215       SOCIAL HISTORY:  Social History     Tobacco Use    Smoking status: Never    Smokeless tobacco: Never   Substance Use Topics    Alcohol use: Not Currently       Family History   Problem Relation  "Age of Onset    Hypertension Father     Lipids Father     Hypertension Mother     Lipids Mother     Diabetes No family hx of     Coronary Artery Disease No family hx of     Hyperlipidemia No family hx of     Cerebrovascular Disease No family hx of     Breast Cancer No family hx of     Colon Cancer No family hx of     Prostate Cancer No family hx of     Other Cancer No family hx of     Depression No family hx of     Anxiety Disorder No family hx of     Mental Illness No family hx of     Substance Abuse No family hx of     Anesthesia Reaction No family hx of     Asthma No family hx of     Osteoporosis No family hx of     Genetic Disorder No family hx of     Thyroid Disease No family hx of     Obesity No family hx of     Unknown/Adopted No family hx of              Review of Systems  Constitutional, HEENT, cardiovascular, pulmonary, gi and gu systems are negative, except as otherwise noted.      Objective    Vitals - Patient Reported  Weight (Patient Reported): 52.2 kg (115 lb)  Height (Patient Reported): 149.9 cm (4' 11\")  BMI (Based on Pt Reported Ht/Wt): 23.23        Physical Exam   General: Alert and no distress //Respiratory: No audible wheeze, cough, or shortness of breath // Psychiatric:  Appropriate affect, tone, and pace of words      Office Visit on 01/03/2024   Component Date Value Ref Range Status    Color Urine 01/03/2024 Yellow  Colorless, Straw, Light Yellow, Yellow Final    Appearance Urine 01/03/2024 Clear  Clear Final    Glucose Urine 01/03/2024 Negative  Negative mg/dL Final    Bilirubin Urine 01/03/2024 Negative  Negative Final    Ketones Urine 01/03/2024 Negative  Negative mg/dL Final    Specific Gravity Urine 01/03/2024 1.020  1.003 - 1.035 Final    Blood Urine 01/03/2024 Large (A)  Negative Final    pH Urine 01/03/2024 7.5 (H)  5.0 - 7.0 Final    Protein Albumin Urine 01/03/2024 30 (A)  Negative mg/dL Final    Urobilinogen Urine 01/03/2024 0.2  0.2, 1.0 E.U./dL Final    Nitrite Urine 01/03/2024 " Negative  Negative Final    Leukocyte Esterase Urine 01/03/2024 Moderate (A)  Negative Final    Bacteria Urine 01/03/2024 Moderate (A)  None Seen /HPF Final    RBC Urine 01/03/2024 >100 (A)  0-2 /HPF /HPF Final    WBC Urine 01/03/2024 25-50 (A)  0-5 /HPF /HPF Final    Squamous Epithelials Urine 01/03/2024 Few (A)  None Seen /LPF Final    WBC Clumps Urine 01/03/2024 Present (A)  None Seen /HPF Final    Mucus Urine 01/03/2024 Present (A)  None Seen /LPF Final    Culture 01/03/2024 >100,000 CFU/mL Escherichia coli (A)   Final         Phone call duration: 15 minutes  Signed Electronically by: Terri Robles MD

## 2024-05-22 DIAGNOSIS — M45.8 ANKYLOSING SPONDYLITIS OF SACRAL REGION (H): ICD-10-CM

## 2024-05-22 DIAGNOSIS — M47.819 SERONEGATIVE SPONDYLOARTHROPATHY: ICD-10-CM

## 2024-05-22 RX ORDER — SULFASALAZINE 500 MG/1
1000 TABLET, DELAYED RELEASE ORAL 2 TIMES DAILY
Qty: 360 TABLET | Refills: 0 | Status: SHIPPED | OUTPATIENT
Start: 2024-05-22 | End: 2024-07-05

## 2024-07-02 DIAGNOSIS — M79.662 PAIN OF LEFT LOWER LEG: ICD-10-CM

## 2024-07-02 DIAGNOSIS — M46.98 INFLAMMATORY SPONDYLOPATHY OF SACRAL REGION (H): ICD-10-CM

## 2024-07-02 DIAGNOSIS — G89.4 CHRONIC PAIN SYNDROME: ICD-10-CM

## 2024-07-03 RX ORDER — TRAMADOL HYDROCHLORIDE 50 MG/1
50 TABLET ORAL 2 TIMES DAILY PRN
Qty: 10 TABLET | Refills: 0 | Status: SHIPPED | OUTPATIENT
Start: 2024-07-03 | End: 2024-07-05

## 2024-07-05 ENCOUNTER — MYC REFILL (OUTPATIENT)
Dept: FAMILY MEDICINE | Facility: CLINIC | Age: 49
End: 2024-07-05
Payer: MEDICARE

## 2024-07-05 DIAGNOSIS — M79.662 PAIN OF LEFT LOWER LEG: ICD-10-CM

## 2024-07-05 DIAGNOSIS — G89.4 CHRONIC PAIN SYNDROME: ICD-10-CM

## 2024-07-05 DIAGNOSIS — M47.819 SERONEGATIVE SPONDYLOARTHROPATHY: ICD-10-CM

## 2024-07-05 DIAGNOSIS — M46.98 INFLAMMATORY SPONDYLOPATHY OF SACRAL REGION (H): ICD-10-CM

## 2024-07-05 DIAGNOSIS — M45.8 ANKYLOSING SPONDYLITIS OF SACRAL REGION (H): ICD-10-CM

## 2024-07-08 RX ORDER — TRAMADOL HYDROCHLORIDE 50 MG/1
50 TABLET ORAL 2 TIMES DAILY PRN
Qty: 60 TABLET | Refills: 0 | Status: SHIPPED | OUTPATIENT
Start: 2024-07-08 | End: 2024-09-11

## 2024-07-08 RX ORDER — SULFASALAZINE 500 MG/1
1000 TABLET, DELAYED RELEASE ORAL 2 TIMES DAILY
Qty: 360 TABLET | Refills: 0 | Status: SHIPPED | OUTPATIENT
Start: 2024-07-08

## 2024-07-26 DIAGNOSIS — M54.2 NECK PAIN: ICD-10-CM

## 2024-07-26 DIAGNOSIS — G44.219 EPISODIC TENSION-TYPE HEADACHE, NOT INTRACTABLE: ICD-10-CM

## 2024-07-26 RX ORDER — CYCLOBENZAPRINE HCL 5 MG
5 TABLET ORAL 3 TIMES DAILY PRN
Qty: 90 TABLET | Refills: 0 | Status: SHIPPED | OUTPATIENT
Start: 2024-07-26 | End: 2024-08-21

## 2024-07-30 NOTE — Clinical Note
Zo Montalvo Dr. saw me at [facility] on May 1, 2017.  Please refer to the visit note at your convenience and feel free to contact me should you have any questions.  Thank you for the consult. Sincerely,  Rupert Pantoja DO, [facility] 
Apixaban/Eliquis increases your risk for bleeding. Notify your doctor if you experience any of the following side effects: bleeding, coughing or vomiting blood, red or black stool, unexpected pain or swelling, itching or hives, chest pain, chest tightness, trouble breathing, changes in how much or how often you urinate, red or pink urine, numbness or tingling in your feet, or unusual muscle weakness. When Apixaban/Eliquis is taken with other medicines, they can affect how it works. Taking other medications such as aspirin, blood thinners, nonsteroidal anti-inflammatories, and medications that treat depression can increase your risk of bleeding. It is very important to tell your health care provider about all of the other medicines, including over-the-counter medications, herbs, and vitamins you are taking. DO NOT start, stop, or change the dosage of any medicine, including over-the-counter medicines, vitamins, and herbal products without your doctor’s approval. Any products containing aspirin or are nonsteroidal anti-inflammatories lessen the blood’s ability to form clots and add to the effect of Apixaban/Eliquis. Never take aspirin or medicines that contain aspirin without speaking to your doctor.

## 2024-08-21 DIAGNOSIS — M54.2 NECK PAIN: ICD-10-CM

## 2024-08-21 DIAGNOSIS — G44.219 EPISODIC TENSION-TYPE HEADACHE, NOT INTRACTABLE: ICD-10-CM

## 2024-08-21 RX ORDER — CYCLOBENZAPRINE HCL 5 MG
5 TABLET ORAL 3 TIMES DAILY PRN
Qty: 90 TABLET | Refills: 0 | Status: SHIPPED | OUTPATIENT
Start: 2024-08-21 | End: 2024-09-11

## 2024-08-26 ENCOUNTER — PATIENT OUTREACH (OUTPATIENT)
Dept: CARE COORDINATION | Facility: CLINIC | Age: 49
End: 2024-08-26
Payer: MEDICARE

## 2024-08-26 DIAGNOSIS — R42 VERTIGO: ICD-10-CM

## 2024-08-26 DIAGNOSIS — D50.9 IRON DEFICIENCY ANEMIA, UNSPECIFIED IRON DEFICIENCY ANEMIA TYPE: ICD-10-CM

## 2024-08-26 RX ORDER — CHOLECALCIFEROL (VITAMIN D3) 50 MCG
1 TABLET ORAL DAILY
Qty: 90 TABLET | Refills: 3 | Status: SHIPPED | OUTPATIENT
Start: 2024-08-26

## 2024-08-26 RX ORDER — FERROUS SULFATE 325(65) MG
325 TABLET ORAL
Qty: 90 TABLET | Refills: 3 | Status: SHIPPED | OUTPATIENT
Start: 2024-08-26

## 2024-09-09 ENCOUNTER — PATIENT OUTREACH (OUTPATIENT)
Dept: CARE COORDINATION | Facility: CLINIC | Age: 49
End: 2024-09-09
Payer: MEDICARE

## 2024-09-11 ENCOUNTER — OFFICE VISIT (OUTPATIENT)
Dept: FAMILY MEDICINE | Facility: CLINIC | Age: 49
End: 2024-09-11
Payer: MEDICARE

## 2024-09-11 VITALS
HEIGHT: 59 IN | TEMPERATURE: 97.4 F | OXYGEN SATURATION: 100 % | RESPIRATION RATE: 15 BRPM | SYSTOLIC BLOOD PRESSURE: 120 MMHG | BODY MASS INDEX: 23.99 KG/M2 | WEIGHT: 119 LBS | DIASTOLIC BLOOD PRESSURE: 76 MMHG | HEART RATE: 106 BPM

## 2024-09-11 DIAGNOSIS — M45.8 ANKYLOSING SPONDYLITIS OF SACRAL REGION (H): ICD-10-CM

## 2024-09-11 DIAGNOSIS — M46.98 INFLAMMATORY SPONDYLOPATHY OF SACRAL REGION (H): ICD-10-CM

## 2024-09-11 DIAGNOSIS — G89.4 CHRONIC PAIN SYNDROME: ICD-10-CM

## 2024-09-11 DIAGNOSIS — F11.20 CONTINUOUS OPIOID DEPENDENCE (H): ICD-10-CM

## 2024-09-11 DIAGNOSIS — M15.1 HEBERDEN'S NODES: ICD-10-CM

## 2024-09-11 DIAGNOSIS — H10.13 ALLERGIC CONJUNCTIVITIS OF BOTH EYES: ICD-10-CM

## 2024-09-11 DIAGNOSIS — Z23 ENCOUNTER FOR IMMUNIZATION: ICD-10-CM

## 2024-09-11 DIAGNOSIS — D50.9 IRON DEFICIENCY ANEMIA, UNSPECIFIED IRON DEFICIENCY ANEMIA TYPE: Primary | ICD-10-CM

## 2024-09-11 DIAGNOSIS — Z79.899 HIGH RISK MEDICATION USE: ICD-10-CM

## 2024-09-11 DIAGNOSIS — I10 PRIMARY HYPERTENSION: ICD-10-CM

## 2024-09-11 DIAGNOSIS — M79.662 PAIN OF LEFT LOWER LEG: ICD-10-CM

## 2024-09-11 DIAGNOSIS — G47.00 INSOMNIA, UNSPECIFIED TYPE: ICD-10-CM

## 2024-09-11 DIAGNOSIS — M54.2 NECK PAIN: ICD-10-CM

## 2024-09-11 DIAGNOSIS — Z87.898 H/O MOTION SICKNESS: ICD-10-CM

## 2024-09-11 DIAGNOSIS — G44.219 EPISODIC TENSION-TYPE HEADACHE, NOT INTRACTABLE: ICD-10-CM

## 2024-09-11 DIAGNOSIS — E78.5 HYPERLIPIDEMIA LDL GOAL <130: ICD-10-CM

## 2024-09-11 LAB
ALBUMIN SERPL BCG-MCNC: 4.6 G/DL (ref 3.5–5.2)
ALP SERPL-CCNC: 79 U/L (ref 40–150)
ALT SERPL W P-5'-P-CCNC: 12 U/L (ref 0–50)
ANION GAP SERPL CALCULATED.3IONS-SCNC: 10 MMOL/L (ref 7–15)
AST SERPL W P-5'-P-CCNC: 20 U/L (ref 0–45)
BASOPHILS # BLD AUTO: 0 10E3/UL (ref 0–0.2)
BASOPHILS NFR BLD AUTO: 0 %
BILIRUB SERPL-MCNC: 0.4 MG/DL
BUN SERPL-MCNC: 12.2 MG/DL (ref 6–20)
CALCIUM SERPL-MCNC: 9.6 MG/DL (ref 8.8–10.4)
CHLORIDE SERPL-SCNC: 102 MMOL/L (ref 98–107)
CHOLEST SERPL-MCNC: 262 MG/DL
CREAT SERPL-MCNC: 0.56 MG/DL (ref 0.51–0.95)
CRP SERPL-MCNC: <3 MG/L
EGFRCR SERPLBLD CKD-EPI 2021: >90 ML/MIN/1.73M2
EOSINOPHIL # BLD AUTO: 0.2 10E3/UL (ref 0–0.7)
EOSINOPHIL NFR BLD AUTO: 3 %
ERYTHROCYTE [DISTWIDTH] IN BLOOD BY AUTOMATED COUNT: 12.6 % (ref 10–15)
ERYTHROCYTE [SEDIMENTATION RATE] IN BLOOD BY WESTERGREN METHOD: 30 MM/HR (ref 0–20)
FASTING STATUS PATIENT QL REPORTED: ABNORMAL
GLUCOSE SERPL-MCNC: 88 MG/DL (ref 70–99)
HCO3 SERPL-SCNC: 27 MMOL/L (ref 22–29)
HCT VFR BLD AUTO: 37.3 % (ref 35–47)
HDLC SERPL-MCNC: 52 MG/DL
HGB BLD-MCNC: 12.3 G/DL (ref 11.7–15.7)
IMM GRANULOCYTES # BLD: 0 10E3/UL
IMM GRANULOCYTES NFR BLD: 0 %
LDLC SERPL CALC-MCNC: 184 MG/DL
LYMPHOCYTES # BLD AUTO: 1.7 10E3/UL (ref 0.8–5.3)
LYMPHOCYTES NFR BLD AUTO: 29 %
MCH RBC QN AUTO: 30 PG (ref 26.5–33)
MCHC RBC AUTO-ENTMCNC: 33 G/DL (ref 31.5–36.5)
MCV RBC AUTO: 91 FL (ref 78–100)
MONOCYTES # BLD AUTO: 0.5 10E3/UL (ref 0–1.3)
MONOCYTES NFR BLD AUTO: 9 %
NEUTROPHILS # BLD AUTO: 3.5 10E3/UL (ref 1.6–8.3)
NEUTROPHILS NFR BLD AUTO: 60 %
NONHDLC SERPL-MCNC: 210 MG/DL
PLATELET # BLD AUTO: 225 10E3/UL (ref 150–450)
POTASSIUM SERPL-SCNC: 4.3 MMOL/L (ref 3.4–5.3)
PROT SERPL-MCNC: 8.1 G/DL (ref 6.4–8.3)
RBC # BLD AUTO: 4.1 10E6/UL (ref 3.8–5.2)
SODIUM SERPL-SCNC: 139 MMOL/L (ref 135–145)
TRIGL SERPL-MCNC: 131 MG/DL
WBC # BLD AUTO: 5.9 10E3/UL (ref 4–11)

## 2024-09-11 PROCEDURE — 85025 COMPLETE CBC W/AUTO DIFF WBC: CPT | Performed by: FAMILY MEDICINE

## 2024-09-11 PROCEDURE — 90656 IIV3 VACC NO PRSV 0.5 ML IM: CPT | Performed by: FAMILY MEDICINE

## 2024-09-11 PROCEDURE — 85652 RBC SED RATE AUTOMATED: CPT | Performed by: FAMILY MEDICINE

## 2024-09-11 PROCEDURE — 36415 COLL VENOUS BLD VENIPUNCTURE: CPT | Performed by: FAMILY MEDICINE

## 2024-09-11 PROCEDURE — 80053 COMPREHEN METABOLIC PANEL: CPT | Performed by: FAMILY MEDICINE

## 2024-09-11 PROCEDURE — 86140 C-REACTIVE PROTEIN: CPT | Performed by: FAMILY MEDICINE

## 2024-09-11 PROCEDURE — 99214 OFFICE O/P EST MOD 30 MIN: CPT | Mod: 25 | Performed by: FAMILY MEDICINE

## 2024-09-11 PROCEDURE — G0008 ADMIN INFLUENZA VIRUS VAC: HCPCS | Performed by: FAMILY MEDICINE

## 2024-09-11 PROCEDURE — 80061 LIPID PANEL: CPT | Performed by: FAMILY MEDICINE

## 2024-09-11 RX ORDER — ROSUVASTATIN CALCIUM 20 MG/1
20 TABLET, COATED ORAL DAILY
Qty: 90 TABLET | Refills: 4 | Status: SHIPPED | OUTPATIENT
Start: 2024-09-11

## 2024-09-11 RX ORDER — CYCLOBENZAPRINE HCL 5 MG
5 TABLET ORAL 3 TIMES DAILY PRN
Qty: 90 TABLET | Refills: 5 | Status: SHIPPED | OUTPATIENT
Start: 2024-09-11

## 2024-09-11 RX ORDER — SCOLOPAMINE TRANSDERMAL SYSTEM 1 MG/1
1 PATCH, EXTENDED RELEASE TRANSDERMAL
Qty: 10 PATCH | Refills: 3 | Status: SHIPPED | OUTPATIENT
Start: 2024-09-11

## 2024-09-11 RX ORDER — TRAZODONE HYDROCHLORIDE 50 MG/1
100 TABLET, FILM COATED ORAL
Qty: 180 TABLET | Refills: 4 | Status: SHIPPED | OUTPATIENT
Start: 2024-09-11

## 2024-09-11 RX ORDER — TRAMADOL HYDROCHLORIDE 50 MG/1
50 TABLET ORAL 2 TIMES DAILY PRN
Qty: 60 TABLET | Refills: 0 | Status: SHIPPED | OUTPATIENT
Start: 2024-09-11

## 2024-09-11 RX ORDER — MELOXICAM 15 MG/1
15 TABLET ORAL DAILY
Qty: 180 TABLET | Refills: 1 | Status: SHIPPED | OUTPATIENT
Start: 2024-09-11

## 2024-09-11 RX ORDER — KETOTIFEN FUMARATE 0.35 MG/ML
1 SOLUTION/ DROPS OPHTHALMIC 2 TIMES DAILY
Qty: 10 ML | Refills: 2 | Status: SHIPPED | OUTPATIENT
Start: 2024-09-11

## 2024-09-11 ASSESSMENT — ENCOUNTER SYMPTOMS: BACK PAIN: 1

## 2024-09-11 ASSESSMENT — PAIN SCALES - GENERAL: PAINLEVEL: SEVERE PAIN (6)

## 2024-09-11 NOTE — PROGRESS NOTES
Assessment & Plan     Iron deficiency anemia, unspecified iron deficiency anemia type  Resolved  Recheck     Hypertension  Under control     Continuous opioid dependence (H)  Signed CSA    Neck pain  Refills per epicare    - cyclobenzaprine (FLEXERIL) 5 MG tablet  Dispense: 90 tablet; Refill: 5    Episodic tension-type headache, not intractable  Refills per epicare    - cyclobenzaprine (FLEXERIL) 5 MG tablet  Dispense: 90 tablet; Refill: 5    Ankylosing spondylitis of sacral region (H)  Stable  Labs per rheum  - meloxicam (MOBIC) 15 MG tablet  Dispense: 180 tablet; Refill: 1    Hyperlipidemia LDL goal <130  Under control   - Lipid panel reflex to direct LDL Fasting  - rosuvastatin (CRESTOR) 20 MG tablet  Dispense: 90 tablet; Refill: 4    H/O motion sickness  Refills per epicare    - scopolamine (TRANSDERM) 1 MG/3DAYS 72 hr patch  Dispense: 10 patch; Refill: 3    Inflammatory spondylopathy of sacral region (H24)    - INFLUENZA VACCINE,SPLIT VIRUS,TRIVALENT,PF(FLUZONE)  - REVIEW OF HEALTH MAINTENANCE PROTOCOL ORDERS  - traMADol (ULTRAM) 50 MG tablet  Dispense: 60 tablet; Refill: 0    Chronic pain syndrome  Refills per epicare    - traMADol (ULTRAM) 50 MG tablet  Dispense: 60 tablet; Refill: 0    Pain of left lower leg    - traMADol (ULTRAM) 50 MG tablet  Dispense: 60 tablet; Refill: 0    Insomnia, unspecified type  Refills per epicare    - traZODone (DESYREL) 50 MG tablet  Dispense: 180 tablet; Refill: 4    Encounter for immunization    - INFLUENZA VACCINE,SPLIT VIRUS,TRIVALENT,PF(FLUZONE)    Heberden's nodes      Allergic conjunctivitis of both eyes  May need to obtain OTC   - ketotifen fumarate 0.035% 0.035 % SOLN ophthalmic solution  Dispense: 10 mL; Refill: 2    High risk medication use    - Comprehensive metabolic panel  - CBC with platelets differential  - Erythrocyte sedimentation rate auto  - CRP inflammation              FUTURE APPOINTMENTS:       - Follow-up visit in 3 months for physical   See Patient  Kole Pathak is a 49 year old, presenting for the following health issues:   she is here for med check.   She needs refills on her meds for cholesterol and headaches and blood pressure.   Her eyes are giving her trouble due to allergies and her insurance does not cover patanol.   She also wonders about a bump on her finger that is tender and sore.   It has been there for  months now.   Her inflammatory arthritis is under fair control.   She is due for her labs that rheum wants her to get and due for a visit with them.   Back Pain        9/11/2024     9:28 AM   Additional Questions   Roomed by Denise Ramires   Accompanied by self     History of Present Illness       Back Pain:  She presents for follow up of back pain. Patient's back pain is a chronic problem.  Location of back pain:  Right lower back, left lower back, right middle of back, left middle of back, right side of neck, left side of neck, right shoulder, left shoulder, right buttock, left buttock and left hip  Description of back pain: shooting  Back pain spreads: right buttocks, left buttocks, left thigh, left knee, left foot, right shoulder, left shoulder, right side of neck and left side of neck    Since patient first noticed back pain, pain is: always present, but gets better and worse  Does back pain interfere with her job:  Yes       Reason for visit:  Shoulders    She eats 2-3 servings of fruits and vegetables daily.She consumes 1 sweetened beverage(s) daily.   She is taking medications regularly.         Pain History:  When did you first notice your pain? Chronic back pain   Have you seen this provider for your pain in the past? Yes   Where in your body do you have pain? Lower to mid back pain  Are you seeing anyone else for your pain? Yes -  Rheumatologist        8/29/2022     9:15 AM 3/1/2024     8:34 AM 3/11/2024     9:07 AM   PHQ-9 SCORE   PHQ-9 Total Score MyChart 11 (Moderate depression)  15 (Moderately severe  depression)   PHQ-9 Total Score 11 13 15                 Chronic Pain Follow Up:    Location of pain: joints   Analgesia/pain control:    - Recent changes:  no    - Overall control: Tolerable with discomfort    - Current treatments: xeljlanz and tramadol prn and mobic prn and sulfasalazine    Adherence:     - Do you ever take more pain medicine than prescribed? No    - When did you take your last dose of pain medicine?  Tramadol a few days    Adverse effects: No   PDMP Review         Value Time User    State PDMP site checked  Yes 5/7/2024 11:49 AM O&#39;Terri Parmar MD          Last CSA Agreement:   CSA -- Patient Level:     [Media Unavailable] Controlled Substance Agreement - Opioid - Scan on 11/6/2023  2:13 PM: Controlled Substance Agreement - Opioid   [Media Unavailable] Controlled Substance Agreement - Opioid - Scan on 3/15/2021  8:19 PM       Last UDS: 8/19/2021          Past Medical History:   Diagnosis Date    Anemia     History of blood transfusion 2000    after vaginal delivery, 2 units PRBCs given    HSIL on Pap smear 09/21/2011    MARTA 2 and 3    Immune to hepatitis B 08/2021    hep B antigen NEG    INFLAM SPONDYLOPATHY NOS 01/07/2008    check labs on sulfasalzine every 3 months and check hep B and C and TB every 2 years    Leukocytopenia 03/10/2014    Tendinosis     Thrombocytopenia (H24) 03/10/2014    Unspecified closed fracture of pelvis 2000    left fracture of pelvis after delivery       Past Surgical History:   Procedure Laterality Date    COLONOSCOPY Left 11/12/2021    recheck in 10 years    DILATION AND CURETTAGE, ABLATE ENDOMETRIUM NOVASURE, COMBINED N/A 12/04/2018    Procedure: novasure endometrial ablation, myosure resection of leiomyoma, dilation and curettage;  Surgeon: Rolando Covarrubias MD;  Location: [facility] OR    GYN SURGERY  12/04/2018    fibroid removal    lap hysterectomy and salpingetcomy Bilateral 2019    done at [facility] - ovaries are in    LEEP TX, CERVICAL  12/19/2011    MARTA II     OPERATIVE HYSTEROSCOPY WITH MORCELLATOR N/A 12/04/2018    ovaries are still in.   Surgeon: Rolando Covarrubias MD;  Location: [facility] OR    TUBAL LIGATION  05/2009    laparoscopic tubal ligation    ZZC NONSPECIFIC PROCEDURE  10/2000    after delivery 2 units of prbcs secondary to placenta       MEDICATIONS:  Current Outpatient Medications   Medication Sig Dispense Refill    amLODIPine (NORVASC) 2.5 MG tablet Take 1 tablet (2.5 mg) by mouth daily 90 tablet 3    aspirin-acetaminophen-caffeine (EXCEDRIN MIGRAINE) 250-250-65 MG tablet Take 1 tablet by mouth every 6 hours as needed for headaches      cyclobenzaprine (FLEXERIL) 5 MG tablet Take 1 tablet (5 mg) by mouth 3 times daily as needed for muscle spasms. 90 tablet 5    diclofenac (VOLTAREN) 1 % topical gel Apply to affected area twice daily 150 g 3    DULoxetine (CYMBALTA) 30 MG capsule Take 2 capsules (60 mg) by mouth daily 180 capsule 3    Evening Primrose Oil 500 MG CAPS Take 500 mg by mouth 2 times daily 60 capsule 11    FEROSUL 325 (65 Fe) MG tablet TAKE ONE TABLET BY MOUTH ONCE DAILY WITH BREAKFAST 90 tablet 3    fluticasone (FLONASE) 50 MCG/ACT nasal spray Spray 1-2 sprays into both nostrils daily 16 g 3    gabapentin (NEURONTIN) 300 MG capsule 1 tab in am and 1 at 5 pm  and 2 at bedtime for (4 per day)1 tab in am and 1 at 5 pm  and 2 at bedtime for (4 per day) 360 capsule 3    ketotifen fumarate 0.035% 0.035 % SOLN ophthalmic solution Place 1 drop into both eyes 2 times daily. 10 mL 2    ketotifen fumarate 0.035%, ketotifen 0.025%, (ZADITOR) 0.025 % ophthalmic solution Place 1 drop into both eyes 2 times daily 10 mL 3    lidocaine (XYLOCAINE) 5 % external ointment Apply topically 3 times daily as needed for moderate pain (4-6) 150 g 3    loperamide (IMODIUM A-D) 2 MG tablet Take 1 tablet (2 mg) by mouth 4 times daily as needed for diarrhea 30 tablet 1    loratadine-pseudoePHEDrine (CLARITIN-D 24-HOUR)  MG 24 hr tablet Take 1 tablet by mouth daily 30 tablet  1    meclizine (ANTIVERT) 25 MG tablet Take 1 tablet (25 mg) by mouth 3 times daily as needed for dizziness 30 tablet 1    meloxicam (MOBIC) 15 MG tablet Take 1 tablet (15 mg) by mouth daily. 180 tablet 1    naloxone (NARCAN) 4 MG/0.1ML nasal spray Spray 4 mg into one nostril alternating nostrils once as needed.      olopatadine (PATADAY) 0.2 % ophthalmic solution Place 0.05 mLs (1 drop) into both eyes daily 2.5 mL 3    ondansetron (ZOFRAN) 4 MG tablet Take 1 tablet (4 mg) by mouth every 8 hours as needed for nausea or vomiting 12 tablet 4    polyethylene glycol (MIRALAX) 17 GM/Dose powder Take 17 g (1 Capful) by mouth daily 510 g 1    rosuvastatin (CRESTOR) 20 MG tablet Take 1 tablet (20 mg) by mouth daily. 90 tablet 4    scopolamine (TRANSDERM) 1 MG/3DAYS 72 hr patch Place 1 patch onto the skin every 72 hours. 10 patch 3    SM STOOL SOFTENER 100 MG capsule TAKE ONE CAPSULE BY MOUTH ONCE DAILY AS NEEDED FOR CONSTIPATION 30 capsule 1    sulfaSALAzine ER (AZULFIDINE EN) 500 MG EC tablet Take 2 tablets (1,000 mg) by mouth 2 times daily 360 tablet 0    tofacitinib (XELJANZ XR) 11 MG 24 hr tablet Take 1 tablet (11 mg) by mouth daily Hold for signs of infection, then seek medical attention. 30 tablet 11    traMADol (ULTRAM) 50 MG tablet Take 1 tablet (50 mg) by mouth 2 times daily as needed for severe pain. FOR SEVERE PAIN 60 tablet 0    traZODone (DESYREL) 50 MG tablet Take 2 tablets (100 mg) by mouth nightly as needed for sleep. 180 tablet 4    triamcinolone (KENALOG) 0.1 % external cream Apply topically 2 times daily 30 g 1    VITAMIN D3 50 MCG (2000 UT) tablet TAKE ONE TABLET BY MOUTH ONCE DAILY 90 tablet 3     No current facility-administered medications for this visit.       SOCIAL HISTORY:  Social History     Tobacco Use    Smoking status: Never    Smokeless tobacco: Never   Substance Use Topics    Alcohol use: Not Currently       Family History   Problem Relation Age of Onset    Hypertension Father     Lipids  "Father     Hypertension Mother     Lipids Mother     Diabetes No family hx of     Coronary Artery Disease No family hx of     Hyperlipidemia No family hx of     Cerebrovascular Disease No family hx of     Breast Cancer No family hx of     Colon Cancer No family hx of     Prostate Cancer No family hx of     Other Cancer No family hx of     Depression No family hx of     Anxiety Disorder No family hx of     Mental Illness No family hx of     Substance Abuse No family hx of     Anesthesia Reaction No family hx of     Asthma No family hx of     Osteoporosis No family hx of     Genetic Disorder No family hx of     Thyroid Disease No family hx of     Obesity No family hx of     Unknown/Adopted No family hx of            Review of Systems  Constitutional, HEENT, cardiovascular, pulmonary, gi and gu systems are negative, except as otherwise noted.      Objective    /76 (BP Location: Right arm, Patient Position: Sitting, Cuff Size: Adult Regular)   Pulse 106   Temp 97.4  F (36.3  C) (Oral)   Resp 15   Ht 1.499 m (4' 11\")   Wt 54 kg (119 lb)   LMP 03/26/2019 (Within Days)   SpO2 100%   Breastfeeding No   BMI 24.04 kg/m    Body mass index is 24.04 kg/m .  Physical Exam   GENERAL: alert and no distress  EYES: Eyes grossly normal to inspection, PERRL and conjunctivae and sclerae normal  NECK: no adenopathy, no asymmetry, masses, or scars  RESP: lungs clear to auscultation - no rales, rhonchi or wheezes  CV: regular rate and rhythm, normal S1 S2, no S3 or S4, no murmur, click or rub, no peripheral edema  MS: normal muscle tone and DIP on left hand index finer with Heberdans' node a little tender   SKIN: no suspicious lesions or rashes  NEURO: Normal strength and tone, mentation intact and speech normal  PSYCH: mentation appears normal, affect normal/bright  LYMPH: no cervical, supraclavicular, axillary, or inguinal adenopathy    Office Visit on 01/03/2024   Component Date Value Ref Range Status    Color Urine " 01/03/2024 Yellow  Colorless, Straw, Light Yellow, Yellow Final    Appearance Urine 01/03/2024 Clear  Clear Final    Glucose Urine 01/03/2024 Negative  Negative mg/dL Final    Bilirubin Urine 01/03/2024 Negative  Negative Final    Ketones Urine 01/03/2024 Negative  Negative mg/dL Final    Specific Gravity Urine 01/03/2024 1.020  1.003 - 1.035 Final    Blood Urine 01/03/2024 Large (A)  Negative Final    pH Urine 01/03/2024 7.5 (H)  5.0 - 7.0 Final    Protein Albumin Urine 01/03/2024 30 (A)  Negative mg/dL Final    Urobilinogen Urine 01/03/2024 0.2  0.2, 1.0 E.U./dL Final    Nitrite Urine 01/03/2024 Negative  Negative Final    Leukocyte Esterase Urine 01/03/2024 Moderate (A)  Negative Final    Bacteria Urine 01/03/2024 Moderate (A)  None Seen /HPF Final    RBC Urine 01/03/2024 >100 (A)  0-2 /HPF /HPF Final    WBC Urine 01/03/2024 25-50 (A)  0-5 /HPF /HPF Final    Squamous Epithelials Urine 01/03/2024 Few (A)  None Seen /LPF Final    WBC Clumps Urine 01/03/2024 Present (A)  None Seen /HPF Final    Mucus Urine 01/03/2024 Present (A)  None Seen /LPF Final    Culture 01/03/2024 >100,000 CFU/mL Escherichia coli (A)   Final           Signed Electronically by: Terri Robles MD

## 2024-09-11 NOTE — LETTER
September 17, 2024      Zo T Titus  [address] [place] [address]        Dear ,    We are writing to inform you of your test results.    Cholesterol is much higher than it was last year. Please make sure you are taking the rosuvastatin (it appears Dr. Robles just refilled it).     Thank you,  Resulted Orders   Lipid panel reflex to direct LDL Fasting   Result Value Ref Range    Cholesterol 262 (H) <200 mg/dL    Triglycerides 131 <150 mg/dL    Direct Measure HDL 52 >=50 mg/dL    LDL Cholesterol Calculated 184 (H) <100 mg/dL    Non HDL Cholesterol 210 (H) <130 mg/dL    Patient Fasting > 8hrs? Unknown     Narrative    Cholesterol  Desirable: < 200 mg/dL  Borderline High: 200 - 239 mg/dL  High: >= 240 mg/dL    Triglycerides  Normal: < 150 mg/dL  Borderline High: 150 - 199 mg/dL  High: 200-499 mg/dL  Very High: >= 500 mg/dL    Direct Measure HDL  Female: >= 50 mg/dL   Male: >= 40 mg/dL    LDL Cholesterol  Desirable: < 100 mg/dL  Above Desirable: 100 - 129 mg/dL   Borderline High: 130 - 159 mg/dL   High:  160 - 189 mg/dL   Very High: >= 190 mg/dL    Non HDL Cholesterol  Desirable: < 130 mg/dL  Above Desirable: 130 - 159 mg/dL  Borderline High: 160 - 189 mg/dL  High: 190 - 219 mg/dL  Very High: >= 220 mg/dL       If you have any questions or concerns, please call the clinic at the number listed above.       Sincerely,      Hattie Perez PA-C (covering for Dr. Robles)

## 2024-09-11 NOTE — LETTER

## 2024-09-18 ENCOUNTER — VIRTUAL VISIT (OUTPATIENT)
Dept: RHEUMATOLOGY | Facility: CLINIC | Age: 49
End: 2024-09-18
Attending: INTERNAL MEDICINE
Payer: MEDICARE

## 2024-09-18 DIAGNOSIS — M45.8 ANKYLOSING SPONDYLITIS OF SACRAL REGION (H): Primary | ICD-10-CM

## 2024-09-18 DIAGNOSIS — E78.5 HYPERLIPIDEMIA: ICD-10-CM

## 2024-09-18 DIAGNOSIS — Z71.85 VACCINE COUNSELING: ICD-10-CM

## 2024-09-18 NOTE — Clinical Note
Forwarded LDL elevations to primary provider, hyperlipidemia is longstanding although Xeljanz could be contributing. She has been having monthly respiratory infections and has held the Xeljanz pretty frequently (4-5 days per month), thus her joint symptoms have worsened and is needing to utilize prn pain meds more frequently. Sees you in November, wanted to give you an update ahead of that visit. Thanks!

## 2024-09-18 NOTE — PROGRESS NOTES
Medication Therapy Management (MTM) Encounter    ASSESSMENT:                            Medication Adherence/Access: No issues identified.    Ankylosing spondylitis: Patient notes frequent respiratory infections on Xeljanz and is needing to hold the medication for 4-5 days each month. Subjective disease control was difficult to obtain today although she did report increased joint pain and utilization of as-needed pain medications meloxicam and tramadol. Consider that Xeljanz may be contributing to her frequent infections and if not working well to control symptoms may be pertinent to consider an alternative, pending provider assessment at November visit. Writer will update provider.    Vaccine counseling: Indicated for updated COVID booster and Shingrix per ACIP recommendations. No changes from last visit.    Hyperlipidemia: LDL is not at goal of < 130 mg/dL. Xeljanz could be contributing although hyperlipidemia is longstanding. Patient denies missing statin doses, fill history supports adherence. Consider increasing rosuvastatin dose from 20 to 40 mg if tolerated, or adding additional lipid-lowering therapy such as ezetimibe if patient is unwilling to increase statin.    PLAN:                            Will discuss options for better controlling cholesterol with primary care provider and update you accordingly. We may need to increase your rosuvastatin dose or add an additional medication.  Will also discuss the frequent infections you have been having on Xeljanz with your rheumatologist. It may be pertinent to consider an alternative medication, especially given ongoing joint symptoms.  Continue sulfasalazine 1000 mg twice daily.  Continue utilizing meloxicam, tramadol, and topicals as needed for pain.  Continue to consider the following vaccines: COVID booster and Shingrix (shingles). Shingrix is a 2-part vaccine and is usually covered when given at a pharmacy.    Follow-up: Pending provider response re: the  above. Next rheumatologist visit is 11/1/24.    SUBJECTIVE/OBJECTIVE:                          Zo Qureshi is a 49 year old female called for a follow up visit from 3/8/24. She was referred to me from Ray Cuba MD.    Reason for visit: Rheum meds 6 months.    Allergies/ADRs: Reviewed in chart  Past Medical History: Reviewed in chart  Tobacco: She reports that she has never smoked. She has never used smokeless tobacco.  Alcohol: not assessed today    Medication Adherence/Access: No issues identified.    Ankylosing spondylitis:   Xeljanz 11 mg daily  Sulfasalazine 1000 mg twice daily  Meloxicam 15 mg daily as needed  Tramadol 50 mg, 1-2 tabs daily as needed for severe pain  Diclofenac 1% gel as needed  Lidocaine 5% ointment as needed    Called for 6-month med check, at last visit patient had been feeling well on combination of Xeljanz and sulfasalazine. Today notes frequent respiratory infections, at least one per month lasting 4-5 days each. She has been holding the Xeljanz during these infections and does notice increased joint pain/higher tramadol and meloxicam utilization as a result. Weather changes also tend to be a pain trigger. She is unsure if she was having frequent infections prior to starting the Xeljanz. Still tolerating sulfasalazine at dose of 1000 mg twice daily, did not tolerate higher doses. Previously failed Humira, Enbrel, or Cosentyx due to severe flu-like symptoms and hives.    Component      Latest Ref Rng 9/11/2024  10:21 AM   WBC      4.0 - 11.0 10e3/uL 5.9    RBC Count      3.80 - 5.20 10e6/uL 4.10    Hemoglobin      11.7 - 15.7 g/dL 12.3    Hematocrit      35.0 - 47.0 % 37.3    MCV      78 - 100 fL 91    MCH      26.5 - 33.0 pg 30.0    MCHC      31.5 - 36.5 g/dL 33.0    RDW      10.0 - 15.0 % 12.6    Platelet Count      150 - 450 10e3/uL 225    % Neutrophils      % 60    % Lymphocytes      % 29    % Monocytes      % 9    % Eosinophils      % 3    % Basophils      % 0    % Immature  Granulocytes      % 0    Absolute Neutrophils      1.6 - 8.3 10e3/uL 3.5    Absolute Lymphocytes      0.8 - 5.3 10e3/uL 1.7    Absolute Monocytes      0.0 - 1.3 10e3/uL 0.5    Absolute Eosinophils      0.0 - 0.7 10e3/uL 0.2    Absolute Basophils      0.0 - 0.2 10e3/uL 0.0    Absolute Immature Granulocytes      <=0.4 10e3/uL 0.0    Sodium      135 - 145 mmol/L 139    Potassium      3.4 - 5.3 mmol/L 4.3    Carbon Dioxide (CO2)      22 - 29 mmol/L 27    Anion Gap      7 - 15 mmol/L 10    Urea Nitrogen      6.0 - 20.0 mg/dL 12.2    Creatinine      0.51 - 0.95 mg/dL 0.56    GFR Estimate      >60 mL/min/1.73m2 >90    Calcium      8.8 - 10.4 mg/dL 9.6    Chloride      98 - 107 mmol/L 102    Glucose      70 - 99 mg/dL 88    Alkaline Phosphatase      40 - 150 U/L 79    AST      0 - 45 U/L 20    ALT      0 - 50 U/L 12    Protein Total      6.4 - 8.3 g/dL 8.1    Albumin      3.5 - 5.2 g/dL 4.6    Bilirubin Total      <=1.2 mg/dL 0.4    Sed Rate      0 - 20 mm/hr 30 (H)    CRP Inflammation      <5.00 mg/L <3.00       Vaccine counseling: Patient is open to receiving recommended vaccines.    Immunization History   Administered Date(s) Administered    COVID-19 Monovalent 18+ (Moderna) 04/22/2021, 05/20/2021    COVID-19 Monovalent Booster 18+ (Moderna) 11/17/2021    Influenza (H1N1) 11/13/2009    Influenza (IIV3) PF 10/27/2010, 09/21/2015, 11/01/2016    Influenza Vaccine >6 months,quad, PF 10/18/2017, 10/08/2018, 10/07/2019, 09/03/2020, 09/15/2021, 11/10/2022, 09/25/2023    Influenza Vaccine, 6+MO IM (QUADRIVALENT W/PRESERVATIVES) 10/13/2016    Influenza, Split Virus, Trivalent, Pf (Fluzone\Fluarix) 09/11/2024    Mantoux Tuberculin Skin Test 11/28/2011, 04/17/2018    Pneumococcal 20 valent Conjugate (Prevnar 20) 01/18/2023    TDAP Vaccine (Adacel) 05/13/2009, 02/26/2020      Hyperlipidemia:   Rosuvastatin 20 mg daily    Patient is unsure if she has had muscle pain from increased statin dose in the past, but prefers not to increase  "as she is \"sensitive to medications.\" She would rather add something on to help reduce LDL. Denies missing doses. Exercise is difficult for her due to joint symptoms.    Component      Latest Ref Rng 3/1/2022  12:04 PM 2/3/2023  9:28 AM 5/18/2023  10:30 AM 9/11/2024  10:21 AM   Cholesterol      <200 mg/dL 266 (H)  281 (H)  154  262 (H)    Triglycerides      <150 mg/dL 141  89  98  131    HDL Cholesterol      >=50 mg/dL 51  52  46 (L)  52    LDL Cholesterol Calculated      <100 mg/dL 187 (H)  211 (H)  88 184 (H)    Non HDL Cholesterol      <130 mg/dL 215 (H)  229 (H)  108  210 (H)    Patient Fasting? Yes    Unknown       Today's Vitals: none taken today  ----------------    I spent 25 minutes with this patient today. All changes were made via collaborative practice agreement with Ray Cuba MD. A copy of the visit note was provided to the patient's provider(s).    A summary of these recommendations was sent via [employer].    Chelly Cedeno, PharmD  Medication Therapy Management Pharmacist  [facility]    Telemedicine Visit Details  Type of service:  Telephone visit  Start Time: 0900  End Time: 0925     Medication Therapy Recommendations  Ankylosing spondylitis of sacral region (H)    Current Medication: tofacitinib (XELJANZ XR) 11 MG 24 hr tablet   Rationale: Undesirable effect - Adverse medication event - Safety   Recommendation: Continue to Monitor - Xeljanz potentially contributing to frequent respiratory infections, providing insufficient response   Status: Contact Provider - Awaiting Response           "

## 2024-09-18 NOTE — Clinical Note
9/18/2024       RE: Zo Qureshi  [address] [place] [address]     Dear Colleague,    Thank you for referring your patient, Zo Qureshi, to the [facility]. Please see a copy of my visit note below.    Medication Therapy Management (MTM) Encounter    ASSESSMENT:                            Medication Adherence/Access: No issues identified.    Spondyloarthropathy: Patient notes frequent respiratory infections on Xeljanz and is needing to hold the medication for 4-5 days each month. Subjective disease control was difficult to obtain today although she did report increased joint pain and utilization of as-needed pain medications meloxicam and tramadol. Consider that Xeljanz may be contributing to her frequent infections and if not working well to control symptoms may be pertinent to consider an alternative, pending provider assessment at November visit. Writer will update provider.    Vaccine counseling: Indicated for updated COVID booster and Shingrix per ACIP recommendations. No changes from last visit.    Hyperlipidemia: LDL is not at goal of < 130 mg/dL. Consider Xeljanz could be contributing although hyperlipidemia is longstanding. Patient denies missing doses, although writer notes LDL was in goal 5/2023 following initial rosuvastatin ordering. Consider increasing rosuvastatin dose from 20 to 40 mg if tolerated, or adding additional lipid-lowering therapy such as ezetimibe if patient is unwilling to increase statin.    PLAN:                            Will discuss options for better controlling cholesterol with primary care provider and update you accordingly. We may need to increase your rosuvastatin dose or add an additional medication.  Will also discuss the frequent infections you have been having on Xeljanz with your rheumatologist. It may be pertinent to consider an alternative medication, especially  given ongoing joint symptoms.  Continue sulfasalazine 1000 mg twice daily.  Continue utilizing meloxicam, tramadol, and topicals as needed for pain.  Continue to consider the following vaccines: COVID booster and Shingrix (shingles). Shingrix is a 2-part vaccine and is usually covered when given at a pharmacy.    Follow-up: Pending provider response re: the above. Next rheumatologist visit is 11/1/24.    SUBJECTIVE/OBJECTIVE:                          Zo Qureshi is a 49 year old female called for a follow up visit from 3/8/24. She was referred to me from Ray Cuba MD.    Reason for visit: Rheum meds 6 months.    Allergies/ADRs: Reviewed in chart  Past Medical History: Reviewed in chart  Tobacco: She reports that she has never smoked. She has never used smokeless tobacco.  Alcohol: not assessed today    Medication Adherence/Access: No issues identified.    Spondyloarthropathy:   Xeljanz 11 mg daily  Sulfasalazine 1000 mg twice daily  Meloxicam 15 mg daily as needed  Tramadol 50 mg, 1-2 tabs daily as needed for severe pain  Diclofenac 1% gel as needed  Lidocaine 5% ointment as needed    Called for 6-month med check, at last visit patient had been feeling well on combination of Xeljanz and sulfasalazine. Today notes frequent respiratory infections, at least one per month lasting 4-5 days each. She has been holding the Xeljanz during these infections and does notice increased joint pain/higher tramadol and meloxicam utilization as a result. Weather changes also tend to be a pain trigger. She is unsure if she was having frequent infections prior to starting the Xeljanz. Still tolerating sulfasalazine at dose of 1000 mg twice daily, did not tolerate higher doses. Previously failed Humira, Enbrel, or Cosentyx due to severe flu-like symptoms and hives.    Component      Latest Ref Rng 9/11/2024  10:21 AM   WBC      4.0 - 11.0 10e3/uL 5.9    RBC Count      3.80 - 5.20 10e6/uL 4.10    Hemoglobin      11.7 - 15.7 g/dL 12.3     Hematocrit      35.0 - 47.0 % 37.3    MCV      78 - 100 fL 91    MCH      26.5 - 33.0 pg 30.0    MCHC      31.5 - 36.5 g/dL 33.0    RDW      10.0 - 15.0 % 12.6    Platelet Count      150 - 450 10e3/uL 225    % Neutrophils      % 60    % Lymphocytes      % 29    % Monocytes      % 9    % Eosinophils      % 3    % Basophils      % 0    % Immature Granulocytes      % 0    Absolute Neutrophils      1.6 - 8.3 10e3/uL 3.5    Absolute Lymphocytes      0.8 - 5.3 10e3/uL 1.7    Absolute Monocytes      0.0 - 1.3 10e3/uL 0.5    Absolute Eosinophils      0.0 - 0.7 10e3/uL 0.2    Absolute Basophils      0.0 - 0.2 10e3/uL 0.0    Absolute Immature Granulocytes      <=0.4 10e3/uL 0.0    Sodium      135 - 145 mmol/L 139    Potassium      3.4 - 5.3 mmol/L 4.3    Carbon Dioxide (CO2)      22 - 29 mmol/L 27    Anion Gap      7 - 15 mmol/L 10    Urea Nitrogen      6.0 - 20.0 mg/dL 12.2    Creatinine      0.51 - 0.95 mg/dL 0.56    GFR Estimate      >60 mL/min/1.73m2 >90    Calcium      8.8 - 10.4 mg/dL 9.6    Chloride      98 - 107 mmol/L 102    Glucose      70 - 99 mg/dL 88    Alkaline Phosphatase      40 - 150 U/L 79    AST      0 - 45 U/L 20    ALT      0 - 50 U/L 12    Protein Total      6.4 - 8.3 g/dL 8.1    Albumin      3.5 - 5.2 g/dL 4.6    Bilirubin Total      <=1.2 mg/dL 0.4    Sed Rate      0 - 20 mm/hr 30 (H)    CRP Inflammation      <5.00 mg/L <3.00       Vaccine counseling: Patient is open to receiving recommended vaccines.    Immunization History   Administered Date(s) Administered    COVID-19 Monovalent 18+ (Moderna) 04/22/2021, 05/20/2021    COVID-19 Monovalent Booster 18+ (Moderna) 11/17/2021    Influenza (H1N1) 11/13/2009    Influenza (IIV3) PF 10/27/2010, 09/21/2015, 11/01/2016    Influenza Vaccine >6 months,quad, PF 10/18/2017, 10/08/2018, 10/07/2019, 09/03/2020, 09/15/2021, 11/10/2022, 09/25/2023    Influenza Vaccine, 6+MO IM (QUADRIVALENT W/PRESERVATIVES) 10/13/2016    Influenza, Split Virus, Trivalent, Pf  "(Fluzone\Fluarix) 09/11/2024    Mantoux Tuberculin Skin Test 11/28/2011, 04/17/2018    Pneumococcal 20 valent Conjugate (Prevnar 20) 01/18/2023    TDAP Vaccine (Adacel) 05/13/2009, 02/26/2020      Hyperlipidemia:   Rosuvastatin 20 mg daily    Patient is unsure if she has had muscle pain from increased statin dose in the past, but prefers not to increase as she is \"sensitive to medications.\" She would rather add something on to help reduce LDL. Denies missing doses. Exercise is difficult for her due to joint symptoms.    Component      Latest Ref Rng 3/1/2022  12:04 PM 2/3/2023  9:28 AM 5/18/2023  10:30 AM 9/11/2024  10:21 AM   Cholesterol      <200 mg/dL 266 (H)  281 (H)  154  262 (H)    Triglycerides      <150 mg/dL 141  89  98  131    HDL Cholesterol      >=50 mg/dL 51  52  46 (L)  52    LDL Cholesterol Calculated      <100 mg/dL 187 (H)  211 (H)  88 184 (H)    Non HDL Cholesterol      <130 mg/dL 215 (H)  229 (H)  108  210 (H)    Patient Fasting? Yes    Unknown       Today's Vitals: none taken today  ----------------    I spent 25 minutes with this patient today. All changes were made via collaborative practice agreement with Ray Cuba MD. A copy of the visit note was provided to the patient's provider(s).    A summary of these recommendations was sent via [employer].    Chelly Cedeno, PharmD  Medication Therapy Management Pharmacist  [facility]    Telemedicine Visit Details  Type of service:  Telephone visit  Start Time: 0900  End Time: 0925     Medication Therapy Recommendations  No medication therapy recommendations to display    Medication Therapy Management (MTM) Encounter    ASSESSMENT:                            Medication Adherence/Access: No issues identified.    Ankylosing spondylitis: Patient notes frequent respiratory infections on Xeljanz and is needing to hold the medication for 4-5 days each month. Subjective disease control was difficult to obtain today although she did " report increased joint pain and utilization of as-needed pain medications meloxicam and tramadol. Consider that Xeljanz may be contributing to her frequent infections and if not working well to control symptoms may be pertinent to consider an alternative, pending provider assessment at November visit. Writer will update provider.    Vaccine counseling: Indicated for updated COVID booster and Shingrix per ACIP recommendations. No changes from last visit.    Hyperlipidemia: LDL is not at goal of < 130 mg/dL. Xeljanz could be contributing although hyperlipidemia is longstanding. Patient denies missing statin doses, fill history supports adherence. Consider increasing rosuvastatin dose from 20 to 40 mg if tolerated, or adding additional lipid-lowering therapy such as ezetimibe if patient is unwilling to increase statin.    PLAN:                            Will discuss options for better controlling cholesterol with primary care provider and update you accordingly. We may need to increase your rosuvastatin dose or add an additional medication.  Will also discuss the frequent infections you have been having on Xeljanz with your rheumatologist. It may be pertinent to consider an alternative medication, especially given ongoing joint symptoms.  Continue sulfasalazine 1000 mg twice daily.  Continue utilizing meloxicam, tramadol, and topicals as needed for pain.  Continue to consider the following vaccines: COVID booster and Shingrix (shingles). Shingrix is a 2-part vaccine and is usually covered when given at a pharmacy.    Follow-up: Pending provider response re: the above. Next rheumatologist visit is 11/1/24.    SUBJECTIVE/OBJECTIVE:                          Zo Qureshi is a 49 year old female called for a follow up visit from 3/8/24. She was referred to me from Ray Cuba MD.    Reason for visit: Rheum meds 6 months.    Allergies/ADRs: Reviewed in chart  Past Medical History: Reviewed in chart  Tobacco: She reports  that she has never smoked. She has never used smokeless tobacco.  Alcohol: not assessed today    Medication Adherence/Access: No issues identified.    Ankylosing spondylitis:   Xeljanz 11 mg daily  Sulfasalazine 1000 mg twice daily  Meloxicam 15 mg daily as needed  Tramadol 50 mg, 1-2 tabs daily as needed for severe pain  Diclofenac 1% gel as needed  Lidocaine 5% ointment as needed    Called for 6-month med check, at last visit patient had been feeling well on combination of Xeljanz and sulfasalazine. Today notes frequent respiratory infections, at least one per month lasting 4-5 days each. She has been holding the Xeljanz during these infections and does notice increased joint pain/higher tramadol and meloxicam utilization as a result. Weather changes also tend to be a pain trigger. She is unsure if she was having frequent infections prior to starting the Xeljanz. Still tolerating sulfasalazine at dose of 1000 mg twice daily, did not tolerate higher doses. Previously failed Humira, Enbrel, or Cosentyx due to severe flu-like symptoms and hives.    Component      Latest Ref Rng 9/11/2024  10:21 AM   WBC      4.0 - 11.0 10e3/uL 5.9    RBC Count      3.80 - 5.20 10e6/uL 4.10    Hemoglobin      11.7 - 15.7 g/dL 12.3    Hematocrit      35.0 - 47.0 % 37.3    MCV      78 - 100 fL 91    MCH      26.5 - 33.0 pg 30.0    MCHC      31.5 - 36.5 g/dL 33.0    RDW      10.0 - 15.0 % 12.6    Platelet Count      150 - 450 10e3/uL 225    % Neutrophils      % 60    % Lymphocytes      % 29    % Monocytes      % 9    % Eosinophils      % 3    % Basophils      % 0    % Immature Granulocytes      % 0    Absolute Neutrophils      1.6 - 8.3 10e3/uL 3.5    Absolute Lymphocytes      0.8 - 5.3 10e3/uL 1.7    Absolute Monocytes      0.0 - 1.3 10e3/uL 0.5    Absolute Eosinophils      0.0 - 0.7 10e3/uL 0.2    Absolute Basophils      0.0 - 0.2 10e3/uL 0.0    Absolute Immature Granulocytes      <=0.4 10e3/uL 0.0    Sodium      135 - 145 mmol/L 139   "  Potassium      3.4 - 5.3 mmol/L 4.3    Carbon Dioxide (CO2)      22 - 29 mmol/L 27    Anion Gap      7 - 15 mmol/L 10    Urea Nitrogen      6.0 - 20.0 mg/dL 12.2    Creatinine      0.51 - 0.95 mg/dL 0.56    GFR Estimate      >60 mL/min/1.73m2 >90    Calcium      8.8 - 10.4 mg/dL 9.6    Chloride      98 - 107 mmol/L 102    Glucose      70 - 99 mg/dL 88    Alkaline Phosphatase      40 - 150 U/L 79    AST      0 - 45 U/L 20    ALT      0 - 50 U/L 12    Protein Total      6.4 - 8.3 g/dL 8.1    Albumin      3.5 - 5.2 g/dL 4.6    Bilirubin Total      <=1.2 mg/dL 0.4    Sed Rate      0 - 20 mm/hr 30 (H)    CRP Inflammation      <5.00 mg/L <3.00       Vaccine counseling: Patient is open to receiving recommended vaccines.    Immunization History   Administered Date(s) Administered     COVID-19 Monovalent 18+ (Moderna) 04/22/2021, 05/20/2021     COVID-19 Monovalent Booster 18+ (Moderna) 11/17/2021     Influenza (H1N1) 11/13/2009     Influenza (IIV3) PF 10/27/2010, 09/21/2015, 11/01/2016     Influenza Vaccine >6 months,quad, PF 10/18/2017, 10/08/2018, 10/07/2019, 09/03/2020, 09/15/2021, 11/10/2022, 09/25/2023     Influenza Vaccine, 6+MO IM (QUADRIVALENT W/PRESERVATIVES) 10/13/2016     Influenza, Split Virus, Trivalent, Pf (Fluzone\Fluarix) 09/11/2024     Mantoux Tuberculin Skin Test 11/28/2011, 04/17/2018     Pneumococcal 20 valent Conjugate (Prevnar 20) 01/18/2023     TDAP Vaccine (Adacel) 05/13/2009, 02/26/2020      Hyperlipidemia:   Rosuvastatin 20 mg daily    Patient is unsure if she has had muscle pain from increased statin dose in the past, but prefers not to increase as she is \"sensitive to medications.\" She would rather add something on to help reduce LDL. Denies missing doses. Exercise is difficult for her due to joint symptoms.    Component      Latest Ref Rng 3/1/2022  12:04 PM 2/3/2023  9:28 AM 5/18/2023  10:30 AM 9/11/2024  10:21 AM   Cholesterol      <200 mg/dL 266 (H)  281 (H)  154  262 (H)    Triglycerides    "   <150 mg/dL 141  89  98  131    HDL Cholesterol      >=50 mg/dL 51  52  46 (L)  52    LDL Cholesterol Calculated      <100 mg/dL 187 (H)  211 (H)  88 184 (H)    Non HDL Cholesterol      <130 mg/dL 215 (H)  229 (H)  108  210 (H)    Patient Fasting? Yes    Unknown       Today's Vitals: none taken today  ----------------    I spent 25 minutes with this patient today. All changes were made via collaborative practice agreement with Ray Cuba MD. A copy of the visit note was provided to the patient's provider(s).    A summary of these recommendations was sent via [employer].    Chelly Cedeno, PharmD  Medication Therapy Management Pharmacist  [facility]    Telemedicine Visit Details  Type of service:  Telephone visit  Start Time: 0900  End Time: 0925     Medication Therapy Recommendations  Ankylosing spondylitis of sacral region (H)    Current Medication: tofacitinib (XELJANZ XR) 11 MG 24 hr tablet   Rationale: Undesirable effect - Adverse medication event - Safety   Recommendation: Continue to Monitor - Xeljanz potentially contributing to frequent respiratory infections, providing insufficient response   Status: Contact Provider - Awaiting Response               Again, thank you for allowing me to participate in the care of your patient.      Sincerely,    Chelly Cedeno, [facility]

## 2024-09-18 NOTE — Clinical Note
Hi Dr. Robles, I see Zo for medication therapy management in rheumatology and noted recent LDL elevation. Xeljanz could potentially be contributing although looks like elevations are longstanding, wondered if you wanted to increase statin or possibly add Zetia? Patient is adherent per her report and fill history supports this. She did seem hesitant about increasing the statin although denies muscle pain (does have ongoing joint pian likely lupus-related). Let me know and I can help coordinate, thanks!

## 2024-09-19 NOTE — PATIENT INSTRUCTIONS
"Recommendations from today's MTM visit:                                                      Will discuss options for better controlling cholesterol with primary care provider and update you accordingly. We may need to increase your rosuvastatin dose or add an additional medication.  Will also discuss the frequent infections you have been having on Xeljanz with your rheumatologist. It may be pertinent to consider an alternative medication, especially given ongoing joint symptoms.  Continue sulfasalazine 1000 mg twice daily.  Continue utilizing meloxicam, tramadol, and topicals as needed for pain.  Continue to consider the following vaccines: COVID booster and Shingrix (shingles). Shingrix is a 2-part vaccine and is usually covered when given at a pharmacy.    Follow-up: Pending provider response re: the above. Next rheumatologist visit is 11/1/24.    It was great speaking with you today.  I value your experience and would be very thankful for your time in providing feedback in our clinic survey. In the next few days, you may receive an email or text message from [employer] with a link to a survey related to your  clinical pharmacist.\"     To schedule another MTM appointment, please call the clinic directly or you may call the MTM scheduling line at [phone] or toll-free at [phone].     My Clinical Pharmacist's contact information:                                                      Please feel free to contact me with any questions or concerns you have.      Chelly Cedeno, PharmD  Medication Therapy Management Pharmacist  [facility]    "

## 2024-09-20 ENCOUNTER — MYC MEDICAL ADVICE (OUTPATIENT)
Dept: RHEUMATOLOGY | Facility: CLINIC | Age: 49
End: 2024-09-20
Payer: MEDICARE

## 2024-11-01 ENCOUNTER — VIRTUAL VISIT (OUTPATIENT)
Dept: RHEUMATOLOGY | Facility: CLINIC | Age: 49
End: 2024-11-01
Attending: INTERNAL MEDICINE
Payer: MEDICARE

## 2024-11-01 VITALS — WEIGHT: 118 LBS | HEIGHT: 59 IN | BODY MASS INDEX: 23.79 KG/M2

## 2024-11-01 DIAGNOSIS — M45.8 ANKYLOSING SPONDYLITIS OF SACRAL REGION (H): ICD-10-CM

## 2024-11-01 DIAGNOSIS — M79.645 THUMB PAIN, LEFT: Primary | ICD-10-CM

## 2024-11-01 DIAGNOSIS — E78.5 HYPERLIPIDEMIA LDL GOAL <130: ICD-10-CM

## 2024-11-01 DIAGNOSIS — M25.562 CHRONIC PAIN OF LEFT KNEE: ICD-10-CM

## 2024-11-01 DIAGNOSIS — G89.29 CHRONIC PAIN OF LEFT KNEE: ICD-10-CM

## 2024-11-01 DIAGNOSIS — M47.819 SERONEGATIVE SPONDYLOARTHROPATHY: ICD-10-CM

## 2024-11-01 PROCEDURE — 99214 OFFICE O/P EST MOD 30 MIN: CPT | Mod: 95 | Performed by: INTERNAL MEDICINE

## 2024-11-01 PROCEDURE — G2211 COMPLEX E/M VISIT ADD ON: HCPCS | Performed by: INTERNAL MEDICINE

## 2024-11-01 RX ORDER — MELOXICAM 15 MG/1
15 TABLET ORAL DAILY
Qty: 180 TABLET | Refills: 1 | Status: SHIPPED | OUTPATIENT
Start: 2024-11-01

## 2024-11-01 RX ORDER — SULFASALAZINE 500 MG/1
1000 TABLET, DELAYED RELEASE ORAL 2 TIMES DAILY
Qty: 360 TABLET | Refills: 2 | Status: SHIPPED | OUTPATIENT
Start: 2024-11-01

## 2024-11-01 RX ORDER — ROSUVASTATIN CALCIUM 20 MG/1
40 TABLET, COATED ORAL DAILY
Qty: 90 TABLET | Refills: 6 | Status: SHIPPED | OUTPATIENT
Start: 2024-11-01

## 2024-11-01 ASSESSMENT — PAIN SCALES - GENERAL: PAINLEVEL_OUTOF10: MODERATE PAIN (5)

## 2024-11-01 NOTE — LETTER
11/1/2024       RE: Zo Qureshi  [address] [place] [address]     Dear Colleague,    Thank you for referring your patient, Zo Qureshi, to the [facility]HEUM[facility] [place] at [facility]. Please see a copy of my visit note below.       Rheumatology Clinic Visit  Ray Cuba M.D.     Zo Qureshi MRN# [id number]   YOB: 1975 Age: 49 year old     Date of Visit: 11/01/2024  Primary care provider: Terri Robles        Assessment & Plan:   # HLA-B27 negative seronegative spondyloarthropathy:   # Viral syndrome 3-2024    She has R shoulder pain with some stiffness in the neck.  Video exam: painful movement of R thumb, 2nd, 3rd digits, wearing a R volar wrist splint. +brown red macules R leg, no skin breakdown    Data: On September 11, 2024, comprehensive metabolic panel was normal; CRP less than 3; CBC normal; sedimentation rate elevated at 30;    Discusssion: Inflammatory joint pain from spondyloarthropathy remains much improved compared to prior to starting combination immunomodulatory therapy.  Left knee and left thumb pain are likely due to osteoarthritis, not inflammatory disease.  I recommend continuing combination tofacitinib and sulfasalazine, with use of as needed meloxicam for residual joint pain.  I think that cervical and right shoulder pain is likely muscular in origin, as x-ray does not show change in bony anatomy of the cervical spine.  I recommend plain x-rays of left hand and left knee.    # High risk medication use: Xeljanz, SSZ  Data: 9/25/23 to include CBC, AST, ALT, creatinine, ESR, CRP. All within normal limits except her ESR was 35. No evidence of acute drug toxicity identified on these labs. Will continue to monitor routine screening drug toxicity labs every 6 months while on this therapy. She is due for routine screening drug toxicity monitoring labs again in March of 2024.   -Risks and benefits of  these therapies to include infection, thrombosis, bowel perforation, bone marrow suppression, GI/hepatotoxicity, malignancy, increased all cause mortality among others discussed again today. She is agreeable to continue given potential benefit and lack of significant side effects to date.    Cholesterol 262 noted on September 2024 likely reflects Xeljanz induced hyperlipidemia.  I recommend increasing rosuvastatin dose to achieve better lipid lowering effect.    We discussed:  Diagnosis:  1.  Spondyloarthropathy: Symptoms overall well-controlled.  Plan continue current medication after resolution of viral infection.  2.  Viral infection causing rash, muscle aches, cough: Remain off Xeljanz until 48 hours after resolution of symptoms.  3.  Neck and right shoulder pain: X-ray does not show bony change; cause of pain is likely muscular. Continue range of motion exercises    4.  Left knee pain  5.  Left thumb pain    Plan:    - continue Xeljanz 11 mg once daily  - continue sulfasalazine 1 g twice daily  - continue meloxicam 15 mg once daily as needed.  Counseled not to take any other NSAIDs in 24 hours after each meloxicam dose.  Risk and benefits discussed including renal cardiac GI other.  - X-rays of left hand and left knee  - Increase rosuvastatin to 40 mg (2 tablets) once daily.    RTC 7  mos    No orders of the defined types were placed in this encounter.      Ray Cuba MD  Staff Rheumatologist, [facility]    On the day of the encounter, a total of 32 minutes was spent in chart review, and in counseling and coordination of care, regarding the patient's complex medical problem of spondyloarthropathy, shoulder pain, viral syndrome.    The longitudinal plan of care for the diagnosis(es)/condition(s) as documented were addressed during this visit. Due to the added complexity in care, I will continue to support Zo in the subsequent management and with ongoing continuity of care.         Subjective:     HPI:  Patient presents as a transfer of care from Dr. Hernandez.  She had previously seen me and Dr. Garay for spondyloarthropathy.  At the last visit in March 2024, daily joint pain was continuing.  Recommendation was to continue Xeljanz 11 mg daily, sulfasalazine and increased dose of 1.5 mg twice daily, and continue meloxicam once daily.    Background: SpA, previously evaluated by [place] and Tanisha of [facility] who established care with me on 5/27/21. Prior to that time she had last been seen by Dr. Garay of 5/24/2019. Most recent imaging of SI joints was on 2/28/2020 which showed sclerosis with marginal spurring about the SI joints, likely secondary to her chronic inflammatory disease, though no active inflammatory changes at that time. Had been tried on both humira and enbrel, though did not tolerate these due to side effects so discontinued prior to determining if efficacious. At the time of her last visit with rheumatology it was documented that she was responsive to NSAIDs, prednisone, and SSZ. The dose of her SSZ was increased to 1gm BID at the time of her initial visit with me given her active peripheral arthritis and enthesitis. Despite naproxen 500mg BID, she had ongoing axial stiffness which was AM predominant and improved with activity and stretching, so risks and benefits of cosentyx discussed and patient started on this IL-17 inhibitor. She interestingly developed the same side effects of severe flu like symptoms after each dose of her biologic therapy- similar to those she was experiencing when on humira and enbrel, with rhinorrhea, muscle aches/pains, severe fatigue, and also hives, low grade fever which come on after her injection and slowly over many weeks will resolve. Had been worse with each subsequent injection of cosentyx.  Cosentyx was discontinued and xeljanz 11mg once daily started early January 2022. She self discontinued this for a short period however it was  "restarted in April of 2023. She continues on xeljanz 11mg once daily SSZ 1gm BID and mobic 15mg once daily.    Interval history November 1, 2024    She is better after stopping xeljanz last week for respiratory illness, fever. She was off xeljanz for 5 days.  While off tofa, she had pain in the back of the neck. Allso headaches radiating into shoulders. Neck pain now less severe.     Before recent URI, she had noted bilateral knee pain. L > R. Pain is worse with walking, movement at night.  Also noted tender L thumb mass. Has trouble with grasping/holding.     She notes neck and L shoulder pain daily, lasting 1 hour.  Posterior neck pain shoots into her head and down to shoulder    Xeljanz once daily  Using sulfasalazine 4 tabs daily.  Using meloxicam daily.    Interval history March 1, 2024    She reports \"sick\" for 4 days with body aches, chills, HA, +cough, non-productive. +tactile fever. No contacts sick.  COVID19 negative.    She held xeljanz for the last 4 days. Symptoms are improving.  Before falling ill, she had been doing well. She notes persistent low  level pain in her shoulders, back, neck. Pain radiates into buttock.   She is doing PTx for her lower back; she feels more pain with the exercises  Her R shoulder and neck are painful.  She thinks that DMARD help with pain  She    She endorses hx of medication use (Xeljanz, sulfasalazine, meloxicam). She reports frequent \"flu\" symptoms recurrent while on DMARDs. She has better tolerability of meds than with anti-TNF.    November 24, 2023 Dr. Rowell  -at the time of her last appointment she was continued on xeljanz, SSZ, mobic. Had only been back on systemic immunosuppression for about 8 weeks at that point but was noticing improvement already  -we obtained bilateral hand and wrist xrays to assess for inflammatory sequelae, no erosions or acute osseous abnormalities were identified.   -most recent labs reviewed from 9/25/23 to include CBC, AST, ALT, " creatinine, ESR, CRP. All within normal limits except her ESR was 35. No evidence of acute drug toxicity identified on these labs. Will continue to monitor routine screening drug toxicity labs every 6 months while on this therapy. Standing orders are in place. She is due for routine screening drug toxicity monitoring labs again in March of 2024.   -The patient, last seen in June, presented with ongoing symptoms. They reported experiencing cold-like symptoms intermittently, including body aches, runny nose, and chills. These symptoms have been occurring with a frequency of about one to two times per month since their last visit. The patient mentioned that during these episodes, when they discontinue their rheumatologic medication, specifically Xeljanz, they experience significant discomfort and pain. However, when on medication, including sulfasalazine, xeljanz, and Mobic (meloxicam), their symptoms are notably less severe. The patient continues to take Mobic and sulfasalazine during these episodes but stops Xeljanz.    In addition to these symptoms, the patient also reported experiencing dizziness, headaches, and joint pain, particularly in the fingers. They described the pain as being primarily in the joints and worsening with movement or upon impact. They noted that the pain initially affected the thumb and middle finger but has now progressed to include the index finger and ring finger, sparing the pinky. The patient has been using braces to manage the pain, which has been somewhat effective.    The patient's most recent imaging studies, including x-rays of the hands and wrists, showed no signs of erosions, fractures, or degenerative changes. However, they have been experiencing neck pain, which radiates to the back of the skull and is exacerbated by certain movements, leading to headaches. This neck pain has been a concern for some time, and their primary care doctor had previously recommended an MRI, which was  not yet conducted. The patient's history of lumbar spine issues was also noted, with the last MRI performed approximately three to four years ago.    In summary, the patient's primary issues include recurrent cold-like symptoms affecting their rheumatologic treatment adherence, ongoing joint pain in the fingers, and significant neck pain leading to headaches, all of which impact their overall quality of life and require ongoing management and further evaluation.    14 point review of systems collected and negative if not documented above.    June 30, 2023   -at the time of her last visit, we restarted both xeljanz and SSZ. Mobic restarted at that time as well.   -Has a cold, runny nose, raspy voice. Has night sweats/chills. No fever. Has been going on for 3-4 days  -started xeljanz and SSZ, feels some less pain. Mobic as well. Started these in May, 1st or 2nd. Has been 7-8 weeks now that she has been on both medications  -If she misses any doses then pain returns.   -Rates her pain 5/10. Involves shoulder/neck/hands.   -Wakes up and stretches for 10 minutes then her stiffness is improved for the day  -Symptoms on the right hand are worse than the left hand. She has been wearing a brace on the left hand that was special molded for her by ortho/PT she reports  -no new rashes  -outside of above possible URI, no new or progressive fevers/chills/night sweats. No other interval infections.   -no unintentional weight loss  -Has intermittent swelling of PIPs/DIPs.   -In her hands has most pain in her CMCs and wrists  She is tolerating xeljanz, mobic, SSZ without any noticeable side effects, GI/cutaneous/other.  14 point ROS collected and negative if not documented above      April 14, 2023  -Up until January 2023, had continued on xeljanz 11mg once daily, SSZ 1gm BID. But then ran out of both medications and did not follow-up/request refill and so stopped.   -While on both therapies, she noted that if she took her doses  "late, would experience worse pain/stiffness.   -Currently reporting pain stiffness in almost all joint in her body, both axial and peripheral. In shoulder/neck. Has pain/stiffness in hands. 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 Stretches. Pain in the shoulder, right side also but left more than right.   Has tried massage which is temporarily helpful for pain, but immediately returns. No redness, Has swelling of her wrist on right more than left.   - Still with some HA/dizziness. Still gets warm, though not fever. Intermittent coughing/runny nose. Has nasal spray.  -has some red patches of that come and go, even now not on medication. No different on medication. Not medication side effects  -Middle to low back also much more severe pain and stiffness which is worse in the AM and improves with use. Has radiating pain down her left leg.   -Has ongoing RLS.   -no unintentional weight loss.   -Chronic mild hair thinning over the last 2-3 years    Interval history 3/4/22  Has not experienced any side effects from xeljanz. Has had ongoing HA/dizziness, which was there prior to starting xeljanz. Has intermittent chills/ feeling warm, though no temp. Does not happen everyday, happens 1-2 times per week. Also warm at night, though not wet on clothes. Her pain is less. Rates her pain now around 6/10. No redness/warmth/swelling. Though last week had right wrist pain with minimal movement. Lasted for 2-3 days. Used topical lidocaine which resolved symptoms. When painful, it was somewhat \"puffy\".  At this time her back is still stiff, AM predominant. Back and neck. She sits when she wakes up and stretches. Then one hour later she has improvement and continues her day. Has rash on anterior shin right LE, saw PCP who gave her topical steroid which she has not started. Still with intermittent blurred vision/double vision. Unchanged. No more severe/frequent. Rare sores in her mouth. No interval infections. ROS otherwise negative.       Interval " history  12/17/21  Reports feeling of fever/body aches/runny nose. Also with rash. 3-4 days after her first injection she noticed that this started, though took her next dose and the above symptoms were more severe. Then was off for about 1 month due to these severe side effects. She decided to re-try in October and November, though has again had return of above. Has been about 1 month since her last injection and reports side effects are continuing to slowly improve, though still with some fatigue/body aches/ HA. Rash and fever resolved. She finds it hard to comment on axial pain/stiffness/peripiheral joint pain/swelling in setting of severe fatigue/flu like symptoms. Her ROS is otherwise negative.     HPI from initial consultation 5/2021  Had flu like symptoms with flu like symptoms. Resolves when stopped. Prednisone, NSAIDs, SSZ used/helpful. Prednisone had nausea and consipation. SSZ has drowsiness/ dizziness/ mental fog worse in the AM. Has continued on SSZ 1gm in the AM and 1gm in the PM. Still with significant drowsiness. If she doesn't want to have drowsiness, reduces the dose, has pain in neck, shoulder, back. Has some swelling of joints. Has tenderness of many joints. Takes naproxen and gabapentin. She takes naproxen 500mg BID. In the AM has stiffness in her neck/back. Gets some better with stretching/ exercise. Takes 15 minutes to get better. No rash. Does have swelling of her right hand 2nd -4th digits. Pain in her PIPs predominantly. Pain also in between the joints. Has recently had some blurred vision, wears glasses. No inflammatory eye disease history. Intermittent double vision. Has had hair loss, non scarring more that is thinning. More over the last 3-4 years. Intermittent sore in mouth, painful. Has dry eyes, uses eye drops 3-4 times per week. When she wakes up her throat is dry. Drinks frequently. No abdominal pain. No blood in her stool. No n/v. No raynauds. No sclerodactyly. No fevers, weight  loss. Occasional weight loss and chills. Has been having ongoing throbbing HA on the left side. The naproxen is some helpful, though returns shortly afterwards. No miscarriages or blood clots.     Past Medical History  Past Medical History:   Diagnosis Date    Anemia     History of blood transfusion 2000    after vaginal delivery, 2 units PRBCs given    HSIL on Pap smear 09/21/2011    MARTA 2 and 3    Immune to hepatitis B 08/2021    hep B antigen NEG    INFLAM SPONDYLOPATHY NOS 01/07/2008    check labs on sulfasalzine every 3 months and check hep B and C and TB every 2 years    Leukocytopenia 03/10/2014    Tendinosis     Thrombocytopenia (H) 03/10/2014    Unspecified closed fracture of pelvis 2000    left fracture of pelvis after delivery     Past Surgical History  Past Surgical History:   Procedure Laterality Date    COLONOSCOPY Left 11/12/2021    recheck in 10 years    DILATION AND CURETTAGE, ABLATE ENDOMETRIUM NOVASURE, COMBINED N/A 12/04/2018    Procedure: novasure endometrial ablation, myosure resection of leiomyoma, dilation and curettage;  Surgeon: Rolando Covarrubias MD;  Location: [facility] OR    GYN SURGERY  12/04/2018    fibroid removal    lap hysterectomy and salpingetcomy Bilateral 2019    done at [facility] - ovaries are in    LEEP TX, CERVICAL  12/19/2011    MARTA II    OPERATIVE HYSTEROSCOPY WITH MORCELLATOR N/A 12/04/2018    ovaries are still in.   Surgeon: Rolando Covarrubias MD;  Location: [facility] OR    TUBAL LIGATION  05/2009    laparoscopic tubal ligation    ZZC NONSPECIFIC PROCEDURE  10/2000    after delivery 2 units of prbcs secondary to placenta     Medications  Current Outpatient Medications   Medication Sig Dispense Refill    amLODIPine (NORVASC) 2.5 MG tablet Take 1 tablet (2.5 mg) by mouth daily 90 tablet 3    aspirin-acetaminophen-caffeine (EXCEDRIN MIGRAINE) 250-250-65 MG tablet Take 1 tablet by mouth every 6 hours as needed for headaches      cyclobenzaprine (FLEXERIL) 5 MG tablet Take 1 tablet (5 mg)  by mouth 3 times daily as needed for muscle spasms. 90 tablet 5    diclofenac (VOLTAREN) 1 % topical gel Apply to affected area twice daily 150 g 3    DULoxetine (CYMBALTA) 30 MG capsule Take 2 capsules (60 mg) by mouth daily 180 capsule 3    Evening Primrose Oil 500 MG CAPS Take 500 mg by mouth 2 times daily 60 capsule 11    FEROSUL 325 (65 Fe) MG tablet TAKE ONE TABLET BY MOUTH ONCE DAILY WITH BREAKFAST 90 tablet 3    fluticasone (FLONASE) 50 MCG/ACT nasal spray Spray 1-2 sprays into both nostrils daily 16 g 3    gabapentin (NEURONTIN) 300 MG capsule 1 tab in am and 1 at 5 pm  and 2 at bedtime for (4 per day)1 tab in am and 1 at 5 pm  and 2 at bedtime for (4 per day) 360 capsule 3    ketotifen fumarate 0.035% 0.035 % SOLN ophthalmic solution Place 1 drop into both eyes 2 times daily. 10 mL 2    ketotifen fumarate 0.035%, ketotifen 0.025%, (ZADITOR) 0.025 % ophthalmic solution Place 1 drop into both eyes 2 times daily 10 mL 3    lidocaine (XYLOCAINE) 5 % external ointment Apply topically 3 times daily as needed for moderate pain (4-6) 150 g 3    loperamide (IMODIUM A-D) 2 MG tablet Take 1 tablet (2 mg) by mouth 4 times daily as needed for diarrhea 30 tablet 1    loratadine-pseudoePHEDrine (CLARITIN-D 24-HOUR)  MG 24 hr tablet Take 1 tablet by mouth daily 30 tablet 1    meclizine (ANTIVERT) 25 MG tablet Take 1 tablet (25 mg) by mouth 3 times daily as needed for dizziness 30 tablet 1    meloxicam (MOBIC) 15 MG tablet Take 1 tablet (15 mg) by mouth daily. 180 tablet 1    naloxone (NARCAN) 4 MG/0.1ML nasal spray Spray 4 mg into one nostril alternating nostrils once as needed.      olopatadine (PATADAY) 0.2 % ophthalmic solution Place 0.05 mLs (1 drop) into both eyes daily 2.5 mL 3    ondansetron (ZOFRAN) 4 MG tablet Take 1 tablet (4 mg) by mouth every 8 hours as needed for nausea or vomiting 12 tablet 4    polyethylene glycol (MIRALAX) 17 GM/Dose powder Take 17 g (1 Capful) by mouth daily 510 g 1    rosuvastatin  "(CRESTOR) 20 MG tablet Take 1 tablet (20 mg) by mouth daily. 90 tablet 4    scopolamine (TRANSDERM) 1 MG/3DAYS 72 hr patch Place 1 patch onto the skin every 72 hours. 10 patch 3    SM STOOL SOFTENER 100 MG capsule TAKE ONE CAPSULE BY MOUTH ONCE DAILY AS NEEDED FOR CONSTIPATION 30 capsule 1    sulfaSALAzine ER (AZULFIDINE EN) 500 MG EC tablet Take 2 tablets (1,000 mg) by mouth 2 times daily 360 tablet 0    tofacitinib (XELJANZ XR) 11 MG 24 hr tablet Take 1 tablet (11 mg) by mouth daily Hold for signs of infection, then seek medical attention. 30 tablet 11    traMADol (ULTRAM) 50 MG tablet Take 1 tablet (50 mg) by mouth 2 times daily as needed for severe pain. FOR SEVERE PAIN 60 tablet 0    traZODone (DESYREL) 50 MG tablet Take 2 tablets (100 mg) by mouth nightly as needed for sleep. 180 tablet 4    triamcinolone (KENALOG) 0.1 % external cream Apply topically 2 times daily 30 g 1    VITAMIN D3 50 MCG (2000 UT) tablet TAKE ONE TABLET BY MOUTH ONCE DAILY 90 tablet 3     No current facility-administered medications for this visit.     Allergies   Allergies   Allergen Reactions    Contrast Dye Nausea and Vomiting    Diatrizoate Nausea and Vomiting    Morphine And Codeine Itching and Nausea and Vomiting    Percocet [Oxycodone-Acetaminophen] Nausea and Vomiting and Itching    Advil [Ibuprofen Micronized] Rash     Rash     Ibuprofen Rash     Family History: Mother with \"arthritis\"    Social History: Not currently working. 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 Repetitive motion. No smoking/drugs/ETOH. Not . 4 kids who are healthy.       Objective:    Physical exam:  No vitals    Sclera appear clear  No facial rash  Breathing comfortably on room air, no cough, no audible wheeze, no use of accessory muscles  Wearing wrist splint/brace on the right wrist. No red edema/erythema of the MCPs/PIPs bilaterally.   Left knee without visible effusion redness; preserved range of motion on camera, left thumb with preserved " flexion of the thumb IP.    Labs:  WBC   Date Value Ref Range Status   02/15/2021 6.5 4.0 - 11.0 10e9/L Final     WBC Count   Date Value Ref Range Status   09/11/2024 5.9 4.0 - 11.0 10e3/uL Final     Hemoglobin   Date Value Ref Range Status   09/11/2024 12.3 11.7 - 15.7 g/dL Final   02/15/2021 12.2 11.7 - 15.7 g/dL Final     Platelet Count   Date Value Ref Range Status   09/11/2024 225 150 - 450 10e3/uL Final   02/15/2021 219 150 - 450 10e9/L Final     Creatinine   Date Value Ref Range Status   09/11/2024 0.56 0.51 - 0.95 mg/dL Final   02/15/2021 0.54 0.52 - 1.04 mg/dL Final     Lab Results   Component Value Date    ALKPHOS 60 03/01/2022    ALKPHOS 55 02/15/2021     AST   Date Value Ref Range Status   09/11/2024 20 0 - 45 U/L Final   02/15/2021 14 0 - 45 U/L Final     Lab Results   Component Value Date    ALT 21 03/01/2022    ALT 17 02/15/2021     Sed Rate   Date Value Ref Range Status   02/15/2021 20 0 - 20 mm/h Final     Erythrocyte Sedimentation Rate   Date Value Ref Range Status   09/11/2024 30 (H) 0 - 20 mm/hr Final     CRP Inflammation   Date Value Ref Range Status   08/16/2021 <2.9 0.0 - 8.0 mg/L Final   02/26/2020 <2.9 0.0 - 8.0 mg/L Final     UA RESULTS:  Recent Labs   Lab Test 05/18/21  0858   COLOR Yellow   APPEARANCE Clear   URINEGLC Negative   URINEBILI Negative   URINEKETONE Negative   SG 1.015   UBLD Large*   URINEPH 6.5   PROTEIN Negative   UROBILINOGEN 0.2   NITRITE Negative   LEUKEST Moderate*   RBCU 10-25*   WBCU *      RAMÍREZ Screen by EIA   Date Value Ref Range Status   05/21/2014 <1.0  Interpretation:  Negative   <1.0 Final     Rheumatoid Factor   Date Value Ref Range Status   05/21/2014 <20 <20 IU/mL Final     Cyclic Citrullinated Peptide Antibody, IgG   Date Value Ref Range Status   08/28/2017 1 <7 U/mL Final     Comment:     Negative       Imaging:  MRI LUMBAR SPINE WITHOUT CONTRAST   2/28/2020 3:51 PM      HISTORY: Radiculopathy, greater than  6 weeks conservative treatment,  persistent  symptoms; radiculopathy - history of sacroiliitis. Family  history of spinal disease and severe stenosis - left-sided symptoms.  Spondyloarthropathy.      TECHNIQUE: Multiplanar multisequence MRI of the lumbar spine without  contrast.      COMPARISON: Lumbar spine MRI 9/7/2010. Correlation is also made with  prior pelvic CT 6/10/2019.     FINDINGS:  Alignment is normal. No fracture or significant vertebral  body height loss. The intervertebral discs are normal in height and  signal. No abnormal marrow signal in the lumbar spine. There is no  disc bulge or herniation. There is no spinal or neural foraminal  stenosis. Sclerotic changes about the sacroiliac joints with marginal  spurring. This appears overall similar (within the field-of-view) to  the most recent examinations.                                                                      IMPRESSION:  1. No acute abnormality of the lumbar spine. Specifically, no disc  bulge/herniation, marrow signal abnormality, or spinal or neural  foraminal stenosis.  2. Redemonstrated sclerosis with marginal spurring about the  sacroiliac joints, potentially representing changes related to  osteoarthritis. This may be secondary to prior sacroiliitis in the  setting of spondyloarthropathy given the patient's clinical history.  This is incompletely included within the field-of-view of this exam.            Again, thank you for allowing me to participate in the care of your patient.      Sincerely,    Ray Cuba MD

## 2024-11-01 NOTE — PATIENT INSTRUCTIONS
Diagnosis:  1.  Spondyloarthropathy: Symptoms overall well-controlled.  Plan continue current medication after resolution of viral infection.  2.  Viral infection causing rash, muscle aches, cough: Remain off Xeljanz until 48 hours after resolution of symptoms.  3.  Neck and right shoulder pain: X-ray does not show bony change; cause of pain is likely muscular. Continue range of motion exercises    4.  Left knee pain  5.  Left thumb pain    Plan:    - continue Xeljanz 11 mg once daily  - continue sulfasalazine 1 g twice daily  - continue meloxicam 15 mg once daily as needed.  Counseled not to take any other NSAIDs in 24 hours after each meloxicam dose.  Risk and benefits discussed including renal cardiac GI other.  - X-rays of left hand and left knee  - Increase rosuvastatin to 40 mg (2 tablets) once daily.

## 2024-11-01 NOTE — PROGRESS NOTES
Virtual Visit Details    Type of service:  Video Visit   Video Start Time: 9:13 AM  Video End Time:9:39 AM    Originating Location (pt. Location): Home    Distant Location (provider location):  On-site  Platform used for Video Visit: [facility]       Rheumatology Clinic Visit  Ray Cuba M.D.     Zo Qureshi MRN# [id number]   YOB: 1975 Age: 49 year old     Date of Visit: 11/01/2024  Primary care provider: Treri Robles        Assessment & Plan:   # HLA-B27 negative seronegative spondyloarthropathy:   # Viral syndrome 3-2024    She has R shoulder pain with some stiffness in the neck.  Video exam: painful movement of R thumb, 2nd, 3rd digits, wearing a R volar wrist splint. +brown red macules R leg, no skin breakdown    Data: On September 11, 2024, comprehensive metabolic panel was normal; CRP less than 3; CBC normal; sedimentation rate elevated at 30;    Discusssion: Inflammatory joint pain from spondyloarthropathy remains much improved compared to prior to starting combination immunomodulatory therapy.  Left knee and left thumb pain are likely due to osteoarthritis, not inflammatory disease.  I recommend continuing combination tofacitinib and sulfasalazine, with use of as needed meloxicam for residual joint pain.  I think that cervical and right shoulder pain is likely muscular in origin, as x-ray does not show change in bony anatomy of the cervical spine.  I recommend plain x-rays of left hand and left knee.    # High risk medication use: Xeljanz, SSZ  Data: 9/25/23 to include CBC, AST, ALT, creatinine, ESR, CRP. All within normal limits except her ESR was 35. No evidence of acute drug toxicity identified on these labs. Will continue to monitor routine screening drug toxicity labs every 6 months while on this therapy. She is due for routine screening drug toxicity monitoring labs again in March of 2024.   -Risks and benefits of these therapies to include infection, thrombosis, bowel perforation,  bone marrow suppression, GI/hepatotoxicity, malignancy, increased all cause mortality among others discussed again today. She is agreeable to continue given potential benefit and lack of significant side effects to date.    Cholesterol 262 noted on September 2024 likely reflects Xeljanz induced hyperlipidemia.  I recommend increasing rosuvastatin dose to achieve better lipid lowering effect.    We discussed:  Diagnosis:  1.  Spondyloarthropathy: Symptoms overall well-controlled.  Plan continue current medication after resolution of viral infection.  2.  Viral infection causing rash, muscle aches, cough: Remain off Xeljanz until 48 hours after resolution of symptoms.  3.  Neck and right shoulder pain: X-ray does not show bony change; cause of pain is likely muscular. Continue range of motion exercises    4.  Left knee pain  5.  Left thumb pain    Plan:    - continue Xeljanz 11 mg once daily  - continue sulfasalazine 1 g twice daily  - continue meloxicam 15 mg once daily as needed.  Counseled not to take any other NSAIDs in 24 hours after each meloxicam dose.  Risk and benefits discussed including renal cardiac GI other.  - X-rays of left hand and left knee  - Increase rosuvastatin to 40 mg (2 tablets) once daily.    RTC 7  mos    No orders of the defined types were placed in this encounter.      Ray Cuba MD  Staff Rheumatologist, [facility]    On the day of the encounter, a total of 32 minutes was spent in chart review, and in counseling and coordination of care, regarding the patient's complex medical problem of spondyloarthropathy, shoulder pain, viral syndrome.    The longitudinal plan of care for the diagnosis(es)/condition(s) as documented were addressed during this visit. Due to the added complexity in care, I will continue to support Zo in the subsequent management and with ongoing continuity of care.         Subjective:     HPI: Patient presents as a transfer of care from Dr. Hernandez.  She had  previously seen me and Dr. Garay for spondyloarthropathy.  At the last visit in March 2024, daily joint pain was continuing.  Recommendation was to continue Xeljanz 11 mg daily, sulfasalazine and increased dose of 1.5 mg twice daily, and continue meloxicam once daily.    Background: SpA, previously evaluated by [place] and Tanisha of [facility] who established care with me on 5/27/21. Prior to that time she had last been seen by Dr. Garay of 5/24/2019. Most recent imaging of SI joints was on 2/28/2020 which showed sclerosis with marginal spurring about the SI joints, likely secondary to her chronic inflammatory disease, though no active inflammatory changes at that time. Had been tried on both humira and enbrel, though did not tolerate these due to side effects so discontinued prior to determining if efficacious. At the time of her last visit with rheumatology it was documented that she was responsive to NSAIDs, prednisone, and SSZ. The dose of her SSZ was increased to 1gm BID at the time of her initial visit with me given her active peripheral arthritis and enthesitis. Despite naproxen 500mg BID, she had ongoing axial stiffness which was AM predominant and improved with activity and stretching, so risks and benefits of cosentyx discussed and patient started on this IL-17 inhibitor. She interestingly developed the same side effects of severe flu like symptoms after each dose of her biologic therapy- similar to those she was experiencing when on humira and enbrel, with rhinorrhea, muscle aches/pains, severe fatigue, and also hives, low grade fever which come on after her injection and slowly over many weeks will resolve. Had been worse with each subsequent injection of cosentyx.  Cosentyx was discontinued and xeljanz 11mg once daily started early January 2022. She self discontinued this for a short period however it was restarted in April of 2023. She continues on xeljanz 11mg once daily SSZ  "1gm BID and mobic 15mg once daily.    Interval history November 1, 2024    She is better after stopping xeljanz last week for respiratory illness, fever. She was off xeljanz for 5 days.  While off tofa, she had pain in the back of the neck. Allso headaches radiating into shoulders. Neck pain now less severe.     Before recent URI, she had noted bilateral knee pain. L > R. Pain is worse with walking, movement at night.  Also noted tender L thumb mass. Has trouble with grasping/holding.     She notes neck and L shoulder pain daily, lasting 1 hour.  Posterior neck pain shoots into her head and down to shoulder    Xeljanz once daily  Using sulfasalazine 4 tabs daily.  Using meloxicam daily.    Interval history March 1, 2024    She reports \"sick\" for 4 days with body aches, chills, HA, +cough, non-productive. +tactile fever. No contacts sick.  COVID19 negative.    She held xeljanz for the last 4 days. Symptoms are improving.  Before falling ill, she had been doing well. She notes persistent low  level pain in her shoulders, back, neck. Pain radiates into buttock.   She is doing PTx for her lower back; she feels more pain with the exercises  Her R shoulder and neck are painful.  She thinks that DMARD help with pain  She    She endorses hx of medication use (Xeljanz, sulfasalazine, meloxicam). She reports frequent \"flu\" symptoms recurrent while on DMARDs. She has better tolerability of meds than with anti-TNF.    November 24, 2023 Dr. Rowell  -at the time of her last appointment she was continued on xeljanz, SSZ, mobic. Had only been back on systemic immunosuppression for about 8 weeks at that point but was noticing improvement already  -we obtained bilateral hand and wrist xrays to assess for inflammatory sequelae, no erosions or acute osseous abnormalities were identified.   -most recent labs reviewed from 9/25/23 to include CBC, AST, ALT, creatinine, ESR, CRP. All within normal limits except her ESR was 35. No " evidence of acute drug toxicity identified on these labs. Will continue to monitor routine screening drug toxicity labs every 6 months while on this therapy. Standing orders are in place. She is due for routine screening drug toxicity monitoring labs again in March of 2024.   -The patient, last seen in June, presented with ongoing symptoms. They reported experiencing cold-like symptoms intermittently, including body aches, runny nose, and chills. These symptoms have been occurring with a frequency of about one to two times per month since their last visit. The patient mentioned that during these episodes, when they discontinue their rheumatologic medication, specifically Xeljanz, they experience significant discomfort and pain. However, when on medication, including sulfasalazine, xeljanz, and Mobic (meloxicam), their symptoms are notably less severe. The patient continues to take Mobic and sulfasalazine during these episodes but stops Xeljanz.    In addition to these symptoms, the patient also reported experiencing dizziness, headaches, and joint pain, particularly in the fingers. They described the pain as being primarily in the joints and worsening with movement or upon impact. They noted that the pain initially affected the thumb and middle finger but has now progressed to include the index finger and ring finger, sparing the pinky. The patient has been using braces to manage the pain, which has been somewhat effective.    The patient's most recent imaging studies, including x-rays of the hands and wrists, showed no signs of erosions, fractures, or degenerative changes. However, they have been experiencing neck pain, which radiates to the back of the skull and is exacerbated by certain movements, leading to headaches. This neck pain has been a concern for some time, and their primary care doctor had previously recommended an MRI, which was not yet conducted. The patient's history of lumbar spine issues was also  noted, with the last MRI performed approximately three to four years ago.    In summary, the patient's primary issues include recurrent cold-like symptoms affecting their rheumatologic treatment adherence, ongoing joint pain in the fingers, and significant neck pain leading to headaches, all of which impact their overall quality of life and require ongoing management and further evaluation.    14 point review of systems collected and negative if not documented above.    June 30, 2023   -at the time of her last visit, we restarted both xeljanz and SSZ. Mobic restarted at that time as well.   -Has a cold, runny nose, raspy voice. Has night sweats/chills. No fever. Has been going on for 3-4 days  -started xeljanz and SSZ, feels some less pain. Mobic as well. Started these in May, 1st or 2nd. Has been 7-8 weeks now that she has been on both medications  -If she misses any doses then pain returns.   -Rates her pain 5/10. Involves shoulder/neck/hands.   -Wakes up and stretches for 10 minutes then her stiffness is improved for the day  -Symptoms on the right hand are worse than the left hand. She has been wearing a brace on the left hand that was special molded for her by ortho/PT she reports  -no new rashes  -outside of above possible URI, no new or progressive fevers/chills/night sweats. No other interval infections.   -no unintentional weight loss  -Has intermittent swelling of PIPs/DIPs.   -In her hands has most pain in her CMCs and wrists  She is tolerating xeljanz, mobic, SSZ without any noticeable side effects, GI/cutaneous/other.  14 point ROS collected and negative if not documented above      April 14, 2023  -Up until January 2023, had continued on xeljanz 11mg once daily, SSZ 1gm BID. But then ran out of both medications and did not follow-up/request refill and so stopped.   -While on both therapies, she noted that if she took her doses late, would experience worse pain/stiffness.   -Currently reporting pain  "stiffness in almost all joint in her body, both axial and peripheral. In shoulder/neck. Has pain/stiffness in hands. 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 Stretches. Pain in the shoulder, right side also but left more than right.   Has tried massage which is temporarily helpful for pain, but immediately returns. No redness, Has swelling of her wrist on right more than left.   - Still with some HA/dizziness. Still gets warm, though not fever. Intermittent coughing/runny nose. Has nasal spray.  -has some red patches of that come and go, even now not on medication. No different on medication. Not medication side effects  -Middle to low back also much more severe pain and stiffness which is worse in the AM and improves with use. Has radiating pain down her left leg.   -Has ongoing RLS.   -no unintentional weight loss.   -Chronic mild hair thinning over the last 2-3 years    Interval history 3/4/22  Has not experienced any side effects from xeljanz. Has had ongoing HA/dizziness, which was there prior to starting xeljanz. Has intermittent chills/ feeling warm, though no temp. Does not happen everyday, happens 1-2 times per week. Also warm at night, though not wet on clothes. Her pain is less. Rates her pain now around 6/10. No redness/warmth/swelling. Though last week had right wrist pain with minimal movement. Lasted for 2-3 days. Used topical lidocaine which resolved symptoms. When painful, it was somewhat \"puffy\".  At this time her back is still stiff, AM predominant. Back and neck. She sits when she wakes up and stretches. Then one hour later she has improvement and continues her day. Has rash on anterior shin right LE, saw PCP who gave her topical steroid which she has not started. Still with intermittent blurred vision/double vision. Unchanged. No more severe/frequent. Rare sores in her mouth. No interval infections. ROS otherwise negative.       Interval history  12/17/21  Reports feeling of fever/body aches/runny nose. Also with " rash. 3-4 days after her first injection she noticed that this started, though took her next dose and the above symptoms were more severe. Then was off for about 1 month due to these severe side effects. She decided to re-try in October and November, though has again had return of above. Has been about 1 month since her last injection and reports side effects are continuing to slowly improve, though still with some fatigue/body aches/ HA. Rash and fever resolved. She finds it hard to comment on axial pain/stiffness/peripiheral joint pain/swelling in setting of severe fatigue/flu like symptoms. Her ROS is otherwise negative.     HPI from initial consultation 5/2021  Had flu like symptoms with flu like symptoms. Resolves when stopped. Prednisone, NSAIDs, SSZ used/helpful. Prednisone had nausea and consipation. SSZ has drowsiness/ dizziness/ mental fog worse in the AM. Has continued on SSZ 1gm in the AM and 1gm in the PM. Still with significant drowsiness. If she doesn't want to have drowsiness, reduces the dose, has pain in neck, shoulder, back. Has some swelling of joints. Has tenderness of many joints. Takes naproxen and gabapentin. She takes naproxen 500mg BID. In the AM has stiffness in her neck/back. Gets some better with stretching/ exercise. Takes 15 minutes to get better. No rash. Does have swelling of her right hand 2nd -4th digits. Pain in her PIPs predominantly. Pain also in between the joints. Has recently had some blurred vision, wears glasses. No inflammatory eye disease history. Intermittent double vision. Has had hair loss, non scarring more that is thinning. More over the last 3-4 years. Intermittent sore in mouth, painful. Has dry eyes, uses eye drops 3-4 times per week. When she wakes up her throat is dry. Drinks frequently. No abdominal pain. No blood in her stool. No n/v. No raynauds. No sclerodactyly. No fevers, weight loss. Occasional weight loss and chills. Has been having ongoing throbbing HA  on the left side. The naproxen is some helpful, though returns shortly afterwards. No miscarriages or blood clots.     Past Medical History  Past Medical History:   Diagnosis Date    Anemia     History of blood transfusion 2000    after vaginal delivery, 2 units PRBCs given    HSIL on Pap smear 09/21/2011    MARTA 2 and 3    Immune to hepatitis B 08/2021    hep B antigen NEG    INFLAM SPONDYLOPATHY NOS 01/07/2008    check labs on sulfasalzine every 3 months and check hep B and C and TB every 2 years    Leukocytopenia 03/10/2014    Tendinosis     Thrombocytopenia (H) 03/10/2014    Unspecified closed fracture of pelvis 2000    left fracture of pelvis after delivery     Past Surgical History  Past Surgical History:   Procedure Laterality Date    COLONOSCOPY Left 11/12/2021    recheck in 10 years    DILATION AND CURETTAGE, ABLATE ENDOMETRIUM NOVASURE, COMBINED N/A 12/04/2018    Procedure: novasure endometrial ablation, myosure resection of leiomyoma, dilation and curettage;  Surgeon: Rolando Covarrubias MD;  Location: [facility] OR    GYN SURGERY  12/04/2018    fibroid removal    lap hysterectomy and salpingetcomy Bilateral 2019    done at [facility] - ovaries are in    LEEP TX, CERVICAL  12/19/2011    MARTA II    OPERATIVE HYSTEROSCOPY WITH MORCELLATOR N/A 12/04/2018    ovaries are still in.   Surgeon: Rolando Covarrubias MD;  Location: [facility] OR    TUBAL LIGATION  05/2009    laparoscopic tubal ligation    ZC NONSPECIFIC PROCEDURE  10/2000    after delivery 2 units of prbcs secondary to placenta     Medications  Current Outpatient Medications   Medication Sig Dispense Refill    amLODIPine (NORVASC) 2.5 MG tablet Take 1 tablet (2.5 mg) by mouth daily 90 tablet 3    aspirin-acetaminophen-caffeine (EXCEDRIN MIGRAINE) 250-250-65 MG tablet Take 1 tablet by mouth every 6 hours as needed for headaches      cyclobenzaprine (FLEXERIL) 5 MG tablet Take 1 tablet (5 mg) by mouth 3 times daily as needed for muscle spasms. 90 tablet 5    diclofenac  (VOLTAREN) 1 % topical gel Apply to affected area twice daily 150 g 3    DULoxetine (CYMBALTA) 30 MG capsule Take 2 capsules (60 mg) by mouth daily 180 capsule 3    Evening Primrose Oil 500 MG CAPS Take 500 mg by mouth 2 times daily 60 capsule 11    FEROSUL 325 (65 Fe) MG tablet TAKE ONE TABLET BY MOUTH ONCE DAILY WITH BREAKFAST 90 tablet 3    fluticasone (FLONASE) 50 MCG/ACT nasal spray Spray 1-2 sprays into both nostrils daily 16 g 3    gabapentin (NEURONTIN) 300 MG capsule 1 tab in am and 1 at 5 pm  and 2 at bedtime for (4 per day)1 tab in am and 1 at 5 pm  and 2 at bedtime for (4 per day) 360 capsule 3    ketotifen fumarate 0.035% 0.035 % SOLN ophthalmic solution Place 1 drop into both eyes 2 times daily. 10 mL 2    ketotifen fumarate 0.035%, ketotifen 0.025%, (ZADITOR) 0.025 % ophthalmic solution Place 1 drop into both eyes 2 times daily 10 mL 3    lidocaine (XYLOCAINE) 5 % external ointment Apply topically 3 times daily as needed for moderate pain (4-6) 150 g 3    loperamide (IMODIUM A-D) 2 MG tablet Take 1 tablet (2 mg) by mouth 4 times daily as needed for diarrhea 30 tablet 1    loratadine-pseudoePHEDrine (CLARITIN-D 24-HOUR)  MG 24 hr tablet Take 1 tablet by mouth daily 30 tablet 1    meclizine (ANTIVERT) 25 MG tablet Take 1 tablet (25 mg) by mouth 3 times daily as needed for dizziness 30 tablet 1    meloxicam (MOBIC) 15 MG tablet Take 1 tablet (15 mg) by mouth daily. 180 tablet 1    naloxone (NARCAN) 4 MG/0.1ML nasal spray Spray 4 mg into one nostril alternating nostrils once as needed.      olopatadine (PATADAY) 0.2 % ophthalmic solution Place 0.05 mLs (1 drop) into both eyes daily 2.5 mL 3    ondansetron (ZOFRAN) 4 MG tablet Take 1 tablet (4 mg) by mouth every 8 hours as needed for nausea or vomiting 12 tablet 4    polyethylene glycol (MIRALAX) 17 GM/Dose powder Take 17 g (1 Capful) by mouth daily 510 g 1    rosuvastatin (CRESTOR) 20 MG tablet Take 1 tablet (20 mg) by mouth daily. 90 tablet 4     "scopolamine (TRANSDERM) 1 MG/3DAYS 72 hr patch Place 1 patch onto the skin every 72 hours. 10 patch 3    SM STOOL SOFTENER 100 MG capsule TAKE ONE CAPSULE BY MOUTH ONCE DAILY AS NEEDED FOR CONSTIPATION 30 capsule 1    sulfaSALAzine ER (AZULFIDINE EN) 500 MG EC tablet Take 2 tablets (1,000 mg) by mouth 2 times daily 360 tablet 0    tofacitinib (XELJANZ XR) 11 MG 24 hr tablet Take 1 tablet (11 mg) by mouth daily Hold for signs of infection, then seek medical attention. 30 tablet 11    traMADol (ULTRAM) 50 MG tablet Take 1 tablet (50 mg) by mouth 2 times daily as needed for severe pain. FOR SEVERE PAIN 60 tablet 0    traZODone (DESYREL) 50 MG tablet Take 2 tablets (100 mg) by mouth nightly as needed for sleep. 180 tablet 4    triamcinolone (KENALOG) 0.1 % external cream Apply topically 2 times daily 30 g 1    VITAMIN D3 50 MCG (2000 UT) tablet TAKE ONE TABLET BY MOUTH ONCE DAILY 90 tablet 3     No current facility-administered medications for this visit.     Allergies   Allergies   Allergen Reactions    Contrast Dye Nausea and Vomiting    Diatrizoate Nausea and Vomiting    Morphine And Codeine Itching and Nausea and Vomiting    Percocet [Oxycodone-Acetaminophen] Nausea and Vomiting and Itching    Advil [Ibuprofen Micronized] Rash     Rash     Ibuprofen Rash     Family History: Mother with \"arthritis\"    Social History: Not currently working. 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 Repetitive motion. No smoking/drugs/ETOH. Not . 4 kids who are healthy.       Objective:    Physical exam:  No vitals    Sclera appear clear  No facial rash  Breathing comfortably on room air, no cough, no audible wheeze, no use of accessory muscles  Wearing wrist splint/brace on the right wrist. No red edema/erythema of the MCPs/PIPs bilaterally.   Left knee without visible effusion redness; preserved range of motion on camera, left thumb with preserved flexion of the thumb IP.    Labs:  WBC   Date Value Ref Range Status "   02/15/2021 6.5 4.0 - 11.0 10e9/L Final     WBC Count   Date Value Ref Range Status   09/11/2024 5.9 4.0 - 11.0 10e3/uL Final     Hemoglobin   Date Value Ref Range Status   09/11/2024 12.3 11.7 - 15.7 g/dL Final   02/15/2021 12.2 11.7 - 15.7 g/dL Final     Platelet Count   Date Value Ref Range Status   09/11/2024 225 150 - 450 10e3/uL Final   02/15/2021 219 150 - 450 10e9/L Final     Creatinine   Date Value Ref Range Status   09/11/2024 0.56 0.51 - 0.95 mg/dL Final   02/15/2021 0.54 0.52 - 1.04 mg/dL Final     Lab Results   Component Value Date    ALKPHOS 60 03/01/2022    ALKPHOS 55 02/15/2021     AST   Date Value Ref Range Status   09/11/2024 20 0 - 45 U/L Final   02/15/2021 14 0 - 45 U/L Final     Lab Results   Component Value Date    ALT 21 03/01/2022    ALT 17 02/15/2021     Sed Rate   Date Value Ref Range Status   02/15/2021 20 0 - 20 mm/h Final     Erythrocyte Sedimentation Rate   Date Value Ref Range Status   09/11/2024 30 (H) 0 - 20 mm/hr Final     CRP Inflammation   Date Value Ref Range Status   08/16/2021 <2.9 0.0 - 8.0 mg/L Final   02/26/2020 <2.9 0.0 - 8.0 mg/L Final     UA RESULTS:  Recent Labs   Lab Test 05/18/21  0858   COLOR Yellow   APPEARANCE Clear   URINEGLC Negative   URINEBILI Negative   URINEKETONE Negative   SG 1.015   UBLD Large*   URINEPH 6.5   PROTEIN Negative   UROBILINOGEN 0.2   NITRITE Negative   LEUKEST Moderate*   RBCU 10-25*   WBCU *      RAMÍREZ Screen by EIA   Date Value Ref Range Status   05/21/2014 <1.0  Interpretation:  Negative   <1.0 Final     Rheumatoid Factor   Date Value Ref Range Status   05/21/2014 <20 <20 IU/mL Final     Cyclic Citrullinated Peptide Antibody, IgG   Date Value Ref Range Status   08/28/2017 1 <7 U/mL Final     Comment:     Negative       Imaging:  MRI LUMBAR SPINE WITHOUT CONTRAST   2/28/2020 3:51 PM      HISTORY: Radiculopathy, greater than  6 weeks conservative treatment,  persistent symptoms; radiculopathy - history of sacroiliitis. Family  history of  spinal disease and severe stenosis - left-sided symptoms.  Spondyloarthropathy.      TECHNIQUE: Multiplanar multisequence MRI of the lumbar spine without  contrast.      COMPARISON: Lumbar spine MRI 9/7/2010. Correlation is also made with  prior pelvic CT 6/10/2019.     FINDINGS:  Alignment is normal. No fracture or significant vertebral  body height loss. The intervertebral discs are normal in height and  signal. No abnormal marrow signal in the lumbar spine. There is no  disc bulge or herniation. There is no spinal or neural foraminal  stenosis. Sclerotic changes about the sacroiliac joints with marginal  spurring. This appears overall similar (within the field-of-view) to  the most recent examinations.                                                                      IMPRESSION:  1. No acute abnormality of the lumbar spine. Specifically, no disc  bulge/herniation, marrow signal abnormality, or spinal or neural  foraminal stenosis.  2. Redemonstrated sclerosis with marginal spurring about the  sacroiliac joints, potentially representing changes related to  osteoarthritis. This may be secondary to prior sacroiliitis in the  setting of spondyloarthropathy given the patient's clinical history.  This is incompletely included within the field-of-view of this exam.

## 2024-11-01 NOTE — NURSING NOTE
Current patient location: [address] [place] [address]   [facility] [address]    Is the patient currently in the state of [place]? YES    Visit mode:VIDEO    If the visit is dropped, the patient can be reconnected by: VIDEO VISIT: Text to cell phone:   Telephone Information:   Mobile [phone]       Will anyone else be joining the visit? NO  (If patient encounters technical issues they should call [phone]    Are changes needed to the allergy or medication list? No    Patient denies any changes and states that all information remains accurate since last reviewed/verified.     Are refills needed on medications prescribed by this physician? NO    Rooming Documentation:  Questionnaire(s) completed    Reason for visit: RECHECK    CHANTELL EastmanF

## 2024-11-04 DIAGNOSIS — L30.9 DERMATITIS: ICD-10-CM

## 2024-11-04 RX ORDER — TRIAMCINOLONE ACETONIDE 1 MG/G
CREAM TOPICAL 2 TIMES DAILY
Qty: 30 G | Refills: 1 | Status: SHIPPED | OUTPATIENT
Start: 2024-11-04

## 2024-11-14 ENCOUNTER — PATIENT OUTREACH (OUTPATIENT)
Dept: CARE COORDINATION | Facility: CLINIC | Age: 49
End: 2024-11-14
Payer: MEDICARE

## 2024-11-20 ENCOUNTER — TELEPHONE (OUTPATIENT)
Dept: RHEUMATOLOGY | Facility: CLINIC | Age: 49
End: 2024-11-20
Payer: MEDICARE

## 2024-11-20 DIAGNOSIS — M25.562 CHRONIC PAIN OF LEFT KNEE: Primary | ICD-10-CM

## 2024-11-20 DIAGNOSIS — G89.29 CHRONIC PAIN OF LEFT KNEE: Primary | ICD-10-CM

## 2024-11-20 NOTE — TELEPHONE ENCOUNTER
[facility] Call Center    Phone Message    May a detailed message be left on voicemail: yes     Reason for Call: Order(s): Other:   Reason for requested: Xray - per pt, correction needed on the xray order. States it should be for the left knee. Please call pt to advise when updated. Thank you.   Date needed: Asap  Provider name: Dr. Cuba    Action Taken: Other: Rheum    Travel Screening: Not Applicable     Date of Service: 11/20/24

## 2024-11-20 NOTE — TELEPHONE ENCOUNTER
"Xray was ordered for the right knee.  Per notes, 11/1/24, \"- X-rays of left hand and left knee\"    New order pended. Please review and sign.    Franca Ortiz RN    "

## 2024-11-27 ENCOUNTER — ANCILLARY PROCEDURE (OUTPATIENT)
Dept: GENERAL RADIOLOGY | Facility: CLINIC | Age: 49
End: 2024-11-27
Attending: INTERNAL MEDICINE
Payer: MEDICARE

## 2024-11-27 DIAGNOSIS — G89.29 CHRONIC PAIN OF LEFT KNEE: ICD-10-CM

## 2024-11-27 DIAGNOSIS — M79.645 THUMB PAIN, LEFT: ICD-10-CM

## 2024-11-27 DIAGNOSIS — M25.562 CHRONIC PAIN OF LEFT KNEE: ICD-10-CM

## 2024-11-27 PROCEDURE — 73130 X-RAY EXAM OF HAND: CPT | Mod: TC | Performed by: STUDENT IN AN ORGANIZED HEALTH CARE EDUCATION/TRAINING PROGRAM

## 2024-11-27 PROCEDURE — 73562 X-RAY EXAM OF KNEE 3: CPT | Mod: TC | Performed by: STUDENT IN AN ORGANIZED HEALTH CARE EDUCATION/TRAINING PROGRAM

## 2024-12-12 ENCOUNTER — PATIENT OUTREACH (OUTPATIENT)
Dept: CARE COORDINATION | Facility: CLINIC | Age: 49
End: 2024-12-12
Payer: MEDICARE

## 2024-12-18 ENCOUNTER — OFFICE VISIT (OUTPATIENT)
Dept: FAMILY MEDICINE | Facility: CLINIC | Age: 49
End: 2024-12-18
Payer: MEDICARE

## 2024-12-18 ENCOUNTER — ANCILLARY PROCEDURE (OUTPATIENT)
Dept: MAMMOGRAPHY | Facility: CLINIC | Age: 49
End: 2024-12-18
Payer: MEDICARE

## 2024-12-18 VITALS
SYSTOLIC BLOOD PRESSURE: 113 MMHG | WEIGHT: 123 LBS | HEART RATE: 66 BPM | OXYGEN SATURATION: 95 % | DIASTOLIC BLOOD PRESSURE: 77 MMHG | HEIGHT: 59 IN | RESPIRATION RATE: 14 BRPM | TEMPERATURE: 98.1 F | BODY MASS INDEX: 24.8 KG/M2

## 2024-12-18 DIAGNOSIS — Z00.00 ENCOUNTER FOR MEDICARE ANNUAL WELLNESS EXAM: Primary | ICD-10-CM

## 2024-12-18 DIAGNOSIS — K64.4 EXTERNAL HEMORRHOIDS: ICD-10-CM

## 2024-12-18 DIAGNOSIS — G89.4 CHRONIC PAIN SYNDROME: ICD-10-CM

## 2024-12-18 DIAGNOSIS — M54.6 LEFT-SIDED THORACIC BACK PAIN, UNSPECIFIED CHRONICITY: ICD-10-CM

## 2024-12-18 DIAGNOSIS — Z12.31 VISIT FOR SCREENING MAMMOGRAM: ICD-10-CM

## 2024-12-18 DIAGNOSIS — R11.0 NAUSEA: ICD-10-CM

## 2024-12-18 DIAGNOSIS — E78.00 HYPERCHOLESTEROLEMIA: ICD-10-CM

## 2024-12-18 DIAGNOSIS — M79.662 PAIN OF LEFT LOWER LEG: ICD-10-CM

## 2024-12-18 DIAGNOSIS — M46.98 INFLAMMATORY SPONDYLOPATHY OF SACRAL REGION (H): ICD-10-CM

## 2024-12-18 DIAGNOSIS — D50.9 IRON DEFICIENCY ANEMIA, UNSPECIFIED IRON DEFICIENCY ANEMIA TYPE: ICD-10-CM

## 2024-12-18 DIAGNOSIS — E78.5 HYPERLIPIDEMIA LDL GOAL <130: ICD-10-CM

## 2024-12-18 DIAGNOSIS — Z23 NEED FOR PROPHYLACTIC VACCINATION AGAINST HEPATITIS A: ICD-10-CM

## 2024-12-18 DIAGNOSIS — Z00.00 ENCOUNTER FOR MEDICARE ANNUAL WELLNESS EXAM: ICD-10-CM

## 2024-12-18 DIAGNOSIS — R42 VERTIGO: ICD-10-CM

## 2024-12-18 LAB
ALT SERPL W P-5'-P-CCNC: 13 U/L (ref 0–50)
CHOLEST SERPL-MCNC: 205 MG/DL
FASTING STATUS PATIENT QL REPORTED: ABNORMAL
HDLC SERPL-MCNC: 52 MG/DL
LDLC SERPL CALC-MCNC: 129 MG/DL
NONHDLC SERPL-MCNC: 153 MG/DL
TRIGL SERPL-MCNC: 122 MG/DL

## 2024-12-18 PROCEDURE — 99214 OFFICE O/P EST MOD 30 MIN: CPT | Mod: 25 | Performed by: FAMILY MEDICINE

## 2024-12-18 PROCEDURE — 84460 ALANINE AMINO (ALT) (SGPT): CPT | Performed by: FAMILY MEDICINE

## 2024-12-18 PROCEDURE — 77063 BREAST TOMOSYNTHESIS BI: CPT | Mod: TC | Performed by: RADIOLOGY

## 2024-12-18 PROCEDURE — G0439 PPPS, SUBSEQ VISIT: HCPCS | Performed by: FAMILY MEDICINE

## 2024-12-18 PROCEDURE — 77067 SCR MAMMO BI INCL CAD: CPT | Mod: TC | Performed by: RADIOLOGY

## 2024-12-18 PROCEDURE — 36415 COLL VENOUS BLD VENIPUNCTURE: CPT | Performed by: FAMILY MEDICINE

## 2024-12-18 PROCEDURE — 80061 LIPID PANEL: CPT | Performed by: FAMILY MEDICINE

## 2024-12-18 RX ORDER — PRAMOXINE HYDROCHLORIDE 10 MG/G
AEROSOL, FOAM TOPICAL
Qty: 15 G | Refills: 5 | Status: SHIPPED | OUTPATIENT
Start: 2024-12-18

## 2024-12-18 RX ORDER — ROSUVASTATIN CALCIUM 40 MG/1
40 TABLET, COATED ORAL DAILY
Status: SHIPPED
Start: 2024-12-18

## 2024-12-18 RX ORDER — MECLIZINE HYDROCHLORIDE 25 MG/1
25 TABLET ORAL 3 TIMES DAILY PRN
Qty: 30 TABLET | Refills: 2 | Status: SHIPPED | OUTPATIENT
Start: 2024-12-18

## 2024-12-18 RX ORDER — ONDANSETRON 4 MG/1
4 TABLET, FILM COATED ORAL EVERY 8 HOURS PRN
Qty: 12 TABLET | Refills: 4 | Status: SHIPPED | OUTPATIENT
Start: 2024-12-18

## 2024-12-18 RX ORDER — TRAMADOL HYDROCHLORIDE 50 MG/1
50 TABLET ORAL 2 TIMES DAILY PRN
Qty: 60 TABLET | Refills: 0 | Status: SHIPPED | OUTPATIENT
Start: 2024-12-18

## 2024-12-18 SDOH — HEALTH STABILITY: PHYSICAL HEALTH: ON AVERAGE, HOW MANY DAYS PER WEEK DO YOU ENGAGE IN MODERATE TO STRENUOUS EXERCISE (LIKE A BRISK WALK)?: 0 DAYS

## 2024-12-18 ASSESSMENT — PATIENT HEALTH QUESTIONNAIRE - PHQ9
SUM OF ALL RESPONSES TO PHQ QUESTIONS 1-9: 7
10. IF YOU CHECKED OFF ANY PROBLEMS, HOW DIFFICULT HAVE THESE PROBLEMS MADE IT FOR YOU TO DO YOUR WORK, TAKE CARE OF THINGS AT HOME, OR GET ALONG WITH OTHER PEOPLE: VERY DIFFICULT
SUM OF ALL RESPONSES TO PHQ QUESTIONS 1-9: 7
5. POOR APPETITE OR OVEREATING: NOT AT ALL

## 2024-12-18 ASSESSMENT — ANXIETY QUESTIONNAIRES
5. BEING SO RESTLESS THAT IT IS HARD TO SIT STILL: NOT AT ALL
7. FEELING AFRAID AS IF SOMETHING AWFUL MIGHT HAPPEN: NOT AT ALL
1. FEELING NERVOUS, ANXIOUS, OR ON EDGE: NOT AT ALL
IF YOU CHECKED OFF ANY PROBLEMS ON THIS QUESTIONNAIRE, HOW DIFFICULT HAVE THESE PROBLEMS MADE IT FOR YOU TO DO YOUR WORK, TAKE CARE OF THINGS AT HOME, OR GET ALONG WITH OTHER PEOPLE: NOT DIFFICULT AT ALL
6. BECOMING EASILY ANNOYED OR IRRITABLE: SEVERAL DAYS
3. WORRYING TOO MUCH ABOUT DIFFERENT THINGS: NOT AT ALL
2. NOT BEING ABLE TO STOP OR CONTROL WORRYING: NOT AT ALL
GAD7 TOTAL SCORE: 1
GAD7 TOTAL SCORE: 1

## 2024-12-18 ASSESSMENT — SOCIAL DETERMINANTS OF HEALTH (SDOH): HOW OFTEN DO YOU GET TOGETHER WITH FRIENDS OR RELATIVES?: PATIENT DECLINED

## 2024-12-18 NOTE — PROGRESS NOTES
Preventive Care Visit  [facility]  Terri Robles MD, Family Medicine  Dec 18, 2024      Assessment & Plan     Encounter for Medicare annual wellness exam    - MA Screening Bilateral w/ Nadeem    Need for prophylactic vaccination against hepatitis A  Handout on the above diagnosis was given to the patient and discussed      Iron deficiency anemia, unspecified iron deficiency anemia type  CBC was good 3 months ago     Visit for screening mammogram    - MA Screening Bilateral w/ Nadeem    Hypercholesterolemia  not under optimal control  Had dose increased and will check again today   - Lipid panel reflex to direct LDL Fasting  - ALT    Vertigo  Refills per epicare  Uses prn   - meclizine (ANTIVERT) 25 MG tablet  Dispense: 30 tablet; Refill: 2    Inflammatory spondylopathy of sacral region (H)  Refills per epicare  For when she has flares   - ondansetron (ZOFRAN) 4 MG tablet  Dispense: 12 tablet; Refill: 4  - traMADol (ULTRAM) 50 MG tablet  Dispense: 60 tablet; Refill: 0    Chronic pain syndrome  Refills per epicare    - ondansetron (ZOFRAN) 4 MG tablet  Dispense: 12 tablet; Refill: 4  - traMADol (ULTRAM) 50 MG tablet  Dispense: 60 tablet; Refill: 0    Nausea  Refills per epicare    - ondansetron (ZOFRAN) 4 MG tablet  Dispense: 12 tablet; Refill: 4    Hyperlipidemia LDL goal <130  Continue on higher dose and recheck today   - rosuvastatin (CRESTOR) 40 MG tablet    Pain of left lower leg  Refills per epicare  This is not bothering now  - it is more of right upper shoulder and arm that flare off and on   - traMADol (ULTRAM) 50 MG tablet  Dispense: 60 tablet; Refill: 0    External hemorrhoids  Bother her now and then    - pramoxine (PROCTOFOAM) 1 % foam  Dispense: 15 g; Refill: 5    Left-sided thoracic back pain, unspecified chronicity  Could be pleuritic - check for infection or growth - likely inflammation from underlying condtion  - XR Chest 2 Views      Patient has been advised of split billing  requirements and indicates understanding: Yes        Counseling  Appropriate preventive services were addressed with this patient via screening, questionnaire, or discussion as appropriate for fall prevention, nutrition, physical activity, Tobacco-use cessation, social engagement, weight loss and cognition.  Checklist reviewing preventive services available has been given to the patient.  Reviewed patient's diet, addressing concerns and/or questions.   She is at risk for psychosocial distress and has been provided with information to reduce risk.   Discussed possible causes of fatigue. Updated plan of care.  Patient reported difficulty with activities of daily living were addressed today.The patient's PHQ-9 score is consistent with mild depression. She was provided with information regarding depression.   I have reviewed Opioid Use Disorder and Substance Use Disorder risk factors and made any needed referrals.       FURTHER TESTING:       - mammogram       - cxr for scapular pain   CONSULTATION/REFERRAL to ongoing with rheum   FUTURE APPOINTMENTS:       - Follow-up visit in 6 months for med check for pain and lipids   Regular exercise  See Patient Instructions    Shaggy Pathak is a 49 year old, presenting for the following:  Physical  She does need to get a few reifils.    She has spondyloarthropathy and sees rheum.   Her last labs were 3 months ago and were good.   Last summer her lipid panel was high and her crestor was increased in dose.   She is doing fine on this but she does note that in the last 3-4 months she gets 1 week long bouts of left scapular sharp pain worse with breathing.    She does not have cough.   She does have some morning stiffness.   When she gets sick she stops her xeljanz and then her arthritis flares but it is helping overall.          12/18/2024     8:16 AM   Additional Questions   Roomed by Denise ROGERS  See above         Health Care Directive  Patient does not have a  Health Care Directive: have discussed in past        12/18/2024   General Health   How would you rate your overall physical health? (!) POOR   Feel stress (tense, anxious, or unable to sleep) To some extent      (!) STRESS CONCERN      12/18/2024   Nutrition   Diet: I don't know            12/18/2024   Exercise   Days per week of moderate/strenous exercise 0 days      (!) EXERCISE CONCERN      12/18/2024   Social Factors   Frequency of gathering with friends or relatives Patient declined   Worry food won't last until get money to buy more Patient declined   Food not last or not have enough money for food? Patient declined   Do you have housing? (Housing is defined as stable permanent housing and does not include staying ouside in a car, in a tent, in an abandoned building, in an overnight shelter, or couch-surfing.) Yes   Are you worried about losing your housing? No   Lack of transportation? No   Unable to get utilities (heat,electricity)? No            12/18/2024   Fall Risk   Fallen 2 or more times in the past year? No    Trouble with walking or balance? Yes    Gait Speed Test Interpretation Less than or equal to 5.00 seconds - PASS       Patient-reported           12/18/2024   Activities of Daily Living- Home Safety   Needs help with the following daily activites Shopping    Housework    Laundry   Safety concerns in the home None of the above       Multiple values from one day are sorted in reverse-chronological order         12/18/2024   Dental   Dentist two times every year? Yes            12/18/2024   Hearing Screening   Hearing concerns? None of the above            12/18/2024   Driving Risk Screening   Patient/family members have concerns about driving (!) DECLINE            12/18/2024   General Alertness/Fatigue Screening   Have you been more tired than usual lately? (!) YES            12/18/2024   Urinary Incontinence Screening   Bothered by leaking urine in past 6 months No             Today's PHQ-9  Score:       12/18/2024     8:16 AM   PHQ-9 SCORE   PHQ-9 Total Score MyChart 7 (Mild depression)   PHQ-9 Total Score 7        Patient-reported         12/18/2024   Substance Use   Alcohol more than 3/day or more than 7/wk Not Applicable   Do you have a current opioid prescription? (!) YES   How severe/bad is pain from 1 to 10? 6/10   Do you use any other substances recreationally? No          Social History     Tobacco Use    Smoking status: Never    Smokeless tobacco: Never   Vaping Use    Vaping status: Never Used   Substance Use Topics    Alcohol use: Not Currently    Drug use: No           12/14/2022   LAST FHS-7 RESULTS   1st degree relative breast or ovarian cancer No   Any relative bilateral breast cancer No   Any male have breast cancer No   Any ONE woman have BOTH breast AND ovarian cancer No   Any woman with breast cancer before 50yrs No   2 or more relatives with breast AND/OR ovarian cancer No   2 or more relatives with breast AND/OR bowel cancer No           Mammogram Screening - Mammogram every 1-2 years updated in Health Maintenance based on mutual decision making      History of abnormal Pap smear: Status post hysterectomy with removal of cervix and no history of CIN2 or greater or cervical cancer. Health Maintenance and Surgical History updated.        Latest Ref Rng & Units 2/21/2018    11:00 AM 2/21/2018    10:47 AM 9/21/2015     3:15 PM   PAP / HPV   PAP (Historical)   NIL     HPV 16 DNA NEG^Negative Negative   Negative    HPV 18 DNA NEG^Negative Negative   Negative    Other HR HPV NEG^Negative Negative   Negative      ASCVD Risk   The 10-year ASCVD risk score (Halie JEWELL, et al., 2019) is: 1.8%    Values used to calculate the score:      Age: 49 years      Sex: Female      Is Non- : No      Diabetic: No      Tobacco smoker: No      Systolic Blood Pressure: 113 mmHg      Is BP treated: Yes      HDL Cholesterol: 52 mg/dL      Total Cholesterol: 262  mg/dL        Reviewed and updated as needed this visit by Provider     Meds                Labs reviewed in EPIC  BP Readings from Last 3 Encounters:   12/18/24 113/77   09/11/24 120/76   03/11/24 122/82    Wt Readings from Last 3 Encounters:   12/18/24 55.8 kg (123 lb)   11/01/24 53.5 kg (118 lb)   09/11/24 54 kg (119 lb)                  Patient Active Problem List   Diagnosis    Disorder of SI (sacroiliac) joint    Hypercholesterolemia    HSIL on Pap smear    S/P LEEP of cervix    Acne    Influenza B    Vitamin D deficiency    Right peroneal tendinosis    Episodic tension-type headache, not intractable    Cervicalgia    Insomnia due to medical condition    Chronic pain syndrome    Submucous leiomyoma of uterus    Menorrhagia with regular cycle    Right shoulder tendonitis    Intramural leiomyoma of uterus    Pelvic pain in female    Left ovarian cyst    Immune to hepatitis B    TMJ (temporomandibular joint syndrome)    Spondyloarthropathy    Anisometropic amblyopia of right eye    Continuous opioid dependence (H)     Past Surgical History:   Procedure Laterality Date    COLONOSCOPY Left 11/12/2021    recheck in 10 years    DILATION AND CURETTAGE, ABLATE ENDOMETRIUM NOVASURE, COMBINED N/A 12/04/2018    Procedure: novasure endometrial ablation, myosure resection of leiomyoma, dilation and curettage;  Surgeon: Rolando Covarrubias MD;  Location: [facility] OR    GYN SURGERY  12/04/2018    fibroid removal    lap hysterectomy and salpingetcomy Bilateral 2019    done at [facility] - ovaries are in    LEEP TX, CERVICAL  12/19/2011    MARTA II    OPERATIVE HYSTEROSCOPY WITH MORCELLATOR N/A 12/04/2018    ovaries are still in.   Surgeon: Rolando Covarrubias MD;  Location: [facility] OR    TUBAL LIGATION  05/2009    laparoscopic tubal ligation    ZZC NONSPECIFIC PROCEDURE  10/2000    after delivery 2 units of prbcs secondary to placenta       Social History     Tobacco Use    Smoking status: Never    Smokeless tobacco: Never   Substance Use  Topics    Alcohol use: Not Currently     Family History   Problem Relation Age of Onset    Hypertension Father     Lipids Father     Hypertension Mother     Lipids Mother     Diabetes No family hx of     Coronary Artery Disease No family hx of     Hyperlipidemia No family hx of     Cerebrovascular Disease No family hx of     Breast Cancer No family hx of     Colon Cancer No family hx of     Prostate Cancer No family hx of     Other Cancer No family hx of     Depression No family hx of     Anxiety Disorder No family hx of     Mental Illness No family hx of     Substance Abuse No family hx of     Anesthesia Reaction No family hx of     Asthma No family hx of     Osteoporosis No family hx of     Genetic Disorder No family hx of     Thyroid Disease No family hx of     Obesity No family hx of     Unknown/Adopted No family hx of          Current Outpatient Medications   Medication Sig Dispense Refill    amLODIPine (NORVASC) 2.5 MG tablet Take 1 tablet (2.5 mg) by mouth daily 90 tablet 3    aspirin-acetaminophen-caffeine (EXCEDRIN MIGRAINE) 250-250-65 MG tablet Take 1 tablet by mouth every 6 hours as needed for headaches      cyclobenzaprine (FLEXERIL) 5 MG tablet Take 1 tablet (5 mg) by mouth 3 times daily as needed for muscle spasms. 90 tablet 5    diclofenac (VOLTAREN) 1 % topical gel Apply to affected area twice daily 150 g 3    DULoxetine (CYMBALTA) 30 MG capsule Take 2 capsules (60 mg) by mouth daily 180 capsule 3    Evening Primrose Oil 500 MG CAPS Take 500 mg by mouth 2 times daily 60 capsule 11    FEROSUL 325 (65 Fe) MG tablet TAKE ONE TABLET BY MOUTH ONCE DAILY WITH BREAKFAST 90 tablet 3    fluticasone (FLONASE) 50 MCG/ACT nasal spray Spray 1-2 sprays into both nostrils daily 16 g 3    gabapentin (NEURONTIN) 300 MG capsule 1 tab in am and 1 at 5 pm  and 2 at bedtime for (4 per day)1 tab in am and 1 at 5 pm  and 2 at bedtime for (4 per day) 360 capsule 3    ketotifen fumarate 0.035% 0.035 % SOLN ophthalmic  solution Place 1 drop into both eyes 2 times daily. 10 mL 2    ketotifen fumarate 0.035%, ketotifen 0.025%, (ZADITOR) 0.025 % ophthalmic solution Place 1 drop into both eyes 2 times daily 10 mL 3    lidocaine (XYLOCAINE) 5 % external ointment Apply topically 3 times daily as needed for moderate pain (4-6) 150 g 3    loperamide (IMODIUM A-D) 2 MG tablet Take 1 tablet (2 mg) by mouth 4 times daily as needed for diarrhea 30 tablet 1    loratadine-pseudoePHEDrine (CLARITIN-D 24-HOUR)  MG 24 hr tablet Take 1 tablet by mouth daily 30 tablet 1    meclizine (ANTIVERT) 25 MG tablet Take 1 tablet (25 mg) by mouth 3 times daily as needed for dizziness 30 tablet 1    meloxicam (MOBIC) 15 MG tablet Take 1 tablet (15 mg) by mouth daily. 180 tablet 1    naloxone (NARCAN) 4 MG/0.1ML nasal spray Spray 4 mg into one nostril alternating nostrils once as needed.      olopatadine (PATADAY) 0.2 % ophthalmic solution Place 0.05 mLs (1 drop) into both eyes daily 2.5 mL 3    ondansetron (ZOFRAN) 4 MG tablet Take 1 tablet (4 mg) by mouth every 8 hours as needed for nausea or vomiting 12 tablet 4    polyethylene glycol (MIRALAX) 17 GM/Dose powder Take 17 g (1 Capful) by mouth daily 510 g 1    rosuvastatin (CRESTOR) 20 MG tablet Take 2 tablets (40 mg) by mouth daily. 90 tablet 6    scopolamine (TRANSDERM) 1 MG/3DAYS 72 hr patch Place 1 patch onto the skin every 72 hours. 10 patch 3    SM STOOL SOFTENER 100 MG capsule TAKE ONE CAPSULE BY MOUTH ONCE DAILY AS NEEDED FOR CONSTIPATION 30 capsule 1    sulfaSALAzine ER (AZULFIDINE EN) 500 MG EC tablet Take 2 tablets (1,000 mg) by mouth 2 times daily. 360 tablet 2    tofacitinib (XELJANZ XR) 11 MG 24 hr tablet Take 1 tablet (11 mg) by mouth daily Hold for signs of infection, then seek medical attention. 30 tablet 11    traMADol (ULTRAM) 50 MG tablet Take 1 tablet (50 mg) by mouth 2 times daily as needed for severe pain. FOR SEVERE PAIN 60 tablet 0    traZODone (DESYREL) 50 MG tablet Take 2  tablets (100 mg) by mouth nightly as needed for sleep. 180 tablet 4    triamcinolone (KENALOG) 0.1 % external cream APPLY TOPICALLY 2 TIMES DAILY 30 g 1    VITAMIN D3 50 MCG (2000 UT) tablet TAKE ONE TABLET BY MOUTH ONCE DAILY 90 tablet 3     Allergies   Allergen Reactions    Contrast Dye Nausea and Vomiting    Diatrizoate Nausea and Vomiting    Morphine And Codeine Itching and Nausea and Vomiting    Percocet [Oxycodone-Acetaminophen] Nausea and Vomiting and Itching    Advil [Ibuprofen Micronized] Rash     Rash     Ibuprofen Rash     Current providers sharing in care for this patient include:  Patient Care Team:  Terri Robles MD as PCP - General (Family Practice)  Terri Robles MD as Assigned PCP  Hayde Godwin RN as Personal Advocate & Liaison (PAL) (Family Medicine)  Jonny Wilson RPH as Pharmacist (Pharmacist)  Ray Cuba MD as MD (Rheumatology)  Chelly Cedeno RPH as Pharmacist  Terri Robles MD as Assigned Pain Medication Provider  Ray Cuba MD as Assigned Rheumatology Provider  Chelly Cedeno RPH as Assigned MTM Pharmacist    The following health maintenance items are reviewed in Epic and correct as of today:  Health Maintenance   Topic Date Due    HEPATITIS A IMMUNIZATION (1 of 2 - Risk 2-dose series) Never done    CBC  12/11/2024    MAMMO SCREENING  12/14/2024    BMP  09/11/2025    CMP  09/11/2025    LIPID  09/11/2025    ANNUAL REVIEW OF HM ORDERS  09/11/2025    CONTROLLED SUBSTANCE AGREEMENT FOR CHRONIC PAIN MANAGEMENT  09/11/2025    MEDICARE ANNUAL WELLNESS VISIT  12/18/2025    JOSE DAVID ASSESSMENT  12/18/2025    PHQ-9  12/18/2025    GLUCOSE  09/11/2027    ADVANCE CARE PLANNING  09/25/2028    DTAP/TDAP/TD IMMUNIZATION (3 - Td or Tdap) 02/26/2030    COLORECTAL CANCER SCREENING  11/12/2031    RSV VACCINE (1 - 1-dose 75+ series) 02/13/2051    HEPATITIS C SCREENING  Completed    HIV SCREENING  Completed    PHQ-2 (once per calendar year)  Completed    INFLUENZA VACCINE  Completed     "Pneumococcal Vaccine: Pediatrics (0 to 5 Years) and At-Risk Patients (6 to 64 Years)  Completed    HPV IMMUNIZATION  Aged Out    MENINGITIS IMMUNIZATION  Aged Out    RSV MONOCLONAL ANTIBODY  Aged Out    HPV TEST  Discontinued    URINE DRUG SCREEN  Discontinued    PAP  Discontinued    HEPATITIS B IMMUNIZATION  Discontinued    COVID-19 Vaccine  Discontinued         Review of Systems  Constitutional, HEENT, cardiovascular, pulmonary, gi and gu systems are negative, except as otherwise noted.     Objective    Exam  /77 (BP Location: Right arm, Patient Position: Sitting, Cuff Size: Adult Regular)   Pulse 66   Temp 98.1  F (36.7  C) (Temporal)   Resp 14   Ht 1.499 m (4' 11\")   Wt 55.8 kg (123 lb)   LMP 03/26/2019 (Within Days)   SpO2 95%   Breastfeeding No   BMI 24.84 kg/m     Estimated body mass index is 24.84 kg/m  as calculated from the following:    Height as of this encounter: 1.499 m (4' 11\").    Weight as of this encounter: 55.8 kg (123 lb).    Physical Exam  GENERAL: alert and no distress  EYES: Eyes grossly normal to inspection, PERRL and conjunctivae and sclerae normal  HENT: ear canals and TM's normal, nose and mouth without ulcers or lesions  NECK: no adenopathy, no asymmetry, masses, or scars  RESP: lungs clear to auscultation - no rales, rhonchi or wheezes  BREAST: normal without masses, tenderness or nipple discharge and no palpable axillary masses or adenopathy  CV: regular rate and rhythm, normal S1 S2, no S3 or S4, no murmur, click or rub, no peripheral edema  ABDOMEN: soft, nontender, no hepatosplenomegaly, no masses and bowel sounds normal  MS: no gross musculoskeletal defects noted, no edema  SKIN: no suspicious lesions or rashes  NEURO: Normal strength and tone, mentation intact and speech normal  PSYCH: mentation appears normal, affect normal/bright  LYMPH: no cervical, supraclavicular, axillary, or inguinal adenopathy         12/18/2024   Mini Cog   Clock Draw Score 2 Normal   3 Item " Recall 3 objects recalled   Mini Cog Total Score 5                 Signed Electronically by: Terri Robles MD

## 2024-12-18 NOTE — PATIENT INSTRUCTIONS
Patient Education   Preventive Care Advice   This is general advice given by our system to help you stay healthy. However, your care team may have specific advice just for you. Please talk to your care team about your preventive care needs.  Nutrition  Eat 5 or more servings of fruits and vegetables each day.  Try wheat bread, brown rice and whole grain pasta (instead of white bread, rice, and pasta).  Get enough calcium and vitamin D. Check the label on foods and aim for 100% of the RDA (recommended daily allowance).  Lifestyle  Exercise at least 150 minutes each week  (30 minutes a day, 5 days a week).  Do muscle strengthening activities 2 days a week. These help control your weight and prevent disease.  No smoking.  Wear sunscreen to prevent skin cancer.  Have a dental exam and cleaning every 6 months.  Yearly exams  See your health care team every year to talk about:  Any changes in your health.  Any medicines your care team has prescribed.  Preventive care, family planning, and ways to prevent chronic diseases.  Shots (vaccines)   HPV shots (up to age 26), if you've never had them before.  Hepatitis B shots (up to age 59), if you've never had them before.  COVID-19 shot: Get this shot when it's due.  Flu shot: Get a flu shot every year.  Tetanus shot: Get a tetanus shot every 10 years.  Pneumococcal, hepatitis A, and RSV shots: Ask your care team if you need these based on your risk.  Shingles shot (for age 50 and up)  General health tests  Diabetes screening:  Starting at age 35, Get screened for diabetes at least every 3 years.  If you are younger than age 35, ask your care team if you should be screened for diabetes.  Cholesterol test: At age 39, start having a cholesterol test every 5 years, or more often if advised.  Bone density scan (DEXA): At age 50, ask your care team if you should have this scan for osteoporosis (brittle bones).  Hepatitis C: Get tested at least once in your life.  STIs (sexually  transmitted infections)  Before age 24: Ask your care team if you should be screened for STIs.  After age 24: Get screened for STIs if you're at risk. You are at risk for STIs (including HIV) if:  You are sexually active with more than one person.  You don't use condoms every time.  You or a partner was diagnosed with a sexually transmitted infection.  If you are at risk for HIV, ask about PrEP medicine to prevent HIV.  Get tested for HIV at least once in your life, whether you are at risk for HIV or not.  Cancer screening tests  Cervical cancer screening: If you have a cervix, begin getting regular cervical cancer screening tests starting at age 21.  Breast cancer scan (mammogram): If you've ever had breasts, begin having regular mammograms starting at age 40. This is a scan to check for breast cancer.  Colon cancer screening: It is important to start screening for colon cancer at age 45.  Have a colonoscopy test every 10 years (or more often if you're at risk) Or, ask your provider about stool tests like a FIT test every year or Cologuard test every 3 years.  To learn more about your testing options, visit:   .  For help making a decision, visit:   https://bit.ly/ys11989.  Prostate cancer screening test: If you have a prostate, ask your care team if a prostate cancer screening test (PSA) at age 55 is right for you.  Lung cancer screening: If you are a current or former smoker ages 50 to 80, ask your care team if ongoing lung cancer screenings are right for you.  For informational purposes only. Not to replace the advice of your health care provider. Copyright   2023 [facility]. All rights reserved. Clinically reviewed by the [facility]. [facility] (PRN) [id number] - REV 01/24.  Learning About Activities of Daily Living  What are activities of daily living?     Activities of daily living (ADLs) are the basic self-care tasks you do every day. These include eating, bathing, dressing,  and moving around.  As you age, and if you have health problems, you may find that it's harder to do some of these tasks. If so, your doctor can suggest ideas that may help.  To measure what kind of help you may need, your doctor will ask how well you are able to do ADLs. Let your doctor know if there are any tasks that you are having trouble doing. This is an important first step to getting help. And when you have the help you need, you can stay as independent as possible.  How will a doctor assess your ADLs?  Asking about ADLs is part of a routine health checkup your doctor will likely do as you age. Your health check might be done in a doctor's office, in your home, or at a hospital. The goal is to find out if you are having any problems that could make it hard to care for yourself or that make it unsafe for you to be on your own.  To measure your ADLs, your doctor will ask how hard it is for you to do routine tasks. Your doctor may also want to know if you have changed the way you do a task because of a health problem. Your doctor may watch how you:  Walk back and forth.  Keep your balance while you stand or walk.  Move from sitting to standing or from a bed to a chair.  Button or unbutton a shirt or sweater.  Remove and put on your shoes.  It's common to feel a little worried or anxious if you find you can't do all the things you used to be able to do. Talking with your doctor about ADLs is a way to make sure you're as safe as possible and able to care for yourself as well as you can. You may want to bring a caregiver, friend, or family member to your checkup. They can help you talk to your doctor.  Follow-up care is a key part of your treatment and safety. Be sure to make and go to all appointments, and call your doctor if you are having problems. It's also a good idea to know your test results and keep a list of the medicines you take.  Current as of: October 24, 2023  Content Version: 14.3    2024 [facility]  [employer].   Care instructions adapted under license by your healthcare professional. If you have questions about a medical condition or this instruction, always ask your healthcare professional. [facility] [employer] disclaims any warranty or liability for your use of this information.    Preventing Falls: Care Instructions  Injuries and health problems such as trouble walking or poor eyesight can increase your risk of falling. So can some medicines. But there are things you can do to help prevent falls. You can exercise to get stronger. You can also arrange your home to make it safer.    Talk to your doctor about the medicines you take. Ask if any of them increase the risk of falls and whether they can be changed or stopped.   Try to exercise regularly. It can help improve your strength and balance. This can help lower your risk of falling.         Practice fall safety and prevention.   Wear low-heeled shoes that fit well and give your feet good support. Talk to your doctor if you have foot problems that make this hard.  Carry a cellphone or wear a medical alert device that you can use to call for help.  Use stepladders instead of chairs to reach high objects. Don't climb if you're at risk for falls. Ask for help, if needed.  Wear the correct eyeglasses, if you need them.        Make your home safer.   Remove rugs, cords, clutter, and furniture from walkways.  Keep your house well lit. Use night-lights in hallways and bathrooms.  Install and use sturdy handrails on stairways.  Wear nonskid footwear, even inside. Don't walk barefoot or in socks without shoes.        Be safe outside.   Use handrails, curb cuts, and ramps whenever possible.  Keep your hands free by using a shoulder bag or backpack.  Try to walk in well-lit areas. Watch out for uneven ground, changes in pavement, and debris.  Be careful in the winter. Walk on the grass or gravel when sidewalks are slippery. Use de-icer on steps and walkways.  "Add non-slip devices to shoes.    Put grab bars and nonskid mats in your shower or tub and near the toilet. Try to use a shower chair or bath bench when bathing.   Get into a tub or shower by putting in your weaker leg first. Get out with your strong side first. Have a phone or medical alert device in the bathroom with you.   Where can you learn more?  Go to https://www.[employer]/patiented  Enter G117 in the search box to learn more about \"Preventing Falls: Care Instructions.\"  Current as of: July 31, 2024  Content Version: 14.3    2024 [employer].   Care instructions adapted under license by your healthcare professional. If you have questions about a medical condition or this instruction, always ask your healthcare professional. [employer] disclaims any warranty or liability for your use of this information.    Learning About Stress  What is stress?     Stress is your body's response to a hard situation. Your body can have a physical, emotional, or mental response. Stress is a fact of life for most people, and it affects everyone differently. What causes stress for you may not be stressful for someone else.  A lot of things can cause stress. You may feel stress when you go on a job interview, take a test, or run a race. This kind of short-term stress is normal and even useful. It can help you if you need to work hard or react quickly. For example, stress can help you finish an important job on time.  Long-term stress is caused by ongoing stressful situations or events. Examples of long-term stress include long-term health problems, ongoing problems at work, or conflicts in your family. Long-term stress can harm your health.  How does stress affect your health?  When you are stressed, your body responds as though you are in danger. It makes hormones that speed up your heart, make you breathe faster, and give you a burst of energy. This is called the fight-or-flight stress response. If " the stress is over quickly, your body goes back to normal and no harm is done.  But if stress happens too often or lasts too long, it can have bad effects. Long-term stress can make you more likely to get sick, and it can make symptoms of some diseases worse. If you tense up when you are stressed, you may develop neck, shoulder, or low back pain. Stress is linked to high blood pressure and heart disease.  Stress also harms your emotional health. It can make you day, tense, or depressed. Your relationships may suffer, and you may not do well at work or school.  What can you do to manage stress?  You can try these things to help manage stress:   Do something active. Exercise or activity can help reduce stress. Walking is a great way to get started. Even everyday activities such as housecleaning or yard work can help.  Try yoga or wang chi. These techniques combine exercise and meditation. You may need some training at first to learn them.  Do something you enjoy. For example, listen to music or go to a movie. Practice your hobby or do volunteer work.  Meditate. This can help you relax, because you are not worrying about what happened before or what may happen in the future.  Do guided imagery. Imagine yourself in any setting that helps you feel calm. You can use online videos, books, or a teacher to guide you.  Do breathing exercises. For example:  From a standing position, bend forward from the waist with your knees slightly bent. Let your arms dangle close to the floor.  Breathe in slowly and deeply as you return to a standing position. Roll up slowly and lift your head last.  Hold your breath for just a few seconds in the standing position.  Breathe out slowly and bend forward from the waist.  Let your feelings out. Talk, laugh, cry, and express anger when you need to. Talking with supportive friends or family, a counselor, or a sandra leader about your feelings is a healthy way to relieve stress. Avoid discussing  "your feelings with people who make you feel worse.  Write. It may help to write about things that are bothering you. This helps you find out how much stress you feel and what is causing it. When you know this, you can find better ways to cope.  What can you do to prevent stress?  You might try some of these things to help prevent stress:  Manage your time. This helps you find time to do the things you want and need to do.  Get enough sleep. Your body recovers from the stresses of the day while you are sleeping.  Get support. Your family, friends, and community can make a difference in how you experience stress.  Limit your news feed. Avoid or limit time on social media or news that may make you feel stressed.  Do something active. Exercise or activity can help reduce stress. Walking is a great way to get started.  Where can you learn more?  Go to https://www.[employer]/patiented  Enter N032 in the search box to learn more about \"Learning About Stress.\"  Current as of: October 24, 2023  Content Version: 14.3    2024 [employer].   Care instructions adapted under license by your healthcare professional. If you have questions about a medical condition or this instruction, always ask your healthcare professional. [employer] disclaims any warranty or liability for your use of this information.    Learning About Sleeping Well  What does sleeping well mean?     Sleeping well means getting enough sleep to feel good and stay healthy. How much sleep is enough varies among people.  The number of hours you sleep and how you feel when you wake up are both important. If you do not feel refreshed, you probably need more sleep. Another sign of not getting enough sleep is feeling tired during the day.  Experts recommend that adults get at least 7 or more hours of sleep per day. Children and older adults need more sleep.  Why is getting enough sleep important?  Getting enough quality sleep is a basic part " "of good health. When your sleep suffers, your physical health, mood, and your thoughts can suffer too. You may find yourself feeling more grumpy or stressed. Not getting enough sleep also can lead to serious problems, including injury, accidents, anxiety, and depression.  What might cause poor sleeping?  Many things can cause sleep problems, including:  Changes to your sleep schedule.  Stress. Stress can be caused by fear about a single event, such as giving a speech. Or you may have ongoing stress, such as worry about work or school.  Depression, anxiety, and other mental or emotional conditions.  Changes in your sleep habits or surroundings. This includes changes that happen where you sleep, such as noise, light, or sleeping in a different bed. It also includes changes in your sleep pattern, such as having jet lag or working a late shift.  Health problems, such as pain, breathing problems, and restless legs syndrome.  Lack of regular exercise.  Using alcohol, nicotine, or caffeine before bed.  How can you help yourself?  Here are some tips that may help you sleep more soundly and wake up feeling more refreshed.  Your sleeping area   Use your bedroom only for sleeping and sex. A bit of light reading may help you fall asleep. But if it doesn't, do your reading elsewhere in the house. Try not to use your TV, computer, smartphone, or tablet while you are in bed.  Be sure your bed is big enough to stretch out comfortably, especially if you have a sleep partner.  Keep your bedroom quiet, dark, and cool. Use curtains, blinds, or a sleep mask to block out light. To block out noise, use earplugs, soothing music, or a \"white noise\" machine.  Your evening and bedtime routine   Create a relaxing bedtime routine. You might want to take a warm shower or bath, or listen to soothing music.  Go to bed at the same time every night. And get up at the same time every morning, even if you feel tired.  What to avoid   Limit caffeine " "(coffee, tea, caffeinated sodas) during the day, and don't have any for at least 6 hours before bedtime.  Avoid drinking alcohol before bedtime. Alcohol can cause you to wake up more often during the night.  Try not to smoke or use tobacco, especially in the evening. Nicotine can keep you awake.  Limit naps during the day, especially close to bedtime.  Avoid lying in bed awake for too long. If you can't fall asleep or if you wake up in the middle of the night and can't get back to sleep within about 20 minutes, get out of bed and go to another room until you feel sleepy.  Avoid taking medicine right before bed that may keep you awake or make you feel hyper or energized. Your doctor can tell you if your medicine may do this and if you can take it earlier in the day.  If you can't sleep   Imagine yourself in a peaceful, pleasant scene. Focus on the details and feelings of being in a place that is relaxing.  Get up and do a quiet or boring activity until you feel sleepy.  Avoid drinking any liquids before going to bed to help prevent waking up often to use the bathroom.  Where can you learn more?  Go to https://www.Remit[employer].net/patiented  Enter J942 in the search box to learn more about \"Learning About Sleeping Well.\"  Current as of: July 31, 2024  Content Version: 14.3    2024 [employer].   Care instructions adapted under license by your healthcare professional. If you have questions about a medical condition or this instruction, always ask your healthcare professional. [employer] disclaims any warranty or liability for your use of this information.    Learning About Depression Screening  What is depression screening?  Depression screening is a way to see if you have depression symptoms. It may be done by a doctor or counselor. It's often part of a routine checkup. That's because your mental health is just as important as your physical health.  Depression is a mental health condition that " "affects how you feel, think, and act. You may:  Have less energy.  Lose interest in your daily activities.  Feel sad and grouchy for a long time.  Depression is very common. It affects people of all ages.  Many things can lead to depression. Some people become depressed after they have a stroke or find out they have a major illness like cancer or heart disease. The death of a loved one or a breakup may lead to depression. It can run in families. Most experts believe that a combination of inherited genes and stressful life events can cause it.  What happens during screening?  You may be asked to fill out a form about your depression symptoms. You and the doctor will discuss your answers. The doctor may ask you more questions to learn more about how you think, act, and feel.  What happens after screening?  If you have symptoms of depression, your doctor will talk to you about your options.  Doctors usually treat depression with medicines or counseling. Often, combining the two works best. Many people don't get help because they think that they'll get over the depression on their own. But people with depression may not get better unless they get treatment.  The cause of depression is not well understood. There may be many factors involved. But if you have depression, it's not your fault.  A serious symptom of depression is thinking about death or suicide. If you or someone you care about talks about this or about feeling hopeless, get help right away.  It's important to know that depression can be treated. Medicine, counseling, and self-care may help.  Where can you learn more?  Go to https://www.[employer].net/patiented  Enter T185 in the search box to learn more about \"Learning About Depression Screening.\"  Current as of: July 31, 2024  Content Version: 14.3    2024 [employer].   Care instructions adapted under license by your healthcare professional. If you have questions about a medical condition or " this instruction, always ask your healthcare professional. [employer] disclaims any warranty or liability for your use of this information.    Chronic Pain: Care Instructions  Your Care Instructions     Chronic pain is pain that lasts a long time (months or even years) and may or may not have a clear cause. It is different from acute pain, which usually does have a clear cause--like an injury or illness--and gets better over time. Chronic pain:  Lasts over time but may vary from day to day.  Does not go away despite efforts to end it.  May disrupt your sleep and lead to fatigue.  May cause depression or anxiety.  May make your muscles tense, causing more pain.  Can disrupt your work, hobbies, home life, and relationships with friends and family.  Chronic pain is a very real condition. It is not just in your head. Treatment can help and usually includes several methods used together, such as medicines, physical therapy, exercise, and other treatments. Learning how to relax and changing negative thought patterns can also help you cope.  Chronic pain is complex. Taking an active role in your treatment will help you better manage your pain. Tell your doctor if you have trouble dealing with your pain. You may have to try several things before you find what works best for you.  Follow-up care is a key part of your treatment and safety. Be sure to make and go to all appointments, and call your doctor if you are having problems. It's also a good idea to know your test results and keep a list of the medicines you take.  How can you care for yourself at home?  Pace yourself. Break up large jobs into smaller tasks. Save harder tasks for days when you have less pain, or go back and forth between hard tasks and easier ones. Take rest breaks.  Relax, and reduce stress. Relaxation techniques such as deep breathing or meditation can help.  Keep moving. Gentle, daily exercise can help reduce pain over the long run. Try  low- or no-impact exercises such as walking, swimming, and stationary biking. Do stretches to stay flexible.  Try heat, cold packs, and massage.  Get enough sleep. Chronic pain can make you tired and drain your energy. Talk with your doctor if you have trouble sleeping because of pain.  Think positive. Your thoughts can affect your pain level. Do things that you enjoy to distract yourself when you have pain instead of focusing on the pain. See a movie, read a book, listen to music, or spend time with a friend.  If you think you are depressed, talk to your doctor about treatment.  Keep a daily pain diary. Record how your moods, thoughts, sleep patterns, activities, and medicine affect your pain. You may find that your pain is worse during or after certain activities or when you are feeling a certain emotion. Having a record of your pain can help you and your doctor find the best ways to treat your pain.  Take pain medicines exactly as directed.  If the doctor gave you a prescription medicine for pain, take it as prescribed.  If you are not taking a prescription pain medicine, ask your doctor if you can take an over-the-counter medicine.  Reducing constipation caused by pain medicine  Talk to your doctor about a laxative. If a laxative doesn't work, your doctor may suggest a prescription medicine.  Include fruits, vegetables, beans, and whole grains in your diet each day. These foods are high in fiber.  If your doctor recommends it, get more exercise. Walking is a good choice. Bit by bit, increase the amount you walk every day. Try for at least 30 minutes on most days of the week.  Schedule time each day for a bowel movement. A daily routine may help. Take your time and do not strain when having a bowel movement.  When should you call for help?   Call your doctor now or seek immediate medical care if:    Your pain gets worse or is out of control.     You feel down or blue, or you do not enjoy things like you once did.  "You may be depressed, which is common in people with chronic pain. Depression can be treated.     You have vomiting or cramps for more than 2 hours.   Watch closely for changes in your health, and be sure to contact your doctor if:    You cannot sleep because of pain.     You are very worried or anxious about your pain.     You have trouble taking your pain medicine.     You have any concerns about your pain medicine.     You have trouble with bowel movements, such as:  No bowel movement in 3 days.  Blood in the anal area, in your stool, or on the toilet paper.  Diarrhea for more than 24 hours.   Where can you learn more?  Go to [email or web address]  Enter N004 in the search box to learn more about \"Chronic Pain: Care Instructions.\"  Current as of: July 31, 2024  Content Version: 14.3    2024 [email or web address].   Care instructions adapted under license by your healthcare professional. If you have questions about a medical condition or this instruction, always ask your healthcare professional. [email or web address] disclaims any warranty or liability for your use of this information.       "

## 2024-12-19 DIAGNOSIS — E78.00 HYPERCHOLESTEROLEMIA: Primary | ICD-10-CM

## 2024-12-23 ENCOUNTER — ANCILLARY PROCEDURE (OUTPATIENT)
Dept: GENERAL RADIOLOGY | Facility: CLINIC | Age: 49
End: 2024-12-23
Attending: FAMILY MEDICINE
Payer: MEDICARE

## 2024-12-23 DIAGNOSIS — M54.6 LEFT-SIDED THORACIC BACK PAIN, UNSPECIFIED CHRONICITY: ICD-10-CM

## 2024-12-23 PROCEDURE — 71046 X-RAY EXAM CHEST 2 VIEWS: CPT | Mod: TC | Performed by: RADIOLOGY

## 2024-12-24 ENCOUNTER — TELEPHONE (OUTPATIENT)
Dept: FAMILY MEDICINE | Facility: CLINIC | Age: 49
End: 2024-12-24
Payer: MEDICARE

## 2024-12-24 DIAGNOSIS — M47.819 SPONDYLOARTHROPATHY: ICD-10-CM

## 2024-12-24 DIAGNOSIS — G89.4 CHRONIC PAIN SYNDROME: Primary | ICD-10-CM

## 2024-12-24 NOTE — TELEPHONE ENCOUNTER
Okay I have both filled out and linked to dx.     I will sign whichever patient prefers   MTM might be quicker

## 2024-12-24 NOTE — TELEPHONE ENCOUNTER
Dr Baron   Patient prefers MTM please     RN spoke to patient  Explained drug interactions   Patient prefers MTM over pain clinic - advised after pcp signs referral they will call to schedule, patient states understanding     Flor Maddox, Registered Nurse  [facility]

## 2024-12-24 NOTE — TELEPHONE ENCOUNTER
Dr. Cline   Are you able to place referral to either MTM or Pain management before RN's call patient so we have a set plan when we call ?   Do you want the tramadol stopped ?   Any alternatives for now as will not be able to get into pain clinic or MTM for a bit.     Thank you   Flor Maddox, Registered Nurse  [facility]     FW: [CLINICAL PROGRAM NOTIFICATION] [JANKI] [AQUINO]  Received: 2 days ago  Alicia Rock MD  [facility]    It turns out she the tramadol she is on for her pain has a drug interaction with duloxetine.   We should try something else for the more severe pain.   We could do a referral to the pain clinic or MTM          Previous Messages       Associated Attachment(s)    1. PDF Attachment       Patient Health Summary  12/20/2024      JANKI AQUINO - 49 y.o. Female; born [birth year][facility] Consultation Note, generated on Dec. 20, 2024December 20, 2024   NOTES      December 20, 2024     Re: Retrospective Intervention Program     Dear ALICIA ROCK MD     [facility]  administers a retrospective intervention program to promote the safe and appropriate use of medications for your patient who is a [facility]  member. The report below identifies your patient with potential clinical concerns that require your attention.     This report does not take into account patient-specific variables that may factor into your prescribing decisions.     If you are already aware of the potential concern, please disregard this notice and continue to monitor your patient. Please contact the dispensing pharmacy directly if any of the following apply:      The patient is not under your care      You did not prescribe the identified medication(s)      You want to make changes to the patient s medication therapy     If you have questions about this report, please contact [facility]  at [phone]. Please reference the case or correspondence  number. Thank you.       Sincerely,     The [facility] Team     [facility] Medicare & skilled nursing          Case Number:[id number]     Potential Clinical Concern:Drug-Drug Interaction: TRAMADOL HCL and DULOXETINE HCL from any prescribers       NOTES  Concurrent use of tramadol and selective serotonin reuptake inhibitor (SSRI) or serotonin-norepinephrine reuptake inhibitor (SNRI) or vortioxetine may increase the risk of seizures and serotonin syndrome (hypertension, hyperthermia, myoclonus, mental status changes). The risk is further increased when the dose of tramadol ER exceeds the maximum recommended dose of 300 mg/day. Please review and discontinue, if clinically warranted.         The following table shows the pharmacy claims related to the potential clinical concern identified above:     NOTES  Drug Name Fill Date Qty Days Supply Prescriber Name Prescriber Phone # Pharmacy Name Pharmacy Phone #   TRAMADOL HCL TAB 50MG 2024 60 30 ALICIA ROCK [phone] [facility]  [phone]   DULOXETINE CAP 30MG 10/22/2024 180 90 ALICIA ROCK [phone] [facility]  [phone]        The aggregate data used for this report may have limitations that could cause patients or data to be mistakenly identified [e.g., patient is , no longer eligible for pharmacy benefits, already taking the recommended therapy, or not appropriate candidate for the recommended therapy, etc.]. If your patient or the data has been mistakenly identified, please disregard this notice. You do not need to respond to this report. If you did not prescribe the identified medication(s), or if the patient is not under your care, please contact the dispensing pharmacy.     Insurance coverage provided by or through UnitedHealthcare Insurance Company or its affiliates. Health plan coverage provided by [employer] [place], Inc., [facility]  MR [facility] [employer]   [place], [facility] Benefits Plan of [place], UnitedHealthcare of [place], Inc., UnitedHealthcare of [facility], Inc., MAMSI Life and Health Insurance Company, Valldata ServicesHealthcare of [place], Inc., [employer] Insurance Co. of [place], UnitedHealthcare of [place], Inc., UnitedHealthcare of [place], Inc., UnitedHealthcare of [place], Inc., UnitedHealthcare of [place], Inc., UnitedHealthcare Benefits of [place], Inc., UnitedHealthcare of [place], Inc., UnitedHealthcare of [place], Inc., Optimum Choice, Inc., Oxford Health Insurance, Inc., Validus Health Plans [place] Inc., [place] Health Plans ([place]), Inc., All Savers Insurance Company, or other affiliates. Administrative services provided by [employer], [employer], [employer], [facility] and [place], United HealthCare Services, Inc., or other affiliates. Behavioral health products provided by U.S. Behavioral Health Plan, [place] ([facility]) or its affiliates. [facility] is a product of UnitedHealthcare Insurance Company.     This document and others if attached contain information from [employer] that is proprietary, confidential and/or may contain protected health information (PHI). We are required to safeguard PHI by applicable law. The information in this document is for the sole use of the person(s) or company named above. If you received this document by mistake, please know that sharing, copying, distributing or using information in this document is against the law. If you are not the intended recipient, please notify the sender immediately and return the document(s) by mail to [facility], [address], [place] [address].     Data for patient identification is obtained from the member's most currently available pharmacy claims, as provided by the plan's pharmacy benefit manager, [employer], a [employer].     [employer] clinical notifications are sent to you for the safety of your  patients. If you want to opt out of these notifications, email [email or web address]. Please note By opting out, you risk missing important medication alerts that could cause disruption to your patients  therapy.          [employer]ull and the Closetbox logo are trademarks of UnitedHealth Group Incorporated. All other trademarks are the property of their respective owners.   2024 [employer], Inc. All rights reserved.         ALLERGIES, ADVERSE REACTIONS, ALERTS      No Information    Problems      No Information    PROCEDURES      No Information    RESULTS      No Information    MEDICATIONS      No Information    Patient Demographics    Patient Demographics  Patient Address Patient Name Communication   [id number] [place] [address] JANKI AQUINO [id number]     Patient Demographics  Language Race / Ethnicity Marital Status   Unknown Unknown / Unknown Unknown     Document Information    Primary Care Provider Other Service Providers Document Coverage Dates   ALICIA ROCK MD     Aug. 21, 2024August 21, 2024 - Dec. 18, 2024December 18, 2024      Organization   Clinical Department  [phone]  [facility] Clinical Department, [address] [place] [phone]     Flor Maddox Registered Nurse  [facility]

## 2024-12-30 ENCOUNTER — TELEPHONE (OUTPATIENT)
Dept: FAMILY MEDICINE | Facility: CLINIC | Age: 49
End: 2024-12-30

## 2024-12-30 ENCOUNTER — OFFICE VISIT (OUTPATIENT)
Dept: FAMILY MEDICINE | Facility: CLINIC | Age: 49
End: 2024-12-30
Payer: MEDICARE

## 2024-12-30 VITALS
DIASTOLIC BLOOD PRESSURE: 76 MMHG | BODY MASS INDEX: 24.39 KG/M2 | HEIGHT: 59 IN | SYSTOLIC BLOOD PRESSURE: 115 MMHG | HEART RATE: 66 BPM | OXYGEN SATURATION: 99 % | WEIGHT: 121 LBS | RESPIRATION RATE: 13 BRPM | TEMPERATURE: 98 F

## 2024-12-30 DIAGNOSIS — M77.8 RIGHT SHOULDER TENDONITIS: ICD-10-CM

## 2024-12-30 DIAGNOSIS — M67.88 RIGHT PERONEAL TENDINOSIS: ICD-10-CM

## 2024-12-30 DIAGNOSIS — M54.2 CERVICALGIA: ICD-10-CM

## 2024-12-30 DIAGNOSIS — M25.512 LEFT SHOULDER PAIN, UNSPECIFIED CHRONICITY: ICD-10-CM

## 2024-12-30 DIAGNOSIS — G89.4 CHRONIC PAIN SYNDROME: Primary | ICD-10-CM

## 2024-12-30 DIAGNOSIS — F33.1 MODERATE RECURRENT MAJOR DEPRESSION (H): ICD-10-CM

## 2024-12-30 DIAGNOSIS — M54.6 LEFT-SIDED THORACIC BACK PAIN, UNSPECIFIED CHRONICITY: Primary | ICD-10-CM

## 2024-12-30 PROCEDURE — 99214 OFFICE O/P EST MOD 30 MIN: CPT | Performed by: FAMILY MEDICINE

## 2024-12-30 RX ORDER — DULOXETINE 40 MG/1
40 CAPSULE, DELAYED RELEASE ORAL 2 TIMES DAILY
Qty: 60 CAPSULE | Refills: 5 | Status: SHIPPED | OUTPATIENT
Start: 2024-12-30

## 2024-12-30 RX ORDER — HYDROCODONE BITARTRATE AND ACETAMINOPHEN 5; 325 MG/1; MG/1
1 TABLET ORAL EVERY 6 HOURS PRN
Qty: 30 TABLET | Refills: 0 | Status: SHIPPED | OUTPATIENT
Start: 2024-12-30 | End: 2025-01-02

## 2024-12-30 ASSESSMENT — PATIENT HEALTH QUESTIONNAIRE - PHQ9
10. IF YOU CHECKED OFF ANY PROBLEMS, HOW DIFFICULT HAVE THESE PROBLEMS MADE IT FOR YOU TO DO YOUR WORK, TAKE CARE OF THINGS AT HOME, OR GET ALONG WITH OTHER PEOPLE: VERY DIFFICULT
SUM OF ALL RESPONSES TO PHQ QUESTIONS 1-9: 10
SUM OF ALL RESPONSES TO PHQ QUESTIONS 1-9: 10

## 2024-12-30 ASSESSMENT — ANXIETY QUESTIONNAIRES
8. IF YOU CHECKED OFF ANY PROBLEMS, HOW DIFFICULT HAVE THESE MADE IT FOR YOU TO DO YOUR WORK, TAKE CARE OF THINGS AT HOME, OR GET ALONG WITH OTHER PEOPLE?: SOMEWHAT DIFFICULT
4. TROUBLE RELAXING: NOT AT ALL
2. NOT BEING ABLE TO STOP OR CONTROL WORRYING: SEVERAL DAYS
1. FEELING NERVOUS, ANXIOUS, OR ON EDGE: NOT AT ALL
GAD7 TOTAL SCORE: 3
7. FEELING AFRAID AS IF SOMETHING AWFUL MIGHT HAPPEN: NOT AT ALL
GAD7 TOTAL SCORE: 3
IF YOU CHECKED OFF ANY PROBLEMS ON THIS QUESTIONNAIRE, HOW DIFFICULT HAVE THESE PROBLEMS MADE IT FOR YOU TO DO YOUR WORK, TAKE CARE OF THINGS AT HOME, OR GET ALONG WITH OTHER PEOPLE: SOMEWHAT DIFFICULT
6. BECOMING EASILY ANNOYED OR IRRITABLE: SEVERAL DAYS
7. FEELING AFRAID AS IF SOMETHING AWFUL MIGHT HAPPEN: NOT AT ALL
GAD7 TOTAL SCORE: 3
5. BEING SO RESTLESS THAT IT IS HARD TO SIT STILL: NOT AT ALL
3. WORRYING TOO MUCH ABOUT DIFFERENT THINGS: SEVERAL DAYS

## 2024-12-30 ASSESSMENT — ENCOUNTER SYMPTOMS: BACK PAIN: 1

## 2024-12-30 NOTE — PROGRESS NOTES
Assessment & Plan     Left-sided thoracic back pain, unspecified chronicity  PT for shoulder/scapula on the left   - Physical Therapy  Referral    Right peroneal tendinosis  Stable and chronic  Increase dose of duloxetine - stop tramadol and try hydrocodone prn for more severe pain until she can be seen by MTM for further advice    - DULoxetine 40 MG CPEP  Dispense: 60 capsule; Refill: 5  - HYDROcodone-acetaminophen (NORCO) 5-325 MG tablet  Dispense: 30 tablet; Refill: 0    Right shoulder tendonitis  See above   - DULoxetine 40 MG CPEP  Dispense: 60 capsule; Refill: 5  - HYDROcodone-acetaminophen (NORCO) 5-325 MG tablet  Dispense: 30 tablet; Refill: 0    Cervicalgia  See above   - DULoxetine 40 MG CPEP  Dispense: 60 capsule; Refill: 5  - HYDROcodone-acetaminophen (NORCO) 5-325 MG tablet  Dispense: 30 tablet; Refill: 0  - Physical Therapy  Referral    Left shoulder pain, unspecified chronicity  PT  - Physical Therapy  Referral    Moderate recurrent major depression (H)  Cymbalta was increased   Recheck in 2-3 months for mood and pain            Depression Screening Follow Up        12/30/2024     9:51 AM   PHQ   PHQ-9 Total Score 10    Q9: Thoughts of better off dead/self-harm past 2 weeks Not at all       Patient-reported         12/30/2024     9:51 AM   Last PHQ-9   1.  Little interest or pleasure in doing things 1   2.  Feeling down, depressed, or hopeless 1   3.  Trouble falling or staying asleep, or sleeping too much 1   4.  Feeling tired or having little energy 2   5.  Poor appetite or overeating 2   6.  Feeling bad about yourself 0   7.  Trouble concentrating 2   8.  Moving slowly or restless 1   Q9: Thoughts of better off dead/self-harm past 2 weeks 0   PHQ-9 Total Score 10        Patient-reported         Follow Up Actions Taken  Patient counseled, no additional follow up at this time.  Depression Action Plan reviewed with patient.  Patient declined referral.  Adjusted patient's  anti-depressant medication.       FUTURE APPOINTMENTS:       - Follow-up visit in one month with MTM   See Patient Instructions    Subjective   Zo is a 49 year old, presenting for the following health issues:  Back Pain (Follow-up)  She had xray of chest due to shoulder pain and/or back pain in the scapular region.   This was on the left.    In the past she has had right shoulder tendonitis and wrist but this is different.    The xray was normal.    She continues to have this ache.   she does note that in the last 4 months she gets 1 week long bouts of left scapular sharp pain worse with breathing. She does not have cough.     Her insurance company sent warning recently about drug interaction between cymbalta and tramadol.   She uses about 60 every 90 days    she does  not take this daily but uses for more severe pain.   She has a hard time with oxycodone but has tolerated hydrocodone in 2018 for a brief period of time.            12/30/2024     9:57 AM   Additional Questions   Roomed by Tyler SINGH CMA     Back Pain     History of Present Illness       Back Pain:  She presents for follow up of back pain. Patient's back pain is a chronic problem.  Location of back pain:  Right lower back, left lower back, right middle of back, left middle of back, right side of neck, left side of neck, right shoulder, left shoulder, right buttock and left buttock  Description of back pain: shooting  Back pain spreads: right buttocks, left buttocks, right knee, left knee, left foot, right shoulder, left shoulder, right side of neck and left side of neck    Since patient first noticed back pain, pain is: always present, but gets better and worse  Does back pain interfere with her job:  Yes       Headaches:   Since the patient's last clinic visit, headaches are: no change  The patient is getting headaches:  2 times a week  She is not able to do normal daily activities when she has a migraine.  The patient is taking the following  "rescue/relief medications:  Naproxyn (Aleve) and Tylenol   Patient states \"The relief is inconsistent\" from the rescue/relief medications.   The patient is taking the following medications to prevent migraines:  Other  In the past 4 weeks, the patient has gone to an Urgent Care or Emergency Room 0 times times due to headaches.    Reason for visit:  Shoulders and leg pain    She eats 0-1 servings of fruits and vegetables daily.She consumes 0 sweetened beverage(s) daily.   She is taking medications regularly.       Past Medical History:   Diagnosis Date    Anemia     History of blood transfusion 2000    after vaginal delivery, 2 units PRBCs given    HSIL on Pap smear 09/21/2011    MARTA 2 and 3    Immune to hepatitis B 08/2021    hep B antigen NEG    INFLAM SPONDYLOPATHY NOS 01/07/2008    check labs on sulfasalzine every 3 months and check hep B and C and TB every 2 years    Leukocytopenia 03/10/2014    Tendinosis     Thrombocytopenia (H) 03/10/2014    Unspecified closed fracture of pelvis 2000    left fracture of pelvis after delivery       Past Surgical History:   Procedure Laterality Date    COLONOSCOPY Left 11/12/2021    recheck in 10 years    DILATION AND CURETTAGE, ABLATE ENDOMETRIUM NOVASURE, COMBINED N/A 12/04/2018    Procedure: novasure endometrial ablation, myosure resection of leiomyoma, dilation and curettage;  Surgeon: Rolando Covarrubias MD;  Location: [facility] OR    GYN SURGERY  12/04/2018    fibroid removal    lap hysterectomy and salpingetcomy Bilateral 2019    done at [facility] - ovaries are in    LEEP TX, CERVICAL  12/19/2011    MARTA II    OPERATIVE HYSTEROSCOPY WITH MORCELLATOR N/A 12/04/2018    ovaries are still in.   Surgeon: Rolando Covarrubias MD;  Location: [facility] OR    TUBAL LIGATION  05/2009    laparoscopic tubal ligation    ZZC NONSPECIFIC PROCEDURE  10/2000    after delivery 2 units of prbcs secondary to placenta       MEDICATIONS:  Current Outpatient Medications   Medication Sig Dispense Refill    " amLODIPine (NORVASC) 2.5 MG tablet Take 1 tablet (2.5 mg) by mouth daily 90 tablet 3    aspirin-acetaminophen-caffeine (EXCEDRIN MIGRAINE) 250-250-65 MG tablet Take 1 tablet by mouth every 6 hours as needed for headaches      cyclobenzaprine (FLEXERIL) 5 MG tablet Take 1 tablet (5 mg) by mouth 3 times daily as needed for muscle spasms. 90 tablet 5    diclofenac (VOLTAREN) 1 % topical gel Apply to affected area twice daily 150 g 3    DULoxetine 40 MG CPEP Take 40 mg by mouth 2 times daily. 60 capsule 5    Evening Primrose Oil 500 MG CAPS Take 500 mg by mouth 2 times daily 60 capsule 11    FEROSUL 325 (65 Fe) MG tablet TAKE ONE TABLET BY MOUTH ONCE DAILY WITH BREAKFAST 90 tablet 3    fluticasone (FLONASE) 50 MCG/ACT nasal spray Spray 1-2 sprays into both nostrils daily 16 g 3    gabapentin (NEURONTIN) 300 MG capsule 1 tab in am and 1 at 5 pm  and 2 at bedtime for (4 per day)1 tab in am and 1 at 5 pm  and 2 at bedtime for (4 per day) 360 capsule 3    HYDROcodone-acetaminophen (NORCO) 5-325 MG tablet Take 1 tablet by mouth every 6 hours as needed for severe pain. 30 tablet 0    hydrocortisone 2.5 % cream Apply topically 2 times daily. 30 g 0    ketotifen fumarate 0.035% 0.035 % SOLN ophthalmic solution Place 1 drop into both eyes 2 times daily. 10 mL 2    lidocaine (XYLOCAINE) 5 % external ointment Apply topically 3 times daily as needed for moderate pain (4-6) 150 g 3    loperamide (IMODIUM A-D) 2 MG tablet Take 1 tablet (2 mg) by mouth 4 times daily as needed for diarrhea 30 tablet 1    loratadine-pseudoePHEDrine (CLARITIN-D 24-HOUR)  MG 24 hr tablet Take 1 tablet by mouth daily 30 tablet 1    meclizine (ANTIVERT) 25 MG tablet Take 1 tablet (25 mg) by mouth 3 times daily as needed for dizziness. 30 tablet 2    meloxicam (MOBIC) 15 MG tablet Take 1 tablet (15 mg) by mouth daily. 180 tablet 1    naloxone (NARCAN) 4 MG/0.1ML nasal spray Spray 4 mg into one nostril alternating nostrils once as needed.       olopatadine (PATADAY) 0.2 % ophthalmic solution Place 0.05 mLs (1 drop) into both eyes daily 2.5 mL 3    ondansetron (ZOFRAN) 4 MG tablet Take 1 tablet (4 mg) by mouth every 8 hours as needed for nausea or vomiting. 12 tablet 4    polyethylene glycol (MIRALAX) 17 GM/Dose powder Take 17 g (1 Capful) by mouth daily 510 g 1    pramoxine (PROCTOFOAM) 1 % foam Place rectally every 2 hours as needed for hemorrhoids. 15 g 5    rosuvastatin (CRESTOR) 40 MG tablet Take 1 tablet (40 mg) by mouth daily.      scopolamine (TRANSDERM) 1 MG/3DAYS 72 hr patch Place 1 patch onto the skin every 72 hours. 10 patch 3    SM STOOL SOFTENER 100 MG capsule TAKE ONE CAPSULE BY MOUTH ONCE DAILY AS NEEDED FOR CONSTIPATION 30 capsule 1    sulfaSALAzine ER (AZULFIDINE EN) 500 MG EC tablet Take 2 tablets (1,000 mg) by mouth 2 times daily. 360 tablet 2    tofacitinib (XELJANZ XR) 11 MG 24 hr tablet Take 1 tablet (11 mg) by mouth daily Hold for signs of infection, then seek medical attention. 30 tablet 11    traZODone (DESYREL) 50 MG tablet Take 2 tablets (100 mg) by mouth nightly as needed for sleep. 180 tablet 4    triamcinolone (KENALOG) 0.1 % external cream APPLY TOPICALLY 2 TIMES DAILY 30 g 1    VITAMIN D3 50 MCG (2000 UT) tablet TAKE ONE TABLET BY MOUTH ONCE DAILY 90 tablet 3     No current facility-administered medications for this visit.       SOCIAL HISTORY:  Social History     Tobacco Use    Smoking status: Never    Smokeless tobacco: Never   Substance Use Topics    Alcohol use: Not Currently       Family History   Problem Relation Age of Onset    Hypertension Father     Lipids Father     Hypertension Mother     Lipids Mother     Diabetes No family hx of     Coronary Artery Disease No family hx of     Hyperlipidemia No family hx of     Cerebrovascular Disease No family hx of     Breast Cancer No family hx of     Colon Cancer No family hx of     Prostate Cancer No family hx of     Other Cancer No family hx of     Depression No family hx  "of     Anxiety Disorder No family hx of     Mental Illness No family hx of     Substance Abuse No family hx of     Anesthesia Reaction No family hx of     Asthma No family hx of     Osteoporosis No family hx of     Genetic Disorder No family hx of     Thyroid Disease No family hx of     Obesity No family hx of     Unknown/Adopted No family hx of                Review of Systems  Constitutional, HEENT, cardiovascular, pulmonary, gi and gu systems are negative, except as otherwise noted.      Objective    /76 (BP Location: Left arm, Patient Position: Sitting, Cuff Size: Adult Regular)   Pulse 66   Temp 98  F (36.7  C) (Oral)   Resp 13   Ht 1.499 m (4' 11\")   Wt 54.9 kg (121 lb)   LMP 03/26/2019 (Within Days)   SpO2 99%   BMI 24.44 kg/m    Body mass index is 24.44 kg/m .  Physical Exam   GENERAL: alert and no distress  EYES: Eyes grossly normal to inspection, PERRL and conjunctivae and sclerae normal  NECK: no adenopathy, no asymmetry, masses, or scars  RESP: lungs clear to auscultation - no rales, rhonchi or wheezes  CV: regular rate and rhythm, normal S1 S2, no S3 or S4, no murmur, click or rub, no peripheral edema  MS: some tenderness along the muscular border of the scapula  SKIN: no suspicious lesions or rashes  NEURO: Normal strength and tone, mentation intact and speech normal  PSYCH: mentation appears normal, affect normal/bright    Office Visit on 12/18/2024   Component Date Value Ref Range Status    Cholesterol 12/18/2024 205 (H)  <200 mg/dL Final    Triglycerides 12/18/2024 122  <150 mg/dL Final    Direct Measure HDL 12/18/2024 52  >=50 mg/dL Final    LDL Cholesterol Calculated 12/18/2024 129 (H)  <100 mg/dL Final    Non HDL Cholesterol 12/18/2024 153 (H)  <130 mg/dL Final    Patient Fasting > 8hrs? 12/18/2024 Unknown   Final    ALT 12/18/2024 13  0 - 50 U/L Final     No results found.        Signed Electronically by: Terri Robles MD    Answers submitted by the patient for this " visit:  Patient Health Questionnaire (Submitted on 12/30/2024)  If you checked off any problems, how difficult have these problems made it for you to do your work, take care of things at home, or get along with other people?: Very difficult  PHQ9 TOTAL SCORE: 10  Patient Health Questionnaire (G7) (Submitted on 12/30/2024)  JOSE DAVID 7 TOTAL SCORE: 3

## 2024-12-30 NOTE — TELEPHONE ENCOUNTER
Please do not close this encounter until this has been addressed.  (prior auth approved/denied, prescriber refusal to complete prior auth or medication changed/discontinued)    Prior Authorization needed on: DULOXETINE HCL 40MG CPEP  Drug NDC: [address]-71     Insurance: [facility] PART D  Member ID: [id number]   Insurance phone #: [phone]    Pharmacy NPI: [id number]  Pharmacy Phone #: [phone]  Pharmacy Fax #: [phone]    Please let us know if the PA gets approved or denied or if medication is changed    Thank you,  Chucky Silverio [facility] Pharmacy Technician

## 2024-12-31 NOTE — TELEPHONE ENCOUNTER
PA Initiation    Medication: DULOXETINE HCL 40 MG PO CSDR  Insurance Company: [employer] Part D - Phone [phone] Fax [phone]  Pharmacy Filling the Rx: [address]  Filling Pharmacy Phone: [phone]  Filling Pharmacy Fax: [phone]  Start Date: 12/31/2024

## 2025-01-02 ENCOUNTER — OFFICE VISIT (OUTPATIENT)
Dept: URGENT CARE | Facility: URGENT CARE | Age: 50
End: 2025-01-02
Payer: MEDICARE

## 2025-01-02 VITALS
SYSTOLIC BLOOD PRESSURE: 110 MMHG | DIASTOLIC BLOOD PRESSURE: 76 MMHG | BODY MASS INDEX: 23.83 KG/M2 | HEART RATE: 85 BPM | RESPIRATION RATE: 20 BRPM | TEMPERATURE: 98.1 F | WEIGHT: 118 LBS | OXYGEN SATURATION: 98 %

## 2025-01-02 DIAGNOSIS — R39.89 URINARY PROBLEM: ICD-10-CM

## 2025-01-02 DIAGNOSIS — N30.01 ACUTE CYSTITIS WITH HEMATURIA: Primary | ICD-10-CM

## 2025-01-02 LAB
ALBUMIN UR-MCNC: 100 MG/DL
APPEARANCE UR: CLEAR
BACTERIA #/AREA URNS HPF: ABNORMAL /HPF
BILIRUB UR QL STRIP: NEGATIVE
CLUE CELLS: ABNORMAL
COLOR UR AUTO: YELLOW
GLUCOSE UR STRIP-MCNC: NEGATIVE MG/DL
HGB UR QL STRIP: ABNORMAL
KETONES UR STRIP-MCNC: NEGATIVE MG/DL
LEUKOCYTE ESTERASE UR QL STRIP: ABNORMAL
MUCOUS THREADS #/AREA URNS LPF: PRESENT /LPF
NITRATE UR QL: NEGATIVE
PH UR STRIP: 5.5 [PH] (ref 5–7)
RBC #/AREA URNS AUTO: ABNORMAL /HPF
SP GR UR STRIP: >=1.03 (ref 1–1.03)
SQUAMOUS #/AREA URNS AUTO: ABNORMAL /LPF
TRICHOMONAS, WET PREP: ABNORMAL
UROBILINOGEN UR STRIP-ACNC: 0.2 E.U./DL
WBC #/AREA URNS AUTO: >100 /HPF
WBC CLUMPS #/AREA URNS HPF: PRESENT /HPF
WBC'S/HIGH POWER FIELD, WET PREP: ABNORMAL
YEAST, WET PREP: ABNORMAL

## 2025-01-02 RX ORDER — SULFAMETHOXAZOLE AND TRIMETHOPRIM 800; 160 MG/1; MG/1
1 TABLET ORAL 2 TIMES DAILY
Qty: 6 TABLET | Refills: 0 | Status: SHIPPED | OUTPATIENT
Start: 2025-01-02 | End: 2025-01-05

## 2025-01-02 ASSESSMENT — ENCOUNTER SYMPTOMS
DIARRHEA: 0
VOMITING: 0
FLANK PAIN: 0
DYSURIA: 1
FEVER: 0
BACK PAIN: 1
ABDOMINAL PAIN: 1
FREQUENCY: 1

## 2025-01-02 NOTE — PROGRESS NOTES
Assessment & Plan:        ICD-10-CM    1. Acute cystitis with hematuria  N30.01 sulfamethoxazole-trimethoprim (BACTRIM DS) 800-160 MG tablet      2. Urinary problem  R39.89 UA Macroscopic with reflex to Microscopic and Culture - Lab Collect     Wet prep - lab collect     UA Macroscopic with reflex to Microscopic and Culture - Lab Collect     Wet prep - lab collect     Urine Microscopic Exam     Urine Culture            Plan/Clinical Decision Making:    Patient presents with urinary symptoms for several days with urgency, dysuria, frequency.  No abnormal GI or vaginal symptoms.  No CVA tenderness.  UA Showing over 100 WBCs.  Large blood on micro.  Normal wet prep.  Will treat with  Course of Bactrim.  Urine culture is pending.    Patient Instructions   Take course of antibiotic for 3 days.     Call us if not feeling better in two days.     Have your urine rechecked in 4-6 weeks to verify that you don't have any blood in your urine.       Return if symptoms worsen or fail to improve, for in 2-3 days.     At the end of the encounter, I discussed results, diagnosis, medications. Discussed red flags for immediate return to clinic/ER, as well as indications for follow up if no improvement. Patient understood and agreed to plan. Patient was stable for discharge.        Jamila Ruiz PA-C on 1/2/2025 at 4:00 PM          Subjective:     HPI:    Zo is a 49 year old female who presents to clinic today for the following health issues:  Chief Complaint   Patient presents with    Urgent Care     Urinary problem x 2 days, frequency urination,burning, back pain,      HPI    Urinary symptoms for 2 days. No fever, no vomiting.     Review of Systems   Constitutional:  Negative for fever.   Gastrointestinal:  Positive for abdominal pain (mild LLQ). Negative for diarrhea and vomiting.   Genitourinary:  Positive for dysuria, frequency and urgency. Negative for flank pain.   Musculoskeletal:  Positive for back pain.          Patient Active Problem List   Diagnosis    Disorder of SI (sacroiliac) joint    Hypercholesterolemia    HSIL on Pap smear    S/P LEEP of cervix    Acne    Influenza B    Vitamin D deficiency    Right peroneal tendinosis    Episodic tension-type headache, not intractable    Cervicalgia    Insomnia due to medical condition    Chronic pain syndrome    Submucous leiomyoma of uterus    Menorrhagia with regular cycle    Right shoulder tendonitis    Intramural leiomyoma of uterus    Pelvic pain in female    Left ovarian cyst    Immune to hepatitis B    TMJ (temporomandibular joint syndrome)    Spondyloarthropathy    Anisometropic amblyopia of right eye    Continuous opioid dependence (H)    Moderate recurrent major depression (H)        Past Medical History:   Diagnosis Date    Anemia     History of blood transfusion 2000    after vaginal delivery, 2 units PRBCs given    HSIL on Pap smear 09/21/2011    MARTA 2 and 3    Immune to hepatitis B 08/2021    hep B antigen NEG    INFLAM SPONDYLOPATHY NOS 01/07/2008    check labs on sulfasalzine every 3 months and check hep B and C and TB every 2 years    Leukocytopenia 03/10/2014    Tendinosis     Thrombocytopenia (H) 03/10/2014    Unspecified closed fracture of pelvis 2000    left fracture of pelvis after delivery       Social History     Tobacco Use    Smoking status: Never    Smokeless tobacco: Never   Substance Use Topics    Alcohol use: Not Currently             Objective:     Vitals:    01/02/25 1547   BP: 110/76   Pulse: 85   Resp: 20   Temp: 98.1  F (36.7  C)   TempSrc: Oral   SpO2: 98%   Weight: 53.5 kg (118 lb)         Physical Exam   EXAM:   Pleasant, alert, appropriate appearance. NAD.  Head Exam: Normocephalic, atraumatic.  Cardiovascular Exam: RRR. No murmur or rubs.  ABD: soft, Non-tender, no rebound/guarding.  No masses/organomegaly.  Ext/musculoskeletal: Grossly intact, No edema,   No CVA tenderness.   Neuro: CN II-XII intact grossly intact.  Skin: no rash  or lesion.      Results:  Results for orders placed or performed in visit on 01/02/25   UA Macroscopic with reflex to Microscopic and Culture - Lab Collect     Status: Abnormal    Specimen: Urine, Clean Catch   Result Value Ref Range    Color Urine Yellow Colorless, Straw, Light Yellow, Yellow    Appearance Urine Clear Clear    Glucose Urine Negative Negative mg/dL    Bilirubin Urine Negative Negative    Ketones Urine Negative Negative mg/dL    Specific Gravity Urine >=1.030 1.003 - 1.035    Blood Urine Large (A) Negative    pH Urine 5.5 5.0 - 7.0    Protein Albumin Urine 100 (A) Negative mg/dL    Urobilinogen Urine 0.2 0.2, 1.0 E.U./dL    Nitrite Urine Negative Negative    Leukocyte Esterase Urine Moderate (A) Negative   Urine Microscopic Exam     Status: Abnormal   Result Value Ref Range    Bacteria Urine Moderate (A) None Seen /HPF    RBC Urine 25-50 (A) 0-2 /HPF /HPF    WBC Urine >100 (A) 0-5 /HPF /HPF    Squamous Epithelials Urine Moderate (A) None Seen /LPF    WBC Clumps Urine Present (A) None Seen /HPF    Mucus Urine Present (A) None Seen /LPF   Wet prep - lab collect     Status: Abnormal    Specimen: Vagina; Swab   Result Value Ref Range    Trichomonas Absent Absent    Yeast Absent Absent    Clue Cells Absent Absent    WBCs/high power field 1+ (A) None

## 2025-01-02 NOTE — PATIENT INSTRUCTIONS
Take course of antibiotic for 3 days.     Call us if not feeling better in two days.     Have your urine rechecked in 4-6 weeks to verify that you don't have any blood in your urine.

## 2025-01-02 NOTE — TELEPHONE ENCOUNTER
PRIOR AUTHORIZATION DENIED    Medication: DULOXETINE HCL 40 MG PO CSDR  Insurance Company: [employer] - Phone [phone] Fax [phone]  Denial Date: 12/31/2024  Denial Reason(s):       Appeal Information:       Patient Notified: NO

## 2025-01-03 NOTE — TELEPHONE ENCOUNTER
Will defer to Dr. Robles to write appeal. Chronic pain syndrome could be added to next prescription, perhaps that would help with coverage.

## 2025-01-06 RX ORDER — DULOXETINE 40 MG/1
40 CAPSULE, DELAYED RELEASE ORAL 2 TIMES DAILY
Qty: 60 CAPSULE | Refills: 5 | Status: SHIPPED | OUTPATIENT
Start: 2025-01-06

## 2025-01-06 NOTE — TELEPHONE ENCOUNTER
Dr. Baron   Would you like to reorder with chronic pain syndrome as mentioned below by Hattie Perez, PAC ?   Have patient contact her insurance?  May not cover increase in dose and or formulary may have changed     RN chart review,   Patient was previously prescribed Cymbalta 30 mg   Dose change to 40 mg on 12/30/2024 formulary may have changed     Flor Maddox, Registered Nurse  [facility]

## 2025-01-06 NOTE — TELEPHONE ENCOUNTER
Terri Robles MD  [facility]11 hours ago (10:59 PM)     JAIMIE  Is it this dose that is denied?   She has been on this prior to this?

## 2025-01-06 NOTE — TELEPHONE ENCOUNTER
Message #1 left for patient to return call     Please have patient check with the pharmacy to see if new rx of duloxetine is covered (diagnosis changed on rx)   If it is not covered, patient will need to check with her insurance to see why it is not covered, she had been taking 30 mg and it was increased to 40 mg - is it not covered due to change in formulary? If not covered ask what is covered under formulary that is similar and then report back to pcp to place orders     Flor Maddox Registered Nurse  [facility]

## 2025-01-08 NOTE — TELEPHONE ENCOUNTER
RN spoke to patient     Advised of GEM freed   Explained that Dr. Baron sent with a different diagnosis chronic pain, to see if covered   Patient to check with pharmacy to see if able to    If unable to  patient advised to follow-up with insurance to see what is covered on formulary     Flor Maddox, Registered Nurse  [facility]

## 2025-01-15 DIAGNOSIS — Z87.898 H/O MOTION SICKNESS: ICD-10-CM

## 2025-01-15 DIAGNOSIS — K64.4 EXTERNAL HEMORRHOIDS: ICD-10-CM

## 2025-01-15 RX ORDER — SCOPOLAMINE 1 MG/3D
PATCH, EXTENDED RELEASE TRANSDERMAL
Qty: 10 PATCH | Refills: 3 | Status: SHIPPED | OUTPATIENT
Start: 2025-01-15

## 2025-01-15 RX ORDER — PRAMOXINE HYDROCHLORIDE 10 MG/G
AEROSOL, FOAM TOPICAL
Qty: 15 G | Refills: 5 | OUTPATIENT
Start: 2025-01-15

## 2025-01-21 ENCOUNTER — PATIENT OUTREACH (OUTPATIENT)
Dept: FAMILY MEDICINE | Facility: CLINIC | Age: 50
End: 2025-01-21
Payer: MEDICARE

## 2025-02-05 ENCOUNTER — OFFICE VISIT (OUTPATIENT)
Dept: PHARMACY | Facility: CLINIC | Age: 50
End: 2025-02-05
Payer: MEDICAID

## 2025-02-05 VITALS — DIASTOLIC BLOOD PRESSURE: 86 MMHG | SYSTOLIC BLOOD PRESSURE: 122 MMHG

## 2025-02-05 DIAGNOSIS — Z78.9 TAKES DIETARY SUPPLEMENTS: ICD-10-CM

## 2025-02-05 DIAGNOSIS — G89.4 CHRONIC PAIN SYNDROME: Primary | ICD-10-CM

## 2025-02-05 DIAGNOSIS — F33.1 MODERATE RECURRENT MAJOR DEPRESSION (H): ICD-10-CM

## 2025-02-05 DIAGNOSIS — L30.9 DERMATITIS: ICD-10-CM

## 2025-02-05 DIAGNOSIS — I10 HYPERTENSION, UNSPECIFIED TYPE: ICD-10-CM

## 2025-02-05 DIAGNOSIS — Z87.898 HISTORY OF MOTION SICKNESS: ICD-10-CM

## 2025-02-05 DIAGNOSIS — M47.819 SPONDYLOARTHROPATHY: ICD-10-CM

## 2025-02-05 DIAGNOSIS — J30.2 SEASONAL ALLERGIC RHINITIS, UNSPECIFIED TRIGGER: ICD-10-CM

## 2025-02-05 DIAGNOSIS — K59.03 DRUG-INDUCED CONSTIPATION: ICD-10-CM

## 2025-02-05 DIAGNOSIS — G47.01 INSOMNIA DUE TO MEDICAL CONDITION: ICD-10-CM

## 2025-02-05 DIAGNOSIS — R11.0 NAUSEA: ICD-10-CM

## 2025-02-05 DIAGNOSIS — E78.00 HYPERCHOLESTEROLEMIA: ICD-10-CM

## 2025-02-05 DIAGNOSIS — K64.4 EXTERNAL HEMORRHOIDS: ICD-10-CM

## 2025-02-05 PROCEDURE — 99207 PR NO CHARGE LOS: CPT | Performed by: PHARMACIST

## 2025-02-05 RX ORDER — SENNA AND DOCUSATE SODIUM 50; 8.6 MG/1; MG/1
1 TABLET, FILM COATED ORAL DAILY PRN
Qty: 90 TABLET | Refills: 1 | Status: SHIPPED | OUTPATIENT
Start: 2025-02-05

## 2025-02-05 RX ORDER — DULOXETIN HYDROCHLORIDE 60 MG/1
60 CAPSULE, DELAYED RELEASE ORAL 2 TIMES DAILY
Qty: 180 CAPSULE | Refills: 1 | Status: SHIPPED | OUTPATIENT
Start: 2025-02-05

## 2025-02-05 RX ORDER — HYDROCODONE BITARTRATE AND ACETAMINOPHEN 5; 325 MG/1; MG/1
1 TABLET ORAL EVERY 6 HOURS PRN
Qty: 30 TABLET | Refills: 0 | Status: SHIPPED | OUTPATIENT
Start: 2025-02-05

## 2025-02-05 RX ORDER — HYDROCORTISONE 25 MG/G
CREAM TOPICAL 2 TIMES DAILY PRN
Qty: 30 G | Refills: 5 | Status: SHIPPED | OUTPATIENT
Start: 2025-02-05

## 2025-02-05 RX ORDER — PRAMOXINE HYDROCHLORIDE 10 MG/G
AEROSOL, FOAM TOPICAL
Qty: 15 G | Refills: 5 | Status: SHIPPED | OUTPATIENT
Start: 2025-02-05 | End: 2025-02-05

## 2025-02-05 NOTE — PROGRESS NOTES
Medication Therapy Management (MTM) Encounter    ASSESSMENT:                            Medication Adherence/Access: No issues identified.    Chronic Pain/Spondyloarthropathy   Recommend follow-up with rheumatology team due to frequent respiratory infections from Xeljanz that have continued each month, although when she is taking is helping her symptoms. Winter months have worsened where she is using hydrocodone daily however and recommended to increase duloxetine dose further today to help with chronic pain and limit hydrocodone use to as needed on days when she is off Xeljanz. She recently restarted gabapentin as well - unclear if effective and next visit may trial dose decrease and taper if ineffective or switch to pregabalin. May also consider amitriptyline for chronic pain but will need to weigh benefits vs risks of adding additional medication.     Hypertension   Stable    Hyperlipidemia   Improved since rosuvastatin dose increase. Unknown if last lipid panel was fasting. Recommend recheck fasting and continue to monitor. Reasonable to continue current dose without adding ezetimibe yet to decrease pill burden and as her ascvd risk is low <5%. She has had ~30% decrease in LDL but not at goal <100 mg/dL per 2022 [facility].     Depression  Stable, focus on improving pain control to help with depression.     Insomnia   stable    Nausea/Motion sickness:   stable    Constipation/Hemmorhoids    Resent hydrocortisone cream for hemorrhoids for her as foam previously sent not covered by insurance and too costly out of pocket. Recommend switch Miralax and docusate to senna-s as needed to see if that helps constipation more without causing diarrhea. Educated on using as needed rather than scheduled daily.     Supplements  Recommend recheck iron studies since off iron supplement for past 2 months. She also is probably off primrose oil but will double check and recommend discontinue if she is taking to decrease pill burden.      Allergies:   stable    Dermatitis    stable    PLAN:                            Try senna-s (sennosides and docusate stool softener) 1 tablet every other day as needed instead of the plain stool softener and Miralax powder.     Increase duloxetine to 60 mg twice daily.     Use hydrocodone only as needed and try to only use on days when you are off Xeljanz.     Check at home if you are still taking evening primrose oil capsules - stop taking if you are.     Follow-up: Return in about 1 month (around 3/5/2025) for Medication Therapy Management Pharmacist.    SUBJECTIVE/OBJECTIVE:                          Zo Qureshi is a 49 year old female seen for an initial visit. She was referred to me from Dr. Robles.      Reason for visit: Pain management.    Allergies/ADRs: Reviewed in chart  Past Medical History: Reviewed in chart  Tobacco: She reports that she has never smoked. She has never used smokeless tobacco.  Alcohol: none    Medication Adherence/Access: no issues reported. Uses pillbox.     Chronic Pain/Spondyloarthropathy   Duloxetine 60 mg AM and 30 mg PM  - she thinks higher dose has possibly helped more since increase   Gabapentin 300 mg AM, 300 mg 5 PM and 600 mg bedtime, fill history shows recent fill this month but prior was not filling since April  Hydrocodone-acetaminophen 5-325 daily as needed - using every day right now in winter in the morning, switched from tramadol due to DDI with duloxetine  Cyclobenzaprine 5 mg as needed - 1-2x a day at night, helps cramping in legs at night   Xeljanz XR 11 mg once daily  Sulfasalazine 1 g twice daily  Meloxicam 15 mg once daily as needed - uses every day in winter when pain is worse  Diclofenac 1% gel as needed - uses every day in winter  Lidocaine 5% ointment as needed - uses every day in winter     She does report fatigued/drowsy from hydrocodone and her pain medications. She feels like she may be more forgetful too. She gets respiratory infection every month for  a week where she has to stop Xeljanz during that time and pain is worsened. She has chronic back pain that goes down to her buttocks and legs. Also neck pain that goes down her neck, shoulders and hands. Worse pain in winter months but occurs all the time and when weather changes.    Still tolerating sulfasalazine at dose of 1000 mg twice daily, did not tolerate higher doses. Previously failed Humira, Enbrel, or Cosentyx due to severe flu-like symptoms and hives. Following rheumatology - last visit with Dr. Cuba 11/2024 and [facility] team 9/2024.        Hypertension   Amlodipine 2.5 mg once daily   Patient reports no current medication side effects but once in a while lightheaded from pain.   Patient self monitors blood pressure.  Home BP monitoring 120's/80's .       Hyperlipidemia   rosuvastatin 40mg daily - increased dose a few months ago  Patient reports no significant myalgias or other side effects.  The 10-year ASCVD risk score (Halie JEWELL, et al., 2019) is: 1.4%    Values used to calculate the score:      Age: 49 years      Sex: Female      Is Non- : No      Diabetic: No      Tobacco smoker: No      Systolic Blood Pressure: 122 mmHg      Is BP treated: Yes      HDL Cholesterol: 52 mg/dL      Total Cholesterol: 205 mg/dL         Mental Health   Depression  Duloxetine 60 mg AM and 30 mg PM.   Patient reports no current medication side effects. Patient reports symptoms are stable. Reports when in pain, she is more depressed and down. Up and down, worse in araujo when pain is worse.          Insomnia   trazodone  mg at bedtime as needed - when she's in pain and can't sleep  Patient reports no issues at this time. Trazodone helps somewhat when she really needs, tries to limit use.        Nausea/Motion sickness:   Ondansetron 4 mg as needed - needs about twice weekly   Scopolamine patch every 72 hours as needed for car sickness   Meclizine 25 mg as needed - for motion  sickness 2x a week     Constipation/Hemmorhoids    Miralax 17 g as needed - causes too much BMs and then results in hemorrhoids   docusate 100 mg once daily as needed - most days, somewhat helpful  She is still constipated, BM once every 2 days. States if too many BMs she gets hemorrhoids or if too much straining also causes hemorrhoids. Requests foam for hemorrhoids. Foam works better for her but the foams are not covered by her insurance. Needs refill of hydrocortisone 2.5% cream to use as needed for hemorrhoids.       Supplements  Vitamin D 2000 units once daily   Ferrous sulfate 325 mg - hasn't taken for 2-3 months due to no longer being covered  She's not sure if she's been taking evening primrose oil capsules still.   No reported issues at this time.     Allergies:   Flonase (fluticasone) nasal spray - 1-2 spray(s) each nostril once daily as needed   ophthalmic drops - Pataday and ketotifen drops as needed   Patient reports no current medication side effects.     Patient feels that current therapy is effective.     Dermatitis    Triamcinolone 0.1% cream as needed - no concerns, works well    Hydrocortisone 2.5 % cream as needed     Today's Vitals: /86   LMP 03/26/2019 (Within Days)   ----------------      I spent 50 minutes with this patient today. All changes were made via collaborative practice agreement and verbal approval with Terri Robles MD.     A summary of these recommendations was given to the patient.    Unique Downs, PharmD, BCACP  Medication Therapy Management Provider, [facility]  [phone]         Medication Therapy Recommendations  Ankylosing spondylitis of sacral region (H)   1 Current Medication: tofacitinib (XELJANZ XR) 11 MG 24 hr tablet   Current Medication Sig: Take 1 tablet (11 mg) by mouth daily Hold for signs of infection, then seek medical attention.   Rationale: Undesirable effect - Adverse medication event - Safety   Recommendation: Continue  to Monitor - Xeljanz potentially contributing to frequent respiratory infections, providing insufficient response   Status: No Longer Relevant   Identified Date: 2024 Completed Date: 2025         Chronic pain syndrome   1 Current Medication: DULoxetine (CYMBALTA) 30 MG capsule (Discontinued)   Current Medication Si caps in am and 1 cap in pm for total of 90 mg per day but only twice daily dosing   Rationale: Dose too low - Dosage too low - Effectiveness   Recommendation: Increase Dose - DULoxetine 60 MG capsule   Status: Accepted per CPA   Identified Date: 2025 Completed Date: 2025         2 Current Medication: HYDROcodone-acetaminophen (NORCO) 5-325 MG tablet   Current Medication Sig: Take 1 tablet by mouth every 6 hours as needed for severe pain.   Rationale: Frequency inappropriate - Dosage too high - Safety   Recommendation: Decrease Frequency   Status: Patient Agreed - Adherence/Education   Identified Date: 2025 Completed Date: 2025         Drug-induced constipation   1 Current Medication: SM STOOL SOFTENER 100 MG capsule (Discontinued)   Current Medication Sig: TAKE ONE CAPSULE BY MOUTH ONCE DAILY AS NEEDED FOR CONSTIPATION   Rationale: More effective medication available - Ineffective medication - Effectiveness   Recommendation: Change Medication - Senna S 8.6-50 MG Tabs   Status: Accepted per CPA   Identified Date: 2025 Completed Date: 2025

## 2025-02-05 NOTE — PATIENT INSTRUCTIONS
"Recommendations from today's MTM visit:                                                    MTM (medication therapy management) is a service provided by a clinical pharmacist designed to help you get the most of out of your medicines.   Today we reviewed what your medicines are for, how to know if they are working, that your medicines are safe and how to make your medicine regimen as easy as possible.      Try senna-s (sennosides and docusate stool softener) 1 tablet every other day as needed instead of the plain stool softener and Miralax powder.     Increase duloxetine to 60 mg twice daily.     Check at home if you are still taking evening primrose oil capsules - stop taking if you are.     It was great speaking with you today.  I value your experience and would be very thankful for your time in providing feedback in our clinic survey. In the next few days, you may receive an email or text message from [employer] with a link to a survey related to your  clinical pharmacist.\"     To schedule another MTM appointment, please call the clinic directly or you may call the MTM scheduling line at [phone] or toll-free at [phone].     My Clinical Pharmacist's contact information:                                                      Please feel free to contact me with any questions or concerns you have.      Unique Downs, PharmD, BCACP  Medication Therapy Management Provider, [facility]    "

## 2025-02-05 NOTE — TELEPHONE ENCOUNTER
Dr. Robles - I saw Zo today for MTM visit and we increased duloxetine dose. She thinks higher dose is helping. The Xeljanz appears to be effective for her pain but she has to be off of it for 1-2 weeks every month due to viral/cold symptoms so pain is worsened during that time when she needs breakthrough hydrocodone pain medication. Can you refill for her while we work on optimizing her pain medication? Pended rx here. Checked  - Zo last filled hydrocodone on 12/30 for #30 tablets. No longer taking tramadol. Also educated her to limit hydrocodone use and only use during weeks she is off Xeljanz. She didn't know this and was taking hydrocodone daily since pain is worsened in winter months.    Unique Downs, PharmD, BCACP  Medication Therapy Management Provider, [facility]  [phone]

## 2025-02-05 NOTE — Clinical Note
Pawan Spaulding - I saw you sent her a message yesterday. I saw her at PCP office today and we're working on some chronic pain mgmt. She is still having frequent respiratory infections every month so isn't on Xeljanz for a week every month where her pain is a lot worse and has to take hydrocodone. Not sure what better options are available for her but I know Dr. Cuba did not change her medication at last visit in Nov. Xeljanz seems to work when she is taking but pain is worsened every day now in general in the winter months. Hope she schedules with you guys soon if a change in med would help! Unique

## 2025-03-24 ENCOUNTER — PATIENT OUTREACH (OUTPATIENT)
Dept: FAMILY MEDICINE | Facility: CLINIC | Age: 50
End: 2025-03-24
Payer: MEDICARE

## 2025-03-24 ENCOUNTER — TELEPHONE (OUTPATIENT)
Dept: PHARMACY | Facility: CLINIC | Age: 50
End: 2025-03-24
Payer: MEDICARE

## 2025-03-24 NOTE — TELEPHONE ENCOUNTER
Patient Quality Outreach    Patient is due for the following:   Cervical Cancer Screening - PAP Needed    Action(s) Taken:   Surgical history updated to reflect pap no longer indicated    Type of outreach:    Chart review performed, no outreach needed.    Questions for provider review:    None         Dina Mcfarland CMA  Chart routed to .

## 2025-03-26 ENCOUNTER — VIRTUAL VISIT (OUTPATIENT)
Dept: PHARMACY | Facility: CLINIC | Age: 50
End: 2025-03-26
Attending: FAMILY MEDICINE
Payer: MEDICARE

## 2025-03-26 DIAGNOSIS — M47.819 SPONDYLOARTHROPATHY: Primary | ICD-10-CM

## 2025-03-26 DIAGNOSIS — Z71.85 VACCINE COUNSELING: ICD-10-CM

## 2025-03-26 DIAGNOSIS — E78.5 HYPERLIPIDEMIA: ICD-10-CM

## 2025-03-26 NOTE — PROGRESS NOTES
Medication Therapy Management (MTM) Encounter    ASSESSMENT:                            Medication Adherence/Access: No issues identified.    Ankylosing spondylitis: Patient continues to report frequent respiratory infections on Xeljanz and is needing to hold the medication for 4-5 days each month. Following discussion she does not wish to make a change today as Xeljanz is working better than other biologics she has tried. Could consider trying Rinvoq in the future to see if this is better tolerated given JAVON inhibitor class has been beneficial. Encouraged her to reach out if infections become severe or she is flaring as a result of medication hold. Note overdue for sulfasalazine monitoring labs, standing orders placed.    Vaccine counseling: Indicated for updated COVID booster and Shingrix per ACIP recommendations. No changes from last visit.    Hyperlipidemia: LDL remained elevated as of last check 12/2024 despite increased rosuvastatin dose, unclear if patient was fasting for that lab. Would benefit from rechecking a fasting LDL at next primary care visit scheduled 6/2025.    PLAN:                            Continue Xeljanz 11 mg ER once daily. Monitor infection frequency and if infections become severe or you are needing to hold the medication for more than a few days each month, please reach out to discuss an alternative medication.  Continue sulfasalazine 1000 mg twice daily.  Continue utilizing meloxicam and topicals as needed for pain.  Continue to consider the following vaccines: COVID booster and Shingrix (shingles). Shingrix is a 2-part vaccine and is usually covered when given at a pharmacy.    Follow-up: 6 months, patient to reach out sooner if experiencing severe infections or medication holds resulting in flare. No follow up currently scheduled with rheumatologist. Patient now seeing primary care MTM.    SUBJECTIVE/OBJECTIVE:                          Zo Qureshi is a 49 year old female called for a  follow up visit from 9/18/24. She was referred to me from Ray Cuba MD.    Reason for visit: Rheum meds 6 months.    Allergies/ADRs: Reviewed in chart  Past Medical History: Reviewed in chart  Tobacco: She reports that she has never smoked. She has never used smokeless tobacco.  Alcohol: not assessed today    Medication Adherence/Access: No issues identified.    Ankylosing spondylitis:   Xeljanz 11 mg daily  Sulfasalazine 1000 mg twice daily  Meloxicam 15 mg daily as needed  Diclofenac 1% gel as needed  Lidocaine 5% ointment as needed    Previously tried: Humira, Enbrel, Cosentyx (flu-like side effects, hives)    At last visit patient was feeling well on Xeljanz but needing to hold the medication for 4-5 days each month due to respiratory infections. Does experience increased joint pain and need for meloxicam when she has to hold the Xeljanz. Writer received note from primary care MTM pharmacist that she is still having frequent infections so called patient to follow up. She endorses not much change from last visit. Despite the frequent infections, she is hesitant to make a change as it has been difficult to find a medication that works well. Briefly discussed Rinvoq and she may be open to this in the future if infections continue to be an issue. For now she would like to continue Xeljanz with monitoring.    Labs updated 9/2024    Vaccine counseling: Patient is open to receiving recommended vaccines.    Immunization History   Administered Date(s) Administered    COVID-19 Monovalent 18+ (Moderna) 04/22/2021, 05/20/2021    COVID-19 Monovalent Booster 18+ (Moderna) 11/17/2021    Influenza (H1N1) 11/13/2009    Influenza (IIV3) PF 10/27/2010, 09/21/2015, 11/01/2016    Influenza Vaccine >6 months,quad, PF 10/18/2017, 10/08/2018, 10/07/2019, 09/03/2020, 09/15/2021, 11/10/2022, 09/25/2023    Influenza Vaccine, 6+MO IM (QUADRIVALENT W/PRESERVATIVES) 10/13/2016    Influenza, Split Virus, Trivalent, Pf (Fluzone\Fluarix)  09/11/2024    Mantoux Tuberculin Skin Test 11/28/2011, 04/17/2018    Pneumococcal 20 valent Conjugate (Prevnar 20) 01/18/2023    TDAP Vaccine (Adacel) 05/13/2009, 02/26/2020      Hyperlipidemia:   Rosuvastatin 40 mg daily    Rosuvastatin was increased to 40 mg daily 11/2024, denies muscle pain.    Component      Latest Ref Rng 12/18/2024  9:36 AM   Cholesterol      <200 mg/dL 205 (H)    Triglycerides      <150 mg/dL 122    HDL Cholesterol      >=50 mg/dL 52    LDL Cholesterol Calculated      <100 mg/dL 129 (H)    Non HDL Cholesterol      <130 mg/dL 153 (H)    Patient Fasting? Unknown       Today's Vitals: none taken today  ----------------    I spent 25 minutes with this patient today. All changes were made via collaborative practice agreement with Ray Cuba MD. A copy of the visit note was provided to the patient's provider(s).    A summary of these recommendations was sent via [employer].    Chelly Cedeno, PharmD  Medication Therapy Management Pharmacist  [facility]    Telemedicine Visit Details  Type of service:  Telephone visit  Start Time: 1430  End Time: 1455     Medication Therapy Recommendations  Spondyloarthropathy   1 Current Medication: XELJANZ XR 11 MG 24 hr tablet   Current Medication Sig: TAKE ONE TABLET BY MOUTH ONCE DAILY HOLD FOR SIGNS OF INFECTION, THEN SEEK MEDICAL ATTENTION.   Rationale: Undesirable effect - Adverse medication event - Safety   Recommendation: Continue to Monitor - Continue to monitor infection frequency, may need to consider alternative   Status: Patient Agreed - Adherence/Education   Identified Date: 3/26/2025 Completed Date: 3/27/2025

## 2025-03-27 NOTE — PATIENT INSTRUCTIONS
"Recommendations from today's MTM visit:                                                      Continue Xeljanz 11 mg ER once daily. Monitor infection frequency and if infections become severe or you are needing to hold the medication for more than a few days each month, please reach out to discuss an alternative medication.  Continue sulfasalazine 1000 mg twice daily.  Continue utilizing meloxicam and topicals as needed for pain.  Continue to consider the following vaccines: COVID booster and Shingrix (shingles). Shingrix is a 2-part vaccine and is usually covered when given at a pharmacy.    Follow-up: 6 months, patient to reach out sooner if experiencing severe infections or medication holds resulting in flare. No follow up currently scheduled with rheumatologist. Patient now seeing primary care MTM.    It was great speaking with you today.  I value your experience and would be very thankful for your time in providing feedback in our clinic survey. In the next few days, you may receive an email or text message from [employer] with a link to a survey related to your  clinical pharmacist.\"     To schedule another MTM appointment, please call the clinic directly or you may call the MTM scheduling line at [phone].    My Clinical Pharmacist's contact information:                                                      Please feel free to contact me with any questions or concerns you have.      Chelly Cedeno, PharmD  Medication Therapy Management Pharmacist  [facility]    "

## 2025-04-16 DIAGNOSIS — E78.5 HYPERLIPIDEMIA LDL GOAL <130: ICD-10-CM

## 2025-04-16 RX ORDER — ROSUVASTATIN CALCIUM 40 MG/1
40 TABLET, COATED ORAL DAILY
Qty: 90 TABLET | Refills: 1 | Status: SHIPPED | OUTPATIENT
Start: 2025-04-16

## 2025-04-16 NOTE — TELEPHONE ENCOUNTER
Dr. Cline   Please review pended refill rosuvastatin, med was d/c'd on 12/18/2024 by you then added back historically to med list   Recent MTM visit advised patient should continue taking     RN chart review   Rosuvastatin discontinued by Dr. Cline on 12/18/2024 then added back in historically     Per review of recent MTM visit 3/26/2025 patient should continue taking rosuvastatin     Hyperlipidemia: LDL remained elevated as of last check 12/2024 despite increased rosuvastatin dose, unclear if patient was fasting for that lab. Would benefit from rechecking a fasting LDL at next primary care visit scheduled 6/2025.     Routing for provider review     Flor Maddox, Registered Nurse  [facility]

## 2025-05-08 DIAGNOSIS — Z87.898 H/O MOTION SICKNESS: ICD-10-CM

## 2025-05-08 NOTE — TELEPHONE ENCOUNTER
RN spoke to patient who reports she is using patches often (at least 3x per week) however, patient states she is only using them for one day at a time and then replacing.     RN discussed with patient that she can leave the patches on for 3 days before replacing- patient verbalized understanding.     Routing back to Dr. Robles/Ana to review and send if appropriate.     Dina LIND RN  [facility]

## 2025-05-08 NOTE — TELEPHONE ENCOUNTER
Dr. Robles I would defer to you. Were you aware patient was needing to use this often? Has she had an evaluation in the past?

## 2025-05-08 NOTE — TELEPHONE ENCOUNTER
10 patches with 3 refills sent in January. Can we clarify how often patient is needing to use this?

## 2025-05-12 RX ORDER — SCOPOLAMINE 1 MG/3D
PATCH, EXTENDED RELEASE TRANSDERMAL
Qty: 10 PATCH | Refills: 3 | Status: SHIPPED | OUTPATIENT
Start: 2025-05-12

## 2025-06-18 ENCOUNTER — ANCILLARY PROCEDURE (OUTPATIENT)
Dept: GENERAL RADIOLOGY | Facility: CLINIC | Age: 50
End: 2025-06-18
Attending: FAMILY MEDICINE
Payer: MEDICARE

## 2025-06-18 ENCOUNTER — OFFICE VISIT (OUTPATIENT)
Dept: FAMILY MEDICINE | Facility: CLINIC | Age: 50
End: 2025-06-18
Payer: MEDICARE

## 2025-06-18 VITALS
RESPIRATION RATE: 20 BRPM | BODY MASS INDEX: 24.07 KG/M2 | TEMPERATURE: 98.1 F | WEIGHT: 119.4 LBS | HEART RATE: 71 BPM | DIASTOLIC BLOOD PRESSURE: 78 MMHG | OXYGEN SATURATION: 98 % | HEIGHT: 59 IN | SYSTOLIC BLOOD PRESSURE: 122 MMHG

## 2025-06-18 DIAGNOSIS — M79.644 PAIN OF RIGHT THUMB: ICD-10-CM

## 2025-06-18 DIAGNOSIS — M45.8 ANKYLOSING SPONDYLITIS OF SACRAL REGION (H): Primary | ICD-10-CM

## 2025-06-18 DIAGNOSIS — F33.1 MODERATE RECURRENT MAJOR DEPRESSION (H): ICD-10-CM

## 2025-06-18 DIAGNOSIS — E78.00 HYPERCHOLESTEROLEMIA: ICD-10-CM

## 2025-06-18 DIAGNOSIS — M67.88 RIGHT PERONEAL TENDINOSIS: ICD-10-CM

## 2025-06-18 DIAGNOSIS — G44.219 EPISODIC TENSION-TYPE HEADACHE, NOT INTRACTABLE: ICD-10-CM

## 2025-06-18 DIAGNOSIS — R51.9 CHRONIC DAILY HEADACHE: ICD-10-CM

## 2025-06-18 DIAGNOSIS — M54.2 CERVICALGIA: ICD-10-CM

## 2025-06-18 DIAGNOSIS — Z23 NEED FOR VACCINATION: ICD-10-CM

## 2025-06-18 DIAGNOSIS — M54.2 NECK PAIN: ICD-10-CM

## 2025-06-18 DIAGNOSIS — M77.8 RIGHT SHOULDER TENDONITIS: ICD-10-CM

## 2025-06-18 DIAGNOSIS — G89.4 CHRONIC PAIN SYNDROME: ICD-10-CM

## 2025-06-18 DIAGNOSIS — F11.20 CONTINUOUS OPIOID DEPENDENCE (H): ICD-10-CM

## 2025-06-18 DIAGNOSIS — H10.13 ALLERGIC CONJUNCTIVITIS, BILATERAL: ICD-10-CM

## 2025-06-18 LAB
ALT SERPL W P-5'-P-CCNC: 15 U/L (ref 0–50)
CHOLEST SERPL-MCNC: 189 MG/DL
ERYTHROCYTE [DISTWIDTH] IN BLOOD BY AUTOMATED COUNT: 12.2 % (ref 10–15)
FASTING STATUS PATIENT QL REPORTED: YES
FERRITIN SERPL-MCNC: 215 NG/ML (ref 6–175)
HCT VFR BLD AUTO: 39.5 % (ref 35–47)
HDLC SERPL-MCNC: 54 MG/DL
HGB BLD-MCNC: 12.7 G/DL (ref 11.7–15.7)
IRON BINDING CAPACITY (ROCHE): 287 UG/DL (ref 240–430)
IRON SATN MFR SERPL: 27 % (ref 15–46)
IRON SERPL-MCNC: 77 UG/DL (ref 37–145)
LDLC SERPL CALC-MCNC: 114 MG/DL
MCH RBC QN AUTO: 29.2 PG (ref 26.5–33)
MCHC RBC AUTO-ENTMCNC: 32.2 G/DL (ref 31.5–36.5)
MCV RBC AUTO: 91 FL (ref 78–100)
NONHDLC SERPL-MCNC: 135 MG/DL
PLATELET # BLD AUTO: 246 10E3/UL (ref 150–450)
RBC # BLD AUTO: 4.35 10E6/UL (ref 3.8–5.2)
TRIGL SERPL-MCNC: 105 MG/DL
WBC # BLD AUTO: 5.4 10E3/UL (ref 4–11)

## 2025-06-18 PROCEDURE — 83550 IRON BINDING TEST: CPT | Performed by: FAMILY MEDICINE

## 2025-06-18 PROCEDURE — G2211 COMPLEX E/M VISIT ADD ON: HCPCS | Performed by: FAMILY MEDICINE

## 2025-06-18 PROCEDURE — 83540 ASSAY OF IRON: CPT | Performed by: FAMILY MEDICINE

## 2025-06-18 PROCEDURE — 73140 X-RAY EXAM OF FINGER(S): CPT | Mod: TC | Performed by: RADIOLOGY

## 2025-06-18 PROCEDURE — 85027 COMPLETE CBC AUTOMATED: CPT | Performed by: FAMILY MEDICINE

## 2025-06-18 PROCEDURE — 99214 OFFICE O/P EST MOD 30 MIN: CPT | Performed by: FAMILY MEDICINE

## 2025-06-18 PROCEDURE — 84460 ALANINE AMINO (ALT) (SGPT): CPT | Performed by: FAMILY MEDICINE

## 2025-06-18 PROCEDURE — 36415 COLL VENOUS BLD VENIPUNCTURE: CPT | Performed by: FAMILY MEDICINE

## 2025-06-18 PROCEDURE — 1125F AMNT PAIN NOTED PAIN PRSNT: CPT | Performed by: FAMILY MEDICINE

## 2025-06-18 PROCEDURE — 96127 BRIEF EMOTIONAL/BEHAV ASSMT: CPT | Performed by: FAMILY MEDICINE

## 2025-06-18 PROCEDURE — 82728 ASSAY OF FERRITIN: CPT | Mod: GA | Performed by: FAMILY MEDICINE

## 2025-06-18 PROCEDURE — 3078F DIAST BP <80 MM HG: CPT | Performed by: FAMILY MEDICINE

## 2025-06-18 PROCEDURE — 3074F SYST BP LT 130 MM HG: CPT | Performed by: FAMILY MEDICINE

## 2025-06-18 PROCEDURE — 80061 LIPID PANEL: CPT | Performed by: FAMILY MEDICINE

## 2025-06-18 RX ORDER — GABAPENTIN 300 MG/1
CAPSULE ORAL
Qty: 360 CAPSULE | Refills: 4 | Status: SHIPPED | OUTPATIENT
Start: 2025-06-18

## 2025-06-18 RX ORDER — LIDOCAINE 50 MG/G
OINTMENT TOPICAL 3 TIMES DAILY PRN
Qty: 150 G | Refills: 3 | Status: SHIPPED | OUTPATIENT
Start: 2025-06-18

## 2025-06-18 RX ORDER — AMLODIPINE BESYLATE 2.5 MG/1
2.5 TABLET ORAL DAILY
Qty: 90 TABLET | Refills: 4 | Status: SHIPPED | OUTPATIENT
Start: 2025-06-18

## 2025-06-18 RX ORDER — OLOPATADINE HYDROCHLORIDE 2 MG/ML
1 SOLUTION OPHTHALMIC DAILY
Qty: 2.5 ML | Refills: 3 | Status: SHIPPED | OUTPATIENT
Start: 2025-06-18

## 2025-06-18 RX ORDER — DULOXETIN HYDROCHLORIDE 60 MG/1
60 CAPSULE, DELAYED RELEASE ORAL 2 TIMES DAILY
Qty: 180 CAPSULE | Refills: 2 | Status: SHIPPED | OUTPATIENT
Start: 2025-06-18

## 2025-06-18 RX ORDER — MELOXICAM 15 MG/1
15 TABLET ORAL DAILY
Qty: 90 TABLET | Refills: 3 | Status: SHIPPED | OUTPATIENT
Start: 2025-06-18

## 2025-06-18 ASSESSMENT — PAIN SCALES - GENERAL: PAINLEVEL_OUTOF10: MILD PAIN (2)

## 2025-06-18 ASSESSMENT — ANXIETY QUESTIONNAIRES: GAD7 TOTAL SCORE: INCOMPLETE

## 2025-06-18 NOTE — PROGRESS NOTES
Assessment & Plan     Ankylosing spondylitis of sacral region (H)  Sees rheum and is under fair control    - REVIEW OF HEALTH MAINTENANCE PROTOCOL ORDERS  - zoster vaccine recombinant adjuvanted (SHINGRIX) injection  Dispense: 0.5 mL; Refill: 0  - hepatitis A vaccine (VAQTA) 50 UNIT/ML injection  Dispense: 1 mL; Refill: 0  - CBC with platelets  - meloxicam (MOBIC) 15 MG tablet  Dispense: 90 tablet; Refill: 3  - Orthopedic  Referral    Chronic pain syndrome  Refills per epicare    - Ferritin  - Iron and iron binding capacity  - diclofenac (VOLTAREN) 1 % topical gel  Dispense: 150 g; Refill: 3  - lidocaine (XYLOCAINE) 5 % external ointment  Dispense: 150 g; Refill: 3    Need for vaccination  Discussed     Continuous opioid dependence (H)  Has CSA on file     Pain of right thumb  New over the last few months where her thumb is getting stuck.   He bilateral xray done in 2023     - XR Finger Right G/E 2 Views  - Orthopedic  Referral    Episodic tension-type headache, not intractable  Stable     Moderate recurrent major depression (H)  Under good control   - DEPRESSION ACTION PLAN (DAP)  - DULoxetine (CYMBALTA) 60 MG capsule  Dispense: 180 capsule; Refill: 2    Hypercholesterolemia  Under control   Dose increased recently   - Lipid panel reflex to direct LDL Fasting  - ALT    Chronic daily headache  Under control  Refills per epicare    - amLODIPine (NORVASC) 2.5 MG tablet  Dispense: 90 tablet; Refill: 4    Neck pain  Stable   - diclofenac (VOLTAREN) 1 % topical gel  Dispense: 150 g; Refill: 3    Right peroneal tendinosis  Stable   - gabapentin (NEURONTIN) 300 MG capsule  Dispense: 360 capsule; Refill: 4    Right shoulder tendonitis  Stable   - gabapentin (NEURONTIN) 300 MG capsule  Dispense: 360 capsule; Refill: 4    Cervicalgia  Stable   - gabapentin (NEURONTIN) 300 MG capsule  Dispense: 360 capsule; Refill: 4    Allergic conjunctivitis, bilateral  Refills per epicare    - olopatadine (PATADAY) 0.2 %  "ophthalmic solution  Dispense: 2.5 mL; Refill: 3      The longitudinal plan of care for the diagnosis(es)/condition(s) as documented were addressed during this visit. Due to the added complexity in care, I will continue to support Zo in the subsequent management and with ongoing continuity of care.        Follow-up  Return in about 6 months (around 12/18/2025) for follow-up per plan.    Subjective   Zo is a 50 year old, presenting for the following health issues:  Pain and Consult (Swollen thumb, thumb gets stuck in a fixed position )        6/18/2025     9:20 AM   Additional Questions   Roomed by ana alfaro   Accompanied by self     Pain    History of Present Illness       Back Pain:  She presents for follow up of back pain. Patient's back pain is a chronic problem.  Location of back pain:  Right middle of back and left middle of back  Description of back pain: burning, cramping, shooting and stabbing  Back pain spreads: right buttocks, left buttocks, right thigh, left thigh, right knee and left knee    Since patient first noticed back pain, pain is: unchanged  Does back pain interfere with her job:  Not applicable       Mental Health Follow-up:  Patient presents to follow-up on Depression & Anxiety.Patient's depression since last visit has been:  No change  The patient is having other symptoms associated with depression.  Patient's anxiety since last visit has been:  No change  The patient is not having other symptoms associated with anxiety.  Any significant life events: No  Patient is not feeling anxious or having panic attacks.  Patient has no concerns about alcohol or drug use.    Headaches:   Since the patient's last clinic visit, headaches are: no change  The patient is getting headaches:  Whenever i have neck pain  She is not able to do normal daily activities when she has a migraine.  The patient is taking the following rescue/relief medications:  Tylenol   Patient states \"I get only a small amount of " "relief\" from the rescue/relief medications.   The patient is taking the following medications to prevent migraines:  Other  In the past 4 weeks, the patient has gone to an Urgent Care or Emergency Room 0 times times due to headaches.    She eats 4 or more servings of fruits and vegetables daily.She consumes 0 sweetened beverage(s) daily.She exercises with enough effort to increase her heart rate 9 or less minutes per day.  She exercises with enough effort to increase her heart rate 3 or less days per week.   She is taking medications regularly.          Pain History:  When did you first notice your pain? March   Have you seen this provider for your pain in the past? No   Where in your body do your have pain? Right thumb  Are you seeing anyone else for your pain? No  What makes your pain better? Massage   What makes your pain worse? Moving it  How has pain affected your ability to work? Not applicable  Who lives in your household? Children         12/18/2024     8:16 AM 12/30/2024     9:51 AM 6/18/2025     9:23 AM   PHQ-9 SCORE   PHQ-9 Total Score MyChart 7 (Mild depression) 10 (Moderate depression) 10 (Moderate depression)   PHQ-9 Total Score 7  10  10        Patient-reported           12/18/2024     8:28 AM 12/30/2024     9:48 AM 6/18/2025     9:38 AM   JOSE DAVID-7 SCORE   Total Score  3 (minimal anxiety) Incomplete   Total Score 1 3         Patient-reported           8/16/2021     3:49 PM 12/18/2024     8:27 AM 6/18/2025     9:30 AM   PEG Score   PEG Total Score 6.67 6 5           Chronic Pain - Initial Assessment:    How would you describe your pain? Daily   Have you had any recent changes to the severity or character of your pain? Yes with the thumb   Is there an underlying cause that has been identified? Maybe   Has your ability to work or do daily activities changed recently because of your pain? Yes   Which of these pain treatments have you tried? Other: massage for the thumb   Previous medication " treatments:  Takes tramadol but not for that         Past Medical History:   Diagnosis Date    Anemia     History of blood transfusion 2000    after vaginal delivery, 2 units PRBCs given    HSIL on Pap smear 09/21/2011    MARTA 2 and 3    Immune to hepatitis B 08/2021    hep B antigen NEG    INFLAM SPONDYLOPATHY NOS 01/07/2008    check labs on sulfasalzine every 3 months and check hep B and C and TB every 2 years    Leukocytopenia 03/10/2014    Tendinosis     Thrombocytopenia 03/10/2014    Unspecified closed fracture of pelvis 2000    left fracture of pelvis after delivery       Past Surgical History:   Procedure Laterality Date    COLONOSCOPY Left 11/12/2021    recheck in 10 years    DILATION AND CURETTAGE, ABLATE ENDOMETRIUM NOVASURE, COMBINED N/A 12/04/2018    Procedure: novasure endometrial ablation, myosure resection of leiomyoma, dilation and curettage;  Surgeon: Rolando Covarrubias MD;  Location: [facility] OR    GYN SURGERY  12/04/2018    fibroid removal    HYSTERECTOMY, PAP NO LONGER INDICATED      lap hysterectomy and salpingetcomy Bilateral 2019    done at [facility] - ovaries are in    LEEP TX, CERVICAL  12/19/2011    MARTA II    OPERATIVE HYSTEROSCOPY WITH MORCELLATOR N/A 12/04/2018    ovaries are still in.   Surgeon: Rolando Covarrubias MD;  Location: [facility] OR    TUBAL LIGATION  05/2009    laparoscopic tubal ligation    ZC NONSPECIFIC PROCEDURE  10/2000    after delivery 2 units of prbcs secondary to placenta       MEDICATIONS:  Current Outpatient Medications   Medication Sig Dispense Refill    amLODIPine (NORVASC) 2.5 MG tablet Take 1 tablet (2.5 mg) by mouth daily. 90 tablet 4    cyclobenzaprine (FLEXERIL) 5 MG tablet Take 1 tablet (5 mg) by mouth 3 times daily as needed for muscle spasms. 90 tablet 5    diclofenac (VOLTAREN) 1 % topical gel Apply 2 g topically 2 times daily as needed for moderate pain (pain in joint). 150 g 3    DULoxetine (CYMBALTA) 60 MG capsule Take 1 capsule (60 mg) by mouth 2 times daily. 180  capsule 2    fluticasone (FLONASE) 50 MCG/ACT nasal spray Spray 1-2 sprays into both nostrils daily 16 g 3    gabapentin (NEURONTIN) 300 MG capsule TAKE ONE CAPSULE BY MOUTH EVERY MORNING AND ONE CAP AT 5PM.  TAKE TWO CAPS AT BEDTIME (TOTAL OF 4 CAPS PER DAY) 360 capsule 4    HYDROcodone-acetaminophen (NORCO) 5-325 MG tablet Take 1 tablet by mouth every 6 hours as needed for severe pain. 30 tablet 0    hydrocortisone 2.5 % cream Apply topically 2 times daily. 30 g 0    hydrocortisone, Perianal, (HYDROCORTISONE) 2.5 % cream Place rectally 2 times daily as needed for hemorrhoids. 30 g 5    ketotifen fumarate 0.035% 0.035 % SOLN ophthalmic solution Place 1 drop into both eyes 2 times daily. 10 mL 2    lidocaine (XYLOCAINE) 5 % external ointment Apply topically 3 times daily as needed for moderate pain. 150 g 3    meclizine (ANTIVERT) 25 MG tablet Take 1 tablet (25 mg) by mouth 3 times daily as needed for dizziness. 30 tablet 2    meloxicam (MOBIC) 15 MG tablet Take 1 tablet (15 mg) by mouth daily. 90 tablet 3    olopatadine (PATADAY) 0.2 % ophthalmic solution Place 0.05 mLs (1 drop) into both eyes daily. 2.5 mL 3    ondansetron (ZOFRAN) 4 MG tablet Take 1 tablet (4 mg) by mouth every 8 hours as needed for nausea or vomiting. 12 tablet 4    rosuvastatin (CRESTOR) 40 MG tablet Take 1 tablet (40 mg) by mouth daily. 90 tablet 1    scopolamine (TRANSDERM) 1 MG/3DAYS 72 hr patch REMOVE AND PLACE 1 PATCH ONTO THE SKIN EVERY 72 HOURS. 10 patch 3    SENNA-docusate sodium (SENNA S) 8.6-50 MG tablet Take 1 tablet by mouth daily as needed. 90 tablet 1    sulfaSALAzine ER (AZULFIDINE EN) 500 MG EC tablet Take 2 tablets (1,000 mg) by mouth 2 times daily. 360 tablet 2    traZODone (DESYREL) 50 MG tablet Take 2 tablets (100 mg) by mouth nightly as needed for sleep. 180 tablet 4    triamcinolone (KENALOG) 0.1 % external cream APPLY TOPICALLY 2 TIMES DAILY 30 g 1    VITAMIN D3 50 MCG (2000 UT) tablet TAKE ONE TABLET BY MOUTH ONCE DAILY  "90 tablet 3    XELJANZ XR 11 MG 24 hr tablet TAKE ONE TABLET BY MOUTH ONCE DAILY HOLD FOR SIGNS OF INFECTION, THEN SEEK MEDICAL ATTENTION. 30 tablet 11     No current facility-administered medications for this visit.       SOCIAL HISTORY:  Social History     Tobacco Use    Smoking status: Never    Smokeless tobacco: Never   Substance Use Topics    Alcohol use: Not Currently       Family History   Problem Relation Age of Onset    Hypertension Father     Lipids Father     Hypertension Mother     Lipids Mother     Diabetes No family hx of     Coronary Artery Disease No family hx of     Hyperlipidemia No family hx of     Cerebrovascular Disease No family hx of     Breast Cancer No family hx of     Colon Cancer No family hx of     Prostate Cancer No family hx of     Other Cancer No family hx of     Depression No family hx of     Anxiety Disorder No family hx of     Mental Illness No family hx of     Substance Abuse No family hx of     Anesthesia Reaction No family hx of     Asthma No family hx of     Osteoporosis No family hx of     Genetic Disorder No family hx of     Thyroid Disease No family hx of     Obesity No family hx of     Unknown/Adopted No family hx of            Review of Systems  Constitutional, HEENT, cardiovascular, pulmonary, gi and gu systems are negative, except as otherwise noted.      Objective    /78 (BP Location: Left arm, Patient Position: Chair, Cuff Size: Adult Regular)   Pulse 71   Temp 98.1  F (36.7  C) (Oral)   Resp 20   Ht 1.499 m (4' 11\")   Wt 54.2 kg (119 lb 6.4 oz)   LMP 03/26/2019 (Within Days)   SpO2 98%   BMI 24.12 kg/m    Body mass index is 24.12 kg/m .  Physical Exam   GENERAL: alert and no distress  EYES: Eyes grossly normal to inspection, PERRL and conjunctivae and sclerae normal  HENT: ear canals and TM's normal, nose and mouth without ulcers or lesions  NECK: no adenopathy, no asymmetry, masses, or scars  RESP: lungs clear to auscultation - no rales, rhonchi or " wheezes  CV: regular rate and rhythm, normal S1 S2, no S3 or S4, no murmur, click or rub, no peripheral edema  MS: right thumb MCP is swollen and tender - mild   SKIN: no suspicious lesions or rashes  NEURO: Normal strength and tone, mentation intact and speech normal  PSYCH: mentation appears normal, affect normal/bright  LYMPH: no cervical, supraclavicular, axillary, or inguinal adenopathy    Xray - Reviewed and interpreted by me.  Looks normal   Office Visit on 01/02/2025   Component Date Value Ref Range Status    Color Urine 01/02/2025 Yellow  Colorless, Straw, Light Yellow, Yellow Final    Appearance Urine 01/02/2025 Clear  Clear Final    Glucose Urine 01/02/2025 Negative  Negative mg/dL Final    Bilirubin Urine 01/02/2025 Negative  Negative Final    Ketones Urine 01/02/2025 Negative  Negative mg/dL Final    Specific Gravity Urine 01/02/2025 >=1.030  1.003 - 1.035 Final    Blood Urine 01/02/2025 Large (A)  Negative Final    pH Urine 01/02/2025 5.5  5.0 - 7.0 Final    Protein Albumin Urine 01/02/2025 100 (A)  Negative mg/dL Final    Urobilinogen Urine 01/02/2025 0.2  0.2, 1.0 E.U./dL Final    Nitrite Urine 01/02/2025 Negative  Negative Final    Leukocyte Esterase Urine 01/02/2025 Moderate (A)  Negative Final    Trichomonas 01/02/2025 Absent  Absent Final    Yeast 01/02/2025 Absent  Absent Final    Clue Cells 01/02/2025 Absent  Absent Final    WBCs/high power field 01/02/2025 1+ (A)  None Final    Bacteria Urine 01/02/2025 Moderate (A)  None Seen /HPF Final    RBC Urine 01/02/2025 25-50 (A)  0-2 /HPF /HPF Final    WBC Urine 01/02/2025 >100 (A)  0-5 /HPF /HPF Final    Squamous Epithelials Urine 01/02/2025 Moderate (A)  None Seen /LPF Final    WBC Clumps Urine 01/02/2025 Present (A)  None Seen /HPF Final    Mucus Urine 01/02/2025 Present (A)  None Seen /LPF Final    Culture 01/02/2025 10,000-50,000 CFU/mL Escherichia coli (A)   Final           Signed Electronically by: Terri Robles MD

## 2025-06-18 NOTE — LETTER
My Depression Action Plan  Name: Zo Qureshi   Date of Birth 1975  Date: 6/18/2025    My doctor: Terri Robles   My clinic: [address] [address] [phone]            GREEN    ZONE   Good Control    What it looks like:   Things are going generally well. You have normal ups and downs. You may even feel depressed from time to time, but bad moods usually last less than a day.   What you need to do:  Continue to care for yourself (see self care plan)  Check your depression survival kit and update it as needed  Follow your physician s recommendations including any medication.  Do not stop taking medication unless you consult with your physician first.             YELLOW         ZONE Getting Worse    What it looks like:   Depression is starting to interfere with your life.   It may be hard to get out of bed; you may be starting to isolate yourself from others.  Symptoms of depression are starting to last most all day and this has happened for several days.   You may have suicidal thoughts but they are not constant.   What you need to do:     Call your care team. Your response to treatment will improve if you keep your care team informed of your progress. Yellow periods are signs an adjustment may need to be made.     Continue your self-care.  Just get dressed and ready for the day.  Don't give yourself time to talk yourself out of it.    Talk to someone in your support network.    Open up your Depression Self-Care Plan/Wellness Kit.             RED    ZONE Medical Alert - Get Help    What it looks like:   Depression is seriously interfering with your life.   You may experience these or other symptoms: You can t get out of bed most days, can t work or engage in other necessary activities, you have trouble taking care of basic hygiene, or basic responsibilities, thoughts of suicide or death that will not go away, self-injurious behavior.      What you need to do:  Call your care team and request a same-day appointment. If they are not available (weekends or after hours) call your local crisis line, emergency room or 911.          Depression Self-Care Plan / Wellness Kit    Many people find that medication and therapy are helpful treatments for managing depression. In addition, making small changes to your everyday life can help to boost your mood and improve your wellbeing. Below are some tips for you to consider. Be sure to talk with your medical provider and/or behavioral health consultant if your symptoms are worsening or not improving.     Sleep   Sleep hygiene  means all of the habits that support good, restful sleep. It includes maintaining a consistent bedtime and wake time, using your bedroom only for sleeping or sex, and keeping the bedroom dark and free of distractions like a computer, smartphone, or television.     Develop a Healthy Routine  Maintain good hygiene. Get out of bed in the morning, make your bed, brush your teeth, take a shower, and get dressed. Don t spend too much time viewing media that makes you feel stressed. Find time to relax each day.    Exercise  Get some form of exercise every day. This will help reduce pain and release endorphins, the  feel good  chemicals in your brain. It can be as simple as just going for a walk or doing some gardening, anything that will get you moving.      Diet  Strive to eat healthy foods, including fruits and vegetables. Drink plenty of water. Avoid excessive sugar, caffeine, alcohol, and other mood-altering substances.     Stay Connected with Others  Stay in touch with friends and family members.    Manage Your Mood  Try deep breathing, massage therapy, biofeedback, or meditation. Take part in fun activities when you can. Try to find something to smile about each day.     Psychotherapy  Be open to working with a therapist if your provider recommends it.     Medication  Be sure to take your  medication as prescribed. Most anti-depressants need to be taken every day. It usually takes several weeks for medications to work. Not all medicines work for all people. It is important to follow-up with your provider to make sure you have a treatment plan that is working for you. Do not stop your medication abruptly without first discussing it with your provider.    Crisis Resources   These hotlines are for both adults and children. They and are open 24 hours a day, 7 days a week unless noted otherwise.    National Suicide Prevention Lifeline   988 or 3-789-532-ZHRK (9206)    Crisis Text Line    www.crisistextline.org  Text HOME to 809557 from anywhere in the [place], anytime, about any type of crisis. A live, trained crisis counselor will receive the text and respond quickly.    [place] Lifeline for LGBTQ Youth  A national crisis intervention and suicide lifeline for LGBTQ youth under 25. Provides a safe place to talk without judgement. Call 1-725.640.7542; text START to 024883 or visit www.the[facility].comvorproject.org to talk to a trained counselor.    For [facility] crisis numbers, visit the [facility] website at:  https://[place].gov/dhs/people-we-serve/adults/health-care/mental-health/resources/crisis-contacts.jsp

## 2025-06-19 ENCOUNTER — RESULTS FOLLOW-UP (OUTPATIENT)
Dept: FAMILY MEDICINE | Facility: CLINIC | Age: 50
End: 2025-06-19

## 2025-06-19 ENCOUNTER — RESULTS FOLLOW-UP (OUTPATIENT)
Dept: PHARMACY | Facility: CLINIC | Age: 50
End: 2025-06-19

## 2025-06-19 ENCOUNTER — PATIENT OUTREACH (OUTPATIENT)
Dept: CARE COORDINATION | Facility: CLINIC | Age: 50
End: 2025-06-19
Payer: MEDICARE

## 2025-06-23 ENCOUNTER — PATIENT OUTREACH (OUTPATIENT)
Dept: CARE COORDINATION | Facility: CLINIC | Age: 50
End: 2025-06-23
Payer: MEDICARE

## 2025-07-02 NOTE — PROGRESS NOTES
ASSESSMENT & PLAN  Patient Instructions     1. Tendinitis of thumb    2. Pain of right thumb      - Patient has chronic right thumb pain and stiffness due to a flexor tendinitis  -Personal review of x-rays of the right thumb show no acute fracture or significant arthritis  -Patient will start formal hand therapy and home exercise program.  To consider custom thumb splint to be made by her therapist  -Patient reports no relief with meloxicam anti-inflammatory medication.  Patient was advised to try stopping and will start diclofenac 75 mg twice a day as needed.  Patient may continue with whichever anti-inflammatory provides better relief.  Patient may also take Extra strength Tylenol for breakthrough pain  -Patient will continue to follow-up with me for further treatment and recommendations.    -Call direct clinic number [[phone]] at any time with questions or concerns.    Albert Yeo MD [facility] and [facility] Medicine  [facility]        -----    SUBJECTIVE  Zo Qureshi is a/an 50 year old Right handed female who is seen in consultation at the request of  Terri Robles M.D. for evaluation of right hand, thumb pain. The patient is seen by themselves.    Onset: 7 month(s) ago. Reports insidious onset without acute precipitating event.  Location of Pain: right CMC and IP joint  Rating of Pain at worst: 8/10  Rating of Pain Currently: 3/10  Worsened by: thumb extension, gripping, grasping, cooking, doing dishes    Better with: No treatments  Treatments tried: massage, lidocaine patch  Associated symptoms: swelling catching  Orthopedic history: NO  Relevant surgical history: NO  Social history: Not employed     Past Medical History:   Diagnosis Date    Anemia     History of blood transfusion 2000    after vaginal delivery, 2 units PRBCs given    HSIL on Pap smear 09/21/2011    MARTA 2 and 3    Immune to hepatitis B 08/2021    hep B antigen NEG    INFLAM SPONDYLOPATHY NOS 01/07/2008     check labs on sulfasalzine every 3 months and check hep B and C and TB every 2 years    Leukocytopenia 03/10/2014    Tendinosis     Thrombocytopenia 03/10/2014    Unspecified closed fracture of pelvis 2000    left fracture of pelvis after delivery     Social History     Socioeconomic History    Marital status: Single    Number of children: 4    Highest education level: Bachelor's degree (e.g., BA, AB, BS)   Occupational History    Occupation:      Employer: [employer]   Tobacco Use    Smoking status: Never    Smokeless tobacco: Never   Vaping Use    Vaping status: Never Used   Substance and Sexual Activity    Alcohol use: Not Currently    Drug use: No    Sexual activity: Not Currently     Partners: Male     Birth control/protection: Female Surgical     Comment: LBTL 2009   Other Topics Concern    Parent/sibling w/ CABG, MI or angioplasty before 65F 55M? No     Social Drivers of Health     Financial Resource Strain: Low Risk  (12/18/2024)    Financial Resource Strain     Within the past 12 months, have you or your family members you live with been unable to get utilities (heat, electricity) when it was really needed?: No   Food Insecurity: Unknown (12/18/2024)    Food Insecurity     Within the past 12 months, did you worry that your food would run out before you got money to buy more?: Patient declined     Within the past 12 months, did the food you bought just not last and you didn t have money to get more?: Patient declined   Transportation Needs: Low Risk  (12/18/2024)    Transportation Needs     Within the past 12 months, has lack of transportation kept you from medical appointments, getting your medicines, non-medical meetings or appointments, work, or from getting things that you need?: No   Physical Activity: Unknown (12/18/2024)    Exercise Vital Sign     Days of Exercise per Week: 0 days   Stress: Stress Concern Present (12/18/2024)    [employer] -  Occupational Stress Questionnaire     Feeling of Stress : To some extent   Social Connections: Unknown (12/18/2024)    Social Connection and Isolation Panel [NHANES]     Frequency of Social Gatherings with Friends and Family: Patient declined   Interpersonal Safety: Low Risk  (6/18/2025)    Interpersonal Safety     Do you feel physically and emotionally safe where you currently live?: Yes     Within the past 12 months, have you been hit, slapped, kicked or otherwise physically hurt by someone?: No     Within the past 12 months, have you been humiliated or emotionally abused in other ways by your partner or ex-partner?: No   Housing Stability: Low Risk  (12/18/2024)    Housing Stability     Do you have housing? : Yes     Are you worried about losing your housing?: No         Patient's past medical, surgical, social, and family histories were reviewed today and no changes are noted.    REVIEW OF SYSTEMS:  10 point ROS is negative other than symptoms noted above in HPI, Past Medical History or as stated below  Constitutional: NEGATIVE for fever, chills, change in weight  Skin: NEGATIVE for worrisome rashes, moles or lesions  GI/: NEGATIVE for bowel or bladder changes  Neuro: NEGATIVE for weakness, dizziness or paresthesias    OBJECTIVE:  LMP 03/26/2019 (Within Days)    General: healthy, alert and in no distress  HEENT: no scleral icterus or conjunctival erythema  Skin: no suspicious lesions or rash. No jaundice.  CV: regular rhythm by palpation  Resp: normal respiratory effort without conversational dyspnea   Psych: normal mood and affect  Gait: normal steady gait with appropriate coordination and balance  Neuro: normal light touch sensory exam of the bilateral hands.    MSK:  RIGHT HAND  Inspection:    No swelling or obvious deformity or asymmetry  Palpation:   Carpals: normal   Metacarpals: normal   Thumb: MCP joint, PIP joint, flexor tendon   Fingers: normal  Range of Motion:    Full active flexion and extension  at MCP, PIP, and DIP joints; normal finger cascade without malrotation.  Wrist pronation, supination, and ulnar/radial deviation normal.  Strength:     within normal limits, extension within normal limits, flexion within normal limits  Special Tests:    Positive: none    Negative: CMC grind, palpable triggering, Finkelstein's    Independent visualization of the below image:  No results found for this or any previous visit (from the past 24 hours).      Personal review of right thumb x-ray performed on 6/18/2025 shows no acute fracture, dislocation or osseous abnormalities.        Albert Yeo MD [facility]

## 2025-07-03 ENCOUNTER — OFFICE VISIT (OUTPATIENT)
Dept: ORTHOPEDICS | Facility: CLINIC | Age: 50
End: 2025-07-03
Attending: FAMILY MEDICINE
Payer: MEDICARE

## 2025-07-03 DIAGNOSIS — M47.819 SERONEGATIVE SPONDYLOARTHROPATHY: ICD-10-CM

## 2025-07-03 DIAGNOSIS — M79.644 PAIN OF RIGHT THUMB: ICD-10-CM

## 2025-07-03 DIAGNOSIS — M77.8 TENDINITIS OF THUMB: Primary | ICD-10-CM

## 2025-07-03 DIAGNOSIS — M45.8 ANKYLOSING SPONDYLITIS OF SACRAL REGION (H): ICD-10-CM

## 2025-07-03 RX ORDER — DICLOFENAC SODIUM 75 MG/1
75 TABLET, DELAYED RELEASE ORAL 2 TIMES DAILY PRN
Qty: 60 TABLET | Refills: 0 | Status: SHIPPED | OUTPATIENT
Start: 2025-07-03

## 2025-07-03 NOTE — PATIENT INSTRUCTIONS
1. Tendinitis of thumb    2. Pain of right thumb      - Patient has chronic right thumb pain and stiffness due to a flexor tendinitis  -Personal review of x-rays of the right thumb show no acute fracture or significant arthritis  -Patient will start formal hand therapy and home exercise program.  To consider custom thumb splint to be made by her therapist  -Patient reports no relief with meloxicam anti-inflammatory medication.  Patient was advised to try stopping and will start diclofenac 75 mg twice a day as needed.  Patient may continue with whichever anti-inflammatory provides better relief.  Patient may also take Extra strength Tylenol for breakthrough pain  -Patient will continue to follow-up with me for further treatment and recommendations.    -Call direct clinic number [[phone]] at any time with questions or concerns.    Albert Yeo MD [facility]  [facility]

## 2025-07-03 NOTE — LETTER
Colonoscopy Discharge Instructions  Activity:  Do not drive or operate heavy machinery for the next 24 hours. The medication you have received may impair your reflexes and coordination.    You may feel sleepy or groggy for the rest of the day.    Tomorrow, you can resume your full activity unless otherwise instructed by your doctor.    If a polyp has been removed, for the next seven days  Do not take long car trips  Do not do any heavy lifting, straining, or running  Do not take any aspirin, or anti-inflammatory medications such as Advil, Aleve, Motrin or Ibuprofen for 7 to 10 days.  If you are on a blood thinner, ask your doctor when you can resume it.    Diet:  Resume a regular diet.    Do not drink alcohol for 24 hours.     Special Instructions:  It is normal to have mild abdominal pain and bloating.     It is normal to have gas right after the test. It can help to try to pass it to help prevent later bloating.     You may notice a small amount of blood in your stool or on the tissue paper today.    Call your Gastroenterologist if you develop the following:  Severe abdominal pain or bloating  Chills or fever of 100 degrees or more  Blood from your rectum or in your stools measuring greater than 1/3 cup.         Created: 10/2020   Created by:  Ishmael    Revised:  10/2020     [facility]    05/08/17    Patient: Zo Qureshi  YOB: 1975  Medical Record Number: [id number]                                                                  Controlled Substance Agreement  I understand that my care provider has prescribed controlled substances (narcotics, tranquilizers, and/or stimulants) to help manage my condition(s).  I am taking this medicine to help me function or work.  I know that this is strong medicine.  It could have serious side effects and even cause a dependency on the drug.  If I stop these medicines suddenly, I could have severe withdrawal symptoms.    The risks, benefits, and side effects of these medication(s) were explained to me.  I agree that:  1. I will take part in other treatments as advised by my provider.  This may be psychiatry or counseling, physical therapy, behavioral therapy, group treatment, or a referral to a pain clinic.  I will reduce or stop my medicine when my provider tells me to do so.   2. I will take my medicines as prescribed.  I will not change the dose or schedule unless my provider tells me to.  There will be no refills if I  run out early.   I may be contacted at any time without warning and asked to complete a drug test or pill count.   3. I will keep all my appointments at the clinic.  If I miss appointments or fail to follow instructions, my provider may stop my medicine.  4. I will not ask other providers to prescribe controlled substances. And I will not accept controlled substances from other people. If I need another prescribed controlled substance for a new reason, I will notify my provider within one business day.  5. If I enroll in the [place] Medical Marijuana program, I will tell my provider.  I will also sign an agreement to share my medical records with my provider.  6. I will use one pharmacy to fill all of my controlled substance prescriptions.  If my prescription is mailed to my pharmacy, it may take 5  to 7 days for my medicine to be ready.  7. I understand that my provider, clinic care team, and pharmacy can track controlled substance prescriptions from other providers through a central database (prescription monitoring program).  8. I will bring in my list of medications (or my medicine bottles) each time I come to the clinic.  REV-  04/2016                                                                                                                                            Page 1 of 2      [facility]    05/08/17    Patient: Zo Qureshi  YOB: 1975  Medical Record Number: [id number]    9. Refills of controlled substances will be made only during office hours.  It is up to me to make sure that I do not run out of my medicines on weekends or holidays.    10. I am responsible for my prescriptions.  If the medicine is lost or stolen, it will not be replaced.   I also agree not to share these medicines with anyone.  11. I agree to not use ANY illegal or recreational drugs.  This includes marijuana, cocaine, bath salts or other drugs.  I agree not to use alcohol unless my provider says I may.  I agree to give urine samples whenever asked.  If I fail to give a urine sample, the provider may stop my medicine.     12. I will tell my nurse or provider right away if I become pregnant or have a new medical problem treated outside of [facility].  13. I understand that this medicine can affect my thinking and judgment.  It may be unsafe for me to drive, use machinery and do dangerous tasks.  I will not do any of these things until I know how the medicine affects me.  If my dose changes, I will wait to see how it affects me.  I will contact my provider if I have concerns about medicine side effects.  I understand that if I do not follow any of the conditions above, my prescriptions or treatment may be stopped.    I agree that my provider, clinic care team, and pharmacy may work with any  city, state or federal law enforcement agency that investigates the misuse, sale, or other diversion of my controlled medicine. I will allow my provider to discuss my care with or share a copy of this agreement with any other treating provider, pharmacy or emergency room where I receive care.  I agree to give up (waive) any right of privacy or confidentiality with respect to these authorizations.   I have read this agreement and have asked questions about anything I did not understand.   ___________________________________    ___________________________  Patient Signature                                                           Date and Time  ___________________________________     ____________________________  Witness                                                                            Date and Time  ___________________________________  Terri Baron MD  REV-  04/2016                                                                                                                                                                 Page 2 of 2

## 2025-07-07 DIAGNOSIS — M47.819 SERONEGATIVE SPONDYLOARTHROPATHY: ICD-10-CM

## 2025-07-07 DIAGNOSIS — M45.8 ANKYLOSING SPONDYLITIS OF SACRAL REGION (H): ICD-10-CM

## 2025-07-07 RX ORDER — SULFASALAZINE 500 MG/1
1000 TABLET, DELAYED RELEASE ORAL 2 TIMES DAILY
Qty: 360 TABLET | Refills: 2 | OUTPATIENT
Start: 2025-07-07

## 2025-07-07 RX ORDER — SULFASALAZINE 500 MG/1
1000 TABLET, DELAYED RELEASE ORAL 2 TIMES DAILY
Qty: 360 TABLET | Refills: 1 | Status: SHIPPED | OUTPATIENT
Start: 2025-07-07

## 2025-08-20 ENCOUNTER — THERAPY VISIT (OUTPATIENT)
Dept: OCCUPATIONAL THERAPY | Facility: CLINIC | Age: 50
End: 2025-08-20
Payer: MEDICARE

## 2025-08-20 DIAGNOSIS — M77.8 TENDINITIS OF THUMB: Primary | ICD-10-CM

## 2025-08-20 PROCEDURE — 97140 MANUAL THERAPY 1/> REGIONS: CPT | Mod: GO | Performed by: OCCUPATIONAL THERAPIST

## 2025-08-20 PROCEDURE — 97110 THERAPEUTIC EXERCISES: CPT | Mod: GO | Performed by: OCCUPATIONAL THERAPIST

## 2025-09-03 ENCOUNTER — TELEPHONE (OUTPATIENT)
Dept: FAMILY MEDICINE | Facility: CLINIC | Age: 50
End: 2025-09-03
Payer: MEDICARE

## (undated) DEVICE — SOL NACL 0.9% IRRIG 3000ML BAG 2B7477

## (undated) DEVICE — SOL NACL 0.9% IRRIG 1000ML BOTTLE 2F7124

## (undated) DEVICE — BASIC SINGLE BASIN 1-LF

## (undated) DEVICE — SEAL SET MYOSURE ROD LENS SCOPE SINGLE USE 40-902

## (undated) DEVICE — PACK MINOR LITHOTOMY RIDGES

## (undated) DEVICE — LINEN FULL SHEET 5511

## (undated) DEVICE — SUCTION CANISTER BEMIS HI FLOW 006772-901

## (undated) DEVICE — PAD PERI INDIV WRAP 11" 2022A

## (undated) DEVICE — Device

## (undated) DEVICE — TUBING SYS AQUILEX BLUE INFLOW AQL-110 YLW OUTFLOW AQL-111

## (undated) DEVICE — LINEN HALF SHEET 5512

## (undated) DEVICE — ESU CATH ABLATION NOVASURE NS2000

## (undated) DEVICE — KIT ENDO TURNOVER/PROCEDURE W/CLEAN A SCOPE LINERS 103888

## (undated) DEVICE — BAG CLEAR TRASH 1.3M 39X33" P4040C

## (undated) DEVICE — SPECIMEN BAG BEMIS HI FLOW SUCTION WHITE SOCK 533810

## (undated) DEVICE — CATH INTERMITTENT CLEAN-CATH FEMALE 14FR 6" VINYL LF 420614

## (undated) DEVICE — GLOVE PROTEXIS W/NEU-THERA 7.5  2D73TE75

## (undated) DEVICE — GOWN IMPERVIOUS SPECIALTY XLG/XLONG 32474

## (undated) RX ORDER — FENTANYL CITRATE 50 UG/ML
INJECTION, SOLUTION INTRAMUSCULAR; INTRAVENOUS
Status: DISPENSED
Start: 2018-12-04

## (undated) RX ORDER — HYDROCODONE BITARTRATE AND ACETAMINOPHEN 5; 325 MG/1; MG/1
TABLET ORAL
Status: DISPENSED
Start: 2018-12-04

## (undated) RX ORDER — DIPHENHYDRAMINE HYDROCHLORIDE 50 MG/ML
INJECTION INTRAMUSCULAR; INTRAVENOUS
Status: DISPENSED
Start: 2018-12-04